# Patient Record
Sex: MALE | Race: WHITE | NOT HISPANIC OR LATINO | Employment: OTHER | URBAN - METROPOLITAN AREA
[De-identification: names, ages, dates, MRNs, and addresses within clinical notes are randomized per-mention and may not be internally consistent; named-entity substitution may affect disease eponyms.]

---

## 2017-01-12 ENCOUNTER — CLINICAL SUPPORT (OUTPATIENT)
Dept: SMOKING CESSATION | Facility: CLINIC | Age: 57
End: 2017-01-12
Payer: COMMERCIAL

## 2017-01-12 DIAGNOSIS — F17.200 NICOTINE DEPENDENCE: Primary | ICD-10-CM

## 2017-01-12 PROCEDURE — 99404 PREV MED CNSL INDIV APPRX 60: CPT | Mod: S$GLB,,,

## 2017-01-12 PROCEDURE — 99999 PR PBB SHADOW E&M-EST. PATIENT-LVL I: CPT | Mod: PBBFAC,,,

## 2017-01-12 RX ORDER — IBUPROFEN 200 MG
1 TABLET ORAL DAILY
Qty: 14 PATCH | Refills: 0 | Status: SHIPPED | OUTPATIENT
Start: 2017-01-12 | End: 2017-01-26

## 2017-01-12 RX ORDER — VARENICLINE TARTRATE 0.5 (11)-1
KIT ORAL
Qty: 1 PACKAGE | Refills: 0 | Status: SHIPPED | OUTPATIENT
Start: 2017-01-12 | End: 2017-02-02 | Stop reason: ALTCHOICE

## 2017-01-12 RX ORDER — IBUPROFEN 200 MG
1 TABLET ORAL DAILY
Qty: 28 PATCH | Refills: 0 | Status: SHIPPED | OUTPATIENT
Start: 2017-01-12 | End: 2017-01-12 | Stop reason: CLARIF

## 2017-01-12 NOTE — Clinical Note
Pt seen at intake today. He currently smokes  20 cigs/day. Discussed tobacco cessation medications of chantix starter pack , pt has quit with Chantix before.   Pt started on rate reduction and wait time of 15 min prior to smoking. Will see pt back in office in 2 weeks. Pt's exhaled carbon monoxide level was 30 ppm as per Smokerlyzer.  Please see Tobacco Cessation Intake Form for patient assessment and recommendations.

## 2017-01-15 VITALS — SYSTOLIC BLOOD PRESSURE: 125 MMHG | DIASTOLIC BLOOD PRESSURE: 75 MMHG

## 2017-01-19 ENCOUNTER — CLINICAL SUPPORT (OUTPATIENT)
Dept: SMOKING CESSATION | Facility: CLINIC | Age: 57
End: 2017-01-19
Payer: COMMERCIAL

## 2017-01-19 DIAGNOSIS — F17.200 NICOTINE DEPENDENCE: Primary | ICD-10-CM

## 2017-01-19 PROCEDURE — 99407 BEHAV CHNG SMOKING > 10 MIN: CPT | Mod: S$GLB,,,

## 2017-01-19 NOTE — PROGRESS NOTES
Phone follow up today. He continues to smoke 20 cigs/day. Pt remains on tobacco cessation medications of chantix starter pack  21 mg nicotine patch QD . No adverse effects/mental changes noted at this time. Reviewed coping strategies/habitual behavior/relapse prevention with patient.  Will see pt back in office in 1 week.

## 2017-02-02 ENCOUNTER — CLINICAL SUPPORT (OUTPATIENT)
Dept: SMOKING CESSATION | Facility: CLINIC | Age: 57
End: 2017-02-02
Payer: COMMERCIAL

## 2017-02-02 DIAGNOSIS — F17.200 NICOTINE DEPENDENCE: ICD-10-CM

## 2017-02-02 PROCEDURE — 99402 PREV MED CNSL INDIV APPRX 30: CPT | Mod: S$GLB,,,

## 2017-02-02 RX ORDER — VARENICLINE TARTRATE 1 MG/1
1 TABLET, FILM COATED ORAL 2 TIMES DAILY
Qty: 60 TABLET | Refills: 1 | Status: SHIPPED | OUTPATIENT
Start: 2017-02-02 | End: 2017-03-02 | Stop reason: SDUPTHER

## 2017-02-02 NOTE — Clinical Note
Pt seen in office today.      He quit smoking 7 days ago.   Pt remains on tobacco cessation medications of   chantix 1 mg BID                           No adverse effects  or mental changes noted at this time.  Pt doing well with rate reduction and wait times prior to smoking.  Reviewed coping strategies/habitual behavior/relapse prevention with patient.  Exhaled carbon monoxide level was 2   per Smokerlyzer.  Will see pt back in office in 2 weeks .  Congratulated patient on his  success .

## 2017-02-02 NOTE — PROGRESS NOTES
Individual Follow-Up Form    2/2/2017    Quit Date:     Clinical Status of Patient: Outpatient    Length of Service: 30 minutes    Continuing Medication: yes  Chantix    Other Medications: none     Target Symptoms: Withdrawal and medication side effects. The following were  rated moderate (3) to severe (4) on TCRS:  · Moderate (3): none  · Severe (4): none    Comments: Pt seen in office today.       He quit smoking 7 days ago.    Pt remains on tobacco cessation medications of    chantix 1 mg BID                            No adverse effects  or mental changes noted at this time.   Pt doing well with rate reduction and wait times prior to smoking.   Reviewed coping strategies/habitual behavior/relapse prevention with patient.   Exhaled carbon monoxide level was 2   per Smokerlyzer.   Will see pt back in office in 1 week .   Congratulated patient on his  success .      Diagnosis: F17.200    Next Visit: 2 weeks

## 2017-03-02 ENCOUNTER — CLINICAL SUPPORT (OUTPATIENT)
Dept: SMOKING CESSATION | Facility: CLINIC | Age: 57
End: 2017-03-02
Payer: COMMERCIAL

## 2017-03-02 DIAGNOSIS — F17.200 NICOTINE DEPENDENCE: Primary | ICD-10-CM

## 2017-03-02 PROCEDURE — 99999 PR PBB SHADOW E&M-EST. PATIENT-LVL I: CPT | Mod: PBBFAC,,,

## 2017-03-02 PROCEDURE — 99402 PREV MED CNSL INDIV APPRX 30: CPT | Mod: S$GLB,,,

## 2017-03-02 RX ORDER — VARENICLINE TARTRATE 1 MG/1
1 TABLET, FILM COATED ORAL 2 TIMES DAILY
Qty: 60 TABLET | Refills: 0 | Status: SHIPPED | OUTPATIENT
Start: 2017-03-02 | End: 2017-06-13

## 2017-03-02 NOTE — PROGRESS NOTES
Individual Follow-Up Form    3/2/2017    Quit Date: 2/1/17    Clinical Status of Patient: Outpatient    Length of Service: 30 minutes    Continuing Medication: yes  Chantix    Other Medications: none     Target Symptoms: Withdrawal and medication side effects. The following were  rated moderate (3) to severe (4) on TCRS:  · Moderate (3): none  · Severe (4): none    Comments: Pt seen in office today.       He quit smoking on 2/1/17.    Pt remains on tobacco cessation medications of    chantix starter 1 mg BID                             No adverse effects  or mental changes noted at this time.    Reviewed coping strategies/habitual behavior/relapse prevention with patient.   Exhaled carbon monoxide level was  0  per Smokerlyzer.   Will see pt back in office in 2 weeks .   Congratulated patient on his success .      Diagnosis: F17.200    Next Visit: 2 weeks

## 2017-03-02 NOTE — Clinical Note
Pt seen in office today.      He quit smoking on 2/1/17.   Pt remains on tobacco cessation medications of   chantix starter 1 mg BID                            No adverse effects  or mental changes noted at this time.   Reviewed coping strategies/habitual behavior/relapse prevention with patient.  Exhaled carbon monoxide level was  0  per Smokerlyzer.  Will see pt back in office in 2 weeks .  Congratulated patient on his success .Gave patient his quit coin.

## 2017-03-20 ENCOUNTER — CLINICAL SUPPORT (OUTPATIENT)
Dept: SMOKING CESSATION | Facility: CLINIC | Age: 57
End: 2017-03-20
Payer: COMMERCIAL

## 2017-03-20 DIAGNOSIS — F17.200 NICOTINE DEPENDENCE: Primary | ICD-10-CM

## 2017-03-20 PROCEDURE — 99406 BEHAV CHNG SMOKING 3-10 MIN: CPT | Mod: S$GLB,,,

## 2017-04-07 ENCOUNTER — HOSPITAL ENCOUNTER (OUTPATIENT)
Dept: PREADMISSION TESTING | Facility: HOSPITAL | Age: 57
Discharge: HOME | End: 2017-04-07
Attending: SURGERY
Payer: COMMERCIAL

## 2017-04-07 VITALS
TEMPERATURE: 98 F | RESPIRATION RATE: 17 BRPM | HEART RATE: 67 BPM | OXYGEN SATURATION: 97 % | BODY MASS INDEX: 20.81 KG/M2 | WEIGHT: 153.63 LBS | DIASTOLIC BLOOD PRESSURE: 88 MMHG | HEIGHT: 72 IN | SYSTOLIC BLOOD PRESSURE: 162 MMHG

## 2017-04-07 DIAGNOSIS — Z01.818 PRE-OP TESTING: Primary | ICD-10-CM

## 2017-04-07 LAB
ANION GAP SERPL CALC-SCNC: 13 MMOL/L
BASOPHILS # BLD AUTO: 0.03 K/UL
BASOPHILS NFR BLD: 0.4 %
BUN SERPL-MCNC: 44 MG/DL
CALCIUM SERPL-MCNC: 9.5 MG/DL
CHLORIDE SERPL-SCNC: 109 MMOL/L
CO2 SERPL-SCNC: 18 MMOL/L
CREAT SERPL-MCNC: 1.9 MG/DL
DIFFERENTIAL METHOD: ABNORMAL
EOSINOPHIL # BLD AUTO: 0.1 K/UL
EOSINOPHIL NFR BLD: 1.5 %
ERYTHROCYTE [DISTWIDTH] IN BLOOD BY AUTOMATED COUNT: 12.4 %
EST. GFR  (AFRICAN AMERICAN): 44 ML/MIN/1.73 M^2
EST. GFR  (NON AFRICAN AMERICAN): 38 ML/MIN/1.73 M^2
GLUCOSE SERPL-MCNC: 92 MG/DL
HCT VFR BLD AUTO: 38.6 %
HGB BLD-MCNC: 13 G/DL
LYMPHOCYTES # BLD AUTO: 1.1 K/UL
LYMPHOCYTES NFR BLD: 14.9 %
MCH RBC QN AUTO: 31.3 PG
MCHC RBC AUTO-ENTMCNC: 33.7 %
MCV RBC AUTO: 93 FL
MONOCYTES # BLD AUTO: 0.6 K/UL
MONOCYTES NFR BLD: 7.9 %
NEUTROPHILS # BLD AUTO: 5.5 K/UL
NEUTROPHILS NFR BLD: 75.2 %
PLATELET # BLD AUTO: 203 K/UL
PMV BLD AUTO: 11.2 FL
POTASSIUM SERPL-SCNC: 4.8 MMOL/L
RBC # BLD AUTO: 4.15 M/UL
SODIUM SERPL-SCNC: 140 MMOL/L
WBC # BLD AUTO: 7.31 K/UL

## 2017-04-07 PROCEDURE — 36415 COLL VENOUS BLD VENIPUNCTURE: CPT

## 2017-04-07 PROCEDURE — 93005 ELECTROCARDIOGRAM TRACING: CPT

## 2017-04-07 PROCEDURE — 85025 COMPLETE CBC W/AUTO DIFF WBC: CPT

## 2017-04-07 PROCEDURE — 80048 BASIC METABOLIC PNL TOTAL CA: CPT

## 2017-04-07 NOTE — IP AVS SNAPSHOT
[place] [address] [place] [address]  Phone: [phone]           Patient Discharge Instructions  Our goal is to set you up for success. This packet includes information on your condition, medications, and your home care. It will help you care for yourself to prevent having to return to the hospital.     Please ask your nurse if you have any questions.      There are many details to remember when preparing for your surgery. Here is what you will need to do, please ask your nurse if there are more specific instructions and if you have any questions:    1. Before procedure Do not smoke or drink alcoholic beverages 24 hours prior to your procedure. Do not eat or drink anything 8 hours before your procedure - this includes gum, mints, and candy.     2. Day of procedure Please remove all jewelry for the procedure. If you wear contact lenses, dentures, hearing aids or glasses, bring a container to put them in during your surgery and give to a family member.  If your doctor has scheduled you for an overnight stay, bring a small overnight bag with any personal items that you need.      3. After procedure  Make arrangements in advance for transportation home by a responsible adult. It is not safe to drive a vehicle during the 24 hours following surgery.     PLEASE NOTE: You may be contacted the day before your surgery to confirm your surgery date and arrival time. The Surgery schedule has many variables which may affect the time of your surgery case. Family members should be available if your surgery time changes.               ** Verify the list of medication(s) below is accurate and up to date. Carry this with you in case of emergency. If your medications have changed, please notify your healthcare provider.             Medication List      TAKE these medications        Additional Info                      allopurinol 100 MG tablet   Commonly known as:  ZYLOPRIM   Quantity:  90 tablet    Refills:  0   Dose:  100 mg    Instructions:  Take 1 tablet (100 mg total) by mouth once daily.     Begin Date    AM    Noon    PM    Bedtime       amlodipine 5 MG tablet   Commonly known as:  NORVASC   Quantity:  90 tablet   Refills:  0    Instructions:  TAKE 1 TABLET(5 MG) BY MOUTH EVERY DAY     Begin Date    AM    Noon    PM    Bedtime       atenolol 50 MG tablet   Commonly known as:  TENORMIN   Quantity:  90 tablet   Refills:  0   Dose:  50 mg    Instructions:  Take 1 tablet (50 mg total) by mouth once daily.     Begin Date    AM    Noon    PM    Bedtime       escitalopram oxalate 10 MG tablet   Commonly known as:  LEXAPRO   Quantity:  90 tablet   Refills:  0   Dose:  10 mg    Instructions:  Take 1 tablet (10 mg total) by mouth once daily.     Begin Date    AM    Noon    PM    Bedtime       gabapentin 400 MG capsule   Commonly known as:  NEURONTIN   Quantity:  180 capsule   Refills:  0   Dose:  400 mg    Instructions:  Take 1 capsule (400 mg total) by mouth 2 (two) times daily.     Begin Date    AM    Noon    PM    Bedtime       hydrocodone-acetaminophen 10-325mg  mg Tab   Commonly known as:  NORCO   Quantity:  45 tablet   Refills:  0   Dose:  1 tablet    Instructions:  Take 1 tablet by mouth every 6 (six) hours as needed.     Begin Date    AM    Noon    PM    Bedtime       lactulose 20 gram Pack   Commonly known as:  CEPHULAC   Quantity:  5 packet   Refills:  0   Dose:  20 g    Instructions:  Take 1 packet (20 g total) by mouth once daily.     Begin Date    AM    Noon    PM    Bedtime       levothyroxine 25 MCG tablet   Commonly known as:  SYNTHROID   Quantity:  30 tablet   Refills:  0   Comments:  Pt needs TSH level    Instructions:  TAKE 1 TABLET(25 MCG) BY MOUTH EVERY DAY     Begin Date    AM    Noon    PM    Bedtime       sodium bicarbonate 325 MG tablet   Refills:  0   Dose:  325 mg    Instructions:  Take 325 mg by mouth 2 (two) times daily.     Begin Date    AM    Noon    PM    Bedtime        varenicline 1 mg Tab   Commonly known as:  CHANTIX CONTINUING MONTH BOX   Quantity:  60 tablet   Refills:  0   Dose:  1 mg    Instructions:  Take 1 tablet (1 mg total) by mouth 2 (two) times daily.     Begin Date    AM    Noon    PM    Bedtime                  Please bring to all follow up appointments:    1. A copy of your discharge instructions.  2. All medicines you are currently taking in their original bottles.  3. Identification and insurance card.    Please arrive 15 minutes ahead of scheduled appointment time.    Please call 24 hours in advance if you must reschedule your appointment and/or time.        Your Scheduled Appointments     Jun 13, 2017  9:00 AM CDT   Ct Renal Stone with [facility] CT1 LIMIT 400 LBS   [facility] ([facility])    [address] [place] [id number] [phone]            Jun 13, 2017 11:00 AM CDT   Established Patient Visit with CHAD Bo MD   [facility] [place] [facility] ([facility])    [address] [facility] [address]  [facility] [id number] [phone]              Your Future Surgeries/Procedures     Apr 12, 2017   Surgery with Estuardo Mack MD   [facility] ([facility])    [address] [place] [id number] [phone]                  Discharge Instructions         Your surgery is scheduled for __WEDNESDAY April 12, 2017__________________.    Call [phone] between 2 p.m. and 5 p.m. on   _TUESDAY April 11, 2017______________ to find out your arrival time for the day of your surgery.      Please report to SAME DAY SURGERY UNIT on the 2nd FLOOR at _______ a.m.  Use front door entrance. The doors open at 0530 am.        INSTRUCTIONS IMPORTANT!!!  ¨ Do not eat or drink after 12 midnight-including water. OK to brush teeth, no   gum, candy or mints!    TAKE  AMLODIPINE,  ATENOLOL,  AND   LEXAPRO  WITH A SIP OF WATER THE AM OF SURGERY        X____  Prep instructions:   SHOWER   OTHER   ______________________  X____  Please shower using Hibiclens soap the night before AND  the morning of  your surgery/procedure. Do not use Hibiclens on your face or genitals               If your surgery is around your belly button (Navel) be sure to wash inside your belly button also. Rinse hibiclens off completely.  X____  No shaving of procedural area at least 4-5 days before surgery due to   increased risk of skin irritation and/or possible infection.  X____  No powder, lotions or creams to your body.  X____  You may wear only deodorant on the day of surgery.  X____  Please remove all jewelry, including piercings and leave at home.  X____  No money or valuables needed. Please leave at home.  You may bring your   cell phone.  X____  If going home the same day, arrange for a ride home. You will not be able to   drive if Anesthesia was used.  X____  Wear loose fitting clothing. Allow for dressings, bandages.  X____  Stop Aspirin, Ibuprofen, Motrin and Aleve at least 3-5 days before    surgery, unless otherwise instructed by your doctor, or the nurse.              You MAY use Tylenol/acetaminophen until day of surgery.  X____  Call MD for temperature above 101 degrees.               I have read or had read and explained to me, and understand the above information.  Additional comments or instructions:Please call   [phone] if you have any questions regarding the instructions above.                 Admission Information     Date & Time Provider Department CSN    4/7/2017 12:30 PM Estuardo Mack MD [facility] [id number]      Care Providers     Provider Role Specialty Primary office phone    Estuardo Mack MD Attending Provider General Surgery [phone]      Your Vitals Were     BP Pulse Temp Resp Height Weight    162/88 (BP Location: Right arm, Patient Position: Sitting, BP Method: Automatic) 67 98 °F (36.7 °C) (Oral) 17 6' (1.829 m) 69.7 kg (153 lb 9.6 oz)    SpO2 BMI             97% 20.83  "kg/m2         Recent Lab Values     No lab values to display.      Allergies as of 4/7/2017        Reactions    Keflex [Cephalexin] Anaphylaxis, Other (See Comments)    UNKNOWN TO PT-" DID NOT AGREE WITH PT"    Percocet [Oxycodone-acetaminophen] Nausea And Vomiting      [facility]     [facility] Nurse Care Line - 24/7 Assistance  Unless otherwise directed by your provider, please contact [facility], our nurse care line that is available for 24/7 assistance.     Registered nurses in the [facility] provide clinical advisement, health education, appointment booking, and other advisory services.  Call for this free service at [phone].        Advance Directives     An advance directive is a document which, in the event you are no longer able to make decisions for yourself, tells your healthcare team what kind of treatment you do or do not want to receive, or who you would like to make those decisions for you.  If you do not currently have an advance directive, [facility] encourages you to create one.  For more information call:  [phone]), [phone] ([phone]),  or log on to [email or web address].        Smoking Cessation     If you would like to quit smoking:   You may be eligible for free services if you are a [place] resident and started smoking cigarettes before September 1, 1988.  Call the [employer]oking [employer]essation [employer] (SCT) toll free at (168) 768-4798 or (451) 698-4504.   Call 6-446-QUIT-NOW if you do not meet the above criteria.   Contact us via email: [email or web address]   View our website for more information: [email or web address]        Language Assistance Services     ATTENTION: Language assistance services are available, free of charge. Please call [phone].      ATENCIÓN: Si habla español, tiene a platt disposición servicios gratuitos de asistencia lingüística. Llame al [phone].     CHÚ Ý: N?u b?n nói Ti?ng Vi?t, có các d?ch v? " h? tr? ngôn ng? mi?n phí mikeh cho b?n. G?i s? [phone].         [facility] complies with applicable Federal civil rights laws and does not discriminate on the basis of race, color, national origin, age, disability, or sex.

## 2017-04-07 NOTE — DISCHARGE INSTRUCTIONS
Your surgery is scheduled for __WEDNESDAY April 12, 2017__________________.    Call [phone] between 2 p.m. and 5 p.m. on   _TUESDAY April 11, 2017______________ to find out your arrival time for the day of your surgery.      Please report to SAME DAY SURGERY UNIT on the 2nd FLOOR at _______ a.m.  Use front door entrance. The doors open at 0530 am.        INSTRUCTIONS IMPORTANT!!!  ¨ Do not eat or drink after 12 midnight-including water. OK to brush teeth, no   gum, candy or mints!    TAKE  AMLODIPINE,  ATENOLOL,   WITH A SIP OF WATER THE AM OF SURGERY        X____  Prep instructions:   SHOWER   OTHER  ______________________  X____  Please shower using Hibiclens soap the night before AND  the morning of  your surgery/procedure. Do not use Hibiclens on your face or genitals               If your surgery is around your belly button (Navel) be sure to wash inside your belly button also. Rinse hibiclens off completely.  X____  No shaving of procedural area at least 4-5 days before surgery due to   increased risk of skin irritation and/or possible infection.  X____  No powder, lotions or creams to your body.  X____  You may wear only deodorant on the day of surgery.  X____  Please remove all jewelry, including piercings and leave at home.  X____  No money or valuables needed. Please leave at home.  You may bring your   cell phone.  X____  If going home the same day, arrange for a ride home. You will not be able to   drive if Anesthesia was used.  X____  Wear loose fitting clothing. Allow for dressings, bandages.  X____  Stop Aspirin, Ibuprofen, Motrin and Aleve at least 3-5 days before    surgery, unless otherwise instructed by your doctor, or the nurse.              You MAY use Tylenol/acetaminophen until day of surgery.  X____  Call MD for temperature above 101 degrees.               I have read or had read and explained to me, and understand the above information.  Additional comments or instructions:Please call    [phone] if you have any questions regarding the instructions above.

## 2017-04-11 ENCOUNTER — ANESTHESIA EVENT (OUTPATIENT)
Dept: SURGERY | Facility: HOSPITAL | Age: 57
End: 2017-04-11
Payer: COMMERCIAL

## 2017-04-11 NOTE — CLINICAL REVIEW
Patient's allergic to keflex (anaphylaxis), called dr Mack's office, dr Hurst said to cancel ancef, give vancomycin 1000mg prior to surgery.

## 2017-04-12 ENCOUNTER — ANESTHESIA (OUTPATIENT)
Dept: SURGERY | Facility: HOSPITAL | Age: 57
End: 2017-04-12
Payer: COMMERCIAL

## 2017-04-12 ENCOUNTER — HOSPITAL ENCOUNTER (OUTPATIENT)
Facility: HOSPITAL | Age: 57
Discharge: HOME | End: 2017-04-15
Attending: SURGERY | Admitting: SURGERY
Payer: COMMERCIAL

## 2017-04-12 DIAGNOSIS — K43.5 PARASTOMAL HERNIA WITHOUT OBSTRUCTION OR GANGRENE: Primary | ICD-10-CM

## 2017-04-12 DIAGNOSIS — N13.30 HYDRONEPHROSIS, BILATERAL: Chronic | ICD-10-CM

## 2017-04-12 PROCEDURE — 63600175 PHARM REV CODE 636 W HCPCS

## 2017-04-12 PROCEDURE — 37000009 HC ANESTHESIA EA ADD 15 MINS: Performed by: SURGERY

## 2017-04-12 PROCEDURE — 25000003 PHARM REV CODE 250: Performed by: ANESTHESIOLOGY

## 2017-04-12 PROCEDURE — 27201423 OPTIME MED/SURG SUP & DEVICES STERILE SUPPLY: Performed by: SURGERY

## 2017-04-12 PROCEDURE — 63600175 PHARM REV CODE 636 W HCPCS: Performed by: SURGERY

## 2017-04-12 PROCEDURE — 25000003 PHARM REV CODE 250: Performed by: SURGERY

## 2017-04-12 PROCEDURE — 63600175 PHARM REV CODE 636 W HCPCS: Performed by: ANESTHESIOLOGY

## 2017-04-12 PROCEDURE — 25000003 PHARM REV CODE 250: Performed by: REGISTERED NURSE

## 2017-04-12 PROCEDURE — 64486 TAP BLOCK UNIL BY INJECTION: CPT | Performed by: ANESTHESIOLOGY

## 2017-04-12 PROCEDURE — 36000709 HC OR TIME LEV III EA ADD 15 MIN: Performed by: SURGERY

## 2017-04-12 PROCEDURE — 37000008 HC ANESTHESIA 1ST 15 MINUTES: Performed by: SURGERY

## 2017-04-12 PROCEDURE — 63600175 PHARM REV CODE 636 W HCPCS: Performed by: REGISTERED NURSE

## 2017-04-12 PROCEDURE — 71000033 HC RECOVERY, INTIAL HOUR: Performed by: SURGERY

## 2017-04-12 PROCEDURE — 36000708 HC OR TIME LEV III 1ST 15 MIN: Performed by: SURGERY

## 2017-04-12 PROCEDURE — 71000039 HC RECOVERY, EACH ADD'L HOUR: Performed by: SURGERY

## 2017-04-12 PROCEDURE — 27800517 HC TRAY,EPIDURAL-CONTINUOUS: Performed by: ANESTHESIOLOGY

## 2017-04-12 PROCEDURE — D9220A PRA ANESTHESIA: Mod: CRNA,,, | Performed by: REGISTERED NURSE

## 2017-04-12 PROCEDURE — D9220A PRA ANESTHESIA: Mod: ANES,,, | Performed by: ANESTHESIOLOGY

## 2017-04-12 DEVICE — IMPLANTABLE DEVICE: Type: IMPLANTABLE DEVICE | Site: ABDOMEN | Status: FUNCTIONAL

## 2017-04-12 RX ORDER — PROPOFOL 10 MG/ML
VIAL (ML) INTRAVENOUS
Status: DISCONTINUED | OUTPATIENT
Start: 2017-04-12 | End: 2017-04-12

## 2017-04-12 RX ORDER — BUPIVACAINE HYDROCHLORIDE 5 MG/ML
INJECTION, SOLUTION EPIDURAL; INTRACAUDAL
Status: DISPENSED
Start: 2017-04-12 | End: 2017-04-13

## 2017-04-12 RX ORDER — FENTANYL CITRATE 50 UG/ML
INJECTION, SOLUTION INTRAMUSCULAR; INTRAVENOUS
Status: DISCONTINUED | OUTPATIENT
Start: 2017-04-12 | End: 2017-04-12

## 2017-04-12 RX ORDER — HYDROMORPHONE HYDROCHLORIDE 2 MG/ML
1 INJECTION, SOLUTION INTRAMUSCULAR; INTRAVENOUS; SUBCUTANEOUS EVERY 4 HOURS PRN
Status: DISCONTINUED | OUTPATIENT
Start: 2017-04-12 | End: 2017-04-13

## 2017-04-12 RX ORDER — ACETAMINOPHEN 10 MG/ML
1000 INJECTION, SOLUTION INTRAVENOUS ONCE
Status: COMPLETED | OUTPATIENT
Start: 2017-04-12 | End: 2017-04-12

## 2017-04-12 RX ORDER — HYDROCODONE BITARTRATE AND ACETAMINOPHEN 5; 325 MG/1; MG/1
1 TABLET ORAL EVERY 4 HOURS PRN
Status: DISCONTINUED | OUTPATIENT
Start: 2017-04-12 | End: 2017-04-13

## 2017-04-12 RX ORDER — HYDROMORPHONE HYDROCHLORIDE 2 MG/ML
INJECTION, SOLUTION INTRAMUSCULAR; INTRAVENOUS; SUBCUTANEOUS
Status: COMPLETED
Start: 2017-04-12 | End: 2017-04-12

## 2017-04-12 RX ORDER — SODIUM CHLORIDE, SODIUM LACTATE, POTASSIUM CHLORIDE, CALCIUM CHLORIDE 600; 310; 30; 20 MG/100ML; MG/100ML; MG/100ML; MG/100ML
INJECTION, SOLUTION INTRAVENOUS CONTINUOUS
Status: DISCONTINUED | OUTPATIENT
Start: 2017-04-12 | End: 2017-04-13

## 2017-04-12 RX ORDER — LIDOCAINE HYDROCHLORIDE 10 MG/ML
1 INJECTION, SOLUTION EPIDURAL; INFILTRATION; INTRACAUDAL; PERINEURAL ONCE AS NEEDED
Status: DISCONTINUED | OUTPATIENT
Start: 2017-04-12 | End: 2017-04-12 | Stop reason: HOSPADM

## 2017-04-12 RX ORDER — HYDROCODONE BITARTRATE AND ACETAMINOPHEN 10; 325 MG/1; MG/1
1 TABLET ORAL EVERY 6 HOURS PRN
Status: DISCONTINUED | OUTPATIENT
Start: 2017-04-12 | End: 2017-04-12

## 2017-04-12 RX ORDER — GLYCOPYRROLATE 0.2 MG/ML
INJECTION INTRAMUSCULAR; INTRAVENOUS
Status: DISCONTINUED | OUTPATIENT
Start: 2017-04-12 | End: 2017-04-12

## 2017-04-12 RX ORDER — PHENYLEPHRINE HYDROCHLORIDE 10 MG/ML
INJECTION INTRAVENOUS
Status: DISCONTINUED | OUTPATIENT
Start: 2017-04-12 | End: 2017-04-12

## 2017-04-12 RX ORDER — LIDOCAINE HCL/EPINEPHRINE/PF 2%-1:200K
VIAL (ML) INJECTION
Status: DISCONTINUED | OUTPATIENT
Start: 2017-04-12 | End: 2017-04-12 | Stop reason: HOSPADM

## 2017-04-12 RX ORDER — SUCCINYLCHOLINE CHLORIDE 20 MG/ML
INJECTION INTRAMUSCULAR; INTRAVENOUS
Status: DISCONTINUED | OUTPATIENT
Start: 2017-04-12 | End: 2017-04-12

## 2017-04-12 RX ORDER — SODIUM CHLORIDE 9 MG/ML
INJECTION, SOLUTION INTRAVENOUS CONTINUOUS
Status: DISCONTINUED | OUTPATIENT
Start: 2017-04-12 | End: 2017-04-13

## 2017-04-12 RX ORDER — MIDAZOLAM HYDROCHLORIDE 1 MG/ML
INJECTION, SOLUTION INTRAMUSCULAR; INTRAVENOUS
Status: DISCONTINUED | OUTPATIENT
Start: 2017-04-12 | End: 2017-04-12

## 2017-04-12 RX ORDER — LORAZEPAM 2 MG/ML
0.25 INJECTION INTRAMUSCULAR ONCE AS NEEDED
Status: DISCONTINUED | OUTPATIENT
Start: 2017-04-12 | End: 2017-04-12 | Stop reason: HOSPADM

## 2017-04-12 RX ORDER — LIDOCAINE HCL/PF 100 MG/5ML
SYRINGE (ML) INTRAVENOUS
Status: DISCONTINUED | OUTPATIENT
Start: 2017-04-12 | End: 2017-04-12

## 2017-04-12 RX ORDER — HYDROMORPHONE HYDROCHLORIDE 2 MG/ML
0.2 INJECTION, SOLUTION INTRAMUSCULAR; INTRAVENOUS; SUBCUTANEOUS EVERY 5 MIN PRN
Status: COMPLETED | OUTPATIENT
Start: 2017-04-12 | End: 2017-04-12

## 2017-04-12 RX ORDER — ROCURONIUM BROMIDE 10 MG/ML
INJECTION, SOLUTION INTRAVENOUS
Status: DISCONTINUED | OUTPATIENT
Start: 2017-04-12 | End: 2017-04-12

## 2017-04-12 RX ORDER — ACETAMINOPHEN 10 MG/ML
INJECTION, SOLUTION INTRAVENOUS
Status: DISPENSED
Start: 2017-04-12 | End: 2017-04-13

## 2017-04-12 RX ORDER — BUPIVACAINE HYDROCHLORIDE 5 MG/ML
INJECTION, SOLUTION PERINEURAL
Status: DISCONTINUED | OUTPATIENT
Start: 2017-04-12 | End: 2017-04-12 | Stop reason: HOSPADM

## 2017-04-12 RX ORDER — HYDROCODONE BITARTRATE AND ACETAMINOPHEN 10; 325 MG/1; MG/1
1 TABLET ORAL EVERY 4 HOURS PRN
Status: DISCONTINUED | OUTPATIENT
Start: 2017-04-12 | End: 2017-04-13

## 2017-04-12 RX ORDER — DIPHENHYDRAMINE HYDROCHLORIDE 50 MG/ML
25 INJECTION INTRAMUSCULAR; INTRAVENOUS EVERY 6 HOURS PRN
Status: DISCONTINUED | OUTPATIENT
Start: 2017-04-12 | End: 2017-04-12 | Stop reason: HOSPADM

## 2017-04-12 RX ORDER — HYDROCODONE BITARTRATE AND ACETAMINOPHEN 10; 325 MG/1; MG/1
1 TABLET ORAL EVERY 6 HOURS PRN
Qty: 50 TABLET | Refills: 0 | Status: SHIPPED | OUTPATIENT
Start: 2017-04-12 | End: 2017-06-13

## 2017-04-12 RX ORDER — ONDANSETRON 2 MG/ML
4 INJECTION INTRAMUSCULAR; INTRAVENOUS EVERY 8 HOURS PRN
Status: DISCONTINUED | OUTPATIENT
Start: 2017-04-12 | End: 2017-04-15 | Stop reason: HOSPADM

## 2017-04-12 RX ORDER — ACETAMINOPHEN 325 MG/1
650 TABLET ORAL EVERY 6 HOURS PRN
Status: DISCONTINUED | OUTPATIENT
Start: 2017-04-12 | End: 2017-04-13

## 2017-04-12 RX ORDER — FENTANYL CITRATE 50 UG/ML
25 INJECTION, SOLUTION INTRAMUSCULAR; INTRAVENOUS EVERY 5 MIN PRN
Status: DISCONTINUED | OUTPATIENT
Start: 2017-04-12 | End: 2017-04-12 | Stop reason: HOSPADM

## 2017-04-12 RX ORDER — ONDANSETRON 2 MG/ML
INJECTION INTRAMUSCULAR; INTRAVENOUS
Status: DISCONTINUED | OUTPATIENT
Start: 2017-04-12 | End: 2017-04-12

## 2017-04-12 RX ORDER — MEPERIDINE HYDROCHLORIDE 50 MG/ML
12.5 INJECTION INTRAMUSCULAR; INTRAVENOUS; SUBCUTANEOUS ONCE AS NEEDED
Status: DISCONTINUED | OUTPATIENT
Start: 2017-04-12 | End: 2017-04-12 | Stop reason: HOSPADM

## 2017-04-12 RX ORDER — NEOSTIGMINE METHYLSULFATE 1 MG/ML
INJECTION, SOLUTION INTRAVENOUS
Status: DISCONTINUED | OUTPATIENT
Start: 2017-04-12 | End: 2017-04-12

## 2017-04-12 RX ADMIN — ROCURONIUM BROMIDE 20 MG: 10 INJECTION, SOLUTION INTRAVENOUS at 01:04

## 2017-04-12 RX ADMIN — MIDAZOLAM HYDROCHLORIDE 2 MG: 1 INJECTION, SOLUTION INTRAMUSCULAR; INTRAVENOUS at 12:04

## 2017-04-12 RX ADMIN — SODIUM CHLORIDE: 0.9 INJECTION, SOLUTION INTRAVENOUS at 06:04

## 2017-04-12 RX ADMIN — EPHEDRINE SULFATE 5 MG: 50 INJECTION, SOLUTION INTRAMUSCULAR; INTRAVENOUS; SUBCUTANEOUS at 02:04

## 2017-04-12 RX ADMIN — ROCURONIUM BROMIDE 20 MG: 10 INJECTION, SOLUTION INTRAVENOUS at 02:04

## 2017-04-12 RX ADMIN — HYDROMORPHONE HYDROCHLORIDE 0.2 MG: 2 INJECTION INTRAMUSCULAR; INTRAVENOUS; SUBCUTANEOUS at 05:04

## 2017-04-12 RX ADMIN — LIDOCAINE HYDROCHLORIDE 80 MG: 20 INJECTION, SOLUTION INTRAVENOUS at 12:04

## 2017-04-12 RX ADMIN — EPHEDRINE SULFATE 10 MG: 50 INJECTION, SOLUTION INTRAMUSCULAR; INTRAVENOUS; SUBCUTANEOUS at 02:04

## 2017-04-12 RX ADMIN — PHENYLEPHRINE HYDROCHLORIDE 100 MCG: 10 INJECTION INTRAVENOUS at 12:04

## 2017-04-12 RX ADMIN — SODIUM CHLORIDE, SODIUM LACTATE, POTASSIUM CHLORIDE, AND CALCIUM CHLORIDE: .6; .31; .03; .02 INJECTION, SOLUTION INTRAVENOUS at 01:04

## 2017-04-12 RX ADMIN — Medication 1000 MG: at 12:04

## 2017-04-12 RX ADMIN — FENTANYL CITRATE 50 MCG: 50 INJECTION INTRAMUSCULAR; INTRAVENOUS at 03:04

## 2017-04-12 RX ADMIN — ROCURONIUM BROMIDE 10 MG: 10 INJECTION, SOLUTION INTRAVENOUS at 02:04

## 2017-04-12 RX ADMIN — HYDROMORPHONE HYDROCHLORIDE 0.2 MG: 2 INJECTION INTRAMUSCULAR; INTRAVENOUS; SUBCUTANEOUS at 04:04

## 2017-04-12 RX ADMIN — NEOSTIGMINE METHYLSULFATE 5 MG: 1 INJECTION INTRAVENOUS at 03:04

## 2017-04-12 RX ADMIN — PROPOFOL 50 MG: 10 INJECTION, EMULSION INTRAVENOUS at 03:04

## 2017-04-12 RX ADMIN — EPHEDRINE SULFATE 10 MG: 50 INJECTION, SOLUTION INTRAMUSCULAR; INTRAVENOUS; SUBCUTANEOUS at 01:04

## 2017-04-12 RX ADMIN — HYDROMORPHONE HYDROCHLORIDE 1 MG: 2 INJECTION INTRAMUSCULAR; INTRAVENOUS; SUBCUTANEOUS at 09:04

## 2017-04-12 RX ADMIN — SODIUM CHLORIDE, SODIUM LACTATE, POTASSIUM CHLORIDE, AND CALCIUM CHLORIDE: .6; .31; .03; .02 INJECTION, SOLUTION INTRAVENOUS at 09:04

## 2017-04-12 RX ADMIN — PROPOFOL 150 MG: 10 INJECTION, EMULSION INTRAVENOUS at 12:04

## 2017-04-12 RX ADMIN — GLYCOPYRROLATE 0.6 MG: 0.2 INJECTION, SOLUTION INTRAMUSCULAR; INTRAVENOUS at 03:04

## 2017-04-12 RX ADMIN — EPHEDRINE SULFATE 5 MG: 50 INJECTION, SOLUTION INTRAMUSCULAR; INTRAVENOUS; SUBCUTANEOUS at 01:04

## 2017-04-12 RX ADMIN — HYDROMORPHONE HYDROCHLORIDE 0.2 MG: 2 INJECTION INTRAMUSCULAR; INTRAVENOUS; SUBCUTANEOUS at 03:04

## 2017-04-12 RX ADMIN — ROCURONIUM BROMIDE 20 MG: 10 INJECTION, SOLUTION INTRAVENOUS at 12:04

## 2017-04-12 RX ADMIN — ONDANSETRON 4 MG: 2 INJECTION, SOLUTION INTRAMUSCULAR; INTRAVENOUS at 02:04

## 2017-04-12 RX ADMIN — SUCCINYLCHOLINE CHLORIDE 120 MG: 20 INJECTION, SOLUTION INTRAMUSCULAR; INTRAVENOUS at 12:04

## 2017-04-12 RX ADMIN — FENTANYL CITRATE 100 MCG: 50 INJECTION INTRAMUSCULAR; INTRAVENOUS at 12:04

## 2017-04-12 RX ADMIN — ROCURONIUM BROMIDE 10 MG: 10 INJECTION, SOLUTION INTRAVENOUS at 01:04

## 2017-04-12 RX ADMIN — ACETAMINOPHEN 1000 MG: 10 INJECTION, SOLUTION INTRAVENOUS at 04:04

## 2017-04-12 RX ADMIN — ROCURONIUM BROMIDE 5 MG: 10 INJECTION, SOLUTION INTRAVENOUS at 12:04

## 2017-04-12 RX ADMIN — HYDROCODONE BITARTRATE AND ACETAMINOPHEN 1 TABLET: 10; 325 TABLET ORAL at 11:04

## 2017-04-12 RX ADMIN — ROCURONIUM BROMIDE 15 MG: 10 INJECTION, SOLUTION INTRAVENOUS at 12:04

## 2017-04-12 RX ADMIN — FENTANYL CITRATE 50 MCG: 50 INJECTION INTRAMUSCULAR; INTRAVENOUS at 01:04

## 2017-04-12 RX ADMIN — FENTANYL CITRATE 50 MCG: 50 INJECTION INTRAMUSCULAR; INTRAVENOUS at 02:04

## 2017-04-12 RX ADMIN — HYDROCODONE BITARTRATE AND ACETAMINOPHEN 1 TABLET: 10; 325 TABLET ORAL at 06:04

## 2017-04-12 NOTE — IP AVS SNAPSHOT
[place] [address] [place] [address]  Phone: [phone]           Patient Discharge Instructions   Our goal is to set you up for success. This packet includes information on your condition, medications, and your home care.  It will help you care for yourself to prevent having to return to the hospital.     Please ask your nurse if you have any questions.      There are many details to remember when preparing to leave the hospital. Here is what you will need to do:    1. Take your medicine. If you are prescribed medications, review your Medication List on the following pages. You may have new medications to  at the pharmacy and others that you'll need to stop taking. Review the instructions for how and when to take your medications. Talk with your doctor or nurses if you are unsure of what to do.     2. Go to your follow-up appointments. Specific follow-up information is listed in the following pages. Your may be contacted by a nurse or clinical provider about future appointments. Be sure we have all of the phone numbers to reach you. Please contact your provider's office if you are unable to make an appointment.     3. Watch for warning signs. Your doctor or nurse will give you detailed warning signs to watch for and when to call for assistance. These instructions may also include educational information about your condition. If you experience any of warning signs to your health, call your doctor.               ** Verify the list of medication(s) below is accurate and up to date. Carry this with you in case of emergency. If your medications have changed, please notify your healthcare provider.             Medication List      CHANGE how you take these medications        Additional Info                      escitalopram oxalate 10 MG tablet   Commonly known as:  LEXAPRO   Quantity:  90 tablet   Refills:  0   Dose:  10 mg   What changed:  when to take this    Instructions:   Take 1 tablet (10 mg total) by mouth once daily.     Begin Date    AM    Noon    PM    Bedtime         CONTINUE taking these medications        Additional Info                      allopurinol 100 MG tablet   Commonly known as:  ZYLOPRIM   Quantity:  90 tablet   Refills:  0   Dose:  100 mg    Instructions:  Take 1 tablet (100 mg total) by mouth once daily.     Begin Date    AM    Noon    PM    Bedtime       amlodipine 5 MG tablet   Commonly known as:  NORVASC   Quantity:  90 tablet   Refills:  0    Instructions:  TAKE 1 TABLET(5 MG) BY MOUTH EVERY DAY     Begin Date    AM    Noon    PM    Bedtime       atenolol 50 MG tablet   Commonly known as:  TENORMIN   Quantity:  90 tablet   Refills:  0   Dose:  50 mg    Instructions:  Take 1 tablet (50 mg total) by mouth once daily.     Begin Date    AM    Noon    PM    Bedtime       gabapentin 400 MG capsule   Commonly known as:  NEURONTIN   Quantity:  180 capsule   Refills:  0   Dose:  400 mg    Instructions:  Take 1 capsule (400 mg total) by mouth 2 (two) times daily.     Begin Date    AM    Noon    PM    Bedtime       hydrocodone-acetaminophen 10-325mg  mg Tab   Commonly known as:  NORCO   Quantity:  50 tablet   Refills:  0   Dose:  1 tablet    Last time this was given:  1 tablet on 4/15/2017 12:49 PM   Instructions:  Take 1 tablet by mouth every 6 (six) hours as needed.     Begin Date    AM    Noon    PM    Bedtime       levothyroxine 25 MCG tablet   Commonly known as:  SYNTHROID   Quantity:  30 tablet   Refills:  0   Comments:  Pt needs TSH level    Instructions:  TAKE 1 TABLET(25 MCG) BY MOUTH EVERY DAY     Begin Date    AM    Noon    PM    Bedtime       sodium bicarbonate 325 MG tablet   Refills:  0   Dose:  325 mg    Instructions:  Take 325 mg by mouth 2 (two) times daily.     Begin Date    AM    Noon    PM    Bedtime       varenicline 1 mg Tab   Commonly known as:  CHANTIX CONTINUING MONTH BOX   Quantity:  60 tablet   Refills:  0   Dose:  1 mg    Instructions:   Take 1 tablet (1 mg total) by mouth 2 (two) times daily.     Begin Date    AM    Noon    PM    Bedtime         STOP taking these medications     lactulose 20 gram Pack   Commonly known as:  CEPHULAC            Where to Get Your Medications      You can get these medications from any pharmacy     Bring a paper prescription for each of these medications     hydrocodone-acetaminophen 10-325mg  mg Tab                  Please bring to all follow up appointments:    1. A copy of your discharge instructions.  2. All medicines you are currently taking in their original bottles.  3. Identification and insurance card.    Please arrive 15 minutes ahead of scheduled appointment time.    Please call 24 hours in advance if you must reschedule your appointment and/or time.        Your Scheduled Appointments     Apr 27, 2017  9:50 AM CDT   Post Op-OCC with Estuardo Mack MD   [facility] ([facility])    [address], [address]  [facility] [address]   [phone]            Jun 13, 2017  9:00 AM CDT   Ct Renal Stone with [facility] CT1 LIMIT 400 LBS   [facility] ([facility])    [address]  [place] [address]   [phone]            Jun 13, 2017 11:00 AM CDT   Established Patient Visit with CHAD Bo MD   [facility] ([facility])    [address]  [place] [address] [phone]              Follow-up Information     Follow up with Estuardo Mack MD On 4/27/2017.    Specialties:  General Surgery, Bariatrics    Why:  Thursday at 9:50am    Contact information:    [address]  [address]  [place] [facility] [place] [address]  [phone]          Follow up with Estuardo Mack MD In 10 days.    Specialties:  General Surgery, Bariatrics    Contact information:    [address]  [place] [facility]  [place] [address]  [phone]          Discharge Instructions     Future Orders    Activity as tolerated     Call  MD for:  difficulty breathing, headache or visual disturbances     Call MD for:  extreme fatigue     Call MD for:  hives     Call MD for:  persistent dizziness or light-headedness     Call MD for:  persistent nausea and vomiting or diarrhea     Call MD for:  persistent nausea and vomiting     Call MD for:  redness, tenderness, or signs of infection (pain, swelling, redness, odor or green/yellow discharge around incision site)     Call MD for:  redness, tenderness, or signs of infection (pain, swelling, redness, odor or green/yellow discharge around incision site)     Call MD for:  severe uncontrolled pain     Call MD for:  severe uncontrolled pain     Call MD for:  temperature >100.4     Call MD for:  temperature >100.4     Diet general     Questions:    Total calories:      Fat restriction, if any:      Protein restriction, if any:      Na restriction, if any:      Fluid restriction:      Additional restrictions:      Diet general     Questions:    Total calories:      Fat restriction, if any:      Protein restriction, if any:      Na restriction, if any:      Fluid restriction:      Additional restrictions:      Lifting restrictions     Comments:    No lifting greater than 10 pounds for 4 weeks    No dressing needed     No dressing needed     Shower on day dressing removed (No bath)         Primary Diagnosis     Your primary diagnosis was:  Parastomal Hernia Without Obstruction Or Gangrene      Admission Information     Date & Time Provider Department CSN    4/12/2017  8:54 AM Estuardo Mack MD [facility] [id number]      Care Providers     Provider Role Specialty Primary office phone    Estuardo Mack MD Attending Provider General Surgery [phone]    Estuardo Mack MD Surgeon  General Surgery [phone]    Jerad Bautista MD Consulting Physician  Anesthesiology [phone]      Your Vitals Were     BP Pulse Temp Resp Height Weight    181/89 (BP Location: Right arm, Patient Position:  "Lying, BP Method: Automatic) 98 98.3 °F (36.8 °C) (Oral) 20 6' (1.829 m) 69.7 kg (153 lb 9 oz)    SpO2 BMI             96% 20.83 kg/m2         Recent Lab Values     No lab values to display.      Allergies as of 4/15/2017        Reactions    Keflex [Cephalexin] Anaphylaxis, Other (See Comments)    UNKNOWN TO PT-" DID NOT AGREE WITH PT"    Percocet [Oxycodone-acetaminophen] Nausea And Vomiting      [facility]     [facility] Nurse Care Line - 24/7 Assistance  Unless otherwise directed by your provider, please contact [facility], our nurse care line that is available for 24/7 assistance.     Registered nurses in the [facility] provide clinical advisement, health education, appointment booking, and other advisory services.  Call for this free service at [phone].        Advance Directives     An advance directive is a document which, in the event you are no longer able to make decisions for yourself, tells your healthcare team what kind of treatment you do or do not want to receive, or who you would like to make those decisions for you.  If you do not currently have an advance directive, [facility] encourages you to create one.  For more information call:  [phone]), [phone] ([phone]),  or log on to [email or web address].        Smoking Cessation     If you would like to quit smoking:   You may be eligible for free services if you are a [place] resident and started smoking cigarettes before September 1, 1988.  Call the Smoking Cessation [employer] (SCT) toll free at ([phone] or ([phone] 466-2874.   Call 2-093-QUIT-NOW if you do not meet the above criteria.   Contact us via email: [email or web address]   View our website for more information: [email or web address]        Language Assistance Services     ATTENTION: Language assistance services are available, free of charge. Please call [phone].      ATENCIÓN: Si joanna ruiz " disposición servicios gratuitos de asistencia lingüística. Wingjuan ramon al [phone].     PLACIDO Ý: N?u b?n nói Ti?ng Vi?t, có các d?ch v? h? tr? ngôn ng? mi?n phí dành cho b?n. G?i s? [phone].         [facility] complies with applicable Federal civil rights laws and does not discriminate on the basis of race, color, national origin, age, disability, or sex.

## 2017-04-12 NOTE — H&P (VIEW-ONLY)
"History & Physical    SUBJECTIVE:     History of Present Illness:  Patient is a 57 y.o. male with previous resection of fistula and recreation of ileal conduit, now with pain and bulge at his urostomy.  Worse with lifting/straining.  Occasional constipation.    Chief Complaint   Patient presents with    Hernia       Review of patient's allergies indicates:   Allergen Reactions    Keflex [cephalexin] Anaphylaxis and Other (See Comments)     UNKNOWN TO PT-" DID NOT AGREE WITH PT"    Percocet [oxycodone-acetaminophen] Nausea And Vomiting       Current Outpatient Prescriptions   Medication Sig Dispense Refill    allopurinol (ZYLOPRIM) 100 MG tablet Take 1 tablet (100 mg total) by mouth once daily. 90 tablet 0    amlodipine (NORVASC) 5 MG tablet TAKE 1 TABLET(5 MG) BY MOUTH EVERY DAY 90 tablet 0    atenolol (TENORMIN) 50 MG tablet Take 1 tablet (50 mg total) by mouth once daily. 90 tablet 0    escitalopram oxalate (LEXAPRO) 10 MG tablet Take 1 tablet (10 mg total) by mouth once daily. 90 tablet 0    gabapentin (NEURONTIN) 400 MG capsule Take 1 capsule (400 mg total) by mouth 2 (two) times daily. 180 capsule 0    hydrocodone-acetaminophen 10-325mg (NORCO)  mg Tab Take 1 tablet by mouth every 6 (six) hours as needed. 45 tablet 0    lactulose (CEPHULAC) 20 gram Pack Take 1 packet (20 g total) by mouth once daily. 5 packet 0    levothyroxine (SYNTHROID) 25 MCG tablet TAKE 1 TABLET(25 MCG) BY MOUTH EVERY DAY 30 tablet 0    sodium bicarbonate 325 MG tablet Take 325 mg by mouth 2 (two) times daily.      varenicline (CHANTIX CONTINUING MONTH BOX) 1 mg Tab Take 1 tablet (1 mg total) by mouth 2 (two) times daily. 60 tablet 0     No current facility-administered medications for this visit.        Past Medical History:   Diagnosis Date    Arthritis     Blood transfusion     Cancer     bladder    Frequency of urination     General anesthetics causing adverse effect in therapeutic use     pt states he wakes up " very violently from anesthesia    History of urostomy     Hypertension     Limited joint range of motion right hip and leg    Mixed anxiety and depressive disorder     Personal history of bladder cancer     Ureteral stent displacement     Urinary retention     Wears glasses      Past Surgical History:   Procedure Laterality Date    ABDOMINAL SURGERY      APPENDECTOMY  12/30/2015    Procedure: APPENDECTOMY;  Surgeon: CHAD Bo MD;  Location: [facility] OR;  Service: Urology;;    bladder removed      CYSTOSCOPY      >1  episodes for bilat ureteral stent exchange    FRACTURE SURGERY      HAND SURGERY      HERNIA REPAIR      amalia    HIP SURGERY  2012    Rt hip septic    MOUTH SURGERY  2000    all teeth extracted    turbt      urostomy       Family History   Problem Relation Age of Onset    Adopted: Yes     Social History   Substance Use Topics    Smoking status: Former Smoker     Packs/day: 0.00     Years: 42.00     Quit date: 2/1/2017    Smokeless tobacco: Never Used    Alcohol use 0.0 oz/week     2 - 4 Cans of beer per week      Comment: occassional        Review of Systems:    Review of Systems   Constitutional: Negative for chills and fever.   HENT: Negative.    Eyes: Negative.    Respiratory: Negative for cough and shortness of breath.    Cardiovascular: Negative for chest pain and palpitations.   Gastrointestinal: Positive for abdominal pain, constipation and diarrhea. Negative for nausea and vomiting.   Musculoskeletal: Negative.    Skin: Negative.    Hematological: Negative.    Psychiatric/Behavioral: Negative.        OBJECTIVE:     Vital Signs (Most Recent)     6' (1.829 m)  67.1 kg (148 lb)     Physical Exam:    Physical Exam   Constitutional: He is oriented to person, place, and time. He appears well-developed and well-nourished.   HENT:   Head: Normocephalic and atraumatic.   Eyes: Pupils are equal, round, and reactive to light. No scleral icterus.   Neck: Normal range of motion. No  thyromegaly present.   Cardiovascular: Normal rate and regular rhythm.    Pulmonary/Chest: Effort normal and breath sounds normal.   Abdominal: Soft. He exhibits no distension. There is no tenderness. A hernia (parastomal hernia containing bowel, reducible) is present.   Neurological: He is alert and oriented to person, place, and time.   Skin: Skin is warm and dry.   Psychiatric: He has a normal mood and affect. Thought content normal.       Laboratory      Diagnostic Results:      ASSESSMENT/PLAN:     Parastomal hernia--explained the higher than normal risk of infection and recurrence associated with these, but he is at risk for strangulation.  He understands and wishes to proceed.  Lap ventral hernia repair, will attempt a modified sugarbaker technique if possible.

## 2017-04-12 NOTE — INTERVAL H&P NOTE
The patient has been examined and the H&P has been reviewed:    I concur with the findings and no changes have occurred since H&P was written.    Anesthesia/Surgery risks, benefits and alternative options discussed and understood by patient/family.          Active Hospital Problems    Diagnosis  POA    Parastomal hernia without obstruction or gangrene [K43.5]  Yes      Resolved Hospital Problems    Diagnosis Date Resolved POA   No resolved problems to display.

## 2017-04-12 NOTE — BRIEF OP NOTE
[facility]  Brief Operative Note     SUMMARY     Surgery Date: 4/12/2017     Surgeon(s) and Role:     * Estuardo Mack MD - Primary    Assisting Surgeon: None    Pre-op Diagnosis:  Parastomal hernia without obstruction or gangrene [K43.5]    Post-op Diagnosis:  Post-Op Diagnosis Codes:     * Parastomal hernia without obstruction or gangrene [K43.5]    Procedure(s) (LRB):  LAPAROSCOPIC PARASTOMAL HERNIA REPAIR WITH MESH (N/A)    Anesthesia: General    Description of the findings of the procedure: dense adhesions of small bowel and omentum to abdominal wall and in hernia defect.  Primary closure, reinforced with strattice.    Findings/Key Components: as above    Estimated Blood Loss: 50 ml         Specimens:   Specimen     None          Discharge Note    SUMMARY     Admit Date: 4/12/2017    Discharge Date and Time:  04/12/2017 3:23 PM    Hospital Course (synopsis of major diagnoses, care, treatment, and services provided during the course of the hospital stay): outpatient procedure, see op note     Final Diagnosis: Post-Op Diagnosis Codes:     * Parastomal hernia without obstruction or gangrene [K43.5]    Disposition: Home or Self Care    Follow Up/Patient Instructions:     Medications:  Reconciled Home Medications:   Current Discharge Medication List      CONTINUE these medications which have CHANGED    Details   hydrocodone-acetaminophen 10-325mg (NORCO)  mg Tab Take 1 tablet by mouth every 6 (six) hours as needed.  Qty: 50 tablet, Refills: 0    Associated Diagnoses: Hydronephrosis, bilateral         CONTINUE these medications which have NOT CHANGED    Details   allopurinol (ZYLOPRIM) 100 MG tablet Take 1 tablet (100 mg total) by mouth once daily.  Qty: 90 tablet, Refills: 0    Associated Diagnoses: Gout synovitis      amlodipine (NORVASC) 5 MG tablet TAKE 1 TABLET(5 MG) BY MOUTH EVERY DAY  Qty: 90 tablet, Refills: 0    Associated Diagnoses: Essential hypertension      atenolol (TENORMIN)  50 MG tablet Take 1 tablet (50 mg total) by mouth once daily.  Qty: 90 tablet, Refills: 0    Associated Diagnoses: Essential hypertension      escitalopram oxalate (LEXAPRO) 10 MG tablet Take 1 tablet (10 mg total) by mouth once daily.  Qty: 90 tablet, Refills: 0    Associated Diagnoses: Mild single current episode of major depressive disorder      gabapentin (NEURONTIN) 400 MG capsule Take 1 capsule (400 mg total) by mouth 2 (two) times daily.  Qty: 180 capsule, Refills: 0    Associated Diagnoses: Neuropathy; Stricture or kinking of ureter      levothyroxine (SYNTHROID) 25 MCG tablet TAKE 1 TABLET(25 MCG) BY MOUTH EVERY DAY  Qty: 30 tablet, Refills: 0    Comments: Pt needs TSH level  Associated Diagnoses: Acquired hypothyroidism      sodium bicarbonate 325 MG tablet Take 325 mg by mouth 2 (two) times daily.      varenicline (CHANTIX CONTINUING MONTH BOX) 1 mg Tab Take 1 tablet (1 mg total) by mouth 2 (two) times daily.  Qty: 60 tablet, Refills: 0    Associated Diagnoses: Nicotine dependence         STOP taking these medications       lactulose (CEPHULAC) 20 gram Pack Comments:   Reason for Stopping:               Discharge Procedure Orders  Diet general     Lifting restrictions   Order Comments: No lifting greater than 10 pounds for 4 weeks     Call MD for:  temperature >100.4     Call MD for:  persistent nausea and vomiting     Call MD for:  severe uncontrolled pain     Call MD for:  difficulty breathing, headache or visual disturbances     Call MD for:  redness, tenderness, or signs of infection (pain, swelling, redness, odor or green/yellow discharge around incision site)     Call MD for:  hives     Call MD for:  persistent dizziness or light-headedness     Call MD for:  extreme fatigue     No dressing needed       Follow-up Information     Follow up In 2 weeks.

## 2017-04-12 NOTE — ANESTHESIA PREPROCEDURE EVALUATION
04/12/2017  Blake Guillory is a 57 y.o., male.    [facility] Anesthesia Evaluation    I have reviewed the Patient Summary Reports.     I have reviewed the Medications.     Review of Systems  Anesthesia Hx:  History of prior surgery of interest to airway management or planning:  Denies Personal Hx of Anesthesia complications.   Social:  Former Smoker    Hematology/Oncology:         -- Anemia: --  Cancer in past history:    Cardiovascular:   Hypertension    Pulmonary:   COPD    Renal/:   Chronic Renal Disease, CRI    Hepatic/GI:  Hepatic/GI Normal    Musculoskeletal:   Arthritis     Endocrine:   Hypothyroidism    Psych:   Psychiatric History          Physical Exam  General:  Well nourished    Airway/Jaw/Neck:  Airway Findings: Mouth Opening: Normal Tongue: Normal  General Airway Assessment: Possible difficult intubation  Mallampati: III  TM Distance: 4 - 6 cm  Jaw/Neck Findings:  Neck ROM: Normal ROM  Neck Findings:      Dental:  Dental Findings: Periodontal disease, Severe   Chest/Lungs:  Chest/Lungs Findings: Normal Respiratory Rate     Heart/Vascular:  Heart Findings: Rate: Normal             Anesthesia Plan  Type of Anesthesia, risks & benefits discussed:  Anesthesia Type:  general  Patient's Preference:   Intra-op Monitoring Plan: standard ASA monitors  Intra-op Monitoring Plan Comments:   Post Op Pain Control Plan:   Post Op Pain Control Plan Comments:   Induction:   IV  Beta Blocker:  Patient is not currently on a Beta-Blocker (No further documentation required).       Informed Consent: Patient understands risks and agrees with Anesthesia plan.  Questions answered. Anesthesia consent signed with patient.  ASA Score: 3     Day of Surgery Review of History & Physical:    H&P update referred to the provider.         Ready For Surgery From Anesthesia Perspective.

## 2017-04-12 NOTE — OR NURSING
Dr sanderson called about pt pain and sats 86% needing O2 (93%) - awaiting call back for possible admit order

## 2017-04-12 NOTE — TRANSFER OF CARE
Anesthesia Transfer of Care Note    Patient: Blake Guillory    Procedure(s) Performed: Procedure(s) (LRB):  LAPAROSCOPIC PARASTOMAL HERNIA REPAIR WITH MESH (N/A)    Patient location: PACU    Anesthesia Type: general    Transport from OR: Transported from OR on room air with adequate spontaneous ventilation    Post pain: adequate analgesia    Post assessment: no apparent anesthetic complications and tolerated procedure well    Post vital signs: stable    Level of consciousness: responds to stimulation    Nausea/Vomiting: no nausea/vomiting    Complications: none          Last vitals:   Visit Vitals    BP (!) 140/66    Pulse 84    Temp 36.7 °C (98.1 °F) (Oral)    Resp 20    Ht 6' (1.829 m)    Wt 69.7 kg (153 lb 9 oz)    SpO2 (!) 93%    BMI 20.83 kg/m2

## 2017-04-12 NOTE — PROGRESS NOTES
Pt arrived to unit via stretcher, transferred to bed independently.  O2 @ 3LNC.  Pt rates pain 10/10.  Oriented to room and call light system.

## 2017-04-12 NOTE — PROGRESS NOTES
Instructed pt on importance of deep breathing.  Incentive spirometer and pillow for splinting at bedside, return demonstration complete.

## 2017-04-12 NOTE — ANESTHESIA PROCEDURE NOTES
Left TAP block    Patient location during procedure: post-op   Block not for primary anesthetic.  Reason for block: at surgeon's request and post-op pain management   Post-op Pain Location: Abdominal pain  Start time: 4/12/2017 3:45 PM  Timeout: 4/12/2017 3:45 PM   End time: 4/12/2017 3:48 PM  Surgery related to: parastomal hernia repair  Staffing  Anesthesiologist: JAMES PHELPS  Performed by: anesthesiologist   Preanesthetic Checklist  Completed: patient identified, site marked, surgical consent, pre-op evaluation, timeout performed, IV checked, risks and benefits discussed and monitors and equipment checked  Peripheral Block  Patient position: supine  Patient monitoring: heart rate, cardiac monitor, continuous pulse ox, continuous capnometry and frequent blood pressure checks  Block type: TAP  Laterality: left  Injection technique: single shot  Needle  Needle type: Stimuplex   Needle gauge: 21 G  Needle length: 4 in  Needle localization: ultrasound guidance   -ultrasound image captured on disc.  Assessment  Injection assessment: negative aspiration  Paresthesia pain: none  Heart rate change: no  Slow fractionated injection: yes  Medications:  Bolus administered: 30 mL of 0.25 bupivacaine  Additional Notes  Bupivacaine 0.25% 30ml left side.  Right side abandoned because of stoma

## 2017-04-13 PROCEDURE — 63600175 PHARM REV CODE 636 W HCPCS: Performed by: ANESTHESIOLOGY

## 2017-04-13 PROCEDURE — 25000003 PHARM REV CODE 250: Performed by: SURGERY

## 2017-04-13 PROCEDURE — G0378 HOSPITAL OBSERVATION PER HR: HCPCS

## 2017-04-13 PROCEDURE — 63600175 PHARM REV CODE 636 W HCPCS: Performed by: SURGERY

## 2017-04-13 RX ORDER — NALOXONE HCL 0.4 MG/ML
0.02 VIAL (ML) INJECTION ONCE
Status: COMPLETED | OUTPATIENT
Start: 2017-04-13 | End: 2017-04-13

## 2017-04-13 RX ORDER — DIAZEPAM 10 MG/2ML
5 INJECTION INTRAMUSCULAR EVERY 4 HOURS PRN
Status: DISCONTINUED | OUTPATIENT
Start: 2017-04-13 | End: 2017-04-13

## 2017-04-13 RX ORDER — DEXTROSE, SODIUM CHLORIDE, SODIUM LACTATE, POTASSIUM CHLORIDE, AND CALCIUM CHLORIDE 5; .6; .31; .03; .02 G/100ML; G/100ML; G/100ML; G/100ML; G/100ML
INJECTION, SOLUTION INTRAVENOUS CONTINUOUS
Status: DISCONTINUED | OUTPATIENT
Start: 2017-04-13 | End: 2017-04-15 | Stop reason: HOSPADM

## 2017-04-13 RX ORDER — OXYCODONE AND ACETAMINOPHEN 5; 325 MG/1; MG/1
1 TABLET ORAL EVERY 4 HOURS PRN
Status: DISCONTINUED | OUTPATIENT
Start: 2017-04-13 | End: 2017-04-14

## 2017-04-13 RX ORDER — DIPHENHYDRAMINE HYDROCHLORIDE 50 MG/ML
12.5 INJECTION INTRAMUSCULAR; INTRAVENOUS EVERY 4 HOURS PRN
Status: DISCONTINUED | OUTPATIENT
Start: 2017-04-13 | End: 2017-04-14

## 2017-04-13 RX ORDER — OXYCODONE AND ACETAMINOPHEN 10; 325 MG/1; MG/1
1 TABLET ORAL EVERY 4 HOURS PRN
Status: DISCONTINUED | OUTPATIENT
Start: 2017-04-13 | End: 2017-04-14

## 2017-04-13 RX ORDER — HYDROMORPHONE HYDROCHLORIDE 2 MG/ML
1 INJECTION, SOLUTION INTRAMUSCULAR; INTRAVENOUS; SUBCUTANEOUS EVERY 4 HOURS PRN
Status: DISCONTINUED | OUTPATIENT
Start: 2017-04-13 | End: 2017-04-13

## 2017-04-13 RX ORDER — HYDROMORPHONE HYDROCHLORIDE 2 MG/ML
1 INJECTION, SOLUTION INTRAMUSCULAR; INTRAVENOUS; SUBCUTANEOUS
Status: DISCONTINUED | OUTPATIENT
Start: 2017-04-13 | End: 2017-04-13

## 2017-04-13 RX ORDER — ENOXAPARIN SODIUM 100 MG/ML
30 INJECTION SUBCUTANEOUS EVERY 24 HOURS
Status: DISCONTINUED | OUTPATIENT
Start: 2017-04-13 | End: 2017-04-15 | Stop reason: HOSPADM

## 2017-04-13 RX ORDER — ONDANSETRON 2 MG/ML
4 INJECTION INTRAMUSCULAR; INTRAVENOUS EVERY 12 HOURS PRN
Status: DISCONTINUED | OUTPATIENT
Start: 2017-04-13 | End: 2017-04-14

## 2017-04-13 RX ORDER — HYDROMORPHONE HCL IN 0.9% NACL 6 MG/30 ML
PATIENT CONTROLLED ANALGESIA SYRINGE INTRAVENOUS CONTINUOUS
Status: DISCONTINUED | OUTPATIENT
Start: 2017-04-13 | End: 2017-04-14

## 2017-04-13 RX ORDER — DIAZEPAM 10 MG/2ML
5 INJECTION INTRAMUSCULAR EVERY 4 HOURS PRN
Status: DISCONTINUED | OUTPATIENT
Start: 2017-04-13 | End: 2017-04-15 | Stop reason: HOSPADM

## 2017-04-13 RX ADMIN — Medication: at 12:04

## 2017-04-13 RX ADMIN — HYDROMORPHONE HYDROCHLORIDE 1 MG: 2 INJECTION INTRAMUSCULAR; INTRAVENOUS; SUBCUTANEOUS at 02:04

## 2017-04-13 RX ADMIN — NALOXONE HYDROCHLORIDE 0.02 MG: 0.4 INJECTION, SOLUTION INTRAMUSCULAR; INTRAVENOUS; SUBCUTANEOUS at 12:04

## 2017-04-13 RX ADMIN — DIAZEPAM 5 MG: 5 INJECTION, SOLUTION INTRAMUSCULAR; INTRAVENOUS at 10:04

## 2017-04-13 RX ADMIN — Medication: at 05:04

## 2017-04-13 RX ADMIN — OXYCODONE HYDROCHLORIDE AND ACETAMINOPHEN 1 TABLET: 10; 325 TABLET ORAL at 09:04

## 2017-04-13 RX ADMIN — DIAZEPAM 5 MG: 5 INJECTION, SOLUTION INTRAMUSCULAR; INTRAVENOUS at 08:04

## 2017-04-13 RX ADMIN — HYDROMORPHONE HYDROCHLORIDE 1 MG: 2 INJECTION INTRAMUSCULAR; INTRAVENOUS; SUBCUTANEOUS at 11:04

## 2017-04-13 RX ADMIN — PROMETHAZINE HYDROCHLORIDE 12.5 MG: 25 INJECTION, SOLUTION INTRAMUSCULAR; INTRAVENOUS at 02:04

## 2017-04-13 RX ADMIN — HYDROMORPHONE HYDROCHLORIDE 1 MG: 2 INJECTION INTRAMUSCULAR; INTRAVENOUS; SUBCUTANEOUS at 06:04

## 2017-04-13 RX ADMIN — SODIUM CHLORIDE: 0.9 INJECTION, SOLUTION INTRAVENOUS at 02:04

## 2017-04-13 RX ADMIN — DEXTROSE, SODIUM CHLORIDE, SODIUM LACTATE, POTASSIUM CHLORIDE, AND CALCIUM CHLORIDE: 5; .6; .31; .03; .02 INJECTION, SOLUTION INTRAVENOUS at 09:04

## 2017-04-13 RX ADMIN — HYDROCODONE BITARTRATE AND ACETAMINOPHEN 1 TABLET: 10; 325 TABLET ORAL at 04:04

## 2017-04-13 RX ADMIN — Medication: at 09:04

## 2017-04-13 NOTE — PLAN OF CARE
Problem: Patient Care Overview  Goal: Plan of Care Review  Outcome: Ongoing (interventions implemented as appropriate)  Pt progressing. Oriented x4. Urostomy functioning. Skin integrity maintained. Pain still not controlled per patient. VS stable. Tolerating liquid diet. IV fluids maintained. Free of falls. Call light in reach. Low bed. No issues during shift. Continue plan of care.        Problem: Fall Risk (Adult)  Goal: Identify Related Risk Factors and Signs and Symptoms  Related risk factors and signs and symptoms are identified upon initiation of Human Response Clinical Practice Guideline (CPG)   Outcome: Ongoing (interventions implemented as appropriate)  Bed alarm on.     Problem: Surgery Nonspecified (Adult)  Goal: Signs and Symptoms of Listed Potential Problems Will be Absent, Minimized or Managed (Surgery Nonspecified)  Signs and symptoms of listed potential problems will be absent, minimized or managed by discharge/transition of care (reference Surgery Nonspecified (Adult) CPG).  Outcome: Ongoing (interventions implemented as appropriate)  multiple dermabond incisions to ABD noted.     Problem: Pain, Acute (Adult)  Goal: Identify Related Risk Factors and Signs and Symptoms  Related risk factors and signs and symptoms are identified upon initiation of Human Response Clinical Practice Guideline (CPG)  Outcome: Ongoing (interventions implemented as appropriate)  Reports severe pain to ABD. Pain not adequately controlled on current medication per patient.

## 2017-04-13 NOTE — PROGRESS NOTES
Pain not controlled.  No flatus.  Not ambulating due to pain.    Vitals:    04/12/17 2020 04/12/17 2327 04/13/17 0412 04/13/17 0730   BP: 133/64 128/69 (!) 156/76 (!) 167/82   BP Location: Right arm Right arm Right arm Right arm   Patient Position: Lying Lying Lying Lying   BP Method: Automatic Automatic Automatic Automatic   Pulse: 69 76 78 82   Resp: 19 18 18 18   Temp: 98 °F (36.7 °C) 98.1 °F (36.7 °C) 98.5 °F (36.9 °C) 98.6 °F (37 °C)   TempSrc: Oral Oral Oral Oral   SpO2: 99% 96% (!) 94% (!) 93%   Weight:       Height:         A/O x 3  RRR  Soft, lashonda tender    A/P s/p parastomal hernia.  Unable to give NSAIDs due to CKD.  Had been avoiding percocet due to adverse reaction, but norco is not sufficient.  Adding valium for muscle spasm and will consult anesthesiology for pain control

## 2017-04-13 NOTE — PLAN OF CARE
Problem: Fall Risk (Adult)  Intervention: Safety Promotion/Fall Prevention    04/12/17 2124   Safety Interventions   Safety Promotion/Fall Prevention bed alarm set;side rails raised x 2         Goal: Absence of Falls  Patient will demonstrate the desired outcomes by discharge/transition of care.   Outcome: Ongoing (interventions implemented as appropriate)    04/13/17 1836   Fall Risk (Adult)   Absence of Falls making progress toward outcome         Problem: Pain, Acute (Adult)  Intervention: Mutually Develop/Implement Acute Pain Management Plan    04/13/17 1836   Pain/Comfort/Sleep Interventions   Pain Management Interventions pain management plan reviewed with patient/caregiver;pain pump in use       Intervention: Support/Optimize Psychosocial Response to Acute Pain    04/13/17 1836   Coping/Psychosocial Interventions   Supportive Measures positive reinforcement provided;relaxation techniques promoted;self-care encouraged   Psychosocial Support   Family/Support System Care caregiver stress acknowledged;presence promoted         Goal: Acceptable Pain Control/Comfort Level  Patient will demonstrate the desired outcomes by discharge/transition of care.   Outcome: Ongoing (interventions implemented as appropriate)    04/13/17 1836   Pain, Acute (Adult)   Acceptable Pain Control/Comfort Level making progress toward outcome

## 2017-04-13 NOTE — PROGRESS NOTES
WRITTEN HEALTHCARE DISCHARGE INFORMATION     If you are unable to make your follow up appointments, please call the number listed and reschedule this appointment.     After discharge, if you need assistance, you can call [facility] On Call Nurse Care Line for 24/7 assistance at [phone]    Thank you for choosing [facility] and allowing us to care for you.   From your care management team: Urvashi Carmichael [phone] or [phone]     You should receive a call from [facility] Discharge Department within 48-72 hours to help manage your care after discharge. Please try to make sure that you answer your phone for this important phone call.     Follow-up Information     Follow up with Estuardo Mack MD On 4/27/2017.    Specialties:  General Surgery, Bariatrics    Why:  Thursday at 9:50am    Contact information:    [address] [facility]  [address] [place] [address] SURGICAL [facility]  [place] [address]  [phone]

## 2017-04-13 NOTE — PROGRESS NOTES
"Call placed to Dr. Mack's office [phone], spoke to Dina. Follow up appointment for patient is scheduled for Thursday, April 27th at 9:50am. All information added to "follow up" tab.        "

## 2017-04-13 NOTE — ANESTHESIA POSTPROCEDURE EVALUATION
Anesthesia Post Evaluation    Patient: Blake Guillory    Procedure(s) Performed: Procedure(s) (LRB):  LAPAROSCOPIC PARASTOMAL HERNIA REPAIR WITH MESH (N/A)    Final Anesthesia Type: general  Patient location during evaluation: PACU  Patient participation: Yes- Able to Participate  Level of consciousness: awake and alert and oriented  Post-procedure vital signs: reviewed and stable  Pain management: adequate  Airway patency: patent  PONV status at discharge: No PONV  Anesthetic complications: no      Cardiovascular status: blood pressure returned to baseline and hemodynamically stable  Respiratory status: unassisted, spontaneous ventilation and room air  Hydration status: euvolemic  Follow-up not needed.        Visit Vitals    BP (!) 140/70 (BP Location: Right arm, Patient Position: Lying, BP Method: Automatic)    Pulse 80    Temp 36.7 °C (98 °F) (Oral)    Resp 19    Ht 6' (1.829 m)    Wt 69.7 kg (153 lb 9 oz)    SpO2 97%    BMI 20.83 kg/m2       Pain/Gissel Score: Pain Assessment Performed: Yes (4/12/2017  9:05 AM)  Presence of Pain: complains of pain/discomfort (4/12/2017  5:50 PM)  Pain Rating Prior to Med Admin: 7 (4/12/2017  6:36 PM)  Gissel Score: 9 (4/12/2017  5:50 PM)

## 2017-04-13 NOTE — PLAN OF CARE
04/13/17 1112   Discharge Assessment   Assessment Type Discharge Planning Assessment   Confirmed/corrected address and phone number on facesheet? Yes   Assessment information obtained from? Other  (Spouse, Luke Guillory)   Communicated expected length of stay with patient/caregiver no   Type of Healthcare Directive Received Durable power of  for health care  (Spouse, Luke Guillory)   If Healthcare Directive is received, is it scanned into Epic? no (comment)  (Spouse encouraged to bring to hospital)   Prior to hospitilization cognitive status: Alert/Oriented   Prior to hospitalization functional status: Independent   Current cognitive status: Alert/Oriented   Current Functional Status: Independent   Arrived From home or self-care   Lives With spouse  (Luke Guillory)   Able to Return to Prior Arrangements yes   Is patient able to care for self after discharge? Yes   How many people do you have in your home that can help with your care after discharge? 1   Who are your caregiver(s) and their phone number(s)? Spouse, Luke Guillory   Patient's perception of discharge disposition home or selfcare   Readmission Within The Last 30 Days no previous admission in last 30 days   Patient currently being followed by outpatient case management? No   Patient currently receives home health services? No   Does the patient currently use HME? No   Patient currently receives private duty nursing? No   Patient currently receives any other outside agency services? No   Equipment Currently Used at Home none   Do you have any problems affording any of your prescribed medications? No   Is the patient taking medications as prescribed? yes   Do you have any financial concerns preventing you from receiving the healthcare you need? No   Does the patient have transportation to healthcare appointments? Yes   Transportation Available car;family or friend will provide   On Dialysis? No   Does the patient receive services at the Coumadin Clinic?  No   Are there any open cases? No   Discharge Plan A Home with family   Discharge Plan B Home with family   Patient/Family In Agreement With Plan yes   TN explained duties of Case Management to patient. Contact information added to white board, TN explained process to contact TN for any additional questions or concerns. Blue folder given to patient. She was informed to leave folder at bedside and we will add any needed paperwork to folder during hospital stay.     [facility] [id number] - [place] [facility]  [address] AT [facility] [address]  [address] [place] [address]  Phone: [phone] Fax: [phone]    [facility] [address] [facility]  [address] [facility] [address]  Phone: [phone] Fax: [phone]

## 2017-04-14 PROCEDURE — 27000221 HC OXYGEN, UP TO 24 HOURS

## 2017-04-14 PROCEDURE — 63600175 PHARM REV CODE 636 W HCPCS: Performed by: SURGERY

## 2017-04-14 PROCEDURE — 94761 N-INVAS EAR/PLS OXIMETRY MLT: CPT

## 2017-04-14 PROCEDURE — 25000003 PHARM REV CODE 250: Performed by: SURGERY

## 2017-04-14 PROCEDURE — 63600175 PHARM REV CODE 636 W HCPCS: Performed by: ANESTHESIOLOGY

## 2017-04-14 PROCEDURE — G0378 HOSPITAL OBSERVATION PER HR: HCPCS

## 2017-04-14 RX ORDER — HYDROCODONE BITARTRATE AND ACETAMINOPHEN 7.5; 325 MG/1; MG/1
1 TABLET ORAL EVERY 6 HOURS PRN
Status: DISCONTINUED | OUTPATIENT
Start: 2017-04-14 | End: 2017-04-14 | Stop reason: ALTCHOICE

## 2017-04-14 RX ORDER — OXYCODONE AND ACETAMINOPHEN 7.5; 325 MG/1; MG/1
1 TABLET ORAL EVERY 4 HOURS PRN
Status: DISCONTINUED | OUTPATIENT
Start: 2017-04-14 | End: 2017-04-14

## 2017-04-14 RX ORDER — HYDROCODONE BITARTRATE AND ACETAMINOPHEN 10; 325 MG/1; MG/1
1 TABLET ORAL EVERY 6 HOURS PRN
Status: DISCONTINUED | OUTPATIENT
Start: 2017-04-14 | End: 2017-04-14

## 2017-04-14 RX ORDER — HYDROCODONE BITARTRATE AND ACETAMINOPHEN 10; 325 MG/1; MG/1
1 TABLET ORAL EVERY 4 HOURS PRN
Status: DISCONTINUED | OUTPATIENT
Start: 2017-04-14 | End: 2017-04-15 | Stop reason: HOSPADM

## 2017-04-14 RX ORDER — MORPHINE SULFATE 10 MG/ML
2 INJECTION INTRAMUSCULAR; INTRAVENOUS; SUBCUTANEOUS EVERY 4 HOURS PRN
Status: DISCONTINUED | OUTPATIENT
Start: 2017-04-14 | End: 2017-04-15 | Stop reason: HOSPADM

## 2017-04-14 RX ADMIN — HYDROCODONE BITARTRATE AND ACETAMINOPHEN 1 TABLET: 10; 325 TABLET ORAL at 10:04

## 2017-04-14 RX ADMIN — Medication: at 01:04

## 2017-04-14 RX ADMIN — DEXTROSE, SODIUM CHLORIDE, SODIUM LACTATE, POTASSIUM CHLORIDE, AND CALCIUM CHLORIDE: 5; .6; .31; .03; .02 INJECTION, SOLUTION INTRAVENOUS at 08:04

## 2017-04-14 RX ADMIN — PROMETHAZINE HYDROCHLORIDE 12.5 MG: 25 INJECTION, SOLUTION INTRAMUSCULAR; INTRAVENOUS at 08:04

## 2017-04-14 RX ADMIN — Medication: at 06:04

## 2017-04-14 RX ADMIN — HYDROCODONE BITARTRATE AND ACETAMINOPHEN 1 TABLET: 10; 325 TABLET ORAL at 06:04

## 2017-04-14 RX ADMIN — MORPHINE SULFATE 2 MG: 10 INJECTION INTRAVENOUS at 04:04

## 2017-04-14 RX ADMIN — DIAZEPAM 5 MG: 5 INJECTION, SOLUTION INTRAMUSCULAR; INTRAVENOUS at 05:04

## 2017-04-14 RX ADMIN — MORPHINE SULFATE 2 MG: 10 INJECTION INTRAVENOUS at 08:04

## 2017-04-14 RX ADMIN — HYDROCODONE BITARTRATE AND ACETAMINOPHEN 1 TABLET: 7.5; 325 TABLET ORAL at 12:04

## 2017-04-14 NOTE — PROGRESS NOTES
Pt is agitated and not cooperating with prescribed care goals because of pain  Has not passed any flatus  abd is distended slightly    I will get anesthesia to re-evaluate him

## 2017-04-14 NOTE — PLAN OF CARE
Problem: Fall Risk (Adult)  Goal: Identify Related Risk Factors and Signs and Symptoms  Related risk factors and signs and symptoms are identified upon initiation of Human Response Clinical Practice Guideline (CPG)   Outcome: Ongoing (interventions implemented as appropriate)    04/14/17 0545   Fall Risk   Related Risk Factors (Fall Risk) polypharmacy;environment unfamiliar   Signs and Symptoms (Fall Risk) presence of risk factors       Goal: Absence of Falls  Patient will demonstrate the desired outcomes by discharge/transition of care.   Outcome: Ongoing (interventions implemented as appropriate)    04/14/17 0545   Fall Risk (Adult)   Absence of Falls making progress toward outcome         Problem: Pain, Acute (Adult)  Goal: Identify Related Risk Factors and Signs and Symptoms  Related risk factors and signs and symptoms are identified upon initiation of Human Response Clinical Practice Guideline (CPG)   Outcome: Ongoing (interventions implemented as appropriate)    04/14/17 0545   Pain, Acute   Related Risk Factors (Acute Pain) surgery;disease process   Signs and Symptoms (Acute Pain) verbalization of pain descriptors       Goal: Acceptable Pain Control/Comfort Level  Patient will demonstrate the desired outcomes by discharge/transition of care.   Outcome: Ongoing (interventions implemented as appropriate)    04/14/17 0545   Pain, Acute (Adult)   Acceptable Pain Control/Comfort Level making progress toward outcome         Problem: Pressure Ulcer Risk (Juan Scale) (Adult,Obstetrics,Pediatric)  Goal: Identify Related Risk Factors and Signs and Symptoms  Related risk factors and signs and symptoms are identified upon initiation of Human Response Clinical Practice Guideline (CPG)   Outcome: Ongoing (interventions implemented as appropriate)    04/14/17 0545   Pressure Ulcer Risk (Juan Scale)   Related Risk Factors (Pressure Ulcer Risk (Juan Scale)) tissue perfusion altered;medication       Goal: Skin  Integrity  Patient will demonstrate the desired outcomes by discharge/transition of care.   Outcome: Ongoing (interventions implemented as appropriate)    04/14/17 0520   Pressure Ulcer Risk (Juan Scale) (Adult,Obstetrics,Pediatric)   Skin Integrity making progress toward outcome

## 2017-04-15 VITALS
WEIGHT: 153.56 LBS | SYSTOLIC BLOOD PRESSURE: 181 MMHG | TEMPERATURE: 98 F | BODY MASS INDEX: 20.8 KG/M2 | RESPIRATION RATE: 20 BRPM | HEIGHT: 72 IN | HEART RATE: 98 BPM | OXYGEN SATURATION: 96 % | DIASTOLIC BLOOD PRESSURE: 89 MMHG

## 2017-04-15 PROCEDURE — 63600175 PHARM REV CODE 636 W HCPCS: Performed by: SURGERY

## 2017-04-15 PROCEDURE — G0378 HOSPITAL OBSERVATION PER HR: HCPCS

## 2017-04-15 PROCEDURE — 25000003 PHARM REV CODE 250: Performed by: SURGERY

## 2017-04-15 RX ORDER — HYDROCODONE BITARTRATE AND ACETAMINOPHEN 10; 325 MG/1; MG/1
1 TABLET ORAL EVERY 6 HOURS PRN
Status: DISCONTINUED | OUTPATIENT
Start: 2017-04-15 | End: 2017-04-15 | Stop reason: HOSPADM

## 2017-04-15 RX ADMIN — MORPHINE SULFATE 2 MG: 10 INJECTION INTRAVENOUS at 04:04

## 2017-04-15 RX ADMIN — HYDROCODONE BITARTRATE AND ACETAMINOPHEN 1 TABLET: 10; 325 TABLET ORAL at 12:04

## 2017-04-15 RX ADMIN — MORPHINE SULFATE 2 MG: 10 INJECTION INTRAVENOUS at 12:04

## 2017-04-15 RX ADMIN — HYDROCODONE BITARTRATE AND ACETAMINOPHEN 1 TABLET: 10; 325 TABLET ORAL at 06:04

## 2017-04-15 RX ADMIN — MORPHINE SULFATE 2 MG: 10 INJECTION INTRAVENOUS at 08:04

## 2017-04-15 RX ADMIN — HYDROCODONE BITARTRATE AND ACETAMINOPHEN 1 TABLET: 10; 325 TABLET ORAL at 02:04

## 2017-04-15 NOTE — PLAN OF CARE
Problem: Fall Risk (Adult)  Goal: Identify Related Risk Factors and Signs and Symptoms  Related risk factors and signs and symptoms are identified upon initiation of Human Response Clinical Practice Guideline (CPG)   Outcome: Ongoing (interventions implemented as appropriate)    04/14/17 0545   Fall Risk   Related Risk Factors (Fall Risk) polypharmacy;environment unfamiliar   Signs and Symptoms (Fall Risk) presence of risk factors       Goal: Absence of Falls  Patient will demonstrate the desired outcomes by discharge/transition of care.   Outcome: Ongoing (interventions implemented as appropriate)    04/15/17 0409   Fall Risk (Adult)   Absence of Falls making progress toward outcome         Problem: Pain, Acute (Adult)  Goal: Identify Related Risk Factors and Signs and Symptoms  Related risk factors and signs and symptoms are identified upon initiation of Human Response Clinical Practice Guideline (CPG)   Outcome: Ongoing (interventions implemented as appropriate)    04/14/17 0545   Pain, Acute   Related Risk Factors (Acute Pain) surgery;disease process   Signs and Symptoms (Acute Pain) verbalization of pain descriptors       Goal: Acceptable Pain Control/Comfort Level  Patient will demonstrate the desired outcomes by discharge/transition of care.   Outcome: Ongoing (interventions implemented as appropriate)    04/15/17 0409   Pain, Acute (Adult)   Acceptable Pain Control/Comfort Level making progress toward outcome

## 2017-04-15 NOTE — NURSING
Patient transported in wheelchair to his vehicle that his wife is driving with being discharged from hospital..  Patient AAO.x3   Patient and his spouse verbalized understanding of instruction of discharge and has prescriptions with them.  Patient in stable condition.

## 2017-04-16 NOTE — DISCHARGE SUMMARY
DATE OF ADMISSION:  04/12/2017    DATE OF DISCHARGE:  04/15/2017    ADMITTING DIAGNOSIS:  Parastomal hernia.    DISCHARGE DIAGNOSIS:  Parastomal hernia.    OPERATIVE PROCEDURE:  Laparoscopic repair of parastomal hernia.    HOSPITAL COURSE:  This is a 57-year-old male, who was admitted for elective   repair of a parastomal hernia.  On the day of admission, he was taken to the   Operating Room, where under general anesthesia, the above operative procedures   were carried out.  Postoperatively, he has done well.  At the time of discharge,   he was afebrile, tolerating diet, ambulatory, given a prescription for Norco   for pain, resuming his regular diet, no heavy lifting, will be followed up in   the clinic in approximately 10 days for further evaluation.      BUDDY/  dd: 04/16/2017 09:21:57 (CDT)  td: 04/16/2017 13:31:02 (CDT)  Doc ID   #[id number]  Job ID #[id number]    CC:

## 2017-04-17 ENCOUNTER — HOSPITAL ENCOUNTER (INPATIENT)
Facility: HOSPITAL | Age: 57
LOS: 8 days | Discharge: HOME | DRG: 388 | End: 2017-04-25
Payer: COMMERCIAL

## 2017-04-17 DIAGNOSIS — J44.1 COPD EXACERBATION: ICD-10-CM

## 2017-04-17 DIAGNOSIS — N17.9 ACUTE RENAL FAILURE, UNSPECIFIED ACUTE RENAL FAILURE TYPE: ICD-10-CM

## 2017-04-17 DIAGNOSIS — J44.9 CHRONIC OBSTRUCTIVE PULMONARY DISEASE, UNSPECIFIED COPD TYPE: ICD-10-CM

## 2017-04-17 DIAGNOSIS — K56.609 SMALL BOWEL OBSTRUCTION: Primary | ICD-10-CM

## 2017-04-17 LAB
ALBUMIN SERPL BCP-MCNC: 3.6 G/DL
ALLENS TEST: ABNORMAL
ALP SERPL-CCNC: 66 U/L
ALT SERPL W/O P-5'-P-CCNC: 21 U/L
ANION GAP SERPL CALC-SCNC: 22 MMOL/L
AST SERPL-CCNC: 21 U/L
BASOPHILS NFR BLD: 0 %
BILIRUB SERPL-MCNC: 0.7 MG/DL
BUN SERPL-MCNC: 93 MG/DL
CALCIUM SERPL-MCNC: 8.5 MG/DL
CHLORIDE SERPL-SCNC: 88 MMOL/L
CO2 SERPL-SCNC: 30 MMOL/L
CREAT SERPL-MCNC: 5.6 MG/DL
CREAT UR-MCNC: 126.7 MG/DL
DELSYS: ABNORMAL
DIFFERENTIAL METHOD: ABNORMAL
EOSINOPHIL NFR BLD: 0 %
ERYTHROCYTE [DISTWIDTH] IN BLOOD BY AUTOMATED COUNT: 12.5 %
EST. GFR  (AFRICAN AMERICAN): 12 ML/MIN/1.73 M^2
EST. GFR  (NON AFRICAN AMERICAN): 10 ML/MIN/1.73 M^2
FLOW: 3
GLUCOSE SERPL-MCNC: 124 MG/DL
HCO3 UR-SCNC: 30.4 MMOL/L (ref 24–28)
HCT VFR BLD AUTO: 37.3 %
HGB BLD-MCNC: 12.8 G/DL
LIPASE SERPL-CCNC: 6 U/L
LYMPHOCYTES NFR BLD: 12 %
MCH RBC QN AUTO: 31.8 PG
MCHC RBC AUTO-ENTMCNC: 34.3 %
MCV RBC AUTO: 93 FL
MODE: ABNORMAL
MONOCYTES NFR BLD: 7 %
NEUTROPHILS NFR BLD: 44 %
NEUTS BAND NFR BLD MANUAL: 37 %
PCO2 BLDA: 42.5 MMHG (ref 35–45)
PH SMN: 7.46 [PH] (ref 7.35–7.45)
PHOSPHATE SERPL-MCNC: 7.9 MG/DL
PLATELET # BLD AUTO: 291 K/UL
PMV BLD AUTO: 11.2 FL
PO2 BLDA: 54 MMHG (ref 80–100)
POC BE: 6 MMOL/L
POC SATURATED O2: 89 % (ref 95–100)
POC TCO2: 32 MMOL/L (ref 23–27)
POTASSIUM SERPL-SCNC: 3.4 MMOL/L
PROT SERPL-MCNC: 7.9 G/DL
PTH-INTACT SERPL-MCNC: 493.4 PG/ML
RBC # BLD AUTO: 4.02 M/UL
SAMPLE: ABNORMAL
SITE: ABNORMAL
SODIUM SERPL-SCNC: 140 MMOL/L
SODIUM UR-SCNC: 98 MMOL/L
WBC # BLD AUTO: 6 K/UL
WBC TOXIC VACUOLES BLD QL SMEAR: PRESENT

## 2017-04-17 PROCEDURE — 84300 ASSAY OF URINE SODIUM: CPT

## 2017-04-17 PROCEDURE — 36415 COLL VENOUS BLD VENIPUNCTURE: CPT

## 2017-04-17 PROCEDURE — 12000002 HC ACUTE/MED SURGE SEMI-PRIVATE ROOM

## 2017-04-17 PROCEDURE — 96375 TX/PRO/DX INJ NEW DRUG ADDON: CPT

## 2017-04-17 PROCEDURE — 82803 BLOOD GASES ANY COMBINATION: CPT

## 2017-04-17 PROCEDURE — 63600175 PHARM REV CODE 636 W HCPCS

## 2017-04-17 PROCEDURE — 85007 BL SMEAR W/DIFF WBC COUNT: CPT

## 2017-04-17 PROCEDURE — 25000003 PHARM REV CODE 250

## 2017-04-17 PROCEDURE — 99285 EMERGENCY DEPT VISIT HI MDM: CPT

## 2017-04-17 PROCEDURE — 25000242 PHARM REV CODE 250 ALT 637 W/ HCPCS

## 2017-04-17 PROCEDURE — 94640 AIRWAY INHALATION TREATMENT: CPT

## 2017-04-17 PROCEDURE — 80053 COMPREHEN METABOLIC PANEL: CPT

## 2017-04-17 PROCEDURE — 25000003 PHARM REV CODE 250: Performed by: INTERNAL MEDICINE

## 2017-04-17 PROCEDURE — 36600 WITHDRAWAL OF ARTERIAL BLOOD: CPT

## 2017-04-17 PROCEDURE — 96361 HYDRATE IV INFUSION ADD-ON: CPT | Mod: 59

## 2017-04-17 PROCEDURE — 25000242 PHARM REV CODE 250 ALT 637 W/ HCPCS: Performed by: INTERNAL MEDICINE

## 2017-04-17 PROCEDURE — 96365 THER/PROPH/DIAG IV INF INIT: CPT

## 2017-04-17 PROCEDURE — 82570 ASSAY OF URINE CREATININE: CPT

## 2017-04-17 PROCEDURE — 85027 COMPLETE CBC AUTOMATED: CPT

## 2017-04-17 PROCEDURE — 83690 ASSAY OF LIPASE: CPT

## 2017-04-17 PROCEDURE — 84100 ASSAY OF PHOSPHORUS: CPT

## 2017-04-17 PROCEDURE — C9113 INJ PANTOPRAZOLE SODIUM, VIA: HCPCS

## 2017-04-17 PROCEDURE — 83970 ASSAY OF PARATHORMONE: CPT

## 2017-04-17 RX ORDER — SODIUM CHLORIDE 0.9 % (FLUSH) 0.9 %
3 SYRINGE (ML) INJECTION EVERY 8 HOURS
Status: DISCONTINUED | OUTPATIENT
Start: 2017-04-17 | End: 2017-04-25 | Stop reason: HOSPADM

## 2017-04-17 RX ORDER — GABAPENTIN 400 MG/1
400 CAPSULE ORAL 2 TIMES DAILY
Status: DISCONTINUED | OUTPATIENT
Start: 2017-04-17 | End: 2017-04-25 | Stop reason: HOSPADM

## 2017-04-17 RX ORDER — MORPHINE SULFATE 10 MG/ML
5 INJECTION INTRAMUSCULAR; INTRAVENOUS; SUBCUTANEOUS
Status: COMPLETED | OUTPATIENT
Start: 2017-04-17 | End: 2017-04-17

## 2017-04-17 RX ORDER — SODIUM BICARBONATE 325 MG/1
325 TABLET ORAL 2 TIMES DAILY
Status: DISCONTINUED | OUTPATIENT
Start: 2017-04-17 | End: 2017-04-18

## 2017-04-17 RX ORDER — PANTOPRAZOLE SODIUM 40 MG/10ML
40 INJECTION, POWDER, LYOPHILIZED, FOR SOLUTION INTRAVENOUS
Status: COMPLETED | OUTPATIENT
Start: 2017-04-17 | End: 2017-04-17

## 2017-04-17 RX ORDER — PANTOPRAZOLE SODIUM 40 MG/10ML
40 INJECTION, POWDER, LYOPHILIZED, FOR SOLUTION INTRAVENOUS DAILY
Status: DISCONTINUED | OUTPATIENT
Start: 2017-04-18 | End: 2017-04-25 | Stop reason: HOSPADM

## 2017-04-17 RX ORDER — VARENICLINE TARTRATE 0.5 MG/1
1 TABLET, FILM COATED ORAL 2 TIMES DAILY
Status: DISCONTINUED | OUTPATIENT
Start: 2017-04-17 | End: 2017-04-25 | Stop reason: HOSPADM

## 2017-04-17 RX ORDER — SODIUM CHLORIDE 9 MG/ML
1000 INJECTION, SOLUTION INTRAVENOUS
Status: COMPLETED | OUTPATIENT
Start: 2017-04-17 | End: 2017-04-17

## 2017-04-17 RX ORDER — ATENOLOL 50 MG/1
50 TABLET ORAL DAILY
Status: DISCONTINUED | OUTPATIENT
Start: 2017-04-17 | End: 2017-04-25 | Stop reason: HOSPADM

## 2017-04-17 RX ORDER — IPRATROPIUM BROMIDE AND ALBUTEROL SULFATE 2.5; .5 MG/3ML; MG/3ML
3 SOLUTION RESPIRATORY (INHALATION) EVERY 6 HOURS
Status: DISCONTINUED | OUTPATIENT
Start: 2017-04-17 | End: 2017-04-19

## 2017-04-17 RX ORDER — SODIUM CHLORIDE 9 MG/ML
INJECTION, SOLUTION INTRAVENOUS CONTINUOUS
Status: DISCONTINUED | OUTPATIENT
Start: 2017-04-17 | End: 2017-04-18

## 2017-04-17 RX ORDER — LEVOTHYROXINE SODIUM 25 UG/1
25 TABLET ORAL
Status: DISCONTINUED | OUTPATIENT
Start: 2017-04-18 | End: 2017-04-25 | Stop reason: HOSPADM

## 2017-04-17 RX ORDER — SODIUM CHLORIDE 9 MG/ML
1000 INJECTION, SOLUTION INTRAVENOUS
Status: DISCONTINUED | OUTPATIENT
Start: 2017-04-17 | End: 2017-04-17 | Stop reason: SDUPTHER

## 2017-04-17 RX ORDER — IPRATROPIUM BROMIDE AND ALBUTEROL SULFATE 2.5; .5 MG/3ML; MG/3ML
3 SOLUTION RESPIRATORY (INHALATION)
Status: COMPLETED | OUTPATIENT
Start: 2017-04-17 | End: 2017-04-17

## 2017-04-17 RX ORDER — PROMETHAZINE HYDROCHLORIDE 12.5 MG/1
12.5 SUPPOSITORY RECTAL EVERY 6 HOURS PRN
Status: DISCONTINUED | OUTPATIENT
Start: 2017-04-17 | End: 2017-04-25 | Stop reason: HOSPADM

## 2017-04-17 RX ORDER — HYDROCODONE BITARTRATE AND ACETAMINOPHEN 5; 325 MG/1; MG/1
1 TABLET ORAL EVERY 4 HOURS PRN
Status: DISCONTINUED | OUTPATIENT
Start: 2017-04-17 | End: 2017-04-25 | Stop reason: HOSPADM

## 2017-04-17 RX ORDER — ACETAMINOPHEN 325 MG/1
650 TABLET ORAL EVERY 6 HOURS PRN
Status: DISCONTINUED | OUTPATIENT
Start: 2017-04-17 | End: 2017-04-25 | Stop reason: HOSPADM

## 2017-04-17 RX ORDER — ONDANSETRON 8 MG/1
8 TABLET, ORALLY DISINTEGRATING ORAL EVERY 8 HOURS PRN
Status: DISCONTINUED | OUTPATIENT
Start: 2017-04-17 | End: 2017-04-17

## 2017-04-17 RX ORDER — IPRATROPIUM BROMIDE AND ALBUTEROL SULFATE 2.5; .5 MG/3ML; MG/3ML
3 SOLUTION RESPIRATORY (INHALATION)
Status: DISCONTINUED | OUTPATIENT
Start: 2017-04-17 | End: 2017-04-17 | Stop reason: SDUPTHER

## 2017-04-17 RX ORDER — MORPHINE SULFATE 10 MG/ML
4 INJECTION INTRAMUSCULAR; INTRAVENOUS; SUBCUTANEOUS EVERY 4 HOURS PRN
Status: DISCONTINUED | OUTPATIENT
Start: 2017-04-17 | End: 2017-04-25 | Stop reason: HOSPADM

## 2017-04-17 RX ORDER — AMLODIPINE BESYLATE 5 MG/1
5 TABLET ORAL DAILY
Status: DISCONTINUED | OUTPATIENT
Start: 2017-04-17 | End: 2017-04-25 | Stop reason: HOSPADM

## 2017-04-17 RX ORDER — ESCITALOPRAM OXALATE 10 MG/1
10 TABLET ORAL NIGHTLY
Status: DISCONTINUED | OUTPATIENT
Start: 2017-04-17 | End: 2017-04-25 | Stop reason: HOSPADM

## 2017-04-17 RX ADMIN — SODIUM BICARBONATE 325 MG: 325 TABLET ORAL at 12:04

## 2017-04-17 RX ADMIN — IPRATROPIUM BROMIDE AND ALBUTEROL SULFATE 3 ML: .5; 3 SOLUTION RESPIRATORY (INHALATION) at 07:04

## 2017-04-17 RX ADMIN — MORPHINE SULFATE 5 MG: 10 INJECTION INTRAVENOUS at 10:04

## 2017-04-17 RX ADMIN — VARENICLINE TARTRATE 1 MG: 0.5 TABLET, FILM COATED ORAL at 12:04

## 2017-04-17 RX ADMIN — MORPHINE SULFATE 4 MG: 10 INJECTION INTRAVENOUS at 08:04

## 2017-04-17 RX ADMIN — MORPHINE SULFATE 4 MG: 10 INJECTION INTRAVENOUS at 04:04

## 2017-04-17 RX ADMIN — PROMETHAZINE HYDROCHLORIDE 12.5 MG: 25 INJECTION, SOLUTION INTRAMUSCULAR; INTRAVENOUS at 10:04

## 2017-04-17 RX ADMIN — IPRATROPIUM BROMIDE AND ALBUTEROL SULFATE 3 ML: .5; 3 SOLUTION RESPIRATORY (INHALATION) at 10:04

## 2017-04-17 RX ADMIN — SODIUM CHLORIDE: 0.9 INJECTION, SOLUTION INTRAVENOUS at 04:04

## 2017-04-17 RX ADMIN — SODIUM CHLORIDE 1000 ML: 0.9 INJECTION, SOLUTION INTRAVENOUS at 10:04

## 2017-04-17 RX ADMIN — SODIUM CHLORIDE 250 ML: 0.9 INJECTION, SOLUTION INTRAVENOUS at 09:04

## 2017-04-17 RX ADMIN — GABAPENTIN 400 MG: 100 CAPSULE ORAL at 12:04

## 2017-04-17 RX ADMIN — SODIUM CHLORIDE 1000 ML: 0.9 INJECTION, SOLUTION INTRAVENOUS at 04:04

## 2017-04-17 RX ADMIN — PANTOPRAZOLE SODIUM 40 MG: 40 INJECTION, POWDER, FOR SOLUTION INTRAVENOUS at 08:04

## 2017-04-17 NOTE — ED TRIAGE NOTES
"Pt arrived via [facility] from [facility]. CC of abdominal pain. Pt was transferred from [place], and was diagnosed with a small bowel obstruction, pt stated "I had a hernia repair done here last week on Wednesday that was around my ostomy." Pt reports N/V, Pt also arrived to ED with NG tube to Right Nare. Denies D/SOB/F. NAD at this time.  "

## 2017-04-17 NOTE — ED PROVIDER NOTES
"Encounter Date: 4/17/2017    SCRIBE #1 NOTE: I, Edmond Guerrero, am scribing for, and in the presence of, Indra Luna MD. Other sections scribed: HPI, ROS.       History     Chief Complaint   Patient presents with    Abdominal Pain     sent from [facility] for evaluation of possible bowel obstruction.     Review of patient's allergies indicates:   Allergen Reactions    Keflex [cephalexin] Anaphylaxis and Other (See Comments)     UNKNOWN TO PT-" DID NOT AGREE WITH PT"    Percocet [oxycodone-acetaminophen] Nausea And Vomiting     The history is provided by the patient.     Past Medical History:   Diagnosis Date    Arthritis     Blood transfusion     Cancer     bladder    COPD (chronic obstructive pulmonary disease)     Frequency of urination     General anesthetics causing adverse effect in therapeutic use     pt states he wakes up very violently from anesthesia    History of urostomy     Hypertension     Limited joint range of motion right hip and leg    Mixed anxiety and depressive disorder     Personal history of bladder cancer     Thyroid disease     Ureteral stent displacement     Urinary retention     Wears glasses      Past Surgical History:   Procedure Laterality Date    ABDOMINAL SURGERY      APPENDECTOMY  12/30/2015    Procedure: APPENDECTOMY;  Surgeon: CHAD Bo MD;  [facility];  Service: Urology;;    bladder removed      BOWEL RESECTION      CYSTOSCOPY      >1  episodes for bilat ureteral stent exchange    FRACTURE SURGERY      HAND SURGERY      HERNIA REPAIR      amalia    HIP SURGERY  2012    Rt hip septic    MOUTH SURGERY  2000    all teeth extracted    turbt      urostomy       Family History   Problem Relation Age of Onset    Adopted: Yes     Social History   Substance Use Topics    Smoking status: Former Smoker     Packs/day: 2.00     Years: 42.00     Quit date: 2/1/2017    Smokeless tobacco: Never Used    Alcohol use 0.0 oz/week     2 - " 4 Cans of beer per week      Comment: occassional     Review of Systems   Constitutional: Negative for fever.   HENT: Negative for rhinorrhea and sore throat.    Eyes: Negative for pain and visual disturbance.   Respiratory: Negative for shortness of breath.    Cardiovascular: Negative for chest pain.   Gastrointestinal: Positive for abdominal pain, constipation and nausea. Negative for vomiting.   Genitourinary: Negative for hematuria.   Musculoskeletal: Negative for back pain.   Skin: Negative for rash.   Neurological: Negative for syncope.       Physical Exam   Initial Vitals   BP Pulse Resp Temp SpO2   -- -- -- -- --            Physical Exam    ED Course   Procedures  Labs Reviewed   CBC W/ AUTO DIFFERENTIAL   COMPREHENSIVE METABOLIC PANEL   LIPASE                        Scribe Attestation:   Scribe #1: I performed the above scribed service and the documentation accurately describes the services I performed. I attest to the accuracy of the note.    Attending Attestation:           Physician Attestation for Scribe:  Physician Attestation Statement for Scribe #1: I, Indra Luna MD, reviewed documentation, as scribed by Edmond Guerrero in my presence, and it is both accurate and complete.                 ED Course     Clinical Impression:   There were no encounter diagnoses.

## 2017-04-17 NOTE — IP AVS SNAPSHOT
[place] [address] [address] [address]  Phone: [phone]           Patient Discharge Instructions   Our goal is to set you up for success. This packet includes information on your condition, medications, and your home care.  It will help you care for yourself to prevent having to return to the hospital.     Please ask your nurse if you have any questions.      There are many details to remember when preparing to leave the hospital. Here is what you will need to do:    1. Take your medicine. If you are prescribed medications, review your Medication List on the following pages. You may have new medications to  at the pharmacy and others that you'll need to stop taking. Review the instructions for how and when to take your medications. Talk with your doctor or nurses if you are unsure of what to do.     2. Go to your follow-up appointments. Specific follow-up information is listed in the following pages. Your may be contacted by a nurse or clinical provider about future appointments. Be sure we have all of the phone numbers to reach you. Please contact your provider's office if you are unable to make an appointment.     3. Watch for warning signs. Your doctor or nurse will give you detailed warning signs to watch for and when to call for assistance. These instructions may also include educational information about your condition. If you experience any of warning signs to your health, call your doctor.               ** Verify the list of medication(s) below is accurate and up to date. Carry this with you in case of emergency. If your medications have changed, please notify your healthcare provider.             Medication List      START taking these medications        Additional Info                      fluticasone-salmeterol 250-50 mcg/dose 250-50 mcg/dose diskus inhaler   Commonly known as:  ADVAIR   Quantity:  60 each   Refills:  11   Dose:  1 puff    Instructions:   Inhale 1 puff into the lungs 2 (two) times daily. Controller     Begin Date    AM    Noon    PM    Bedtime       levalbuterol 0.63 mg/3 mL nebulizer solution   Commonly known as:  XOPENEX   Quantity:  3 mL   Refills:  1   Dose:  0.63 mg    Last time this was given:  0.63 mg on 4/25/2017 10:56 AM   Instructions:  Take 3 mLs (0.63 mg total) by nebulization every 8 (eight) hours as needed for Wheezing or Shortness of Breath. Rescue     Begin Date    AM    Noon    PM    Bedtime       predniSONE 10 MG tablet   Commonly known as:  DELTASONE   Quantity:  6 tablet   Refills:  0   Dose:  10 mg    Last time this was given:  10 mg on 4/25/2017  9:15 AM   Instructions:  Take 1 tablet (10 mg total) by mouth once daily.     Begin Date    AM    Noon    PM    Bedtime       tiotropium 18 mcg inhalation capsule   Commonly known as:  SPIRIVA   Quantity:  30 capsule   Refills:  11   Dose:  18 mcg    Instructions:  Inhale 1 capsule (18 mcg total) into the lungs once daily. Controller     Begin Date    AM    Noon    PM    Bedtime         CHANGE how you take these medications        Additional Info                      escitalopram oxalate 10 MG tablet   Commonly known as:  LEXAPRO   Quantity:  90 tablet   Refills:  0   Dose:  10 mg   What changed:  when to take this    Last time this was given:  10 mg on 4/24/2017 10:04 PM   Instructions:  Take 1 tablet (10 mg total) by mouth once daily.     Begin Date    AM    Noon    PM    Bedtime         CONTINUE taking these medications        Additional Info                      allopurinol 100 MG tablet   Commonly known as:  ZYLOPRIM   Quantity:  90 tablet   Refills:  0   Dose:  100 mg    Instructions:  Take 1 tablet (100 mg total) by mouth once daily.     Begin Date    AM    Noon    PM    Bedtime       amlodipine 5 MG tablet   Commonly known as:  NORVASC   Quantity:  90 tablet   Refills:  0    Last time this was given:  5 mg on 4/25/2017  9:16 AM   Instructions:  TAKE 1 TABLET(5 MG) BY MOUTH EVERY  DAY     Begin Date    AM    Noon    PM    Bedtime       atenolol 50 MG tablet   Commonly known as:  TENORMIN   Quantity:  90 tablet   Refills:  0   Dose:  50 mg    Last time this was given:  50 mg on 4/25/2017  9:16 AM   Instructions:  Take 1 tablet (50 mg total) by mouth once daily.     Begin Date    AM    Noon    PM    Bedtime       gabapentin 400 MG capsule   Commonly known as:  NEURONTIN   Quantity:  180 capsule   Refills:  0   Dose:  400 mg    Last time this was given:  400 mg on 4/25/2017  9:16 AM   Instructions:  Take 1 capsule (400 mg total) by mouth 2 (two) times daily.     Begin Date    AM    Noon    PM    Bedtime       hydrocodone-acetaminophen 10-325mg  mg Tab   Commonly known as:  NORCO   Quantity:  50 tablet   Refills:  0   Dose:  1 tablet    Instructions:  Take 1 tablet by mouth every 6 (six) hours as needed.     Begin Date    AM    Noon    PM    Bedtime       levothyroxine 25 MCG tablet   Commonly known as:  SYNTHROID   Quantity:  30 tablet   Refills:  0   Comments:  Pt needs TSH level    Last time this was given:  25 mcg on 4/25/2017  5:00 AM   Instructions:  TAKE 1 TABLET(25 MCG) BY MOUTH EVERY DAY     Begin Date    AM    Noon    PM    Bedtime       sodium bicarbonate 325 MG tablet   Refills:  0   Dose:  325 mg    Last time this was given:  325 mg on 4/17/2017 12:35 PM   Instructions:  Take 325 mg by mouth 2 (two) times daily.     Begin Date    AM    Noon    PM    Bedtime       varenicline 1 mg Tab   Commonly known as:  CHANTIX CONTINUING MONTH BOX   Quantity:  60 tablet   Refills:  0   Dose:  1 mg    Last time this was given:  1 mg on 4/25/2017  9:16 AM   Instructions:  Take 1 tablet (1 mg total) by mouth 2 (two) times daily.     Begin Date    AM    Noon    PM    Bedtime            Where to Get Your Medications      These medications were sent to [id number] - [place] - [address] AT [facility] [address]  [address] [address]      Phone:  [phone]     fluticasone-salmeterol 250-50 mcg/dose 250-50 mcg/dose diskus inhaler    levalbuterol 0.63 mg/3 mL nebulizer solution    predniSONE 10 MG tablet    tiotropium 18 mcg inhalation capsule                  Please bring to all follow up appointments:    1. A copy of your discharge instructions.  2. All medicines you are currently taking in their original bottles.  3. Identification and insurance card.    Please arrive 15 minutes ahead of scheduled appointment time.    Please call 24 hours in advance if you must reschedule your appointment and/or time.        Your Scheduled Appointments     May 08, 2017 11:45 AM CDT   Established Patient Follow Up- OCC with Varun Zeng MD   [facility] ([facility])    [address] [place] [id number]   [phone]            May 11, 2017  9:30 AM CDT   Post Op-OCC with Estuardo Mack MD   [facility] ([facility])    [address] [facility]  [place] [address]   [phone]            Jun 02, 2017  2:30 PM CDT   Established Patient Visit with CHAD Bo MD   [facility] ([facility])    [address] [facility] [address]  [place] [address] [phone]            Jun 13, 2017  9:00 AM CDT   Ct Renal Stone with [facility] CT1 LIMIT 400 LBS   [facility] ([facility])    [address]  [place] [address]   [phone]            Jun 13, 2017 11:00 AM CDT   Established Patient Visit with CHAD Bo MD   [facility] ([facility])    [address] [facility]  [place] [address] [phone]              Follow-up Information     Follow up with Estuardo Mack MD On 5/11/2017.    Specialties:  General Surgery, Bariatrics    Why:  For Appointment on Thursday 5/11/2017 @ 9:30AM.     Contact information:    [address] [place] [address]  [address]  [place] [facility]  [place] [address]  [phone]          Follow up with Varun Zeng MD. Go on  5/8/2017.    Specialties:  General Practice, Physical Medicine and Rehabilitation    Why:  For Appointment on Monday 5/8/2017 @ 11:45 AM    Contact information:    [address] [place] [id number] [phone]          Follow up with SHIN Bo MD. Go on 6/2/2017.    Specialty:  Urology    Why:  For Appointment on Friday 6/2/2017 @ 2:35PM     Contact information:    [address]   [address]  [place] [id number] [phone]          Follow up with Joe Silver MD In 2 weeks.    Specialty:  Pulmonary Disease    Contact information:    [address] [place] [address] [address]  [place] [id number] [phone]          Follow up with [facility]    Specialty:  DME Provider    Why:  DME-nebulizer    Contact information:    [address]  [facility] [address]  [phone]        Referrals     Future Orders    Ambulatory Referral to Pulmonology         Discharge Instructions     Future Orders    Activity as tolerated     Call MD for:  persistent nausea and vomiting or diarrhea     Call MD for:  severe uncontrolled pain     Complete PFT w/ bronchodilator     Process Instructions:    This procedure includes: spirometry w/tracings, spirometry w/without bronchodilator, lung volumes, MVV, and DLCO.    Medication used for this procedure: 0.5cc of ventolin and 3cc of normal saline.    Diet general     Questions:    Total calories:      Fat restriction, if any:      Protein restriction, if any:      Na restriction, if any:      Fluid restriction:      Additional restrictions:      NEBULIZER FOR HOME USE     Questions:    Height:  6' (1.829 m)    Weight:  64.2 kg (141 lb 9.6 oz)    Does patient have medical equipment at home?:      Length of need (1-99 months):  99    No dressing needed       Discharge References/Attachments     BOWEL OBSTRUCTION, SMALL (ENGLISH)    INHALER USE (ENGLISH)        Primary Diagnosis     Your primary diagnosis was:  Small Bowel Obstruction      Admission Information     Date  "& Time Provider Department CSN    4/17/2017  7:32 AM Estuardo Mack MD [facility] [id number]      Care Providers     Provider Role Specialty Primary office phone    Estuardo Mack MD Attending Provider General Surgery [phone]    Kelvin Lowe MD Consulting Physician  Nephrology [phone]    Jerad Bautista MD Consulting Physician  Anesthesiology [phone]    Portia Maynard MD Consulting Physician  Hospitalist [phone]      Your Vitals Were     BP Pulse Temp Resp Height Weight    126/59 (BP Location: Left arm, Patient Position: Lying, BP Method: Automatic) 67 98.1 °F (36.7 °C) (Oral) 20 6' (1.829 m) 64.2 kg (141 lb 9.6 oz)    SpO2 BMI             97% 19.2 kg/m2         Recent Lab Values     No lab values to display.      Allergies as of 4/25/2017        Reactions    Keflex [Cephalexin] Anaphylaxis, Other (See Comments)    UNKNOWN TO PT-" DID NOT AGREE WITH PT"    Percocet [Oxycodone-acetaminophen] Nausea And Vomiting      [facility]     [facility] Nurse Care Line - 24/7 Assistance  Unless otherwise directed by your provider, please contact [facility], our nurse care line that is available for 24/7 assistance.     Registered nurses in the [facility] provide clinical advisement, health education, appointment booking, and other advisory services.  Call for this free service at [phone].        Advance Directives     An advance directive is a document which, in the event you are no longer able to make decisions for yourself, tells your healthcare team what kind of treatment you do or do not want to receive, or who you would like to make those decisions for you.  If you do not currently have an advance directive, [facility] encourages you to create one.  For more information call:  [phone]), [phone] ([phone]),  or log on to [email or web address].        Smoking Cessation     If you would like to quit " smoking:   You may be eligible for free services if you are a [place] resident and started smoking cigarettes before September 1, 1988.  Call the Smoking Cessation Trust (SCT) toll free at (899) 043-4680 or (364) 602-9193.   Call 1-800-QUIT-NOW if you do not meet the above criteria.   Contact us via email: [email or web address]   View our website for more information: [email or web address]        Language Assistance Services     ATTENTION: Language assistance services are available, free of charge. Please call [phone].      ATENCIÓN: Si habla español, tiene a platt disposición servicios gratuitos de asistencia lingüística. Llame al [phone].     CHÚ Ý: N?u b?n nói Ti?ng Vi?t, có các d?ch v? h? tr? ngôn ng? mi?n phí dành cho b?n. G?i s? [phone].         [facility] complies with applicable Federal civil rights laws and does not discriminate on the basis of race, color, national origin, age, disability, or sex.

## 2017-04-17 NOTE — CONSULTS
"57 y o male with hx of ca of the bladder who has a urostomy and ckd3 admitted with abdominal pain, nausea and vomiting which has led to marley on ckd. Renal consult requested to help with care.    He apparently went to [facility] where was evaluated and referred to [facility] where a week ago he apparently had a hernia repaired.    Has noted decrease po intake n-v and dark urine. Denies NSAID/Raygoza 2 ags. Also denies fever chill or redness on the incision.    Denies CNS ENT CP  RHEUM OR DERM SX  Past Medical History:   Diagnosis Date    Arthritis     Blood transfusion     Cancer     bladder    COPD (chronic obstructive pulmonary disease)     Frequency of urination     General anesthetics causing adverse effect in therapeutic use     pt states he wakes up very violently from anesthesia    History of urostomy     Hypertension     Limited joint range of motion right hip and leg    Mixed anxiety and depressive disorder     Personal history of bladder cancer     Thyroid disease     Ureteral stent displacement     Urinary retention     Wears glasses      Review of patient's allergies indicates:   Allergen Reactions    Keflex [cephalexin] Anaphylaxis and Other (See Comments)     UNKNOWN TO PT-" DID NOT AGREE WITH PT"    Percocet [oxycodone-acetaminophen] Nausea And Vomiting     Social History     Social History    Marital status:      Spouse name: N/A    Number of children: N/A    Years of education: N/A     Occupational History    [employer]     Social History Main Topics    Smoking status: Former Smoker     Packs/day: 2.00     Years: 42.00     Quit date: 2/1/2017    Smokeless tobacco: Never Used    Alcohol use 0.0 oz/week     2 - 4 Cans of beer per week      Comment: occassional    Drug use: Yes     Special: Marijuana      Comment: occass    Sexual activity: Yes     Partners: Female     Birth control/ protection: None     Other Topics Concern    Not on file "     Social History Narrative     Family History   Problem Relation Age of Onset    Adopted: Yes     Current Facility-Administered Medications   Medication    0.9%  NaCl infusion    acetaminophen tablet 650 mg    albuterol-ipratropium 2.5mg-0.5mg/3mL nebulizer solution 3 mL    amlodipine tablet 5 mg    atenolol tablet 50 mg    escitalopram oxalate tablet 10 mg    gabapentin capsule 400 mg    hydrocodone-acetaminophen 5-325mg per tablet 1 tablet    [START ON 4/18/2017] levothyroxine tablet 25 mcg    morphine injection 4 mg    ondansetron disintegrating tablet 8 mg    [START ON 4/18/2017] pantoprazole injection 40 mg    promethazine (PHENERGAN) 12.5 mg in dextrose 5 % 50 mL IVPB    sodium bicarbonate tablet 325 mg    sodium chloride 0.9% flush 3 mL    varenicline tablet 1 mg       LABS    Recent Results (from the past 24 hour(s))   CBC auto differential    Collection Time: 04/17/17  8:50 AM   Result Value Ref Range    WBC 6.00 3.90 - 12.70 K/uL    RBC 4.02 (L) 4.60 - 6.20 M/uL    Hemoglobin 12.8 (L) 14.0 - 18.0 g/dL    Hematocrit 37.3 (L) 40.0 - 54.0 %    MCV 93 82 - 98 fL    MCH 31.8 (H) 27.0 - 31.0 pg    MCHC 34.3 32.0 - 36.0 %    RDW 12.5 11.5 - 14.5 %    Platelets 291 150 - 350 K/uL    MPV 11.2 9.2 - 12.9 fL    Gran% 44.0 38.0 - 73.0 %    Lymph% 12.0 (L) 18.0 - 48.0 %    Mono% 7.0 4.0 - 15.0 %    Eosinophil% 0.0 0.0 - 8.0 %    Basophil% 0.0 0.0 - 1.9 %    Bands 37.0 %    Vacuolated Granulocytes Present     Differential Method Manual    Comprehensive metabolic panel    Collection Time: 04/17/17  8:50 AM   Result Value Ref Range    Sodium 140 136 - 145 mmol/L    Potassium 3.4 (L) 3.5 - 5.1 mmol/L    Chloride 88 (L) 95 - 110 mmol/L    CO2 30 (H) 23 - 29 mmol/L    Glucose 124 (H) 70 - 110 mg/dL    BUN, Bld 93 (H) 6 - 20 mg/dL    Creatinine 5.6 (H) 0.5 - 1.4 mg/dL    Calcium 8.5 (L) 8.7 - 10.5 mg/dL    Total Protein 7.9 6.0 - 8.4 g/dL    Albumin 3.6 3.5 - 5.2 g/dL    Total Bilirubin 0.7 0.1 - 1.0 mg/dL     Alkaline Phosphatase 66 55 - 135 U/L    AST 21 10 - 40 U/L    ALT 21 10 - 44 U/L    Anion Gap 22 (H) 8 - 16 mmol/L    eGFR if African American 12 (A) >60 mL/min/1.73 m^2    eGFR if non African American 10 (A) >60 mL/min/1.73 m^2   Lipase    Collection Time: 04/17/17  8:50 AM   Result Value Ref Range    Lipase 6 4 - 60 U/L   ISTAT PROCEDURE    Collection Time: 04/17/17 10:03 AM   Result Value Ref Range    POC PH 7.463 (H) 7.35 - 7.45    POC PCO2 42.5 35 - 45 mmHg    POC PO2 54 (LL) 80 - 100 mmHg    POC HCO3 30.4 (H) 24 - 28 mmol/L    POC BE 6 -2 to 2 mmol/L    POC SATURATED O2 89 (L) 95 - 100 %    POC TCO2 32 (H) 23 - 27 mmol/L    Sample ARTERIAL     Site LB     Allens Test Pass     DelSys Nasal Can     Mode SPONT     Flow 3    ]         Vitals:    04/17/17 1224 04/17/17 1337 04/17/17 1407 04/17/17 1533   BP:  (!) 99/58 101/63 111/62   Pulse:  88 86 90   Resp:    18   Temp:    98.5 °F (36.9 °C)   TempSrc:    Oral   SpO2: (!) 91% (!) 91% (!) 92% (!) 91%   Weight:    68 kg (150 lb)   Height:    6' (1.829 m)       No Jvd, Thyromegaly or Lymphadenopathy  Lungs: Fairly clear anteriorly and laterally  Cor: RRR no G or rubs  Abd: Soft benign good bowel sounds  Tender urostomy nice and pink good uo  Ext: No E C C    A)  DON on ckd3  Don secondary to dehydration  Hypotension hx of hypertension  N--abd pain  SBO  Hx of ca of bladder leading to a  Urostomy.  Hx of anxiety  COPD    P)  Rehydrate   Close follow up of I/O and BMP  Check Fena  When able renal diet  Check po4

## 2017-04-17 NOTE — OP NOTE
DATE OF PROCEDURE:  04/12/2017    SURGEON:  Estuardo Mack M.D.    PREOPERATIVE DIAGNOSIS:  Parastomal hernia without obstruction or gangrene.    POSTOPERATIVE DIAGNOSIS:  Parastomal hernia without obstruction or gangrene.    PROCEDURE:  Laparoscopic repair of peristomal hernia with mesh.    ANESTHESIA:  General endotracheal.    ESTIMATED BLOOD LOSS:  50 mL.    INDICATIONS FOR PROCEDURE:  The patient is a 57-year-old male who has previously   undergone an exploratory laparotomy for bowel obstruction and had to have   revision of his ileal conduit.    He now returns with a hernia through his stoma containing bowel.  The risks and   benefits of the procedure have been explained to the patient who acknowledges   these risks and wishes to proceed.    PROCEDURE IN DETAIL:  After administration of IV antibiotics and placement of   sequential compression devices, the patient was intubated by Anesthesia and   sterilely prepped and draped.  Access to the abdomen was gained in the left   upper quadrant through an 11-mm incision using an 11-mm trocar and a 0-degree   scope.  Insufflation was connected and a pneumoperitoneum to 15 mmHg was   obtained.  The underlying bowel was inspected and found to be free of injury.    Then, 5-mm ports were placed in the right and left lateral abdomen.  Later,   another 5-mm port was placed in the left lateral abdomen to facilitate   oversewing an area of the small bowel.  The abdomen was inspected and multiple   adhesions were found within the middle of the abdomen.  The lateral abdomen was   relatively free and the majority of adhesions were omental; these were taken   down with sharp dissection using electrocautery only where it could be clearly   seen that the bowel was well away from the area of cautery.  The portion of the   colon was adherent to the anterior abdominal wall, but came down easily.  The   small bowel was found to be within the defect and was reduced with a combination   of  sharp and blunt dissection.  The defect around the stoma was approximately 4   cm in greatest dimension; interrupted 0 Vicryl sutures were pulled through the   abdominal wall to close this defect primarily.  A 10 x 16 cm piece of   laparoscopic Strattice was chosen to cover the defect using a Sugarbaker   technique.  The mesh was rolled up and inserted into the abdomen and then   unrolled within the abdomen.  Transvesical sutures were pulled up in the lower   abdomen to the right and left of the ileal conduit and well below the defect.  A   third transvaginal suture was pulled out through the upper abdomen.  The intent   was then placed tacks at 1 to 1.5 cm intervals around the circumference of the   mesh; however, after trying multiple tackers, not a single tack would penetrate   the Strattice, even after soaking in warm saline.  For this reason, interrupted   0 Vicryl sutures were used to secure the mesh to the abdominal wall.  Stab   incisions were made circumferentially to pass the sutures through.  The tip of   the suture passer was towed considerably during this process and had to be   changed out several times.  During one of these passes through the suture passer   hit the small intestine, did not appear to penetrate fully, but due to this   concern, this area of the bowel was oversewn with 2-0 Vicryl Lembert sutures.    There was no evidence of bowel wall hematoma, no spillage, no evidence of   enterotomy.  Once all the transfascial sutures were placed, the abdomen was   inspected and found to be hemostatic with no other concerning findings.  The   insufflation was expelled from the abdomen.  All ports were removed and the skin   over all ports was closed using interrupted 4-0 Monocryl deep dermal sutures.    Dermabond was applied, and the patient was aroused and transferred to Recovery   Room in good condition.  All instrument and sponge counts were correct at the   end of the case.      LANA/  dd:  04/17/2017 08:13:28 (CDT)  td: 04/17/2017 10:22:47 (MARISSA)  Doc ID   #[id number]  Job ID #[id number]    CC:

## 2017-04-17 NOTE — ED PROVIDER NOTES
"Encounter Date: 4/17/2017    SCRIBE #1 NOTE: I, Edmond Guerrero, am scribing for, and in the presence of, Indra Luna MD. Other sections scribed: HPI, ROS.       History     Chief Complaint   Patient presents with    Abdominal Pain     sent from [facility] for evaluation of possible bowel obstruction.     Review of patient's allergies indicates:   Allergen Reactions    Keflex [cephalexin] Anaphylaxis and Other (See Comments)     UNKNOWN TO PT-" DID NOT AGREE WITH PT"    Percocet [oxycodone-acetaminophen] Nausea And Vomiting     HPI Comments: CC: Abdominal Pain  HPI: This 57 y.o. male with COPD, HTN,  hypothyroidism, urostomy s/p TURBT, mixed anxiety and depressive disorder and Hx of hernia repair, R hip surgery, bowel resection, appendectomy, tobacco use presents to the ED via EMS from [facility] for evaluation of possible bowel obstruction. Pt states he had hernia repair done by Dr. Mack here 5 days ago, and was dc'd 2 days ago. Pt states that he has not eaten any food since 6 days ago, as he was unable to even tolerate/swallow light foods or broths once he got home. Pt reports his last BM while in the hospital, noting it was watery and black.  Pt is not aware of any Hx of ulcers. Pt c/o severe pain to his entire abdomen. He denies fever, chest pain, vomiting, syncope.    The history is provided by the patient.     Past Medical History:   Diagnosis Date    Arthritis     Blood transfusion     Cancer     bladder    COPD (chronic obstructive pulmonary disease)     Frequency of urination     General anesthetics causing adverse effect in therapeutic use     pt states he wakes up very violently from anesthesia    History of urostomy     Hypertension     Limited joint range of motion right hip and leg    Mixed anxiety and depressive disorder     Personal history of bladder cancer     Thyroid disease     Ureteral stent displacement     Urinary retention     Wears glasses  "     Past Surgical History:   Procedure Laterality Date    ABDOMINAL SURGERY      APPENDECTOMY  12/30/2015    Procedure: APPENDECTOMY;  Surgeon: CHAD Bo MD;  Location: [facility] OR;  Service: Urology;;    bladder removed      BOWEL RESECTION      CYSTOSCOPY      >1  episodes for bilat ureteral stent exchange    FRACTURE SURGERY      HAND SURGERY      HERNIA REPAIR      amalia    HIP SURGERY  2012    Rt hip septic    MOUTH SURGERY  2000    all teeth extracted    turbt      urostomy       Family History   Problem Relation Age of Onset    Adopted: Yes     Social History   Substance Use Topics    Smoking status: Former Smoker     Packs/day: 2.00     Years: 42.00     Quit date: 2/1/2017    Smokeless tobacco: Never Used    Alcohol use 0.0 oz/week     2 - 4 Cans of beer per week      Comment: occassional     Review of Systems   Constitutional: Negative for chills and fever.   HENT: Negative for congestion and rhinorrhea.    Eyes: Negative for pain and visual disturbance.   Respiratory: Negative for shortness of breath.    Cardiovascular: Negative for chest pain.   Gastrointestinal: Positive for abdominal pain and constipation. Negative for vomiting.   Genitourinary: Negative for hematuria.   Musculoskeletal: Negative for myalgias.   Skin: Negative for rash.   Neurological: Negative for syncope and headaches.       Physical Exam   Initial Vitals   BP Pulse Resp Temp SpO2   -- -- -- -- --            Physical Exam well-developed thin white male alert oriented ×3  HEENT exam pupils reactive nonicteric nares benign moist mucous membranes posterior pharynx benign  Neck no significant adenopathy  Lungs somewhat distant without rales rhonchi or wheezing  Cardiac vascular regular rate and rhythm no murmur rub or gallop  Abdomen bowel sounds decreased diffusely tender throughout nephrostomy bag noted right lower abdomen no increased warmth  Muscular skeletal without deformity  Rectal normal tone yellow brown stool  tracing positive    ED Course   Critical Care  Date/Time: 4/29/2017 8:06 PM  Performed by: RADHA ABDULLAHI  Authorized by: MALIK MACK   Direct patient critical care time: 14 minutes  Additional history critical care time: 5 minutes  Ordering / reviewing critical care time: 7 minutes  Documentation critical care time: 17 minutes  Consulting other physicians critical care time: 3 minutes  Total critical care time (exclusive of procedural time) : 46 minutes  Critical care was necessary to treat or prevent imminent or life-threatening deterioration of the following conditions: renal failure.        Labs Reviewed   CBC W/ AUTO DIFFERENTIAL - Abnormal; Notable for the following:        Result Value    RBC 4.02 (*)     Hemoglobin 12.8 (*)     Hematocrit 37.3 (*)     MCH 31.8 (*)     Lymph% 12.0 (*)     All other components within normal limits   COMPREHENSIVE METABOLIC PANEL - Abnormal; Notable for the following:     Potassium 3.4 (*)     Chloride 88 (*)     CO2 30 (*)     Glucose 124 (*)     BUN, Bld 93 (*)     Creatinine 5.6 (*)     Calcium 8.5 (*)     Anion Gap 22 (*)     eGFR if  12 (*)     eGFR if non  10 (*)     All other components within normal limits   ISTAT PROCEDURE - Abnormal; Notable for the following:     POC PH 7.463 (*)     POC PO2 54 (*)     POC HCO3 30.4 (*)     POC SATURATED O2 89 (*)     POC TCO2 32 (*)     All other components within normal limits   LIPASE          X-Rays:   Independently Interpreted Readings:   Other Readings:  Abdominal films concerning for small bowel obstruction   MDM patient sent from [facility] ER for history of small bowel obstruction.  Patient had stomal hernia repair done Wednesday by Dr. Mack year discharged on Saturday states he is only been drinking liquids on Sunday but diffuse abdominal pain sharp stabbing in nature.  He states he had one black stool at home yesterday.  No history of ulcers he is tracing positive but there  is no evidence of melena present.  Differential includes small bowel obstruction peritonitis hypokalemia GI bleed             Scribe Attestation:   Scribe #1: I performed the above scribed service and the documentation accurately describes the services I performed. I attest to the accuracy of the note.    Attending Attestation:           Physician Attestation for Scribe:  Physician Attestation Statement for Scribe #1: I, Indra Luna MD, reviewed documentation, as scribed by Edmond Guerrero in my presence, and it is both accurate and complete.                 ED Course     Clinical Impression:   The primary encounter diagnosis was Small bowel obstruction. Diagnoses of Acute renal failure, unspecified acute renal failure type and Chronic obstructive pulmonary disease, unspecified COPD type were also pertinent to this visit.          Indra Luna MD  04/17/17 1105       Indra Luna MD  04/29/17 2006

## 2017-04-18 PROBLEM — N17.9 ACUTE RENAL FAILURE: Status: ACTIVE | Noted: 2017-04-18

## 2017-04-18 PROBLEM — E83.39 HYPERPHOSPHATEMIA: Status: ACTIVE | Noted: 2017-04-18

## 2017-04-18 PROBLEM — R79.89 ELEVATED PTHRP LEVEL: Status: ACTIVE | Noted: 2017-04-18

## 2017-04-18 PROBLEM — R09.02 HYPOXEMIA: Status: ACTIVE | Noted: 2017-04-18

## 2017-04-18 PROBLEM — E83.51 HYPOCALCEMIA: Status: ACTIVE | Noted: 2017-04-18

## 2017-04-18 PROBLEM — R79.89 INCREASED PTH LEVEL: Status: ACTIVE | Noted: 2017-04-18

## 2017-04-18 LAB
ANION GAP SERPL CALC-SCNC: 22 MMOL/L
BUN SERPL-MCNC: 117 MG/DL
CALCIUM SERPL-MCNC: 7.5 MG/DL
CHLORIDE SERPL-SCNC: 91 MMOL/L
CO2 SERPL-SCNC: 29 MMOL/L
CREAT SERPL-MCNC: 6.2 MG/DL
EST. GFR  (AFRICAN AMERICAN): 10 ML/MIN/1.73 M^2
EST. GFR  (NON AFRICAN AMERICAN): 9 ML/MIN/1.73 M^2
GLUCOSE SERPL-MCNC: 100 MG/DL
POTASSIUM SERPL-SCNC: 3.2 MMOL/L
SODIUM SERPL-SCNC: 142 MMOL/L

## 2017-04-18 PROCEDURE — 27000221 HC OXYGEN, UP TO 24 HOURS

## 2017-04-18 PROCEDURE — 25000242 PHARM REV CODE 250 ALT 637 W/ HCPCS: Performed by: INTERNAL MEDICINE

## 2017-04-18 PROCEDURE — 25000003 PHARM REV CODE 250: Performed by: INTERNAL MEDICINE

## 2017-04-18 PROCEDURE — 63600175 PHARM REV CODE 636 W HCPCS: Performed by: HOSPITALIST

## 2017-04-18 PROCEDURE — 94640 AIRWAY INHALATION TREATMENT: CPT

## 2017-04-18 PROCEDURE — 63600175 PHARM REV CODE 636 W HCPCS: Performed by: INTERNAL MEDICINE

## 2017-04-18 PROCEDURE — 36415 COLL VENOUS BLD VENIPUNCTURE: CPT

## 2017-04-18 PROCEDURE — 25000003 PHARM REV CODE 250

## 2017-04-18 PROCEDURE — 63600175 PHARM REV CODE 636 W HCPCS: Performed by: SURGERY

## 2017-04-18 PROCEDURE — 12000002 HC ACUTE/MED SURGE SEMI-PRIVATE ROOM

## 2017-04-18 PROCEDURE — C9113 INJ PANTOPRAZOLE SODIUM, VIA: HCPCS

## 2017-04-18 PROCEDURE — 63600175 PHARM REV CODE 636 W HCPCS

## 2017-04-18 PROCEDURE — 80048 BASIC METABOLIC PNL TOTAL CA: CPT

## 2017-04-18 PROCEDURE — 94761 N-INVAS EAR/PLS OXIMETRY MLT: CPT

## 2017-04-18 RX ORDER — POTASSIUM CHLORIDE 7.45 MG/ML
10 INJECTION INTRAVENOUS ONCE
Status: COMPLETED | OUTPATIENT
Start: 2017-04-18 | End: 2017-04-18

## 2017-04-18 RX ORDER — CALCITRIOL 0.25 UG/1
0.5 CAPSULE ORAL DAILY
Status: DISCONTINUED | OUTPATIENT
Start: 2017-04-19 | End: 2017-04-25 | Stop reason: HOSPADM

## 2017-04-18 RX ORDER — POTASSIUM CHLORIDE 7.45 MG/ML
10 INJECTION INTRAVENOUS
Status: DISPENSED | OUTPATIENT
Start: 2017-04-18 | End: 2017-04-18

## 2017-04-18 RX ORDER — POTASSIUM CHLORIDE 7.45 MG/ML
10 INJECTION INTRAVENOUS ONCE
Status: DISCONTINUED | OUTPATIENT
Start: 2017-04-18 | End: 2017-04-18

## 2017-04-18 RX ADMIN — POTASSIUM CHLORIDE 10 MEQ: 10 INJECTION, SOLUTION INTRAVENOUS at 05:04

## 2017-04-18 RX ADMIN — MORPHINE SULFATE 4 MG: 10 INJECTION INTRAVENOUS at 11:04

## 2017-04-18 RX ADMIN — Medication 3 ML: at 02:04

## 2017-04-18 RX ADMIN — IPRATROPIUM BROMIDE AND ALBUTEROL SULFATE 3 ML: .5; 3 SOLUTION RESPIRATORY (INHALATION) at 01:04

## 2017-04-18 RX ADMIN — MORPHINE SULFATE 4 MG: 10 INJECTION INTRAVENOUS at 06:04

## 2017-04-18 RX ADMIN — SODIUM CHLORIDE: 0.9 INJECTION, SOLUTION INTRAVENOUS at 01:04

## 2017-04-18 RX ADMIN — POTASSIUM CHLORIDE 10 MEQ: 10 INJECTION, SOLUTION INTRAVENOUS at 02:04

## 2017-04-18 RX ADMIN — PANTOPRAZOLE SODIUM 40 MG: 40 INJECTION, POWDER, FOR SOLUTION INTRAVENOUS at 10:04

## 2017-04-18 RX ADMIN — Medication 3 ML: at 06:04

## 2017-04-18 RX ADMIN — MORPHINE SULFATE 4 MG: 10 INJECTION INTRAVENOUS at 01:04

## 2017-04-18 RX ADMIN — MORPHINE SULFATE 4 MG: 10 INJECTION INTRAVENOUS at 02:04

## 2017-04-18 RX ADMIN — POTASSIUM CHLORIDE 10 MEQ: 10 INJECTION, SOLUTION INTRAVENOUS at 03:04

## 2017-04-18 RX ADMIN — METHYLPREDNISOLONE SODIUM SUCCINATE 40 MG: 40 INJECTION, POWDER, FOR SOLUTION INTRAMUSCULAR; INTRAVENOUS at 02:04

## 2017-04-18 RX ADMIN — SODIUM CHLORIDE: 0.9 INJECTION, SOLUTION INTRAVENOUS at 05:04

## 2017-04-18 RX ADMIN — POTASSIUM CHLORIDE: 2 INJECTION, SOLUTION, CONCENTRATE INTRAVENOUS at 09:04

## 2017-04-18 RX ADMIN — MORPHINE SULFATE 4 MG: 10 INJECTION INTRAVENOUS at 10:04

## 2017-04-18 RX ADMIN — METHYLPREDNISOLONE SODIUM SUCCINATE 40 MG: 40 INJECTION, POWDER, FOR SOLUTION INTRAMUSCULAR; INTRAVENOUS at 06:04

## 2017-04-18 RX ADMIN — Medication 3 ML: at 09:04

## 2017-04-18 RX ADMIN — MORPHINE SULFATE 4 MG: 10 INJECTION INTRAVENOUS at 07:04

## 2017-04-18 RX ADMIN — POTASSIUM CHLORIDE 10 MEQ: 10 INJECTION, SOLUTION INTRAVENOUS at 11:04

## 2017-04-18 RX ADMIN — IPRATROPIUM BROMIDE AND ALBUTEROL SULFATE 3 ML: .5; 3 SOLUTION RESPIRATORY (INHALATION) at 07:04

## 2017-04-18 RX ADMIN — METHYLPREDNISOLONE SODIUM SUCCINATE 40 MG: 40 INJECTION, POWDER, FOR SOLUTION INTRAMUSCULAR; INTRAVENOUS at 09:04

## 2017-04-18 NOTE — ASSESSMENT & PLAN NOTE
Hypertension  · Goal while inpatient is a systolic blood pressure less than 160mmHg  · BP in acceptable range at this time  · Continue current home regimen with hold parameters  · PRN antihypertensives available

## 2017-04-18 NOTE — ASSESSMENT & PLAN NOTE
Low normal calcium levels.  Albumin is within normal range.  Likely related to acute on chronic renal failure.

## 2017-04-18 NOTE — CONSULTS
[facility] Hospital Medicine  Consult Note    Patient Name: Blake Guillory  MRN: [id number]  Admission Date: 4/17/2017  Hospital Length of Stay: 1 days  Attending Physician: Estuardo Mack MD   Primary Care Provider: Varun Zeng MD           Patient information was obtained from patient and ER records.     Consults      Reason for consult:     Chief Complaint   Patient presents with    Abdominal Pain     sent from [facility] for evaluation of possible bowel obstruction.     Hospital medicine consulted for shortness of breath and COPD management.    HISTORY OF PRESENT ILLNESS:     Blake Guillory is a 57 y.o. male that (in part)  has a past medical history of Arthritis; Blood transfusion; Cancer; COPD (chronic obstructive pulmonary disease); Frequency of urination; General anesthetics causing adverse effect in therapeutic use; History of urostomy; Hypertension; Limited joint range of motion (right hip and leg); Mixed anxiety and depressive disorder; Personal history of bladder cancer; Thyroid disease; Ureteral stent displacement; Urinary retention; and Wears glasses. Presents to [facility] Emergency Department for transfer for evaluation of small bowel obstruction.  He was admitted to the general surgery service.  Hospital medicine was consulted by the emergency department for COPD management and hypoxemia.  The patient complains of shortness of breath with exertion.  He is comfortable at rest.  Symptoms were initially relieved with some little oxygen, but he subsequently developed dyspnea with exertion.  This is an acute exacerbation of her chronic problem for him.  He was given generalized treatment with albuterol and Atrovent which provided him with some relief.  He denies fever or chills.  Occasional nonproductive cough.  Denies thoughts is, stridor, wheezing, or night sweats.  Former smoker.  Continues to smoke marijuana  occasionally.      REVIEW OF SYSTEMS:     -- Constitutional: Generalized malaise and lethargy.  No fever or chills.  -- Eyes: No visual changes, diplopia, pain, tearing, blind spots, or discharge.   -- Ears, nose, mouth, throat, and face: No congestion, sore throat, epistaxis, d/c, bleeding gums, neck stiffness masses, or dental issues.  -- Respiratory: As above in history of present illness.  -- Cardiovascular: No chest pain, SHAFFER, syncope, PND, edema, cyanosis, or palpitations.   -- Gastrointestinal: Once distended abdomen.  Nausea with vomiting.  Here for evaluation of small bowel obstruction..  -- Genitourinary: Decreased urine output.  History of bladder cancer.  Denies hematuria.   -- Integument/breast: No rash, pruritis, pigmentation changes, dryness, or changes in hair  -- Hematologic/lymphatic: No easy bruising or lymphadenopathy.   -- Musculoskeletal: No acute arthralgias, acute myalgias, joint swelling, acute limitations of ROM,  -- Neurological: No seizures, headaches, incoordination, paraesthesias, ataxia, vertigo, or tremors.  -- Behavioral/Psych: History of treated anxiety and depression.  No auditory or visual hallucinations, , or suicidal/homicidal ideations.  -- Endocrine: No heat or cold intolerance, polydipsia, or unintentional weight gain / loss.  -- Allergy/Immunologic: No recurrent infections or adverse reaction to food, insects, or difficulty breathing.      PAST MEDICAL / SURGICAL HISTORY:     Past Medical History:   Diagnosis Date    Arthritis     Blood transfusion     Cancer     bladder    COPD (chronic obstructive pulmonary disease)     Frequency of urination     General anesthetics causing adverse effect in therapeutic use     pt states he wakes up very violently from anesthesia    History of urostomy     Hypertension     Limited joint range of motion right hip and leg    Mixed anxiety and depressive disorder     Personal history of bladder cancer     Thyroid disease      "Ureteral stent displacement     Urinary retention     Wears glasses      Past Surgical History:   Procedure Laterality Date    ABDOMINAL SURGERY      APPENDECTOMY  12/30/2015    Procedure: APPENDECTOMY;  Surgeon: CHAD Bo MD;  Location: [facility];  Service: Urology;;    bladder removed      BOWEL RESECTION      CYSTOSCOPY      >1  episodes for bilat ureteral stent exchange    FRACTURE SURGERY      HAND SURGERY      HERNIA REPAIR      amalia    HIP SURGERY  2012    Rt hip septic    MOUTH SURGERY  2000    all teeth extracted    turbt      urostomy           FAMILY HISTORY:     Family History   Problem Relation Age of Onset    Adopted: Yes         SOCIAL HISTORY:     Social History   Substance Use Topics    Smoking status: Former Smoker     Packs/day: 2.00     Years: 42.00     Quit date: 2/1/2017    Smokeless tobacco: Never Used    Alcohol use 0.0 oz/week     2 - 4 Cans of beer per week      Comment: occassional     Social History     Social History    Marital status:      Spouse name: N/A    Number of children: N/A    Years of education: N/A     Occupational History    [employer]     Social History Main Topics    Smoking status: Former Smoker     Packs/day: 2.00     Years: 42.00     Quit date: 2/1/2017    Smokeless tobacco: Never Used    Alcohol use 0.0 oz/week     2 - 4 Cans of beer per week      Comment: occassional    Drug use: Yes     Special: Marijuana      Comment: occass    Sexual activity: Yes     Partners: Female     Birth control/ protection: None     Other Topics Concern    None     Social History Narrative         ALLERGIES:       Review of patient's allergies indicates:   Allergen Reactions    Keflex [cephalexin] Anaphylaxis and Other (See Comments)     UNKNOWN TO PT-" DID NOT AGREE WITH PT"    Percocet [oxycodone-acetaminophen] Nausea And Vomiting         HEALTH SCREENING:     -- Prevnar 13 pneumonia vaccine = no evidence of previous " vaccination found in the medical record  -- Influenza vaccine = no evidence of current flu vaccination found in the medical record for this flu season      HOME MEDICATIONS:     Prior to Admission medications    Medication Sig Start Date End Date Taking? Authorizing Provider   allopurinol (ZYLOPRIM) 100 MG tablet Take 1 tablet (100 mg total) by mouth once daily. 1/25/17  Yes Varun Zeng MD   amlodipine (NORVASC) 5 MG tablet TAKE 1 TABLET(5 MG) BY MOUTH EVERY DAY 3/27/17  Yes Mamie Zeng NP   atenolol (TENORMIN) 50 MG tablet Take 1 tablet (50 mg total) by mouth once daily. 1/25/17  Yes Varun Zeng MD   escitalopram oxalate (LEXAPRO) 10 MG tablet Take 1 tablet (10 mg total) by mouth once daily.  Patient taking differently: Take 10 mg by mouth every evening.  1/25/17  Yes Varun Zeng MD   gabapentin (NEURONTIN) 400 MG capsule Take 1 capsule (400 mg total) by mouth 2 (two) times daily. 1/25/17  Yes Varun Zeng MD   hydrocodone-acetaminophen 10-325mg (NORCO)  mg Tab Take 1 tablet by mouth every 6 (six) hours as needed. 4/12/17  Yes Estuardo Mack MD   levothyroxine (SYNTHROID) 25 MCG tablet TAKE 1 TABLET(25 MCG) BY MOUTH EVERY DAY 3/27/17  Yes Mamie Zeng NP   sodium bicarbonate 325 MG tablet Take 325 mg by mouth 2 (two) times daily.   Yes Historical Provider, MD   varenicline (CHANTIX CONTINUING MONTH BOX) 1 mg Tab Take 1 tablet (1 mg total) by mouth 2 (two) times daily. 3/2/17  Yes Chris Samuels MD         HOSPITAL MEDICATIONS:     Scheduled Meds:   albuterol-ipratropium 2.5mg-0.5mg/3mL  3 mL Nebulization Q6H    amlodipine  5 mg Oral Daily    atenolol  50 mg Oral Daily    escitalopram oxalate  10 mg Oral QHS    gabapentin  400 mg Oral BID    levothyroxine  25 mcg Oral Before breakfast    methylPREDNISolone sodium succinate  40 mg Intravenous Q8H    pantoprazole  40 mg Intravenous Daily    sodium bicarbonate  325 mg Oral BID    sodium chloride 0.9%  3 mL Intravenous Q8H     varenicline  1 mg Oral BID     Continuous Infusions:   sodium chloride 0.9% 75 mL/hr at 04/17/17 1620     PRN Meds:.acetaminophen, hydrocodone-acetaminophen 5-325mg, morphine, promethazine      PHYSICAL EXAM:     Wt Readings from Last 1 Encounters:   04/17/17 1533 68 kg (150 lb)     Body mass index is 20.34 kg/(m^2).  Vitals:    04/17/17 1533 04/17/17 1730 04/17/17 1930 04/17/17 2121   BP: 111/62 120/72  115/67   BP Location: Right arm Right arm  Right arm   Patient Position: Lying Lying  Lying   BP Method: Automatic Automatic  Automatic   Pulse: 90 88 90 90   Resp: 18 19 18 18   Temp: 98.5 °F (36.9 °C) 98.2 °F (36.8 °C)  98 °F (36.7 °C)   TempSrc: Oral Oral  Oral   SpO2: (!) 91% 97% (!) 88% (!) 93%   Weight: 68 kg (150 lb)      Height: 6' (1.829 m)             -- General appearance: Lying in bed and appears comfortable.  well developed. appears slightly older than stated age   -- Head: normocephalic, atraumatic. +beard  -- Eyes: conjunctivae clear. Extraocular muscles intact  -- Nose: NG tube in place to suction.  Nares normal. Septum midline.   -- Mouth/Throat: lips, mucosa, and tongue normal. no throat erythema.   -- Neck: supple, symmetrical, trachea midline, no JVD and thyroid not grossly enlarged, appears symmetric  -- Lungs: Decreased breath sounds auscultation bilaterally with poor air excursion and prolonged expiratory phase consistent with long-term smoker. normal respiratory effort. No use of accessory muscles.   -- Chest wall: no tenderness. equal bilateral chest rise   -- Heart: Rapid rate and regular rhythm. S1, S2 normal.  no click, rub or gallop   -- Abdomen: Urostomy site in place.  Soft, Mildly distended abdomen that is tympanic with hypoactive bowel sounds.    -- Extremities: no cyanosis, clubbing or edema.   -- Pulses: 2+ and symmetric   -- Skin: color normal, texture normal, turgor normal. No rashes or lesions.   -- Neurologic: Normal strength and tone. No focal numbness or weakness.  CNII-XII intact. Edil coma scale: eyes open spontaneously-4, oriented & converses-5, obeys commands-6.      LABORATORY STUDIES:     Recent Results (from the past 36 hour(s))   CBC auto differential    Collection Time: 04/17/17  8:50 AM   Result Value Ref Range    WBC 6.00 3.90 - 12.70 K/uL    RBC 4.02 (L) 4.60 - 6.20 M/uL    Hemoglobin 12.8 (L) 14.0 - 18.0 g/dL    Hematocrit 37.3 (L) 40.0 - 54.0 %    MCV 93 82 - 98 fL    MCH 31.8 (H) 27.0 - 31.0 pg    MCHC 34.3 32.0 - 36.0 %    RDW 12.5 11.5 - 14.5 %    Platelets 291 150 - 350 K/uL    MPV 11.2 9.2 - 12.9 fL    Gran% 44.0 38.0 - 73.0 %    Lymph% 12.0 (L) 18.0 - 48.0 %    Mono% 7.0 4.0 - 15.0 %    Eosinophil% 0.0 0.0 - 8.0 %    Basophil% 0.0 0.0 - 1.9 %    Bands 37.0 %    Vacuolated Granulocytes Present     Differential Method Manual    Comprehensive metabolic panel    Collection Time: 04/17/17  8:50 AM   Result Value Ref Range    Sodium 140 136 - 145 mmol/L    Potassium 3.4 (L) 3.5 - 5.1 mmol/L    Chloride 88 (L) 95 - 110 mmol/L    CO2 30 (H) 23 - 29 mmol/L    Glucose 124 (H) 70 - 110 mg/dL    BUN, Bld 93 (H) 6 - 20 mg/dL    Creatinine 5.6 (H) 0.5 - 1.4 mg/dL    Calcium 8.5 (L) 8.7 - 10.5 mg/dL    Total Protein 7.9 6.0 - 8.4 g/dL    Albumin 3.6 3.5 - 5.2 g/dL    Total Bilirubin 0.7 0.1 - 1.0 mg/dL    Alkaline Phosphatase 66 55 - 135 U/L    AST 21 10 - 40 U/L    ALT 21 10 - 44 U/L    Anion Gap 22 (H) 8 - 16 mmol/L    eGFR if African American 12 (A) >60 mL/min/1.73 m^2    eGFR if non African American 10 (A) >60 mL/min/1.73 m^2   Lipase    Collection Time: 04/17/17  8:50 AM   Result Value Ref Range    Lipase 6 4 - 60 U/L   ISTAT PROCEDURE    Collection Time: 04/17/17 10:03 AM   Result Value Ref Range    POC PH 7.463 (H) 7.35 - 7.45    POC PCO2 42.5 35 - 45 mmHg    POC PO2 54 (LL) 80 - 100 mmHg    POC HCO3 30.4 (H) 24 - 28 mmol/L    POC BE 6 -2 to 2 mmol/L    POC SATURATED O2 89 (L) 95 - 100 %    POC TCO2 32 (H) 23 - 27 mmol/L    Sample ARTERIAL     Site LUIS MIGUEL Riggins  Test Pass     DelSys Nasal Can     Mode SPONT     Flow 3    Sodium, urine, random    Collection Time: 04/17/17  4:04 PM   Result Value Ref Range    Sodium Urine Random 98 20 - 250 mmol/L   Creatinine, urine, random    Collection Time: 04/17/17  4:04 PM   Result Value Ref Range    Creatinine, Random Ur 126.7 23.0 - 375.0 mg/dL   PTH, intact    Collection Time: 04/17/17  6:29 PM   Result Value Ref Range    PTH, Intact 493.4 (H) 9.0 - 77.0 pg/mL   Phosphorus    Collection Time: 04/17/17  6:29 PM   Result Value Ref Range    Phosphorus 7.9 (H) 2.7 - 4.5 mg/dL       Lab Results   Component Value Date    INR 0.9 08/01/2014    INR 1.0 06/24/2014    INR 1.0 06/27/2013     No results found for: HGBA1C  No results for input(s): POCTGLUCOSE in the last 72 hours.        MICROBIOLOGY DATA:     Urine Culture, Routine   Date Value Ref Range Status   06/11/2015 PROVIDENCIA RETTGERI  > 100,000 cfu/ml    Final   06/11/2015 STAPHYLOCOCCUS AUREUS  10,000 - 49,999 cfu/ml    Final   01/23/2015 PSEUDOMONAS AERUGINOSA  50,000 - 100,000 cfu/ml    Final   01/23/2015 STAPHYLOCOCCUS AUREUS  50,000 - 100,000 cfu/ml    Final   11/01/2014 PROVIDENCIA RETTGERI  > 100,000 cfu/ml    Final   11/01/2014 STAPHYLOCOCCUS AUREUS  50,000 - 100,000 cfu/ml    Final   11/01/2014 PSEUDOMONAS AERUGINOSA  > 100,000 cfu/ml    Final       Microbiology x 7d:   Microbiology Results (last 7 days)     ** No results found for the last 168 hours. **            IMAGING:     Imaging Results         X-Ray Abdomen Flat And Erect (Final result) Result time:  04/17/17 08:38:48    Final result by Mireya De Souza MD (04/17/17 08:38:48)    Impression:     Dilated loops of small bowel disproportionate to the amount of air noted in the large bowel concerning for small bowel obstruction      Electronically signed by: MIREYA DE SOUZA MD  Date:     04/17/17  Time:    08:38     Narrative:    Abdomen series.    Results: NG tube distal tip within the proximal stomach, could be  slightly advanced. Several dilated loops of small bowel seen in the left monika-abdomen measuring up to 5.5 cm with very little air noted in the large bowel concerning for small bowel obstruction.  No organomegaly. Small band of atelectasis at the lung bases.  Postoperative changes of lower abdominal wall hernia repair.  No free air.                CONSULTS:     IP CONSULT TO NEPHROLOGY  IP CONSULT TO HOSPITAL MEDICINE       ASSESSMENT & PLAN:     Primary Diagnosis:  Small bowel obstruction    Active Hospital Problems    Diagnosis  POA    *Small bowel obstruction [K56.69]  Yes     Priority: 1 - High    Hypoxemia [R09.02]  Yes     Priority: 2     Acute renal failure [N17.9]  Yes     Priority: 3     Increased PTH level [E34.9]  Yes    Hypocalcemia [E83.51]  Yes    Hyperphosphatemia [E83.39]  Yes    HTN (hypertension) [I10]  Yes     Chronic      Resolved Hospital Problems    Diagnosis Date Resolved POA   No resolved problems to display.         Small bowel obstruction  · Management per primary team    HTN (hypertension)  Hypertension  · Goal while inpatient is a systolic blood pressure less than 160mmHg  · BP in acceptable range at this time  · Continue current home regimen with hold parameters  · PRN antihypertensives available      Hypoxemia  · As evidence by history, physical exam, and ABG results    Recent Labs  Lab 04/17/17  1003   PH 7.463*   PCO2 42.5   PO2 54*   HCO3 30.4*   POCSATURATED 89*   BE 6     · Differential includes: COPD exacerbation, pulmonary edema associated with acute renal failure, and/or ventilation perfusion mismatch with supplemental oxygen in COPD patient  · Obtain PA and lateral chest x-ray  · Titrate his oxygen to 88-93% to prevent V/Q mismatch with his COPD patient  · Trial of Solu-Medrol with prophylactic Protonix for ulcer prevention  · Scheduled DuoNeb nebs    Acute renal failure  Management per nephrology      Hyperphosphatemia  · Likely due to acute renal failure  · Management  per nephrology      Insomnia        Increased PTH level  · PTH ordered by Dr. Kelvin Lowe  · Elevated PTH level with low normal calcium level  · Management per nephrology      Hypocalcemia  Low normal calcium levels.  Albumin is within normal range.  Likely related to acute on chronic renal failure.        VTE Risk Mitigation         Ordered     Medium Risk of VTE  Once      04/17/17 1530     Place ANNAMARIE hose  Until discontinued      04/17/17 1530            Adult PRN medications available   DVT prophylaxis given       Thank you for the consult.  Hospital medicine will continue to follow.    ===============================================================    Estuardo Philip MD, MPH  Department of Hospital Medicine   [facility]  [phone] pg  (7pm - 6am)          This note is dictated using Dragon Medical 360 voice recognition software.  There are word recognition mistakes that are occasionally missed on review.

## 2017-04-18 NOTE — NURSING
"Notified Dr Jain (On call for Dr Mack ) Pt went to  to have a BM; states he had 2 BM; dark brown lumps per pt and his wife,; they flushed the toilet. The pt states his NG tube "came out" and states he does not want it back in.  Pt did not get abd xray, spoke to Radiology who wanted to know should he get another NG prior to xray. Verbalized understanding. Leave NG tube out, keep NPO and get xray as ordered. Notified Radiology to perform xray as ord. Verbalized understanding.   "

## 2017-04-18 NOTE — ASSESSMENT & PLAN NOTE
· PTH ordered by Dr. Kelvin Lowe  · Elevated PTH level with low normal calcium level  · Management per nephrology

## 2017-04-18 NOTE — ASSESSMENT & PLAN NOTE
· As evidence by history, physical exam, and ABG results    Recent Labs  Lab 04/17/17  1003   PH 7.463*   PCO2 42.5   PO2 54*   HCO3 30.4*   POCSATURATED 89*   BE 6     · Differential includes: COPD exacerbation, pulmonary edema associated with acute renal failure, and/or ventilation perfusion mismatch with supplemental oxygen in COPD patient  · Obtain PA and lateral chest x-ray  · Titrate his oxygen to 88-93% to prevent V/Q mismatch with his COPD patient  · Trial of Solu-Medrol with prophylactic Protonix for ulcer prevention  · Scheduled DuoNeb nebs

## 2017-04-19 LAB
ANION GAP SERPL CALC-SCNC: 16 MMOL/L
BUN SERPL-MCNC: 117 MG/DL
CALCIUM SERPL-MCNC: 7.9 MG/DL
CHLORIDE SERPL-SCNC: 102 MMOL/L
CO2 SERPL-SCNC: 23 MMOL/L
CREAT SERPL-MCNC: 3.7 MG/DL
EST. GFR  (AFRICAN AMERICAN): 19 ML/MIN/1.73 M^2
EST. GFR  (NON AFRICAN AMERICAN): 17 ML/MIN/1.73 M^2
GLUCOSE SERPL-MCNC: 185 MG/DL
POTASSIUM SERPL-SCNC: 3.4 MMOL/L
SODIUM SERPL-SCNC: 141 MMOL/L

## 2017-04-19 PROCEDURE — 63600175 PHARM REV CODE 636 W HCPCS: Performed by: HOSPITALIST

## 2017-04-19 PROCEDURE — 63600175 PHARM REV CODE 636 W HCPCS: Performed by: INTERNAL MEDICINE

## 2017-04-19 PROCEDURE — 80048 BASIC METABOLIC PNL TOTAL CA: CPT

## 2017-04-19 PROCEDURE — 36415 COLL VENOUS BLD VENIPUNCTURE: CPT

## 2017-04-19 PROCEDURE — 25000003 PHARM REV CODE 250: Performed by: INTERNAL MEDICINE

## 2017-04-19 PROCEDURE — 25000003 PHARM REV CODE 250

## 2017-04-19 PROCEDURE — 63600175 PHARM REV CODE 636 W HCPCS

## 2017-04-19 PROCEDURE — 94640 AIRWAY INHALATION TREATMENT: CPT

## 2017-04-19 PROCEDURE — 99223 1ST HOSP IP/OBS HIGH 75: CPT | Mod: ,,, | Performed by: UROLOGY

## 2017-04-19 PROCEDURE — 25500020 PHARM REV CODE 255: Performed by: SURGERY

## 2017-04-19 PROCEDURE — 27000221 HC OXYGEN, UP TO 24 HOURS

## 2017-04-19 PROCEDURE — 12000002 HC ACUTE/MED SURGE SEMI-PRIVATE ROOM

## 2017-04-19 PROCEDURE — 25000242 PHARM REV CODE 250 ALT 637 W/ HCPCS: Performed by: INTERNAL MEDICINE

## 2017-04-19 PROCEDURE — 25000003 PHARM REV CODE 250: Performed by: HOSPITALIST

## 2017-04-19 RX ORDER — LORAZEPAM 2 MG/ML
0.5 INJECTION INTRAMUSCULAR EVERY 8 HOURS PRN
Status: DISCONTINUED | OUTPATIENT
Start: 2017-04-19 | End: 2017-04-25 | Stop reason: HOSPADM

## 2017-04-19 RX ORDER — POTASSIUM CHLORIDE 20 MEQ/15ML
40 SOLUTION ORAL ONCE
Status: DISCONTINUED | OUTPATIENT
Start: 2017-04-19 | End: 2017-04-25 | Stop reason: HOSPADM

## 2017-04-19 RX ORDER — POTASSIUM CHLORIDE 7.45 MG/ML
10 INJECTION INTRAVENOUS ONCE
Status: DISCONTINUED | OUTPATIENT
Start: 2017-04-19 | End: 2017-04-19

## 2017-04-19 RX ORDER — LEVALBUTEROL INHALATION SOLUTION 0.63 MG/3ML
0.63 SOLUTION RESPIRATORY (INHALATION) EVERY 8 HOURS
Status: DISCONTINUED | OUTPATIENT
Start: 2017-04-19 | End: 2017-04-22

## 2017-04-19 RX ADMIN — Medication 3 ML: at 06:04

## 2017-04-19 RX ADMIN — POTASSIUM CHLORIDE: 2 INJECTION, SOLUTION, CONCENTRATE INTRAVENOUS at 02:04

## 2017-04-19 RX ADMIN — POTASSIUM CHLORIDE: 2 INJECTION, SOLUTION, CONCENTRATE INTRAVENOUS at 06:04

## 2017-04-19 RX ADMIN — MORPHINE SULFATE 4 MG: 10 INJECTION INTRAVENOUS at 11:04

## 2017-04-19 RX ADMIN — LEVALBUTEROL 0.63 MG: 0.63 SOLUTION RESPIRATORY (INHALATION) at 10:04

## 2017-04-19 RX ADMIN — MORPHINE SULFATE 4 MG: 10 INJECTION INTRAVENOUS at 08:04

## 2017-04-19 RX ADMIN — METHYLPREDNISOLONE SODIUM SUCCINATE 40 MG: 40 INJECTION, POWDER, FOR SOLUTION INTRAMUSCULAR; INTRAVENOUS at 10:04

## 2017-04-19 RX ADMIN — Medication 3 ML: at 02:04

## 2017-04-19 RX ADMIN — MORPHINE SULFATE 4 MG: 10 INJECTION INTRAVENOUS at 03:04

## 2017-04-19 RX ADMIN — IOHEXOL 15 ML: 300 INJECTION, SOLUTION INTRAVENOUS at 01:04

## 2017-04-19 RX ADMIN — METHYLPREDNISOLONE SODIUM SUCCINATE 40 MG: 40 INJECTION, POWDER, FOR SOLUTION INTRAMUSCULAR; INTRAVENOUS at 06:04

## 2017-04-19 RX ADMIN — IPRATROPIUM BROMIDE AND ALBUTEROL SULFATE 3 ML: .5; 3 SOLUTION RESPIRATORY (INHALATION) at 12:04

## 2017-04-19 RX ADMIN — METHYLPREDNISOLONE SODIUM SUCCINATE 40 MG: 40 INJECTION, POWDER, FOR SOLUTION INTRAMUSCULAR; INTRAVENOUS at 02:04

## 2017-04-19 RX ADMIN — LEVALBUTEROL 0.63 MG: 0.63 SOLUTION RESPIRATORY (INHALATION) at 07:04

## 2017-04-19 NOTE — PLAN OF CARE
Problem: Fall Risk (Adult)  Goal: Identify Related Risk Factors and Signs and Symptoms  Related risk factors and signs and symptoms are identified upon initiation of Human Response Clinical Practice Guideline (CPG)   Outcome: Ongoing (interventions implemented as appropriate)  Free of falls. Call light in reach.     Problem: Patient Care Overview  Goal: Plan of Care Review  Outcome: Ongoing (interventions implemented as appropriate)  Pt progressing. Oriented x4. Urostomy maintained. Skin integrity maintained. Pain controlled. VS stable. NPO. IV fluids maintained. Free of falls. Call light in reach. Low bed. No issues during shift. Continue plan of care.        Problem: Pressure Ulcer Risk (Juan Scale) (Adult,Obstetrics,Pediatric)  Goal: Identify Related Risk Factors and Signs and Symptoms  Related risk factors and signs and symptoms are identified upon initiation of Human Response Clinical Practice Guideline (CPG)   Outcome: Ongoing (interventions implemented as appropriate)  Skin intact.     Problem: Renal Failure/Kidney Injury, Acute (Adult)  Goal: Signs and Symptoms of Listed Potential Problems Will be Absent, Minimized or Managed (Renal Failure/Kidney Injury, Acute)  Signs and symptoms of listed potential problems will be absent, minimized or managed by discharge/transition of care (reference Renal Failure/Kidney Injury, Acute (Adult) CPG).  Outcome: Ongoing (interventions implemented as appropriate)  IV fluids maintained. Urine is now yellow, sediment noted.     Problem: Bowel Obstruction (Adult)  Goal: Signs and Symptoms of Listed Potential Problems Will be Absent, Minimized or Managed (Bowel Obstruction)  Signs and symptoms of listed potential problems will be absent, minimized or managed by discharge/transition of care (reference Bowel Obstruction (Adult) CPG).  Outcome: Ongoing (interventions implemented as appropriate)  Pt no longer has NGT. Remains NPO. Still reports pain to stoma area. IV medication  given. Pt has had BMs during shift. No reports of nausea/ vomiting.

## 2017-04-19 NOTE — PROGRESS NOTES
16f NG tube inserted to right nare. F-tad used to secure tube in place. PH test done to confirm placement.PH-3-4 Tolerated well.

## 2017-04-19 NOTE — PLAN OF CARE
Problem: Patient Care Overview  Goal: Plan of Care Review  Outcome: Ongoing (interventions implemented as appropriate)  Patient is A/Ox4, remains free from falls and states moderate relief from pain with ordered pain medicines.  Patient was very upset and frustrated this AM and was threatening to leave AMA.  Patient agreed to morning imaging and afternoon CT scan.  Patient is now more agreeable, and is less frustrated.  NG tube reinserted, set to low/intermittent suction, tolerating well.  Patient's urostomy remains patent and stoma is pink and no signs of infection or circulation deficiency.  Will continue to monitor for pain, safety and abdominal discomfort.

## 2017-04-19 NOTE — PROGRESS NOTES
Renal Progress Note    Date of Admission:  4/17/2017  7:32 AM      Subjective:    N/a      Objective:    Current Facility-Administered Medications   Medication    0.9%  NaCl infusion    acetaminophen tablet 650 mg    albuterol-ipratropium 2.5mg-0.5mg/3mL nebulizer solution 3 mL    amlodipine tablet 5 mg    atenolol tablet 50 mg    escitalopram oxalate tablet 10 mg    gabapentin capsule 400 mg    hydrocodone-acetaminophen 5-325mg per tablet 1 tablet    levothyroxine tablet 25 mcg    methylPREDNISolone sodium succinate injection 40 mg    morphine injection 4 mg    pantoprazole injection 40 mg    promethazine suppository 12.5 mg    sodium bicarbonate tablet 325 mg    sodium chloride 0.9% flush 3 mL    varenicline tablet 1 mg     Vitals:    04/18/17 1815   BP: 114/67   Pulse: 98   Resp: 18   Temp: 98 °F (36.7 °C)     I/O last 3 completed shifts:  In: 2462.5 [I.V.:1762.5; Other:300; IV Piggyback:400]  Out: 1300 [Urine:750; Drains:550]         Exam:  N/a      Laboratories:    No results for input(s): WBC, RBC, HGB, HCT, PLT, MCV, MCH, MCHC in the last 24 hours.    Recent Labs  Lab 04/18/17  0402   CALCIUM 7.5*      K 3.2*   CO2 29   CL 91*   *   CREATININE 6.2*     PTH                 493.4   PTH       Diagnostic Tests:  X-Ray Abdomen Flat And Erect [[id number]] Resulted: 04/18/17 1407        Order Status: Completed Updated: 04/18/17 1408       Narrative:         Flat and upright    A dilated loop of small bowel are seen within the midabdomen from her prior exam. There is osteopenia and degenerative changes of the spine.       Impression:          There are findings suggesting small bowel obstruction.      Electronically signed by: DARY AGUILA MD  Date: 04/18/17  Time: 14:07        X-Ray Chest PA And Lateral [[id number]] Resulted: 04/18/17 0910       Order Status: Completed Updated: 04/18/17 0910       Addenda:           Results: There is a line or a catheter  seen overlying the mid chest region   possibly an NG tube, its distal tip is not well-seen on the lateral view   it projects in the gastroesophageal junction, this could be repeated to   confirm placement of the NG tube.     Electronically signed by: MIREYA DE SOUZA MD Date: 04/18/17 Time: 09:10   Signed: 04/18/17 0910 by Mireya De Souza MD       Narrative:         PA and lateral chest radiograph    Comparison: 4/14/17    Result: The cardiac silhouette is midline.  The pulmonary vascularity is normal.  No acute focal area of airspace disease.  No pleural effusion.  No pneumothorax.  Minimal band of atelectasis at the right lung base.  It is better aerated compared to the prior exam.       Impression:          Minimal residual atelectasis right lung base      Electronically signed by: MIREYA DE SOUZA MD  Date: 04/18/17  Time: 09:08         Assessment:    58 y/o male admitted with:    - SBO  - s/p recent Hernia repair  - Severe INDIO / Azotemia  likely due to severe volume depletion, possible ATN  - CKD-3 (creatinine 2.86 last Summer)  - Hypokalemia  - Secondary Hyperparathyroidism  - HypoCa++  - Hx. Cystectomy due to bladder CA  - Hx. Ileal conduit 2012  - Hx. COPD  - Hx. HTN      Plan:    - NPO per Surgery  - Change IVFs to D5NS + K+ and Ca++  - Renal Sonogram in a.m. (r/o obstruction)  - Consult  for f/u  - watch BMP

## 2017-04-19 NOTE — PROGRESS NOTES
04/18/17 1539   Discharge Assessment   Assessment Type Discharge Planning Assessment   Assessment information obtained from? Patient   Communicated expected length of stay with patient/caregiver no   Type of Healthcare Directive Received (None)   Prior to hospitilization cognitive status: Alert/Oriented   Prior to hospitalization functional status: Independent   Current cognitive status: Alert/Oriented   Current Functional Status: Needs Assistance   Arrived From home or self-care   Lives With spouse   Able to Return to Prior Arrangements yes   Is patient able to care for self after discharge? Yes  (Will need assistance.)   How many people do you have in your home that can help with your care after discharge? 1   Who are your caregiver(s) and their phone number(s)? (Spouse, Luke Guillory [phone])   Patient's perception of discharge disposition home or selfcare   Readmission Within The Last 30 Days (Yes, [facility], Hernia Repair 04/15/17)   Patient currently being followed by outpatient case management? No   Patient currently receives home health services? No   Does the patient currently use HME? Yes   Patient currently receives private duty nursing? N/A   Patient currently receives any other outside agency services? No   Equipment Currently Used at Home cane, straight   Do you have any problems affording any of your prescribed medications? No   Is the patient taking medications as prescribed? yes   Do you have any financial concerns preventing you from receiving the healthcare you need? No   Does the patient have transportation to healthcare appointments? Yes   Transportation Available family or friend will provide   On Dialysis? No   Does the patient receive services at the Coumadin Clinic? No   Are there any open cases? No   Discharge Plan A Home with family   Patient/Family In Agreement With Plan yes   Pt stated he has an appt on 04/25/17 with Dr. Mack. Discussed Discharge Brochure Checklist, Help at  Home, and Health Information Packet in detail with verbalization via teach back with pt and spouse.     "[employer] [id number]  [place] [address] [address] [facility] [address]  [facility] [address]  [place] [address]  Phone: [phone] Fax: [phone]     ** Assessment entered in error into another chart by another TN and copied into correct chart**

## 2017-04-19 NOTE — ASSESSMENT & PLAN NOTE
Some degree of hydronephrosis is expected with an ileal conduit  Currently he has more hydro than previous exam  He may need b/l nephrostomy tubes, but this is unlikely.  His creatinine is improving and he denies flank pain.    Dr. Mack is ordering a CT scan for further evaluation of his bowel obstruction.

## 2017-04-19 NOTE — SUBJECTIVE & OBJECTIVE
"Past Medical History:   Diagnosis Date    Arthritis     Blood transfusion     Cancer     bladder    COPD (chronic obstructive pulmonary disease)     Frequency of urination     General anesthetics causing adverse effect in therapeutic use     pt states he wakes up very violently from anesthesia    History of urostomy     Hypertension     Limited joint range of motion right hip and leg    Mixed anxiety and depressive disorder     Personal history of bladder cancer     Thyroid disease     Ureteral stent displacement     Urinary retention     Wears glasses        Past Surgical History:   Procedure Laterality Date    ABDOMINAL SURGERY      APPENDECTOMY  12/30/2015    Procedure: APPENDECTOMY;  Surgeon: CHAD Bo MD;  Location: [facility];  Service: Urology;;    bladder removed      BOWEL RESECTION      CYSTOSCOPY      >1  episodes for bilat ureteral stent exchange    FRACTURE SURGERY      HAND SURGERY      HERNIA REPAIR      amalia    HIP SURGERY  2012    Rt hip septic    MOUTH SURGERY  2000    all teeth extracted    turbt      urostomy         Review of patient's allergies indicates:   Allergen Reactions    Keflex [cephalexin] Anaphylaxis and Other (See Comments)     UNKNOWN TO PT-" DID NOT AGREE WITH PT"    Percocet [oxycodone-acetaminophen] Nausea And Vomiting       Family History     None          Social History Main Topics    Smoking status: Former Smoker     Packs/day: 2.00     Years: 42.00     Quit date: 2/1/2017    Smokeless tobacco: Never Used    Alcohol use 0.0 oz/week     2 - 4 Cans of beer per week      Comment: occassional    Drug use: Yes     Special: Marijuana      Comment: occass    Sexual activity: Yes     Partners: Female     Birth control/ protection: None       Review of Systems   Constitutional: Negative.    HENT: Negative.    Eyes: Negative.    Respiratory: Negative for cough, chest tightness and shortness of breath.    Cardiovascular: Negative for chest pain. "   Gastrointestinal: Negative.  Negative for abdominal distention, abdominal pain, constipation, diarrhea and nausea.   Musculoskeletal: Negative.    Neurological: Negative.    Psychiatric/Behavioral: Negative.        Objective:     Temp:  [97.5 °F (36.4 °C)-98.8 °F (37.1 °C)] 98.7 °F (37.1 °C)  Pulse:  [] 80  Resp:  [17-20] 18  SpO2:  [84 %-97 %] 91 %  BP: (114-141)/(60-74) 140/74     Body mass index is 20.34 kg/(m^2).            Drains     Drain                 Urostomy RLQ -- days         Ureteral Drain/Stent 12/30/15 1217 Left ureter 8 Fr. 475 days         Ureteral Drain/Stent 12/30/15 1300 Right ureter 7 Fr. 475 days                Physical Exam   Nursing note and vitals reviewed.  Constitutional: He is oriented to person, place, and time. He appears well-developed and well-nourished.   HENT:   Head: Normocephalic.   Eyes: Conjunctivae are normal.   Neck: Normal range of motion. Neck supple. No tracheal deviation present. No thyromegaly present.   Cardiovascular: Normal rate and normal heart sounds.    Pulmonary/Chest: Effort normal and breath sounds normal. No respiratory distress. He has no wheezes.   Abdominal: Soft. Bowel sounds are normal. There is no hepatosplenomegaly. There is no tenderness. There is no rebound and no CVA tenderness. No hernia.       Ileal Conduit Stoma, pink and well vascularized.  Urine yellow.  Some resolving ecchymosis noted around stoma.   Musculoskeletal: Normal range of motion. He exhibits no edema or tenderness.   Lymphadenopathy:     He has no cervical adenopathy.   Neurological: He is alert and oriented to person, place, and time.   Skin: Skin is warm and dry. No rash noted. No erythema.     Psychiatric: He has a normal mood and affect. His behavior is normal. Judgment and thought content normal.       Significant Labs:    BMP:    Recent Labs  Lab 04/17/17  0850 04/18/17  0402 04/19/17  0430    142 141   K 3.4* 3.2* 3.4*   CL 88* 91* 102   CO2 30* 29 23   BUN 93*  117* 117*   CREATININE 5.6* 6.2* 3.7*   CALCIUM 8.5* 7.5* 7.9*       CBC:    Recent Labs  Lab 04/17/17  0850   WBC 6.00   HGB 12.8*   HCT 37.3*          Blood Culture: No results for input(s): LABBLOO in the last 168 hours.  Urine Culture: No results for input(s): LABURIN in the last 168 hours.    Significant Imaging:  U/S: I have reviewed all results within the past 24 hours and my personal findings are:  bilateral hydronephrosis present, moderate.

## 2017-04-19 NOTE — PROGRESS NOTES
Urostomy leaking. Bag changed. Stoma pink and round. Skin intact without redness. No other issues at this time.

## 2017-04-19 NOTE — CONSULTS
[facility]  Urology  Consult Note    Patient Name: Blake Guillory  MRN: [id number]  Admission Date: 4/17/2017  Hospital Length of Stay: 2   Code Status: Full Code   Attending Provider: Estuardo Mack MD   Consulting Provider: SHIN Bo MD  Primary Care Physician: Varun Zeng MD  Principal Problem:Small bowel obstruction    Inpatient consult to Urology  Consult performed by: CHAD BO  Consult ordered by: ESTER MCCULLOUGH          Subjective:     HPI:  He has a history of bladder cancer. He is s/p cystectomy with ileal conduit in November 2012. From an oncologic standpoint, he is doing well. He has had issues with bilateral ureteral stricture. He underwent a bilateral ureteral reimplantation into his conduit several months later. His strictures recurred shortly afterwards. He was managed with ureteral stent changes until one of the stent eroded through his conduit and into his bowels.   He had an Exploratory Laparotomy with replacement of ileal conduit and small bowel anastomosis on 12/30/2015.     He developed a parastomal hernia that was recently repaired by Dr. Mack.  He is back in the hospital with concerns for bowel obstruction.  Urology consult requested due to concerns with the conduit and renal failure.    He denies problems with his stoma or stomal appliance.  He is feeling better today.      Past Medical History:   Diagnosis Date    Arthritis     Blood transfusion     Cancer     bladder    COPD (chronic obstructive pulmonary disease)     Frequency of urination     General anesthetics causing adverse effect in therapeutic use     pt states he wakes up very violently from anesthesia    History of urostomy     Hypertension     Limited joint range of motion right hip and leg    Mixed anxiety and depressive disorder     Personal history of bladder cancer     Thyroid disease     Ureteral stent displacement     Urinary retention     Wears glasses   "      Past Surgical History:   Procedure Laterality Date    ABDOMINAL SURGERY      APPENDECTOMY  12/30/2015    Procedure: APPENDECTOMY;  Surgeon: CHAD Bo MD;  Location: [facility] OR;  Service: Urology;;    bladder removed      BOWEL RESECTION      CYSTOSCOPY      >1  episodes for bilat ureteral stent exchange    FRACTURE SURGERY      HAND SURGERY      HERNIA REPAIR      amalia    HIP SURGERY  2012    Rt hip septic    MOUTH SURGERY  2000    all teeth extracted    turbt      urostomy       No current facility-administered medications on file prior to encounter.      Current Outpatient Prescriptions on File Prior to Encounter   Medication Sig Dispense Refill    allopurinol (ZYLOPRIM) 100 MG tablet Take 1 tablet (100 mg total) by mouth once daily. 90 tablet 0    amlodipine (NORVASC) 5 MG tablet TAKE 1 TABLET(5 MG) BY MOUTH EVERY DAY 90 tablet 0    atenolol (TENORMIN) 50 MG tablet Take 1 tablet (50 mg total) by mouth once daily. 90 tablet 0    escitalopram oxalate (LEXAPRO) 10 MG tablet Take 1 tablet (10 mg total) by mouth once daily. (Patient taking differently: Take 10 mg by mouth every evening. ) 90 tablet 0    gabapentin (NEURONTIN) 400 MG capsule Take 1 capsule (400 mg total) by mouth 2 (two) times daily. 180 capsule 0    hydrocodone-acetaminophen 10-325mg (NORCO)  mg Tab Take 1 tablet by mouth every 6 (six) hours as needed. 50 tablet 0    levothyroxine (SYNTHROID) 25 MCG tablet TAKE 1 TABLET(25 MCG) BY MOUTH EVERY DAY 30 tablet 0    sodium bicarbonate 325 MG tablet Take 325 mg by mouth 2 (two) times daily.      varenicline (CHANTIX CONTINUING MONTH BOX) 1 mg Tab Take 1 tablet (1 mg total) by mouth 2 (two) times daily. 60 tablet 0       Review of patient's allergies indicates:   Allergen Reactions    Keflex [cephalexin] Anaphylaxis and Other (See Comments)     UNKNOWN TO PT-" DID NOT AGREE WITH PT"    Percocet [oxycodone-acetaminophen] Nausea And Vomiting       Family History     None "          Social History Main Topics    Smoking status: Former Smoker     Packs/day: 2.00     Years: 42.00     Quit date: 2/1/2017    Smokeless tobacco: Never Used    Alcohol use 0.0 oz/week     2 - 4 Cans of beer per week      Comment: occassional    Drug use: Yes     Special: Marijuana      Comment: occass    Sexual activity: Yes     Partners: Female     Birth control/ protection: None       Review of Systems   Constitutional: Negative.    HENT: Negative.    Eyes: Negative.    Respiratory: Negative for cough, chest tightness and shortness of breath.    Cardiovascular: Negative for chest pain.   Gastrointestinal: Negative.  Negative for abdominal distention, abdominal pain, constipation, diarrhea and nausea.   Musculoskeletal: Negative.    Neurological: Negative.    Psychiatric/Behavioral: Negative.        Objective:     Temp:  [97.5 °F (36.4 °C)-98.8 °F (37.1 °C)] 98.7 °F (37.1 °C)  Pulse:  [] 80  Resp:  [17-20] 18  SpO2:  [84 %-97 %] 91 %  BP: (114-141)/(60-74) 140/74     Body mass index is 20.34 kg/(m^2).            Drains     Drain                 Urostomy RLQ -- days         Ureteral Drain/Stent 12/30/15 1217 Left ureter 8 Fr. 475 days         Ureteral Drain/Stent 12/30/15 1300 Right ureter 7 Fr. 475 days                Physical Exam   Nursing note and vitals reviewed.  Constitutional: He is oriented to person, place, and time. He appears well-developed and well-nourished.   HENT:   Head: Normocephalic.   Eyes: Conjunctivae are normal.   Neck: Normal range of motion. Neck supple. No tracheal deviation present. No thyromegaly present.   Cardiovascular: Normal rate and normal heart sounds.    Pulmonary/Chest: Effort normal and breath sounds normal. No respiratory distress. He has no wheezes.   Abdominal: Soft. Bowel sounds are normal. There is no hepatosplenomegaly. There is no tenderness. There is no rebound and no CVA tenderness. No hernia.       Ileal Conduit Stoma, pink and well vascularized.   Urine yellow.  Some resolving ecchymosis noted around stoma.   Musculoskeletal: Normal range of motion. He exhibits no edema or tenderness.   Lymphadenopathy:     He has no cervical adenopathy.   Neurological: He is alert and oriented to person, place, and time.   Skin: Skin is warm and dry. No rash noted. No erythema.     Psychiatric: He has a normal mood and affect. His behavior is normal. Judgment and thought content normal.       Significant Labs:    BMP:    Recent Labs  Lab 04/17/17  0850 04/18/17  0402 04/19/17  0430    142 141   K 3.4* 3.2* 3.4*   CL 88* 91* 102   CO2 30* 29 23   BUN 93* 117* 117*   CREATININE 5.6* 6.2* 3.7*   CALCIUM 8.5* 7.5* 7.9*       CBC:    Recent Labs  Lab 04/17/17  0850   WBC 6.00   HGB 12.8*   HCT 37.3*          Blood Culture: No results for input(s): LABBLOO in the last 168 hours.  Urine Culture: No results for input(s): LABURIN in the last 168 hours.    Significant Imaging:  U/S: I have reviewed all results within the past 24 hours and my personal findings are:  bilateral hydronephrosis present, moderate.                    Assessment and Plan:     Hydronephrosis, bilateral  Some degree of hydronephrosis is expected with an ileal conduit  Currently he has more hydro than previous exam  He may need b/l nephrostomy tubes, but this is unlikely.  His creatinine is improving and he denies flank pain.    Dr. Mack is ordering a CT scan for further evaluation of his bowel obstruction.    Acute renal failure  Improving with hydration       VTE Risk Mitigation         Ordered     Medium Risk of VTE  Once      04/17/17 1530     Place ANNAMARIE hose  Until discontinued      04/17/17 1530          Thank you for your consult.   Following    W Vikash Bo MD  Urology  [facility]

## 2017-04-19 NOTE — NURSING
Patient back on unit from ultrasound and xray.  Patient refusing all morning medicines, is adamant about leaving.  Dr. Mack notified.

## 2017-04-19 NOTE — NURSING
Patient transported off unit for ultrasound.  Patient stated he refused further treatment, is adamant on leaving this morning.  Patient agreed to ultrasound if IV was disconnected.  Patient travelled to ultrasound via wheelchair, IV fluids currently paused.  Patient is agitated and is threatening leaving AMA.   Dr. Mack notified.  Flat/erect abd xray ordered while patient is down for ultrasound.  Ultrasound and xray notified and order put in.

## 2017-04-19 NOTE — NURSING
Patient stated need for an anxiolytic and a request for albuterol treatment to be switched to a different nebulization to reduce anxiety caused by albuterol.  Dr. Brantley consulted as hospitalist, but consult was in error day prior, so Dr. Philip will be seeing patient at 7:00 to address issues.  Will hand-off concerns and requests to PM nurse.

## 2017-04-19 NOTE — PLAN OF CARE
04/18/17 1539   Readmission Questionnaire   At the time of your discharge, did someone talk to you about what your health problems were? Yes   At the time of discharge, did someone talk to you about what to watch out for regarding worsening of your health problem? Yes   At the time of discharge, did someone talk to you about what to do if you experienced worsening of your health problem? Yes   At the time of discharge, did someone talk to you about which medication to take when you left the hospital and which ones to stop taking? Yes   At the time of discharge, did someone talk to you about when and where to follow up with a doctor after you left the hospital? Yes   What do you believe caused you to be sick enough to be re-admitted? increased pain and nausea   How often do you need to have someone help you when you read instructions, pamphlets, or other written material from your doctor or pharmacy? Often   Do you have problems taking your medications as prescribed? No   Do you have any problems affording any of  your prescribed medications? No   Do you have problems obtaining/receiving your medications? No   Does the patient have transportation to healthcare appointments? Yes   Lives With spouse   Living Arrangements house   Does the patient have family/friends to help with healtcare needs after discharge? yes   Does your caregiver provide all the help you need? Yes   Are you currently feeling confused? No   Are you currently having problems thinking? No   Are you currently having memory problems? No

## 2017-04-19 NOTE — PROGRESS NOTES
Pt trying to leave AMA.  I have had a lengthy discussion with him and he is now willing to stay.  Passing some flatus, feels like eating.    Vitals:    04/18/17 2007 04/19/17 0002 04/19/17 0044 04/19/17 0800   BP: (!) 141/60 129/64  (!) 140/74   BP Location:       Patient Position:       BP Method:       Pulse: 91 89 79 80   Resp: 20 20 18 18   Temp: 98.7 °F (37.1 °C) 98.8 °F (37.1 °C)  98.7 °F (37.1 °C)   TempSrc:    Oral   SpO2: (!) 93% (!) 94%  (!) 91%   Weight:       Height:         abd films still with dilated loop of small bowel    A/P small bowel obstruction.  Will repeat CT.  If bowel is going over the mesh, may need surgery to fix this.  If contrast does not easily reach the colon will need NGT.  Pt understands this and is willing to proceed.  Acute renal failure on CKD, improving, likely dehydration

## 2017-04-19 NOTE — PROGRESS NOTES
Renal Progress Note    Date of Admission:  4/17/2017  7:32 AM      Subjective:    Pte. Very hungry and upset due to not knowing what is going on with his current condition. Reports passing small amount of stool at first and then a large bowel movement, no abdominal pain, nausea or vomiting, feels very thirsty.      Objective:    Current Facility-Administered Medications   Medication    acetaminophen tablet 650 mg    albuterol-ipratropium 2.5mg-0.5mg/3mL nebulizer solution 3 mL    amlodipine tablet 5 mg    atenolol tablet 50 mg    calcitRIOL capsule 0.5 mcg    dextrose 5 % 1,000 mL with potassium chloride 20 mEq, calcium chloride 2 g infusion    escitalopram oxalate tablet 10 mg    gabapentin capsule 400 mg    hydrocodone-acetaminophen 5-325mg per tablet 1 tablet    levothyroxine tablet 25 mcg    methylPREDNISolone sodium succinate injection 40 mg    morphine injection 4 mg    pantoprazole injection 40 mg    promethazine suppository 12.5 mg    sodium chloride 0.9% flush 3 mL    varenicline tablet 1 mg     Vitals:    04/19/17 0800   BP: (!) 140/74   Pulse: 80   Resp: 18   Temp: 98.7 °F (37.1 °C)     I/O last 3 completed shifts:  In: 2462.5 [I.V.:1762.5; Other:300; IV Piggyback:400]  Out: 3000 [Urine:2450; Drains:550]         Exam:  N/a      Laboratories:    No results for input(s): WBC, RBC, HGB, HCT, PLT, MCV, MCH, MCHC in the last 24 hours.    Recent Labs  Lab 04/19/17  0430   CALCIUM 7.9*      K 3.4*   CO2 23      *   CREATININE 3.7*     PTH                 493.4   PTH       Diagnostic Tests:  X-Ray Abdomen Flat And Erect [[id number]] Resulted: 04/18/17 1407        Order Status: Completed Updated: 04/18/17 1408       Narrative:         Flat and upright    A dilated loop of small bowel are seen within the midabdomen from her prior exam. There is osteopenia and degenerative changes of the spine.       Impression:          There are findings suggesting  small bowel obstruction.      Electronically signed by: DARY AGUILA MD  Date: 04/18/17  Time: 14:07        X-Ray Chest PA And Lateral [[id number]] Resulted: 04/18/17 0910       Order Status: Completed Updated: 04/18/17 0910       Addenda:           Results: There is a line or a catheter seen overlying the mid chest region   possibly an NG tube, its distal tip is not well-seen on the lateral view   it projects in the gastroesophageal junction, this could be repeated to   confirm placement of the NG tube.     Electronically signed by: MIREYA DE SOUZA MD Date: 04/18/17 Time: 09:10   Signed: 04/18/17 0910 by Mireya De Souza MD       Narrative:         PA and lateral chest radiograph    Comparison: 4/14/17    Result: The cardiac silhouette is midline.  The pulmonary vascularity is normal.  No acute focal area of airspace disease.  No pleural effusion.  No pneumothorax.  Minimal band of atelectasis at the right lung base.  It is better aerated compared to the prior exam.       Impression:          Minimal residual atelectasis right lung base      Electronically signed by: MIREYA DE SOUZA MD  Date: 04/18/17  Time: 09:08         US Retroperitoneal Complete (Kidney and [[id number]] (Abnormal) Resulted: 04/19/17 0927       Order Status: Completed Updated: 04/19/17 0927       Narrative:         Renal ultrasound    History: Ileal conduit, rule out obstruction    Comparison: June 2016    Technique: Sonographic evaluation of the kidneys and bladder was performed.    Findings: The right kidney measures 9.6 cm in length.  There is moderate right hydronephrosis, this is more prominent than was seen in June of 2016.  The resistive index within a parenchymal artery is 0.8.      The left kidney measures 9.1 cm in length.  There is moderate left hydronephrosis which is more prominent than what was seen in June of 2016. The resistive index within a parenchymal artery is 0.8.There is a 7 mm calcification within the lower  pole left kidney.    The bladder is unremarkable. Incidental note of sludge within the gallbladder is main.       Impression:           There is bilateral moderate hydronephrosis, the renal collecting systems are more prominent than what was seen in June of 2016. The increased degree of hydronephrosis may signify obstruction.    This report has been flagged as abnormal in EPIC.       Electronically signed by: DARY AGUILA MD  Date: 04/19/17              Assessment:    56 y/o male admitted with:    - SBO  - s/p recent Hernia repair  - Severe INDIO / Azotemia  likely due to severe volume depletion - creatinine improving since yesterday -  - CKD-3 (creatinine 2.86 last Summer)  - Hypokalemia  - Secondary Hyperparathyroidism  - HypoCa++  - Hx. Cystectomy due to bladder CA  - Hx. Ileal conduit 2012  - Mild chronic hydronephrosis  - Hx. COPD  - Hx. HTN      Plan:    - NPO per Surgery  - Change IVFs to D5W  + K+ and Ca++ at 125 cc/h  - If planning on keeping Pte. NPo any longer consider TPN  -  saw pte. Regarding Ileal conduit and mild hydronephrosis  - watch BMP and UO    Renal function issues discussed with Pte. At length.

## 2017-04-20 LAB
ANION GAP SERPL CALC-SCNC: 12 MMOL/L
BUN SERPL-MCNC: 84 MG/DL
CALCIUM SERPL-MCNC: 10.1 MG/DL
CHLORIDE SERPL-SCNC: 104 MMOL/L
CO2 SERPL-SCNC: 30 MMOL/L
CREAT SERPL-MCNC: 2.1 MG/DL
EST. GFR  (AFRICAN AMERICAN): 39 ML/MIN/1.73 M^2
EST. GFR  (NON AFRICAN AMERICAN): 33 ML/MIN/1.73 M^2
GLUCOSE SERPL-MCNC: 212 MG/DL
MAGNESIUM SERPL-MCNC: 2.1 MG/DL
PHOSPHATE SERPL-MCNC: 3.6 MG/DL
POTASSIUM SERPL-SCNC: 3.5 MMOL/L
SODIUM SERPL-SCNC: 146 MMOL/L

## 2017-04-20 PROCEDURE — 99232 SBSQ HOSP IP/OBS MODERATE 35: CPT | Mod: ,,, | Performed by: UROLOGY

## 2017-04-20 PROCEDURE — 63600175 PHARM REV CODE 636 W HCPCS: Performed by: HOSPITALIST

## 2017-04-20 PROCEDURE — 84100 ASSAY OF PHOSPHORUS: CPT

## 2017-04-20 PROCEDURE — 25000003 PHARM REV CODE 250: Performed by: HOSPITALIST

## 2017-04-20 PROCEDURE — 63600175 PHARM REV CODE 636 W HCPCS: Performed by: INTERNAL MEDICINE

## 2017-04-20 PROCEDURE — 94640 AIRWAY INHALATION TREATMENT: CPT

## 2017-04-20 PROCEDURE — 25000003 PHARM REV CODE 250: Performed by: INTERNAL MEDICINE

## 2017-04-20 PROCEDURE — 25000003 PHARM REV CODE 250

## 2017-04-20 PROCEDURE — 25000003 PHARM REV CODE 250: Performed by: SURGERY

## 2017-04-20 PROCEDURE — 94761 N-INVAS EAR/PLS OXIMETRY MLT: CPT

## 2017-04-20 PROCEDURE — 63600175 PHARM REV CODE 636 W HCPCS

## 2017-04-20 PROCEDURE — 27000221 HC OXYGEN, UP TO 24 HOURS

## 2017-04-20 PROCEDURE — 83735 ASSAY OF MAGNESIUM: CPT

## 2017-04-20 PROCEDURE — 80048 BASIC METABOLIC PNL TOTAL CA: CPT

## 2017-04-20 PROCEDURE — 12000002 HC ACUTE/MED SURGE SEMI-PRIVATE ROOM

## 2017-04-20 PROCEDURE — 02HV33Z INSERTION OF INFUSION DEVICE INTO SUPERIOR VENA CAVA, PERCUTANEOUS APPROACH: ICD-10-PCS | Performed by: SURGERY

## 2017-04-20 PROCEDURE — 36415 COLL VENOUS BLD VENIPUNCTURE: CPT

## 2017-04-20 PROCEDURE — C9113 INJ PANTOPRAZOLE SODIUM, VIA: HCPCS

## 2017-04-20 RX ORDER — PREDNISONE 20 MG/1
60 TABLET ORAL DAILY
Status: DISCONTINUED | OUTPATIENT
Start: 2017-04-20 | End: 2017-04-22

## 2017-04-20 RX ADMIN — POTASSIUM CHLORIDE: 2 INJECTION, SOLUTION, CONCENTRATE INTRAVENOUS at 09:04

## 2017-04-20 RX ADMIN — I.V. FAT EMULSION 250 ML: 20 EMULSION INTRAVENOUS at 11:04

## 2017-04-20 RX ADMIN — Medication 3 ML: at 10:04

## 2017-04-20 RX ADMIN — POTASSIUM CHLORIDE: 2 INJECTION, SOLUTION, CONCENTRATE INTRAVENOUS at 12:04

## 2017-04-20 RX ADMIN — MORPHINE SULFATE 4 MG: 10 INJECTION INTRAVENOUS at 04:04

## 2017-04-20 RX ADMIN — LORAZEPAM 0.5 MG: 2 INJECTION, SOLUTION INTRAMUSCULAR; INTRAVENOUS at 12:04

## 2017-04-20 RX ADMIN — LEVALBUTEROL 0.63 MG: 0.63 SOLUTION RESPIRATORY (INHALATION) at 08:04

## 2017-04-20 RX ADMIN — MORPHINE SULFATE 4 MG: 10 INJECTION INTRAVENOUS at 06:04

## 2017-04-20 RX ADMIN — PANTOPRAZOLE SODIUM 40 MG: 40 INJECTION, POWDER, FOR SOLUTION INTRAVENOUS at 09:04

## 2017-04-20 RX ADMIN — MORPHINE SULFATE 4 MG: 10 INJECTION INTRAVENOUS at 12:04

## 2017-04-20 RX ADMIN — METHYLPREDNISOLONE SODIUM SUCCINATE 40 MG: 40 INJECTION, POWDER, FOR SOLUTION INTRAMUSCULAR; INTRAVENOUS at 05:04

## 2017-04-20 RX ADMIN — LORAZEPAM 0.5 MG: 2 INJECTION, SOLUTION INTRAMUSCULAR; INTRAVENOUS at 09:04

## 2017-04-20 RX ADMIN — MORPHINE SULFATE 4 MG: 10 INJECTION INTRAVENOUS at 11:04

## 2017-04-20 RX ADMIN — LEVALBUTEROL 0.63 MG: 0.63 SOLUTION RESPIRATORY (INHALATION) at 04:04

## 2017-04-20 RX ADMIN — RETINOL, ERGOCALCIFEROL, .ALPHA.-TOCOPHEROL ACETATE, DL-, PHYTONADIONE, ASCORBIC ACID, NIACINAMIDE, RIBOFLAVIN 5-PHOSPHATE SODIUM, THIAMINE HYDROCHLORIDE, PYRIDOXINE HYDROCHLORIDE, DEXPANTHENOL, BIOTIN, FOLIC ACID, AND CYANOCOBALAMIN: KIT at 10:04

## 2017-04-20 RX ADMIN — MORPHINE SULFATE 4 MG: 10 INJECTION INTRAVENOUS at 08:04

## 2017-04-20 RX ADMIN — LEVALBUTEROL 0.63 MG: 0.63 SOLUTION RESPIRATORY (INHALATION) at 11:04

## 2017-04-20 RX ADMIN — Medication 3 ML: at 04:04

## 2017-04-20 NOTE — PROGRESS NOTES
NG came out overnight, reinserted.  Passing flatus, no BM.    Vitals:    04/19/17 1700 04/19/17 2019 04/19/17 2210 04/20/17 0000   BP: (!) 166/75 (!) 145/74  (!) 162/72   BP Location:       Patient Position:  Lying     BP Method:  Automatic     Pulse: 79 79 74 81   Resp: 17 18 (!) 22 18   Temp: 98.6 °F (37 °C) 98.7 °F (37.1 °C)  98.5 °F (36.9 °C)   TempSrc: Oral Oral  Oral   SpO2: (!) 92% (!) 90% (!) 92% (!) 92%   Weight:       Height:         A/O x 3  RRR  Soft, lashonda tender, nondistended.  NG bilious    CT shows obstruction, but no recurrence of parastomal hernia.    A/P sbo, continue NG decompression  Protein calorie malnutrition, start TPN  Acute renal failure, likely dehydration, improved.  CKD, stable, close to baseline now

## 2017-04-20 NOTE — NURSING
"SPOKE TO DR. PULIDO TO CLEAR LINE. MD STATED "SHE WILL NOT CLEAR THE LINE NOR DISCONTINUE THE ORDER''.  I THEN SPOKE TO DR. ORTEGA AND NOTIFIED HIM THE MD TOLD PRIMARY NURSE TO CANCEL LINE AND CONSULT ANESTHESIA FOR TLC  "

## 2017-04-20 NOTE — PLAN OF CARE
Problem: Fall Risk (Adult)  Intervention: Review Medications/Identify Contributors to Fall Risk    04/20/17 1809   Safety Interventions   Medication Review/Management medications reviewed       Intervention: Patient Rounds    04/20/17 1800   Safety Interventions   Patient Rounds bed in low position;bed wheels locked;call light in reach;clutter free environment maintained;ID band on;placement of personal items at bedside;toileting offered;visualized patient       Intervention: Safety Promotion/Fall Prevention    04/20/17 1800   Safety Interventions   Safety Promotion/Fall Prevention Fall Risk reviewed with patient/family;high risk medications identified;lighting adjusted;medications reviewed;nonskid shoes/socks when out of bed;side rails raised x 2           Problem: Pressure Ulcer Risk (Juan Scale) (Adult,Obstetrics,Pediatric)  Intervention: Promote/Optimize Nutrition    04/20/17 1809   Nutrition Interventions   Oral Nutrition Promotion rest periods promoted       Intervention: Maintain Head of Bed Elevation Less Than 30 Degrees as Tolerated    04/20/17 1800   Positioning   Head of Bed (HOB) HOB at 30-45 degrees       Intervention: Turn/Reposition Often    04/19/17 0715 04/20/17 1800   Skin Interventions   Pressure Reduction Techniques frequent weight shift encouraged --    Positioning   Body Position --  positioned/repositioned independently           Problem: Renal Failure/Kidney Injury, Acute (Adult)  Intervention: Monitor/Manage Fluid, Acid Base Balance    04/19/17 0715 04/20/17 1800 04/20/17 1809   Safety Interventions   Medication Review/Management --  --  medications reviewed   Nutrition Interventions   Fluid/Electrolyte Management intravenous fluid replacement initiated --  --    Positioning   Body Position --  positioned/repositioned independently --        Intervention: Evaluate/Maintain Nutrition Support    04/19/17 0715   Coping/Psychosocial Interventions   Environmental Support calm environment  promoted       Intervention: Promote Energy Conservation    04/20/17 1809   Pain/Comfort/Sleep Interventions   Sleep/Rest Enhancement relaxation techniques promoted           Problem: Bowel Obstruction (Adult)  Intervention: Monitor/Manage Pain    04/20/17 1809   Safety Interventions   Medication Review/Management medications reviewed       Intervention: Promote/Optimize Nutrition    04/20/17 1809   Nutrition Interventions   Oral Nutrition Promotion rest periods promoted       Intervention: Monitor/Manage Fluid Electrolyte Balance    04/19/17 0715   Nutrition Interventions   Fluid/Electrolyte Management intravenous fluid replacement initiated

## 2017-04-20 NOTE — SUBJECTIVE & OBJECTIVE
Interval History: No new issues. Resting comfortably without 02.     Review of Systems   Respiratory: Negative for chest tightness and shortness of breath.    Cardiovascular: Negative for chest pain, palpitations and leg swelling.   Gastrointestinal: Negative for abdominal pain.     Objective:     Vital Signs (Most Recent):  Temp: 98.5 °F (36.9 °C) (04/20/17 0000)  Pulse: 81 (04/20/17 0000)  Resp: 18 (04/20/17 0000)  BP: (!) 162/72 (04/20/17 0000)  SpO2: (!) 92 % (04/20/17 0000) Vital Signs (24h Range):  Temp:  [98.5 °F (36.9 °C)-98.7 °F (37.1 °C)] 98.5 °F (36.9 °C)  Pulse:  [74-81] 81  Resp:  [17-22] 18  SpO2:  [90 %-92 %] 92 %  BP: (140-166)/(72-75) 162/72     Weight: 68 kg (150 lb)  Body mass index is 20.34 kg/(m^2).    Intake/Output Summary (Last 24 hours) at 04/20/17 0633  Last data filed at 04/20/17 0500   Gross per 24 hour   Intake          1031.25 ml   Output             2450 ml   Net         -1418.75 ml      Physical Exam   Constitutional: He is oriented to person, place, and time. He appears well-developed and well-nourished.   Cardiovascular: Normal rate.    Pulmonary/Chest: Breath sounds normal. No respiratory distress. He has no wheezes.   Abdominal: Soft.   Neurological: He is alert and oriented to person, place, and time.   Vitals reviewed.      Significant Labs:   BMP:   Recent Labs  Lab 04/19/17  0430   *      K 3.4*      CO2 23   *   CREATININE 3.7*   CALCIUM 7.9*     CBC: No results for input(s): WBC, HGB, HCT, PLT in the last 48 hours.    Significant Imaging:

## 2017-04-20 NOTE — ASSESSMENT & PLAN NOTE
· As evidence by history, physical exam, and ABG results  No results for input(s): PH, PCO2, PO2, HCO3, POCSATURATED, BE in the last 24 hours.  · Differential includes: COPD exacerbation, pulmonary edema associated with acute renal failure, and/or ventilation perfusion mismatch with supplemental oxygen in COPD patient  · Obtain PA and lateral chest x-ray  · Titrate his oxygen to 88-93% to prevent V/Q mismatch with his COPD patient  Scheduled DuoNeb nebs  Changed to po prednisone on 4/20.

## 2017-04-20 NOTE — PROGRESS NOTES
[facility]  Urology  Progress Note    Patient Name: Blake Guillory  MRN: [id number]  Admission Date: 4/17/2017  Hospital Length of Stay: 3 days  Code Status: Full Code   Attending Provider: Estuardo Mack MD   Primary Care Physician: Varun Zeng MD    Subjective:     HPI:  He has a history of bladder cancer. He is s/p cystectomy with ileal conduit in November 2012. From an oncologic standpoint, he is doing well. He has had issues with bilateral ureteral stricture. He underwent a bilateral ureteral reimplantation into his conduit several months later. His strictures recurred shortly afterwards. He was managed with ureteral stent changes until one of the stent eroded through his conduit and into his bowels.   He had an Exploratory Laparotomy with replacement of ileal conduit and small bowel anastomosis on 12/30/2015.     He developed a parastomal hernia that was recently repaired by Dr. Mack.  He is back in the hospital with concerns for bowel obstruction.  Urology consult requested due to concerns with the conduit and renal failure.    He denies problems with his stoma or stomal appliance.  He is feeling better today.      Interval History: He feels okay.  NGT replaced yesterday after CT scan.    Review of Systems   Constitutional: Negative.    HENT: Negative.    Eyes: Negative.    Respiratory: Negative for cough, chest tightness and shortness of breath.    Cardiovascular: Negative for chest pain.   Gastrointestinal: Negative.  Negative for constipation, diarrhea and nausea.   Genitourinary: Negative for hematuria.   Musculoskeletal: Negative.    Neurological: Negative.    Psychiatric/Behavioral: Negative.      Objective:     Temp:  [98.5 °F (36.9 °C)-98.8 °F (37.1 °C)] 98.8 °F (37.1 °C)  Pulse:  [64-81] 64  Resp:  [17-22] 18  SpO2:  [86 %-98 %] 98 %  BP: (143-166)/(69-75) 143/69     Body mass index is 20.34 kg/(m^2).            Drains     Drain                 Urostomy RLQ -- days          Ureteral Drain/Stent 12/30/15 1217 Left ureter 8 Fr. 476 days         Ureteral Drain/Stent 12/30/15 1300 Right ureter 7 Fr. 476 days         NG/OG Tube 04/20/17 0644 16 Fr. Right nostril less than 1 day                Physical Exam   Nursing note and vitals reviewed.  Constitutional: He is oriented to person, place, and time. He appears well-developed and well-nourished.   HENT:   Head: Normocephalic.   Eyes: Conjunctivae are normal.   Neck: Normal range of motion. Neck supple. No tracheal deviation present. No thyromegaly present.   Cardiovascular: Normal rate and normal heart sounds.    Pulmonary/Chest: Effort normal and breath sounds normal. No respiratory distress. He has no wheezes.   Abdominal: Soft. Bowel sounds are normal. There is no hepatosplenomegaly. There is no tenderness. There is no rebound and no CVA tenderness. No hernia.   Musculoskeletal: Normal range of motion. He exhibits no edema or tenderness.   Lymphadenopathy:     He has no cervical adenopathy.   Neurological: He is alert and oriented to person, place, and time.   Skin: Skin is warm and dry. No rash noted. No erythema.     Psychiatric: He has a normal mood and affect. His behavior is normal. Judgment and thought content normal.       Significant Labs:    BMP:    Recent Labs  Lab 04/18/17  0402 04/19/17  0430 04/20/17  0506    141 146*   K 3.2* 3.4* 3.5   CL 91* 102 104   CO2 29 23 30*   * 117* 84*   CREATININE 6.2* 3.7* 2.1*   CALCIUM 7.5* 7.9* 10.1       CBC:     Recent Labs  Lab 04/17/17  0850   WBC 6.00   HGB 12.8*   HCT 37.3*          Blood Culture: No results for input(s): LABBLOO in the last 168 hours.  Urine Culture: No results for input(s): LABURIN in the last 168 hours.    Significant Imaging:  CT: I have reviewed all results within the past 24 hours and my personal findings are:  mild/moderate hydronephrosis                  Assessment/Plan:     Hydronephrosis, bilateral  Some degree of hydronephrosis is  expected with an ileal conduit  Currently he has more hydro than previous exam  He may need b/l nephrostomy tubes, but this is unlikely.  His creatinine is improving and he denies flank pain.        Acute renal failure  Improving with hydration       VTE Risk Mitigation         Ordered     Place sequential compression device  Until discontinued      04/20/17 0629     Medium Risk of VTE  Once      04/17/17 1530     Place ANNAMARIE hose  Until discontinued      04/17/17 1530          W Vikash Bo MD  Urology  [facility] [place]

## 2017-04-20 NOTE — ASSESSMENT & PLAN NOTE
Some degree of hydronephrosis is expected with an ileal conduit  Currently he has more hydro than previous exam  He may need b/l nephrostomy tubes, but this is unlikely.  His creatinine is improving and he denies flank pain.

## 2017-04-20 NOTE — SUBJECTIVE & OBJECTIVE
Interval History: He feels okay.  NGT replaced yesterday after CT scan.    Review of Systems   Constitutional: Negative.    HENT: Negative.    Eyes: Negative.    Respiratory: Negative for cough, chest tightness and shortness of breath.    Cardiovascular: Negative for chest pain.   Gastrointestinal: Negative.  Negative for constipation, diarrhea and nausea.   Genitourinary: Negative for hematuria.   Musculoskeletal: Negative.    Neurological: Negative.    Psychiatric/Behavioral: Negative.      Objective:     Temp:  [98.5 °F (36.9 °C)-98.8 °F (37.1 °C)] 98.8 °F (37.1 °C)  Pulse:  [64-81] 64  Resp:  [17-22] 18  SpO2:  [86 %-98 %] 98 %  BP: (143-166)/(69-75) 143/69     Body mass index is 20.34 kg/(m^2).            Drains     Drain                 Urostomy RLQ -- days         Ureteral Drain/Stent 12/30/15 1217 Left ureter 8 Fr. 476 days         Ureteral Drain/Stent 12/30/15 1300 Right ureter 7 Fr. 476 days         NG/OG Tube 04/20/17 0644 16 Fr. Right nostril less than 1 day                Physical Exam   Nursing note and vitals reviewed.  Constitutional: He is oriented to person, place, and time. He appears well-developed and well-nourished.   HENT:   Head: Normocephalic.   Eyes: Conjunctivae are normal.   Neck: Normal range of motion. Neck supple. No tracheal deviation present. No thyromegaly present.   Cardiovascular: Normal rate and normal heart sounds.    Pulmonary/Chest: Effort normal and breath sounds normal. No respiratory distress. He has no wheezes.   Abdominal: Soft. Bowel sounds are normal. There is no hepatosplenomegaly. There is no tenderness. There is no rebound and no CVA tenderness. No hernia.   Musculoskeletal: Normal range of motion. He exhibits no edema or tenderness.   Lymphadenopathy:     He has no cervical adenopathy.   Neurological: He is alert and oriented to person, place, and time.   Skin: Skin is warm and dry. No rash noted. No erythema.     Psychiatric: He has a normal mood and affect. His  behavior is normal. Judgment and thought content normal.       Significant Labs:    BMP:    Recent Labs  Lab 04/18/17  0402 04/19/17  0430 04/20/17  0506    141 146*   K 3.2* 3.4* 3.5   CL 91* 102 104   CO2 29 23 30*   * 117* 84*   CREATININE 6.2* 3.7* 2.1*   CALCIUM 7.5* 7.9* 10.1       CBC:     Recent Labs  Lab 04/17/17  0850   WBC 6.00   HGB 12.8*   HCT 37.3*          Blood Culture: No results for input(s): LABBLOO in the last 168 hours.  Urine Culture: No results for input(s): LABURIN in the last 168 hours.    Significant Imaging:  CT: I have reviewed all results within the past 24 hours and my personal findings are:  mild/moderate hydronephrosis

## 2017-04-20 NOTE — NURSING
Pt NPO with NGT KIRA, new order for potassium chloride PO, pt requesting IV medication for anxiety and a different medication than albuteral for his respiratory treatments as albuterol makes him very anxious.  Dr. Philip informed, stated to order Ativan 0.5q8h PRN anxiety, d/c albuterol tx, xopinex q8h when awake, stated ok to d/c potassium chloride PO, no new order for potassium.  Will continue to monitor.

## 2017-04-20 NOTE — PROGRESS NOTES
[facility] Hospital Medicine  Progress Note    Patient Name: Blake Guillory  MRN: [id number]  Patient Class: IP- Inpatient   Admission Date: 4/17/2017  Length of Stay: 3 days  Attending Physician: Estuardo Mack MD  Primary Care Provider: Varun Zeng MD        Subjective:     Principal Problem:Small bowel obstruction    HPI:       Hospital Course:  Patient was admitted to the hospital for treatment of SBO. The patient is on the surgery service and medicine was consulted to follow for history of COPD, hypertension as well as acute renal failure.      Interval History: No new issues. Resting comfortably without 02.     Review of Systems   Respiratory: Negative for chest tightness and shortness of breath.    Cardiovascular: Negative for chest pain, palpitations and leg swelling.   Gastrointestinal: Negative for abdominal pain.     Objective:     Vital Signs (Most Recent):  Temp: 98.5 °F (36.9 °C) (04/20/17 0000)  Pulse: 81 (04/20/17 0000)  Resp: 18 (04/20/17 0000)  BP: (!) 162/72 (04/20/17 0000)  SpO2: (!) 92 % (04/20/17 0000) Vital Signs (24h Range):  Temp:  [98.5 °F (36.9 °C)-98.7 °F (37.1 °C)] 98.5 °F (36.9 °C)  Pulse:  [74-81] 81  Resp:  [17-22] 18  SpO2:  [90 %-92 %] 92 %  BP: (140-166)/(72-75) 162/72     Weight: 68 kg (150 lb)  Body mass index is 20.34 kg/(m^2).    Intake/Output Summary (Last 24 hours) at 04/20/17 0633  Last data filed at 04/20/17 0500   Gross per 24 hour   Intake          1031.25 ml   Output             2450 ml   Net         -1418.75 ml      Physical Exam   Constitutional: He is oriented to person, place, and time. He appears well-developed and well-nourished.   Cardiovascular: Normal rate.    Pulmonary/Chest: Breath sounds normal. No respiratory distress. He has no wheezes.   Abdominal: Soft.   Neurological: He is alert and oriented to person, place, and time.   Vitals reviewed.      Significant Labs:   BMP:   Recent Labs  Lab 04/19/17  0430   *      K  3.4*      CO2 23   *   CREATININE 3.7*   CALCIUM 7.9*     CBC: No results for input(s): WBC, HGB, HCT, PLT in the last 48 hours.    Significant Imaging:     Assessment/Plan:      * Small bowel obstruction  · Management per primary team    HTN (hypertension)  Hypertension  · Goal while inpatient is a systolic blood pressure less than 160mmHg  · BP in acceptable range at this time  · Continue current home regimen with hold parameters  · PRN antihypertensives available      Hydronephrosis, bilateral        Hypoxemia  · As evidence by history, physical exam, and ABG results  No results for input(s): PH, PCO2, PO2, HCO3, POCSATURATED, BE in the last 24 hours.  · Differential includes: COPD exacerbation, pulmonary edema associated with acute renal failure, and/or ventilation perfusion mismatch with supplemental oxygen in COPD patient  · Obtain PA and lateral chest x-ray  · Titrate his oxygen to 88-93% to prevent V/Q mismatch with his COPD patient  Scheduled DuoNeb nebs  Changed to po prednisone on 4/20.     Increased PTH level  · PTH ordered by Dr. Kelvin Lowe  · Elevated PTH level with low normal calcium level  · Management per nephrology      Hypocalcemia  Low normal calcium levels.  Albumin is within normal range.  Likely related to acute on chronic renal failure.    Hyperphosphatemia  · Likely due to acute renal failure  · Management per nephrology      Acute renal failure  Management per nephrology- labs pending this am. CRT improving. All likely pre-renal.       VTE Risk Mitigation         Ordered     Place sequential compression device  Until discontinued      04/20/17 0629     Medium Risk of VTE  Once      04/17/17 1530     Place ANNAMARIE hose  Until discontinued      04/17/17 1530        Changed to po prednisone. No other recs.      Blaze Brantley MD  Department of Hospital Medicine   [facility]

## 2017-04-21 PROBLEM — E43 SEVERE MALNUTRITION: Status: ACTIVE | Noted: 2017-04-21

## 2017-04-21 LAB
25(OH)D3+25(OH)D2 SERPL-MCNC: 23 NG/ML
ALBUMIN SERPL BCP-MCNC: 3.3 G/DL
ALP SERPL-CCNC: 63 U/L
ALT SERPL W/O P-5'-P-CCNC: 25 U/L
ANION GAP SERPL CALC-SCNC: 11 MMOL/L
AST SERPL-CCNC: 27 U/L
BILIRUB SERPL-MCNC: 0.4 MG/DL
BUN SERPL-MCNC: 61 MG/DL
CALCIUM SERPL-MCNC: 9.9 MG/DL
CHLORIDE SERPL-SCNC: 105 MMOL/L
CO2 SERPL-SCNC: 30 MMOL/L
CREAT SERPL-MCNC: 1.6 MG/DL
EST. GFR  (AFRICAN AMERICAN): 54 ML/MIN/1.73 M^2
EST. GFR  (NON AFRICAN AMERICAN): 46 ML/MIN/1.73 M^2
GLUCOSE SERPL-MCNC: 107 MG/DL
MAGNESIUM SERPL-MCNC: 1.8 MG/DL
MAGNESIUM SERPL-MCNC: 1.8 MG/DL
PHOSPHATE SERPL-MCNC: 2.9 MG/DL
POTASSIUM SERPL-SCNC: 3.2 MMOL/L
PREALB SERPL-MCNC: 22 MG/DL
PROT SERPL-MCNC: 7 G/DL
SODIUM SERPL-SCNC: 146 MMOL/L
TRIGL SERPL-MCNC: 288 MG/DL

## 2017-04-21 PROCEDURE — 83735 ASSAY OF MAGNESIUM: CPT

## 2017-04-21 PROCEDURE — C9113 INJ PANTOPRAZOLE SODIUM, VIA: HCPCS

## 2017-04-21 PROCEDURE — 94761 N-INVAS EAR/PLS OXIMETRY MLT: CPT

## 2017-04-21 PROCEDURE — 12000002 HC ACUTE/MED SURGE SEMI-PRIVATE ROOM

## 2017-04-21 PROCEDURE — 99232 SBSQ HOSP IP/OBS MODERATE 35: CPT | Mod: ,,, | Performed by: UROLOGY

## 2017-04-21 PROCEDURE — 80053 COMPREHEN METABOLIC PANEL: CPT

## 2017-04-21 PROCEDURE — 84100 ASSAY OF PHOSPHORUS: CPT

## 2017-04-21 PROCEDURE — 25000003 PHARM REV CODE 250: Performed by: SURGERY

## 2017-04-21 PROCEDURE — 27000221 HC OXYGEN, UP TO 24 HOURS

## 2017-04-21 PROCEDURE — 82306 VITAMIN D 25 HYDROXY: CPT

## 2017-04-21 PROCEDURE — 84478 ASSAY OF TRIGLYCERIDES: CPT

## 2017-04-21 PROCEDURE — 25000003 PHARM REV CODE 250: Performed by: HOSPITALIST

## 2017-04-21 PROCEDURE — 94640 AIRWAY INHALATION TREATMENT: CPT

## 2017-04-21 PROCEDURE — 63600175 PHARM REV CODE 636 W HCPCS: Performed by: HOSPITALIST

## 2017-04-21 PROCEDURE — 36415 COLL VENOUS BLD VENIPUNCTURE: CPT

## 2017-04-21 PROCEDURE — 25000003 PHARM REV CODE 250

## 2017-04-21 PROCEDURE — 84134 ASSAY OF PREALBUMIN: CPT

## 2017-04-21 PROCEDURE — 63600175 PHARM REV CODE 636 W HCPCS: Performed by: SURGERY

## 2017-04-21 PROCEDURE — 63600175 PHARM REV CODE 636 W HCPCS

## 2017-04-21 RX ORDER — HYDRALAZINE HYDROCHLORIDE 20 MG/ML
10 INJECTION INTRAMUSCULAR; INTRAVENOUS EVERY 4 HOURS PRN
Status: DISCONTINUED | OUTPATIENT
Start: 2017-04-21 | End: 2017-04-22

## 2017-04-21 RX ADMIN — LORAZEPAM 0.5 MG: 2 INJECTION, SOLUTION INTRAMUSCULAR; INTRAVENOUS at 10:04

## 2017-04-21 RX ADMIN — MORPHINE SULFATE 4 MG: 10 INJECTION INTRAVENOUS at 05:04

## 2017-04-21 RX ADMIN — MORPHINE SULFATE 4 MG: 10 INJECTION INTRAVENOUS at 09:04

## 2017-04-21 RX ADMIN — Medication 3 ML: at 10:04

## 2017-04-21 RX ADMIN — MORPHINE SULFATE 4 MG: 10 INJECTION INTRAVENOUS at 01:04

## 2017-04-21 RX ADMIN — LEVALBUTEROL 0.63 MG: 0.63 SOLUTION RESPIRATORY (INHALATION) at 07:04

## 2017-04-21 RX ADMIN — Medication 3 ML: at 02:04

## 2017-04-21 RX ADMIN — HYDRALAZINE HYDROCHLORIDE 10 MG: 20 INJECTION INTRAMUSCULAR; INTRAVENOUS at 03:04

## 2017-04-21 RX ADMIN — PANTOPRAZOLE SODIUM 40 MG: 40 INJECTION, POWDER, FOR SOLUTION INTRAVENOUS at 09:04

## 2017-04-21 RX ADMIN — ASCORBIC ACID, VITAMIN A PALMITATE, CHOLECALCIFEROL, THIAMINE HYDROCHLORIDE, RIBOFLAVIN-5 PHOSPHATE SODIUM, PYRIDOXINE HYDROCHLORIDE, NIACINAMIDE, DEXPANTHENOL, ALPHA-TOCOPHEROL ACETATE, VITAMIN K1, FOLIC ACID, BIOTIN, CYANOCOBALAMIN: 200; 3300; 200; 6; 3.6; 6; 40; 15; 10; 150; 600; 60; 5 INJECTION, SOLUTION INTRAVENOUS at 10:04

## 2017-04-21 RX ADMIN — LEVALBUTEROL 0.63 MG: 0.63 SOLUTION RESPIRATORY (INHALATION) at 04:04

## 2017-04-21 RX ADMIN — Medication 3 ML: at 05:04

## 2017-04-21 NOTE — SUBJECTIVE & OBJECTIVE
Interval History: Complains of generalized back pain, no localized to kidney area. +flatus. Good UOP.    Review of Systems   Constitutional: Negative for chills and fever.   HENT: Negative for hearing loss, sore throat and trouble swallowing.    Eyes: Negative.    Respiratory: Negative for cough and shortness of breath.    Cardiovascular: Negative for chest pain.   Gastrointestinal: Positive for abdominal distention and abdominal pain. Negative for constipation, diarrhea and nausea.   Genitourinary: Negative for frequency and hematuria.        Ileal conduit   Musculoskeletal: Negative for arthralgias and neck pain.   Skin: Negative for color change, pallor and rash.   Neurological: Negative for dizziness and seizures.   Hematological: Negative for adenopathy.   Psychiatric/Behavioral: Negative for confusion.   All other systems reviewed and are negative.    Objective:     Temp:  [97.4 °F (36.3 °C)-98.6 °F (37 °C)] 98.1 °F (36.7 °C)  Pulse:  [63-94] 76  Resp:  [16-18] 18  SpO2:  [92 %-97 %] 92 %  BP: (148-182)/(80-96) 148/96     Body mass index is 19.17 kg/(m^2).            Drains     Drain                 Urostomy RLQ -- days         Ureteral Drain/Stent 12/30/15 1217 Left ureter 8 Fr. 478 days         Ureteral Drain/Stent 12/30/15 1300 Right ureter 7 Fr. 478 days         NG/OG Tube 04/20/17 0644 16 Fr. Right nostril 1 day                Physical Exam    Significant Labs:    BMP:    Recent Labs  Lab 04/19/17  0430 04/20/17  0506 04/21/17  0600    146* 146*  146*  146*   K 3.4* 3.5 3.2*  3.2*  3.2*    104 105  105  105   CO2 23 30* 30*  30*  30*   * 84* 61*  61*  61*   CREATININE 3.7* 2.1* 1.6*  1.6*  1.6*   CALCIUM 7.9* 10.1 9.9  9.9  9.9       CBC:     Recent Labs  Lab 04/17/17  0850   WBC 6.00   HGB 12.8*   HCT 37.3*

## 2017-04-21 NOTE — PROGRESS NOTES
Blake Guillory is a 57 y.o. male patient.    Active Hospital Problems    Diagnosis  POA    *Small bowel obstruction [K56.69]  Yes    Severe malnutrition [E43]  Yes    Hypoxemia [R09.02]  Yes    Increased PTH level [E34.9]  Yes    Hypocalcemia [E83.51]  Yes    Hyperphosphatemia [E83.39]  Yes    Acute renal failure [N17.9]  Yes    Hydronephrosis, bilateral [N13.30]  Yes     Chronic    HTN (hypertension) [I10]  Yes     Chronic      Resolved Hospital Problems    Diagnosis Date Resolved POA   No resolved problems to display.     Temp: 98.1 °F (36.7 °C) (04/21/17 0730)  Pulse: 76 (04/21/17 0739)  Resp: 18 (04/21/17 0739)  BP: (!) 148/96 (04/21/17 0730)  SpO2: (!) 92 % (04/21/17 0739)  Weight: 64.1 kg (141 lb 5 oz) (04/20/17 2007)  Height: 6' (182.9 cm) (04/20/17 2007)    Subjective:  Symptoms:  (Notes kidney pain with sitting and laying).      Objective:  General Appearance:  Comfortable, ill-appearing and in no acute distress.    Vital signs: (most recent): Blood pressure (!) 148/96, pulse 76, temperature 98.1 °F (36.7 °C), temperature source Oral, resp. rate 18, height 6' (1.829 m), weight 64.1 kg (141 lb 5 oz), SpO2 (!) 92 %.    HEENT: Normal HEENT exam.    Lungs:  Breath sounds clear to auscultation.    Heart: S1 normal and S2 normal.    Extremities: There is no dependent edema.    Neurological: Patient is alert.    Skin:  Warm and dry.    Abdomen: Abdomen is soft.  Bowel sounds are normal.   There is no abdominal tenderness.       Intake/Output - Last 3 Shifts       04/19 0700 - 04/20 0659 04/20 0700 - 04/21 0659 04/21 0700 - 04/22 0659    P.O.  0     I.V. (mL/kg) 1031.3 (15.2)      Other       IV Piggyback       TPN  682.3     Total Intake(mL/kg) 1031.3 (15.2) 682.3 (10.6)     Urine (mL/kg/hr) 2050 (1.3) 2020 (1.3)     Drains 1150 (0.7) 750 (0.5)     Stool       Total Output 3200 2770      Net -2168.8 -2087.7             Stool Occurrence  0 x 0 x        Lab Results   Component Value Date    WBC 6.00  04/17/2017    HGB 12.8 (L) 04/17/2017    HCT 37.3 (L) 04/17/2017    MCV 93 04/17/2017     04/17/2017     BMP  Lab Results   Component Value Date     (H) 04/21/2017     (H) 04/21/2017     (H) 04/21/2017    K 3.2 (L) 04/21/2017    K 3.2 (L) 04/21/2017    K 3.2 (L) 04/21/2017     04/21/2017     04/21/2017     04/21/2017    CO2 30 (H) 04/21/2017    CO2 30 (H) 04/21/2017    CO2 30 (H) 04/21/2017    BUN 61 (H) 04/21/2017    BUN 61 (H) 04/21/2017    BUN 61 (H) 04/21/2017    CREATININE 1.6 (H) 04/21/2017    CREATININE 1.6 (H) 04/21/2017    CREATININE 1.6 (H) 04/21/2017    CALCIUM 9.9 04/21/2017    CALCIUM 9.9 04/21/2017    CALCIUM 9.9 04/21/2017    ANIONGAP 11 04/21/2017    ANIONGAP 11 04/21/2017    ANIONGAP 11 04/21/2017    ESTGFRAFRICA 54 (A) 04/21/2017    ESTGFRAFRICA 54 (A) 04/21/2017    ESTGFRAFRICA 54 (A) 04/21/2017    EGFRNONAA 46 (A) 04/21/2017    EGFRNONAA 46 (A) 04/21/2017    EGFRNONAA 46 (A) 04/21/2017     Current Facility-Administered Medications   Medication    acetaminophen tablet 650 mg    Amino acid 4.25% - dextrose 25% (CLINIMIX-E) solution with additives (1L provides 42.5 gm AA, 250 gm CHO (850 kcal/L dextrose), Na 35, K 30, Mg 5, Ca 4.5, Acetate 80, Cl 39, Phos 15)    amlodipine tablet 5 mg    atenolol tablet 50 mg    calcitRIOL capsule 0.5 mcg    escitalopram oxalate tablet 10 mg    gabapentin capsule 400 mg    hydrocodone-acetaminophen 5-325mg per tablet 1 tablet    levalbuterol nebulizer solution 0.63 mg    levothyroxine tablet 25 mcg    lorazepam injection 0.5 mg    morphine injection 4 mg    pantoprazole injection 40 mg    pneumoc 13-pavel conj-dip cr(PF) 0.5 mL 0.5 mL    potassium chloride 10% solution 40 mEq    predniSONE tablet 60 mg    promethazine suppository 12.5 mg    sodium chloride 0.9% flush 3 mL    varenicline tablet 1 mg       Assessment & Plan  1.INDIO. Better.   Good uop.  2.Hypokalemia. Replete more ordered.  3.SBO.  ?  "Distension causing abd pain? Cont ng. Tx per surgery. Cont tpn.  4.2nd hyperparathyroidism. Cont tx.  5.ileal conduit with hydro. Expected. Defer to urology about "kidney pain."  Renal wise better. Will sign off. Please reconsult as needed. Thanks.  Elo Vasquez MD  4/21/2017    "

## 2017-04-21 NOTE — PROGRESS NOTES
[facility]  Urology  Progress Note    Patient Name: Blake Guillory  MRN: [id number]  Admission Date: 4/17/2017  Hospital Length of Stay: 4 days  Code Status: Full Code   Attending Provider: Estuardo Mack MD   Primary Care Physician: Varun Zeng MD    Subjective:     HPI:  He has a history of bladder cancer. He is s/p cystectomy with ileal conduit in November 2012. From an oncologic standpoint, he is doing well. He has had issues with bilateral ureteral stricture. He underwent a bilateral ureteral reimplantation into his conduit several months later. His strictures recurred shortly afterwards. He was managed with ureteral stent changes until one of the stent eroded through his conduit and into his bowels.   He had an Exploratory Laparotomy with replacement of ileal conduit and small bowel anastomosis on 12/30/2015.     He developed a parastomal hernia that was recently repaired by Dr. Mack.  He is back in the hospital with concerns for bowel obstruction.  Urology consult requested due to concerns with the conduit and renal failure.    He denies problems with his stoma or stomal appliance.  He is feeling better today.      Interval History: Complains of generalized back pain, no localized to kidney area. +flatus. Good UOP.    Review of Systems   Constitutional: Negative for chills and fever.   HENT: Negative for hearing loss, sore throat and trouble swallowing.    Eyes: Negative.    Respiratory: Negative for cough and shortness of breath.    Cardiovascular: Negative for chest pain.   Gastrointestinal: Positive for abdominal distention and abdominal pain. Negative for constipation, diarrhea and nausea.   Genitourinary: Negative for frequency and hematuria.        Ileal conduit   Musculoskeletal: Negative for arthralgias and neck pain.   Skin: Negative for color change, pallor and rash.   Neurological: Negative for dizziness and seizures.   Hematological: Negative for adenopathy.    Psychiatric/Behavioral: Negative for confusion.   All other systems reviewed and are negative.    Objective:     Temp:  [97.4 °F (36.3 °C)-98.6 °F (37 °C)] 98.1 °F (36.7 °C)  Pulse:  [63-94] 76  Resp:  [16-18] 18  SpO2:  [92 %-97 %] 92 %  BP: (148-182)/(80-96) 148/96     Body mass index is 19.17 kg/(m^2).            Drains     Drain                 Urostomy RLQ -- days         Ureteral Drain/Stent 12/30/15 1217 Left ureter 8 Fr. 478 days         Ureteral Drain/Stent 12/30/15 1300 Right ureter 7 Fr. 478 days         NG/OG Tube 04/20/17 0644 16 Fr. Right nostril 1 day                Physical Exam    Significant Labs:    BMP:    Recent Labs  Lab 04/19/17  0430 04/20/17  0506 04/21/17  0600    146* 146*  146*  146*   K 3.4* 3.5 3.2*  3.2*  3.2*    104 105  105  105   CO2 23 30* 30*  30*  30*   * 84* 61*  61*  61*   CREATININE 3.7* 2.1* 1.6*  1.6*  1.6*   CALCIUM 7.9* 10.1 9.9  9.9  9.9       CBC:     Recent Labs  Lab 04/17/17  0850   WBC 6.00   HGB 12.8*   HCT 37.3*              Assessment/Plan:     Hydronephrosis, bilateral  Some degree of hydronephrosis is expected with an ileal conduit  Currently he has more hydro than previous exam  He may need b/l nephrostomy tubes, but this is unlikely.  His creatinine is improving and he denies flank pain.  CT reviewed        Acute renal failure  Improving with hydration       VTE Risk Mitigation         Ordered     Place sequential compression device  Until discontinued      04/20/17 0629     Medium Risk of VTE  Once      04/17/17 1530     Place ANNAMARIE hose  Until discontinued      04/17/17 1530          Tori Fletcher MD  Urology  [facility]

## 2017-04-21 NOTE — PLAN OF CARE
Problem: Patient Care Overview  Goal: Plan of Care Review  Outcome: Ongoing (interventions implemented as appropriate)  Ng to low intermittent suction,output greenish brown apx. 250ml so far this shift. Urostomy with clear yellow uring 400 in drainage bag.patient afebrile. Abdominal incisions from last week healing, without redness, warmth or edema. kindey funcion returning to normal evidenced by daily lab work showing reduction in the BUN and creatinine. Pain managed with IV medication. Continue with plan of care and continue to monitor patient.

## 2017-04-21 NOTE — ASSESSMENT & PLAN NOTE
Some degree of hydronephrosis is expected with an ileal conduit  Currently he has more hydro than previous exam  He may need b/l nephrostomy tubes, but this is unlikely.  His creatinine is improving and he denies flank pain.  CT reviewed

## 2017-04-21 NOTE — PROGRESS NOTES
Passing flatus, no pain.      Vitals:    04/20/17 2354 04/21/17 0047 04/21/17 0730 04/21/17 0739   BP:  (!) 182/82 (!) 148/96    BP Location:   Right arm    Patient Position:   Lying    BP Method:   Automatic    Pulse: 63 64 94 76   Resp: 16 18 18 18   Temp:  97.4 °F (36.3 °C) 98.1 °F (36.7 °C)    TempSrc:  Oral Oral    SpO2: 97% (!) 92% 96% (!) 92%   Weight:       Height:         A/O x 3  RRR  NG bilious    abd films slightly worse, NG not seen on xray    A/P sbo, NG not in position  Advance NG and recheck KUB

## 2017-04-21 NOTE — PLAN OF CARE
Recommendations     Recommendation/Intervention:   1. Rec change TPN to customized: 5%AA/15% CHO @ 90 ml/hr + IVFE Daily (would decrease GIR to 3.5)  - (current TPN providing 450 g dextrose/day = GIR 4.8)  - Continue to monitor Mg, Phos, lytes- may be due to refeeding syndrome/high glucose load  2. Monitor TG weekly; if >400 rec cycle lipids 3 x week   3. Advance diet as able to 2 gm sodium with boost plus tid   4. Monitor weight weekly 5. RD to monitor     Goals: Patient to meet 85% EEN  Nutrition Goal Status: (new)  Communication of RD Recs: reviewed with RN     Continuum of Care Plan     Referral to Outpatient Services: (D/C planning: To soon to determine)

## 2017-04-21 NOTE — PROGRESS NOTES
[facility]  Adult Nutrition  Consult Note    SUMMARY     Recommendations    Recommendation/Intervention:   1. Rec change TPN to customized: 5%AA/15% CHO @ 90 ml/hr + IVFE Daily (would decrease GIR to 3.5)     - (current TPN providing 450 g dextrose/day = GIR 4.8)     - Continue to monitor Mg, Phos, lytes- may be due to refeeding syndrome/high glucose load  2. Monitor TG weekly; if >400 rec cycle lipids 3 x week   3. Advance diet as able to 2 gm sodium with boost plus tid   4. Monitor weight weekly 5. RD to monitor    Goals: Patient to meet 85% EEN  Nutrition Goal Status:  (new)  Communication of RD Recs: reviewed with RN    Continuum of Care Plan    Referral to Outpatient Services:  (D/C planning: To soon to determine)    Reason for Assessment    Reason for Assessment: new TPN  Diagnosis:  (SBO)  Relevent Medical History: HTN, COPD, CKD   Interdisciplinary Rounds: did not attend     General Information Comments: Last po intake x 10 days ago. Reports UBW of 150# 1 month ago. TPN started. Recent surgery for cystectomy and ileal conduit.    Nutrition Prescription Ordered    Current Diet Order: NPO     Current Nutrition Support Formula Ordered: Clinimix 4.25/25  Current Nutrition Support Rate Ordered: 75 (ml)  Current Nutrition Support Frequency Ordered: ml/hr  Oral Nutrition Supplement: IVFE Daily     Evaluation of Received Nutrients/Fluid Intake     Parenteral Calories (kcal): 2336  Parenteral Protein (gm): 77  Parenteral Fluid (mL): 1800      % Kcal Needs: 109%      % Protein Needs: 100%      Tolerance:  (GIR: 4.857)     Nutrition Risk Screen     Nutrition Risk Screen: no indicators present    Nutrition/Diet History     Food Preferences: Denies cultural, Shinto, ethnic food preferences      Factors Affecting Nutritional Intake: altered gastrointestinal function, NPO     Labs/Tests/Procedures/Meds     Pertinent Labs Reviewed: reviewed  Pertinent Labs Comments:   Pertinent Medications  Reviewed: reviewed  Pertinent Medications Comments: prednisone    Physical Findings    Overall Physical Appearance: generalized wasting  Tubes: nasogastric tube (suction: 750 ml output)  Oral/Mouth Cavity: WDL  Skin:  (incision; Juan 18)    Anthropometrics     Height (inches): 72.01 in  Weight Method: Bed Scale  Weight (kg): 64.1 kg  Ideal Body Weight (IBW), Male: 178.06 lb     % Ideal Body Weight, Male (lb): 79.37 lb     BMI (kg/m2): 19.16  BMI Grade: 18.5-24.9 - normal  Usual Body Weight (UBW), k.18 kg  % Usual Body Weight: 94.02     Weight Loss: unintentional     Estimated/Assessed Needs    Weight Used For Calorie Calculations: 64.1 kg (141 lb 5 oz)   Height (cm): 182.9 cm     Energy Need Method: Kcal/kg (2275-1129 kcal)     RMR ([place]-St. Jeor Equation): 1498.2      Weight Used For Protein Calculations: 64.1 kg (141 lb 5 oz)  Protein Requirements: 77-96 g    Fluid Need Method: RDA Method     Assessment and Plan    Nutrition Diagnosis    Problem: Severe Malnutrition (Acute)  Etiology: Altered GI Function  As Evidenced by: 7% weight loss in 1 month/0% intake x 10 days  Nutrition Diagnosis Status: New    Monitor and Evaluation    Food and Nutrient Intake: energy intake, food and beverage intake, parenteral nutrition intake  Food and Nutrient Adminstration: diet order, enteral and parenteral nutrition administration  Anthropometric Measurements: weight, weight change  Biochemical Data, Medical Tests and Procedures: electrolyte and renal panel, glucose/endocrine profile  Nutrition-Focused Physical Findings: overall appearance    Nutrition Risk    Level of Risk:  (2 x week)    Nutrition Follow-Up    RD Follow-up?: Yes

## 2017-04-21 NOTE — ASSESSMENT & PLAN NOTE
Nutrition Problem:   Severe Malnutrition  (Acute).    Etiology:  Altered GI Function/Inadequate Energy Intake    As Evidenced by:  7% weight loss in 1 month  0% po intake x 10 days    Treatment Strategy:   Currently on TPN; Consider customizing 5%AA/15% CHO @ 90 ml/hr + IVFE Daily to reduce CHO intake/GIR  Monitor lipids weekly; if >400 cycle lipids three times/week  Continue to monitor lytes, Mg, Phos for refeeding syndrome   Advance diet as able to 2 gm sodium with boost plus tid    Nutrition Diagnosis Status:   New

## 2017-04-21 NOTE — PLAN OF CARE
04/21/17 1023   Discharge Reassessment   Assessment Type Discharge Planning Reassessment   Can the patient answer the patient profile reliably? Yes, cognitively intact   How does the patient rate their overall health at the present time? Fair   Describe the patient's ability to walk at the present time. No restrictions   How often would a person be available to care for the patient? Whenever needed   Number of comorbid conditions (as recorded on the chart) Five or more   During the past month, has the patient often been bothered by feeling down, depressed or hopeless? No   During the past month, has the patient often been bothered by little interest or pleasure in doing things? No   Discharge plan remains the same: Yes   Provided patient/caregiver education on the expected discharge date and the discharge plan No   Discharge Plan A Home with family   Discharge Plan B Home with family;Home Health   Change in patient condition or support system No   Patient choice form signed by patient/caregiver N/A

## 2017-04-22 PROBLEM — E87.0 HYPERNATREMIA: Status: ACTIVE | Noted: 2017-04-22

## 2017-04-22 PROBLEM — J44.1 COPD EXACERBATION: Status: ACTIVE | Noted: 2017-04-22

## 2017-04-22 PROBLEM — E87.6 HYPOKALEMIA: Status: ACTIVE | Noted: 2017-04-22

## 2017-04-22 LAB
ANION GAP SERPL CALC-SCNC: 11 MMOL/L
ANION GAP SERPL CALC-SCNC: 11 MMOL/L
BUN SERPL-MCNC: 56 MG/DL
BUN SERPL-MCNC: 56 MG/DL
CALCIUM SERPL-MCNC: 9.8 MG/DL
CALCIUM SERPL-MCNC: 9.8 MG/DL
CHLORIDE SERPL-SCNC: 106 MMOL/L
CHLORIDE SERPL-SCNC: 106 MMOL/L
CO2 SERPL-SCNC: 31 MMOL/L
CO2 SERPL-SCNC: 31 MMOL/L
CREAT SERPL-MCNC: 1.7 MG/DL
CREAT SERPL-MCNC: 1.7 MG/DL
EST. GFR  (AFRICAN AMERICAN): 50 ML/MIN/1.73 M^2
EST. GFR  (AFRICAN AMERICAN): 50 ML/MIN/1.73 M^2
EST. GFR  (NON AFRICAN AMERICAN): 43 ML/MIN/1.73 M^2
EST. GFR  (NON AFRICAN AMERICAN): 43 ML/MIN/1.73 M^2
GLUCOSE SERPL-MCNC: 119 MG/DL
GLUCOSE SERPL-MCNC: 119 MG/DL
MAGNESIUM SERPL-MCNC: 1.9 MG/DL
MAGNESIUM SERPL-MCNC: 1.9 MG/DL
PHOSPHATE SERPL-MCNC: 3.6 MG/DL
PHOSPHATE SERPL-MCNC: 3.6 MG/DL
POTASSIUM SERPL-SCNC: 3.3 MMOL/L
POTASSIUM SERPL-SCNC: 3.3 MMOL/L
SODIUM SERPL-SCNC: 148 MMOL/L
SODIUM SERPL-SCNC: 148 MMOL/L

## 2017-04-22 PROCEDURE — 99900035 HC TECH TIME PER 15 MIN (STAT)

## 2017-04-22 PROCEDURE — 84100 ASSAY OF PHOSPHORUS: CPT

## 2017-04-22 PROCEDURE — 63600175 PHARM REV CODE 636 W HCPCS: Performed by: HOSPITALIST

## 2017-04-22 PROCEDURE — 99232 SBSQ HOSP IP/OBS MODERATE 35: CPT | Mod: ,,, | Performed by: UROLOGY

## 2017-04-22 PROCEDURE — 36415 COLL VENOUS BLD VENIPUNCTURE: CPT

## 2017-04-22 PROCEDURE — 25000003 PHARM REV CODE 250: Performed by: HOSPITALIST

## 2017-04-22 PROCEDURE — 25000003 PHARM REV CODE 250: Performed by: SURGERY

## 2017-04-22 PROCEDURE — 83735 ASSAY OF MAGNESIUM: CPT

## 2017-04-22 PROCEDURE — 25000003 PHARM REV CODE 250

## 2017-04-22 PROCEDURE — 12000002 HC ACUTE/MED SURGE SEMI-PRIVATE ROOM

## 2017-04-22 PROCEDURE — 94761 N-INVAS EAR/PLS OXIMETRY MLT: CPT

## 2017-04-22 PROCEDURE — 63600175 PHARM REV CODE 636 W HCPCS

## 2017-04-22 PROCEDURE — 94640 AIRWAY INHALATION TREATMENT: CPT

## 2017-04-22 PROCEDURE — 80048 BASIC METABOLIC PNL TOTAL CA: CPT

## 2017-04-22 PROCEDURE — C9113 INJ PANTOPRAZOLE SODIUM, VIA: HCPCS

## 2017-04-22 RX ORDER — LEVALBUTEROL INHALATION SOLUTION 0.63 MG/3ML
0.63 SOLUTION RESPIRATORY (INHALATION) EVERY 8 HOURS PRN
Status: DISCONTINUED | OUTPATIENT
Start: 2017-04-22 | End: 2017-04-25 | Stop reason: HOSPADM

## 2017-04-22 RX ORDER — HYDRALAZINE HYDROCHLORIDE 20 MG/ML
10 INJECTION INTRAMUSCULAR; INTRAVENOUS EVERY 8 HOURS PRN
Status: DISCONTINUED | OUTPATIENT
Start: 2017-04-22 | End: 2017-04-25 | Stop reason: HOSPADM

## 2017-04-22 RX ORDER — POTASSIUM CHLORIDE 7.45 MG/ML
10 INJECTION INTRAVENOUS
Status: DISPENSED | OUTPATIENT
Start: 2017-04-22 | End: 2017-04-22

## 2017-04-22 RX ADMIN — Medication 3 ML: at 05:04

## 2017-04-22 RX ADMIN — Medication 3 ML: at 10:04

## 2017-04-22 RX ADMIN — ESCITALOPRAM OXALATE 10 MG: 10 TABLET ORAL at 09:04

## 2017-04-22 RX ADMIN — MORPHINE SULFATE 4 MG: 10 INJECTION INTRAVENOUS at 06:04

## 2017-04-22 RX ADMIN — MORPHINE SULFATE 4 MG: 10 INJECTION INTRAVENOUS at 10:04

## 2017-04-22 RX ADMIN — Medication 3 ML: at 02:04

## 2017-04-22 RX ADMIN — PANTOPRAZOLE SODIUM 40 MG: 40 INJECTION, POWDER, FOR SOLUTION INTRAVENOUS at 09:04

## 2017-04-22 RX ADMIN — POTASSIUM CHLORIDE 10 MEQ: 10 INJECTION, SOLUTION INTRAVENOUS at 09:04

## 2017-04-22 RX ADMIN — POTASSIUM CHLORIDE 10 MEQ: 10 INJECTION, SOLUTION INTRAVENOUS at 08:04

## 2017-04-22 RX ADMIN — MORPHINE SULFATE 4 MG: 10 INJECTION INTRAVENOUS at 02:04

## 2017-04-22 RX ADMIN — GABAPENTIN 400 MG: 100 CAPSULE ORAL at 09:04

## 2017-04-22 RX ADMIN — LORAZEPAM 0.5 MG: 2 INJECTION, SOLUTION INTRAMUSCULAR; INTRAVENOUS at 11:04

## 2017-04-22 RX ADMIN — ASCORBIC ACID, VITAMIN A PALMITATE, CHOLECALCIFEROL, THIAMINE HYDROCHLORIDE, RIBOFLAVIN-5 PHOSPHATE SODIUM, PYRIDOXINE HYDROCHLORIDE, NIACINAMIDE, DEXPANTHENOL, ALPHA-TOCOPHEROL ACETATE, VITAMIN K1, FOLIC ACID, BIOTIN, CYANOCOBALAMIN: 200; 3300; 200; 6; 3.6; 6; 40; 15; 10; 150; 600; 60; 5 INJECTION, SOLUTION INTRAVENOUS at 09:04

## 2017-04-22 RX ADMIN — LEVALBUTEROL 0.63 MG: 0.63 SOLUTION RESPIRATORY (INHALATION) at 12:04

## 2017-04-22 NOTE — ASSESSMENT & PLAN NOTE
Management per nephrology- labs pending this am. CRT improving. All likely pre-renal.   Creatinine is slightly increased today  Need to increase maintenance IV fluids

## 2017-04-22 NOTE — ASSESSMENT & PLAN NOTE
Management per nephrology- labs pending this am. CRT improving. All likely pre-renal.   Creatinine is improving

## 2017-04-22 NOTE — SUBJECTIVE & OBJECTIVE
Interval History:   No flank pain  Good appliance fit    NG still in but he has started passing gas    Review of Systems  Objective:     Temp:  [97.5 °F (36.4 °C)-98.3 °F (36.8 °C)] 97.5 °F (36.4 °C)  Pulse:  [] 104  Resp:  [17-20] 17  SpO2:  [95 %-98 %] 97 %  BP: (128-188)/(65-96) 128/69     Body mass index is 19.2 kg/(m^2).            Drains     Drain                 Urostomy RLQ -- days         Ureteral Drain/Stent 12/30/15 1217 Left ureter 8 Fr. 478 days         Ureteral Drain/Stent 12/30/15 1300 Right ureter 7 Fr. 478 days         NG/OG Tube 04/20/17 0644 16 Fr. Right nostril 2 days                Physical Exam  Ao*4  resp normal rate' breathing not labored  cv normal rate  Ab nondistended  Ext no edema  No cva tenderness  Stoma pink and viable  Good appliance fit  Urine clear yellow      Significant Labs:    BMP:    Recent Labs  Lab 04/20/17  0506 04/21/17  0600 04/22/17  0541   * 146*  146*  146* 148*  148*   K 3.5 3.2*  3.2*  3.2* 3.3*  3.3*    105  105  105 106  106   CO2 30* 30*  30*  30* 31*  31*   BUN 84* 61*  61*  61* 56*  56*   CREATININE 2.1* 1.6*  1.6*  1.6* 1.7*  1.7*   CALCIUM 10.1 9.9  9.9  9.9 9.8  9.8       CBC:     Recent Labs  Lab 04/17/17  0850   WBC 6.00   HGB 12.8*   HCT 37.3*          Urine Culture: No results for input(s): LABURIN in the last 168 hours.    Significant Imaging:

## 2017-04-22 NOTE — ASSESSMENT & PLAN NOTE
The patient has good air movement on lung exam, continue Xopenex to as needed treatments  Start Solumedrol 30mg

## 2017-04-22 NOTE — PROGRESS NOTES
[facility] Hospital Medicine  Progress Note    Patient Name: Blake Guillory  MRN: [id number]  Patient Class: IP- Inpatient   Admission Date: 4/17/2017  Length of Stay: 5 days  Attending Physician: Estuardo Mack MD  Primary Care Provider: Varun Zeng MD        Subjective:     Principal Problem:Small bowel obstruction    HPI:       Hospital Course:  Patient was admitted to the hospital for treatment of SBO. The patient is on the surgery service and medicine was consulted to follow for history of COPD, hypertension as well as acute renal failure.  The patient has an improving creatinine and COPD appears to be stable with steroids and bronchodilator treatments.  The patient is receiving parenteral nutrition and has some electrolyte abnormalities.  BP is moderately elevated but giving IV Hydralazine to maintain SBP <180.    Interval History: The patient    Review of Systems   Constitutional: Negative for fatigue.   HENT: Negative for congestion.    Respiratory: Positive for cough.    Cardiovascular: Negative for chest pain.   Gastrointestinal: Negative for abdominal pain and vomiting.     Objective:     Vital Signs (Most Recent):  Temp: 98.3 °F (36.8 °C) (04/22/17 0400)  Pulse: 87 (04/22/17 0400)  Resp: 18 (04/22/17 0400)  BP: (!) 160/82 (04/22/17 0400)  SpO2: 96 % (04/22/17 0400) Vital Signs (24h Range):  Temp:  [98 °F (36.7 °C)-98.3 °F (36.8 °C)] 98.3 °F (36.8 °C)  Pulse:  [74-94] 87  Resp:  [18-20] 18  SpO2:  [92 %-98 %] 96 %  BP: (135-188)/(65-96) 160/82     Weight: 64.2 kg (141 lb 9.6 oz)  Body mass index is 19.2 kg/(m^2).    Intake/Output Summary (Last 24 hours) at 04/22/17 0725  Last data filed at 04/22/17 0609   Gross per 24 hour   Intake              825 ml   Output             2750 ml   Net            -1925 ml      Physical Exam   Constitutional: He is oriented to person, place, and time.   Unkempt male, pleasant in NAD cooperative with exam     HENT:   Head: Normocephalic and  atraumatic.   Eyes: EOM are normal. Pupils are equal, round, and reactive to light.   Neck: Normal range of motion. Neck supple. No JVD present.   Cardiovascular: Normal rate, regular rhythm, normal heart sounds and intact distal pulses.  Exam reveals no gallop and no friction rub.    No murmur heard.  Pulmonary/Chest: Effort normal and breath sounds normal. No respiratory distress. He has no wheezes.   Abdominal: Soft. There is tenderness.   Lymphadenopathy:     He has no cervical adenopathy.   Neurological: He is alert and oriented to person, place, and time.       Significant Labs:   CBC: No results for input(s): WBC, HGB, HCT, PLT in the last 48 hours.  CMP:   Recent Labs  Lab 04/21/17  0600   *  146*  146*   K 3.2*  3.2*  3.2*     105  105   CO2 30*  30*  30*     107  107   BUN 61*  61*  61*   CREATININE 1.6*  1.6*  1.6*   CALCIUM 9.9  9.9  9.9   PROT 7.0   ALBUMIN 3.3*   BILITOT 0.4   ALKPHOS 63   AST 27   ALT 25   ANIONGAP 11  11  11   EGFRNONAA 46*  46*  46*       Significant Imaging: I have reviewed and interpreted all pertinent imaging results/findings within the past 24 hours.    Assessment/Plan:      * Small bowel obstruction  · Management per general surgery    Essential hypertension  Continue IV Hydralazine while NPO  SBP<180      Anemia of chronic disease  H/H mildly decreased, no need to monitor  No signs of bleeding      Acquired hypothyroidism  Patient on oral supplementation despite NG to low intermittent suction so unsure how much medication the patient is actually receiving      Hypoxemia  The patient is stable on 2L O2 via NC    Hypocalcemia  Low normal calcium levels.  Albumin is within normal range.  Likely related to acute on chronic renal failure, Nephrology to address.    Hyperphosphatemia  · Likely due to acute renal failure  · Management per nephrology      Acute renal failure  Management per nephrology- labs pending this am. CRT improving. All  likely pre-renal.   Creatinine is slightly increased today  Need to increase maintenance IV fluids      Hypernatremia  Needs free water, but currently NPO so will increase IV hydration      COPD exacerbation  The patient has good air movement on lung exam, continue Xopenex to as needed treatments  Start Solumedrol 30mg      VTE Risk Mitigation         Ordered     Place sequential compression device  Until discontinued      04/20/17 0629     Medium Risk of VTE  Once      04/17/17 1530     Place ANNAMARIE hose  Until discontinued      04/17/17 1530          Susanne Hickey MD  Department of Hospital Medicine   [facility]

## 2017-04-22 NOTE — ASSESSMENT & PLAN NOTE
The patient has good air movement on lung exam, can change Xopenex to as needed treatments  Decrease Prednisone to 30mg daily, again this is oral medication, not sure how much he is receiving, would recommend Solumedrol

## 2017-04-22 NOTE — ASSESSMENT & PLAN NOTE
Low normal calcium levels.  Albumin is within normal range.  Likely related to acute on chronic renal failure, Nephrology to address.

## 2017-04-22 NOTE — ASSESSMENT & PLAN NOTE
Patient on oral supplementation despite NG to low intermittent suction so unsure how much medication the patient is actually receiving

## 2017-04-22 NOTE — PLAN OF CARE
Problem: Patient Care Overview  Goal: Plan of Care Review  Outcome: Ongoing (interventions implemented as appropriate)  njo falls trauma or injury this shift. Pain managed with iv dilaudid. NG tube to low intermittent suction. Patient afebrile, watching for resolving of small bowel obstruction. Continue with plan of care and continue to monitor patient.

## 2017-04-22 NOTE — PROGRESS NOTES
[facility]  Urology  Progress Note    Patient Name: Blake Guillory  MRN: [id number]  Admission Date: 4/17/2017  Hospital Length of Stay: 5 days  Code Status: Full Code   Attending Provider: Estuardo Mack MD   Primary Care Physician: Varun Zeng MD    Subjective:     HPI:  He has a history of bladder cancer. He is s/p cystectomy with ileal conduit in November 2012. From an oncologic standpoint, he is doing well. He has had issues with bilateral ureteral stricture. He underwent a bilateral ureteral reimplantation into his conduit several months later. His strictures recurred shortly afterwards. He was managed with ureteral stent changes until one of the stent eroded through his conduit and into his bowels.   He had an Exploratory Laparotomy with replacement of ileal conduit and small bowel anastomosis on 12/30/2015.     He developed a parastomal hernia that was recently repaired by Dr. Mack.  He is back in the hospital with concerns for bowel obstruction.  Urology consult requested due to concerns with the conduit and renal failure.    He denies problems with his stoma or stomal appliance.  He is feeling better today.      Interval History:   No flank pain  Good appliance fit    NG still in but he has started passing gas    Review of Systems  Objective:     Temp:  [97.5 °F (36.4 °C)-98.3 °F (36.8 °C)] 97.5 °F (36.4 °C)  Pulse:  [] 104  Resp:  [17-20] 17  SpO2:  [95 %-98 %] 97 %  BP: (128-188)/(65-96) 128/69     Body mass index is 19.2 kg/(m^2).            Drains     Drain                 Urostomy RLQ -- days         Ureteral Drain/Stent 12/30/15 1217 Left ureter 8 Fr. 478 days         Ureteral Drain/Stent 12/30/15 1300 Right ureter 7 Fr. 478 days         NG/OG Tube 04/20/17 0644 16 Fr. Right nostril 2 days                Physical Exam  Ao*4  resp normal rate' breathing not labored  cv normal rate  Ab nondistended  Ext no edema  No cva tenderness  Stoma pink and viable  Good appliance  fit  Urine clear yellow      Significant Labs:    BMP:    Recent Labs  Lab 04/20/17  0506 04/21/17  0600 04/22/17  0541   * 146*  146*  146* 148*  148*   K 3.5 3.2*  3.2*  3.2* 3.3*  3.3*    105  105  105 106  106   CO2 30* 30*  30*  30* 31*  31*   BUN 84* 61*  61*  61* 56*  56*   CREATININE 2.1* 1.6*  1.6*  1.6* 1.7*  1.7*   CALCIUM 10.1 9.9  9.9  9.9 9.8  9.8       CBC:     Recent Labs  Lab 04/17/17  0850   WBC 6.00   HGB 12.8*   HCT 37.3*          Urine Culture: No results for input(s): LABURIN in the last 168 hours.    Significant Imaging:                    Assessment/Plan:     History of bladder cancer  5 years sp cystectomy and ileal conduit  Will follow up with Dr Fletcher/Betzy as outpatient    Hydronephrosis, bilateral  Not symptomatic; chronic  No intervention necessary at this time          VTE Risk Mitigation         Ordered     Place sequential compression device  Until discontinued      04/20/17 0629     Medium Risk of VTE  Once      04/17/17 1530     Place ANNAMARIE hose  Until discontinued      04/17/17 1530          Timbo Dempsey MD  Urology  [facility]

## 2017-04-22 NOTE — SUBJECTIVE & OBJECTIVE
Interval History: The patient    Review of Systems   Constitutional: Negative for fatigue.   HENT: Negative for congestion.    Respiratory: Positive for cough.    Cardiovascular: Negative for chest pain.   Gastrointestinal: Negative for abdominal pain and vomiting.     Objective:     Vital Signs (Most Recent):  Temp: 98.3 °F (36.8 °C) (04/22/17 0400)  Pulse: 87 (04/22/17 0400)  Resp: 18 (04/22/17 0400)  BP: (!) 160/82 (04/22/17 0400)  SpO2: 96 % (04/22/17 0400) Vital Signs (24h Range):  Temp:  [98 °F (36.7 °C)-98.3 °F (36.8 °C)] 98.3 °F (36.8 °C)  Pulse:  [74-94] 87  Resp:  [18-20] 18  SpO2:  [92 %-98 %] 96 %  BP: (135-188)/(65-96) 160/82     Weight: 64.2 kg (141 lb 9.6 oz)  Body mass index is 19.2 kg/(m^2).    Intake/Output Summary (Last 24 hours) at 04/22/17 0725  Last data filed at 04/22/17 0609   Gross per 24 hour   Intake              825 ml   Output             2750 ml   Net            -1925 ml      Physical Exam   Constitutional: He is oriented to person, place, and time.   Unkempt male, pleasant in NAD cooperative with exam     HENT:   Head: Normocephalic and atraumatic.   Eyes: EOM are normal. Pupils are equal, round, and reactive to light.   Neck: Normal range of motion. Neck supple. No JVD present.   Cardiovascular: Normal rate, regular rhythm, normal heart sounds and intact distal pulses.  Exam reveals no gallop and no friction rub.    No murmur heard.  Pulmonary/Chest: Effort normal and breath sounds normal. No respiratory distress. He has no wheezes.   Abdominal: Soft. There is tenderness.   Lymphadenopathy:     He has no cervical adenopathy.   Neurological: He is alert and oriented to person, place, and time.       Significant Labs:   CBC: No results for input(s): WBC, HGB, HCT, PLT in the last 48 hours.  CMP:   Recent Labs  Lab 04/21/17  0600   *  146*  146*   K 3.2*  3.2*  3.2*     105  105   CO2 30*  30*  30*     107  107   BUN 61*  61*  61*   CREATININE 1.6*  1.6*   1.6*   CALCIUM 9.9  9.9  9.9   PROT 7.0   ALBUMIN 3.3*   BILITOT 0.4   ALKPHOS 63   AST 27   ALT 25   ANIONGAP 11  11  11   EGFRNONAA 46*  46*  46*       Significant Imaging: I have reviewed and interpreted all pertinent imaging results/findings within the past 24 hours.

## 2017-04-22 NOTE — ASSESSMENT & PLAN NOTE
Patient appears to have some contraction alkalosis from GI losses, so will give some additional hydration to current PPN and supplement with potassium.

## 2017-04-22 NOTE — SUBJECTIVE & OBJECTIVE
Interval History: The patient has no complaints of cough or wheezing, and has discomfort with NG tube.    Review of Systems   Constitutional: Negative for fatigue.   HENT: Negative for congestion.    Respiratory: Positive for cough.    Cardiovascular: Negative for chest pain.   Gastrointestinal: Negative for abdominal pain and vomiting.     Objective:     Vital Signs (Most Recent):  Temp: 98.3 °F (36.8 °C) (04/22/17 0400)  Pulse: 87 (04/22/17 0400)  Resp: 18 (04/22/17 0400)  BP: (!) 160/82 (04/22/17 0400)  SpO2: 96 % (04/22/17 0400) Vital Signs (24h Range):  Temp:  [98 °F (36.7 °C)-98.3 °F (36.8 °C)] 98.3 °F (36.8 °C)  Pulse:  [74-94] 87  Resp:  [18-20] 18  SpO2:  [92 %-98 %] 96 %  BP: (135-188)/(65-96) 160/82     Weight: 64.2 kg (141 lb 9.6 oz)  Body mass index is 19.2 kg/(m^2).    Intake/Output Summary (Last 24 hours) at 04/22/17 0708  Last data filed at 04/22/17 0609   Gross per 24 hour   Intake              825 ml   Output             2750 ml   Net            -1925 ml      Physical Exam   Constitutional: He is oriented to person, place, and time.   Unkempt male, pleasant in NAD cooperative with exam     HENT:   Head: Normocephalic and atraumatic.   Eyes: EOM are normal. Pupils are equal, round, and reactive to light.   Neck: Normal range of motion. Neck supple. No JVD present.   Cardiovascular: Normal rate, regular rhythm, normal heart sounds and intact distal pulses.  Exam reveals no gallop and no friction rub.    No murmur heard.  Pulmonary/Chest: Effort normal and breath sounds normal. No respiratory distress. He has no wheezes.   Abdominal: Soft. There is tenderness.   Lymphadenopathy:     He has no cervical adenopathy.   Neurological: He is alert and oriented to person, place, and time.       Significant Labs: All pertinent labs within the past 24 hours have been reviewed.    Significant Imaging: I have reviewed and interpreted all pertinent imaging results/findings within the past 24 hours.

## 2017-04-22 NOTE — PLAN OF CARE
Problem: Pain, Chronic (Adult)  Intervention: Manage Persistent Pain Analgesia    04/22/17 1725   Safety Interventions   Medication Review/Management medications reviewed         Goal: Acceptable Pain Control/Comfort Level  Patient will demonstrate the desired outcomes by discharge/transition of care.  Outcome: Ongoing (interventions implemented as appropriate)    04/22/17 1725   Pain, Chronic (Adult)   Acceptable Pain Control/Comfort Level making progress toward outcome

## 2017-04-23 PROBLEM — E83.39 HYPERPHOSPHATEMIA: Status: RESOLVED | Noted: 2017-04-18 | Resolved: 2017-04-23

## 2017-04-23 PROBLEM — E83.51 HYPOCALCEMIA: Status: RESOLVED | Noted: 2017-04-18 | Resolved: 2017-04-23

## 2017-04-23 PROBLEM — E87.0 HYPERNATREMIA: Status: RESOLVED | Noted: 2017-04-22 | Resolved: 2017-04-23

## 2017-04-23 PROBLEM — N17.9 ACUTE RENAL FAILURE: Status: RESOLVED | Noted: 2017-04-18 | Resolved: 2017-04-23

## 2017-04-23 LAB
ANION GAP SERPL CALC-SCNC: 9 MMOL/L
ANION GAP SERPL CALC-SCNC: 9 MMOL/L
BUN SERPL-MCNC: 60 MG/DL
BUN SERPL-MCNC: 60 MG/DL
CALCIUM SERPL-MCNC: 8.7 MG/DL
CALCIUM SERPL-MCNC: 8.7 MG/DL
CHLORIDE SERPL-SCNC: 107 MMOL/L
CHLORIDE SERPL-SCNC: 107 MMOL/L
CO2 SERPL-SCNC: 25 MMOL/L
CO2 SERPL-SCNC: 25 MMOL/L
CREAT SERPL-MCNC: 1.8 MG/DL
CREAT SERPL-MCNC: 1.8 MG/DL
EST. GFR  (AFRICAN AMERICAN): 47 ML/MIN/1.73 M^2
EST. GFR  (AFRICAN AMERICAN): 47 ML/MIN/1.73 M^2
EST. GFR  (NON AFRICAN AMERICAN): 40 ML/MIN/1.73 M^2
EST. GFR  (NON AFRICAN AMERICAN): 40 ML/MIN/1.73 M^2
GLUCOSE SERPL-MCNC: 164 MG/DL
GLUCOSE SERPL-MCNC: 164 MG/DL
MAGNESIUM SERPL-MCNC: 1.8 MG/DL
MAGNESIUM SERPL-MCNC: 1.8 MG/DL
PHOSPHATE SERPL-MCNC: 3.6 MG/DL
PHOSPHATE SERPL-MCNC: 3.6 MG/DL
POTASSIUM SERPL-SCNC: 3.7 MMOL/L
POTASSIUM SERPL-SCNC: 3.7 MMOL/L
SODIUM SERPL-SCNC: 141 MMOL/L
SODIUM SERPL-SCNC: 141 MMOL/L

## 2017-04-23 PROCEDURE — 25000003 PHARM REV CODE 250: Performed by: INTERNAL MEDICINE

## 2017-04-23 PROCEDURE — 36415 COLL VENOUS BLD VENIPUNCTURE: CPT

## 2017-04-23 PROCEDURE — C9113 INJ PANTOPRAZOLE SODIUM, VIA: HCPCS

## 2017-04-23 PROCEDURE — 63600175 PHARM REV CODE 636 W HCPCS: Performed by: HOSPITALIST

## 2017-04-23 PROCEDURE — 25000003 PHARM REV CODE 250

## 2017-04-23 PROCEDURE — 12000002 HC ACUTE/MED SURGE SEMI-PRIVATE ROOM

## 2017-04-23 PROCEDURE — 63600175 PHARM REV CODE 636 W HCPCS

## 2017-04-23 PROCEDURE — 84100 ASSAY OF PHOSPHORUS: CPT

## 2017-04-23 PROCEDURE — 25000003 PHARM REV CODE 250: Performed by: HOSPITALIST

## 2017-04-23 PROCEDURE — 25000003 PHARM REV CODE 250: Performed by: SURGERY

## 2017-04-23 PROCEDURE — 99232 SBSQ HOSP IP/OBS MODERATE 35: CPT | Mod: ,,, | Performed by: UROLOGY

## 2017-04-23 PROCEDURE — 80048 BASIC METABOLIC PNL TOTAL CA: CPT

## 2017-04-23 PROCEDURE — 83735 ASSAY OF MAGNESIUM: CPT

## 2017-04-23 RX ORDER — PREDNISONE 20 MG/1
20 TABLET ORAL DAILY
Status: DISCONTINUED | OUTPATIENT
Start: 2017-04-24 | End: 2017-04-24

## 2017-04-23 RX ADMIN — Medication 3 ML: at 01:04

## 2017-04-23 RX ADMIN — PREDNISONE 30 MG: 5 TABLET ORAL at 09:04

## 2017-04-23 RX ADMIN — PANTOPRAZOLE SODIUM 40 MG: 40 INJECTION, POWDER, FOR SOLUTION INTRAVENOUS at 09:04

## 2017-04-23 RX ADMIN — CALCITRIOL 0.5 MCG: 0.25 CAPSULE, LIQUID FILLED ORAL at 09:04

## 2017-04-23 RX ADMIN — MORPHINE SULFATE 4 MG: 10 INJECTION INTRAVENOUS at 01:04

## 2017-04-23 RX ADMIN — GABAPENTIN 400 MG: 100 CAPSULE ORAL at 09:04

## 2017-04-23 RX ADMIN — MORPHINE SULFATE 4 MG: 10 INJECTION INTRAVENOUS at 10:04

## 2017-04-23 RX ADMIN — LEVOTHYROXINE SODIUM 25 MCG: 25 TABLET ORAL at 06:04

## 2017-04-23 RX ADMIN — LORAZEPAM 0.5 MG: 2 INJECTION, SOLUTION INTRAMUSCULAR; INTRAVENOUS at 10:04

## 2017-04-23 RX ADMIN — MORPHINE SULFATE 4 MG: 10 INJECTION INTRAVENOUS at 06:04

## 2017-04-23 RX ADMIN — ATENOLOL 50 MG: 50 TABLET ORAL at 09:04

## 2017-04-23 RX ADMIN — Medication 3 ML: at 06:04

## 2017-04-23 RX ADMIN — ASCORBIC ACID, VITAMIN A PALMITATE, CHOLECALCIFEROL, THIAMINE HYDROCHLORIDE, RIBOFLAVIN-5 PHOSPHATE SODIUM, PYRIDOXINE HYDROCHLORIDE, NIACINAMIDE, DEXPANTHENOL, ALPHA-TOCOPHEROL ACETATE, VITAMIN K1, FOLIC ACID, BIOTIN, CYANOCOBALAMIN: 200; 3300; 200; 6; 3.6; 6; 40; 15; 10; 150; 600; 60; 5 INJECTION, SOLUTION INTRAVENOUS at 10:04

## 2017-04-23 RX ADMIN — Medication 3 ML: at 10:04

## 2017-04-23 RX ADMIN — MORPHINE SULFATE 4 MG: 10 INJECTION INTRAVENOUS at 05:04

## 2017-04-23 RX ADMIN — MORPHINE SULFATE 4 MG: 10 INJECTION INTRAVENOUS at 09:04

## 2017-04-23 RX ADMIN — ESCITALOPRAM OXALATE 10 MG: 10 TABLET ORAL at 08:04

## 2017-04-23 RX ADMIN — AMLODIPINE BESYLATE 5 MG: 5 TABLET ORAL at 09:04

## 2017-04-23 RX ADMIN — GABAPENTIN 400 MG: 100 CAPSULE ORAL at 08:04

## 2017-04-23 NOTE — PLAN OF CARE
Problem: Fall Risk (Adult)  Goal: Identify Related Risk Factors and Signs and Symptoms  Related risk factors and signs and symptoms are identified upon initiation of Human Response Clinical Practice Guideline (CPG)   Outcome: Ongoing (interventions implemented as appropriate)  Patient will remain free from falls,trauma,and injury while hospitalized.    Problem: Patient Care Overview  Goal: Plan of Care Review  Outcome: Ongoing (interventions implemented as appropriate)  Pt progressing. Oriented X4. Voiding well. Skin integrity maintained. Pain controlled with Iv pain meds. Vs stable. Adequate oral intake. Tolerating diet. TPN maintained. Free of falls. Call light within reach. Bed in low position. No issues during shift. Continue plan of care.        Problem: Pressure Ulcer Risk (Juan Scale) (Adult,Obstetrics,Pediatric)  Goal: Identify Related Risk Factors and Signs and Symptoms  Related risk factors and signs and symptoms are identified upon initiation of Human Response Clinical Practice Guideline (CPG)   Outcome: Ongoing (interventions implemented as appropriate)  Skin integrity maintained. Patient independently turns and is ambulatory.    Problem: Infection, Risk/Actual (Adult)  Goal: Identify Related Risk Factors and Signs and Symptoms  Related risk factors and signs and symptoms are identified upon initiation of Human Response Clinical Practice Guideline (CPG)   Outcome: Ongoing (interventions implemented as appropriate)  Afebrile during shift. Patient will remain free from signs and symptoms of infection.    Problem: Pain, Chronic (Adult)  Goal: Identify Related Risk Factors and Signs and Symptoms  Related risk factors and signs and symptoms are identified upon initiation of Human Response Clinical Practice Guideline (CPG)   Outcome: Ongoing (interventions implemented as appropriate)  Complains of generalized abdominal pain.

## 2017-04-23 NOTE — NURSING
"Patient remains free from falls and injury. No distress noted at this time. No complaint of pain, nausea/vomiting, or SOB. Afebrile. VSS. Tolerating diet well. Patient ambulated in hallway. No bowel movement, but complains of "a lot of gas." Will continue to monitor. Will continue with plan of care.  "

## 2017-04-23 NOTE — PROGRESS NOTES
[facility] Hospital Medicine  Progress Note    Patient Name: Blake Guillory  MRN: [id number]  Patient Class: IP- Inpatient   Admission Date: 4/17/2017  Length of Stay: 6 days  Attending Physician: Estuardo Mack MD  Primary Care Provider: Varun Zeng MD        Subjective:     Principal Problem:Small bowel obstruction    HPI/Hospital Course:  Patient was admitted to the hospital for treatment of SBO. The patient is on the surgery service and medicine was consulted to follow for history of COPD, hypertension as well as acute renal failure.  The patient has an improving creatinine and COPD appears to be stable with steroids and bronchodilator treatments.  The patient is receiving parenteral nutrition and has some electrolyte abnormalities.  BP is moderately elevated but giving IV Hydralazine to maintain SBP <180.    Interval History: only complaint is feeling hungry    Review of Systems   Constitutional: Negative for fatigue.   HENT: Negative for congestion.    Respiratory: Positive for cough.    Cardiovascular: Negative for chest pain.   Gastrointestinal: Negative for abdominal pain and vomiting.     Objective:     Vital Signs (Most Recent):  Temp: 97.8 °F (36.6 °C) (04/23/17 0730)  Pulse: 98 (04/23/17 0730)  Resp: 17 (04/23/17 0730)  BP: (!) 156/90 (04/23/17 0730)  SpO2: 97 % (04/23/17 0730) Vital Signs (24h Range):  Temp:  [97.8 °F (36.6 °C)-99.1 °F (37.3 °C)] 97.8 °F (36.6 °C)  Pulse:  [] 98  Resp:  [17-18] 17  SpO2:  [94 %-97 %] 97 %  BP: (122-160)/(60-90) 156/90     Weight: 64.2 kg (141 lb 9.6 oz)  Body mass index is 19.2 kg/(m^2).    Intake/Output Summary (Last 24 hours) at 04/23/17 1324  Last data filed at 04/23/17 0600   Gross per 24 hour   Intake            977.5 ml   Output             2025 ml   Net          -1047.5 ml      Physical Exam   Constitutional: He is oriented to person, place, and time. He appears well-developed and well-nourished. No distress.   HENT:   Head:  Normocephalic and atraumatic.   Mouth/Throat: Oropharynx is clear and moist.   Eyes: EOM are normal. Pupils are equal, round, and reactive to light.   Neck: Normal range of motion. Neck supple. No JVD present.   Cardiovascular: Normal rate, normal heart sounds and intact distal pulses.  Exam reveals no gallop and no friction rub.    No murmur heard.  Pulmonary/Chest: Effort normal and breath sounds normal. No respiratory distress. He has no wheezes.   Abdominal: Soft. There is tenderness.   Lymphadenopathy:     He has no cervical adenopathy.   Neurological: He is alert and oriented to person, place, and time.       Significant Labs:     CMP:   Recent Labs  Lab 04/22/17  0541 04/23/17  0523   *  148* 141  141   K 3.3*  3.3* 3.7  3.7     106 107  107   CO2 31*  31* 25  25   *  119* 164*  164*   BUN 56*  56* 60*  60*   CREATININE 1.7*  1.7* 1.8*  1.8*   CALCIUM 9.8  9.8 8.7  8.7   ANIONGAP 11  11 9  9   EGFRNONAA 43*  43* 40*  40*     Assessment/Plan:      Hypoxemia  The patient is stable on 2L O2 via NC    COPD exacerbation  The patient has good air movement on lung exam, continue Xopenex to as needed treatments.  Start to slowly taper steroids.    Acute renal failure, resolved as of 4/23/2017  CRT improving, back to baseline, and stable; good UOP. All likely pre-renal.     Hypokalemia  Monitor and replete cautiously.    Hypocalcemia, resolved as of 4/23/2017  Low normal calcium levels.  Albumin is within normal range.  Likely related to acute on chronic renal failure.    Hyperphosphatemia, resolved as of 4/23/2017  Likely due to acute renal failure. Resolved.    Hypernatremia, resolved as of 4/23/2017  Resolved w/ IV fluids.    Anemia of chronic disease  H/H mildly decreased, no need to monitor. No signs of bleeding.    Acquired hypothyroidism  Levothyroxine 25mcg    Severe malnutrition  PPN    Bladder cancer, resolved as of 11/5/2015  Five years sp cystectomy and ileal conduit.  Will follow up with Dr Fletcher/Betzy as outpatient.    VTE Risk Mitigation         Ordered     Place sequential compression device  Until discontinued      04/20/17 0629     Medium Risk of VTE  Once      04/17/17 1530     Place ANNAMARIE hose  Until discontinued      04/17/17 1530          Portia Maynard MD  Department of Hospital Medicine   [facility]

## 2017-04-23 NOTE — PLAN OF CARE
Problem: Bowel Obstruction (Adult)  Intervention: Monitor/Manage Gastrointestinal Function/Elimination    04/23/17 1419   Monitor/Manage Gastrointestinal Function/Elimination   Abdominal Appearance nondistended   Activity   Activity Type activity adjusted per tolerance   Monitor/Manage Chemotherapy Gastrointestinal Effects   Bowel Dysfunction Management sitting position facilitated;relaxation techniques promoted;toileting offered       Intervention: Monitor/Manage Pain    04/23/17 1419   Safety Interventions   Medication Review/Management medications reviewed   Pain/Comfort/Sleep Interventions   Pain Management Interventions care clustered;medication       Intervention: Support/Optimize Psychosocial Response to Illness    04/23/17 1419   Coping/Psychosocial Interventions   Supportive Measures active listening utilized;positive reinforcement provided;self-care encouraged   Psychosocial Support   Family/Support System Care involvement promoted;presence promoted;self-care encouraged         04/23/17 1419   Coping/Psychosocial Interventions   Supportive Measures active listening utilized;positive reinforcement provided;self-care encouraged   Psychosocial Support   Family/Support System Care involvement promoted;presence promoted;self-care encouraged         Goal: Signs and Symptoms of Listed Potential Problems Will be Absent, Minimized or Managed (Bowel Obstruction)  Signs and symptoms of listed potential problems will be absent, minimized or managed by discharge/transition of care (reference Bowel Obstruction (Adult) CPG).   Outcome: Ongoing (interventions implemented as appropriate)    04/23/17 1419   Bowel Obstruction   Problems Assessed (Bowel Obstruction) all   Problems Present (Bowel Obstruction) pain;situational response

## 2017-04-23 NOTE — SUBJECTIVE & OBJECTIVE
Interval History: only complaint is feeling hungry    Review of Systems   Constitutional: Negative for fatigue.   HENT: Negative for congestion.    Respiratory: Positive for cough.    Cardiovascular: Negative for chest pain.   Gastrointestinal: Negative for abdominal pain and vomiting.     Objective:     Vital Signs (Most Recent):  Temp: 97.8 °F (36.6 °C) (04/23/17 0730)  Pulse: 98 (04/23/17 0730)  Resp: 17 (04/23/17 0730)  BP: (!) 156/90 (04/23/17 0730)  SpO2: 97 % (04/23/17 0730) Vital Signs (24h Range):  Temp:  [97.8 °F (36.6 °C)-99.1 °F (37.3 °C)] 97.8 °F (36.6 °C)  Pulse:  [] 98  Resp:  [17-18] 17  SpO2:  [94 %-97 %] 97 %  BP: (122-160)/(60-90) 156/90     Weight: 64.2 kg (141 lb 9.6 oz)  Body mass index is 19.2 kg/(m^2).    Intake/Output Summary (Last 24 hours) at 04/23/17 1324  Last data filed at 04/23/17 0600   Gross per 24 hour   Intake            977.5 ml   Output             2025 ml   Net          -1047.5 ml      Physical Exam   Constitutional: He is oriented to person, place, and time. He appears well-developed and well-nourished. No distress.   HENT:   Head: Normocephalic and atraumatic.   Mouth/Throat: Oropharynx is clear and moist.   Eyes: EOM are normal. Pupils are equal, round, and reactive to light.   Neck: Normal range of motion. Neck supple. No JVD present.   Cardiovascular: Normal rate, normal heart sounds and intact distal pulses.  Exam reveals no gallop and no friction rub.    No murmur heard.  Pulmonary/Chest: Effort normal and breath sounds normal. No respiratory distress. He has no wheezes.   Abdominal: Soft. There is tenderness.   Lymphadenopathy:     He has no cervical adenopathy.   Neurological: He is alert and oriented to person, place, and time.       Significant Labs:     CMP:   Recent Labs  Lab 04/22/17  0541 04/23/17  0523   *  148* 141  141   K 3.3*  3.3* 3.7  3.7     106 107  107   CO2 31*  31* 25  25   *  119* 164*  164*   BUN 56*  56* 60*  60*    CREATININE 1.7*  1.7* 1.8*  1.8*   CALCIUM 9.8  9.8 8.7  8.7   ANIONGAP 11  11 9  9   EGFRNONAA 43*  43* 40*  40*

## 2017-04-23 NOTE — SUBJECTIVE & OBJECTIVE
Interval History:   No flank pain  Good urine output  No ostomy issues    edel reg diet    Review of Systems  Objective:     Temp:  [97.8 °F (36.6 °C)-99.1 °F (37.3 °C)] 97.8 °F (36.6 °C)  Pulse:  [] 98  Resp:  [17-18] 17  SpO2:  [94 %-97 %] 97 %  BP: (122-160)/(60-90) 156/90     Body mass index is 19.2 kg/(m^2).     good urine output  IO not accurate (some urine output is recorded as a drain but pt does not have a drain)       Drains     Drain                 Urostomy RLQ -- days         Ureteral Drain/Stent 12/30/15 1217 Left ureter 8 Fr. 480 days         Ureteral Drain/Stent 12/30/15 1300 Right ureter 7 Fr. 480 days                Physical Exam  Ao*4  resp normal rate' breathing not labored  cv normal rate  Ab nondistended  Ext no edema  Stoma pink and viable  Good appliance fit  No cva tenderness    Significant Labs:    BMP:    Recent Labs  Lab 04/21/17  0600 04/22/17  0541 04/23/17  0523   *  146*  146* 148*  148* 141  141   K 3.2*  3.2*  3.2* 3.3*  3.3* 3.7  3.7     105  105 106  106 107  107   CO2 30*  30*  30* 31*  31* 25  25   BUN 61*  61*  61* 56*  56* 60*  60*   CREATININE 1.6*  1.6*  1.6* 1.7*  1.7* 1.8*  1.8*   CALCIUM 9.9  9.9  9.9 9.8  9.8 8.7  8.7       CBC:     Recent Labs  Lab 04/17/17  0850   WBC 6.00   HGB 12.8*   HCT 37.3*          Urine Culture: No results for input(s): LABURIN in the last 168 hours.    Significant Imaging:

## 2017-04-23 NOTE — ASSESSMENT & PLAN NOTE
The patient has good air movement on lung exam, continue Xopenex to as needed treatments.  Start to slowly taper steroids.

## 2017-04-23 NOTE — PROGRESS NOTES
[facility]  Urology  Progress Note    Patient Name: Blake Guillory  MRN: [id number]  Admission Date: 4/17/2017  Hospital Length of Stay: 6 days  Code Status: Full Code   Attending Provider: Estuardo Mack MD   Primary Care Physician: Varun Zeng MD    Subjective:     HPI:  He has a history of bladder cancer. He is s/p cystectomy with ileal conduit in November 2012. From an oncologic standpoint, he is doing well. He has had issues with bilateral ureteral stricture. He underwent a bilateral ureteral reimplantation into his conduit several months later. His strictures recurred shortly afterwards. He was managed with ureteral stent changes until one of the stent eroded through his conduit and into his bowels.   He had an Exploratory Laparotomy with replacement of ileal conduit and small bowel anastomosis on 12/30/2015.     He developed a parastomal hernia that was recently repaired by Dr. Mack.  He is back in the hospital with concerns for bowel obstruction.  Urology consult requested due to concerns with the conduit and renal failure.    He denies problems with his stoma or stomal appliance.  He is feeling better today.      Interval History:   No flank pain  Good urine output  No ostomy issues    edel reg diet    Review of Systems  Objective:     Temp:  [97.8 °F (36.6 °C)-99.1 °F (37.3 °C)] 97.8 °F (36.6 °C)  Pulse:  [] 98  Resp:  [17-18] 17  SpO2:  [94 %-97 %] 97 %  BP: (122-160)/(60-90) 156/90     Body mass index is 19.2 kg/(m^2).     good urine output  IO not accurate (some urine output is recorded as a drain but pt does not have a drain)       Drains     Drain                 Urostomy RLQ -- days         Ureteral Drain/Stent 12/30/15 1217 Left ureter 8 Fr. 480 days         Ureteral Drain/Stent 12/30/15 1300 Right ureter 7 Fr. 480 days                Physical Exam  Ao*4  resp normal rate' breathing not labored  cv normal rate  Ab nondistended  Ext no edema  Stoma pink and  viable  Good appliance fit  No cva tenderness    Significant Labs:    BMP:    Recent Labs  Lab 04/21/17  0600 04/22/17  0541 04/23/17  0523   *  146*  146* 148*  148* 141  141   K 3.2*  3.2*  3.2* 3.3*  3.3* 3.7  3.7     105  105 106  106 107  107   CO2 30*  30*  30* 31*  31* 25  25   BUN 61*  61*  61* 56*  56* 60*  60*   CREATININE 1.6*  1.6*  1.6* 1.7*  1.7* 1.8*  1.8*   CALCIUM 9.9  9.9  9.9 9.8  9.8 8.7  8.7       CBC:     Recent Labs  Lab 04/17/17  0850   WBC 6.00   HGB 12.8*   HCT 37.3*          Urine Culture: No results for input(s): LABURIN in the last 168 hours.    Significant Imaging:                    Assessment/Plan:     History of bladder cancer  5 years sp cystectomy and ileal conduit  Will follow up with Dr Fletcher/Betzy as outpatient    Hydronephrosis, bilateral  Not symptomatic; chronic  No intervention necessary at this time          VTE Risk Mitigation         Ordered     Place sequential compression device  Until discontinued      04/20/17 0629     Medium Risk of VTE  Once      04/17/17 1530     Place ANNAMARIE hose  Until discontinued      04/17/17 1530          Timbo Dempsey MD  Urology  [facility]

## 2017-04-24 LAB
ANION GAP SERPL CALC-SCNC: 8 MMOL/L
ANION GAP SERPL CALC-SCNC: 8 MMOL/L
BUN SERPL-MCNC: 67 MG/DL
BUN SERPL-MCNC: 67 MG/DL
CALCIUM SERPL-MCNC: 8.7 MG/DL
CALCIUM SERPL-MCNC: 8.7 MG/DL
CHLORIDE SERPL-SCNC: 107 MMOL/L
CHLORIDE SERPL-SCNC: 107 MMOL/L
CO2 SERPL-SCNC: 24 MMOL/L
CO2 SERPL-SCNC: 24 MMOL/L
CREAT SERPL-MCNC: 1.8 MG/DL
CREAT SERPL-MCNC: 1.8 MG/DL
EST. GFR  (AFRICAN AMERICAN): 47 ML/MIN/1.73 M^2
EST. GFR  (AFRICAN AMERICAN): 47 ML/MIN/1.73 M^2
EST. GFR  (NON AFRICAN AMERICAN): 40 ML/MIN/1.73 M^2
EST. GFR  (NON AFRICAN AMERICAN): 40 ML/MIN/1.73 M^2
GLUCOSE SERPL-MCNC: 86 MG/DL
GLUCOSE SERPL-MCNC: 86 MG/DL
MAGNESIUM SERPL-MCNC: 1.9 MG/DL
MAGNESIUM SERPL-MCNC: 1.9 MG/DL
PHOSPHATE SERPL-MCNC: 3.7 MG/DL
PHOSPHATE SERPL-MCNC: 3.7 MG/DL
POTASSIUM SERPL-SCNC: 4.6 MMOL/L
POTASSIUM SERPL-SCNC: 4.6 MMOL/L
SODIUM SERPL-SCNC: 139 MMOL/L
SODIUM SERPL-SCNC: 139 MMOL/L

## 2017-04-24 PROCEDURE — C9113 INJ PANTOPRAZOLE SODIUM, VIA: HCPCS

## 2017-04-24 PROCEDURE — 25000003 PHARM REV CODE 250: Performed by: INTERNAL MEDICINE

## 2017-04-24 PROCEDURE — 36415 COLL VENOUS BLD VENIPUNCTURE: CPT

## 2017-04-24 PROCEDURE — 83735 ASSAY OF MAGNESIUM: CPT

## 2017-04-24 PROCEDURE — 63600175 PHARM REV CODE 636 W HCPCS

## 2017-04-24 PROCEDURE — 84100 ASSAY OF PHOSPHORUS: CPT

## 2017-04-24 PROCEDURE — 25000003 PHARM REV CODE 250: Performed by: SURGERY

## 2017-04-24 PROCEDURE — 12000002 HC ACUTE/MED SURGE SEMI-PRIVATE ROOM

## 2017-04-24 PROCEDURE — 27000221 HC OXYGEN, UP TO 24 HOURS

## 2017-04-24 PROCEDURE — 25000003 PHARM REV CODE 250

## 2017-04-24 PROCEDURE — 80048 BASIC METABOLIC PNL TOTAL CA: CPT

## 2017-04-24 PROCEDURE — 94640 AIRWAY INHALATION TREATMENT: CPT

## 2017-04-24 PROCEDURE — 25000003 PHARM REV CODE 250: Performed by: HOSPITALIST

## 2017-04-24 PROCEDURE — 63600175 PHARM REV CODE 636 W HCPCS: Performed by: INTERNAL MEDICINE

## 2017-04-24 PROCEDURE — 99232 SBSQ HOSP IP/OBS MODERATE 35: CPT | Mod: ,,, | Performed by: UROLOGY

## 2017-04-24 PROCEDURE — 63600175 PHARM REV CODE 636 W HCPCS: Performed by: HOSPITALIST

## 2017-04-24 RX ORDER — PREDNISONE 5 MG/1
10 TABLET ORAL DAILY
Status: DISCONTINUED | OUTPATIENT
Start: 2017-04-25 | End: 2017-04-25 | Stop reason: HOSPADM

## 2017-04-24 RX ADMIN — MORPHINE SULFATE 4 MG: 10 INJECTION INTRAVENOUS at 01:04

## 2017-04-24 RX ADMIN — ATENOLOL 50 MG: 50 TABLET ORAL at 08:04

## 2017-04-24 RX ADMIN — GABAPENTIN 400 MG: 100 CAPSULE ORAL at 08:04

## 2017-04-24 RX ADMIN — PREDNISONE 20 MG: 20 TABLET ORAL at 08:04

## 2017-04-24 RX ADMIN — MORPHINE SULFATE 4 MG: 10 INJECTION INTRAVENOUS at 04:04

## 2017-04-24 RX ADMIN — LORAZEPAM 0.5 MG: 2 INJECTION, SOLUTION INTRAMUSCULAR; INTRAVENOUS at 10:04

## 2017-04-24 RX ADMIN — CALCITRIOL 0.5 MCG: 0.25 CAPSULE, LIQUID FILLED ORAL at 08:04

## 2017-04-24 RX ADMIN — Medication 3 ML: at 01:04

## 2017-04-24 RX ADMIN — LEVALBUTEROL 0.63 MG: 0.63 SOLUTION RESPIRATORY (INHALATION) at 05:04

## 2017-04-24 RX ADMIN — ESCITALOPRAM OXALATE 10 MG: 10 TABLET ORAL at 10:04

## 2017-04-24 RX ADMIN — Medication 3 ML: at 10:04

## 2017-04-24 RX ADMIN — MORPHINE SULFATE 4 MG: 10 INJECTION INTRAVENOUS at 10:04

## 2017-04-24 RX ADMIN — MORPHINE SULFATE 4 MG: 10 INJECTION INTRAVENOUS at 05:04

## 2017-04-24 RX ADMIN — MORPHINE SULFATE 4 MG: 10 INJECTION INTRAVENOUS at 09:04

## 2017-04-24 RX ADMIN — GABAPENTIN 400 MG: 100 CAPSULE ORAL at 10:04

## 2017-04-24 RX ADMIN — RETINOL, ERGOCALCIFEROL, .ALPHA.-TOCOPHEROL ACETATE, DL-, PHYTONADIONE, ASCORBIC ACID, NIACINAMIDE, RIBOFLAVIN 5-PHOSPHATE SODIUM, THIAMINE HYDROCHLORIDE, PYRIDOXINE HYDROCHLORIDE, DEXPANTHENOL, BIOTIN, FOLIC ACID, AND CYANOCOBALAMIN: KIT at 10:04

## 2017-04-24 RX ADMIN — VARENICLINE TARTRATE 1 MG: 0.5 TABLET, FILM COATED ORAL at 08:04

## 2017-04-24 RX ADMIN — LEVOTHYROXINE SODIUM 25 MCG: 25 TABLET ORAL at 05:04

## 2017-04-24 RX ADMIN — AMLODIPINE BESYLATE 5 MG: 5 TABLET ORAL at 08:04

## 2017-04-24 RX ADMIN — PANTOPRAZOLE SODIUM 40 MG: 40 INJECTION, POWDER, FOR SOLUTION INTRAVENOUS at 08:04

## 2017-04-24 NOTE — PROGRESS NOTES
[facility] Hospital Medicine  Progress Note    Patient Name: Blake Guillory  MRN: [id number]  Patient Class: IP- Inpatient   Admission Date: 4/17/2017  Length of Stay: 7 days  Attending Physician: Estuardo Mack MD  Primary Care Provider: Varun Zeng MD        Subjective:     Principal Problem:Small bowel obstruction    HPI:  Hospital Course:  Patient was admitted to the hospital for treatment of SBO. The patient is on the surgery service and medicine was consulted to follow for history of COPD, hypertension as well as acute renal failure.  The patient has an improving creatinine and COPD appears to be stable with steroids and bronchodilator treatments. Steroids are being tapered slowly.  The patient is receiving parenteral nutrition and had some electrolyte abnormalities. Electrolytes have since improved.  BP is controlled on amlodipine and atenolol.     Review of Systems   Constitutional: Negative for fatigue and fever.   HENT: Negative for congestion and rhinorrhea.    Respiratory: Negative for cough and shortness of breath.    Cardiovascular: Negative for chest pain and palpitations.   Gastrointestinal: Negative for abdominal pain, constipation and vomiting.     Objective:     Vital Signs (Most Recent):  Temp: 98.2 °F (36.8 °C) (04/24/17 0749)  Pulse: 72 (04/24/17 0749)  Resp: 17 (04/24/17 0749)  BP: 131/69 (04/24/17 0749)  SpO2: 95 % (04/24/17 0749) Vital Signs (24h Range):  Temp:  [97.8 °F (36.6 °C)-98.5 °F (36.9 °C)] 98.2 °F (36.8 °C)  Pulse:  [72-84] 72  Resp:  [17-18] 17  SpO2:  [93 %-100 %] 95 %  BP: (125-145)/(68-74) 131/69     Weight: 64.2 kg (141 lb 9.6 oz)  Body mass index is 19.2 kg/(m^2).    Intake/Output Summary (Last 24 hours) at 04/24/17 1303  Last data filed at 04/24/17 0600   Gross per 24 hour   Intake              925 ml   Output             1400 ml   Net             -475 ml      Physical Exam   Constitutional: He is oriented to person, place, and time. He appears  well-developed and well-nourished. No distress.   HENT:   Head: Normocephalic and atraumatic.   Mouth/Throat: Oropharynx is clear and moist.   Eyes: EOM are normal. Pupils are equal, round, and reactive to light.   Neck: Normal range of motion. Neck supple. No JVD present.   Cardiovascular: Normal rate, normal heart sounds and intact distal pulses.  Exam reveals no friction rub.    No murmur heard.  Pulmonary/Chest: Effort normal and breath sounds normal. No respiratory distress. He has no wheezes. He has no rales.   Abdominal: Soft. Bowel sounds are normal. He exhibits no distension. There is no tenderness.   Musculoskeletal: Normal range of motion. He exhibits no edema.   Lymphadenopathy:     He has no cervical adenopathy.   Neurological: He is alert and oriented to person, place, and time.   Skin: Skin is warm and dry. No rash noted.       Significant Labs:   CMP:   Recent Labs  Lab 04/23/17  0523 04/24/17  0450     141 139  139   K 3.7  3.7 4.6  4.6     107 107  107   CO2 25  25 24  24   *  164* 86  86   BUN 60*  60* 67*  67*   CREATININE 1.8*  1.8* 1.8*  1.8*   CALCIUM 8.7  8.7 8.7  8.7   ANIONGAP 9  9 8  8   EGFRNONAA 40*  40* 40*  40*     Assessment/Plan:      * Small bowel obstruction  Management per general surgery. Tolerating normal diet.    Hypoxemia  The patient is stable on 2L O2 via NC. Wean as tolerated. 6 min walk test ordered to see if he qualifies for home O2.     COPD exacerbation  The patient has good air movement on lung exam. Continue Xopenex to as needed treatments.  Start to slowly taper steroids. Prednisone 20mg daily until 4/25. Start 10mg daily 4/25 x6 days then stop.    Acute renal failure, resolved as of 4/23/2017  CRT improving, back to baseline, and stable; good UOP. All likely pre-renal.     Hypokalemia  Monitor and replete cautiously given CKD.    Hypocalcemia, resolved as of 4/23/2017  Low normal calcium levels.  Albumin is within normal range.   Likely related to acute on chronic renal failure.    Hyperphosphatemia, resolved as of 4/23/2017  Likely due to acute renal failure. Resolved.    Hypernatremia, resolved as of 4/23/2017  Resolved w/ IV fluids.    Anemia of chronic disease  H/H mildly decreased, no need to monitor. No signs of bleeding.    Acquired hypothyroidism  Levothyroxine 25mcg    Severe malnutrition  BMI 19. PPN if not taking sufficient PO nutrition.    Bladder cancer, resolved as of 11/5/2015  Five years sp cystectomy and ileal conduit. Will follow up with Dr Fletcher/Betzy as outpatient.    VTE Risk Mitigation         Ordered     Place sequential compression device  Until discontinued      04/20/17 0629     Medium Risk of VTE  Once      04/17/17 1530     Place ANNAMARIE hose  Until discontinued      04/17/17 1530          Portia Maynard MD  Department of Hospital Medicine   [facility]

## 2017-04-24 NOTE — PLAN OF CARE
Problem: Fall Risk (Adult)  Goal: Identify Related Risk Factors and Signs and Symptoms  Related risk factors and signs and symptoms are identified upon initiation of Human Response Clinical Practice Guideline (CPG)   Outcome: Ongoing (interventions implemented as appropriate)  Free of falls.     Problem: Patient Care Overview  Goal: Plan of Care Review  Outcome: Ongoing (interventions implemented as appropriate)  Pt progressing. Oriented x4. Urostomy maintained. Skin integrity maintained. Pain controlled. VS stable. Adequate oral intake. Tolerating diet.  TPN maintained. Free of falls. Call light in reach. Low bed. No issues during shift. Continue plan of care.        Problem: Pressure Ulcer Risk (Juan Scale) (Adult,Obstetrics,Pediatric)  Goal: Identify Related Risk Factors and Signs and Symptoms  Related risk factors and signs and symptoms are identified upon initiation of Human Response Clinical Practice Guideline (CPG)   Outcome: Ongoing (interventions implemented as appropriate)  Skin intact.     Problem: Bowel Obstruction (Adult)  Goal: Signs and Symptoms of Listed Potential Problems Will be Absent, Minimized or Managed (Bowel Obstruction)  Signs and symptoms of listed potential problems will be absent, minimized or managed by discharge/transition of care (reference Bowel Obstruction (Adult) CPG).   Outcome: Ongoing (interventions implemented as appropriate)  Tolerating regular diet. No n/v. Continue to monitor. Reports flatulence.     Problem: Infection, Risk/Actual (Adult)  Goal: Identify Related Risk Factors and Signs and Symptoms  Related risk factors and signs and symptoms are identified upon initiation of Human Response Clinical Practice Guideline (CPG)   Outcome: Ongoing (interventions implemented as appropriate)  Afebrile.     Problem: Pain, Chronic (Adult)  Goal: Identify Related Risk Factors and Signs and Symptoms  Related risk factors and signs and symptoms are identified upon initiation of Human  Response Clinical Practice Guideline (CPG)   Outcome: Ongoing (interventions implemented as appropriate)  Complains of RLQ ABD pain.

## 2017-04-24 NOTE — PLAN OF CARE
04/24/17 1051   Discharge Reassessment   Assessment Type Discharge Planning Reassessment   Can the patient answer the patient profile reliably? Yes, cognitively intact   How does the patient rate their overall health at the present time? Good   Describe the patient's ability to walk at the present time. No restrictions   Number of comorbid conditions (as recorded on the chart) Five or more   During the past month, has the patient often been bothered by feeling down, depressed or hopeless? No   Discharge plan remains the same: No   Provided patient/caregiver education on the expected discharge date and the discharge plan Yes   Discharge Plan A Home with family   Discharge Plan B Home with family   Change in patient condition or support system No   Patient choice form signed by patient/caregiver N/A

## 2017-04-24 NOTE — PROGRESS NOTES
"TN scheduled follow up appts with marshall at Dr. Tanner office [phone] for 6/2/2017 @ 2:35PM    TN scheduled follow up appts with Chaparrita at Dr. Zurita office [phone] for 5/8/2017 @ 11:45AM    TN scheduled follow up appts with Marci at Dr. Cooks office [phone] for 5/11/2017 @ 9:30AM.    All information added to "follow up" tab of pt's chart.  "

## 2017-04-24 NOTE — ASSESSMENT & PLAN NOTE
The patient is stable on 2L O2 via NC. Wean as tolerated. 6 min walk test ordered to see if he qualifies for home O2.

## 2017-04-24 NOTE — SUBJECTIVE & OBJECTIVE
Interval History: He is feeling better.  He is tolerating regular diet.  He would like to go home.    Review of Systems   Constitutional: Negative.    HENT: Negative.    Eyes: Negative.    Respiratory: Negative for cough, chest tightness and shortness of breath.    Cardiovascular: Negative for chest pain.   Gastrointestinal: Negative.  Negative for constipation, diarrhea and nausea.   Musculoskeletal: Negative.    Neurological: Negative.    Psychiatric/Behavioral: Negative.      Objective:     Temp:  [97.8 °F (36.6 °C)-98.5 °F (36.9 °C)] 98.2 °F (36.8 °C)  Pulse:  [72-84] 72  Resp:  [17-18] 17  SpO2:  [93 %-100 %] 95 %  BP: (125-145)/(68-74) 131/69     Body mass index is 19.2 kg/(m^2).            Drains     Drain                 Urostomy RLQ -- days         Ureteral Drain/Stent 12/30/15 1217 Left ureter 8 Fr. 480 days         Ureteral Drain/Stent 12/30/15 1300 Right ureter 7 Fr. 480 days                Physical Exam   Nursing note and vitals reviewed.  Constitutional: He is oriented to person, place, and time. He appears well-developed and well-nourished.   HENT:   Head: Normocephalic.   Eyes: Conjunctivae are normal.   Neck: Normal range of motion. Neck supple. No tracheal deviation present. No thyromegaly present.   Cardiovascular: Normal rate and normal heart sounds.    Pulmonary/Chest: Effort normal and breath sounds normal. No respiratory distress. He has no wheezes.   Abdominal: Soft. Bowel sounds are normal. There is no hepatosplenomegaly. There is no tenderness. There is no rebound and no CVA tenderness. No hernia.   Stoma pink   Musculoskeletal: Normal range of motion. He exhibits no edema or tenderness.   Lymphadenopathy:     He has no cervical adenopathy.   Neurological: He is alert and oriented to person, place, and time.   Skin: Skin is warm and dry. No rash noted. No erythema.     Psychiatric: He has a normal mood and affect. His behavior is normal. Judgment and thought content normal.        Significant Labs:    BMP:    Recent Labs  Lab 04/22/17  0541 04/23/17  0523 04/24/17  0450   *  148* 141  141 139  139   K 3.3*  3.3* 3.7  3.7 4.6  4.6     106 107  107 107  107   CO2 31*  31* 25  25 24  24   BUN 56*  56* 60*  60* 67*  67*   CREATININE 1.7*  1.7* 1.8*  1.8* 1.8*  1.8*   CALCIUM 9.8  9.8 8.7  8.7 8.7  8.7       CBC:   No results for input(s): WBC, HGB, HCT, PLT in the last 168 hours.    Blood Culture: No results for input(s): LABBLOO in the last 168 hours.  Urine Culture: No results for input(s): LABURIN in the last 168 hours.    Significant Imaging:

## 2017-04-24 NOTE — PROGRESS NOTES
WRITTEN HEALTHCARE DISCHARGE INFORMATION     Things that YOU are responsible for to Manage Your Care At Home:  1. Getting your prescriptions filled.  2. Taking you medications as directed. DO NOT MISS ANY DOSES!  3. Going to your follow-up doctor appointments. This is important because it allows the doctor to monitor your progress and to determine if any changes need to be made to your treatment plan.    If you are unable to make your follow up appointments, please call the number listed and reschedule this appointment.     After discharge, if you need assistance, you can call [facility] Line for 24/7 assistance at [phone]    Thank you for choosing [facility] and allowing us to care for you.   From your care management team: Urvashi Carmichael [phone] or [phone]     You should receive a call from [facility] within 48-72 hours to help manage your care after discharge. Please try to make sure that you answer your phone for this important phone call.     Follow-up Information     Follow up with Estuardo Mack MD On 5/11/2017.    Specialties:  General Surgery, Bariatrics    Why:  For Appointment on Thursday 5/11/2017 @ 9:30AM.     Contact information:    [address]  [address]  [facility] [place] [facility]  [place] [address]  [phone]          Follow up with Varun Zeng MD. Go on 5/8/2017.    Specialties:  General Practice, Physical Medicine and Rehabilitation    Why:  For Appointment on Monday 5/8/2017 @ 11:45 AM    Contact information:    [address]  [facility] [address]  [phone]          Follow up with SHIN Bo MD. Go on 6/2/2017.    Specialty:  Urology    Why:  For Appointment on Friday 6/2/2017 @ 2:35PM     Contact information:    [address]  [address]  [place] [address]  [phone]          Follow up with Joe Silver MD In 2 weeks.    Specialty:  Pulmonary Disease    Contact information:    [address] [place] [address]  [id number]  [place] [address]  [phone]          Follow up with [facility].    Specialty:  DME Provider    Why:  DME-nebulizer    Contact information:    [address]  [facility] [address]  [facility] [id number] [phone]

## 2017-04-24 NOTE — SUBJECTIVE & OBJECTIVE
Review of Systems   Constitutional: Negative for fatigue and fever.   HENT: Negative for congestion and rhinorrhea.    Respiratory: Negative for cough and shortness of breath.    Cardiovascular: Negative for chest pain and palpitations.   Gastrointestinal: Negative for abdominal pain, constipation and vomiting.     Objective:     Vital Signs (Most Recent):  Temp: 98.2 °F (36.8 °C) (04/24/17 0749)  Pulse: 72 (04/24/17 0749)  Resp: 17 (04/24/17 0749)  BP: 131/69 (04/24/17 0749)  SpO2: 95 % (04/24/17 0749) Vital Signs (24h Range):  Temp:  [97.8 °F (36.6 °C)-98.5 °F (36.9 °C)] 98.2 °F (36.8 °C)  Pulse:  [72-84] 72  Resp:  [17-18] 17  SpO2:  [93 %-100 %] 95 %  BP: (125-145)/(68-74) 131/69     Weight: 64.2 kg (141 lb 9.6 oz)  Body mass index is 19.2 kg/(m^2).    Intake/Output Summary (Last 24 hours) at 04/24/17 1303  Last data filed at 04/24/17 0600   Gross per 24 hour   Intake              925 ml   Output             1400 ml   Net             -475 ml      Physical Exam   Constitutional: He is oriented to person, place, and time. He appears well-developed and well-nourished. No distress.   HENT:   Head: Normocephalic and atraumatic.   Mouth/Throat: Oropharynx is clear and moist.   Eyes: EOM are normal. Pupils are equal, round, and reactive to light.   Neck: Normal range of motion. Neck supple. No JVD present.   Cardiovascular: Normal rate, normal heart sounds and intact distal pulses.  Exam reveals no friction rub.    No murmur heard.  Pulmonary/Chest: Effort normal and breath sounds normal. No respiratory distress. He has no wheezes. He has no rales.   Abdominal: Soft. Bowel sounds are normal. He exhibits no distension. There is no tenderness.   Musculoskeletal: Normal range of motion. He exhibits no edema.   Lymphadenopathy:     He has no cervical adenopathy.   Neurological: He is alert and oriented to person, place, and time.   Skin: Skin is warm and dry. No rash noted.       Significant Labs:   CMP:   Recent Labs  Lab  04/23/17  0523 04/24/17  0450     141 139  139   K 3.7  3.7 4.6  4.6     107 107  107   CO2 25  25 24  24   *  164* 86  86   BUN 60*  60* 67*  67*   CREATININE 1.8*  1.8* 1.8*  1.8*   CALCIUM 8.7  8.7 8.7  8.7   ANIONGAP 9  9 8  8   EGFRNONAA 40*  40* 40*  40*

## 2017-04-24 NOTE — PLAN OF CARE
Problem: Patient Care Overview  Goal: Plan of Care Review  Outcome: Ongoing (interventions implemented as appropriate)  Patient is A/Ox4, remains free from falls, and states readiness for discharge.  Patient is tolerating IV fluids and CINEMIX solution well, and has stated no s/s's from the tapering of the CINEMIX from 75 to 25 mL/hour.  Patient's urostomy remains red and wet, with a good seal on the bag.  Voiding clear yellow urine.  Patient ambulated in [place], with no stated complaints.  Patient complains of no further abdominal discomfort, save for some pain by stoma site.  Patient is passing gas and having small bowel movements.  Will continue to monitor for s/s's of worsening obstruction, safety and tolerance of CINEMIX taper.

## 2017-04-24 NOTE — ASSESSMENT & PLAN NOTE
The patient has good air movement on lung exam. Continue Xopenex to as needed treatments.  Start to slowly taper steroids. Prednisone 20mg daily until 4/25. Start 10mg daily 4/25 x6 days then stop.

## 2017-04-24 NOTE — PROGRESS NOTES
[facility]  Urology  Progress Note    Patient Name: Blake Guillory  MRN: [id number]  Admission Date: 4/17/2017  Hospital Length of Stay: 7 days  Code Status: Full Code   Attending Provider: Estuardo Mack MD   Primary Care Physician: Varun Zeng MD    Subjective:     HPI:  He has a history of bladder cancer. He is s/p cystectomy with ileal conduit in November 2012. From an oncologic standpoint, he is doing well. He has had issues with bilateral ureteral stricture. He underwent a bilateral ureteral reimplantation into his conduit several months later. His strictures recurred shortly afterwards. He was managed with ureteral stent changes until one of the stent eroded through his conduit and into his bowels.   He had an Exploratory Laparotomy with replacement of ileal conduit and small bowel anastomosis on 12/30/2015.     He developed a parastomal hernia that was recently repaired by Dr. Mack.  He is back in the hospital with concerns for bowel obstruction.  Urology consult requested due to concerns with the conduit and renal failure.    He denies problems with his stoma or stomal appliance.  He is feeling better today.      Interval History: He is feeling better.  He is tolerating regular diet.  He would like to go home.    Review of Systems   Constitutional: Negative.    HENT: Negative.    Eyes: Negative.    Respiratory: Negative for cough, chest tightness and shortness of breath.    Cardiovascular: Negative for chest pain.   Gastrointestinal: Negative.  Negative for constipation, diarrhea and nausea.   Musculoskeletal: Negative.    Neurological: Negative.    Psychiatric/Behavioral: Negative.      Objective:     Temp:  [97.8 °F (36.6 °C)-98.5 °F (36.9 °C)] 98.2 °F (36.8 °C)  Pulse:  [72-84] 72  Resp:  [17-18] 17  SpO2:  [93 %-100 %] 95 %  BP: (125-145)/(68-74) 131/69     Body mass index is 19.2 kg/(m^2).            Drains     Drain                 Urostomy RLQ -- days         Ureteral  Drain/Stent 12/30/15 1217 Left ureter 8 Fr. 480 days         Ureteral Drain/Stent 12/30/15 1300 Right ureter 7 Fr. 480 days                Physical Exam   Nursing note and vitals reviewed.  Constitutional: He is oriented to person, place, and time. He appears well-developed and well-nourished.   HENT:   Head: Normocephalic.   Eyes: Conjunctivae are normal.   Neck: Normal range of motion. Neck supple. No tracheal deviation present. No thyromegaly present.   Cardiovascular: Normal rate and normal heart sounds.    Pulmonary/Chest: Effort normal and breath sounds normal. No respiratory distress. He has no wheezes.   Abdominal: Soft. Bowel sounds are normal. There is no hepatosplenomegaly. There is no tenderness. There is no rebound and no CVA tenderness. No hernia.   Stoma pink   Musculoskeletal: Normal range of motion. He exhibits no edema or tenderness.   Lymphadenopathy:     He has no cervical adenopathy.   Neurological: He is alert and oriented to person, place, and time.   Skin: Skin is warm and dry. No rash noted. No erythema.     Psychiatric: He has a normal mood and affect. His behavior is normal. Judgment and thought content normal.       Significant Labs:    BMP:    Recent Labs  Lab 04/22/17  0541 04/23/17  0523 04/24/17  0450   *  148* 141  141 139  139   K 3.3*  3.3* 3.7  3.7 4.6  4.6     106 107  107 107  107   CO2 31*  31* 25  25 24  24   BUN 56*  56* 60*  60* 67*  67*   CREATININE 1.7*  1.7* 1.8*  1.8* 1.8*  1.8*   CALCIUM 9.8  9.8 8.7  8.7 8.7  8.7       CBC:   No results for input(s): WBC, HGB, HCT, PLT in the last 168 hours.    Blood Culture: No results for input(s): LABBLOO in the last 168 hours.  Urine Culture: No results for input(s): LABURIN in the last 168 hours.    Significant Imaging:                    Assessment/Plan:     History of bladder cancer  5 years sp cystectomy and ileal conduit  Will follow up with me as scheduled in June    Hydronephrosis,  bilateral  Not symptomatic; chronic  No intervention necessary at this time        Acute renal failure, resolved as of 4/23/2017  Back to baseline      VTE Risk Mitigation         Ordered     Place sequential compression device  Until discontinued      04/20/17 0629     Medium Risk of VTE  Once      04/17/17 1530     Place ANNAMARIE hose  Until discontinued      04/17/17 1530          W Vikash Bo MD  Urology  [facility]

## 2017-04-24 NOTE — PHYSICIAN QUERY
"PT Name: Blake Guillory  MR #: [id number]    Physician Query Form - Relationship to Procedure Clarification     CDS/: Shannen Kramer RN, CCDS               Contact information: [phone]    This form is a permanent document in the medical record.     Query Date: April 24, 2017      By submitting this query, we are merely seeking further clarification of documentation. Please utilize your independent clinical judgment when addressing the question(s) below.    The Medical record contains the following:  Supporting Clinical Findings Location in Medical Record   presents to the ED via EMS from [facility] for evaluation of possible bowel obstruction. Pt states he had hernia repair done by Dr. Mack here 5 days ago, and was dc'd 2 days ago. Pt states that he has not eaten any food since 6 days ago, as he was unable to even tolerate/swallow light foods or broths once he got home. Pt reports his last BM while in the hospital, noting it was watery and black.     SBO ED MD Note       Please clarify if "SBO" is    [  ] Inherent/Integral to procedure (Hernia repair done 5 days ago)  [  ] Routine outcome  [ x ] Incidental finding--related to previous bladder resection and previous small bowel obstruction  [  ] Complication of procedure  [  ] Clinically insignificant  [  ] Clinically undetermined      "

## 2017-04-25 VITALS
SYSTOLIC BLOOD PRESSURE: 126 MMHG | HEIGHT: 72 IN | WEIGHT: 141.63 LBS | TEMPERATURE: 98 F | RESPIRATION RATE: 20 BRPM | DIASTOLIC BLOOD PRESSURE: 59 MMHG | OXYGEN SATURATION: 97 % | HEART RATE: 67 BPM | BODY MASS INDEX: 19.18 KG/M2

## 2017-04-25 PROBLEM — E87.6 HYPOKALEMIA: Status: RESOLVED | Noted: 2017-04-22 | Resolved: 2017-04-25

## 2017-04-25 LAB
ANION GAP SERPL CALC-SCNC: 9 MMOL/L
ANION GAP SERPL CALC-SCNC: 9 MMOL/L
BUN SERPL-MCNC: 62 MG/DL
BUN SERPL-MCNC: 62 MG/DL
CALCIUM SERPL-MCNC: 9 MG/DL
CALCIUM SERPL-MCNC: 9 MG/DL
CHLORIDE SERPL-SCNC: 109 MMOL/L
CHLORIDE SERPL-SCNC: 109 MMOL/L
CO2 SERPL-SCNC: 22 MMOL/L
CO2 SERPL-SCNC: 22 MMOL/L
CREAT SERPL-MCNC: 1.7 MG/DL
CREAT SERPL-MCNC: 1.7 MG/DL
EST. GFR  (AFRICAN AMERICAN): 50 ML/MIN/1.73 M^2
EST. GFR  (AFRICAN AMERICAN): 50 ML/MIN/1.73 M^2
EST. GFR  (NON AFRICAN AMERICAN): 43 ML/MIN/1.73 M^2
EST. GFR  (NON AFRICAN AMERICAN): 43 ML/MIN/1.73 M^2
GLUCOSE SERPL-MCNC: 95 MG/DL
GLUCOSE SERPL-MCNC: 95 MG/DL
MAGNESIUM SERPL-MCNC: 1.8 MG/DL
MAGNESIUM SERPL-MCNC: 1.8 MG/DL
PHOSPHATE SERPL-MCNC: 3.4 MG/DL
PHOSPHATE SERPL-MCNC: 3.4 MG/DL
POTASSIUM SERPL-SCNC: 4.5 MMOL/L
POTASSIUM SERPL-SCNC: 4.5 MMOL/L
SODIUM SERPL-SCNC: 140 MMOL/L
SODIUM SERPL-SCNC: 140 MMOL/L

## 2017-04-25 PROCEDURE — 80048 BASIC METABOLIC PNL TOTAL CA: CPT

## 2017-04-25 PROCEDURE — C9113 INJ PANTOPRAZOLE SODIUM, VIA: HCPCS

## 2017-04-25 PROCEDURE — 84100 ASSAY OF PHOSPHORUS: CPT

## 2017-04-25 PROCEDURE — 63600175 PHARM REV CODE 636 W HCPCS: Performed by: SURGERY

## 2017-04-25 PROCEDURE — 25000003 PHARM REV CODE 250: Performed by: HOSPITALIST

## 2017-04-25 PROCEDURE — 25000003 PHARM REV CODE 250

## 2017-04-25 PROCEDURE — 36415 COLL VENOUS BLD VENIPUNCTURE: CPT

## 2017-04-25 PROCEDURE — 63600175 PHARM REV CODE 636 W HCPCS

## 2017-04-25 PROCEDURE — 94640 AIRWAY INHALATION TREATMENT: CPT

## 2017-04-25 PROCEDURE — 27000221 HC OXYGEN, UP TO 24 HOURS

## 2017-04-25 PROCEDURE — 83735 ASSAY OF MAGNESIUM: CPT

## 2017-04-25 PROCEDURE — 25000003 PHARM REV CODE 250: Performed by: INTERNAL MEDICINE

## 2017-04-25 RX ORDER — FLUTICASONE PROPIONATE AND SALMETEROL 250; 50 UG/1; UG/1
1 POWDER RESPIRATORY (INHALATION) 2 TIMES DAILY
Qty: 60 EACH | Refills: 11 | Status: SHIPPED | OUTPATIENT
Start: 2017-04-25 | End: 2017-05-08

## 2017-04-25 RX ORDER — PREDNISONE 10 MG/1
10 TABLET ORAL DAILY
Qty: 6 TABLET | Refills: 0 | Status: SHIPPED | OUTPATIENT
Start: 2017-04-25 | End: 2017-05-01

## 2017-04-25 RX ORDER — LEVALBUTEROL INHALATION SOLUTION 0.63 MG/3ML
0.63 SOLUTION RESPIRATORY (INHALATION) EVERY 8 HOURS PRN
Qty: 3 ML | Refills: 1 | Status: SHIPPED | OUTPATIENT
Start: 2017-04-25 | End: 2018-04-19

## 2017-04-25 RX ORDER — TIOTROPIUM BROMIDE 18 UG/1
18 CAPSULE ORAL; RESPIRATORY (INHALATION) DAILY
Qty: 30 CAPSULE | Refills: 11 | Status: SHIPPED | OUTPATIENT
Start: 2017-04-25 | End: 2017-06-13

## 2017-04-25 RX ADMIN — VARENICLINE TARTRATE 1 MG: 0.5 TABLET, FILM COATED ORAL at 09:04

## 2017-04-25 RX ADMIN — PREDNISONE 10 MG: 5 TABLET ORAL at 09:04

## 2017-04-25 RX ADMIN — HYDROCODONE BITARTRATE AND ACETAMINOPHEN 1 TABLET: 5; 325 TABLET ORAL at 01:04

## 2017-04-25 RX ADMIN — Medication 3 ML: at 05:04

## 2017-04-25 RX ADMIN — MORPHINE SULFATE 4 MG: 10 INJECTION INTRAVENOUS at 09:04

## 2017-04-25 RX ADMIN — MORPHINE SULFATE 4 MG: 10 INJECTION INTRAVENOUS at 04:04

## 2017-04-25 RX ADMIN — AMLODIPINE BESYLATE 5 MG: 5 TABLET ORAL at 09:04

## 2017-04-25 RX ADMIN — CALCITRIOL 0.5 MCG: 0.25 CAPSULE, LIQUID FILLED ORAL at 09:04

## 2017-04-25 RX ADMIN — LEVOTHYROXINE SODIUM 25 MCG: 25 TABLET ORAL at 05:04

## 2017-04-25 RX ADMIN — GABAPENTIN 400 MG: 100 CAPSULE ORAL at 09:04

## 2017-04-25 RX ADMIN — PANTOPRAZOLE SODIUM 40 MG: 40 INJECTION, POWDER, FOR SOLUTION INTRAVENOUS at 09:04

## 2017-04-25 RX ADMIN — ATENOLOL 50 MG: 50 TABLET ORAL at 09:04

## 2017-04-25 RX ADMIN — LEVALBUTEROL 0.63 MG: 0.63 SOLUTION RESPIRATORY (INHALATION) at 10:04

## 2017-04-25 NOTE — PLAN OF CARE
04/25/17 1316   Final Note   Assessment Type Final Discharge Note   Discharge Disposition Home   Discharge planning education complete? Yes   Hospital Follow Up  Appt(s) scheduled? Yes   Discharge plans and expectations educations in teach back method with documentation complete? Yes   Offered [facility] -- Bedside Delivery? n/a   Discharge/Hospital Encounter Summary to (non[facility]) PCP n/a   Referral to Outpatient Case Management complete? No   Referral to / orders for Home Health Complete? n/a   30 day supply of medicines given at discharge, if documented non-compliance / non-adherence? No   Any social issues identified prior to discharge? No   Did you assess the readiness or willingness of the family or caregiver to support self management of care? Yes   Right Care Referral Info   Post Acute Recommendation No Care     Patients nurse, Tonya, informed that patient can discharge from case management standpoint, and that the nebulizer should be delivered to bedside from respiratory shortly.

## 2017-04-25 NOTE — PROGRESS NOTES
"Call placed back to [facility] with [facility]. She was informed that discharge is in the system and that we will pull the nebulizer from the closet. All information added to "follow up" tab.     Call placed to Respiratory department, spoke to Fior. She was informed of above. She states that they will get the nebulizer and deliver to patients room.   "

## 2017-04-25 NOTE — SUBJECTIVE & OBJECTIVE
Review of Systems   Constitutional: Negative for fatigue and fever.   HENT: Negative for congestion and rhinorrhea.    Respiratory: Negative for cough and shortness of breath.    Cardiovascular: Negative for chest pain and palpitations.   Gastrointestinal: Negative for abdominal pain, constipation and vomiting.     Objective:     Vital Signs (Most Recent):  Temp: 97.7 °F (36.5 °C) (04/25/17 0020)  Pulse: 80 (04/25/17 0020)  Resp: 20 (04/25/17 0020)  BP: 134/74 (04/25/17 0020)  SpO2: 97 % (04/25/17 0020) Vital Signs (24h Range):  Temp:  [97.7 °F (36.5 °C)-98.7 °F (37.1 °C)] 97.7 °F (36.5 °C)  Pulse:  [72-84] 80  Resp:  [17-20] 20  SpO2:  [95 %-99 %] 97 %  BP: (114-134)/(58-74) 134/74     Weight: 64.2 kg (141 lb 9.6 oz)  Body mass index is 19.2 kg/(m^2).    Intake/Output Summary (Last 24 hours) at 04/25/17 0717  Last data filed at 04/25/17 0600   Gross per 24 hour   Intake             3010 ml   Output              850 ml   Net             2160 ml      Physical Exam   Constitutional: He is oriented to person, place, and time. He appears well-developed and well-nourished. No distress.   HENT:   Head: Normocephalic and atraumatic.   Mouth/Throat: Oropharynx is clear and moist.   Eyes: EOM are normal. Pupils are equal, round, and reactive to light.   Neck: Normal range of motion. Neck supple. No JVD present.   Cardiovascular: Normal rate, normal heart sounds and intact distal pulses.  Exam reveals no friction rub.    No murmur heard.  Pulmonary/Chest: Effort normal and breath sounds normal. No respiratory distress. He has no wheezes. He has no rales.   Abdominal: Soft. Bowel sounds are normal. He exhibits no distension. There is no tenderness.   Musculoskeletal: Normal range of motion. He exhibits no edema.   Lymphadenopathy:     He has no cervical adenopathy.   Neurological: He is alert and oriented to person, place, and time.   Skin: Skin is warm and dry. No rash noted.       Significant Labs:   CMP:   Recent Labs  Lab  04/24/17  0450 04/25/17  0526     139 140  140   K 4.6  4.6 4.5  4.5     107 109  109   CO2 24  24 22*  22*   GLU 86  86 95  95   BUN 67*  67* 62*  62*   CREATININE 1.8*  1.8* 1.7*  1.7*   CALCIUM 8.7  8.7 9.0  9.0   ANIONGAP 8  8 9  9   EGFRNONAA 40*  40* 43*  43*

## 2017-04-25 NOTE — PROGRESS NOTES
Order for nebulizer received and faxed to [facility] along with facesheet to [phone]. Fax confirmation received.

## 2017-04-25 NOTE — NURSING
Received nebulizer. Patient discharged via wheelchair; accompanied by staff and family.  No acute distress noted.

## 2017-04-25 NOTE — NURSING
Home Oxygen Evaluation    Date Performed: 2017    1) Patient's Home O2 Sat on room air, while at rest: 97%        If O2 sats on room air at rest are 88% or below, patient qualifies. No additional testing needed. Document N/A in steps 2 and 3. If 89% or above, complete steps 2.      2) Patient's O2 Sat on room air while exercisin%        If O2 sats on room air while exercising remain 89% or above patient does not qualify, no further testing needed Document N/A in step 3. If O2 sats on room air while exercising are 88% or below, continue to step 3.

## 2017-04-25 NOTE — PROGRESS NOTES
[facility]  Adult Nutrition  Progress Note    SUMMARY     Recommendations    Recommendation/Intervention: 1) Continue current diet 2) Order Boost Plus BID 3) Monitor weight  Goals: 1) Patient to meet >=85% of EEN  Nutrition Goal Status: new  Communication of RD Recs: other (physician's sticky note)    Continuum of Care Plan    D/C Planning:  Patient will consume adequate intake for optimal nutrition.    Reason for Assessment    Reason for Assessment: RD follow-up  Diagnosis:  (SBO)  Relevent Medical History: HTN, COPD, CKD   General Information Comments: TPN discontinued. Diet advanced. 75- 100% meal intake for 3 days. Tolerating well. Weight stable.     Nutrition Prescription Ordered    Current Diet Order: NPO     Current Nutrition Support Formula Ordered: Discontinued  Current Nutrition Support Rate Ordered: 75 (ml)  Current Nutrition Support Frequency Ordered: ml/hr  Oral Nutrition Supplement: IVFE Daily     Evaluation of Received Nutrients/Fluid Intake    Energy Calories Required: meeting needs  Protein Required: meeting needs  Fluid Required: exceed needs (Net I/O +1860)     Tolerance: tolerating     Nutrition Risk Screen     Nutrition Risk Screen: no indicators present    Nutrition/Diet History    Food Preferences: Denies cultural, Restorationism, ethnic food preferences     Labs/Tests/Procedures/Meds    Pertinent Labs Reviewed: reviewed  Pertinent Labs Comments: BUN 62, Creat 1.7, GFR 43  Pertinent Medications Reviewed: reviewed  Pertinent Medications Comments: prednisone    Physical Findings    Overall Physical Appearance: generalized wasting  Tubes:  (-)  Oral/Mouth Cavity: WDL  Skin: incision    Anthropometrics    Height (inches): 72.01 in  Weight Method: Bed Scale  Weight (kg): 64.2 kg  Ideal Body Weight (IBW), Male: 178.06 lb  % Ideal Body Weight, Male (lb): 79.49 lb  BMI (kg/m2): 19.19  BMI Grade: 18.5-24.9 - normal  Usual Body Weight (UBW), k.18 kg  % Usual Body Weight: 94.02  Weight  Loss: unintentional    Estimated/Assessed Needs    Weight Used For Calorie Calculations: 64.1 kg (141 lb 5 oz)   Height (cm): 182.9 cm  Energy Need Method: Kcal/kg (7454-7585 kcal)  RMR ([place]-St. Jeor Equation): 1499.2  Weight Used For Protein Calculations: 64.1 kg (141 lb 5 oz)  Protein Requirements: 77-96 g  Fluid Need Method: RDA Method    Assessment and Plan    Severe malnutrition  Nutrition Problem:   Severe Malnutrition  (Acute).    Etiology:  Altered GI Function/Inadequate Energy Intake    As Evidenced by:  7% weight loss in 1 month  0% po intake x 10 days    Treatment Strategy:   Currently on TPN; Consider customizing 5%AA/15% CHO @ 90 ml/hr + IVFE Daily to reduce CHO intake/GIR  Monitor lipids weekly; if >400 cycle lipids three times/week  Continue to monitor lytes, Mg, Phos for refeeding syndrome   Advance diet as able to 2 gm sodium with boost plus tid    Nutrition Diagnosis Status:   Improving (diet advanced; 75 -100% po intake x3 days)    Monitor and Evaluation    Food and Nutrient Intake: energy intake, food and beverage intake  Food and Nutrient Adminstration: diet order  Anthropometric Measurements: weight, weight change  Biochemical Data, Medical Tests and Procedures: electrolyte and renal panel, glucose/endocrine profile  Nutrition-Focused Physical Findings: overall appearance  % Intake of Estimated Energy Needs: 75 - 100 %  % Meal Intake: 100%    Nutrition Risk    Level of Risk: other (see comments) (f/u 1x weekly)    Nutrition Follow-Up    RD Follow-up?: Yes

## 2017-04-25 NOTE — PLAN OF CARE
Problem: Fall Risk (Adult)  Goal: Identify Related Risk Factors and Signs and Symptoms  Related risk factors and signs and symptoms are identified upon initiation of Human Response Clinical Practice Guideline (CPG)   Outcome: Ongoing (interventions implemented as appropriate)  Free of falls.      Problem: Patient Care Overview  Goal: Plan of Care Review  Outcome: Ongoing (interventions implemented as appropriate)  Pt progressing. Oriented x4. Urostomy maintained. Skin integrity maintained. Pain controlled. VS stable. Adequate oral intake. Tolerating diet. TPN tapered. No issues. Free of falls. Call light in reach. Low bed. No issues during shift. Continue plan of care.         Problem: Pressure Ulcer Risk (Juan Scale) (Adult,Obstetrics,Pediatric)  Goal: Identify Related Risk Factors and Signs and Symptoms  Related risk factors and signs and symptoms are identified upon initiation of Human Response Clinical Practice Guideline (CPG)   Outcome: Ongoing (interventions implemented as appropriate)  Skin intact.      Problem: Bowel Obstruction (Adult)  Goal: Signs and Symptoms of Listed Potential Problems Will be Absent, Minimized or Managed (Bowel Obstruction)  Signs and symptoms of listed potential problems will be absent, minimized or managed by discharge/transition of care (reference Bowel Obstruction (Adult) CPG).   Outcome: Ongoing (interventions implemented as appropriate)  Tolerating regular diet. No n/v. Continue to monitor. Reports flatulence.      Problem: Infection, Risk/Actual (Adult)  Goal: Identify Related Risk Factors and Signs and Symptoms  Related risk factors and signs and symptoms are identified upon initiation of Human Response Clinical Practice Guideline (CPG)   Outcome: Ongoing (interventions implemented as appropriate)  Afebrile.      Problem: Pain, Chronic (Adult)  Goal: Identify Related Risk Factors and Signs and Symptoms  Related risk factors and signs and symptoms are identified upon  initiation of Human Response Clinical Practice Guideline (CPG)   Outcome: Ongoing (interventions implemented as appropriate)  Complains of RLQ ABD pain. Pain controlled.

## 2017-04-25 NOTE — PROGRESS NOTES
[facility] Hospital Medicine  Progress Note    Patient Name: Blake Guillory  MRN: [id number]  Patient Class: IP- Inpatient   Admission Date: 4/17/2017  Length of Stay: 8 days  Attending Physician: Estuardo Mack MD  Primary Care Provider: Varun Zeng MD        Subjective:     Principal Problem:Small bowel obstruction    HPI:       Hospital Course:  Patient was admitted to the hospital for treatment of SBO. The patient is on the surgery service and medicine was consulted to follow for history of COPD, hypertension as well as acute renal failure.  The patient has an improving creatinine and COPD appears to be stable with steroids and bronchodilator treatments. Steroids are being tapered slowly.  The patient is receiving parenteral nutrition and had some electrolyte abnormalities. Electrolytes have since improved.  BP is controlled on amlodipine and atenolol.     Review of Systems   Constitutional: Negative for fatigue and fever.   HENT: Negative for congestion and rhinorrhea.    Respiratory: Negative for cough and shortness of breath.    Cardiovascular: Negative for chest pain and palpitations.   Gastrointestinal: Negative for abdominal pain, constipation and vomiting.     Objective:     Vital Signs (Most Recent):  Temp: 97.7 °F (36.5 °C) (04/25/17 0020)  Pulse: 80 (04/25/17 0020)  Resp: 20 (04/25/17 0020)  BP: 134/74 (04/25/17 0020)  SpO2: 97 % (04/25/17 0020) Vital Signs (24h Range):  Temp:  [97.7 °F (36.5 °C)-98.7 °F (37.1 °C)] 97.7 °F (36.5 °C)  Pulse:  [72-84] 80  Resp:  [17-20] 20  SpO2:  [95 %-99 %] 97 %  BP: (114-134)/(58-74) 134/74     Weight: 64.2 kg (141 lb 9.6 oz)  Body mass index is 19.2 kg/(m^2).    Intake/Output Summary (Last 24 hours) at 04/25/17 0717  Last data filed at 04/25/17 0600   Gross per 24 hour   Intake             3010 ml   Output              850 ml   Net             2160 ml      Physical Exam   Constitutional: He is oriented to person, place, and time. He  appears well-developed and well-nourished. No distress.   HENT:   Head: Normocephalic and atraumatic.   Mouth/Throat: Oropharynx is clear and moist.   Eyes: EOM are normal. Pupils are equal, round, and reactive to light.   Neck: Normal range of motion. Neck supple. No JVD present.   Cardiovascular: Normal rate, normal heart sounds and intact distal pulses.  Exam reveals no friction rub.    No murmur heard.  Pulmonary/Chest: Effort normal and breath sounds normal. No respiratory distress. He has no wheezes. He has no rales.   Abdominal: Soft. Bowel sounds are normal. He exhibits no distension. There is no tenderness.   Musculoskeletal: Normal range of motion. He exhibits no edema.   Lymphadenopathy:     He has no cervical adenopathy.   Neurological: He is alert and oriented to person, place, and time.   Skin: Skin is warm and dry. No rash noted.       Significant Labs:   CMP:   Recent Labs  Lab 04/24/17  0450 04/25/17  0526     139 140  140   K 4.6  4.6 4.5  4.5     107 109  109   CO2 24  24 22*  22*   GLU 86  86 95  95   BUN 67*  67* 62*  62*   CREATININE 1.8*  1.8* 1.7*  1.7*   CALCIUM 8.7  8.7 9.0  9.0   ANIONGAP 8  8 9  9   EGFRNONAA 40*  40* 43*  43*     Assessment/Plan:      * Small bowel obstruction  Management per general surgery. Tolerating normal diet.    Hypoxemia  The patient is stable on 2L O2 via NC. Wean as tolerated. 6 min walk test ordered to see if he qualifies for home O2. Will ask nursing to document.    COPD exacerbation  The patient has good air movement on lung exam. Continue Xopenex to as needed treatments. Ordered for home as well. Also ordered spiriva, advair, and home nebulizer. Needs to follow up with pulmonology as outpatient.  Start to slowly taper steroids. Prednisone 20mg today. Start 10mg daily 4/25 x6 days then stop (ordered).    Acute renal failure, resolved as of 4/23/2017  CRT improving, back to baseline, and stable; good UOP. All likely pre-renal.      Hypokalemia, resolved as of 4/25/2017  Monitor and replete cautiously given CKD.    Hypocalcemia, resolved as of 4/23/2017  Low normal calcium levels.  Albumin is within normal range.  Likely related to acute on chronic renal failure. Resolved.    Hyperphosphatemia, resolved as of 4/23/2017  Likely due to acute renal failure. Resolved.    Hypernatremia, resolved as of 4/23/2017  Resolved w/ IV fluids.    Anemia of chronic disease  H/H mildly decreased, no need to monitor. No signs of bleeding.    Acquired hypothyroidism  Levothyroxine 25mcg    Severe malnutrition  BMI 19. PPN continued until taking sufficient PO nutrition.    Bladder cancer, resolved as of 11/5/2015  Five years sp cystectomy and ileal conduit. Will follow up with Dr Fletcher/Betzy as outpatient.    VTE Risk Mitigation         Ordered     Place sequential compression device  Until discontinued      04/20/17 0629     Medium Risk of VTE  Once      04/17/17 1530     Place ANNAMARIE hose  Until discontinued      04/17/17 1530          Portia Maynard MD  Department of Hospital Medicine   [facility]

## 2017-04-25 NOTE — ASSESSMENT & PLAN NOTE
The patient has good air movement on lung exam. Continue Xopenex to as needed treatments. Ordered for home as well. Also ordered spiriva, advair, and home nebulizer. Needs to follow up with pulmonology as outpatient.  Start to slowly taper steroids. Prednisone 20mg today. Start 10mg daily 4/25 x6 days then stop (ordered).

## 2017-04-25 NOTE — ASSESSMENT & PLAN NOTE
The patient is stable on 2L O2 via NC. Wean as tolerated. 6 min walk test ordered to see if he qualifies for home O2. Will ask nursing to document.

## 2017-04-25 NOTE — PROGRESS NOTES
Call received from Magdy with [facility]. She informed TN that she did receive the order for the nebulizer and she will have it delivered to the patients room.

## 2017-04-25 NOTE — PROGRESS NOTES
"TN reviewed follow up appointment information as well as  "Post op discharge instructions" handout with patient using teach back while informing patient to concentrate on signs and symptoms to look for after discharge that would flag him that he needs to contact the doctor. Patient is in agreement and verbalized an understanding. Placed discharge information in blue discharge folder.  TN also reviewed patient responsibility checklist with him using teach back. Patient was able to verbalize his responsibilities after discharge to manager his care at home being   1. Going to follow up appointments   2.  rx from the pharmacy when discharged  3. Taking her medication as prescribed       "

## 2017-04-25 NOTE — PLAN OF CARE
Problem: Patient Care Overview  Goal: Plan of Care Review  Outcome: Ongoing (interventions implemented as appropriate)    04/25/17 1600   Coping/Psychosocial   Plan Of Care Reviewed With patient      Patient remained free from falls, trauma, and injuries throughout shift.  No complaints of nausea or vomiting.  Pain controlled with prn analgesics.  TPN and medications administered as prescribed.  Urostomy site clean and intact; draining concentrated yellow urine. Vital Signs Stable; afebrile throughout shift.  Tolerated regular diet well.  Patient reported BM earlier today.  No acute distress noted.

## 2017-04-25 NOTE — PLAN OF CARE
Problem: Patient Care Overview  Goal: Plan of Care Review  04/25/2017     Recommendations     Recommendation/Intervention: 1) Continue current diet 2) Order Boost Plus BID 3) Monitor weight  Goals: 1) Patient to meet >=85% of EEN  Nutrition Goal Status: new  Communication of RD Recs: other (physician's sticky note)     Nahed Rivas, MPH, RD, LDN

## 2017-04-25 NOTE — ASSESSMENT & PLAN NOTE
Low normal calcium levels.  Albumin is within normal range.  Likely related to acute on chronic renal failure. Resolved.

## 2017-04-25 NOTE — PROGRESS NOTES
R TLC d/c per protocol, pt instructed to hold breath upon removal. Cath tip intact, no redness/swelling noted to site. Pressure held for 5 minutes. Pt instructed to lie flat for 15 minutes.     Discharge instructions given. Pt stated understanding. Pt educated on new meds ordered and follow up appts. Pt stated understanding.

## 2017-04-27 ENCOUNTER — PATIENT OUTREACH (OUTPATIENT)
Dept: ADMINISTRATIVE | Facility: CLINIC | Age: 57
End: 2017-04-27
Payer: COMMERCIAL

## 2017-04-27 NOTE — Clinical Note
Please forward this important TCC information to your provider in order to maximize the post discharge care delivery of this patient.  C3 nurse attempted to contact Blake Guillory  for a TCC post hospital discharge follow up call. No answer. C3 nurse left a voice message and OOC # given. The patient has a scheduled HOSFU appointment with Varun Zeng MD  on 5/8 @ 6326.  Respectfully, Syeda St RN  [facility]  [email or web address]  Please do not reply to this message, as this inbox is not routinely monitored.

## 2017-04-27 NOTE — Clinical Note
Please forward this important TCC information to your provider in order to maximize the post discharge care delivery of this patient.  C3 nurse spoke with Blake Guillory  for a TCC post hospital discharge follow up call. The patient has a scheduled HOSFU appointment with Varun Zeng MD on 5/8 @ 3701. Respectfully,  Syeda St RN  [facility]   [email or web address]     Please do not reply to this message, as this inbox is not routinely monitored.

## 2017-04-27 NOTE — PROGRESS NOTES
C3 nurse attempted to contact patient. The following occurred:   C3 nurse attempted to contact Blake Guillory  for a TCC post hospital discharge follow up call. The patient is unable to conduct the call @ this time. The patient requested a callback.    The patient has a scheduled HOSFU appointment with Varun Zeng MD  on 5/8 @ 6873. Message sent to Physician staff. NON [facility].

## 2017-04-27 NOTE — PATIENT INSTRUCTIONS
Small Bowel Obstruction  A small bowel obstruction occurs when there is partial or total blockage in the small intestine (bowel). As a result, waste cannot move through the bowel properly and out of the body. Treatment is needed right away to remove the blockage. This can relieve painful symptoms. It can also prevent serious complications such as tissue death or rupture of the small bowel. Without treatment, a small bowel obstruction can be fatal.  Causes of Small Bowel Obstruction  A small bowel obstruction can be caused by:  Scar tissue (adhesions). These may form after surgery or an infection.  Hernia (weakness or tear in the wall of the abdomen). This can cause a portion of the small bowel to push out and appear as a visible bulge under the skin.  Certain medical conditions. These include intussusception (occurs when a portion of the bowel slides inside another portion) and Crohns disease (illness that causes inflammation and sores to form in the digestive tract).  Abnormal tissue growths (tumors). These can form on the inside or outside of the small bowel, and are usually due to cancer.  Symptoms of Small Bowel Obstruction  Common symptoms include:  Abdominal cramping and pain  Abdominal swelling and bloating  Nausea and vomiting  Inability to pass gas  Inability to pass stool (constipation)  Diarrhea  Diagnosing Small Bowel Obstruction  Your doctor will ask about your symptoms and health history. Youll also have a physical exam. In addition, tests may be done to confirm the problem. These can include:  Imaging tests. These provide pictures of the small bowel. Common tests include x-rays and a computed tomography (CT) scan.  Blood tests. These check for infection and other problems such as dehydration.  Upper gastrointestinal (GI) series with a small bowel follow-through. This test is done to take x-rays of the upper digestive tract from the mouth through the small bowel. A contrast fluid is used. The  contrast fluid coats the inside of the upper digestive tract so it will show up clearly on x-rays.  Treating Small Bowel Obstruction  Treatment takes place in a hospital. As part of your care, the following may be done:  No food or drink is given by mouth. This allows your bowels to rest.  An intravenous (IV) line is placed in a vein in your arm or hand. The IV line is used to give fluids. It may also be used to give medications. These may be needed to relieve pain, nausea, and other symptoms. They may also be needed to treat or prevent infections.  A soft, thin, flexible tube (nasogastric tube) is inserted through your nose and into your stomach. The tube is used to remove extra gas and fluid in your stomach and bowels. This helps to relieve symptoms such as pain and swelling.  In severe cases, such as when the small bowel is almost or totally blocked, surgery is done. During surgery, the blockage is removed. Portions of the bowel may also be removed if there is tissue death. Other repair may be done as well, depending on the cause of the blockage. Your doctor will give you more information about surgery, if needed.  Youll be observed in the hospital until your symptoms improve. Your doctor will tell you when you can return home.  Long-term Concerns   After treatment, many people recover with no lasting effects. If a long portion of the bowel is removed, there is a greater chance for lifelong digestive problems such as irregular bowel movements. Work with your doctor to learn the best ways to manage any symptoms you may have and to protect your health.    When to Call the Doctor  Call your doctor right away if you have any of the following:  Abdominal swelling or cramping that wont go away  Inability to pass stool or gas  Nausea or vomiting (especially if the vomit looks or smells like stool)   © 6454-5140 [employer], [address] [place] [address], [place] [address]. All rights reserved. This information is not  intended as a substitute for professional medical care. Always follow your healthcare professional's instructions.

## 2017-05-03 NOTE — DISCHARGE SUMMARY
ADMITTING DIAGNOSIS:  Possible small bowel obstruction.    DISCHARGE DIAGNOSIS:  Partial small bowel obstruction.    HOSPITAL COURSE:  This is a 57-year-old male who returns to the hospital after   having a repair of a parastomal hernia.  At the time of discharge, he was doing   well; however represents back to the hospital several days after discharge with   nausea, vomiting and abdominal pain.  Evaluation in the Emergency Room at the   time of admission was compatible with a small bowel obstruction.  He was treated   with NG suction, IV fluids and slowly made a recovery, and was able finally to   be started on diet with no difficulties and began to have normal bowel   movements.  At the time of discharge, he was afebrile, his diet was able to be   advanced and was discharged on the eighth hospital day, afebrile, tolerating   diet, ambulatory, normal bowel movements, normal diet.  He was given a   prescription for Percocet for pain.  His TPN had been discontinued and will be   followed up by Dr. Mack in the clinic in approximately 1 week.      CARLOS  dd: 05/02/2017 07:42:38 (CDT)  td: 05/03/2017 00:45:24 (CDT)  Doc ID   #[id number]  Job ID #[id number]    CC:

## 2017-05-09 NOTE — H&P
HISTORY:  This is a 57-year-old male, who previously underwent a laparoscopic   hernia repair for a parastomal hernia, who presents to the Emergency Room after   being evaluated in [facility] for possible bowel obstruction.  He states   that after being discharged, he developed nausea, vomiting and abdominal pain.    He underwent CAT scan, which was compatible with a partial small bowel   obstruction.    PAST HISTORY:  Significant for COPD, cystectomy with urostomy, high blood   pressure, bowel resection.    SOCIAL HISTORY:  That he was previously a smoker, does drink beer occasionally.    REVIEW OF SYSTEMS:  Unremarkable except for the previously mentioned findings.    PHYSICAL EXAMINATION:  GENERAL:  He is a thin male, in no acute distress.  HEAD, EYES, EARS, NOSE AND THROAT:  Normal.  NECK:  Supple.  CHEST:  Has some decreased breath sounds.  HEART:  Shows a regular sinus rhythm without murmurs.  ABDOMEN:  Soft and flat with multiple small incisions over the abdomen with no   significant tenderness or rebound.  EXTREMITIES:  Show no deformity.  NEUROLOGIC:  Grossly intact.    IMPRESSION:  Small bowel obstruction.      BUDDY/  dd: 05/09/2017 07:00:39 (CDT)  td: 05/09/2017 07:51:55 (CDT)  Doc ID   #[id number]  Job ID #[id number]    CC:

## 2017-06-13 ENCOUNTER — OFFICE VISIT (OUTPATIENT)
Dept: UROLOGY | Facility: CLINIC | Age: 57
End: 2017-06-13
Payer: COMMERCIAL

## 2017-06-13 VITALS — BODY MASS INDEX: 21.83 KG/M2 | HEIGHT: 72 IN | WEIGHT: 161.19 LBS

## 2017-06-13 DIAGNOSIS — N13.30 HYDRONEPHROSIS, BILATERAL: Chronic | ICD-10-CM

## 2017-06-13 DIAGNOSIS — N28.9 RENAL INSUFFICIENCY: Chronic | ICD-10-CM

## 2017-06-13 DIAGNOSIS — Z85.51 HISTORY OF BLADDER CANCER: Primary | ICD-10-CM

## 2017-06-13 PROCEDURE — 99999 PR PBB SHADOW E&M-EST. PATIENT-LVL III: CPT | Mod: PBBFAC,,, | Performed by: UROLOGY

## 2017-06-13 PROCEDURE — 99213 OFFICE O/P EST LOW 20 MIN: CPT | Mod: S$GLB,,, | Performed by: UROLOGY

## 2017-06-13 RX ORDER — OXYCODONE AND ACETAMINOPHEN 10; 325 MG/1; MG/1
1 TABLET ORAL
Qty: 30 TABLET | Refills: 0 | Status: SHIPPED | OUTPATIENT
Start: 2017-06-13 | End: 2017-07-25 | Stop reason: SDUPTHER

## 2017-06-13 RX ORDER — ESCITALOPRAM OXALATE 10 MG/1
TABLET ORAL
COMMUNITY
Start: 2017-06-09 | End: 2017-07-17 | Stop reason: SDUPTHER

## 2017-06-13 RX ORDER — OXYCODONE AND ACETAMINOPHEN 5; 325 MG/1; MG/1
TABLET ORAL
Refills: 0 | COMMUNITY
Start: 2017-05-25 | End: 2017-06-13

## 2017-06-13 NOTE — PROGRESS NOTES
Subjective:       Patient ID: Blake uGillory is a 57 y.o. male who was last seen in this office Visit date not found    Chief Complaint:   Chief Complaint   Patient presents with    Bladder Cancer     6 month f/u          History of Present Illness  History of Bladder Cancer  He has a history of bladder cancer. He is s/p cystectomy with ileal conduit in November 2012. From an oncologic standpoint, he is doing well. He has had issues with bilateral ureteral stricture. He underwent a bilateral ureteral reimplantation into his conduit several months later. His strictures recurred shortly afterwards.  He was managed with ureteral stent changes until one of the stent eroded through his conduit and into his bowels.    He had an Exploratory Laparotomy with replacement of ileal conduit and small bowel anastomosis on 12/30/2015. His stents have been removed and he is here for a follow up.  He continues to have back pain in the morning and in the evening.  His stoma is not bothering him and he tells me that he has good urine output.    He had a parastomal hernia repair in April by Dr. Mack.  He is healing nicely from that and has been discharged from his office.  He had a small bowel repair that resolved.      ACTIVE MEDICAL ISSUES:  Patient Active Problem List   Diagnosis    Insomnia    Lumbar facet arthropathy    Sacroiliitis    Spondylosis without myelopathy    DDD (degenerative disc disease)    History of bladder cancer    Hydronephrosis, bilateral    H/O primary malignant neoplasm of urinary bladder    Essential hypertension    Anemia of chronic disease    Moderate protein malnutrition    Chronic gout    Renal insufficiency    Body mass index (BMI) 20.0-20.9, adult    Personal history of bladder cancer    Acquired hypothyroidism    Parastomal hernia without obstruction or gangrene    Small bowel obstruction    Hypoxemia    Increased PTH level    Severe malnutrition    COPD exacerbation        ALLERGIES AND MEDICATIONS: updated and reviewed.  Review of patient's allergies indicates:  No Known Allergies  Current Outpatient Prescriptions   Medication Sig    allopurinol (ZYLOPRIM) 100 MG tablet Take 1 tablet (100 mg total) by mouth once daily.    amlodipine (NORVASC) 5 MG tablet TAKE 1 TABLET(5 MG) BY MOUTH EVERY DAY    atenolol (TENORMIN) 50 MG tablet Take 1 tablet (50 mg total) by mouth once daily.    escitalopram oxalate (LEXAPRO) 10 MG tablet     gabapentin (NEURONTIN) 400 MG capsule Take 1 capsule (400 mg total) by mouth 2 (two) times daily.    levalbuterol (XOPENEX) 0.63 mg/3 mL nebulizer solution Take 3 mLs (0.63 mg total) by nebulization every 8 (eight) hours as needed for Wheezing or Shortness of Breath. Rescue    levothyroxine (SYNTHROID) 50 MCG tablet Take 1 tablet (50 mcg total) by mouth once daily.    sodium bicarbonate 325 MG tablet Take 325 mg by mouth 2 (two) times daily.    trazodone (DESYREL) 100 MG tablet Take 1 tablet (100 mg total) by mouth every evening.    oxycodone-acetaminophen (PERCOCET)  mg per tablet Take 1 tablet by mouth every 24 hours as needed for Pain.     No current facility-administered medications for this visit.        Review of Systems   Constitutional: Negative for activity change, fatigue, fever and unexpected weight change.   HENT: Negative for congestion.    Eyes: Negative for redness.   Respiratory: Negative for chest tightness and shortness of breath.    Cardiovascular: Negative for chest pain and leg swelling.   Gastrointestinal: Negative for abdominal pain, constipation, diarrhea, nausea and vomiting.   Genitourinary: Negative for dysuria, flank pain, frequency, hematuria, penile pain, penile swelling, scrotal swelling, testicular pain and urgency.   Musculoskeletal: Negative for arthralgias and back pain.   Neurological: Negative for dizziness and light-headedness.   Psychiatric/Behavioral: Negative for behavioral problems and confusion.  The patient is not nervous/anxious.    All other systems reviewed and are negative.      Objective:      Vitals:    06/13/17 1101   Weight: 73.1 kg (161 lb 2.5 oz)   Height: 6' (1.829 m)     Physical Exam   Nursing note and vitals reviewed.  Constitutional: He is oriented to person, place, and time. He appears well-developed and well-nourished.   HENT:   Head: Normocephalic.   Eyes: Conjunctivae are normal.   Neck: Normal range of motion. Neck supple. No tracheal deviation present. No thyromegaly present.   Cardiovascular: Normal rate and normal heart sounds.    Pulmonary/Chest: Effort normal and breath sounds normal. No respiratory distress. He has no wheezes.   Abdominal: Soft. Bowel sounds are normal. There is no hepatosplenomegaly. There is no tenderness. There is no rebound and no CVA tenderness. No hernia.   Genitourinary:   Genitourinary Comments: Stoma pink, urine yellow   Musculoskeletal: Normal range of motion. He exhibits no edema or tenderness.   Lymphadenopathy:     He has no cervical adenopathy.   Neurological: He is alert and oriented to person, place, and time.   Skin: Skin is warm and dry. No rash noted. No erythema.     Psychiatric: He has a normal mood and affect. His behavior is normal. Judgment and thought content normal.       CT: no evidence of recurrence    Assessment:       1. History of bladder cancer    2. Hydronephrosis, bilateral    3. Renal insufficiency          Plan:       1. History of bladder cancer  stable    2. Hydronephrosis, bilateral  stable  - US Retroperitoneal Complete (Kidney and; Future  - oxycodone-acetaminophen (PERCOCET)  mg per tablet; Take 1 tablet by mouth every 24 hours as needed for Pain.  Dispense: 30 tablet; Refill: 0    3. Renal insufficiency  Stable, soon to see Dr. Vega            Return in about 6 months (around 12/13/2017) for Follow up, Review X-ray.

## 2017-07-10 ENCOUNTER — INITIAL CONSULT (OUTPATIENT)
Dept: ENDOCRINOLOGY | Facility: CLINIC | Age: 57
End: 2017-07-10
Payer: COMMERCIAL

## 2017-07-10 VITALS
HEIGHT: 72 IN | DIASTOLIC BLOOD PRESSURE: 62 MMHG | BODY MASS INDEX: 21.56 KG/M2 | WEIGHT: 159.19 LBS | SYSTOLIC BLOOD PRESSURE: 102 MMHG | HEART RATE: 65 BPM

## 2017-07-10 DIAGNOSIS — I10 ESSENTIAL HYPERTENSION: ICD-10-CM

## 2017-07-10 DIAGNOSIS — E03.9 ACQUIRED HYPOTHYROIDISM: Primary | Chronic | ICD-10-CM

## 2017-07-10 DIAGNOSIS — R79.89 INCREASED PTH LEVEL: ICD-10-CM

## 2017-07-10 PROCEDURE — 99244 OFF/OP CNSLTJ NEW/EST MOD 40: CPT | Mod: S$GLB,,, | Performed by: INTERNAL MEDICINE

## 2017-07-10 PROCEDURE — 99999 PR PBB SHADOW E&M-EST. PATIENT-LVL III: CPT | Mod: PBBFAC,,, | Performed by: INTERNAL MEDICINE

## 2017-07-10 RX ORDER — LEVOTHYROXINE SODIUM 88 UG/1
88 TABLET ORAL DAILY
Qty: 30 TABLET | Refills: 11 | Status: SHIPPED | OUTPATIENT
Start: 2017-07-10 | End: 2018-01-09 | Stop reason: SDUPTHER

## 2017-07-10 NOTE — PROGRESS NOTES
Subjective:      Patient ID: Blake Guillory is a 57 y.o. male.    Chief Complaint:  No chief complaint on file.    Referral from Dr. Zeng    History of Present Illness  Mr. Guillory presents for evaluation and management of hypothyroidism.     Has active history of hypertension, depression, hypothyroidism, CKD 3 and gout. Has history of bladder cancer.      Was diagnosed with hypothyroidism about 2 years ago. Has been taking thyroid hormone since that time.    Currently taking 50 mcg of levothyroxine in the AM and 25 mcg in the PM. Takes thyroid hormone together with other meds but separates from food for 30 min.   Has overt fatigue. Bowel movements are regular. Weight has been stable. Has heat and cold intolerance. No excessive hair loss. Has some dry skin.     No history of thyroid nodules.     Has HTN on multiple agents with stable bp.     Review of Systems   Constitutional: Negative for unexpected weight change.   Eyes: Negative for visual disturbance.   Respiratory: Positive for shortness of breath.    Cardiovascular: Negative for chest pain.   Gastrointestinal: Positive for abdominal pain.   Endocrine: Positive for cold intolerance and heat intolerance.   Genitourinary: Negative for frequency.   Musculoskeletal: Positive for arthralgias.   Skin: Negative for rash.   Neurological: Negative for headaches.       Objective:   Physical Exam   Constitutional: He is oriented to person, place, and time. He appears well-developed and well-nourished.   HENT:   Head: Normocephalic and atraumatic.   Right Ear: External ear normal.   Left Ear: External ear normal.   Nose: Nose normal.   Neck: No tracheal deviation present. No thyromegaly present.   Cardiovascular: Normal rate, regular rhythm and normal heart sounds.    No edema   Pulmonary/Chest: Effort normal and breath sounds normal.   Abdominal: Soft. Bowel sounds are normal. There is no tenderness.   Urostomy in place   Musculoskeletal:   Normal gait, no cyanosis or  clubbing   Neurological: He is alert and oriented to person, place, and time. He has normal reflexes.   Vibration sense intact   Skin: Skin is warm and dry. No rash noted.   No nodules, no ulcers   Psychiatric: He has a normal mood and affect. Judgment normal.   Vitals reviewed.      Lab Review:   Results for MARINO SANDS (MRN [id number]) as of 7/10/2017 13:17   Ref. Range 5/8/2017 12:49   Sodium Latest Ref Range: 135 - 146 mmol/L 138   Potassium Latest Ref Range: 3.5 - 5.3 mmol/L 5.0   Chloride Latest Ref Range: 98 - 110 mmol/L 109   CO2 Latest Ref Range: 20 - 31 mmol/L 22   BUN, Bld Latest Ref Range: 7 - 25 mg/dL 34 (H)   Creatinine Latest Ref Range: 0.70 - 1.33 mg/dL 2.14 (H)   BUN/Creatinine Ratio Latest Ref Range: 6 - 22 (calc) 16   eGFR if non African American Latest Ref Range: > OR = 60 mL/min/1.73m2 33 (L)   eGFR if African American Latest Ref Range: > OR = 60 mL/min/1.73m2 38 (L)   Glucose Latest Ref Range: 65 - 99 mg/dL 69   Calcium Latest Ref Range: 8.6 - 10.3 mg/dL 8.3 (L)   Alkaline Phosphatase Latest Ref Range: 40 - 115 U/L 125 (H)   Total Protein Latest Ref Range: 6.1 - 8.1 g/dL 6.0 (L)   Albumin Latest Ref Range: 3.6 - 5.1 g/dL 3.3 (L)   Albumin/Globulin Ratio Latest Ref Range: 1.0 - 2.5 (calc) 1.2   Total Bilirubin Latest Ref Range: 0.2 - 1.2 mg/dL 0.2   AST Latest Ref Range: 10 - 35 U/L 31   ALT Latest Ref Range: 9 - 46 U/L 54 (H)   Globulin, Total Latest Ref Range: 1.9 - 3.7 g/dL (calc) 2.7   TSH Latest Ref Range: 0.40 - 4.50 mIU/L 6.89 (H)   T3, Free Latest Ref Range: 2.3 - 4.2 pg/mL 2.6   T4, Free Latest Ref Range: 0.8 - 1.8 ng/dL 1.0       Assessment:     1. Acquired hypothyroidism    2. Essential hypertension    3. Increased PTH level      Plan:     --Patient with hypothyroidism  --TSH remains stable despite dose escalation  --Having some fatigue which is likely multifactorial but may be due in part to the thyroid  --Will increase levothyroxine to 88 mcg PO daily  --Repeat TSH in 6  weeks  --Advised him to take thyroid hormone first thing in the AM on an empty stomach and separate from other meds  --Bp stable on current regimen  --PTH likely elevated from CKD 3 and low Vit D, calcium has been low normal recently so don't suspect primary HPT    Copy to Dr. Azucena Powell M.D. Staff Endocrinology

## 2017-07-10 NOTE — LETTER
July 10, 2017      Varun Zeng MD  [address] [place] [address]           [facility] [address]  [facility]  [facility] [address]  Phone: [phone]  Fax: [phone]          Patient: Blake Guillory   MR Number: [id number]   YOB: 1960   Date of Visit: 7/10/2017       Dear Dr. Varun Zeng:    Thank you for referring Blake Guillory to me for evaluation. Attached you will find relevant portions of my assessment and plan of care.    If you have questions, please do not hesitate to call me. I look forward to following Blake Guillory along with you.    Sincerely,    Yariel Powell MD    Enclosure  CC:  No Recipients    If you would like to receive this communication electronically, please contact [email or web address] or [phone] to request more information on [facility] access.    For providers and/or their staff who would like to refer a patient to [facility], please contact us through our one-stop-shop provider referral line, [facility], at [phone].    If you feel you have received this communication in error or would no longer like to receive these types of communications, please e-mail [email or web address]

## 2017-07-13 ENCOUNTER — CLINICAL SUPPORT (OUTPATIENT)
Dept: SMOKING CESSATION | Facility: CLINIC | Age: 57
End: 2017-07-13
Payer: COMMERCIAL

## 2017-07-13 DIAGNOSIS — F17.200 NICOTINE DEPENDENCE: Primary | ICD-10-CM

## 2017-07-13 PROCEDURE — 99407 BEHAV CHNG SMOKING > 10 MIN: CPT | Mod: S$GLB,,, | Performed by: INTERNAL MEDICINE

## 2017-12-20 ENCOUNTER — OFFICE VISIT (OUTPATIENT)
Dept: UROLOGY | Facility: CLINIC | Age: 57
End: 2017-12-20
Payer: MEDICAID

## 2017-12-20 ENCOUNTER — HOSPITAL ENCOUNTER (OUTPATIENT)
Dept: RADIOLOGY | Facility: HOSPITAL | Age: 57
Discharge: HOME | End: 2017-12-20
Attending: UROLOGY
Payer: MEDICAID

## 2017-12-20 VITALS
HEART RATE: 62 BPM | DIASTOLIC BLOOD PRESSURE: 78 MMHG | BODY MASS INDEX: 20.51 KG/M2 | WEIGHT: 151.44 LBS | HEIGHT: 72 IN | SYSTOLIC BLOOD PRESSURE: 130 MMHG

## 2017-12-20 DIAGNOSIS — N28.9 RENAL INSUFFICIENCY: Chronic | ICD-10-CM

## 2017-12-20 DIAGNOSIS — Z85.51 H/O PRIMARY MALIGNANT NEOPLASM OF URINARY BLADDER: Primary | Chronic | ICD-10-CM

## 2017-12-20 DIAGNOSIS — N13.30 HYDRONEPHROSIS, BILATERAL: Chronic | ICD-10-CM

## 2017-12-20 DIAGNOSIS — N13.30 HYDRONEPHROSIS, UNSPECIFIED HYDRONEPHROSIS TYPE: ICD-10-CM

## 2017-12-20 DIAGNOSIS — N23 RENAL COLIC ON RIGHT SIDE: ICD-10-CM

## 2017-12-20 PROCEDURE — 99214 OFFICE O/P EST MOD 30 MIN: CPT | Mod: S$PBB,,, | Performed by: UROLOGY

## 2017-12-20 PROCEDURE — 81001 URINALYSIS AUTO W/SCOPE: CPT | Mod: PBBFAC | Performed by: UROLOGY

## 2017-12-20 PROCEDURE — 76770 US EXAM ABDO BACK WALL COMP: CPT | Mod: TC

## 2017-12-20 PROCEDURE — 76770 US EXAM ABDO BACK WALL COMP: CPT | Mod: 26,,, | Performed by: RADIOLOGY

## 2017-12-20 PROCEDURE — 99999 PR PBB SHADOW E&M-EST. PATIENT-LVL III: CPT | Mod: PBBFAC,,, | Performed by: UROLOGY

## 2017-12-20 PROCEDURE — 99213 OFFICE O/P EST LOW 20 MIN: CPT | Mod: PBBFAC,25 | Performed by: UROLOGY

## 2017-12-20 RX ORDER — HYDROCODONE BITARTRATE AND ACETAMINOPHEN 10; 325 MG/1; MG/1
1 TABLET ORAL EVERY 6 HOURS PRN
Qty: 30 TABLET | Refills: 0 | Status: SHIPPED | OUTPATIENT
Start: 2017-12-20 | End: 2018-01-03 | Stop reason: SDUPTHER

## 2017-12-20 NOTE — PROGRESS NOTES
Subjective:       Patient ID: Blaek Guillory is a 57 y.o. male who was last seen in this office Visit date not found    Chief Complaint:   Chief Complaint   Patient presents with    Bladder Cancer     6 month f/u with ultrasound      History of Present Illness  History of Bladder Cancer  He has a history of bladder cancer. He is s/p cystectomy with ileal conduit in November 2012. From an oncologic standpoint, he is doing well. He has had issues with bilateral ureteral stricture. He underwent a bilateral ureteral reimplantation into his conduit several months later. His strictures recurred shortly afterwards.  He was managed with ureteral stent changes until one of the stent eroded through his conduit and into his bowels.    He had an Exploratory Laparotomy with replacement of ileal conduit and small bowel anastomosis on 12/30/2015.    He had a parastomal hernia repair in April by Dr. Mack.  He is healing nicely from that and has been discharged from his office.  He had a small bowel obstruction. that resolved.       He continues to have back pain in the morning and in the evening.  His stoma is not bothering him and he tells me that he has good urine output during the day and less at night.  He has worsening renal function and his right sided pain is becoming worse.          ACTIVE MEDICAL ISSUES:  Patient Active Problem List   Diagnosis    Insomnia    Lumbar facet arthropathy    Sacroiliitis    Spondylosis without myelopathy    DDD (degenerative disc disease)    History of bladder cancer    Hydronephrosis, bilateral    H/O primary malignant neoplasm of urinary bladder    Essential hypertension    Anemia of chronic disease    Moderate protein malnutrition    Chronic gout    Renal insufficiency    Body mass index (BMI) 20.0-20.9, adult    Personal history of bladder cancer    Acquired hypothyroidism    Parastomal hernia without obstruction or gangrene    Small bowel obstruction    Hypoxemia     Increased PTH level    Severe malnutrition    COPD exacerbation       ALLERGIES AND MEDICATIONS: updated and reviewed.  Review of patient's allergies indicates:  No Known Allergies  Current Outpatient Prescriptions   Medication Sig    allopurinol (ZYLOPRIM) 100 MG tablet TAKE 1 TABLET(100 MG) BY MOUTH EVERY DAY    amlodipine (NORVASC) 5 MG tablet TAKE 1 TABLET(5 MG) BY MOUTH EVERY DAY    atenolol (TENORMIN) 50 MG tablet TAKE 1 TABLET(50 MG) BY MOUTH EVERY DAY    escitalopram oxalate (LEXAPRO) 10 MG tablet Take 1 tablet (10 mg total) by mouth once daily. Pt to take 15 mg daily (1.5) pills for depression    gabapentin (NEURONTIN) 400 MG capsule TAKE 1 CAPSULE(400 MG) BY MOUTH TWICE DAILY    hydrocodone-acetaminophen 10-325mg (NORCO)  mg Tab Take 1 tablet by mouth every 6 (six) hours as needed for Pain.    levalbuterol (XOPENEX) 0.63 mg/3 mL nebulizer solution Take 3 mLs (0.63 mg total) by nebulization every 8 (eight) hours as needed for Wheezing or Shortness of Breath. Rescue    levothyroxine (SYNTHROID) 88 MCG tablet Take 1 tablet (88 mcg total) by mouth once daily.    sodium bicarbonate 325 MG tablet Take 325 mg by mouth 2 (two) times daily.    trazodone (DESYREL) 100 MG tablet Take 1 tablet (100 mg total) by mouth every evening.     No current facility-administered medications for this visit.        Review of Systems   Constitutional: Negative for activity change, fatigue, fever and unexpected weight change.   HENT: Negative for congestion.    Eyes: Negative for redness.   Respiratory: Negative for chest tightness and shortness of breath.    Cardiovascular: Negative for chest pain and leg swelling.   Gastrointestinal: Negative for abdominal pain, constipation, diarrhea, nausea and vomiting.   Genitourinary: Negative for dysuria, flank pain, frequency, hematuria, penile pain, penile swelling, scrotal swelling, testicular pain and urgency.   Musculoskeletal: Negative for arthralgias and back pain.    Neurological: Negative for dizziness and light-headedness.   Psychiatric/Behavioral: Negative for behavioral problems and confusion. The patient is not nervous/anxious.    All other systems reviewed and are negative.      Objective:      Vitals:    12/20/17 1408   BP: 130/78   Pulse: 62   Weight: 68.7 kg (151 lb 7.3 oz)   Height: 6' (1.829 m)     Physical Exam   Nursing note and vitals reviewed.  Constitutional: He is oriented to person, place, and time. He appears well-developed and well-nourished.   HENT:   Head: Normocephalic.   Eyes: Conjunctivae are normal.   Neck: Normal range of motion. Neck supple. No tracheal deviation present. No thyromegaly present.   Cardiovascular: Normal rate and normal heart sounds.    Pulmonary/Chest: Effort normal and breath sounds normal. No respiratory distress. He has no wheezes.   Abdominal: Soft. Bowel sounds are normal. There is no hepatosplenomegaly. There is no tenderness. There is no rebound and no CVA tenderness. No hernia.       Stoma pink  Yellow urine   Genitourinary:   Genitourinary Comments: Right CVA tenderness   Musculoskeletal: Normal range of motion. He exhibits no edema or tenderness.   Lymphadenopathy:     He has no cervical adenopathy.   Neurological: He is alert and oriented to person, place, and time.   Skin: Skin is warm and dry. No rash noted. No erythema.     Psychiatric: He has a normal mood and affect. His behavior is normal. Judgment and thought content normal.       Urine dipstick shows negative for all components.  Micro exam: negative for WBC's or RBC's.  US Retroperitoneal Complete (Kidney and   Status: Final result   MyChart Results Release     "[employer] Status: Active Results Release   PACS Images     Show images for US Retroperitoneal Complete (Kidney and   External Result Report     External Result Report   Narrative     Time of Procedure: 12/20/17 13:21:04  Accession # [id number]    Technique: Ultrasound retroperitoneal complete.      Comparison:  CT abdomen pelvis from 04/19/2017    Clinical indication:  Hydronephrosis.      Findings:    Right kidney: The right kidney measures 10.5 cm.  No evidence of cortical thinning.  There is increased cortical echogenicity with loss of corticomedullary distinction.  There is decreased perfusion.  The resistive index is 0.75.  No evidence of mass.  Subcentimeter simple cysts noted. No renal stones. Moderate hydronephrosis similar to prior exam.  Moderate hydroureter of the visualized proximal ureter.    Left kidney: The left kidney measures 9.8 cm.  No evidence of cortical thinning.  There is increased cortical echogenicity with loss of corticomedullary distinction.  There is decreased perfusion.  The resistive index is 0.75.  10 mm nonobstructing stone in inferior pole.  6 mm nonobstructing stone in the mid to lower pole.  No evidence of mass.   Mild hydronephrosis, improved when compared to prior exam.      The bladder is surgically absent.  No evidence of mass in the surgical bed.   Impression         Moderate stable right sided hydronephrosis and mild left-sided hydronephrosis, slightly improved when compared to prior exam.    Findings consistent with chronic medical renal disease.    2 nonobstructing stones in the left kidney.      Electronically signed by: JACQUELINE MORIN MD  Date: 12/20/17  Time: 14:20             Assessment:       1. H/O primary malignant neoplasm of urinary bladder    2. Renal colic on right side    3. Renal insufficiency    4. Hydronephrosis, unspecified hydronephrosis type          Plan:       1. H/O primary malignant neoplasm of urinary bladder    - Ambulatory Referral to Interventional Radiology  - hydrocodone-acetaminophen 10-325mg (NORCO)  mg Tab; Take 1 tablet by mouth every 6 (six) hours as needed for Pain.  Dispense: 30 tablet; Refill: 0    2. Renal colic on right side    - Ambulatory Referral to Interventional Radiology  - hydrocodone-acetaminophen 10-325mg (NORCO)  mg Tab;  Take 1 tablet by mouth every 6 (six) hours as needed for Pain.  Dispense: 30 tablet; Refill: 0    3. Renal insufficiency      4. Hydronephrosis, unspecified hydronephrosis type  He may need his anastomosis dilated again, but I need a nephrostomy tube first.    - Ambulatory Referral to Interventional Radiology  - hydrocodone-acetaminophen 10-325mg (NORCO)  mg Tab; Take 1 tablet by mouth every 6 (six) hours as needed for Pain.  Dispense: 30 tablet; Refill: 0            No Follow-up on file.

## 2017-12-26 ENCOUNTER — TELEPHONE (OUTPATIENT)
Dept: UROLOGY | Facility: CLINIC | Age: 57
End: 2017-12-26

## 2017-12-26 NOTE — TELEPHONE ENCOUNTER
Patient wife is calling about setting up a procedure. I see a referral and notes that patient has been referred to IR. Can you advise on what procedure? I am unfamiliar with his case. I can try to help them schedule.

## 2017-12-26 NOTE — TELEPHONE ENCOUNTER
----- Message from Jalyn Qiu sent at 12/26/2017 12:43 PM CST -----  Contact: alcira  Discuss setting pt up for surgery. Please call alcira at [phone].

## 2017-12-26 NOTE — TELEPHONE ENCOUNTER
Spoke to james in IR- she will scheduled the procedure- patient wife notified and if apt isn't scheduled by tmrw afternoon, please call me back to check on it.

## 2017-12-28 ENCOUNTER — HOSPITAL ENCOUNTER (OUTPATIENT)
Dept: PREADMISSION TESTING | Facility: HOSPITAL | Age: 57
Discharge: HOME | End: 2017-12-28
Attending: RADIOLOGY
Payer: MEDICAID

## 2017-12-28 VITALS
OXYGEN SATURATION: 96 % | HEART RATE: 70 BPM | SYSTOLIC BLOOD PRESSURE: 130 MMHG | RESPIRATION RATE: 18 BRPM | WEIGHT: 149.69 LBS | HEIGHT: 72 IN | BODY MASS INDEX: 20.28 KG/M2 | DIASTOLIC BLOOD PRESSURE: 75 MMHG | TEMPERATURE: 99 F

## 2017-12-28 DIAGNOSIS — Z85.51 HISTORY OF PRIMARY MALIGNANT NEOPLASM OF URINARY BLADDER: ICD-10-CM

## 2017-12-28 DIAGNOSIS — Z01.818 PRE-OP TESTING: Primary | ICD-10-CM

## 2017-12-28 DIAGNOSIS — N28.9 RENAL INSUFFICIENCY: Chronic | ICD-10-CM

## 2017-12-28 LAB
ANION GAP SERPL CALC-SCNC: 7 MMOL/L
BASOPHILS # BLD AUTO: 0.03 K/UL
BASOPHILS NFR BLD: 0.4 %
BUN SERPL-MCNC: 37 MG/DL
CALCIUM SERPL-MCNC: 9.5 MG/DL
CHLORIDE SERPL-SCNC: 108 MMOL/L
CO2 SERPL-SCNC: 22 MMOL/L
CREAT SERPL-MCNC: 1.7 MG/DL
DIFFERENTIAL METHOD: ABNORMAL
EOSINOPHIL # BLD AUTO: 0.2 K/UL
EOSINOPHIL NFR BLD: 2.7 %
ERYTHROCYTE [DISTWIDTH] IN BLOOD BY AUTOMATED COUNT: 13.7 %
EST. GFR  (AFRICAN AMERICAN): 50 ML/MIN/1.73 M^2
EST. GFR  (NON AFRICAN AMERICAN): 43 ML/MIN/1.73 M^2
GLUCOSE SERPL-MCNC: 76 MG/DL
HCT VFR BLD AUTO: 41.4 %
HGB BLD-MCNC: 13.9 G/DL
INR PPP: 0.9
LYMPHOCYTES # BLD AUTO: 1.4 K/UL
LYMPHOCYTES NFR BLD: 19.3 %
MCH RBC QN AUTO: 32.2 PG
MCHC RBC AUTO-ENTMCNC: 33.6 G/DL
MCV RBC AUTO: 96 FL
MONOCYTES # BLD AUTO: 0.7 K/UL
MONOCYTES NFR BLD: 9.7 %
NEUTROPHILS # BLD AUTO: 4.8 K/UL
NEUTROPHILS NFR BLD: 67.9 %
PLATELET # BLD AUTO: 234 K/UL
PMV BLD AUTO: 9.9 FL
POTASSIUM SERPL-SCNC: 4.8 MMOL/L
PROTHROMBIN TIME: 9.7 SEC
RBC # BLD AUTO: 4.32 M/UL
SODIUM SERPL-SCNC: 137 MMOL/L
WBC # BLD AUTO: 7.09 K/UL

## 2017-12-28 PROCEDURE — 85610 PROTHROMBIN TIME: CPT

## 2017-12-28 PROCEDURE — 80048 BASIC METABOLIC PNL TOTAL CA: CPT

## 2017-12-28 PROCEDURE — 85025 COMPLETE CBC W/AUTO DIFF WBC: CPT

## 2017-12-28 PROCEDURE — 36415 COLL VENOUS BLD VENIPUNCTURE: CPT

## 2017-12-28 NOTE — DISCHARGE INSTRUCTIONS
"  Your surgery is scheduled for _Tuesday Jan. 2 2018___________________.    Call [phone] between 2 p.m. and 5 p.m. on   __Friday _____________ to find out your arrival time for the day of your surgery.      Please report to SAME DAY SURGERY UNIT on the 2nd FLOOR at _______ a.m.  Use front door entrance. The doors open at 0530 am.      If you need WHEELCHAIR assistance please call  [phone] from your cell phone or "[phone]"  from the  hospital courtesy phone located to the right after you enter the hospital lobby.      INSTRUCTIONS IMPORTANT!!!  ¨ Do not eat or drink after 12 midnight-including water. OK to brush teeth, no   gum, candy or mints!    ¨ Take only these medicines with a small swallow of water-morning of surgery.  Take Norvasc, Atenolol and Gabapentin AM of surgery with swallow of water.      _x___  Prep instructions:    SHOWER    _x___  Please shower using Hibiclens soap the night before AND  the morning of   your surgery/procedure. Do not use Hibiclens on your face or genitals               If your surgery is around your belly button (Navel) be sure to wash inside your   belly button also. Rinse hibiclens off completely.  __x__  No shaving of procedural area at least 4-5 days before surgery due to  increased risk of skin irritation and/or possible infection.  ____  Do not wear makeup, including mascara. WEARING EYE MAKEUP MAY  LEAD TO SERIOUS EYE INJURY during surgery.  _x___  No powder, lotions or creams to your body.  __x__  You may wear only deodorant on the day of surgery.  _x___  Please remove all jewelry, including piercings and leave at home.  __x__  No money or valuables needed. Please leave at home.  You may bring your           cell phone.  ____  Please bring any documents given by your doctor.  __x__  If going home the same day, arrange for a ride home. You will not be able to   drive if Anesthesia was used.  ____  Children under 18 years require a parent / guardian present the entire time   they " are in surgery / recovery.  _x___  Wear loose fitting clothing. Allow for dressings, bandages.  _x___  Stop Aspirin, Ibuprofen, Motrin and Aleve at least 3-5 days before surgery, unless otherwise instructed by your doctor, or the nurse.              You MAY use Tylenol/acetaminophen until day of surgery.  ____  If you take diabetic medication, do not take am of surgery unless instructed by   Doctor.  _x___  Call MD for temperature above 101 degrees.        _x___ Stop taking any Fish Oil supplement or any Vitamins that contain Vitamin E at least 5 days prior to surgery.              I have read or had read and explained to me, and understand the above information.    Additional comments or instructions:Please call   [phone] if you have any questions regarding the instructions above.

## 2017-12-28 NOTE — PRE-PROCEDURE INSTRUCTIONS
Pre-operative instructions, medication directives and pain scales reviewed with patient. Instructed to wash with Hibiclens prior to surgery.  All questions the patient had  were answered. Re-assurance about surgical procedure and day of surgery routine given as needed. The patient verbalized understanding of the pre-op instructions.

## 2018-01-02 ENCOUNTER — HOSPITAL ENCOUNTER (OUTPATIENT)
Facility: HOSPITAL | Age: 58
Discharge: HOME | End: 2018-01-02
Attending: RADIOLOGY | Admitting: RADIOLOGY
Payer: MEDICAID

## 2018-01-02 ENCOUNTER — SURGERY (OUTPATIENT)
Age: 58
End: 2018-01-02

## 2018-01-02 VITALS
WEIGHT: 149.69 LBS | BODY MASS INDEX: 20.28 KG/M2 | HEIGHT: 72 IN | TEMPERATURE: 98 F | DIASTOLIC BLOOD PRESSURE: 70 MMHG | SYSTOLIC BLOOD PRESSURE: 120 MMHG | OXYGEN SATURATION: 95 % | RESPIRATION RATE: 16 BRPM | HEART RATE: 69 BPM

## 2018-01-02 DIAGNOSIS — N13.30 HYDRONEPHROSIS: ICD-10-CM

## 2018-01-02 DIAGNOSIS — Z85.51 H/O PRIMARY MALIGNANT NEOPLASM OF URINARY BLADDER: ICD-10-CM

## 2018-01-02 DIAGNOSIS — Z85.51 HISTORY OF PRIMARY MALIGNANT NEOPLASM OF URINARY BLADDER: ICD-10-CM

## 2018-01-02 PROCEDURE — 25000003 PHARM REV CODE 250: Performed by: RADIOLOGY

## 2018-01-02 PROCEDURE — 63600175 PHARM REV CODE 636 W HCPCS: Performed by: RADIOLOGY

## 2018-01-02 RX ORDER — HYDROCODONE BITARTRATE AND ACETAMINOPHEN 10; 325 MG/1; MG/1
1 TABLET ORAL ONCE
Status: COMPLETED | OUTPATIENT
Start: 2018-01-02 | End: 2018-01-02

## 2018-01-02 RX ORDER — FENTANYL CITRATE 50 UG/ML
INJECTION, SOLUTION INTRAMUSCULAR; INTRAVENOUS CODE/TRAUMA/SEDATION MEDICATION
Status: COMPLETED | OUTPATIENT
Start: 2018-01-02 | End: 2018-01-02

## 2018-01-02 RX ORDER — SODIUM CHLORIDE 9 MG/ML
INJECTION, SOLUTION INTRAVENOUS CONTINUOUS
Status: CANCELLED | OUTPATIENT
Start: 2018-01-02

## 2018-01-02 RX ORDER — MIDAZOLAM HYDROCHLORIDE 1 MG/ML
INJECTION INTRAMUSCULAR; INTRAVENOUS CODE/TRAUMA/SEDATION MEDICATION
Status: COMPLETED | OUTPATIENT
Start: 2018-01-02 | End: 2018-01-02

## 2018-01-02 RX ORDER — SODIUM CHLORIDE 9 MG/ML
INJECTION, SOLUTION INTRAVENOUS CONTINUOUS
Status: DISCONTINUED | OUTPATIENT
Start: 2018-01-02 | End: 2018-01-02 | Stop reason: HOSPADM

## 2018-01-02 RX ADMIN — MIDAZOLAM HYDROCHLORIDE 0.5 MG: 1 INJECTION, SOLUTION INTRAMUSCULAR; INTRAVENOUS at 11:01

## 2018-01-02 RX ADMIN — MIDAZOLAM HYDROCHLORIDE 1 MG: 1 INJECTION, SOLUTION INTRAMUSCULAR; INTRAVENOUS at 11:01

## 2018-01-02 RX ADMIN — HYDROCODONE BITARTRATE AND ACETAMINOPHEN 1 TABLET: 10; 325 TABLET ORAL at 01:01

## 2018-01-02 RX ADMIN — FENTANYL CITRATE 50 MCG: 50 INJECTION INTRAMUSCULAR; INTRAVENOUS at 11:01

## 2018-01-02 RX ADMIN — SODIUM CHLORIDE: 900 INJECTION, SOLUTION INTRAVENOUS at 09:01

## 2018-01-02 NOTE — INTERVAL H&P NOTE
The patient has been examined and the H&P has been reviewed:    I concur with the findings and no changes have occurred since H&P was written.    Anesthesia/Surgery risks, benefits and alternative options discussed and understood by patient/family.          Active Hospital Problems    Diagnosis  POA    Hydronephrosis [N13.30]  Yes    History of primary malignant neoplasm of urinary bladder [Z85.51]  Not Applicable      Resolved Hospital Problems    Diagnosis Date Resolved POA   No resolved problems to display.

## 2018-01-02 NOTE — BRIEF OP NOTE
Radiology Post-Procedure Note    Pre Op Diagnosis: right hydronephrosis    Post Op Diagnosis: right hydronephrosis    Procedure:right percutaneous nephrostomy    Procedure performed by: Gennaro Hobson MD    Written Informed Consent Obtained: Yes    Specimen Removed: No    Estimated Blood Loss: Minimal    Findings: Local anesthesia and moderate sedation were used.      Ultrasound and fluoroscopy were used to localize the right collecting system and a percutaneous catheter was introduced.  Position of the catheter in the collecting system was confirmed using injection of iodinated contrast.      The patient tolerated the procedure well and there were no complications.  A specimen was sent to the lab for further analysis and culture. Please see Imaging report for further details.    Gennaro Hobson MD  Staff Interventional Radiologist  Department of Radiology

## 2018-01-02 NOTE — SEDATION DOCUMENTATION
Anesthesia Plan-Radiology    Anesthesia Plan: Moderate Sedation     Pre-Anesthesia Assessment: Calm, Awake    ASA Status: Mild Disease    Airway: Dentition, Mouth Opening, Visualization of uvula and Neck range of motion     PREINDUCTION ASSESSMENT: UNCHANGED    Procedure: R Pcn placement    Pre-Procedure Diagnosis: ureteral stricture    Intra-Procedure Meds:Versed and Fentanyl

## 2018-01-02 NOTE — DISCHARGE SUMMARY
[facility]  Discharge Note  Short Stay    Admit Date: 1/2/2018    Discharge Date and Time: No discharge date for patient encounter.     Attending Physician: Chino Vo MD     Discharge Provider: Gennaro Hobson    Diagnoses:  Active Hospital Problems    Diagnosis  POA    Hydronephrosis [N13.30]  Yes    History of primary malignant neoplasm of urinary bladder [Z85.51]  Not Applicable      Resolved Hospital Problems    Diagnosis Date Resolved POA   No resolved problems to display.       Discharged Condition: good    Hospital Course: Patient was admitted for an outpatient procedure and tolerated the procedure well with no complications.    Final Diagnoses: Same as principal problem.    Disposition: Home or Self Care    Follow up/Patient Instructions:    Medications:  Reconciled Home Medications:   Current Discharge Medication List      CONTINUE these medications which have NOT CHANGED    Details   allopurinol (ZYLOPRIM) 100 MG tablet TAKE 1 TABLET(100 MG) BY MOUTH EVERY DAY  Qty: 90 tablet, Refills: 0    Associated Diagnoses: Gout synovitis      amlodipine (NORVASC) 5 MG tablet TAKE 1 TABLET(5 MG) BY MOUTH EVERY DAY  Qty: 90 tablet, Refills: 0    Associated Diagnoses: Essential hypertension      atenolol (TENORMIN) 50 MG tablet TAKE 1 TABLET(50 MG) BY MOUTH EVERY DAY  Qty: 90 tablet, Refills: 0    Associated Diagnoses: Essential hypertension      escitalopram oxalate (LEXAPRO) 10 MG tablet Take 1 tablet (10 mg total) by mouth once daily. Pt to take 15 mg daily (1.5) pills for depression  Qty: 45 tablet, Refills: 2    Associated Diagnoses: Moderate single current episode of major depressive disorder      gabapentin (NEURONTIN) 400 MG capsule TAKE 1 CAPSULE(400 MG) BY MOUTH TWICE DAILY  Qty: 60 capsule, Refills: 0    Associated Diagnoses: Neuropathy; Stricture or kinking of ureter      hydrocodone-acetaminophen 10-325mg (NORCO)  mg Tab Take 1 tablet by mouth every 6 (six) hours as needed for  Pain.  Qty: 30 tablet, Refills: 0    Associated Diagnoses: H/O primary malignant neoplasm of urinary bladder; Renal colic on right side; Hydronephrosis, unspecified hydronephrosis type      levalbuterol (XOPENEX) 0.63 mg/3 mL nebulizer solution Take 3 mLs (0.63 mg total) by nebulization every 8 (eight) hours as needed for Wheezing or Shortness of Breath. Rescue  Qty: 3 mL, Refills: 1      levothyroxine (SYNTHROID) 88 MCG tablet Take 1 tablet (88 mcg total) by mouth once daily.  Qty: 30 tablet, Refills: 11      sodium bicarbonate 325 MG tablet Take 325 mg by mouth 2 (two) times daily.      trazodone (DESYREL) 100 MG tablet Take 1 tablet (100 mg total) by mouth every evening.  Qty: 90 tablet, Refills: 0    Associated Diagnoses: Primary insomnia      FLUTICASONE/VILANTEROL (BREO ELLIPTA INHL) Inhale 1 puff into the lungs once daily.           No discharge procedures on file.      Discharge Procedure Orders (must include Diet, Follow-up, Activity):  1. Resume previous diet  2. No heavy lifting for 7 days  3. Go to ER for severe abdominal pain, leaking around tube or urinating blood

## 2018-01-02 NOTE — H&P (VIEW-ONLY)
Subjective:       Patient ID: Blake Guillory is a 57 y.o. male who was last seen in this office Visit date not found    Chief Complaint:   Chief Complaint   Patient presents with    Bladder Cancer     6 month f/u with ultrasound      History of Present Illness  History of Bladder Cancer  He has a history of bladder cancer. He is s/p cystectomy with ileal conduit in November 2012. From an oncologic standpoint, he is doing well. He has had issues with bilateral ureteral stricture. He underwent a bilateral ureteral reimplantation into his conduit several months later. His strictures recurred shortly afterwards.  He was managed with ureteral stent changes until one of the stent eroded through his conduit and into his bowels.    He had an Exploratory Laparotomy with replacement of ileal conduit and small bowel anastomosis on 12/30/2015.    He had a parastomal hernia repair in April by Dr. Mack.  He is healing nicely from that and has been discharged from his office.  He had a small bowel obstruction. that resolved.       He continues to have back pain in the morning and in the evening.  His stoma is not bothering him and he tells me that he has good urine output during the day and less at night.  He has worsening renal function and his right sided pain is becoming worse.          ACTIVE MEDICAL ISSUES:  Patient Active Problem List   Diagnosis    Insomnia    Lumbar facet arthropathy    Sacroiliitis    Spondylosis without myelopathy    DDD (degenerative disc disease)    History of bladder cancer    Hydronephrosis, bilateral    H/O primary malignant neoplasm of urinary bladder    Essential hypertension    Anemia of chronic disease    Moderate protein malnutrition    Chronic gout    Renal insufficiency    Body mass index (BMI) 20.0-20.9, adult    Personal history of bladder cancer    Acquired hypothyroidism    Parastomal hernia without obstruction or gangrene    Small bowel obstruction    Hypoxemia     Increased PTH level    Severe malnutrition    COPD exacerbation       ALLERGIES AND MEDICATIONS: updated and reviewed.  Review of patient's allergies indicates:  No Known Allergies  Current Outpatient Prescriptions   Medication Sig    allopurinol (ZYLOPRIM) 100 MG tablet TAKE 1 TABLET(100 MG) BY MOUTH EVERY DAY    amlodipine (NORVASC) 5 MG tablet TAKE 1 TABLET(5 MG) BY MOUTH EVERY DAY    atenolol (TENORMIN) 50 MG tablet TAKE 1 TABLET(50 MG) BY MOUTH EVERY DAY    escitalopram oxalate (LEXAPRO) 10 MG tablet Take 1 tablet (10 mg total) by mouth once daily. Pt to take 15 mg daily (1.5) pills for depression    gabapentin (NEURONTIN) 400 MG capsule TAKE 1 CAPSULE(400 MG) BY MOUTH TWICE DAILY    hydrocodone-acetaminophen 10-325mg (NORCO)  mg Tab Take 1 tablet by mouth every 6 (six) hours as needed for Pain.    levalbuterol (XOPENEX) 0.63 mg/3 mL nebulizer solution Take 3 mLs (0.63 mg total) by nebulization every 8 (eight) hours as needed for Wheezing or Shortness of Breath. Rescue    levothyroxine (SYNTHROID) 88 MCG tablet Take 1 tablet (88 mcg total) by mouth once daily.    sodium bicarbonate 325 MG tablet Take 325 mg by mouth 2 (two) times daily.    trazodone (DESYREL) 100 MG tablet Take 1 tablet (100 mg total) by mouth every evening.     No current facility-administered medications for this visit.        Review of Systems   Constitutional: Negative for activity change, fatigue, fever and unexpected weight change.   HENT: Negative for congestion.    Eyes: Negative for redness.   Respiratory: Negative for chest tightness and shortness of breath.    Cardiovascular: Negative for chest pain and leg swelling.   Gastrointestinal: Negative for abdominal pain, constipation, diarrhea, nausea and vomiting.   Genitourinary: Negative for dysuria, flank pain, frequency, hematuria, penile pain, penile swelling, scrotal swelling, testicular pain and urgency.   Musculoskeletal: Negative for arthralgias and back pain.    Neurological: Negative for dizziness and light-headedness.   Psychiatric/Behavioral: Negative for behavioral problems and confusion. The patient is not nervous/anxious.    All other systems reviewed and are negative.      Objective:      Vitals:    12/20/17 1408   BP: 130/78   Pulse: 62   Weight: 68.7 kg (151 lb 7.3 oz)   Height: 6' (1.829 m)     Physical Exam   Nursing note and vitals reviewed.  Constitutional: He is oriented to person, place, and time. He appears well-developed and well-nourished.   HENT:   Head: Normocephalic.   Eyes: Conjunctivae are normal.   Neck: Normal range of motion. Neck supple. No tracheal deviation present. No thyromegaly present.   Cardiovascular: Normal rate and normal heart sounds.    Pulmonary/Chest: Effort normal and breath sounds normal. No respiratory distress. He has no wheezes.   Abdominal: Soft. Bowel sounds are normal. There is no hepatosplenomegaly. There is no tenderness. There is no rebound and no CVA tenderness. No hernia.       Stoma pink  Yellow urine   Genitourinary:   Genitourinary Comments: Right CVA tenderness   Musculoskeletal: Normal range of motion. He exhibits no edema or tenderness.   Lymphadenopathy:     He has no cervical adenopathy.   Neurological: He is alert and oriented to person, place, and time.   Skin: Skin is warm and dry. No rash noted. No erythema.     Psychiatric: He has a normal mood and affect. His behavior is normal. Judgment and thought content normal.       Urine dipstick shows negative for all components.  Micro exam: negative for WBC's or RBC's.  US Retroperitoneal Complete (Kidney and   Status: Final result   MyChart Results Release     Invodo Status: Active Results Release   PACS Images     Show images for US Retroperitoneal Complete (Kidney and   External Result Report     External Result Report   Narrative     Time of Procedure: 12/20/17 13:21:04  Accession # [id number]    Technique: Ultrasound retroperitoneal complete.      Comparison:  CT abdomen pelvis from 04/19/2017    Clinical indication:  Hydronephrosis.      Findings:    Right kidney: The right kidney measures 10.5 cm.  No evidence of cortical thinning.  There is increased cortical echogenicity with loss of corticomedullary distinction.  There is decreased perfusion.  The resistive index is 0.75.  No evidence of mass.  Subcentimeter simple cysts noted. No renal stones. Moderate hydronephrosis similar to prior exam.  Moderate hydroureter of the visualized proximal ureter.    Left kidney: The left kidney measures 9.8 cm.  No evidence of cortical thinning.  There is increased cortical echogenicity with loss of corticomedullary distinction.  There is decreased perfusion.  The resistive index is 0.75.  10 mm nonobstructing stone in inferior pole.  6 mm nonobstructing stone in the mid to lower pole.  No evidence of mass.   Mild hydronephrosis, improved when compared to prior exam.      The bladder is surgically absent.  No evidence of mass in the surgical bed.   Impression         Moderate stable right sided hydronephrosis and mild left-sided hydronephrosis, slightly improved when compared to prior exam.    Findings consistent with chronic medical renal disease.    2 nonobstructing stones in the left kidney.      Electronically signed by: JACQUELINE MORIN MD  Date: 12/20/17  Time: 14:20             Assessment:       1. H/O primary malignant neoplasm of urinary bladder    2. Renal colic on right side    3. Renal insufficiency    4. Hydronephrosis, unspecified hydronephrosis type          Plan:       1. H/O primary malignant neoplasm of urinary bladder    - Ambulatory Referral to Interventional Radiology  - hydrocodone-acetaminophen 10-325mg (NORCO)  mg Tab; Take 1 tablet by mouth every 6 (six) hours as needed for Pain.  Dispense: 30 tablet; Refill: 0    2. Renal colic on right side    - Ambulatory Referral to Interventional Radiology  - hydrocodone-acetaminophen 10-325mg (NORCO)  mg Tab;  Take 1 tablet by mouth every 6 (six) hours as needed for Pain.  Dispense: 30 tablet; Refill: 0    3. Renal insufficiency      4. Hydronephrosis, unspecified hydronephrosis type  He may need his anastomosis dilated again, but I need a nephrostomy tube first.    - Ambulatory Referral to Interventional Radiology  - hydrocodone-acetaminophen 10-325mg (NORCO)  mg Tab; Take 1 tablet by mouth every 6 (six) hours as needed for Pain.  Dispense: 30 tablet; Refill: 0            No Follow-up on file.

## 2018-01-02 NOTE — DISCHARGE INSTRUCTIONS
BATHING:   May shower tomorrow    Dressing:  Do not remove dressing    ACTIVITY LEVEL: If you have received sedation or an anesthetic, you may feel sleepy for several hours. Rest until you are more awake. Gradually resume your normal activities    Do not drive, drink alcohol, or sign legal documents for 24 hours, or if taking narcotic pain medication.      DIET: You may resume your home diet. If nausea is present, increase your diet gradually with fluids and bland foods.    Medications: Pain medication should be taken only if needed and as directed. If antibiotics are prescribed, the medication should be taken until completed. You will be given an updated list of you medications.  ? No driving, alcoholic beverages or signing legal documents for next 24 hours if you have had sedation, or while taking pain medication    CALL THE DOCTOR:   For any obvious bleeding (some dried blood over the incision is normal).     Redness, swelling, foul smell around incision or fever over 101.  Shortness of breath.  Persistent pain or nausea not relieved by medication.  Call  [phone]     to speak with an Interventional Radiologist    If any unusual problems or difficulties occur contact your doctor. If you cannot contact your doctor but feel your signs and symptoms warrant a physicians attention return to the emergency room.  Fall Prevention  Millions of people fall every year and injure themselves. You may have had anesthesia or sedation which may increase your risk of falling. You may have health issues that put you at an increased risk of falling.     Here are ways to reduce your risk of falling.  ·   · Make your home safe by keeping walkways clear of objects you may trip over.  · Use non-slip pads under rugs. Do not use area rugs or small throw rugs.  · Use non-slip mats in bathtubs and showers.  · Install handrails and lights on staircases.  · Do not walk in poorly lit areas.  · Do not stand on chairs or wobbly ladders.  · Use  caution when reaching overhead or looking upward. This position can cause a loss of balance.  · Be sure your shoes fit properly, have non-slip bottoms and are in good condition.   · Wear shoes both inside and out. Avoid going barefoot or wearing slippers.  · Be cautious when going up and down stairs, curbs, and when walking on uneven sidewalks.  · If your balance is poor, consider using a cane or walker.  · If your fall was related to alcohol use, stop or limit alcohol intake.   · If your fall was related to use of sleeping medicines, talk to your doctor about this. You may need to reduce your dosage at bedtime if you awaken during the night to go to the bathroom.    · To reduce the need for nighttime bathroom trips:  ¨ Avoid drinking fluids for several hours before going to bed  ¨ Empty your bladder before going to bed  ¨ Men can keep a urinal at the bedside  · Stay as active as you can. Balance, flexibility, strength, and endurance all come from exercise. They all play a role in preventing falls. Ask your healthcare provider which types of activity are right for you.  · Get your vision checked on a regular basis.  · If you have pets, know where they are before you stand up or walk so you don't trip over them.  · Use night lights.

## 2018-01-03 ENCOUNTER — TELEPHONE (OUTPATIENT)
Dept: UROLOGY | Facility: CLINIC | Age: 58
End: 2018-01-03

## 2018-01-03 DIAGNOSIS — N13.30 HYDRONEPHROSIS, UNSPECIFIED HYDRONEPHROSIS TYPE: ICD-10-CM

## 2018-01-03 DIAGNOSIS — Z85.51 H/O PRIMARY MALIGNANT NEOPLASM OF URINARY BLADDER: Chronic | ICD-10-CM

## 2018-01-03 DIAGNOSIS — N23 RENAL COLIC ON RIGHT SIDE: ICD-10-CM

## 2018-01-03 RX ORDER — HYDROCODONE BITARTRATE AND ACETAMINOPHEN 10; 325 MG/1; MG/1
1 TABLET ORAL EVERY 6 HOURS PRN
Qty: 30 TABLET | Refills: 0 | Status: SHIPPED | OUTPATIENT
Start: 2018-01-03 | End: 2018-01-18 | Stop reason: SDUPTHER

## 2018-01-03 NOTE — TELEPHONE ENCOUNTER
----- Message from Seema Gerardo sent at 1/3/2018 11:07 AM CST -----  Contact: [phone]  Pt is needing a refill on his pain medication Please call  at your earliest convenience.  Thanks !

## 2018-01-03 NOTE — TELEPHONE ENCOUNTER
Pt had nephrostomy tube placement on yesterday when do you need to see him back for f/u. Please advise/red

## 2018-01-03 NOTE — TELEPHONE ENCOUNTER
I do not want it taken out yet.  I plan to use it as an access point to hopefully get his kidney to drain better.

## 2018-01-03 NOTE — TELEPHONE ENCOUNTER
Pt is requesting another rx for pain medication last script given was on 12/20/17.please advised/red

## 2018-01-03 NOTE — TELEPHONE ENCOUNTER
Spoke to patient to notify him that his rx is ready  for . Patient states that he is hurting very bad and wants his tube removed. He states that its draining and does not think it is clogged, but he states that he can not take the pain and wants to know what else can he do.

## 2018-01-03 NOTE — TELEPHONE ENCOUNTER
----- Message from Christian Walker sent at 1/3/2018  8:46 AM CST -----  Contact: Wife/ Luke Butler called to schedule pt f/u appt from procedure done on 1.2.18. Please call and advise her at [phone] to schedule.    Thanks-

## 2018-01-11 ENCOUNTER — TELEPHONE (OUTPATIENT)
Dept: SMOKING CESSATION | Facility: CLINIC | Age: 58
End: 2018-01-11

## 2018-01-12 ENCOUNTER — TELEPHONE (OUTPATIENT)
Dept: SMOKING CESSATION | Facility: CLINIC | Age: 58
End: 2018-01-12

## 2018-01-12 ENCOUNTER — OFFICE VISIT (OUTPATIENT)
Dept: UROLOGY | Facility: CLINIC | Age: 58
End: 2018-01-12
Payer: MEDICAID

## 2018-01-12 VITALS — BODY MASS INDEX: 19.91 KG/M2 | HEIGHT: 72 IN | WEIGHT: 147 LBS

## 2018-01-12 DIAGNOSIS — N13.1 HYDRONEPHROSIS WITH URETERAL STRICTURE, NOT ELSEWHERE CLASSIFIED: Primary | ICD-10-CM

## 2018-01-12 DIAGNOSIS — Z85.51 HISTORY OF BLADDER CANCER: ICD-10-CM

## 2018-01-12 PROCEDURE — 99214 OFFICE O/P EST MOD 30 MIN: CPT | Mod: PBBFAC | Performed by: UROLOGY

## 2018-01-12 PROCEDURE — 99999 PR PBB SHADOW E&M-EST. PATIENT-LVL IV: CPT | Mod: PBBFAC,,, | Performed by: UROLOGY

## 2018-01-12 PROCEDURE — 99214 OFFICE O/P EST MOD 30 MIN: CPT | Mod: S$PBB,,, | Performed by: UROLOGY

## 2018-01-12 RX ORDER — CIPROFLOXACIN 2 MG/ML
400 INJECTION, SOLUTION INTRAVENOUS
Status: CANCELLED | OUTPATIENT
Start: 2018-01-12

## 2018-01-12 RX ORDER — OXYCODONE AND ACETAMINOPHEN 5; 325 MG/1; MG/1
1 TABLET ORAL EVERY 6 HOURS PRN
Qty: 30 TABLET | Refills: 0 | Status: ON HOLD | OUTPATIENT
Start: 2018-01-12 | End: 2018-01-22

## 2018-01-12 NOTE — PROGRESS NOTES
Subjective:       Patient ID: Blake Guillory is a 57 y.o. male who was referred by No ref. provider found    Chief Complaint:   Chief Complaint   Patient presents with    Follow-up     f/u from nephrostomy tube     History of Present Illness  History of Bladder Cancer  He has a history of bladder cancer. He is s/p cystectomy with ileal conduit in November 2012. From an oncologic standpoint, he is doing well. He has had issues with bilateral ureteral stricture. He underwent a bilateral ureteral reimplantation into his conduit several months later. His strictures recurred shortly afterwards.  He was managed with ureteral stent changes until one of the stent eroded through his conduit and into his bowels.    He had an Exploratory Laparotomy with replacement of ileal conduit and small bowel anastomosis on 12/30/2015.    He had a parastomal hernia repair in April by Dr. Mack.  He is healing nicely from that and has been discharged from his office.  He had a small bowel obstruction. that resolved.       He continues to have back pain in the morning and in the evening.  His stoma is not bothering him and he tells me that he has good urine output during the day and less at night.  He has worsening renal function and his right sided pain is becoming worse.      He had a right PCN placed by IR on 1/2/2018.  He is having a fair amount of pain at the PCN site.  He denies fever or hematuria.  He has had some nausea.    ACTIVE MEDICAL ISSUES:  Patient Active Problem List   Diagnosis    Insomnia    Lumbar facet arthropathy    Sacroiliitis    Spondylosis without myelopathy    DDD (degenerative disc disease)    History of bladder cancer    Hydronephrosis, bilateral    H/O primary malignant neoplasm of urinary bladder    Essential hypertension    Anemia of chronic disease    Moderate protein malnutrition    Chronic gout    Renal insufficiency    Body mass index (BMI) 20.0-20.9, adult    Personal history of bladder  cancer    Acquired hypothyroidism    Parastomal hernia without obstruction or gangrene    Small bowel obstruction    Hypoxemia    Increased PTH level    Severe malnutrition    COPD exacerbation    Hydronephrosis    History of primary malignant neoplasm of urinary bladder       PAST MEDICAL HISTORY  Past Medical History:   Diagnosis Date    Arthritis     Blood transfusion     Cancer     bladder    COPD (chronic obstructive pulmonary disease)     Frequency of urination     General anesthetics causing adverse effect in therapeutic use     pt states he wakes up very violently from anesthesia    History of urostomy     Hypertension     Limited joint range of motion right hip and leg    Mixed anxiety and depressive disorder     Personal history of bladder cancer     Thyroid disease     Ureteral stent displacement     Urinary retention     Wears glasses        PAST SURGICAL HISTORY:  Past Surgical History:   Procedure Laterality Date    ABDOMINAL SURGERY      APPENDECTOMY  12/30/2015    Procedure: APPENDECTOMY;  Surgeon: CHAD Bo MD;  Location: [facility] OR;  Service: Urology;;    bladder removed      BOWEL RESECTION      CYSTOSCOPY      >1  episodes for bilat ureteral stent exchange    FRACTURE SURGERY      HAND SURGERY      HERNIA REPAIR      amalia    HIP SURGERY  2012    Rt hip septic    MOUTH SURGERY  2000    all teeth extracted    nephrostomy tube placement      turbt      urostomy         SOCIAL HISTORY:  Social History   Substance Use Topics    Smoking status: Current Some Day Smoker     Packs/day: 0.30     Years: 42.00     Last attempt to quit: 2/1/2017    Smokeless tobacco: Never Used    Alcohol use 0.0 oz/week     2 - 4 Cans of beer per week      Comment: occassional       FAMILY HISTORY:  Family History   Problem Relation Age of Onset    Adopted: Yes       ALLERGIES AND MEDICATIONS: updated and reviewed.  Review of patient's allergies indicates:  No Known  Allergies  Current Outpatient Prescriptions   Medication Sig    allopurinol (ZYLOPRIM) 100 MG tablet TAKE 1 TABLET(100 MG) BY MOUTH EVERY DAY    amLODIPine (NORVASC) 5 MG tablet TAKE 1 TABLET(5 MG) BY MOUTH EVERY DAY    atenolol (TENORMIN) 50 MG tablet TAKE 1 TABLET(50 MG) BY MOUTH EVERY DAY    ergocalciferol (VITAMIN D2) 50,000 unit Cap Take 1 capsule (50,000 Units total) by mouth every 7 days.    escitalopram oxalate (LEXAPRO) 10 MG tablet Take 1 tablet (10 mg total) by mouth once daily. Pt to take 15 mg daily (1.5) pills for depression    fluticasone-vilanterol (BREO ELLIPTA) 200-25 mcg/dose DsDv diskus inhaler Inhale 1 puff into the lungs once daily. Controller    fluticasone-vilanterol (BREO ELLIPTA) 200-25 mcg/dose DsDv diskus inhaler Inhale 1 puff into the lungs once daily.    gabapentin (NEURONTIN) 400 MG capsule TAKE 1 CAPSULE(400 MG) BY MOUTH TWICE DAILY    hydrocodone-acetaminophen 10-325mg (NORCO)  mg Tab Take 1 tablet by mouth every 6 (six) hours as needed for Pain.    levalbuterol (XOPENEX) 0.63 mg/3 mL nebulizer solution Take 3 mLs (0.63 mg total) by nebulization every 8 (eight) hours as needed for Wheezing or Shortness of Breath. Rescue    levothyroxine (SYNTHROID) 88 MCG tablet Take 1 tablet (88 mcg total) by mouth once daily.    oxyCODONE-acetaminophen (PERCOCET) 5-325 mg per tablet TK 1 T PO  Q 4-6 H PRN P    sodium bicarbonate 325 MG tablet Take 325 mg by mouth 2 (two) times daily.    traZODone (DESYREL) 100 MG tablet Take 1 tablet (100 mg total) by mouth every evening.     No current facility-administered medications for this visit.        Review of Systems   Constitutional: Negative for activity change, fatigue, fever and unexpected weight change.   HENT: Negative for congestion.    Eyes: Negative for redness.   Respiratory: Negative for chest tightness and shortness of breath.    Cardiovascular: Negative for chest pain and leg swelling.   Gastrointestinal: Negative for  abdominal pain, constipation, diarrhea, nausea and vomiting.   Genitourinary: Negative for dysuria, flank pain, frequency, hematuria, penile pain, penile swelling, scrotal swelling, testicular pain and urgency.   Musculoskeletal: Negative for arthralgias and back pain.   Neurological: Negative for dizziness and light-headedness.   Psychiatric/Behavioral: Negative for behavioral problems and confusion. The patient is not nervous/anxious.    All other systems reviewed and are negative.      Objective:      Vitals:    01/12/18 1323   Weight: 66.7 kg (147 lb)   Height: 6' (1.829 m)     Physical Exam   Nursing note and vitals reviewed.  Constitutional: He is oriented to person, place, and time. He appears well-developed and well-nourished.   HENT:   Head: Normocephalic.   Eyes: Conjunctivae are normal.   Neck: Normal range of motion. Neck supple. No tracheal deviation present. No thyromegaly present.   Cardiovascular: Normal rate and normal heart sounds.    Pulmonary/Chest: Effort normal and breath sounds normal. No respiratory distress. He has no wheezes.   Abdominal: Soft. Bowel sounds are normal. There is no hepatosplenomegaly. There is no tenderness. There is no rebound and no CVA tenderness. No hernia.   Genitourinary:   Genitourinary Comments: Right PCN site clean, urine yellow   Musculoskeletal: Normal range of motion. He exhibits no edema or tenderness.   Lymphadenopathy:     He has no cervical adenopathy.   Neurological: He is alert and oriented to person, place, and time.   Skin: Skin is warm and dry. No rash noted. No erythema.     Psychiatric: He has a normal mood and affect. His behavior is normal. Judgment and thought content normal.           Assessment:       1. Hydronephrosis with ureteral stricture, not elsewhere classified    2. History of bladder cancer          Plan:       1. Hydronephrosis with ureteral stricture, not elsewhere classified  Plan for a percutaneous right ureteral dilation on Monday  1/22/2018    2. History of bladder cancer  stable              Follow-up in about 6 weeks (around 2/23/2018) for Follow up.

## 2018-01-12 NOTE — H&P
Subjective:       Patient ID: Blake Guillory is a 57 y.o. male who was referred by No ref. provider found    Chief Complaint:   Chief Complaint   Patient presents with    Follow-up     f/u from nephrostomy tube     History of Present Illness  History of Bladder Cancer  He has a history of bladder cancer. He is s/p cystectomy with ileal conduit in November 2012. From an oncologic standpoint, he is doing well. He has had issues with bilateral ureteral stricture. He underwent a bilateral ureteral reimplantation into his conduit several months later. His strictures recurred shortly afterwards.  He was managed with ureteral stent changes until one of the stent eroded through his conduit and into his bowels.    He had an Exploratory Laparotomy with replacement of ileal conduit and small bowel anastomosis on 12/30/2015.    He had a parastomal hernia repair in April by Dr. Mack.  He is healing nicely from that and has been discharged from his office.  He had a small bowel obstruction. that resolved.       He continues to have back pain in the morning and in the evening.  His stoma is not bothering him and he tells me that he has good urine output during the day and less at night.  He has worsening renal function and his right sided pain is becoming worse.      He had a right PCN placed by IR on 1/2/2018.  He is having a fair amount of pain at the PCN site.  He denies fever or hematuria.  He has had some nausea.    ACTIVE MEDICAL ISSUES:  Patient Active Problem List   Diagnosis    Insomnia    Lumbar facet arthropathy    Sacroiliitis    Spondylosis without myelopathy    DDD (degenerative disc disease)    History of bladder cancer    Hydronephrosis, bilateral    H/O primary malignant neoplasm of urinary bladder    Essential hypertension    Anemia of chronic disease    Moderate protein malnutrition    Chronic gout    Renal insufficiency    Body mass index (BMI) 20.0-20.9, adult    Personal history of bladder  cancer    Acquired hypothyroidism    Parastomal hernia without obstruction or gangrene    Small bowel obstruction    Hypoxemia    Increased PTH level    Severe malnutrition    COPD exacerbation    Hydronephrosis    History of primary malignant neoplasm of urinary bladder       PAST MEDICAL HISTORY  Past Medical History:   Diagnosis Date    Arthritis     Blood transfusion     Cancer     bladder    COPD (chronic obstructive pulmonary disease)     Frequency of urination     General anesthetics causing adverse effect in therapeutic use     pt states he wakes up very violently from anesthesia    History of urostomy     Hypertension     Limited joint range of motion right hip and leg    Mixed anxiety and depressive disorder     Personal history of bladder cancer     Thyroid disease     Ureteral stent displacement     Urinary retention     Wears glasses        PAST SURGICAL HISTORY:  Past Surgical History:   Procedure Laterality Date    ABDOMINAL SURGERY      APPENDECTOMY  12/30/2015    Procedure: APPENDECTOMY;  Surgeon: CHAD Bo MD;  Location: [facility] OR;  Service: Urology;;    bladder removed      BOWEL RESECTION      CYSTOSCOPY      >1  episodes for bilat ureteral stent exchange    FRACTURE SURGERY      HAND SURGERY      HERNIA REPAIR      amalia    HIP SURGERY  2012    Rt hip septic    MOUTH SURGERY  2000    all teeth extracted    nephrostomy tube placement      turbt      urostomy         SOCIAL HISTORY:  Social History   Substance Use Topics    Smoking status: Current Some Day Smoker     Packs/day: 0.30     Years: 42.00     Last attempt to quit: 2/1/2017    Smokeless tobacco: Never Used    Alcohol use 0.0 oz/week     2 - 4 Cans of beer per week      Comment: occassional       FAMILY HISTORY:  Family History   Problem Relation Age of Onset    Adopted: Yes       ALLERGIES AND MEDICATIONS: updated and reviewed.  Review of patient's allergies indicates:  No Known  Allergies  Current Outpatient Prescriptions   Medication Sig    allopurinol (ZYLOPRIM) 100 MG tablet TAKE 1 TABLET(100 MG) BY MOUTH EVERY DAY    amLODIPine (NORVASC) 5 MG tablet TAKE 1 TABLET(5 MG) BY MOUTH EVERY DAY    atenolol (TENORMIN) 50 MG tablet TAKE 1 TABLET(50 MG) BY MOUTH EVERY DAY    ergocalciferol (VITAMIN D2) 50,000 unit Cap Take 1 capsule (50,000 Units total) by mouth every 7 days.    escitalopram oxalate (LEXAPRO) 10 MG tablet Take 1 tablet (10 mg total) by mouth once daily. Pt to take 15 mg daily (1.5) pills for depression    fluticasone-vilanterol (BREO ELLIPTA) 200-25 mcg/dose DsDv diskus inhaler Inhale 1 puff into the lungs once daily. Controller    fluticasone-vilanterol (BREO ELLIPTA) 200-25 mcg/dose DsDv diskus inhaler Inhale 1 puff into the lungs once daily.    gabapentin (NEURONTIN) 400 MG capsule TAKE 1 CAPSULE(400 MG) BY MOUTH TWICE DAILY    hydrocodone-acetaminophen 10-325mg (NORCO)  mg Tab Take 1 tablet by mouth every 6 (six) hours as needed for Pain.    levalbuterol (XOPENEX) 0.63 mg/3 mL nebulizer solution Take 3 mLs (0.63 mg total) by nebulization every 8 (eight) hours as needed for Wheezing or Shortness of Breath. Rescue    levothyroxine (SYNTHROID) 88 MCG tablet Take 1 tablet (88 mcg total) by mouth once daily.    oxyCODONE-acetaminophen (PERCOCET) 5-325 mg per tablet TK 1 T PO  Q 4-6 H PRN P    sodium bicarbonate 325 MG tablet Take 325 mg by mouth 2 (two) times daily.    traZODone (DESYREL) 100 MG tablet Take 1 tablet (100 mg total) by mouth every evening.     No current facility-administered medications for this visit.        Review of Systems   Constitutional: Negative for activity change, fatigue, fever and unexpected weight change.   HENT: Negative for congestion.    Eyes: Negative for redness.   Respiratory: Negative for chest tightness and shortness of breath.    Cardiovascular: Negative for chest pain and leg swelling.   Gastrointestinal: Negative for  abdominal pain, constipation, diarrhea, nausea and vomiting.   Genitourinary: Negative for dysuria, flank pain, frequency, hematuria, penile pain, penile swelling, scrotal swelling, testicular pain and urgency.   Musculoskeletal: Negative for arthralgias and back pain.   Neurological: Negative for dizziness and light-headedness.   Psychiatric/Behavioral: Negative for behavioral problems and confusion. The patient is not nervous/anxious.    All other systems reviewed and are negative.      Objective:      Vitals:    01/12/18 1323   Weight: 66.7 kg (147 lb)   Height: 6' (1.829 m)     Physical Exam   Nursing note and vitals reviewed.  Constitutional: He is oriented to person, place, and time. He appears well-developed and well-nourished.   HENT:   Head: Normocephalic.   Eyes: Conjunctivae are normal.   Neck: Normal range of motion. Neck supple. No tracheal deviation present. No thyromegaly present.   Cardiovascular: Normal rate and normal heart sounds.    Pulmonary/Chest: Effort normal and breath sounds normal. No respiratory distress. He has no wheezes.   Abdominal: Soft. Bowel sounds are normal. There is no hepatosplenomegaly. There is no tenderness. There is no rebound and no CVA tenderness. No hernia.   Genitourinary:   Genitourinary Comments: Right PCN site clean, urine yellow   Musculoskeletal: Normal range of motion. He exhibits no edema or tenderness.   Lymphadenopathy:     He has no cervical adenopathy.   Neurological: He is alert and oriented to person, place, and time.   Skin: Skin is warm and dry. No rash noted. No erythema.     Psychiatric: He has a normal mood and affect. His behavior is normal. Judgment and thought content normal.           Assessment:       1. Hydronephrosis with ureteral stricture, not elsewhere classified    2. History of bladder cancer          Plan:       1. Hydronephrosis with ureteral stricture, not elsewhere classified  Plan for a percutaneous right ureteral dilation on Monday  1/22/2018    2. History of bladder cancer  stable              Follow-up in about 6 weeks (around 2/23/2018) for Follow up.

## 2018-01-15 ENCOUNTER — CLINICAL SUPPORT (OUTPATIENT)
Dept: SMOKING CESSATION | Facility: CLINIC | Age: 58
End: 2018-01-15
Payer: COMMERCIAL

## 2018-01-15 DIAGNOSIS — F17.200 NICOTINE DEPENDENCE: Primary | ICD-10-CM

## 2018-01-15 PROCEDURE — 99407 BEHAV CHNG SMOKING > 10 MIN: CPT | Mod: S$GLB,,,

## 2018-01-18 ENCOUNTER — TELEPHONE (OUTPATIENT)
Dept: UROLOGY | Facility: CLINIC | Age: 58
End: 2018-01-18

## 2018-01-18 ENCOUNTER — HOSPITAL ENCOUNTER (OUTPATIENT)
Dept: PREADMISSION TESTING | Facility: HOSPITAL | Age: 58
Discharge: HOME | End: 2018-01-18
Attending: UROLOGY
Payer: MEDICAID

## 2018-01-18 VITALS
RESPIRATION RATE: 18 BRPM | BODY MASS INDEX: 21.4 KG/M2 | TEMPERATURE: 98 F | OXYGEN SATURATION: 95 % | HEART RATE: 80 BPM | WEIGHT: 158 LBS | SYSTOLIC BLOOD PRESSURE: 120 MMHG | DIASTOLIC BLOOD PRESSURE: 68 MMHG | HEIGHT: 72 IN

## 2018-01-18 DIAGNOSIS — Z01.818 PRE-OP TESTING: Primary | ICD-10-CM

## 2018-01-18 DIAGNOSIS — N23 RENAL COLIC ON RIGHT SIDE: ICD-10-CM

## 2018-01-18 DIAGNOSIS — Z85.51 HISTORY OF BLADDER CANCER: ICD-10-CM

## 2018-01-18 DIAGNOSIS — Z85.51 H/O PRIMARY MALIGNANT NEOPLASM OF URINARY BLADDER: Chronic | ICD-10-CM

## 2018-01-18 DIAGNOSIS — N13.30 HYDRONEPHROSIS, UNSPECIFIED HYDRONEPHROSIS TYPE: ICD-10-CM

## 2018-01-18 DIAGNOSIS — N13.1 HYDRONEPHROSIS WITH URETERAL STRICTURE, NOT ELSEWHERE CLASSIFIED: ICD-10-CM

## 2018-01-18 LAB
ANION GAP SERPL CALC-SCNC: 9 MMOL/L
BASOPHILS # BLD AUTO: 0.06 K/UL
BASOPHILS NFR BLD: 0.9 %
BUN SERPL-MCNC: 27 MG/DL
CALCIUM SERPL-MCNC: 9.1 MG/DL
CHLORIDE SERPL-SCNC: 108 MMOL/L
CO2 SERPL-SCNC: 22 MMOL/L
CREAT SERPL-MCNC: 1.8 MG/DL
DIFFERENTIAL METHOD: ABNORMAL
EOSINOPHIL # BLD AUTO: 0.2 K/UL
EOSINOPHIL NFR BLD: 2.5 %
ERYTHROCYTE [DISTWIDTH] IN BLOOD BY AUTOMATED COUNT: 13.4 %
EST. GFR  (AFRICAN AMERICAN): 47 ML/MIN/1.73 M^2
EST. GFR  (NON AFRICAN AMERICAN): 40 ML/MIN/1.73 M^2
GLUCOSE SERPL-MCNC: 82 MG/DL
HCT VFR BLD AUTO: 37.6 %
HGB BLD-MCNC: 12.4 G/DL
LYMPHOCYTES # BLD AUTO: 1 K/UL
LYMPHOCYTES NFR BLD: 14.1 %
MCH RBC QN AUTO: 31.7 PG
MCHC RBC AUTO-ENTMCNC: 33 G/DL
MCV RBC AUTO: 96 FL
MONOCYTES # BLD AUTO: 0.5 K/UL
MONOCYTES NFR BLD: 7.4 %
NEUTROPHILS # BLD AUTO: 5 K/UL
NEUTROPHILS NFR BLD: 75 %
PLATELET # BLD AUTO: 271 K/UL
PMV BLD AUTO: 9.9 FL
POTASSIUM SERPL-SCNC: 4.9 MMOL/L
RBC # BLD AUTO: 3.91 M/UL
SODIUM SERPL-SCNC: 139 MMOL/L
WBC # BLD AUTO: 6.72 K/UL

## 2018-01-18 PROCEDURE — 93005 ELECTROCARDIOGRAM TRACING: CPT

## 2018-01-18 PROCEDURE — 85025 COMPLETE CBC W/AUTO DIFF WBC: CPT

## 2018-01-18 PROCEDURE — 80048 BASIC METABOLIC PNL TOTAL CA: CPT

## 2018-01-18 PROCEDURE — 36415 COLL VENOUS BLD VENIPUNCTURE: CPT

## 2018-01-18 PROCEDURE — 93010 ELECTROCARDIOGRAM REPORT: CPT | Mod: ,,, | Performed by: INTERNAL MEDICINE

## 2018-01-18 RX ORDER — HYDROCODONE BITARTRATE AND ACETAMINOPHEN 10; 325 MG/1; MG/1
1 TABLET ORAL EVERY 6 HOURS PRN
Qty: 30 TABLET | Refills: 0 | Status: SHIPPED | OUTPATIENT
Start: 2018-01-18 | End: 2018-01-26 | Stop reason: SDUPTHER

## 2018-01-18 NOTE — DISCHARGE INSTRUCTIONS
Your surgery is scheduled for____1/22/2018_____________.    Call [phone] between 2 pm and 5 pm ___1/19/2018_________ to find out your arrival time for the day of surgery.    Report to SAME DAY SURGERY UNIT at _______am on the 2nd floor of the hospital.  Use the front entrance of the hospital before 6 am.  If you need wheelchair assistance, call [phone] from your cell phone,  or call 0 from the courtesy phone in the hospital lobby.    Important instructions:   Do not eat or drink after 12 midnight, including water.  It is okay to brush your teeth.  Do not have gum, candy or mints.     Take only these medications with a small swallow of water on the morning of your surgery.__amlodipine, atenolol, lexapro, inhalers, gabapentin, levothyroxine, pain medicine___________       Please shower the night before and the morning of your surgery.         You may wear deodorant only.      Do not wear powder, body lotion or cologne.     Do not wear any jewelry or have any metal on your body.     Please bring any documents given to you by your doctor.     If you are going home on the same day of surgery, you must have arrangements for a ride home.  You will not be able to drive home if you were given anesthesia or sedation.     Stop taking Aspirin, Ibuprofen, Motrin and Aleve at least 7 days before your surgery. You may use Tylenol.     Stop taking fish oil and vitamin E for least 7 days before surgery.     Wear loose fitting clothes allowing for bandages.     Please leave money and valuables home.       You may bring your cell phone.     Call the doctor if fever or illness should occur before your surgery.    Call [phone] to contact us here at [facility] if needed.

## 2018-01-19 ENCOUNTER — ANESTHESIA EVENT (OUTPATIENT)
Dept: SURGERY | Facility: HOSPITAL | Age: 58
End: 2018-01-19
Payer: MEDICAID

## 2018-01-22 ENCOUNTER — SURGERY (OUTPATIENT)
Age: 58
End: 2018-01-22

## 2018-01-22 ENCOUNTER — HOSPITAL ENCOUNTER (OUTPATIENT)
Facility: HOSPITAL | Age: 58
Discharge: HOME | End: 2018-01-22
Attending: UROLOGY | Admitting: UROLOGY
Payer: MEDICAID

## 2018-01-22 ENCOUNTER — ANESTHESIA (OUTPATIENT)
Dept: SURGERY | Facility: HOSPITAL | Age: 58
End: 2018-01-22
Payer: MEDICAID

## 2018-01-22 VITALS
WEIGHT: 157.88 LBS | DIASTOLIC BLOOD PRESSURE: 58 MMHG | BODY MASS INDEX: 21.38 KG/M2 | TEMPERATURE: 97 F | HEIGHT: 72 IN | RESPIRATION RATE: 16 BRPM | OXYGEN SATURATION: 92 % | HEART RATE: 63 BPM | SYSTOLIC BLOOD PRESSURE: 109 MMHG

## 2018-01-22 DIAGNOSIS — N13.1 HYDRONEPHROSIS WITH URETERAL STRICTURE, NOT ELSEWHERE CLASSIFIED: ICD-10-CM

## 2018-01-22 DIAGNOSIS — Z85.51 HISTORY OF BLADDER CANCER: Primary | ICD-10-CM

## 2018-01-22 PROCEDURE — D9220A PRA ANESTHESIA: Mod: ANES,,, | Performed by: ANESTHESIOLOGY

## 2018-01-22 PROCEDURE — 88300 SURGICAL PATH GROSS: CPT | Performed by: PATHOLOGY

## 2018-01-22 PROCEDURE — 00862 ANES XTRPRTL LWR ABD RNL PX: CPT | Performed by: UROLOGY

## 2018-01-22 PROCEDURE — 25000003 PHARM REV CODE 250: Performed by: UROLOGY

## 2018-01-22 PROCEDURE — C1726 CATH, BAL DIL, NON-VASCULAR: HCPCS | Performed by: UROLOGY

## 2018-01-22 PROCEDURE — C1769 GUIDE WIRE: HCPCS | Performed by: UROLOGY

## 2018-01-22 PROCEDURE — 37000008 HC ANESTHESIA 1ST 15 MINUTES: Performed by: UROLOGY

## 2018-01-22 PROCEDURE — 63600175 PHARM REV CODE 636 W HCPCS: Performed by: NURSE ANESTHETIST, CERTIFIED REGISTERED

## 2018-01-22 PROCEDURE — C1758 CATHETER, URETERAL: HCPCS | Performed by: UROLOGY

## 2018-01-22 PROCEDURE — 50431 NJX PX NFROSGRM &/URTRGRM: CPT | Mod: 51,RT,, | Performed by: UROLOGY

## 2018-01-22 PROCEDURE — 25000003 PHARM REV CODE 250: Performed by: ANESTHESIOLOGY

## 2018-01-22 PROCEDURE — 36000705 HC OR TIME LEV I EA ADD 15 MIN: Performed by: UROLOGY

## 2018-01-22 PROCEDURE — 36000706: Performed by: UROLOGY

## 2018-01-22 PROCEDURE — 71000016 HC POSTOP RECOV ADDL HR: Performed by: UROLOGY

## 2018-01-22 PROCEDURE — 37000009 HC ANESTHESIA EA ADD 15 MINS: Performed by: UROLOGY

## 2018-01-22 PROCEDURE — 71000015 HC POSTOP RECOV 1ST HR: Performed by: UROLOGY

## 2018-01-22 PROCEDURE — C2628 CATHETER, OCCLUSION: HCPCS | Performed by: UROLOGY

## 2018-01-22 PROCEDURE — 63600175 PHARM REV CODE 636 W HCPCS: Performed by: UROLOGY

## 2018-01-22 PROCEDURE — 25000003 PHARM REV CODE 250: Performed by: NURSE ANESTHETIST, CERTIFIED REGISTERED

## 2018-01-22 PROCEDURE — D9220A PRA ANESTHESIA: Mod: CRNA,,, | Performed by: NURSE ANESTHETIST, CERTIFIED REGISTERED

## 2018-01-22 PROCEDURE — 71000033 HC RECOVERY, INTIAL HOUR: Performed by: UROLOGY

## 2018-01-22 PROCEDURE — 36000707: Performed by: UROLOGY

## 2018-01-22 PROCEDURE — 88300 SURGICAL PATH GROSS: CPT | Mod: 26,,, | Performed by: PATHOLOGY

## 2018-01-22 PROCEDURE — 36000704 HC OR TIME LEV I 1ST 15 MIN: Performed by: UROLOGY

## 2018-01-22 PROCEDURE — 50553 KIDNEY ENDOSCOPY: CPT | Mod: RT,,, | Performed by: UROLOGY

## 2018-01-22 RX ORDER — HYDROMORPHONE HYDROCHLORIDE 2 MG/ML
0.2 INJECTION, SOLUTION INTRAMUSCULAR; INTRAVENOUS; SUBCUTANEOUS EVERY 5 MIN PRN
Status: DISCONTINUED | OUTPATIENT
Start: 2018-01-22 | End: 2018-01-22 | Stop reason: HOSPADM

## 2018-01-22 RX ORDER — MIDAZOLAM HYDROCHLORIDE 1 MG/ML
INJECTION, SOLUTION INTRAMUSCULAR; INTRAVENOUS
Status: DISCONTINUED | OUTPATIENT
Start: 2018-01-22 | End: 2018-01-22

## 2018-01-22 RX ORDER — CIPROFLOXACIN 2 MG/ML
400 INJECTION, SOLUTION INTRAVENOUS
Status: COMPLETED | OUTPATIENT
Start: 2018-01-22 | End: 2018-01-22

## 2018-01-22 RX ORDER — PHENYLEPHRINE HYDROCHLORIDE 10 MG/ML
INJECTION INTRAVENOUS
Status: DISCONTINUED | OUTPATIENT
Start: 2018-01-22 | End: 2018-01-22

## 2018-01-22 RX ORDER — ONDANSETRON 2 MG/ML
INJECTION INTRAMUSCULAR; INTRAVENOUS
Status: DISCONTINUED | OUTPATIENT
Start: 2018-01-22 | End: 2018-01-22

## 2018-01-22 RX ORDER — MEPERIDINE HYDROCHLORIDE 50 MG/ML
12.5 INJECTION INTRAMUSCULAR; INTRAVENOUS; SUBCUTANEOUS ONCE AS NEEDED
Status: DISCONTINUED | OUTPATIENT
Start: 2018-01-22 | End: 2018-01-22 | Stop reason: HOSPADM

## 2018-01-22 RX ORDER — PROPOFOL 10 MG/ML
VIAL (ML) INTRAVENOUS
Status: DISCONTINUED | OUTPATIENT
Start: 2018-01-22 | End: 2018-01-22

## 2018-01-22 RX ORDER — METOCLOPRAMIDE HYDROCHLORIDE 5 MG/ML
10 INJECTION INTRAMUSCULAR; INTRAVENOUS EVERY 10 MIN PRN
Status: DISCONTINUED | OUTPATIENT
Start: 2018-01-22 | End: 2018-01-22 | Stop reason: HOSPADM

## 2018-01-22 RX ORDER — HYDROCODONE BITARTRATE AND ACETAMINOPHEN 10; 325 MG/1; MG/1
1 TABLET ORAL EVERY 4 HOURS PRN
Status: DISCONTINUED | OUTPATIENT
Start: 2018-01-22 | End: 2018-01-22 | Stop reason: HOSPADM

## 2018-01-22 RX ORDER — HYDROCODONE BITARTRATE AND ACETAMINOPHEN 5; 325 MG/1; MG/1
1 TABLET ORAL EVERY 4 HOURS PRN
Status: DISCONTINUED | OUTPATIENT
Start: 2018-01-22 | End: 2018-01-22 | Stop reason: HOSPADM

## 2018-01-22 RX ORDER — OXYCODONE AND ACETAMINOPHEN 5; 325 MG/1; MG/1
1 TABLET ORAL
Status: DISCONTINUED | OUTPATIENT
Start: 2018-01-22 | End: 2018-01-22 | Stop reason: HOSPADM

## 2018-01-22 RX ORDER — ROCURONIUM BROMIDE 10 MG/ML
INJECTION, SOLUTION INTRAVENOUS
Status: DISCONTINUED | OUTPATIENT
Start: 2018-01-22 | End: 2018-01-22

## 2018-01-22 RX ORDER — FENTANYL CITRATE 50 UG/ML
INJECTION, SOLUTION INTRAMUSCULAR; INTRAVENOUS
Status: DISCONTINUED | OUTPATIENT
Start: 2018-01-22 | End: 2018-01-22

## 2018-01-22 RX ORDER — CIPROFLOXACIN 500 MG/1
500 TABLET ORAL 2 TIMES DAILY
Qty: 6 TABLET | Refills: 0 | Status: SHIPPED | OUTPATIENT
Start: 2018-01-22 | End: 2018-01-25

## 2018-01-22 RX ORDER — SUCCINYLCHOLINE CHLORIDE 20 MG/ML
INJECTION INTRAMUSCULAR; INTRAVENOUS
Status: DISCONTINUED | OUTPATIENT
Start: 2018-01-22 | End: 2018-01-22

## 2018-01-22 RX ORDER — GLYCOPYRROLATE 0.2 MG/ML
INJECTION INTRAMUSCULAR; INTRAVENOUS
Status: DISCONTINUED | OUTPATIENT
Start: 2018-01-22 | End: 2018-01-22

## 2018-01-22 RX ORDER — LIDOCAINE HCL/PF 100 MG/5ML
SYRINGE (ML) INTRAVENOUS
Status: DISCONTINUED | OUTPATIENT
Start: 2018-01-22 | End: 2018-01-22

## 2018-01-22 RX ORDER — SODIUM CHLORIDE 0.9 % (FLUSH) 0.9 %
3 SYRINGE (ML) INJECTION
Status: DISCONTINUED | OUTPATIENT
Start: 2018-01-22 | End: 2018-01-22 | Stop reason: HOSPADM

## 2018-01-22 RX ORDER — SODIUM CHLORIDE, SODIUM LACTATE, POTASSIUM CHLORIDE, CALCIUM CHLORIDE 600; 310; 30; 20 MG/100ML; MG/100ML; MG/100ML; MG/100ML
INJECTION, SOLUTION INTRAVENOUS CONTINUOUS
Status: DISCONTINUED | OUTPATIENT
Start: 2018-01-22 | End: 2018-01-22 | Stop reason: HOSPADM

## 2018-01-22 RX ORDER — LIDOCAINE HYDROCHLORIDE 10 MG/ML
1 INJECTION, SOLUTION EPIDURAL; INFILTRATION; INTRACAUDAL; PERINEURAL ONCE
Status: DISCONTINUED | OUTPATIENT
Start: 2018-01-22 | End: 2018-01-22 | Stop reason: HOSPADM

## 2018-01-22 RX ADMIN — SODIUM CHLORIDE, SODIUM LACTATE, POTASSIUM CHLORIDE, AND CALCIUM CHLORIDE: 600; 310; 30; 20 INJECTION, SOLUTION INTRAVENOUS at 08:01

## 2018-01-22 RX ADMIN — CIPROFLOXACIN 400 MG: 2 INJECTION, SOLUTION INTRAVENOUS at 09:01

## 2018-01-22 RX ADMIN — PHENYLEPHRINE HYDROCHLORIDE 200 MCG: 10 INJECTION INTRAVENOUS at 09:01

## 2018-01-22 RX ADMIN — ROCURONIUM BROMIDE 5 MG: 10 INJECTION, SOLUTION INTRAVENOUS at 08:01

## 2018-01-22 RX ADMIN — SUCCINYLCHOLINE CHLORIDE 120 MG: 20 INJECTION, SOLUTION INTRAMUSCULAR; INTRAVENOUS at 08:01

## 2018-01-22 RX ADMIN — GLYCOPYRROLATE 0.2 MG: 0.2 INJECTION, SOLUTION INTRAMUSCULAR; INTRAVENOUS at 09:01

## 2018-01-22 RX ADMIN — LIDOCAINE HYDROCHLORIDE 80 MG: 20 INJECTION, SOLUTION INTRAVENOUS at 08:01

## 2018-01-22 RX ADMIN — MIDAZOLAM HYDROCHLORIDE 2 MG: 1 INJECTION, SOLUTION INTRAMUSCULAR; INTRAVENOUS at 08:01

## 2018-01-22 RX ADMIN — PROPOFOL 150 MG: 10 INJECTION, EMULSION INTRAVENOUS at 08:01

## 2018-01-22 RX ADMIN — FENTANYL CITRATE 100 MCG: 50 INJECTION INTRAMUSCULAR; INTRAVENOUS at 08:01

## 2018-01-22 RX ADMIN — HYDROCODONE BITARTRATE AND ACETAMINOPHEN 1 TABLET: 10; 325 TABLET ORAL at 10:01

## 2018-01-22 RX ADMIN — ONDANSETRON 4 MG: 2 INJECTION, SOLUTION INTRAMUSCULAR; INTRAVENOUS at 09:01

## 2018-01-22 RX ADMIN — SUCCINYLCHOLINE CHLORIDE 30 MG: 20 INJECTION, SOLUTION INTRAMUSCULAR; INTRAVENOUS at 09:01

## 2018-01-22 NOTE — TRANSFER OF CARE
Anesthesia Transfer of Care Note    Patient: Blake Guillory    Procedure(s) Performed: Procedure(s) (LRB):  DILATATION-BALLOON-URETER (Right)    Patient location: PACU    Anesthesia Type: general    Transport from OR: Transported from OR on room air with adequate spontaneous ventilation    Post pain: adequate analgesia    Post assessment: no apparent anesthetic complications and tolerated procedure well    Post vital signs: stable    Level of consciousness: sedated and responds to stimulation    Nausea/Vomiting: no nausea/vomiting    Complications: none    Transfer of care protocol was followed      Last vitals:   Visit Vitals  /60   Pulse 75   Temp 36.1 °C (97 °F) (Axillary)   Resp 19   Ht 6' (1.829 m)   Wt 71.6 kg (157 lb 13.6 oz)   SpO2 97%   BMI 21.41 kg/m²

## 2018-01-22 NOTE — DISCHARGE SUMMARY
[facility]  Discharge Note  Short Stay    Admit Date: 1/22/2018    Discharge Date and Time: 01/22/2018 9:39 AM      Attending Physician: CHAD Bo MD     Discharge Provider: SHIN Bo    Diagnoses:  Active Hospital Problems    Diagnosis  POA    *Hydronephrosis with ureteral stricture, not elsewhere classified [N13.1]  Yes    History of bladder cancer [Z85.51]  Not Applicable      Resolved Hospital Problems    Diagnosis Date Resolved POA   No resolved problems to display.       Discharged Condition: stable    Hospital Course: Patient was admitted for an outpatient procedure and tolerated the procedure well with no complications.    Final Diagnoses: Same as principal problem.    Disposition: Home or Self Care    Follow up/Patient Instructions:    Medications:  Reconciled Home Medications:   Current Discharge Medication List      START taking these medications    Details   ciprofloxacin HCl (CIPRO) 500 MG tablet Take 1 tablet (500 mg total) by mouth 2 (two) times daily.  Qty: 6 tablet, Refills: 0    Associated Diagnoses: History of bladder cancer         CONTINUE these medications which have NOT CHANGED    Details   allopurinol (ZYLOPRIM) 100 MG tablet TAKE 1 TABLET(100 MG) BY MOUTH EVERY DAY  Qty: 90 tablet, Refills: 0    Associated Diagnoses: Gout synovitis      amLODIPine (NORVASC) 5 MG tablet TAKE 1 TABLET(5 MG) BY MOUTH EVERY DAY  Qty: 90 tablet, Refills: 0    Associated Diagnoses: Essential hypertension      atenolol (TENORMIN) 50 MG tablet TAKE 1 TABLET(50 MG) BY MOUTH EVERY DAY  Qty: 90 tablet, Refills: 0    Associated Diagnoses: Essential hypertension      escitalopram oxalate (LEXAPRO) 10 MG tablet Take 1 tablet (10 mg total) by mouth once daily. Pt to take 15 mg daily (1.5) pills for depression  Qty: 45 tablet, Refills: 2    Associated Diagnoses: Moderate single current episode of major depressive disorder; Current mild episode of major depressive disorder without prior episode       fluticasone-vilanterol (BREO ELLIPTA) 200-25 mcg/dose DsDv diskus inhaler Inhale 1 puff into the lungs once daily.  Qty: 60 each, Refills: 1    Associated Diagnoses: Simple chronic bronchitis      gabapentin (NEURONTIN) 400 MG capsule TAKE 1 CAPSULE(400 MG) BY MOUTH TWICE DAILY  Qty: 60 capsule, Refills: 2    Associated Diagnoses: Neuropathy; Stricture or kinking of ureter      hydrocodone-acetaminophen 10-325mg (NORCO)  mg Tab Take 1 tablet by mouth every 6 (six) hours as needed for Pain.  Qty: 30 tablet, Refills: 0    Associated Diagnoses: H/O primary malignant neoplasm of urinary bladder; Renal colic on right side; Hydronephrosis, unspecified hydronephrosis type      levothyroxine (SYNTHROID) 88 MCG tablet Take 1 tablet (88 mcg total) by mouth once daily.  Qty: 30 tablet, Refills: 2    Associated Diagnoses: Acquired hypothyroidism      sodium bicarbonate 325 MG tablet Take 325 mg by mouth 2 (two) times daily.      traZODone (DESYREL) 100 MG tablet Take 1 tablet (100 mg total) by mouth every evening.  Qty: 90 tablet, Refills: 0    Associated Diagnoses: Primary insomnia      ergocalciferol (VITAMIN D2) 50,000 unit Cap Take 1 capsule (50,000 Units total) by mouth every 7 days.  Qty: 12 capsule, Refills: 0    Associated Diagnoses: Vitamin D deficiency      levalbuterol (XOPENEX) 0.63 mg/3 mL nebulizer solution Take 3 mLs (0.63 mg total) by nebulization every 8 (eight) hours as needed for Wheezing or Shortness of Breath. Rescue  Qty: 3 mL, Refills: 1             Discharge Procedure Orders  Diet general     Activity as tolerated     Call MD for:   Order Comments: Significant Hematuria       Follow-up Information     W Vikash Bo MD. Schedule an appointment as soon as possible for a visit in 2 weeks.    Specialty:  Urology  Why:  Post op Check  Contact information:  [address] [place] [id number] [phone]                   Discharge Procedure Orders (must include Diet, Follow-up,  Activity):    Discharge Procedure Orders (must include Diet, Follow-up, Activity)  Diet general     Activity as tolerated     Call MD for:   Order Comments: Significant Hematuria

## 2018-01-22 NOTE — H&P (VIEW-ONLY)
Subjective:       Patient ID: Blake Guillory is a 57 y.o. male who was referred by No ref. provider found    Chief Complaint:   Chief Complaint   Patient presents with    Follow-up     f/u from nephrostomy tube     History of Present Illness  History of Bladder Cancer  He has a history of bladder cancer. He is s/p cystectomy with ileal conduit in November 2012. From an oncologic standpoint, he is doing well. He has had issues with bilateral ureteral stricture. He underwent a bilateral ureteral reimplantation into his conduit several months later. His strictures recurred shortly afterwards.  He was managed with ureteral stent changes until one of the stent eroded through his conduit and into his bowels.    He had an Exploratory Laparotomy with replacement of ileal conduit and small bowel anastomosis on 12/30/2015.    He had a parastomal hernia repair in April by Dr. Mack.  He is healing nicely from that and has been discharged from his office.  He had a small bowel obstruction. that resolved.       He continues to have back pain in the morning and in the evening.  His stoma is not bothering him and he tells me that he has good urine output during the day and less at night.  He has worsening renal function and his right sided pain is becoming worse.      He had a right PCN placed by IR on 1/2/2018.  He is having a fair amount of pain at the PCN site.  He denies fever or hematuria.  He has had some nausea.    ACTIVE MEDICAL ISSUES:  Patient Active Problem List   Diagnosis    Insomnia    Lumbar facet arthropathy    Sacroiliitis    Spondylosis without myelopathy    DDD (degenerative disc disease)    History of bladder cancer    Hydronephrosis, bilateral    H/O primary malignant neoplasm of urinary bladder    Essential hypertension    Anemia of chronic disease    Moderate protein malnutrition    Chronic gout    Renal insufficiency    Body mass index (BMI) 20.0-20.9, adult    Personal history of bladder  cancer    Acquired hypothyroidism    Parastomal hernia without obstruction or gangrene    Small bowel obstruction    Hypoxemia    Increased PTH level    Severe malnutrition    COPD exacerbation    Hydronephrosis    History of primary malignant neoplasm of urinary bladder       PAST MEDICAL HISTORY  Past Medical History:   Diagnosis Date    Arthritis     Blood transfusion     Cancer     bladder    COPD (chronic obstructive pulmonary disease)     Frequency of urination     General anesthetics causing adverse effect in therapeutic use     pt states he wakes up very violently from anesthesia    History of urostomy     Hypertension     Limited joint range of motion right hip and leg    Mixed anxiety and depressive disorder     Personal history of bladder cancer     Thyroid disease     Ureteral stent displacement     Urinary retention     Wears glasses        PAST SURGICAL HISTORY:  Past Surgical History:   Procedure Laterality Date    ABDOMINAL SURGERY      APPENDECTOMY  12/30/2015    Procedure: APPENDECTOMY;  Surgeon: CHAD Bo MD;  Location: [facility] OR;  Service: Urology;;    bladder removed      BOWEL RESECTION      CYSTOSCOPY      >1  episodes for bilat ureteral stent exchange    FRACTURE SURGERY      HAND SURGERY      HERNIA REPAIR      amalia    HIP SURGERY  2012    Rt hip septic    MOUTH SURGERY  2000    all teeth extracted    nephrostomy tube placement      turbt      urostomy         SOCIAL HISTORY:  Social History   Substance Use Topics    Smoking status: Current Some Day Smoker     Packs/day: 0.30     Years: 42.00     Last attempt to quit: 2/1/2017    Smokeless tobacco: Never Used    Alcohol use 0.0 oz/week     2 - 4 Cans of beer per week      Comment: occassional       FAMILY HISTORY:  Family History   Problem Relation Age of Onset    Adopted: Yes       ALLERGIES AND MEDICATIONS: updated and reviewed.  Review of patient's allergies indicates:  No Known  Allergies  Current Outpatient Prescriptions   Medication Sig    allopurinol (ZYLOPRIM) 100 MG tablet TAKE 1 TABLET(100 MG) BY MOUTH EVERY DAY    amLODIPine (NORVASC) 5 MG tablet TAKE 1 TABLET(5 MG) BY MOUTH EVERY DAY    atenolol (TENORMIN) 50 MG tablet TAKE 1 TABLET(50 MG) BY MOUTH EVERY DAY    ergocalciferol (VITAMIN D2) 50,000 unit Cap Take 1 capsule (50,000 Units total) by mouth every 7 days.    escitalopram oxalate (LEXAPRO) 10 MG tablet Take 1 tablet (10 mg total) by mouth once daily. Pt to take 15 mg daily (1.5) pills for depression    fluticasone-vilanterol (BREO ELLIPTA) 200-25 mcg/dose DsDv diskus inhaler Inhale 1 puff into the lungs once daily. Controller    fluticasone-vilanterol (BREO ELLIPTA) 200-25 mcg/dose DsDv diskus inhaler Inhale 1 puff into the lungs once daily.    gabapentin (NEURONTIN) 400 MG capsule TAKE 1 CAPSULE(400 MG) BY MOUTH TWICE DAILY    hydrocodone-acetaminophen 10-325mg (NORCO)  mg Tab Take 1 tablet by mouth every 6 (six) hours as needed for Pain.    levalbuterol (XOPENEX) 0.63 mg/3 mL nebulizer solution Take 3 mLs (0.63 mg total) by nebulization every 8 (eight) hours as needed for Wheezing or Shortness of Breath. Rescue    levothyroxine (SYNTHROID) 88 MCG tablet Take 1 tablet (88 mcg total) by mouth once daily.    oxyCODONE-acetaminophen (PERCOCET) 5-325 mg per tablet TK 1 T PO  Q 4-6 H PRN P    sodium bicarbonate 325 MG tablet Take 325 mg by mouth 2 (two) times daily.    traZODone (DESYREL) 100 MG tablet Take 1 tablet (100 mg total) by mouth every evening.     No current facility-administered medications for this visit.        Review of Systems   Constitutional: Negative for activity change, fatigue, fever and unexpected weight change.   HENT: Negative for congestion.    Eyes: Negative for redness.   Respiratory: Negative for chest tightness and shortness of breath.    Cardiovascular: Negative for chest pain and leg swelling.   Gastrointestinal: Negative for  abdominal pain, constipation, diarrhea, nausea and vomiting.   Genitourinary: Negative for dysuria, flank pain, frequency, hematuria, penile pain, penile swelling, scrotal swelling, testicular pain and urgency.   Musculoskeletal: Negative for arthralgias and back pain.   Neurological: Negative for dizziness and light-headedness.   Psychiatric/Behavioral: Negative for behavioral problems and confusion. The patient is not nervous/anxious.    All other systems reviewed and are negative.      Objective:      Vitals:    01/12/18 1323   Weight: 66.7 kg (147 lb)   Height: 6' (1.829 m)     Physical Exam   Nursing note and vitals reviewed.  Constitutional: He is oriented to person, place, and time. He appears well-developed and well-nourished.   HENT:   Head: Normocephalic.   Eyes: Conjunctivae are normal.   Neck: Normal range of motion. Neck supple. No tracheal deviation present. No thyromegaly present.   Cardiovascular: Normal rate and normal heart sounds.    Pulmonary/Chest: Effort normal and breath sounds normal. No respiratory distress. He has no wheezes.   Abdominal: Soft. Bowel sounds are normal. There is no hepatosplenomegaly. There is no tenderness. There is no rebound and no CVA tenderness. No hernia.   Genitourinary:   Genitourinary Comments: Right PCN site clean, urine yellow   Musculoskeletal: Normal range of motion. He exhibits no edema or tenderness.   Lymphadenopathy:     He has no cervical adenopathy.   Neurological: He is alert and oriented to person, place, and time.   Skin: Skin is warm and dry. No rash noted. No erythema.     Psychiatric: He has a normal mood and affect. His behavior is normal. Judgment and thought content normal.           Assessment:       1. Hydronephrosis with ureteral stricture, not elsewhere classified    2. History of bladder cancer          Plan:       1. Hydronephrosis with ureteral stricture, not elsewhere classified  Plan for a percutaneous right ureteral dilation on Monday  1/22/2018    2. History of bladder cancer  stable              Follow-up in about 6 weeks (around 2/23/2018) for Follow up.

## 2018-01-22 NOTE — BRIEF OP NOTE
[facility]  Brief Operative Note     SUMMARY     Surgery Date: 1/22/2018     Surgeon(s) and Role:     * CHAD Bo MD - Primary    Assisting Surgeon: None    Pre-op Diagnosis:  History of bladder cancer [Z85.51]  Hydronephrosis with ureteral stricture, not elsewhere classified [N13.1]    Post-op Diagnosis:  Post-Op Diagnosis Codes:     * History of bladder cancer [Z85.51]     * Hydronephrosis with ureteral stricture, not elsewhere classified [N13.1]    Procedure(s) (LRB):  DILATATION-BALLOON-URETER (Right)    Anesthesia: General    Description of the findings of the procedure: ureteral narrowing at ureteroconduit junction. Dilated. Stent placed    Findings/Key Components: as above    Estimated Blood Loss: * No values recorded between 1/22/2018  9:01 AM and 1/22/2018  9:38 AM *         Specimens:   Specimen (12h ago through future)    None          Discharge Note    SUMMARY     Admit Date: 1/22/2018    Discharge Date and Time:  01/22/2018 9:39 AM    Hospital Course (synopsis of major diagnoses, care, treatment, and services provided during the course of the hospital stay): Patient was admitted for an outpatient procedure and tolerated the procedure well with no complications.      Final Diagnosis: Post-Op Diagnosis Codes:     * History of bladder cancer [Z85.51]     * Hydronephrosis with ureteral stricture, not elsewhere classified [N13.1]    Disposition: Home or Self Care    Follow Up/Patient Instructions:     Medications:  Reconciled Home Medications:   Current Discharge Medication List      START taking these medications    Details   ciprofloxacin HCl (CIPRO) 500 MG tablet Take 1 tablet (500 mg total) by mouth 2 (two) times daily.  Qty: 6 tablet, Refills: 0    Associated Diagnoses: History of bladder cancer         CONTINUE these medications which have NOT CHANGED    Details   allopurinol (ZYLOPRIM) 100 MG tablet TAKE 1 TABLET(100 MG) BY MOUTH EVERY DAY  Qty: 90 tablet, Refills: 0     Associated Diagnoses: Gout synovitis      amLODIPine (NORVASC) 5 MG tablet TAKE 1 TABLET(5 MG) BY MOUTH EVERY DAY  Qty: 90 tablet, Refills: 0    Associated Diagnoses: Essential hypertension      atenolol (TENORMIN) 50 MG tablet TAKE 1 TABLET(50 MG) BY MOUTH EVERY DAY  Qty: 90 tablet, Refills: 0    Associated Diagnoses: Essential hypertension      escitalopram oxalate (LEXAPRO) 10 MG tablet Take 1 tablet (10 mg total) by mouth once daily. Pt to take 15 mg daily (1.5) pills for depression  Qty: 45 tablet, Refills: 2    Associated Diagnoses: Moderate single current episode of major depressive disorder; Current mild episode of major depressive disorder without prior episode      fluticasone-vilanterol (BREO ELLIPTA) 200-25 mcg/dose DsDv diskus inhaler Inhale 1 puff into the lungs once daily.  Qty: 60 each, Refills: 1    Associated Diagnoses: Simple chronic bronchitis      gabapentin (NEURONTIN) 400 MG capsule TAKE 1 CAPSULE(400 MG) BY MOUTH TWICE DAILY  Qty: 60 capsule, Refills: 2    Associated Diagnoses: Neuropathy; Stricture or kinking of ureter      hydrocodone-acetaminophen 10-325mg (NORCO)  mg Tab Take 1 tablet by mouth every 6 (six) hours as needed for Pain.  Qty: 30 tablet, Refills: 0    Associated Diagnoses: H/O primary malignant neoplasm of urinary bladder; Renal colic on right side; Hydronephrosis, unspecified hydronephrosis type      levothyroxine (SYNTHROID) 88 MCG tablet Take 1 tablet (88 mcg total) by mouth once daily.  Qty: 30 tablet, Refills: 2    Associated Diagnoses: Acquired hypothyroidism      sodium bicarbonate 325 MG tablet Take 325 mg by mouth 2 (two) times daily.      traZODone (DESYREL) 100 MG tablet Take 1 tablet (100 mg total) by mouth every evening.  Qty: 90 tablet, Refills: 0    Associated Diagnoses: Primary insomnia      ergocalciferol (VITAMIN D2) 50,000 unit Cap Take 1 capsule (50,000 Units total) by mouth every 7 days.  Qty: 12 capsule, Refills: 0    Associated Diagnoses: Vitamin D  deficiency      levalbuterol (XOPENEX) 0.63 mg/3 mL nebulizer solution Take 3 mLs (0.63 mg total) by nebulization every 8 (eight) hours as needed for Wheezing or Shortness of Breath. Rescue  Qty: 3 mL, Refills: 1             Discharge Procedure Orders  Diet general     Activity as tolerated     Call MD for:   Order Comments: Significant Hematuria       Follow-up Information     W Vikash Bo MD. Schedule an appointment as soon as possible for a visit in 2 weeks.    Specialty:  Urology  Why:  Post op Check  Contact information:  [address] [place] [id number] [phone]

## 2018-01-22 NOTE — ANESTHESIA PREPROCEDURE EVALUATION
01/22/2018  Blake Guillory is a 57 y.o., male.    Anesthesia Evaluation    I have reviewed the Patient Summary Reports.     I have reviewed the Medications.     Review of Systems  Anesthesia Hx:  Hx of Anesthetic complications    Social:  Smoker, Alcohol Use +marijuana use   Cardiovascular:   Hypertension    Pulmonary:   COPD Asthma    Renal/:   Chronic Renal Disease, CRI    Musculoskeletal:   Arthritis   Spine Disorders: Degenerative disease and Disc disease    Endocrine:   Hypothyroidism    Psych:   Psychiatric History          Physical Exam  General:  Well nourished, Large Beard    Airway/Jaw/Neck:  Airway Findings: Mouth Opening: Normal Tongue: Normal  General Airway Assessment: Adult  Mallampati: II  TM Distance: Normal, at least 6 cm  Jaw/Neck Findings:  Neck ROM: Normal ROM      Dental:  Dental Findings: In tact   Chest/Lungs:  Chest/Lungs Findings: Clear to auscultation, Normal Respiratory Rate     Heart/Vascular:  Heart Findings: Rate: Normal        Mental Status:  Mental Status Findings:  Cooperative, Alert and Oriented         Anesthesia Plan  Type of Anesthesia, risks & benefits discussed:  Anesthesia Type:  general  Patient's Preference:   Intra-op Monitoring Plan: standard ASA monitors  Intra-op Monitoring Plan Comments:   Post Op Pain Control Plan: multimodal analgesia, IV/PO Opioids PRN and per primary service following discharge from PACU  Post Op Pain Control Plan Comments:   Induction:   IV  Beta Blocker:  Patient is not currently on a Beta-Blocker (No further documentation required).       Informed Consent: Patient understands risks and agrees with Anesthesia plan.  Questions answered. Anesthesia consent signed with patient.  ASA Score: 3     Day of Surgery Review of History & Physical:    H&P update referred to the surgeon.         Ready For Surgery From Anesthesia Perspective.

## 2018-01-22 NOTE — DISCHARGE INSTRUCTIONS
BATHING:  ? You may shower tomorrow.  DRESSING:  ? Remove dressing tomorrow.        ACTIVITY LEVEL: If you have received sedation or an anesthetic, you may feel sleepy for several hours. Rest until you are more awake. Gradually resume your normal activities    Do not drive, drink alcohol, or sign legal documents for 24 hours, or if taking narcotic pain medication.      DIET: You may resume your home diet. If nausea is present, increase your diet gradually with fluids and bland foods.    Medications: Pain medication should be taken only if needed and as directed. If antibiotics are prescribed, the medication should be taken until completed. You will be given an updated list of you medications.  ? No driving, alcoholic beverages or signing legal documents for next 24 hours if you have had sedation, or while taking pain medication    CALL THE DOCTOR:   For any obvious bleeding (some dried blood over the incision is normal).     Redness, swelling, foul smell around incision or fever over 101.  Shortness of breath.  Persistent pain or nausea not relieved by medication.  Call  [phone]     to speak with an Interventional Radiologist    If any unusual problems or difficulties occur contact your doctor. If you cannot contact your doctor but feel your signs and symptoms warrant a physicians attention return to the emergency room.    Fall Prevention  Millions of people fall every year and injure themselves. You may have had anesthesia or sedation which may increase your risk of falling. You may have health issues that put you at an increased risk of falling.     Here are ways to reduce your risk of falling.  ·   · Make your home safe by keeping walkways clear of objects you may trip over.  · Use non-slip pads under rugs. Do not use area rugs or small throw rugs.  · Use non-slip mats in bathtubs and showers.  · Install handrails and lights on staircases.  · Do not walk in poorly lit areas.  · Do not stand on chairs or wobbly  ladders.  · Use caution when reaching overhead or looking upward. This position can cause a loss of balance.  · Be sure your shoes fit properly, have non-slip bottoms and are in good condition.   · Wear shoes both inside and out. Avoid going barefoot or wearing slippers.  · Be cautious when going up and down stairs, curbs, and when walking on uneven sidewalks.  · If your balance is poor, consider using a cane or walker.  · If your fall was related to alcohol use, stop or limit alcohol intake.   · If your fall was related to use of sleeping medicines, talk to your doctor about this. You may need to reduce your dosage at bedtime if you awaken during the night to go to the bathroom.    · To reduce the need for nighttime bathroom trips:  ¨ Avoid drinking fluids for several hours before going to bed  ¨ Empty your bladder before going to bed  ¨ Men can keep a urinal at the bedside  · Stay as active as you can. Balance, flexibility, strength, and endurance all come from exercise. They all play a role in preventing falls. Ask your healthcare provider which types of activity are right for you.  · Get your vision checked on a regular basis.  · If you have pets, know where they are before you stand up or walk so you don't trip over them.  · Use night lights.

## 2018-01-22 NOTE — ANESTHESIA POSTPROCEDURE EVALUATION
Anesthesia Post Evaluation    Patient: Blake Guillory    Procedure(s) Performed: Procedure(s) (LRB):  DILATATION-BALLOON-URETER (Right)    Final Anesthesia Type: general  Patient location during evaluation: PACU  Patient participation: Yes- Able to Participate  Level of consciousness: awake and alert, oriented and awake  Post-procedure vital signs: reviewed and stable  Pain management: adequate  Airway patency: patent  PONV status at discharge: No PONV  Anesthetic complications: no      Cardiovascular status: blood pressure returned to baseline  Respiratory status: unassisted and spontaneous ventilation  Hydration status: euvolemic  Follow-up not needed.        Visit Vitals  /60   Pulse 75   Temp 36.1 °C (97 °F) (Axillary)   Resp 19   Ht 6' (1.829 m)   Wt 71.6 kg (157 lb 13.6 oz)   SpO2 97%   BMI 21.41 kg/m²       Pain/Gissel Score: Pain Assessment Performed: Yes (1/22/2018  7:54 AM)  Presence of Pain: complains of pain/discomfort (1/22/2018  7:54 AM)

## 2018-01-23 NOTE — OP NOTE
DATE OF PROCEDURE:  01/22/2018.    PREOPERATIVE DIAGNOSIS:  Right ureteral stricture, right hydronephrosis, and   history of bladder cancer.    POSTOPERATIVE DIAGNOSIS:  Right ureteral stricture, right hydronephrosis, and   history of bladder cancer.    PROCEDURES PERFORMED:  Antegrade pyelogram on the right ureteral dilation, right   ureteral stent placement, fluoroscopy.    PRIMARY SURGEON:  Joo Bo M.D.    ANESTHESIA:  General.    ESTIMATED BLOOD LOSS:  Minimal.    DRAINS:  An 8-Somali x 22 cm double-J stents.    SPECIMENS REMOVED:  Nephrostomy tube.    COMPLICATIONS:  None.    INDICATIONS:  Blake Guillory is a 57-year-old male with a history of bladder   cancer.  He has a history of strictures in the ureter.  He has had progressive   hydronephrosis on the right as well as pain on the right side.  It was   recommended that he undergo ureteral dilation at the ureteroileal conduit   junction.  He previously had a nephrostomy tube placed by Interventional   Radiology.    Blake Guillory was taken to the Operating Room where he was positively identified   by armband.  He was left on the stretcher in the supine position.  After   following induction of adequate general anesthesia, he was then placed in the   prone position and his pressure points were then padded.    A  film was taken using fluoroscopy.  The right-sided nephrostomy tube was   visualized.    I then performed antegrade pyelogram through the nephrostomy tube to delineate   the pyelocalyceal structure as well as to delineate the ureter.  Contrast was   seen going down the ureter to the utero-conduit junction where the ureter was   very narrow.  Contrast was seen going into the conduit.    I then passed a hydrophilic tip sensor wire through the nephrostomy tube into   the lower pole bobby.  The nephrostomy tube was then withdrawn, leaving the wire   in place.    I then advanced a 5-Somali open-ended catheter over the wire and then I was  able   to use this to negotiate the wire into the renal collecting system and then   down into the renal pelvis and down the ureter.  The wire was seen going into   the conduit across the strictured area.    I then withdrew the open-ended catheter.    I then passed a 4 cm UroMax balloon dilating catheter over the wire down across   the ureteral conduit junction.  Next, I then using a LeVeen syringe inflated the   balloon and dilated the strictured area.  Waisting was seen to go out of the   balloon.    The balloon was then deflated and the catheter was withdrawn leaving the wire in   place.    I then advanced an 8-Luxembourger 22 cm double-J stent over the wire, deploying a coil   within the conduit and a coil within the kidney on the right side.    Bandage was then applied to the back.    His anesthesia was reversed.  He was taken to the Recovery Room in stable   condition.      ZEENAT  dd: 01/22/2018 09:44:07 (CST)  td: 01/22/2018 19:07:03 (CST)  Doc ID   #[id number]  Job ID #[id number]    CC:

## 2018-01-25 ENCOUNTER — TELEPHONE (OUTPATIENT)
Dept: UROLOGY | Facility: CLINIC | Age: 58
End: 2018-01-25

## 2018-01-25 DIAGNOSIS — Z85.51 H/O PRIMARY MALIGNANT NEOPLASM OF URINARY BLADDER: Chronic | ICD-10-CM

## 2018-01-25 DIAGNOSIS — N23 RENAL COLIC: Primary | ICD-10-CM

## 2018-01-25 DIAGNOSIS — N13.30 HYDRONEPHROSIS, UNSPECIFIED HYDRONEPHROSIS TYPE: ICD-10-CM

## 2018-01-25 DIAGNOSIS — N23 RENAL COLIC ON RIGHT SIDE: ICD-10-CM

## 2018-01-25 NOTE — TELEPHONE ENCOUNTER
----- Message from Liz Cobos sent at 1/25/2018  2:56 PM CST -----  Contact: self  Wife called stating that pain is worsening since procedure. Please contact Luke Guillory      [phone].    Thanks!

## 2018-01-25 NOTE — TELEPHONE ENCOUNTER
Spoke to pt he states since the procedure he is hurting worse I advised him if he cant take the pain he may want to go to the ed he states doesn't want to do that.  He states not having fever. I did offer him to come in tomorrow to see  he states would like to know if there is anything else he can do for pain because he would have to make some kind of arrangements to come in tomorrow. He also states will need an rx for pain medication because he cant go the weekend without medication. Please advise/red

## 2018-01-26 RX ORDER — OXYBUTYNIN CHLORIDE 5 MG/1
5 TABLET ORAL 3 TIMES DAILY PRN
Qty: 90 TABLET | Refills: 1 | Status: SHIPPED | OUTPATIENT
Start: 2018-01-26 | End: 2018-02-02 | Stop reason: DRUGHIGH

## 2018-01-26 RX ORDER — HYDROCODONE BITARTRATE AND ACETAMINOPHEN 10; 325 MG/1; MG/1
1 TABLET ORAL EVERY 6 HOURS PRN
Qty: 30 TABLET | Refills: 0 | Status: SHIPPED | OUTPATIENT
Start: 2018-01-26 | End: 2018-02-02 | Stop reason: SDUPTHER

## 2018-01-26 NOTE — TELEPHONE ENCOUNTER
Spoke to pt's wife advised her  will call in oxybutynin for bladder,pelvic,urethral pain an spams an rx for pain medication will be printed out this afternoon once  is out of surgery. [place] states she will come by the office to  the rx../red

## 2018-01-26 NOTE — TELEPHONE ENCOUNTER
I will send in Oxybutynin for possible ureteral spasms causing pain.  I will write for more narcotic but I will not be in to the office until this afternoon to get it to the desk.

## 2018-01-29 ENCOUNTER — CLINICAL SUPPORT (OUTPATIENT)
Dept: SMOKING CESSATION | Facility: CLINIC | Age: 58
End: 2018-01-29
Payer: COMMERCIAL

## 2018-01-29 DIAGNOSIS — F17.200 NICOTINE DEPENDENCE: Primary | ICD-10-CM

## 2018-01-29 PROCEDURE — 99404 PREV MED CNSL INDIV APPRX 60: CPT | Mod: S$GLB,,, | Performed by: FAMILY MEDICINE

## 2018-01-29 RX ORDER — IBUPROFEN 200 MG
1 TABLET ORAL DAILY
Qty: 28 PATCH | Refills: 0 | Status: SHIPPED | OUTPATIENT
Start: 2018-01-29 | End: 2018-02-22 | Stop reason: SDUPTHER

## 2018-01-29 RX ORDER — ASPIRIN/CALCIUM CARB/MAGNESIUM 325 MG
4 TABLET ORAL
Qty: 72 LOZENGE | Refills: 0 | Status: SHIPPED | OUTPATIENT
Start: 2018-01-29 | End: 2018-02-28

## 2018-01-29 NOTE — Clinical Note
Pt seen at intake today. He currently smokes 20 cigs/day. Discussed tobacco cessation medication of 21 mg nicotine patch QD and 4 mg nicotine lozenge PRN (1-2 per hour in place of cigarettes). Pt started on rate reduction and wait time of 15 min prior to smoking. Exhaled carbon monoxide level was 15 (0-6 non-smoker). Will see pt back in office in 2 wks.

## 2018-01-29 NOTE — PROGRESS NOTES
See Tobacco Cessation Intake Form for patient assessment and recommendations.  Exhaled carbon monoxide level was 15 ppm per Smokerlyzer.

## 2018-02-02 ENCOUNTER — TELEPHONE (OUTPATIENT)
Dept: UROLOGY | Facility: CLINIC | Age: 58
End: 2018-02-02

## 2018-02-02 DIAGNOSIS — N23 RENAL COLIC ON RIGHT SIDE: ICD-10-CM

## 2018-02-02 DIAGNOSIS — N13.30 HYDRONEPHROSIS, UNSPECIFIED HYDRONEPHROSIS TYPE: ICD-10-CM

## 2018-02-02 DIAGNOSIS — N23 RENAL COLIC: ICD-10-CM

## 2018-02-02 DIAGNOSIS — Z85.51 H/O PRIMARY MALIGNANT NEOPLASM OF URINARY BLADDER: Chronic | ICD-10-CM

## 2018-02-02 RX ORDER — HYDROCODONE BITARTRATE AND ACETAMINOPHEN 10; 325 MG/1; MG/1
1 TABLET ORAL EVERY 6 HOURS PRN
Qty: 30 TABLET | Refills: 0 | Status: SHIPPED | OUTPATIENT
Start: 2018-02-02 | End: 2018-02-12 | Stop reason: SDUPTHER

## 2018-02-02 RX ORDER — OXYBUTYNIN CHLORIDE 5 MG/1
10 TABLET ORAL 3 TIMES DAILY
Qty: 180 TABLET | Refills: 11 | Status: SHIPPED | OUTPATIENT
Start: 2018-02-02 | End: 2018-04-19

## 2018-02-02 NOTE — TELEPHONE ENCOUNTER
----- Message from Trista Krishnan sent at 2/2/2018  8:34 AM CST -----  Contact: self  Patient would like oxybutynin (DITROPAN) 5 to be a little stronger. Also patient would like a refill hydrocodone-acetaminophen 10-325mg . Patient can be reached at [phone].        Thanks,

## 2018-02-02 NOTE — TELEPHONE ENCOUNTER
Patient is asking for the oxybutynin to be raised in dosage and is requesting another for pain medication. Please advise.

## 2018-02-12 ENCOUNTER — TELEPHONE (OUTPATIENT)
Dept: UROLOGY | Facility: CLINIC | Age: 58
End: 2018-02-12

## 2018-02-12 DIAGNOSIS — Z85.51 H/O PRIMARY MALIGNANT NEOPLASM OF URINARY BLADDER: Chronic | ICD-10-CM

## 2018-02-12 DIAGNOSIS — N23 RENAL COLIC ON RIGHT SIDE: ICD-10-CM

## 2018-02-12 DIAGNOSIS — N13.30 HYDRONEPHROSIS, UNSPECIFIED HYDRONEPHROSIS TYPE: ICD-10-CM

## 2018-02-12 RX ORDER — HYDROCODONE BITARTRATE AND ACETAMINOPHEN 10; 325 MG/1; MG/1
1 TABLET ORAL EVERY 6 HOURS PRN
Qty: 40 TABLET | Refills: 0 | Status: SHIPPED | OUTPATIENT
Start: 2018-02-12 | End: 2018-02-22 | Stop reason: SDUPTHER

## 2018-02-15 ENCOUNTER — CLINICAL SUPPORT (OUTPATIENT)
Dept: SMOKING CESSATION | Facility: CLINIC | Age: 58
End: 2018-02-15
Payer: COMMERCIAL

## 2018-02-15 DIAGNOSIS — F17.200 NICOTINE DEPENDENCE: ICD-10-CM

## 2018-02-15 PROCEDURE — 99402 PREV MED CNSL INDIV APPRX 30: CPT | Mod: S$GLB,,, | Performed by: FAMILY MEDICINE

## 2018-02-15 NOTE — Clinical Note
Pt seen in office today. He continues to smoke 15 cigs/day. Pt remains on tobacco cessation medication of 21 mg nicotine patch QD and 4 mg nicotine lozenge PRN (1-2 per hour in place of cigarettes). He has only been using lozenges, pharmacy did not have patches. I called pharmacy and they state they have them and he can pick them up today. No adverse effects/mental changes noted at this time. Pt asked to reduce current smoking rate by 3 cigs/day. Reviewed coping strategies/habitual behavior with patient. Exhaled carbon monoxide level was 15 ppm per Smokerlyzer (0-6 non-smoker). Will see pt back in office in 1 wk.

## 2018-02-15 NOTE — PROGRESS NOTES
Individual Follow-Up Form    2/15/2018    Clinical Status of Patient: Outpatient    Length of Service: 30 minutes    Continuing Medication: yes  Nicotine Lozenges    Other Medications: none     Target Symptoms: Withdrawal and medication side effects. The following were rated moderate (3) to severe (4) on TCRS:  · Moderate (3): desire/crave tobacco  · Severe (4): none    Comments:  Pt seen in office today. He continues to smoke 15 cigs/day. Pt remains on tobacco cessation medication of 21 mg nicotine patch QD and 4 mg nicotine lozenge PRN (1-2 per hour in place of cigarettes). He has only been using lozenges, pharmacy did not have patches. I called pharmacy and they state they have them and he can pick them up today. No adverse effects/mental changes noted at this time. Pt asked to reduce current smoking rate by 3 cigs/day. Reviewed coping strategies/habitual behavior with patient. Exhaled carbon monoxide level was 15 ppm per Smokerlyzer (0-6 non-smoker). Will see pt back in office in 1 wk.     Diagnosis: F17.200    Next Visit: 1 week

## 2018-02-22 ENCOUNTER — OFFICE VISIT (OUTPATIENT)
Dept: UROLOGY | Facility: CLINIC | Age: 58
End: 2018-02-22
Payer: MEDICAID

## 2018-02-22 ENCOUNTER — CLINICAL SUPPORT (OUTPATIENT)
Dept: SMOKING CESSATION | Facility: CLINIC | Age: 58
End: 2018-02-22
Payer: MEDICAID

## 2018-02-22 VITALS
SYSTOLIC BLOOD PRESSURE: 130 MMHG | HEART RATE: 68 BPM | WEIGHT: 154.31 LBS | HEIGHT: 72 IN | DIASTOLIC BLOOD PRESSURE: 70 MMHG | BODY MASS INDEX: 20.9 KG/M2

## 2018-02-22 DIAGNOSIS — Z85.51 H/O PRIMARY MALIGNANT NEOPLASM OF URINARY BLADDER: Chronic | ICD-10-CM

## 2018-02-22 DIAGNOSIS — Z85.51 HISTORY OF BLADDER CANCER: ICD-10-CM

## 2018-02-22 DIAGNOSIS — N13.1 HYDRONEPHROSIS WITH URETERAL STRICTURE, NOT ELSEWHERE CLASSIFIED: Primary | ICD-10-CM

## 2018-02-22 DIAGNOSIS — N50.819 ORCHALGIA: ICD-10-CM

## 2018-02-22 DIAGNOSIS — N23 RENAL COLIC ON RIGHT SIDE: ICD-10-CM

## 2018-02-22 DIAGNOSIS — F17.200 NICOTINE DEPENDENCE: ICD-10-CM

## 2018-02-22 DIAGNOSIS — N13.30 HYDRONEPHROSIS, UNSPECIFIED HYDRONEPHROSIS TYPE: ICD-10-CM

## 2018-02-22 PROBLEM — K56.609 SMALL BOWEL OBSTRUCTION: Status: RESOLVED | Noted: 2017-04-17 | Resolved: 2018-02-22

## 2018-02-22 PROBLEM — K43.5 PARASTOMAL HERNIA WITHOUT OBSTRUCTION OR GANGRENE: Status: RESOLVED | Noted: 2017-04-12 | Resolved: 2018-02-22

## 2018-02-22 PROCEDURE — 99999 PR PBB SHADOW E&M-EST. PATIENT-LVL IV: CPT | Mod: PBBFAC,,, | Performed by: UROLOGY

## 2018-02-22 PROCEDURE — 3008F BODY MASS INDEX DOCD: CPT | Mod: ,,, | Performed by: UROLOGY

## 2018-02-22 PROCEDURE — 99214 OFFICE O/P EST MOD 30 MIN: CPT | Mod: PBBFAC | Performed by: UROLOGY

## 2018-02-22 PROCEDURE — 99402 PREV MED CNSL INDIV APPRX 30: CPT | Mod: S$GLB,,, | Performed by: FAMILY MEDICINE

## 2018-02-22 PROCEDURE — 99214 OFFICE O/P EST MOD 30 MIN: CPT | Mod: S$PBB,,, | Performed by: UROLOGY

## 2018-02-22 RX ORDER — IBUPROFEN 200 MG
1 TABLET ORAL DAILY
Qty: 28 PATCH | Refills: 0 | Status: SHIPPED | OUTPATIENT
Start: 2018-02-22 | End: 2018-03-24

## 2018-02-22 RX ORDER — HYDROCODONE BITARTRATE AND ACETAMINOPHEN 10; 325 MG/1; MG/1
1 TABLET ORAL EVERY 6 HOURS PRN
Qty: 40 TABLET | Refills: 0 | Status: ON HOLD | OUTPATIENT
Start: 2018-02-22 | End: 2018-03-09

## 2018-02-22 RX ORDER — OXYCODONE AND ACETAMINOPHEN 5; 325 MG/1; MG/1
TABLET ORAL
Refills: 0 | Status: ON HOLD | COMMUNITY
Start: 2018-01-13 | End: 2018-03-09

## 2018-02-22 NOTE — PROGRESS NOTES
Subjective:       Patient ID: Blake Guillory is a 58 y.o. male who was referred by No ref. provider found    Chief Complaint:   Chief Complaint   Patient presents with    Post-op Evaluation     post op from procedure         History of Present Illness  History of Bladder Cancer  He has a history of bladder cancer. He is s/p cystectomy with ileal conduit in November 2012. From an oncologic standpoint, he is doing well. He has had issues with bilateral ureteral stricture. He underwent a bilateral ureteral reimplantation into his conduit several months later. His strictures recurred shortly afterwards.  He was managed with ureteral stent changes until one of the stent eroded through his conduit and into his bowels.    He had an Exploratory Laparotomy with replacement of ileal conduit and small bowel anastomosis on 12/30/2015.    He had a parastomal hernia repair in April by Dr. Mack.  He is healing nicely from that and has been discharged from his office.  He had a small bowel obstruction. that resolved.       He continues to have back pain in the morning and in the evening.  His stoma is not bothering him and he tells me that he has good urine output during the day and less at night.  He has worsening renal function and his right sided pain is becoming worse.      He had a right PCN placed by IR on 1/2/2018.  He then had a percutaneous right ureteral dilation and stent placement.  He noted increased urine output afterwards.  He still has pain and has noted significant pain in the right testicle.  He denies swelling or redness to the testicle.      ACTIVE MEDICAL ISSUES:  Patient Active Problem List   Diagnosis    Insomnia    Lumbar facet arthropathy    Sacroiliitis    Spondylosis without myelopathy    DDD (degenerative disc disease)    History of bladder cancer    Hydronephrosis, bilateral    H/O primary malignant neoplasm of urinary bladder    Essential hypertension    Anemia of chronic disease     Moderate protein malnutrition    Chronic gout    Renal insufficiency    Body mass index (BMI) 20.0-20.9, adult    Personal history of bladder cancer    Acquired hypothyroidism    Hypoxemia    Increased PTH level    Severe malnutrition    COPD exacerbation    Hydronephrosis    History of primary malignant neoplasm of urinary bladder    Hydronephrosis with ureteral stricture, not elsewhere classified       PAST MEDICAL HISTORY  Past Medical History:   Diagnosis Date    Arthritis     Blood transfusion     Cancer     bladder    COPD (chronic obstructive pulmonary disease)     Frequency of urination     General anesthetics causing adverse effect in therapeutic use     pt states he wakes up very violently from anesthesia    History of urostomy     Hypertension     Limited joint range of motion right hip and leg    Mixed anxiety and depressive disorder     Personal history of bladder cancer     Thyroid disease     Ureteral stent displacement     Urinary retention     Wears glasses        PAST SURGICAL HISTORY:  Past Surgical History:   Procedure Laterality Date    ABDOMINAL SURGERY      APPENDECTOMY  12/30/2015    Procedure: APPENDECTOMY;  Surgeon: CHAD Bo MD;  Location: [facility];  Service: Urology;;    bladder removed      BOWEL RESECTION      CYSTOSCOPY      >1  episodes for bilat ureteral stent exchange    FRACTURE SURGERY      HAND SURGERY      HERNIA REPAIR      amalia    HIP SURGERY  2012    Rt hip septic    MOUTH SURGERY  2000    all teeth extracted    nephrostomy tube placement      turbt      urostomy         SOCIAL HISTORY:  Social History   Substance Use Topics    Smoking status: Current Some Day Smoker     Packs/day: 0.30     Years: 42.00     Last attempt to quit: 2/1/2017    Smokeless tobacco: Never Used    Alcohol use 0.0 oz/week     2 - 4 Cans of beer per week      Comment: occassional       FAMILY HISTORY:  Family History   Problem Relation Age of Onset     Adopted: Yes       ALLERGIES AND MEDICATIONS: updated and reviewed.  Review of patient's allergies indicates:  No Known Allergies  Current Outpatient Prescriptions   Medication Sig    allopurinol (ZYLOPRIM) 100 MG tablet TAKE 1 TABLET(100 MG) BY MOUTH EVERY DAY    amLODIPine (NORVASC) 5 MG tablet TAKE 1 TABLET(5 MG) BY MOUTH EVERY DAY    atenolol (TENORMIN) 50 MG tablet TAKE 1 TABLET(50 MG) BY MOUTH EVERY DAY    ergocalciferol (VITAMIN D2) 50,000 unit Cap Take 1 capsule (50,000 Units total) by mouth every 7 days.    escitalopram oxalate (LEXAPRO) 10 MG tablet Take 1 tablet (10 mg total) by mouth once daily. Pt to take 15 mg daily (1.5) pills for depression    fluticasone-vilanterol (BREO ELLIPTA) 200-25 mcg/dose DsDv diskus inhaler Inhale 1 puff into the lungs once daily.    gabapentin (NEURONTIN) 400 MG capsule TAKE 1 CAPSULE(400 MG) BY MOUTH TWICE DAILY    hydrocodone-acetaminophen 10-325mg (NORCO)  mg Tab Take 1 tablet by mouth every 6 (six) hours as needed for Pain.    levalbuterol (XOPENEX) 0.63 mg/3 mL nebulizer solution Take 3 mLs (0.63 mg total) by nebulization every 8 (eight) hours as needed for Wheezing or Shortness of Breath. Rescue    levothyroxine (SYNTHROID) 88 MCG tablet Take 1 tablet (88 mcg total) by mouth once daily.    nicotine (NICODERM CQ) 21 mg/24 hr Place 1 patch onto the skin once daily.    nicotine polacrilex 4 MG Lozg Take 1 lozenge (4 mg total) by mouth as needed. 1-2 per hour in place of cigarettes    oxybutynin (DITROPAN) 5 MG Tab Take 2 tablets (10 mg total) by mouth 3 (three) times daily.    oxyCODONE-acetaminophen (PERCOCET) 5-325 mg per tablet TK 1 T PO  Q 6 H PRN P    sodium bicarbonate 325 MG tablet Take 325 mg by mouth 2 (two) times daily.    traZODone (DESYREL) 100 MG tablet Take 1 tablet (100 mg total) by mouth every evening.     No current facility-administered medications for this visit.        Review of Systems   Constitutional: Negative for activity  change, fatigue, fever and unexpected weight change.   HENT: Negative for congestion.    Eyes: Negative for redness.   Respiratory: Negative for chest tightness and shortness of breath.    Cardiovascular: Negative for chest pain and leg swelling.   Gastrointestinal: Negative for abdominal pain, constipation, diarrhea, nausea and vomiting.   Genitourinary: Negative for dysuria, flank pain, frequency, hematuria, penile pain, penile swelling, scrotal swelling, testicular pain and urgency.   Musculoskeletal: Negative for arthralgias and back pain.   Neurological: Negative for dizziness and light-headedness.   Psychiatric/Behavioral: Negative for behavioral problems and confusion. The patient is not nervous/anxious.    All other systems reviewed and are negative.      Objective:      Vitals:    02/22/18 1114   BP: 130/70   Pulse: 68   Weight: 70 kg (154 lb 5.2 oz)   Height: 6' (1.829 m)     Physical Exam   Nursing note and vitals reviewed.  Constitutional: He is oriented to person, place, and time. He appears well-developed and well-nourished.   HENT:   Head: Normocephalic.   Eyes: Conjunctivae are normal.   Neck: Normal range of motion. Neck supple. No tracheal deviation present. No thyromegaly present.   Cardiovascular: Normal rate and normal heart sounds.    Pulmonary/Chest: Effort normal and breath sounds normal. No respiratory distress. He has no wheezes.   Abdominal: Soft. Bowel sounds are normal. There is no hepatosplenomegaly. There is no tenderness. There is no rebound and no CVA tenderness. No hernia. Hernia confirmed negative in the right inguinal area and confirmed negative in the left inguinal area.   Genitourinary: Penis normal. Right testis shows tenderness. Right testis shows no mass and no swelling. Right testis is descended. Left testis shows no mass, no swelling and no tenderness. Left testis is descended. Circumcised.   Genitourinary Comments: Stoma pink, urine yellow     Musculoskeletal: Normal range  of motion. He exhibits no edema or tenderness.   Lymphadenopathy:     He has no cervical adenopathy.   Neurological: He is alert and oriented to person, place, and time.   Skin: Skin is warm and dry. No rash noted. No erythema.     Psychiatric: He has a normal mood and affect. His behavior is normal. Judgment and thought content normal.           Assessment:       1. Hydronephrosis with ureteral stricture, not elsewhere classified    2. History of bladder cancer    3. Orchalgia    4. H/O primary malignant neoplasm of urinary bladder    5. Renal colic on right side    6. Hydronephrosis, unspecified hydronephrosis type          Plan:       1. Hydronephrosis with ureteral stricture, not elsewhere classified  Plan to remove stent and perform retrograde pyelogram on Friday 3/9/2018    2. History of bladder cancer  stable    3. Orchalgia    - US Scrotum And Testicles; Future    4. H/O primary malignant neoplasm of urinary bladder  As above  - hydrocodone-acetaminophen 10-325mg (NORCO)  mg Tab; Take 1 tablet by mouth every 6 (six) hours as needed for Pain.  Dispense: 40 tablet; Refill: 0    5. Renal colic on right side    - hydrocodone-acetaminophen 10-325mg (NORCO)  mg Tab; Take 1 tablet by mouth every 6 (six) hours as needed for Pain.  Dispense: 40 tablet; Refill: 0    6. Hydronephrosis, unspecified hydronephrosis type    - hydrocodone-acetaminophen 10-325mg (NORCO)  mg Tab; Take 1 tablet by mouth every 6 (six) hours as needed for Pain.  Dispense: 40 tablet; Refill: 0            No Follow-up on file.

## 2018-02-22 NOTE — PROGRESS NOTES
Individual Follow-Up Form    2/22/2018    Clinical Status of Patient: Outpatient    Length of Service: 30 minutes    Continuing Medication: yes  Patches    Other Medications: none     Target Symptoms: Withdrawal and medication side effects. The following were rated moderate (3) to severe (4) on TCRS:  · Moderate (3): none  · Severe (4): desire/crave tobacco    Comments:  Pt seen in office today. He continues to smoke 10 cigs/day. Pt remains on tobacco cessation medication of 21 mg nicotine patch QD and 4 mg nicotine lozenge PRN (1-2 per hour in place of cigarettes). He is not using lozenges. He finds taste disagreeable. No adverse effects/mental changes noted at this time. Pt asked to reduce current smoking rate by2 cigs/day. Reviewed coping strategies/habitual behavior with patient. Exhaled carbon monoxide level was 11 ppm per Smokerlyzer (0-6 non-smoker). Will see pt back in office in 1 wk.     Diagnosis: F17.200    Next Visit: 1 week

## 2018-02-22 NOTE — H&P
Subjective:       Patient ID: Blake Guillory is a 58 y.o. male who was referred by No ref. provider found    Chief Complaint:   Chief Complaint   Patient presents with    Post-op Evaluation     post op from procedure         History of Present Illness  History of Bladder Cancer  He has a history of bladder cancer. He is s/p cystectomy with ileal conduit in November 2012. From an oncologic standpoint, he is doing well. He has had issues with bilateral ureteral stricture. He underwent a bilateral ureteral reimplantation into his conduit several months later. His strictures recurred shortly afterwards.  He was managed with ureteral stent changes until one of the stent eroded through his conduit and into his bowels.    He had an Exploratory Laparotomy with replacement of ileal conduit and small bowel anastomosis on 12/30/2015.    He had a parastomal hernia repair in April by Dr. Mack.  He is healing nicely from that and has been discharged from his office.  He had a small bowel obstruction. that resolved.       He continues to have back pain in the morning and in the evening.  His stoma is not bothering him and he tells me that he has good urine output during the day and less at night.  He has worsening renal function and his right sided pain is becoming worse.      He had a right PCN placed by IR on 1/2/2018.  He then had a percutaneous right ureteral dilation and stent placement.  He noted increased urine output afterwards.  He still has pain and has noted significant pain in the right testicle.  He denies swelling or redness to the testicle.      ACTIVE MEDICAL ISSUES:  Patient Active Problem List   Diagnosis    Insomnia    Lumbar facet arthropathy    Sacroiliitis    Spondylosis without myelopathy    DDD (degenerative disc disease)    History of bladder cancer    Hydronephrosis, bilateral    H/O primary malignant neoplasm of urinary bladder    Essential hypertension    Anemia of chronic disease     Moderate protein malnutrition    Chronic gout    Renal insufficiency    Body mass index (BMI) 20.0-20.9, adult    Personal history of bladder cancer    Acquired hypothyroidism    Hypoxemia    Increased PTH level    Severe malnutrition    COPD exacerbation    Hydronephrosis    History of primary malignant neoplasm of urinary bladder    Hydronephrosis with ureteral stricture, not elsewhere classified       PAST MEDICAL HISTORY  Past Medical History:   Diagnosis Date    Arthritis     Blood transfusion     Cancer     bladder    COPD (chronic obstructive pulmonary disease)     Frequency of urination     General anesthetics causing adverse effect in therapeutic use     pt states he wakes up very violently from anesthesia    History of urostomy     Hypertension     Limited joint range of motion right hip and leg    Mixed anxiety and depressive disorder     Personal history of bladder cancer     Thyroid disease     Ureteral stent displacement     Urinary retention     Wears glasses        PAST SURGICAL HISTORY:  Past Surgical History:   Procedure Laterality Date    ABDOMINAL SURGERY      APPENDECTOMY  12/30/2015    Procedure: APPENDECTOMY;  Surgeon: CHAD Bo MD;  Location: [facility];  Service: Urology;;    bladder removed      BOWEL RESECTION      CYSTOSCOPY      >1  episodes for bilat ureteral stent exchange    FRACTURE SURGERY      HAND SURGERY      HERNIA REPAIR      amalia    HIP SURGERY  2012    Rt hip septic    MOUTH SURGERY  2000    all teeth extracted    nephrostomy tube placement      turbt      urostomy         SOCIAL HISTORY:  Social History   Substance Use Topics    Smoking status: Current Some Day Smoker     Packs/day: 0.30     Years: 42.00     Last attempt to quit: 2/1/2017    Smokeless tobacco: Never Used    Alcohol use 0.0 oz/week     2 - 4 Cans of beer per week      Comment: occassional       FAMILY HISTORY:  Family History   Problem Relation Age of Onset     Adopted: Yes       ALLERGIES AND MEDICATIONS: updated and reviewed.  Review of patient's allergies indicates:  No Known Allergies  Current Outpatient Prescriptions   Medication Sig    allopurinol (ZYLOPRIM) 100 MG tablet TAKE 1 TABLET(100 MG) BY MOUTH EVERY DAY    amLODIPine (NORVASC) 5 MG tablet TAKE 1 TABLET(5 MG) BY MOUTH EVERY DAY    atenolol (TENORMIN) 50 MG tablet TAKE 1 TABLET(50 MG) BY MOUTH EVERY DAY    ergocalciferol (VITAMIN D2) 50,000 unit Cap Take 1 capsule (50,000 Units total) by mouth every 7 days.    escitalopram oxalate (LEXAPRO) 10 MG tablet Take 1 tablet (10 mg total) by mouth once daily. Pt to take 15 mg daily (1.5) pills for depression    fluticasone-vilanterol (BREO ELLIPTA) 200-25 mcg/dose DsDv diskus inhaler Inhale 1 puff into the lungs once daily.    gabapentin (NEURONTIN) 400 MG capsule TAKE 1 CAPSULE(400 MG) BY MOUTH TWICE DAILY    hydrocodone-acetaminophen 10-325mg (NORCO)  mg Tab Take 1 tablet by mouth every 6 (six) hours as needed for Pain.    levalbuterol (XOPENEX) 0.63 mg/3 mL nebulizer solution Take 3 mLs (0.63 mg total) by nebulization every 8 (eight) hours as needed for Wheezing or Shortness of Breath. Rescue    levothyroxine (SYNTHROID) 88 MCG tablet Take 1 tablet (88 mcg total) by mouth once daily.    nicotine (NICODERM CQ) 21 mg/24 hr Place 1 patch onto the skin once daily.    nicotine polacrilex 4 MG Lozg Take 1 lozenge (4 mg total) by mouth as needed. 1-2 per hour in place of cigarettes    oxybutynin (DITROPAN) 5 MG Tab Take 2 tablets (10 mg total) by mouth 3 (three) times daily.    oxyCODONE-acetaminophen (PERCOCET) 5-325 mg per tablet TK 1 T PO  Q 6 H PRN P    sodium bicarbonate 325 MG tablet Take 325 mg by mouth 2 (two) times daily.    traZODone (DESYREL) 100 MG tablet Take 1 tablet (100 mg total) by mouth every evening.     No current facility-administered medications for this visit.        Review of Systems   Constitutional: Negative for activity  change, fatigue, fever and unexpected weight change.   HENT: Negative for congestion.    Eyes: Negative for redness.   Respiratory: Negative for chest tightness and shortness of breath.    Cardiovascular: Negative for chest pain and leg swelling.   Gastrointestinal: Negative for abdominal pain, constipation, diarrhea, nausea and vomiting.   Genitourinary: Negative for dysuria, flank pain, frequency, hematuria, penile pain, penile swelling, scrotal swelling, testicular pain and urgency.   Musculoskeletal: Negative for arthralgias and back pain.   Neurological: Negative for dizziness and light-headedness.   Psychiatric/Behavioral: Negative for behavioral problems and confusion. The patient is not nervous/anxious.    All other systems reviewed and are negative.      Objective:      Vitals:    02/22/18 1114   BP: 130/70   Pulse: 68   Weight: 70 kg (154 lb 5.2 oz)   Height: 6' (1.829 m)     Physical Exam   Nursing note and vitals reviewed.  Constitutional: He is oriented to person, place, and time. He appears well-developed and well-nourished.   HENT:   Head: Normocephalic.   Eyes: Conjunctivae are normal.   Neck: Normal range of motion. Neck supple. No tracheal deviation present. No thyromegaly present.   Cardiovascular: Normal rate and normal heart sounds.    Pulmonary/Chest: Effort normal and breath sounds normal. No respiratory distress. He has no wheezes.   Abdominal: Soft. Bowel sounds are normal. There is no hepatosplenomegaly. There is no tenderness. There is no rebound and no CVA tenderness. No hernia. Hernia confirmed negative in the right inguinal area and confirmed negative in the left inguinal area.   Genitourinary: Penis normal. Right testis shows tenderness. Right testis shows no mass and no swelling. Right testis is descended. Left testis shows no mass, no swelling and no tenderness. Left testis is descended. Circumcised.   Genitourinary Comments: Stoma pink, urine yellow     Musculoskeletal: Normal range  of motion. He exhibits no edema or tenderness.   Lymphadenopathy:     He has no cervical adenopathy.   Neurological: He is alert and oriented to person, place, and time.   Skin: Skin is warm and dry. No rash noted. No erythema.     Psychiatric: He has a normal mood and affect. His behavior is normal. Judgment and thought content normal.           Assessment:       1. Hydronephrosis with ureteral stricture, not elsewhere classified    2. History of bladder cancer    3. Orchalgia    4. H/O primary malignant neoplasm of urinary bladder    5. Renal colic on right side    6. Hydronephrosis, unspecified hydronephrosis type          Plan:       1. Hydronephrosis with ureteral stricture, not elsewhere classified  Plan to remove stent and perform retrograde pyelogram on Friday 3/9/2018    2. History of bladder cancer  stable    3. Orchalgia    - US Scrotum And Testicles; Future    4. H/O primary malignant neoplasm of urinary bladder  As above  - hydrocodone-acetaminophen 10-325mg (NORCO)  mg Tab; Take 1 tablet by mouth every 6 (six) hours as needed for Pain.  Dispense: 40 tablet; Refill: 0    5. Renal colic on right side    - hydrocodone-acetaminophen 10-325mg (NORCO)  mg Tab; Take 1 tablet by mouth every 6 (six) hours as needed for Pain.  Dispense: 40 tablet; Refill: 0    6. Hydronephrosis, unspecified hydronephrosis type    - hydrocodone-acetaminophen 10-325mg (NORCO)  mg Tab; Take 1 tablet by mouth every 6 (six) hours as needed for Pain.  Dispense: 40 tablet; Refill: 0            No Follow-up on file.

## 2018-02-22 NOTE — Clinical Note
Pt seen in office today. He continues to smoke 10 cigs/day. Pt remains on tobacco cessation medication of 21 mg nicotine patch QD and 4 mg nicotine lozenge PRN (1-2 per hour in place of cigarettes). He is not using lozenges. He finds taste disagreeable. No adverse effects/mental changes noted at this time. Pt asked to reduce current smoking rate by2 cigs/day. Reviewed coping strategies/habitual behavior with patient. Exhaled carbon monoxide level was 11 ppm per Smokerlyzer (0-6 non-smoker). Will see pt back in office in 1 wk.

## 2018-03-01 ENCOUNTER — CLINICAL SUPPORT (OUTPATIENT)
Dept: SMOKING CESSATION | Facility: CLINIC | Age: 58
End: 2018-03-01
Payer: MEDICAID

## 2018-03-01 DIAGNOSIS — F17.200 NICOTINE DEPENDENCE: ICD-10-CM

## 2018-03-01 PROCEDURE — 99402 PREV MED CNSL INDIV APPRX 30: CPT | Mod: S$GLB,,, | Performed by: FAMILY MEDICINE

## 2018-03-01 NOTE — Clinical Note
Pt seen in office today. He continues to smoke 10 cigs/day. Pt remains on tobacco cessation medication of 21 mg nicotine patch QD. No adverse effects/mental changes noted at this time. Pt asked to reduce current smoking rate by 2 cigs/day. Reviewed coping strategies/habitual behavior with patient. Exhaled carbon monoxide level was 13 ppm per Smokerlyzer (0-6 non-smoker). Will see pt back in office in 1 wk.

## 2018-03-01 NOTE — PROGRESS NOTES
Individual Follow-Up Form    3/1/2018    Clinical Status of Patient: Outpatient    Length of Service: 30 minutes    Continuing Medication: yes  Patches    Other Medications: none     Target Symptoms: Withdrawal and medication side effects. The following were rated moderate (3) to severe (4) on TCRS:  · Moderate (3): desire/crave tobacco  · Severe (4): none    Comments:  Pt seen in office today. He continues to smoke 10 cigs/day. Pt remains on tobacco cessation medication of 21 mg nicotine patch QD. No adverse effects/mental changes noted at this time. Pt asked to reduce current smoking rate by 2 cigs/day. Reviewed coping strategies/habitual behavior with patient. Exhaled carbon monoxide level was 13 ppm per Smokerlyzer (0-6 non-smoker). Will see pt back in office in 1 wk.     Diagnosis: F17.200    Next Visit: 1 week

## 2018-03-07 ENCOUNTER — HOSPITAL ENCOUNTER (OUTPATIENT)
Dept: PREADMISSION TESTING | Facility: HOSPITAL | Age: 58
Discharge: HOME | End: 2018-03-07
Attending: UROLOGY
Payer: MEDICAID

## 2018-03-07 ENCOUNTER — HOSPITAL ENCOUNTER (OUTPATIENT)
Dept: RADIOLOGY | Facility: HOSPITAL | Age: 58
Discharge: HOME | End: 2018-03-07
Attending: UROLOGY
Payer: MEDICAID

## 2018-03-07 VITALS
OXYGEN SATURATION: 96 % | BODY MASS INDEX: 20.63 KG/M2 | TEMPERATURE: 99 F | WEIGHT: 152.31 LBS | SYSTOLIC BLOOD PRESSURE: 106 MMHG | DIASTOLIC BLOOD PRESSURE: 60 MMHG | HEIGHT: 72 IN | HEART RATE: 67 BPM | RESPIRATION RATE: 17 BRPM

## 2018-03-07 DIAGNOSIS — Z85.51 HISTORY OF BLADDER CANCER: ICD-10-CM

## 2018-03-07 DIAGNOSIS — N13.1 HYDRONEPHROSIS WITH URETERAL STRICTURE, NOT ELSEWHERE CLASSIFIED: ICD-10-CM

## 2018-03-07 DIAGNOSIS — N50.819 ORCHALGIA: ICD-10-CM

## 2018-03-07 LAB
ANION GAP SERPL CALC-SCNC: 6 MMOL/L
BASOPHILS # BLD AUTO: 0.05 K/UL
BASOPHILS NFR BLD: 0.8 %
BUN SERPL-MCNC: 27 MG/DL
CALCIUM SERPL-MCNC: 9.1 MG/DL
CHLORIDE SERPL-SCNC: 102 MMOL/L
CO2 SERPL-SCNC: 25 MMOL/L
CREAT SERPL-MCNC: 2 MG/DL
DIFFERENTIAL METHOD: ABNORMAL
EOSINOPHIL # BLD AUTO: 0.1 K/UL
EOSINOPHIL NFR BLD: 2.1 %
ERYTHROCYTE [DISTWIDTH] IN BLOOD BY AUTOMATED COUNT: 12.5 %
EST. GFR  (AFRICAN AMERICAN): 41 ML/MIN/1.73 M^2
EST. GFR  (NON AFRICAN AMERICAN): 35 ML/MIN/1.73 M^2
GLUCOSE SERPL-MCNC: 89 MG/DL
HCT VFR BLD AUTO: 35.1 %
HGB BLD-MCNC: 11.9 G/DL
LYMPHOCYTES # BLD AUTO: 1.3 K/UL
LYMPHOCYTES NFR BLD: 20.1 %
MCH RBC QN AUTO: 32.5 PG
MCHC RBC AUTO-ENTMCNC: 33.9 G/DL
MCV RBC AUTO: 96 FL
MONOCYTES # BLD AUTO: 0.6 K/UL
MONOCYTES NFR BLD: 8.9 %
NEUTROPHILS # BLD AUTO: 4.5 K/UL
NEUTROPHILS NFR BLD: 67.9 %
PLATELET # BLD AUTO: 182 K/UL
PMV BLD AUTO: 10.2 FL
POTASSIUM SERPL-SCNC: 5.9 MMOL/L
RBC # BLD AUTO: 3.66 M/UL
SODIUM SERPL-SCNC: 133 MMOL/L
WBC # BLD AUTO: 6.62 K/UL

## 2018-03-07 PROCEDURE — 85025 COMPLETE CBC W/AUTO DIFF WBC: CPT

## 2018-03-07 PROCEDURE — 36415 COLL VENOUS BLD VENIPUNCTURE: CPT

## 2018-03-07 PROCEDURE — 76870 US EXAM SCROTUM: CPT | Mod: TC

## 2018-03-07 PROCEDURE — 76870 US EXAM SCROTUM: CPT | Mod: 26,,, | Performed by: RADIOLOGY

## 2018-03-07 PROCEDURE — 80048 BASIC METABOLIC PNL TOTAL CA: CPT

## 2018-03-07 NOTE — DISCHARGE INSTRUCTIONS
"  Your surgery is scheduled for ___Friday March 9, 2018_________________.    Call [phone] between 2 p.m. and 5 p.m. on   _Thursday______________ to find out your arrival time for the day of your surgery.      Please report to SAME DAY SURGERY UNIT on the 2nd FLOOR at _______ a.m.  Use front door entrance. The doors open at 0530 am.      If you need WHEELCHAIR assistance please call  [phone] from your cell phone or "0"  from the  hospital courtesy phone located to the right after you enter the hospital lobby.      INSTRUCTIONS IMPORTANT!!!  ¨ Do not eat or drink after 12 midnight-including water. OK to brush teeth, no   gum, candy or mints!    ¨ Take only these medicines with a small swallow of water-morning of surgery.  Take Atenolol and Amlodipine with swallow of water am of procedure.                __x__  Prep instructions:    SHOWER    _x___  Please shower using Hibiclens soap the night before AND  the morning of  your surgery/procedure. Do not use Hibiclens on your face or genitals               If your surgery is around your belly button (Navel) be sure to wash inside your  belly button also. Rinse hibiclens off completely.  ____  No shaving of procedural area at least 4-5 days before surgery due to increased risk of skin irritation and/or possible infection.  ____  Do not wear makeup, including mascara. WEARING EYE MAKEUP MAY  LEAD TO SERIOUS EYE INJURY during surgery.  _x___  No powder, lotions or creams to your body.  _x___  You may wear only deodorant on the day of surgery.  _x___  Please remove all jewelry, including piercings and leave at home.  _x___  No money or valuables needed. Please leave at home.  You may bring you cell phone.  ____  Please bring any documents given by your doctor.  _x___  If going home the same day, arrange for a ride home. You will not be able to   drive if Anesthesia was used.  ____  Children under 18 years require a parent / guardian present the entire time   they are in " surgery / recovery.  _x___  Wear loose fitting clothing. Allow for dressings, bandages.  __x__  Stop Aspirin, Ibuprofen, Motrin and Aleve at least 3-5 days before surgery, unless otherwise instructed by your doctor, or the nurse.              You MAY use Tylenol/acetaminophen until day of surgery.  _x___  If you take diabetic medication, do not take am of surgery unless instructed by   Doctor.  x____  Call MD for temperature above 101 degrees.        ____ Stop taking any Fish Oil supplement or any Vitamins that contain Vitamin   E at least 5 days prior to surgery.          I have read or had read and explained to me, and understand the above information.  Additional comments or instructions:Please call   [phone] if you have any questions regarding the instructions above.

## 2018-03-08 ENCOUNTER — CLINICAL SUPPORT (OUTPATIENT)
Dept: SMOKING CESSATION | Facility: CLINIC | Age: 58
End: 2018-03-08
Payer: COMMERCIAL

## 2018-03-08 ENCOUNTER — ANESTHESIA EVENT (OUTPATIENT)
Dept: SURGERY | Facility: HOSPITAL | Age: 58
End: 2018-03-08
Payer: MEDICAID

## 2018-03-08 DIAGNOSIS — F17.200 NICOTINE DEPENDENCE: ICD-10-CM

## 2018-03-08 PROCEDURE — 99402 PREV MED CNSL INDIV APPRX 30: CPT | Mod: S$GLB,,, | Performed by: FAMILY MEDICINE

## 2018-03-08 NOTE — Clinical Note
Pt seen in office today. He continues to smoke 8 cigs/day. Pt remains on tobacco cessation medication of 21 mg nicotine patch QD. No adverse effects/mental changes noted at this time. Pt asked to reduce current smoking rate by 3 cigs/day. Reviewed coping strategies/habitual behavior with patient. Exhaled carbon monoxide level was 7 ppm per Smokerlyzer (0-6 non-smoker). Will see pt back in office in 1 wk.

## 2018-03-08 NOTE — PROGRESS NOTES
Individual Follow-Up Form    3/8/2018    Clinical Status of Patient: Outpatient    Length of Service: 30 minutes    Continuing Medication: yes  Patches    Other Medications: none     Target Symptoms: Withdrawal and medication side effects. The following were rated moderate (3) to severe (4) on TCRS:  · Moderate (3): desire/crave tobacco  · Severe (4): none    Comments:  Pt seen in office today. He continues to smoke 8 cigs/day. Pt remains on tobacco cessation medication of 21 mg nicotine patch QD. No adverse effects/mental changes noted at this time. Pt asked to reduce current smoking rate by 3 cigs/day. Reviewed coping strategies/habitual behavior with patient. Exhaled carbon monoxide level was 7 ppm per Smokerlyzer (0-6 non-smoker). Will see pt back in office in 1 wk.     Diagnosis: F17.200    Next Visit: 1 week

## 2018-03-09 ENCOUNTER — SURGERY (OUTPATIENT)
Age: 58
End: 2018-03-09

## 2018-03-09 ENCOUNTER — HOSPITAL ENCOUNTER (OUTPATIENT)
Facility: HOSPITAL | Age: 58
Discharge: HOME | End: 2018-03-09
Attending: UROLOGY | Admitting: UROLOGY
Payer: MEDICAID

## 2018-03-09 ENCOUNTER — ANESTHESIA (OUTPATIENT)
Dept: SURGERY | Facility: HOSPITAL | Age: 58
End: 2018-03-09
Payer: MEDICAID

## 2018-03-09 VITALS
TEMPERATURE: 98 F | DIASTOLIC BLOOD PRESSURE: 68 MMHG | OXYGEN SATURATION: 95 % | BODY MASS INDEX: 20.63 KG/M2 | WEIGHT: 152.31 LBS | RESPIRATION RATE: 16 BRPM | HEIGHT: 72 IN | SYSTOLIC BLOOD PRESSURE: 133 MMHG | HEART RATE: 58 BPM

## 2018-03-09 DIAGNOSIS — N13.30 HYDRONEPHROSIS, UNSPECIFIED HYDRONEPHROSIS TYPE: ICD-10-CM

## 2018-03-09 DIAGNOSIS — N13.1 HYDRONEPHROSIS WITH URETERAL STRICTURE, NOT ELSEWHERE CLASSIFIED: Primary | ICD-10-CM

## 2018-03-09 DIAGNOSIS — Z85.51 H/O PRIMARY MALIGNANT NEOPLASM OF URINARY BLADDER: Chronic | ICD-10-CM

## 2018-03-09 DIAGNOSIS — Z85.51 HISTORY OF BLADDER CANCER: ICD-10-CM

## 2018-03-09 DIAGNOSIS — N23 RENAL COLIC ON RIGHT SIDE: ICD-10-CM

## 2018-03-09 LAB
ANION GAP SERPL CALC-SCNC: 8 MMOL/L
BUN SERPL-MCNC: 24 MG/DL
CALCIUM SERPL-MCNC: 9.2 MG/DL
CHLORIDE SERPL-SCNC: 106 MMOL/L
CO2 SERPL-SCNC: 25 MMOL/L
CREAT SERPL-MCNC: 2 MG/DL
EST. GFR  (AFRICAN AMERICAN): 41 ML/MIN/1.73 M^2
EST. GFR  (NON AFRICAN AMERICAN): 35 ML/MIN/1.73 M^2
GLUCOSE SERPL-MCNC: 90 MG/DL
POTASSIUM SERPL-SCNC: 5.1 MMOL/L
SODIUM SERPL-SCNC: 139 MMOL/L

## 2018-03-09 PROCEDURE — 36000706: Performed by: UROLOGY

## 2018-03-09 PROCEDURE — 63600175 PHARM REV CODE 636 W HCPCS: Performed by: UROLOGY

## 2018-03-09 PROCEDURE — 63600175 PHARM REV CODE 636 W HCPCS: Performed by: ANESTHESIOLOGY

## 2018-03-09 PROCEDURE — 63600175 PHARM REV CODE 636 W HCPCS: Performed by: NURSE ANESTHETIST, CERTIFIED REGISTERED

## 2018-03-09 PROCEDURE — 36000707: Performed by: UROLOGY

## 2018-03-09 PROCEDURE — D9220A PRA ANESTHESIA: Mod: CRNA,,, | Performed by: NURSE ANESTHETIST, CERTIFIED REGISTERED

## 2018-03-09 PROCEDURE — 71000039 HC RECOVERY, EACH ADD'L HOUR: Performed by: UROLOGY

## 2018-03-09 PROCEDURE — 88300 SURGICAL PATH GROSS: CPT | Performed by: PATHOLOGY

## 2018-03-09 PROCEDURE — 74420 UROGRAPHY RTRGR +-KUB: CPT | Mod: 26,,, | Performed by: UROLOGY

## 2018-03-09 PROCEDURE — 36415 COLL VENOUS BLD VENIPUNCTURE: CPT

## 2018-03-09 PROCEDURE — 37000008 HC ANESTHESIA 1ST 15 MINUTES: Performed by: UROLOGY

## 2018-03-09 PROCEDURE — 25000003 PHARM REV CODE 250: Performed by: UROLOGY

## 2018-03-09 PROCEDURE — 25000003 PHARM REV CODE 250: Performed by: ANESTHESIOLOGY

## 2018-03-09 PROCEDURE — 71000015 HC POSTOP RECOV 1ST HR: Performed by: UROLOGY

## 2018-03-09 PROCEDURE — 71000033 HC RECOVERY, INTIAL HOUR: Performed by: UROLOGY

## 2018-03-09 PROCEDURE — 71000016 HC POSTOP RECOV ADDL HR: Performed by: UROLOGY

## 2018-03-09 PROCEDURE — C1769 GUIDE WIRE: HCPCS | Performed by: UROLOGY

## 2018-03-09 PROCEDURE — 88300 SURGICAL PATH GROSS: CPT | Mod: 26,,, | Performed by: PATHOLOGY

## 2018-03-09 PROCEDURE — 80048 BASIC METABOLIC PNL TOTAL CA: CPT

## 2018-03-09 PROCEDURE — 37000009 HC ANESTHESIA EA ADD 15 MINS: Performed by: UROLOGY

## 2018-03-09 PROCEDURE — C1758 CATHETER, URETERAL: HCPCS | Performed by: UROLOGY

## 2018-03-09 PROCEDURE — 76000 FLUOROSCOPY <1 HR PHYS/QHP: CPT | Mod: 26,59,, | Performed by: UROLOGY

## 2018-03-09 PROCEDURE — 25500020 PHARM REV CODE 255: Performed by: UROLOGY

## 2018-03-09 PROCEDURE — 52005 CYSTO W/URTRL CATHJ: CPT | Mod: ,,, | Performed by: UROLOGY

## 2018-03-09 PROCEDURE — 25000003 PHARM REV CODE 250: Performed by: NURSE ANESTHETIST, CERTIFIED REGISTERED

## 2018-03-09 PROCEDURE — D9220A PRA ANESTHESIA: Mod: ANES,,, | Performed by: ANESTHESIOLOGY

## 2018-03-09 RX ORDER — HYDROCODONE BITARTRATE AND ACETAMINOPHEN 10; 325 MG/1; MG/1
1 TABLET ORAL EVERY 4 HOURS PRN
Status: DISCONTINUED | OUTPATIENT
Start: 2018-03-09 | End: 2018-03-09 | Stop reason: HOSPADM

## 2018-03-09 RX ORDER — FENTANYL CITRATE 50 UG/ML
INJECTION, SOLUTION INTRAMUSCULAR; INTRAVENOUS
Status: DISCONTINUED | OUTPATIENT
Start: 2018-03-09 | End: 2018-03-09

## 2018-03-09 RX ORDER — MIDAZOLAM HYDROCHLORIDE 1 MG/ML
INJECTION, SOLUTION INTRAMUSCULAR; INTRAVENOUS
Status: DISCONTINUED | OUTPATIENT
Start: 2018-03-09 | End: 2018-03-09

## 2018-03-09 RX ORDER — MORPHINE SULFATE 4 MG/ML
2 INJECTION, SOLUTION INTRAMUSCULAR; INTRAVENOUS EVERY 5 MIN PRN
Status: DISCONTINUED | OUTPATIENT
Start: 2018-03-09 | End: 2018-03-09

## 2018-03-09 RX ORDER — PROPOFOL 10 MG/ML
VIAL (ML) INTRAVENOUS
Status: DISCONTINUED | OUTPATIENT
Start: 2018-03-09 | End: 2018-03-09

## 2018-03-09 RX ORDER — HYDROCODONE BITARTRATE AND ACETAMINOPHEN 5; 325 MG/1; MG/1
1 TABLET ORAL EVERY 4 HOURS PRN
Status: DISCONTINUED | OUTPATIENT
Start: 2018-03-09 | End: 2018-03-09 | Stop reason: HOSPADM

## 2018-03-09 RX ORDER — SODIUM CHLORIDE, SODIUM LACTATE, POTASSIUM CHLORIDE, CALCIUM CHLORIDE 600; 310; 30; 20 MG/100ML; MG/100ML; MG/100ML; MG/100ML
INJECTION, SOLUTION INTRAVENOUS CONTINUOUS
Status: DISCONTINUED | OUTPATIENT
Start: 2018-03-09 | End: 2018-03-09 | Stop reason: HOSPADM

## 2018-03-09 RX ORDER — LIDOCAINE HYDROCHLORIDE 10 MG/ML
1 INJECTION, SOLUTION EPIDURAL; INFILTRATION; INTRACAUDAL; PERINEURAL ONCE
Status: DISCONTINUED | OUTPATIENT
Start: 2018-03-09 | End: 2018-03-09 | Stop reason: HOSPADM

## 2018-03-09 RX ORDER — FENTANYL CITRATE 50 UG/ML
25 INJECTION, SOLUTION INTRAMUSCULAR; INTRAVENOUS EVERY 5 MIN PRN
Status: COMPLETED | OUTPATIENT
Start: 2018-03-09 | End: 2018-03-09

## 2018-03-09 RX ORDER — SODIUM CHLORIDE 0.9 % (FLUSH) 0.9 %
3 SYRINGE (ML) INJECTION
Status: DISCONTINUED | OUTPATIENT
Start: 2018-03-09 | End: 2018-03-09 | Stop reason: HOSPADM

## 2018-03-09 RX ORDER — LIDOCAINE HCL/PF 100 MG/5ML
SYRINGE (ML) INTRAVENOUS
Status: DISCONTINUED | OUTPATIENT
Start: 2018-03-09 | End: 2018-03-09

## 2018-03-09 RX ORDER — GLYCOPYRROLATE 0.2 MG/ML
INJECTION INTRAMUSCULAR; INTRAVENOUS
Status: DISCONTINUED | OUTPATIENT
Start: 2018-03-09 | End: 2018-03-09

## 2018-03-09 RX ORDER — HYDROCODONE BITARTRATE AND ACETAMINOPHEN 10; 325 MG/1; MG/1
1 TABLET ORAL EVERY 6 HOURS PRN
Qty: 40 TABLET | Refills: 0 | Status: SHIPPED | OUTPATIENT
Start: 2018-03-09 | End: 2018-03-20 | Stop reason: SDUPTHER

## 2018-03-09 RX ORDER — PHENYLEPHRINE HYDROCHLORIDE 10 MG/ML
INJECTION INTRAVENOUS
Status: DISCONTINUED | OUTPATIENT
Start: 2018-03-09 | End: 2018-03-09

## 2018-03-09 RX ORDER — ONDANSETRON 2 MG/ML
INJECTION INTRAMUSCULAR; INTRAVENOUS
Status: DISCONTINUED | OUTPATIENT
Start: 2018-03-09 | End: 2018-03-09

## 2018-03-09 RX ORDER — CIPROFLOXACIN 2 MG/ML
400 INJECTION, SOLUTION INTRAVENOUS ONCE
Status: COMPLETED | OUTPATIENT
Start: 2018-03-09 | End: 2018-03-09

## 2018-03-09 RX ADMIN — FENTANYL CITRATE 25 MCG: 50 INJECTION, SOLUTION INTRAMUSCULAR; INTRAVENOUS at 08:03

## 2018-03-09 RX ADMIN — GLYCOPYRROLATE 0.1 MG: 0.2 INJECTION, SOLUTION INTRAMUSCULAR; INTRAVENOUS at 07:03

## 2018-03-09 RX ADMIN — IOHEXOL 100 ML: 300 INJECTION, SOLUTION INTRAVENOUS at 07:03

## 2018-03-09 RX ADMIN — PHENYLEPHRINE HYDROCHLORIDE 100 MCG: 10 INJECTION INTRAVENOUS at 07:03

## 2018-03-09 RX ADMIN — ONDANSETRON 4 MG: 2 INJECTION, SOLUTION INTRAMUSCULAR; INTRAVENOUS at 06:03

## 2018-03-09 RX ADMIN — SODIUM CHLORIDE, SODIUM LACTATE, POTASSIUM CHLORIDE, AND CALCIUM CHLORIDE: .6; .31; .03; .02 INJECTION, SOLUTION INTRAVENOUS at 06:03

## 2018-03-09 RX ADMIN — PHENYLEPHRINE HYDROCHLORIDE 200 MCG: 10 INJECTION INTRAVENOUS at 07:03

## 2018-03-09 RX ADMIN — CIPROFLOXACIN 400 MG: 2 INJECTION, SOLUTION INTRAVENOUS at 07:03

## 2018-03-09 RX ADMIN — MIDAZOLAM HYDROCHLORIDE 2 MG: 1 INJECTION, SOLUTION INTRAMUSCULAR; INTRAVENOUS at 06:03

## 2018-03-09 RX ADMIN — LIDOCAINE HYDROCHLORIDE 100 MG: 20 INJECTION, SOLUTION INTRAVENOUS at 07:03

## 2018-03-09 RX ADMIN — HYDROCODONE BITARTRATE AND ACETAMINOPHEN 1 TABLET: 10; 325 TABLET ORAL at 09:03

## 2018-03-09 RX ADMIN — FENTANYL CITRATE 100 MCG: 50 INJECTION INTRAMUSCULAR; INTRAVENOUS at 07:03

## 2018-03-09 RX ADMIN — PROPOFOL 150 MG: 10 INJECTION, EMULSION INTRAVENOUS at 07:03

## 2018-03-09 NOTE — INTERVAL H&P NOTE
The patient has been examined and the H&P has been reviewed:    I concur with the findings and no changes have occurred since H&P was written.    Anesthesia/Surgery risks, benefits and alternative options discussed and understood by patient/family.          Active Hospital Problems    Diagnosis  POA    *Hydronephrosis with ureteral stricture, not elsewhere classified [N13.1]  Yes      Resolved Hospital Problems    Diagnosis Date Resolved POA   No resolved problems to display.

## 2018-03-09 NOTE — TRANSFER OF CARE
Anesthesia Transfer of Care Note    Patient: Blake Guillory    Procedure(s) Performed: Procedure(s) (LRB):  CYSTOSCOPY WITH RETROGRADE PYELOGRAM (Right)    Patient location: PACU    Anesthesia Type: general    Transport from OR: Transported from OR on room air with adequate spontaneous ventilation    Post pain: adequate analgesia    Post assessment: no apparent anesthetic complications and tolerated procedure well    Post vital signs: stable    Level of consciousness: awake, alert and oriented    Nausea/Vomiting: no nausea/vomiting    Complications: none    Transfer of care protocol was followed      Last vitals:   Visit Vitals  /64   Pulse 60   Temp 36.9 °C (98.4 °F) (Oral)   Resp 14   Ht 6' (1.829 m)   Wt 69.1 kg (152 lb 5.4 oz)   SpO2 96%   BMI 20.66 kg/m²

## 2018-03-09 NOTE — ANESTHESIA PREPROCEDURE EVALUATION
03/09/2018  Blake Guillory is a 58 y.o., male.    Anesthesia Evaluation    I have reviewed the Patient Summary Reports.    I have reviewed the Nursing Notes.   I have reviewed the Medications.     Review of Systems  Anesthesia Hx:  No previous Anesthesia Hx of Anesthetic complications  Denies Family Hx of Anesthesia complications.   Denies Personal Hx of Anesthesia complications.   Social:  Non-Smoker    Hematology/Oncology:  Hematology Normal   Oncology Normal     EENT/Dental:EENT/Dental Normal   Cardiovascular:   Hypertension    Pulmonary:   COPD Asthma    Renal/:   Chronic Renal Disease    Hepatic/GI:  Hepatic/GI Normal    Musculoskeletal:   Arthritis     Neurological:  Neurology Normal    Endocrine:   Hypothyroidism    Dermatological:  Skin Normal    Psych:   Psychiatric History          Physical Exam   Airway/Jaw/Neck:  Airway Findings: Mouth Opening: Normal Tongue: Normal  General Airway Assessment: Adult  Mallampati: II  TM Distance: 4 - 6 cm  Jaw/Neck Findings:  Neck ROM: Normal ROM      Dental:  Dental Findings: Edentulous             Anesthesia Plan  Type of Anesthesia, risks & benefits discussed:  Anesthesia Type:  general  Patient's Preference:   Intra-op Monitoring Plan: standard ASA monitors  Intra-op Monitoring Plan Comments:   Post Op Pain Control Plan: IV/PO Opioids PRN  Post Op Pain Control Plan Comments:   Induction:   IV  Beta Blocker:  Patient is on a Beta-Blocker and has received one dose within the past 24 hours (No further documentation required).       Informed Consent: Patient understands risks and agrees with Anesthesia plan.  Questions answered. Anesthesia consent signed with patient.  ASA Score: 3     Day of Surgery Review of History & Physical:     H&P completed by Anesthesiologist.       Ready For Surgery From Anesthesia Perspective.

## 2018-03-09 NOTE — DISCHARGE SUMMARY
[facility]  Discharge Note  Short Stay    Admit Date: 3/9/2018    Discharge Date and Time: 03/09/2018 7:57 AM       Attending Physician: CHAD Bo MD     Discharge Provider: SHIN Bo    Diagnoses:  Active Hospital Problems    Diagnosis  POA    *Hydronephrosis with ureteral stricture, not elsewhere classified [N13.1]  Yes      Resolved Hospital Problems    Diagnosis Date Resolved POA   No resolved problems to display.       Discharged Condition: stable    Hospital Course: Patient was admitted for an outpatient procedure and tolerated the procedure well with no complications.    Final Diagnoses: Same as principal problem.    Disposition: Home or Self Care    Follow up/Patient Instructions:    Medications:  Reconciled Home Medications:   Current Discharge Medication List      CONTINUE these medications which have CHANGED    Details   hydrocodone-acetaminophen 10-325mg (NORCO)  mg Tab Take 1 tablet by mouth every 6 (six) hours as needed for Pain.  Qty: 40 tablet, Refills: 0    Associated Diagnoses: H/O primary malignant neoplasm of urinary bladder; Renal colic on right side; Hydronephrosis, unspecified hydronephrosis type; Hydronephrosis with ureteral stricture, not elsewhere classified         CONTINUE these medications which have NOT CHANGED    Details   allopurinol (ZYLOPRIM) 100 MG tablet TAKE 1 TABLET(100 MG) BY MOUTH EVERY DAY  Qty: 90 tablet, Refills: 0    Associated Diagnoses: Gout synovitis      amLODIPine (NORVASC) 5 MG tablet TAKE 1 TABLET(5 MG) BY MOUTH EVERY DAY  Qty: 90 tablet, Refills: 0    Associated Diagnoses: Essential hypertension      atenolol (TENORMIN) 50 MG tablet TAKE 1 TABLET(50 MG) BY MOUTH EVERY DAY  Qty: 90 tablet, Refills: 0    Associated Diagnoses: Essential hypertension      ergocalciferol (VITAMIN D2) 50,000 unit Cap Take 1 capsule (50,000 Units total) by mouth every 7 days.  Qty: 12 capsule, Refills: 0    Associated Diagnoses: Vitamin D deficiency       escitalopram oxalate (LEXAPRO) 10 MG tablet Take 1 tablet (10 mg total) by mouth once daily. Pt to take 15 mg daily (1.5) pills for depression  Qty: 45 tablet, Refills: 2    Associated Diagnoses: Moderate single current episode of major depressive disorder; Current mild episode of major depressive disorder without prior episode      fluticasone-vilanterol (BREO ELLIPTA) 200-25 mcg/dose DsDv diskus inhaler Inhale 1 puff into the lungs once daily.  Qty: 60 each, Refills: 1    Associated Diagnoses: Simple chronic bronchitis      gabapentin (NEURONTIN) 400 MG capsule TAKE 1 CAPSULE(400 MG) BY MOUTH TWICE DAILY  Qty: 60 capsule, Refills: 2    Associated Diagnoses: Neuropathy; Stricture or kinking of ureter      levothyroxine (SYNTHROID) 88 MCG tablet Take 1 tablet (88 mcg total) by mouth once daily.  Qty: 30 tablet, Refills: 2    Associated Diagnoses: Acquired hypothyroidism      oxybutynin (DITROPAN) 5 MG Tab Take 2 tablets (10 mg total) by mouth 3 (three) times daily.  Qty: 180 tablet, Refills: 11      sodium bicarbonate 325 MG tablet Take 325 mg by mouth 2 (two) times daily.      traZODone (DESYREL) 100 MG tablet Take 1 tablet (100 mg total) by mouth every evening.  Qty: 90 tablet, Refills: 0    Associated Diagnoses: Primary insomnia      levalbuterol (XOPENEX) 0.63 mg/3 mL nebulizer solution Take 3 mLs (0.63 mg total) by nebulization every 8 (eight) hours as needed for Wheezing or Shortness of Breath. Rescue  Qty: 3 mL, Refills: 1      nicotine (NICODERM CQ) 21 mg/24 hr Place 1 patch onto the skin once daily.  Qty: 28 patch, Refills: 0    Associated Diagnoses: Nicotine dependence             Discharge Procedure Orders  Diet general     Activity as tolerated     Call MD for:   Order Comments: Significant Hematuria       Follow-up Information     W Vikash Bo MD. Schedule an appointment as soon as possible for a visit in 3 weeks.    Specialty:  Urology  Why:  Post op Check  Contact information:  [address]  [address] [address]  [phone]                   Discharge Procedure Orders (must include Diet, Follow-up, Activity):    Discharge Procedure Orders (must include Diet, Follow-up, Activity)  Diet general     Activity as tolerated     Call MD for:   Order Comments: Significant Hematuria

## 2018-03-09 NOTE — PLAN OF CARE
Problem: Surgery Nonspecified (Adult)  Goal: Signs and Symptoms of Listed Potential Problems Will be Absent, Minimized or Managed (Surgery Nonspecified)  Signs and symptoms of listed potential problems will be absent, minimized or managed by discharge/transition of care (reference Surgery Nonspecified (Adult) CPG).   Outcome: Ongoing (interventions implemented as appropriate)   03/09/18 0904   Surgery Nonspecified   Problems Assessed (Surgery) bleeding/anemia;pain;postoperative nausea and vomiting;respiratory compromise;situational response   Problems Present (Surgery) pain     Goal: Anesthesia/Sedation Recovery  Outcome: Outcome(s) achieved Date Met: 03/09/18 03/09/18 0904   Goal/Outcome Evaluation   Anesthesia/Sedation Recovery recovered to baseline;criteria met for transfer

## 2018-03-09 NOTE — ANESTHESIA POSTPROCEDURE EVALUATION
Anesthesia Post Evaluation    Patient: Blake Guillory    Procedure(s) Performed: Procedure(s) (LRB):  CYSTOSCOPY WITH RETROGRADE PYELOGRAM (Right)    Final Anesthesia Type: general  Patient location during evaluation: PACU  Patient participation: Yes- Able to Participate  Level of consciousness: awake and alert, oriented and awake  Post-procedure vital signs: reviewed and stable  Pain management: adequate  Airway patency: patent  PONV status at discharge: No PONV  Anesthetic complications: no      Cardiovascular status: blood pressure returned to baseline, hemodynamically stable and stable  Respiratory status: unassisted and spontaneous ventilation  Hydration status: euvolemic  Follow-up not needed.        Visit Vitals  /61   Pulse 62   Temp 36.8 °C (98.2 °F) (Oral)   Resp 17   Ht 6' (1.829 m)   Wt 69.1 kg (152 lb 5.4 oz)   SpO2 96%   BMI 20.66 kg/m²       Pain/Gissel Score: Pain Assessment Performed: Yes (3/9/2018  8:45 AM)  Presence of Pain: complains of pain/discomfort (3/9/2018  9:12 AM)  Pain Rating Prior to Med Admin: 8 (3/9/2018  9:37 AM)  Pain Rating Post Med Admin: 5 (3/9/2018  9:00 AM)  Gissel Score: 10 (3/9/2018  9:12 AM)

## 2018-03-09 NOTE — OP NOTE
DATE OF PROCEDURE:  03/09/2018    PREOPERATIVE DIAGNOSIS:  Right hydronephrosis.    POSTOPERATIVE DIAGNOSES:  Right hydronephrosis.    PROCEDURE PERFORMED:  Endoscopy of the ileal conduit (cystoscopy) with right   ureteral stent removal, right retrograde pyelogram and fluoroscopy.    PRIMARY SURGEON:  Joo Bo M.D.    ANESTHESIA:  General.    ESTIMATED BLOOD LOSS:  Minimal.    DRAINS:  None.    COMPLICATIONS:  None.    INDICATIONS:  Blake Guillory is a 58-year-old male with a history of bladder   cancer.  He has a history of right-sided hydronephrosis with pain with likely   stricture, which was dilated approximately six weeks ago.  He is here today for   removal of the stent and retrograde pyelogram.    Blake Guillory was taken to the Operating Room where he was positively identified   by kevin.  He was placed supine on the operating room table.  Following   induction of adequate general anesthesia, he was left in the supine position and   his ileal conduit was then prepped and draped in the usual sterile fashion.    Preoperative time-out was performed.    A  film was taken using fluoroscopy.    A flexible cystoscope was passed through the stoma into the conduit; however,   the conduit was somewhat tortuous and the scope could not reach the stent.    I switched to a 21-[place] rigid cystoscope and I was then able to gently pass   the scope through the conduit until the stent was visualized.    I then used flexible graspers to grasp the stent and brought this out to the   stoma of the conduit.    I then passed a 0.035 inch Bentson wire passed this through the stent up to the   level of the kidney.  I withdrew the stent.    I then passed a 5-[place] open-ended catheter over the wire into the ureter.    I then performed a retrograde pyelogram.  Contrast was seen traveling up the   ureter.  The ureter was somewhat tortuous and hydronephrosis was noted.  I then   continued to perform retrograde pyelogram  as I pulled the open-ended catheter   out.    Contrast was seen draining through the ureter into the conduit, it seemed to   drain promptly.    His anesthesia was then reversed.  He was then taken to Recovery Room in stable   condition.      ZEENAT  dd: 03/09/2018 08:00:32 (CST)  td: 03/09/2018 08:37:46 (CST)  Doc ID   #[id number]  Job ID #[id number]    CC:

## 2018-03-09 NOTE — DISCHARGE INSTRUCTIONS
BATHING/DRESSING:  ? Ok to shower tomorrow    ACTIVITY LEVEL: If you have received sedation or an anesthetic, you may feel sleepy for several hours. Rest until you are more awake. Gradually resume your normal activities.    No heavy lifting.      DIET: You may resume your home diet. If nausea is present, increase your diet gradually with fluids and bland foods.  Medications: Pain medication should be taken only if needed and as directed. If antibiotics are prescribed, the medication should be taken until completed. You will be given an updated list of you medications.  LAST DOSE OF PAIN MEDICATION 9:37 PM.    No driving, alcoholic beverages or signing legal documents for next 24 hours or while taking pain medication    CALL THE DOCTOR:    For any obvious bleeding (some dried blood over the incision is normal).    Some blood in your urine is normal.     Redness, swelling, foul smell around incision or fever over 101.   Shortness of breath, Coughing Up Bloody Sputum, or Pains or Swelling in your Calves..   Persistent pain or nausea not relieved by medication.   Problems urinating - unable to urinate or heavy bleeding (with our without clots) in urine.    If any unusual problems or difficulties occur contact your doctor. If you cannot contact your doctor but feel your signs and symptoms warrant a physicians attention return to the emergency room.  Fall Prevention  Millions of people fall every year and injure themselves. You may have had anesthesia or sedation which may increase your risk of falling. You may have health issues that put you at an increased risk of falling.     Here are ways to reduce your risk of falling.  ·   · Make your home safe by keeping walkways clear of objects you may trip over.  · Use non-slip pads under rugs. Do not use area rugs or small throw rugs.  · Use non-slip mats in bathtubs and showers.  · Install handrails and lights on staircases.  · Do not walk in poorly lit areas.  · Do  not stand on chairs or wobbly ladders.  · Use caution when reaching overhead or looking upward. This position can cause a loss of balance.  · Be sure your shoes fit properly, have non-slip bottoms and are in good condition.   · Wear shoes both inside and out. Avoid going barefoot or wearing slippers.  · Be cautious when going up and down stairs, curbs, and when walking on uneven sidewalks.  · If your balance is poor, consider using a cane or walker.  · If your fall was related to alcohol use, stop or limit alcohol intake.   · If your fall was related to use of sleeping medicines, talk to your doctor about this. You may need to reduce your dosage at bedtime if you awaken during the night to go to the bathroom.    · To reduce the need for nighttime bathroom trips:  ¨ Avoid drinking fluids for several hours before going to bed  ¨ Empty your bladder before going to bed  ¨ Men can keep a urinal at the bedside  · Stay as active as you can. Balance, flexibility, strength, and endurance all come from exercise. They all play a role in preventing falls. Ask your healthcare provider which types of activity are right for you.  · Get your vision checked on a regular basis.  · If you have pets, know where they are before you stand up or walk so you don't trip over them.  · Use night lights.

## 2018-03-09 NOTE — BRIEF OP NOTE
[facility]  Brief Operative Note     SUMMARY     Surgery Date: 3/9/2018     Surgeon(s) and Role:     * CHAD Bo MD - Primary    Assisting Surgeon: None    Pre-op Diagnosis:  History of bladder cancer [Z85.51]  Hydronephrosis with ureteral stricture, not elsewhere classified [N13.1]    Post-op Diagnosis:  Post-Op Diagnosis Codes:     * History of bladder cancer [Z85.51]     * Hydronephrosis with ureteral stricture, not elsewhere classified [N13.1]    Procedure(s) (LRB):  CYSTOSCOPY WITH RETROGRADE PYELOGRAM (Right)    Anesthesia: General    Description of the findings of the procedure: Hydronephrosis present but contrast drained promptly     Findings/Key Components: as above    Estimated Blood Loss: * No values recorded between 3/9/2018  7:37 AM and 3/9/2018  7:56 AM *         Specimens:   Specimen (12h ago through future)    None          Discharge Note    SUMMARY     Admit Date: 3/9/2018    Discharge Date and Time:  03/09/2018 7:56 AM    Hospital Course (synopsis of major diagnoses, care, treatment, and services provided during the course of the hospital stay): Patient was admitted for an outpatient procedure and tolerated the procedure well with no complications.      Final Diagnosis: Post-Op Diagnosis Codes:     * History of bladder cancer [Z85.51]     * Hydronephrosis with ureteral stricture, not elsewhere classified [N13.1]    Disposition: Home or Self Care    Follow Up/Patient Instructions:     Medications:  Reconciled Home Medications:   Current Discharge Medication List      CONTINUE these medications which have CHANGED    Details   hydrocodone-acetaminophen 10-325mg (NORCO)  mg Tab Take 1 tablet by mouth every 6 (six) hours as needed for Pain.  Qty: 40 tablet, Refills: 0    Associated Diagnoses: H/O primary malignant neoplasm of urinary bladder; Renal colic on right side; Hydronephrosis, unspecified hydronephrosis type; Hydronephrosis with ureteral stricture, not elsewhere  classified         CONTINUE these medications which have NOT CHANGED    Details   allopurinol (ZYLOPRIM) 100 MG tablet TAKE 1 TABLET(100 MG) BY MOUTH EVERY DAY  Qty: 90 tablet, Refills: 0    Associated Diagnoses: Gout synovitis      amLODIPine (NORVASC) 5 MG tablet TAKE 1 TABLET(5 MG) BY MOUTH EVERY DAY  Qty: 90 tablet, Refills: 0    Associated Diagnoses: Essential hypertension      atenolol (TENORMIN) 50 MG tablet TAKE 1 TABLET(50 MG) BY MOUTH EVERY DAY  Qty: 90 tablet, Refills: 0    Associated Diagnoses: Essential hypertension      ergocalciferol (VITAMIN D2) 50,000 unit Cap Take 1 capsule (50,000 Units total) by mouth every 7 days.  Qty: 12 capsule, Refills: 0    Associated Diagnoses: Vitamin D deficiency      escitalopram oxalate (LEXAPRO) 10 MG tablet Take 1 tablet (10 mg total) by mouth once daily. Pt to take 15 mg daily (1.5) pills for depression  Qty: 45 tablet, Refills: 2    Associated Diagnoses: Moderate single current episode of major depressive disorder; Current mild episode of major depressive disorder without prior episode      fluticasone-vilanterol (BREO ELLIPTA) 200-25 mcg/dose DsDv diskus inhaler Inhale 1 puff into the lungs once daily.  Qty: 60 each, Refills: 1    Associated Diagnoses: Simple chronic bronchitis      gabapentin (NEURONTIN) 400 MG capsule TAKE 1 CAPSULE(400 MG) BY MOUTH TWICE DAILY  Qty: 60 capsule, Refills: 2    Associated Diagnoses: Neuropathy; Stricture or kinking of ureter      levothyroxine (SYNTHROID) 88 MCG tablet Take 1 tablet (88 mcg total) by mouth once daily.  Qty: 30 tablet, Refills: 2    Associated Diagnoses: Acquired hypothyroidism      oxybutynin (DITROPAN) 5 MG Tab Take 2 tablets (10 mg total) by mouth 3 (three) times daily.  Qty: 180 tablet, Refills: 11      sodium bicarbonate 325 MG tablet Take 325 mg by mouth 2 (two) times daily.      traZODone (DESYREL) 100 MG tablet Take 1 tablet (100 mg total) by mouth every evening.  Qty: 90 tablet, Refills: 0    Associated  Diagnoses: Primary insomnia      levalbuterol (XOPENEX) 0.63 mg/3 mL nebulizer solution Take 3 mLs (0.63 mg total) by nebulization every 8 (eight) hours as needed for Wheezing or Shortness of Breath. Rescue  Qty: 3 mL, Refills: 1      nicotine (NICODERM CQ) 21 mg/24 hr Place 1 patch onto the skin once daily.  Qty: 28 patch, Refills: 0    Associated Diagnoses: Nicotine dependence             Discharge Procedure Orders  Diet general     Activity as tolerated     Call MD for:   Order Comments: Significant Hematuria       Follow-up Information     W Vikash Bo MD. Schedule an appointment as soon as possible for a visit in 3 weeks.    Specialty:  Urology  Why:  Post op Check  Contact information:  [address] [place] [id number] [phone]

## 2018-03-09 NOTE — H&P (VIEW-ONLY)
Subjective:       Patient ID: Blake Guillory is a 58 y.o. male who was referred by No ref. provider found    Chief Complaint:   Chief Complaint   Patient presents with    Post-op Evaluation     post op from procedure         History of Present Illness  History of Bladder Cancer  He has a history of bladder cancer. He is s/p cystectomy with ileal conduit in November 2012. From an oncologic standpoint, he is doing well. He has had issues with bilateral ureteral stricture. He underwent a bilateral ureteral reimplantation into his conduit several months later. His strictures recurred shortly afterwards.  He was managed with ureteral stent changes until one of the stent eroded through his conduit and into his bowels.    He had an Exploratory Laparotomy with replacement of ileal conduit and small bowel anastomosis on 12/30/2015.    He had a parastomal hernia repair in April by Dr. Mack.  He is healing nicely from that and has been discharged from his office.  He had a small bowel obstruction. that resolved.       He continues to have back pain in the morning and in the evening.  His stoma is not bothering him and he tells me that he has good urine output during the day and less at night.  He has worsening renal function and his right sided pain is becoming worse.      He had a right PCN placed by IR on 1/2/2018.  He then had a percutaneous right ureteral dilation and stent placement.  He noted increased urine output afterwards.  He still has pain and has noted significant pain in the right testicle.  He denies swelling or redness to the testicle.      ACTIVE MEDICAL ISSUES:  Patient Active Problem List   Diagnosis    Insomnia    Lumbar facet arthropathy    Sacroiliitis    Spondylosis without myelopathy    DDD (degenerative disc disease)    History of bladder cancer    Hydronephrosis, bilateral    H/O primary malignant neoplasm of urinary bladder    Essential hypertension    Anemia of chronic disease     Moderate protein malnutrition    Chronic gout    Renal insufficiency    Body mass index (BMI) 20.0-20.9, adult    Personal history of bladder cancer    Acquired hypothyroidism    Hypoxemia    Increased PTH level    Severe malnutrition    COPD exacerbation    Hydronephrosis    History of primary malignant neoplasm of urinary bladder    Hydronephrosis with ureteral stricture, not elsewhere classified       PAST MEDICAL HISTORY  Past Medical History:   Diagnosis Date    Arthritis     Blood transfusion     Cancer     bladder    COPD (chronic obstructive pulmonary disease)     Frequency of urination     General anesthetics causing adverse effect in therapeutic use     pt states he wakes up very violently from anesthesia    History of urostomy     Hypertension     Limited joint range of motion right hip and leg    Mixed anxiety and depressive disorder     Personal history of bladder cancer     Thyroid disease     Ureteral stent displacement     Urinary retention     Wears glasses        PAST SURGICAL HISTORY:  Past Surgical History:   Procedure Laterality Date    ABDOMINAL SURGERY      APPENDECTOMY  12/30/2015    Procedure: APPENDECTOMY;  Surgeon: CHAD Bo MD;  Location: [facility];  Service: Urology;;    bladder removed      BOWEL RESECTION      CYSTOSCOPY      >1  episodes for bilat ureteral stent exchange    FRACTURE SURGERY      HAND SURGERY      HERNIA REPAIR      amalia    HIP SURGERY  2012    Rt hip septic    MOUTH SURGERY  2000    all teeth extracted    nephrostomy tube placement      turbt      urostomy         SOCIAL HISTORY:  Social History   Substance Use Topics    Smoking status: Current Some Day Smoker     Packs/day: 0.30     Years: 42.00     Last attempt to quit: 2/1/2017    Smokeless tobacco: Never Used    Alcohol use 0.0 oz/week     2 - 4 Cans of beer per week      Comment: occassional       FAMILY HISTORY:  Family History   Problem Relation Age of Onset     Adopted: Yes       ALLERGIES AND MEDICATIONS: updated and reviewed.  Review of patient's allergies indicates:  No Known Allergies  Current Outpatient Prescriptions   Medication Sig    allopurinol (ZYLOPRIM) 100 MG tablet TAKE 1 TABLET(100 MG) BY MOUTH EVERY DAY    amLODIPine (NORVASC) 5 MG tablet TAKE 1 TABLET(5 MG) BY MOUTH EVERY DAY    atenolol (TENORMIN) 50 MG tablet TAKE 1 TABLET(50 MG) BY MOUTH EVERY DAY    ergocalciferol (VITAMIN D2) 50,000 unit Cap Take 1 capsule (50,000 Units total) by mouth every 7 days.    escitalopram oxalate (LEXAPRO) 10 MG tablet Take 1 tablet (10 mg total) by mouth once daily. Pt to take 15 mg daily (1.5) pills for depression    fluticasone-vilanterol (BREO ELLIPTA) 200-25 mcg/dose DsDv diskus inhaler Inhale 1 puff into the lungs once daily.    gabapentin (NEURONTIN) 400 MG capsule TAKE 1 CAPSULE(400 MG) BY MOUTH TWICE DAILY    hydrocodone-acetaminophen 10-325mg (NORCO)  mg Tab Take 1 tablet by mouth every 6 (six) hours as needed for Pain.    levalbuterol (XOPENEX) 0.63 mg/3 mL nebulizer solution Take 3 mLs (0.63 mg total) by nebulization every 8 (eight) hours as needed for Wheezing or Shortness of Breath. Rescue    levothyroxine (SYNTHROID) 88 MCG tablet Take 1 tablet (88 mcg total) by mouth once daily.    nicotine (NICODERM CQ) 21 mg/24 hr Place 1 patch onto the skin once daily.    nicotine polacrilex 4 MG Lozg Take 1 lozenge (4 mg total) by mouth as needed. 1-2 per hour in place of cigarettes    oxybutynin (DITROPAN) 5 MG Tab Take 2 tablets (10 mg total) by mouth 3 (three) times daily.    oxyCODONE-acetaminophen (PERCOCET) 5-325 mg per tablet TK 1 T PO  Q 6 H PRN P    sodium bicarbonate 325 MG tablet Take 325 mg by mouth 2 (two) times daily.    traZODone (DESYREL) 100 MG tablet Take 1 tablet (100 mg total) by mouth every evening.     No current facility-administered medications for this visit.        Review of Systems   Constitutional: Negative for activity  change, fatigue, fever and unexpected weight change.   HENT: Negative for congestion.    Eyes: Negative for redness.   Respiratory: Negative for chest tightness and shortness of breath.    Cardiovascular: Negative for chest pain and leg swelling.   Gastrointestinal: Negative for abdominal pain, constipation, diarrhea, nausea and vomiting.   Genitourinary: Negative for dysuria, flank pain, frequency, hematuria, penile pain, penile swelling, scrotal swelling, testicular pain and urgency.   Musculoskeletal: Negative for arthralgias and back pain.   Neurological: Negative for dizziness and light-headedness.   Psychiatric/Behavioral: Negative for behavioral problems and confusion. The patient is not nervous/anxious.    All other systems reviewed and are negative.      Objective:      Vitals:    02/22/18 1114   BP: 130/70   Pulse: 68   Weight: 70 kg (154 lb 5.2 oz)   Height: 6' (1.829 m)     Physical Exam   Nursing note and vitals reviewed.  Constitutional: He is oriented to person, place, and time. He appears well-developed and well-nourished.   HENT:   Head: Normocephalic.   Eyes: Conjunctivae are normal.   Neck: Normal range of motion. Neck supple. No tracheal deviation present. No thyromegaly present.   Cardiovascular: Normal rate and normal heart sounds.    Pulmonary/Chest: Effort normal and breath sounds normal. No respiratory distress. He has no wheezes.   Abdominal: Soft. Bowel sounds are normal. There is no hepatosplenomegaly. There is no tenderness. There is no rebound and no CVA tenderness. No hernia. Hernia confirmed negative in the right inguinal area and confirmed negative in the left inguinal area.   Genitourinary: Penis normal. Right testis shows tenderness. Right testis shows no mass and no swelling. Right testis is descended. Left testis shows no mass, no swelling and no tenderness. Left testis is descended. Circumcised.   Genitourinary Comments: Stoma pink, urine yellow     Musculoskeletal: Normal range  of motion. He exhibits no edema or tenderness.   Lymphadenopathy:     He has no cervical adenopathy.   Neurological: He is alert and oriented to person, place, and time.   Skin: Skin is warm and dry. No rash noted. No erythema.     Psychiatric: He has a normal mood and affect. His behavior is normal. Judgment and thought content normal.           Assessment:       1. Hydronephrosis with ureteral stricture, not elsewhere classified    2. History of bladder cancer    3. Orchalgia    4. H/O primary malignant neoplasm of urinary bladder    5. Renal colic on right side    6. Hydronephrosis, unspecified hydronephrosis type          Plan:       1. Hydronephrosis with ureteral stricture, not elsewhere classified  Plan to remove stent and perform retrograde pyelogram on Friday 3/9/2018    2. History of bladder cancer  stable    3. Orchalgia    - US Scrotum And Testicles; Future    4. H/O primary malignant neoplasm of urinary bladder  As above  - hydrocodone-acetaminophen 10-325mg (NORCO)  mg Tab; Take 1 tablet by mouth every 6 (six) hours as needed for Pain.  Dispense: 40 tablet; Refill: 0    5. Renal colic on right side    - hydrocodone-acetaminophen 10-325mg (NORCO)  mg Tab; Take 1 tablet by mouth every 6 (six) hours as needed for Pain.  Dispense: 40 tablet; Refill: 0    6. Hydronephrosis, unspecified hydronephrosis type    - hydrocodone-acetaminophen 10-325mg (NORCO)  mg Tab; Take 1 tablet by mouth every 6 (six) hours as needed for Pain.  Dispense: 40 tablet; Refill: 0            No Follow-up on file.

## 2018-03-15 ENCOUNTER — CLINICAL SUPPORT (OUTPATIENT)
Dept: SMOKING CESSATION | Facility: CLINIC | Age: 58
End: 2018-03-15
Payer: MEDICAID

## 2018-03-15 DIAGNOSIS — F17.200 NICOTINE DEPENDENCE: ICD-10-CM

## 2018-03-15 PROCEDURE — 99402 PREV MED CNSL INDIV APPRX 30: CPT | Mod: S$GLB,,, | Performed by: FAMILY MEDICINE

## 2018-03-15 NOTE — PROGRESS NOTES
Individual Follow-Up Form    3/15/2018    Clinical Status of Patient: Outpatient    Length of Service: 30 minutes    Continuing Medication: yes  Patches    Other Medications: non     Target Symptoms: Withdrawal and medication side effects. The following were rated moderate (3) to severe (4) on TCRS:  · Moderate (3): desire/crave tobacco  · Severe (4): none    Comments:  Pt seen in office today. He continues to smoke 10 cigs/day. He had surgery a few days ago and smoked a little extra this week. Assured him that these things happen and to start again this week. He agrees. Pt remains on tobacco cessation medication of 21 mg nicotine patch QD. No adverse effects/mental changes noted at this time. Pt asked to reduce current smoking rate by 3 cigs/day. Reviewed coping strategies/habitual behavior with patient. Exhaled carbon monoxide level was 21 ppm per Smokerlyzer (0-6 non-smoker). He smoked prior to visit. Will see pt back in office in 1 wk.     Diagnosis: F17.200    Next Visit: 1 week

## 2018-03-15 NOTE — Clinical Note
Pt seen in office today. He continues to smoke 10 cigs/day. He had surgery a few days ago and smoked a little extra this week. Assured him that these things happen and to start again this week. He agrees. Pt remains on tobacco cessation medication of 21 mg nicotine patch QD. No adverse effects/mental changes noted at this time. Pt asked to reduce current smoking rate by 3 cigs/day. Reviewed coping strategies/habitual behavior with patient. Exhaled carbon monoxide level was 21 ppm per Smokerlyzer (0-6 non-smoker). He smoked prior to visit. Will see pt back in office in 1 wk.

## 2018-03-20 DIAGNOSIS — Z85.51 H/O PRIMARY MALIGNANT NEOPLASM OF URINARY BLADDER: Chronic | ICD-10-CM

## 2018-03-20 DIAGNOSIS — N13.30 HYDRONEPHROSIS, UNSPECIFIED HYDRONEPHROSIS TYPE: ICD-10-CM

## 2018-03-20 DIAGNOSIS — N23 RENAL COLIC ON RIGHT SIDE: ICD-10-CM

## 2018-03-20 DIAGNOSIS — N13.1 HYDRONEPHROSIS WITH URETERAL STRICTURE, NOT ELSEWHERE CLASSIFIED: ICD-10-CM

## 2018-03-20 RX ORDER — HYDROCODONE BITARTRATE AND ACETAMINOPHEN 10; 325 MG/1; MG/1
1 TABLET ORAL EVERY 6 HOURS PRN
Qty: 40 TABLET | Refills: 0 | Status: SHIPPED | OUTPATIENT
Start: 2018-03-20 | End: 2018-04-17 | Stop reason: SDUPTHER

## 2018-03-20 NOTE — TELEPHONE ENCOUNTER
----- Message from Aline Bush sent at 3/20/2018  3:29 PM CDT -----  Contact: Self   Patient need a refill on his pain medication. Please call at [phone]      hydrocodone-acetaminophen 10-325mg (NORCO)  mg Tab3        [facility] - [place], [place] - [address]

## 2018-03-21 RX ORDER — HYDROCODONE BITARTRATE AND ACETAMINOPHEN 10; 325 MG/1; MG/1
1 TABLET ORAL EVERY 6 HOURS PRN
Qty: 40 TABLET | Refills: 0 | OUTPATIENT
Start: 2018-03-21

## 2018-03-22 ENCOUNTER — OFFICE VISIT (OUTPATIENT)
Dept: CARDIOLOGY | Facility: CLINIC | Age: 58
End: 2018-03-22
Payer: MEDICAID

## 2018-03-22 ENCOUNTER — TELEPHONE (OUTPATIENT)
Dept: SMOKING CESSATION | Facility: CLINIC | Age: 58
End: 2018-03-22

## 2018-03-22 VITALS
HEIGHT: 72 IN | BODY MASS INDEX: 20.67 KG/M2 | WEIGHT: 152.63 LBS | DIASTOLIC BLOOD PRESSURE: 67 MMHG | SYSTOLIC BLOOD PRESSURE: 116 MMHG | HEART RATE: 67 BPM

## 2018-03-22 DIAGNOSIS — M1A.00X0 IDIOPATHIC CHRONIC GOUT WITHOUT TOPHUS, UNSPECIFIED SITE: ICD-10-CM

## 2018-03-22 DIAGNOSIS — I10 ESSENTIAL HYPERTENSION: ICD-10-CM

## 2018-03-22 DIAGNOSIS — J44.9 CHRONIC OBSTRUCTIVE PULMONARY DISEASE, UNSPECIFIED COPD TYPE: ICD-10-CM

## 2018-03-22 DIAGNOSIS — E03.9 ACQUIRED HYPOTHYROIDISM: Chronic | ICD-10-CM

## 2018-03-22 DIAGNOSIS — R10.816 EPIGASTRIC ABDOMINAL TENDERNESS WITHOUT REBOUND TENDERNESS: ICD-10-CM

## 2018-03-22 DIAGNOSIS — Z85.51 HISTORY OF BLADDER CANCER: ICD-10-CM

## 2018-03-22 DIAGNOSIS — R07.9 CHEST PAIN OF UNCERTAIN ETIOLOGY: Primary | ICD-10-CM

## 2018-03-22 DIAGNOSIS — N18.30 CKD (CHRONIC KIDNEY DISEASE), STAGE III: ICD-10-CM

## 2018-03-22 PROCEDURE — 99999 PR PBB SHADOW E&M-EST. PATIENT-LVL III: CPT | Mod: PBBFAC,,, | Performed by: INTERNAL MEDICINE

## 2018-03-22 PROCEDURE — 99213 OFFICE O/P EST LOW 20 MIN: CPT | Mod: PBBFAC,PN | Performed by: INTERNAL MEDICINE

## 2018-03-22 PROCEDURE — 99205 OFFICE O/P NEW HI 60 MIN: CPT | Mod: S$PBB,,, | Performed by: INTERNAL MEDICINE

## 2018-03-22 NOTE — TELEPHONE ENCOUNTER
Called patient to let him know that his chantix came in today. Could not leave message on cell phone, no mail box service set up yet. Left message at home phone number. Will try again later.

## 2018-03-22 NOTE — PROGRESS NOTES
Subjective:      Patient ID: Blake Guillory is a 58 y.o. male.    Chief Complaint: Chest Pain (mostly at rest)    HPI:  Over the past month or so since pt pt has been experiencing episodes of pain across his anterior chest not associated with activity.  The pain lasts from a couple of seconds to 5 minutes.  Pt lifts paint cans at work.  There is no hx of chest pain with exertion.  Pt does have some shortness of breath when climbing stairs.  The chest pain began when pt was being treated for a ureteral stricture with a nephrostomy tube.  The chest pain is not pleuritic. There was no radiation of the chest pain and no associated symptoms.     Review of Systems   Cardiovascular: Positive for chest pain and dyspnea on exertion. Negative for claudication, irregular heartbeat, leg swelling, near-syncope, orthopnea, palpitations and syncope.      Pt had trouble swallowing until he stopped taking oxybutynin    Pt is followed by Dr Glass at [facility] for urology.    Pt sees Dr Juárez, nephrologist every 2 months  Past Medical History:   Diagnosis Date    Arthritis     Blood transfusion     Cancer     bladder    COPD (chronic obstructive pulmonary disease)     Frequency of urination     General anesthetics causing adverse effect in therapeutic use     pt states he wakes up very violently from anesthesia    History of urostomy     Hypertension     Limited joint range of motion right hip and leg    Mixed anxiety and depressive disorder     Personal history of bladder cancer     Thyroid disease     Ureteral stent displacement     Urinary retention     Wears glasses         Past Surgical History:   Procedure Laterality Date    ABDOMINAL SURGERY      APPENDECTOMY  12/30/2015    Procedure: APPENDECTOMY;  Surgeon: CHAD Bo MD;  Location: [facility];  Service: Urology;;    bladder removed      BOWEL RESECTION      CYSTOSCOPY      >1  episodes for bilat ureteral stent exchange    FRACTURE  SURGERY      HAND SURGERY      HERNIA REPAIR      amalia    HIP SURGERY  2012    Rt hip septic    MOUTH SURGERY  2000    all teeth extracted    nephrostomy tube placement      turbt      urostomy         Family History   Problem Relation Age of Onset    Adopted: Yes       Social History     Social History    Marital status:      Spouse name: N/A    Number of children: N/A    Years of education: N/A     Occupational History    [employer]     Social History Main Topics    Smoking status: Current Some Day Smoker     Packs/day: 0.30     Years: 42.00     Last attempt to quit: 2/1/2017    Smokeless tobacco: Never Used      Comment: in cessation program    Alcohol use 8.4 - 12.6 oz/week     14 - 21 Cans of beer per week      Comment: occassional    Drug use: Yes     Types: Marijuana      Comment: occass    Sexual activity: Yes     Partners: Female     Birth control/ protection: None     Other Topics Concern    None     Social History Narrative    None       Current Outpatient Prescriptions on File Prior to Visit   Medication Sig Dispense Refill    allopurinol (ZYLOPRIM) 100 MG tablet TAKE 1 TABLET(100 MG) BY MOUTH EVERY DAY 90 tablet 0    amLODIPine (NORVASC) 5 MG tablet TAKE 1 TABLET(5 MG) BY MOUTH EVERY DAY 90 tablet 0    atenolol (TENORMIN) 50 MG tablet TAKE 1 TABLET(50 MG) BY MOUTH EVERY DAY 90 tablet 0    ergocalciferol (VITAMIN D2) 50,000 unit Cap Take 1 capsule (50,000 Units total) by mouth every 7 days. 12 capsule 0    escitalopram oxalate (LEXAPRO) 10 MG tablet Take 1 tablet (10 mg total) by mouth once daily. Pt to take 15 mg daily (1.5) pills for depression 45 tablet 2    fluticasone-vilanterol (BREO ELLIPTA) 200-25 mcg/dose DsDv diskus inhaler Inhale 1 puff into the lungs once daily. 60 each 1    gabapentin (NEURONTIN) 400 MG capsule TAKE 1 CAPSULE(400 MG) BY MOUTH TWICE DAILY 60 capsule 2    hydrocodone-acetaminophen 10-325mg (NORCO)  mg Tab  Take 1 tablet by mouth every 6 (six) hours as needed for Pain. 40 tablet 0    levalbuterol (XOPENEX) 0.63 mg/3 mL nebulizer solution Take 3 mLs (0.63 mg total) by nebulization every 8 (eight) hours as needed for Wheezing or Shortness of Breath. Rescue 3 mL 1    levothyroxine (SYNTHROID) 88 MCG tablet Take 1 tablet (88 mcg total) by mouth once daily. 30 tablet 2    nicotine (NICODERM CQ) 21 mg/24 hr Place 1 patch onto the skin once daily. 28 patch 0    sodium bicarbonate 325 MG tablet Take 325 mg by mouth 2 (two) times daily.      traZODone (DESYREL) 100 MG tablet Take 1 tablet (100 mg total) by mouth every evening. 90 tablet 0    oxybutynin (DITROPAN) 5 MG Tab Take 2 tablets (10 mg total) by mouth 3 (three) times daily. 180 tablet 11     No current facility-administered medications on file prior to visit.        Review of patient's allergies indicates:  No Known Allergies  Objective:     Vitals:    03/22/18 0821   BP: 116/67   BP Location: Right arm   Patient Position: Sitting   BP Method: Medium (Automatic)   Pulse: 67   Weight: 69.2 kg (152 lb 9.6 oz)   Height: 6' (1.829 m)        Physical Exam   Constitutional: He is oriented to person, place, and time. He appears well-developed and well-nourished. No distress.   Eyes: No scleral icterus.   Neck: No JVD present. Carotid bruit is not present.   Cardiovascular: Regular rhythm and normal heart sounds.  Exam reveals no gallop and no friction rub.    No murmur heard.  Pulses:       Posterior tibial pulses are 2+ on the right side, and 2+ on the left side.   Pulmonary/Chest: Effort normal and breath sounds normal. No respiratory distress.   Abdominal: Soft. There is hepatosplenomegaly. There is tenderness in the epigastric area. There is no rebound.       Musculoskeletal: He exhibits no edema.   Neurological: He is alert and oriented to person, place, and time.   Skin: Skin is warm and dry. He is not diaphoretic.   Psychiatric: He has a normal mood and affect.  His behavior is normal. Judgment and thought content normal.   Vitals reviewed.     ECG: sinus bradycardia, WNL    Creat 2.0, BUN 24, K 5.1,  Hgb 11.9  Assessment:     1. Chest pain of uncertain etiology    2. Essential hypertension    3. History of bladder cancer    4. Acquired hypothyroidism    5. Idiopathic chronic gout without tophus, unspecified site    6. Chronic obstructive pulmonary disease, unspecified COPD type    7. Epigastric abdominal tenderness without rebound tenderness      Plan:   Blake was seen today for chest pain.    Diagnoses and all orders for this visit:    Chest pain of uncertain etiology  -     Stress test; Future  -     EKG 12-lead  -     Echocardiogram stress test; Future  -     X-Ray Chest PA And Lateral; Future    Essential hypertension    History of bladder cancer    Acquired hypothyroidism    Idiopathic chronic gout without tophus, unspecified site    Chronic obstructive pulmonary disease, unspecified COPD type    Epigastric abdominal tenderness without rebound tenderness  -     US Abdomen Complete; Future    Other orders  -     ranitidine (ZANTAC) 150 MG tablet; Take 1 tablet (150 mg total) by mouth 2 (two) times daily.     Treadmill stress echo.  Hold atenolol AM of test  CXR  Empiric ranitidine 150 mg bid in case pt has peptic ulcer disease  Pt counseled to curtail alcohol and cigarette consumption.  Ultrasound abdomen  Check lab  Follow-up in about 4 weeks (around 4/19/2018).

## 2018-03-29 ENCOUNTER — CLINICAL SUPPORT (OUTPATIENT)
Dept: SMOKING CESSATION | Facility: CLINIC | Age: 58
End: 2018-03-29
Payer: MEDICAID

## 2018-03-29 DIAGNOSIS — F17.200 NICOTINE DEPENDENCE: ICD-10-CM

## 2018-03-29 PROCEDURE — 99402 PREV MED CNSL INDIV APPRX 30: CPT | Mod: S$GLB,,, | Performed by: FAMILY MEDICINE

## 2018-03-29 NOTE — Clinical Note
Pt seen in office today. He continues to smoke 1-2 cigs/day. Pt remains on tobacco cessation medication of chantix 0.5 mg BID and 21 mg nicotine patch QD. He states that cravings for tobacco are down since he started chantix. No adverse effects/mental changes noted at this time. Pt asked to attempt a quit prior to next visit. Reviewed coping strategies/habitual behavior/relapse prevention with patient. Exhaled carbon monoxide level was 10 ppm per Smokerlyzer (0-6 non-smoker). Will see pt back in office in 1 wk.

## 2018-03-29 NOTE — PROGRESS NOTES
Individual Follow-Up Form    3/29/2018    Clinical Status of Patient: Outpatient    Length of Service: 30 minutes    Continuing Medication: yes  Chantix or Patches    Other Medications: none     Target Symptoms: Withdrawal and medication side effects. The following were rated moderate (3) to severe (4) on TCRS:  · Moderate (3): none  · Severe (4): none    Comments:  Pt seen in office today. He continues to smoke 1-2 cigs/day. Pt remains on tobacco cessation medication of chantix 0.5 mg BID and 21 mg nicotine patch QD. He states that cravings for tobacco are down since he started chantix. No adverse effects/mental changes noted at this time. Pt asked to attempt a quit prior to next visit. Reviewed coping strategies/habitual behavior/relapse prevention with patient. Exhaled carbon monoxide level was 10 ppm per Smokerlyzer (0-6 non-smoker). Will see pt back in office in 1 wk.     Diagnosis: F17.200    Next Visit: 1 week

## 2018-04-03 ENCOUNTER — OFFICE VISIT (OUTPATIENT)
Dept: UROLOGY | Facility: CLINIC | Age: 58
End: 2018-04-03
Payer: MEDICAID

## 2018-04-03 VITALS
HEART RATE: 68 BPM | SYSTOLIC BLOOD PRESSURE: 122 MMHG | BODY MASS INDEX: 20.59 KG/M2 | WEIGHT: 152 LBS | DIASTOLIC BLOOD PRESSURE: 72 MMHG | HEIGHT: 72 IN

## 2018-04-03 DIAGNOSIS — Z85.51 HISTORY OF BLADDER CANCER: Primary | ICD-10-CM

## 2018-04-03 DIAGNOSIS — N13.1 HYDRONEPHROSIS WITH URETERAL STRICTURE, NOT ELSEWHERE CLASSIFIED: ICD-10-CM

## 2018-04-03 DIAGNOSIS — N50.819 ORCHALGIA: ICD-10-CM

## 2018-04-03 DIAGNOSIS — N13.30 HYDRONEPHROSIS, BILATERAL: Chronic | ICD-10-CM

## 2018-04-03 PROCEDURE — 99999 PR PBB SHADOW E&M-EST. PATIENT-LVL III: CPT | Mod: PBBFAC,,, | Performed by: UROLOGY

## 2018-04-03 PROCEDURE — 99214 OFFICE O/P EST MOD 30 MIN: CPT | Mod: S$PBB,,, | Performed by: UROLOGY

## 2018-04-03 PROCEDURE — 99213 OFFICE O/P EST LOW 20 MIN: CPT | Mod: PBBFAC | Performed by: UROLOGY

## 2018-04-03 RX ORDER — IBUPROFEN 200 MG
TABLET ORAL
COMMUNITY
Start: 2018-03-30 | End: 2018-08-24

## 2018-04-03 NOTE — PROGRESS NOTES
Subjective:       Patient ID: Blake Guillory is a 58 y.o. male who was last seen in this office 2/22/2018    Chief Complaint:   Chief Complaint   Patient presents with    Post-op Evaluation     post op from procedure       History of Present Illness  History of Bladder Cancer  He has a history of bladder cancer. He is s/p cystectomy with ileal conduit in November 2012. From an oncologic standpoint, he is doing well. He has had issues with bilateral ureteral stricture. He underwent a bilateral ureteral reimplantation into his conduit several months later. His strictures recurred shortly afterwards.  He was managed with ureteral stent changes until one of the stent eroded through his conduit and into his bowels.    He had an Exploratory Laparotomy with replacement of ileal conduit and small bowel anastomosis on 12/30/2015.    He had a parastomal hernia repair in April by Dr. Mack.  He is healing nicely from that and has been discharged from his office.  He had a small bowel obstruction. that resolved.       He continues to have back pain in the morning and in the evening.  His stoma is not bothering him and he tells me that he has good urine output during the day and less at night.  He has worsening renal function and his right sided pain is becoming worse.      He had a right PCN placed by IR on 1/2/2018.  He then had a percutaneous right ureteral dilation and stent placement.  He noted increased urine output afterwards.  He still has pain and has noted significant pain in the right testicle.  He denies swelling or redness to the testicle.    He had his stent removed on 3/9/2018.  He is feeling okay, his chronic pain is about the same.  He still has right orchalgia.       ACTIVE MEDICAL ISSUES:  Patient Active Problem List   Diagnosis    Insomnia    Lumbar facet arthropathy    Sacroiliitis    Spondylosis without myelopathy    DDD (degenerative disc disease)    History of bladder cancer    Hydronephrosis,  bilateral    H/O primary malignant neoplasm of urinary bladder    Essential hypertension    Anemia of chronic disease    Moderate protein malnutrition    Chronic gout    Renal insufficiency    Body mass index (BMI) 20.0-20.9, adult    Personal history of bladder cancer    Acquired hypothyroidism    Hypoxemia    Increased PTH level    Severe malnutrition    COPD exacerbation    Hydronephrosis    History of primary malignant neoplasm of urinary bladder    Hydronephrosis with ureteral stricture, not elsewhere classified    Chest pain of uncertain etiology    COPD (chronic obstructive pulmonary disease)    Epigastric abdominal tenderness without rebound tenderness    CKD (chronic kidney disease), stage III       ALLERGIES AND MEDICATIONS: updated and reviewed.  Review of patient's allergies indicates:  No Known Allergies  Current Outpatient Prescriptions   Medication Sig    allopurinol (ZYLOPRIM) 100 MG tablet TAKE 1 TABLET(100 MG) BY MOUTH EVERY DAY    amLODIPine (NORVASC) 5 MG tablet TAKE 1 TABLET(5 MG) BY MOUTH EVERY DAY    atenolol (TENORMIN) 50 MG tablet TAKE 1 TABLET(50 MG) BY MOUTH EVERY DAY    ergocalciferol (VITAMIN D2) 50,000 unit Cap Take 1 capsule (50,000 Units total) by mouth every 7 days.    escitalopram oxalate (LEXAPRO) 10 MG tablet Take 1 tablet (10 mg total) by mouth once daily. Pt to take 15 mg daily (1.5) pills for depression    fluticasone-vilanterol (BREO ELLIPTA) 200-25 mcg/dose DsDv diskus inhaler Inhale 1 puff into the lungs once daily.    gabapentin (NEURONTIN) 400 MG capsule TAKE 1 CAPSULE(400 MG) BY MOUTH TWICE DAILY    hydrocodone-acetaminophen 10-325mg (NORCO)  mg Tab Take 1 tablet by mouth every 6 (six) hours as needed for Pain.    levalbuterol (XOPENEX) 0.63 mg/3 mL nebulizer solution Take 3 mLs (0.63 mg total) by nebulization every 8 (eight) hours as needed for Wheezing or Shortness of Breath. Rescue    levothyroxine (SYNTHROID) 88 MCG tablet Take 1  tablet (88 mcg total) by mouth once daily.    oxybutynin (DITROPAN) 5 MG Tab Take 2 tablets (10 mg total) by mouth 3 (three) times daily.    ranitidine (ZANTAC) 150 MG tablet Take 1 tablet (150 mg total) by mouth 2 (two) times daily.    sodium bicarbonate 325 MG tablet Take 325 mg by mouth 2 (two) times daily.    traZODone (DESYREL) 100 MG tablet Take 1 tablet (100 mg total) by mouth every evening.    nicotine (NICODERM CQ) 21 mg/24 hr      No current facility-administered medications for this visit.        Review of Systems   Constitutional: Negative for activity change, fatigue, fever and unexpected weight change.   HENT: Negative for congestion.    Eyes: Negative for redness.   Respiratory: Negative for chest tightness and shortness of breath.    Cardiovascular: Negative for chest pain and leg swelling.   Gastrointestinal: Negative for abdominal pain, constipation, diarrhea, nausea and vomiting.   Genitourinary: Negative for dysuria, flank pain, frequency, hematuria, penile pain, penile swelling, scrotal swelling, testicular pain and urgency.   Musculoskeletal: Negative for arthralgias and back pain.   Neurological: Negative for dizziness and light-headedness.   Psychiatric/Behavioral: Negative for behavioral problems and confusion. The patient is not nervous/anxious.    All other systems reviewed and are negative.      Objective:      Vitals:    04/03/18 1053   BP: 122/72   Pulse: 68   Weight: 68.9 kg (152 lb)   Height: 6' (1.829 m)     Physical Exam   Nursing note and vitals reviewed.  Constitutional: He is oriented to person, place, and time. He appears well-developed and well-nourished.   HENT:   Head: Normocephalic.   Eyes: Conjunctivae are normal.   Neck: Normal range of motion. Neck supple. No tracheal deviation present. No thyromegaly present.   Cardiovascular: Normal rate and normal heart sounds.    Pulmonary/Chest: Effort normal and breath sounds normal. No respiratory distress. He has no wheezes.    Abdominal: Soft. Bowel sounds are normal. He exhibits no distension and no mass. There is no hepatosplenomegaly. There is no tenderness. There is no rebound, no guarding and no CVA tenderness. No hernia.   Musculoskeletal: Normal range of motion. He exhibits no edema or tenderness.   Lymphadenopathy:     He has no cervical adenopathy.   Neurological: He is alert and oriented to person, place, and time.   Skin: Skin is warm and dry. No rash noted. No erythema.     Psychiatric: He has a normal mood and affect. His behavior is normal. Judgment and thought content normal.       Urine dipstick shows negative for all components.  Micro exam: negative for WBC's or RBC's.  US Scrotum And Testicles   Status: Final result   MyChart Results Release     MyChart Status: Active Results Release   PACS Images     Show images for US Scrotum And Testicles   Reviewed By Tori Bo MD on 3/7/2018 16:17   External Result Report     External Result Report   Narrative     Procedure: Testicular ultrasound    Technique: Transverse and longitudinal scrotal imaging of the testicles       Comparison: None    Finding:Right testicle: The right testicle is normal in size measuring 4.8 by2.9 x 3.3 cm    There is normal arterial and venous waveform without varicosity or hydrocele.  There is normal color flow within the right epididymis with a 1 cm anechoic focus in the epididymal head compatible with a cyst.    The left testicle is normal in size measuring 4.4x 2.7 x 3.1 cm.  There is normal arterial and venous waveforms in the left testicle. There is no evidence for left testicular hydrocele or varicocele.    .   Impression      Normal size and echogenicity of the testicles bilaterally without evidence for mass lesion or lack of vascular waveform to suggest testicular torsion.     There is a 1.0 CM anechoic focus within right epididymal head most compatible with epididymal cyst versus spermatocele in..      Electronically signed  by: ALIZA GARCIA DO  Date: 03/07/18  Time: 16:08     Encounter     View Encounter              Assessment:       1. History of bladder cancer    2. Hydronephrosis, bilateral    3. Hydronephrosis with ureteral stricture, not elsewhere classified    4. Orchalgia          Plan:       1. History of bladder cancer  stable    2. Hydronephrosis, bilateral  stable    3. Hydronephrosis with ureteral stricture, not elsewhere classified  Improved after stent  - US Retroperitoneal Complete (Kidney and; Future    4. Orchalgia  Consider looking in to back issues.              Follow-up in about 6 weeks (around 5/15/2018) for Follow up, Review X-ray.

## 2018-04-05 ENCOUNTER — CLINICAL SUPPORT (OUTPATIENT)
Dept: SMOKING CESSATION | Facility: CLINIC | Age: 58
End: 2018-04-05
Payer: MEDICAID

## 2018-04-05 DIAGNOSIS — F17.200 NICOTINE DEPENDENCE: ICD-10-CM

## 2018-04-05 PROCEDURE — 99402 PREV MED CNSL INDIV APPRX 30: CPT | Mod: S$GLB,,, | Performed by: FAMILY MEDICINE

## 2018-04-05 NOTE — PROGRESS NOTES
Individual Follow-Up Form    4/5/2018    Clinical Status of Patient: Outpatient    Length of Service: 30 minutes    Continuing Medication: yes  Chantix or Patches    Other Medications: none     Target Symptoms: Withdrawal and medication side effects. The following were rated moderate (3) to severe (4) on TCRS:  · Moderate (3): none  · Severe (4): none    Comments:  Pt seen in office today. He has only smoked 1 cigarette today. Pt remains on tobacco cessation medication of chantix 1 mg BID and 21 mg nicotine patch QD. No adverse effects/mental changes noted at this time. He states he will be quit by next visit. Reviewed coping strategies/habitual behavior/relapse prevention with patient. Exhaled carbon monoxide level was 6 ppm per Smokerlyzer (0-6 non-smoker). Will see pt back in office in 1 wk.     Diagnosis: F17.200    Next Visit: 1 week

## 2018-04-05 NOTE — Clinical Note
Pt seen in office today. He has only smoked 1 cigarette today. Pt remains on tobacco cessation medication of chantix 1 mg BID and 21 mg nicotine patch QD. No adverse effects/mental changes noted at this time. He states he will be quit by next visit. Reviewed coping strategies/habitual behavior/relapse prevention with patient. Exhaled carbon monoxide level was 6 ppm per Smokerlyzer (0-6 non-smoker). Will see pt back in office in 1 wk.

## 2018-04-06 ENCOUNTER — TELEPHONE (OUTPATIENT)
Dept: CARDIOLOGY | Facility: CLINIC | Age: 58
End: 2018-04-06

## 2018-04-06 NOTE — TELEPHONE ENCOUNTER
I spoke with pt:  CXR WNL  U/S of abdomen shows chronic right hydronephrosis and left renal stones and new finding of gallstone.  Stress echo results pending

## 2018-04-09 ENCOUNTER — TELEPHONE (OUTPATIENT)
Dept: CARDIOLOGY | Facility: CLINIC | Age: 58
End: 2018-04-09

## 2018-04-09 NOTE — TELEPHONE ENCOUNTER
Stress ECG and stress echo WNL at a submaximal rate.  Left message that we will need to proceed with Lexiscan Cardiolite stress test ir pt is still experiencing chest pains.  Pt has appt later this month.

## 2018-04-12 ENCOUNTER — CLINICAL SUPPORT (OUTPATIENT)
Dept: SMOKING CESSATION | Facility: CLINIC | Age: 58
End: 2018-04-12
Payer: COMMERCIAL

## 2018-04-12 DIAGNOSIS — F17.200 NICOTINE DEPENDENCE: ICD-10-CM

## 2018-04-12 PROCEDURE — 99402 PREV MED CNSL INDIV APPRX 30: CPT | Mod: S$GLB,,, | Performed by: FAMILY MEDICINE

## 2018-04-12 NOTE — Clinical Note
Pt seen in office today. He continues to be tobacco free. He does admit to a slip, but has not returned to smoking. Pt remains on tobacco cessation medication of chantix 1 mg BID. No adverse effects/mental changes noted at this time. Reviewed coping strategies/habitual behavior/relapse prevention with patient. Exhaled carbon monoxide level was 2 ppm per Smokerlyzer (0-6 non-smoker). He has completed all assigned tobacco cessation visits and will see pt back in office PRN.

## 2018-04-12 NOTE — PROGRESS NOTES
Individual Follow-Up Form    4/12/2018    Clinical Status of Patient: Outpatient    Length of Service: 30 minutes    Continuing Medication: yes  Chantix    Other Medications: none     Target Symptoms: Withdrawal and medication side effects. The following were rated moderate (3) to severe (4) on TCRS:  · Moderate (3): none  · Severe (4): none    Comments:  Pt seen in office today. He continues to be tobacco free. He does admit to a slip, but has not returned to smoking. Pt remains on tobacco cessation medication of chantix 1 mg BID. No adverse effects/mental changes noted at this time. Reviewed coping strategies/habitual behavior/relapse prevention with patient. Exhaled carbon monoxide level was 2 ppm per Smokerlyzer (0-6 non-smoker). He has completed all assigned tobacco cessation visits and will see pt back in office PRN.     Diagnosis: F17.200    Next Visit: 1 week

## 2018-04-17 DIAGNOSIS — N23 RENAL COLIC ON RIGHT SIDE: ICD-10-CM

## 2018-04-17 DIAGNOSIS — Z85.51 H/O PRIMARY MALIGNANT NEOPLASM OF URINARY BLADDER: Chronic | ICD-10-CM

## 2018-04-17 DIAGNOSIS — N13.1 HYDRONEPHROSIS WITH URETERAL STRICTURE, NOT ELSEWHERE CLASSIFIED: ICD-10-CM

## 2018-04-17 DIAGNOSIS — N13.30 HYDRONEPHROSIS, UNSPECIFIED HYDRONEPHROSIS TYPE: ICD-10-CM

## 2018-04-17 RX ORDER — HYDROCODONE BITARTRATE AND ACETAMINOPHEN 10; 325 MG/1; MG/1
1 TABLET ORAL EVERY 6 HOURS PRN
Qty: 40 TABLET | Refills: 0 | Status: SHIPPED | OUTPATIENT
Start: 2018-04-17 | End: 2018-05-02 | Stop reason: SDUPTHER

## 2018-04-17 NOTE — TELEPHONE ENCOUNTER
----- Message from Trent Silva sent at 4/17/2018  3:54 PM CDT -----  Contact: Wife  REFILL: hydrocodone-acetaminophen 10-325mg (NORCO)  mg Tab    PH[facility]Y-[facility]

## 2018-04-19 ENCOUNTER — OFFICE VISIT (OUTPATIENT)
Dept: CARDIOLOGY | Facility: CLINIC | Age: 58
End: 2018-04-19
Payer: MEDICAID

## 2018-04-19 VITALS
HEIGHT: 72 IN | WEIGHT: 152.88 LBS | SYSTOLIC BLOOD PRESSURE: 136 MMHG | HEART RATE: 65 BPM | BODY MASS INDEX: 20.71 KG/M2 | DIASTOLIC BLOOD PRESSURE: 79 MMHG

## 2018-04-19 DIAGNOSIS — R07.9 CHEST PAIN, UNSPECIFIED TYPE: ICD-10-CM

## 2018-04-19 DIAGNOSIS — R07.9 CHEST PAIN: ICD-10-CM

## 2018-04-19 DIAGNOSIS — I10 ESSENTIAL HYPERTENSION: ICD-10-CM

## 2018-04-19 DIAGNOSIS — E03.9 ACQUIRED HYPOTHYROIDISM: Chronic | ICD-10-CM

## 2018-04-19 DIAGNOSIS — N18.30 CKD (CHRONIC KIDNEY DISEASE), STAGE III: ICD-10-CM

## 2018-04-19 DIAGNOSIS — J44.9 CHRONIC OBSTRUCTIVE PULMONARY DISEASE, UNSPECIFIED COPD TYPE: Primary | ICD-10-CM

## 2018-04-19 PROCEDURE — 99212 OFFICE O/P EST SF 10 MIN: CPT | Mod: PBBFAC,PN | Performed by: INTERNAL MEDICINE

## 2018-04-19 PROCEDURE — 99999 PR PBB SHADOW E&M-EST. PATIENT-LVL II: CPT | Mod: PBBFAC,,, | Performed by: INTERNAL MEDICINE

## 2018-04-19 PROCEDURE — 99213 OFFICE O/P EST LOW 20 MIN: CPT | Mod: S$PBB,,, | Performed by: INTERNAL MEDICINE

## 2018-04-19 RX ORDER — MULTIVITAMIN
1 TABLET ORAL DAILY
COMMUNITY

## 2018-04-19 RX ORDER — VARENICLINE TARTRATE 1 MG/1
0.5 TABLET, FILM COATED ORAL 2 TIMES DAILY
COMMUNITY
End: 2018-08-24

## 2018-04-19 NOTE — PROGRESS NOTES
Subjective:      Patient ID: Blake Guillory is a 58 y.o. male.    Chief Complaint: Follow-up (No chest pain, just pins and needles in neck and shoulder on left side)    HPI:    Feels OK. Woke up with tingling in left shoulder today.  No chest pains since last visit.     Review of Systems   Cardiovascular: Positive for dyspnea on exertion (Chronically short of breath when climbing stairs.). Negative for chest pain, claudication, irregular heartbeat, leg swelling, near-syncope, orthopnea, palpitations and syncope.      No cigarets for the past week on Chantix and patches.  Past Medical History:   Diagnosis Date    Arthritis     Blood transfusion     Cancer     bladder    COPD (chronic obstructive pulmonary disease)     Frequency of urination     General anesthetics causing adverse effect in therapeutic use     pt states he wakes up very violently from anesthesia    History of urostomy     Hypertension     Limited joint range of motion right hip and leg    Mixed anxiety and depressive disorder     Personal history of bladder cancer     Thyroid disease     Ureteral stent displacement     Urinary retention     Wears glasses         Past Surgical History:   Procedure Laterality Date    ABDOMINAL SURGERY      APPENDECTOMY  12/30/2015    Procedure: APPENDECTOMY;  Surgeon: CHAD Bo MD;  [facility];  Service: Urology;;    bladder removed      BOWEL RESECTION      CYSTOSCOPY      >1  episodes for bilat ureteral stent exchange    FRACTURE SURGERY      HAND SURGERY      HERNIA REPAIR      [place]    HIP SURGERY  2012    Rt hip septic    MOUTH SURGERY  2000    all teeth extracted    nephrostomy tube placement      turbt      urostomy         Family History   Problem Relation Age of Onset    Adopted: Yes       Social History     Social History    Marital status:      Spouse name: N/A    Number of children: N/A    Years of education: N/A     Occupational History    [employer]     Social History Main Topics    Smoking status: Current Some Day Smoker     Packs/day: 0.30     Years: 42.00     Last attempt to quit: 2/1/2017    Smokeless tobacco: Never Used      Comment: in cessation program    Alcohol use 8.4 - 12.6 oz/week     14 - 21 Cans of beer per week      Comment: occassional    Drug use: Yes     Types: Marijuana      Comment: occass    Sexual activity: Yes     Partners: Female     Birth control/ protection: None     Other Topics Concern    None     Social History Narrative    None       Current Outpatient Prescriptions on File Prior to Visit   Medication Sig Dispense Refill    allopurinol (ZYLOPRIM) 100 MG tablet TAKE 1 TABLET(100 MG) BY MOUTH EVERY DAY 90 tablet 0    amLODIPine (NORVASC) 5 MG tablet TAKE 1 TABLET(5 MG) BY MOUTH EVERY DAY 90 tablet 0    atenolol (TENORMIN) 50 MG tablet TAKE 1 TABLET(50 MG) BY MOUTH EVERY DAY 90 tablet 0    ergocalciferol (VITAMIN D2) 50,000 unit Cap Take 1 capsule (50,000 Units total) by mouth every 7 days. 12 capsule 0    escitalopram oxalate (LEXAPRO) 10 MG tablet Take 1 tablet (10 mg total) by mouth once daily. Pt to take 15 mg daily (1.5) pills for depression 45 tablet 2    fluticasone-vilanterol (BREO ELLIPTA) 200-25 mcg/dose DsDv diskus inhaler Inhale 1 puff into the lungs once daily. 60 each 1    gabapentin (NEURONTIN) 400 MG capsule TAKE 1 CAPSULE(400 MG) BY MOUTH TWICE DAILY 60 capsule 2    hydrocodone-acetaminophen 10-325mg (NORCO)  mg Tab Take 1 tablet by mouth every 6 (six) hours as needed for Pain. 40 tablet 0    levothyroxine (SYNTHROID) 88 MCG tablet Take 1 tablet (88 mcg total) by mouth once daily. 30 tablet 2    nicotine (NICODERM CQ) 21 mg/24 hr       ranitidine (ZANTAC) 150 MG tablet Take 1 tablet (150 mg total) by mouth 2 (two) times daily. 60 tablet 11    sodium bicarbonate 325 MG tablet Take 325 mg by mouth 2 (two) times daily.      traZODone (DESYREL) 100 MG tablet Take 1  tablet (100 mg total) by mouth every evening. 90 tablet 0    [DISCONTINUED] levalbuterol (XOPENEX) 0.63 mg/3 mL nebulizer solution Take 3 mLs (0.63 mg total) by nebulization every 8 (eight) hours as needed for Wheezing or Shortness of Breath. Rescue 3 mL 1    [DISCONTINUED] oxybutynin (DITROPAN) 5 MG Tab Take 2 tablets (10 mg total) by mouth 3 (three) times daily. 180 tablet 11     No current facility-administered medications on file prior to visit.        Review of patient's allergies indicates:  No Known Allergies  Objective:     Vitals:    04/19/18 0819   BP: 136/79   BP Location: Right arm   Patient Position: Sitting   BP Method: Medium (Automatic)   Pulse: 65   Weight: 69.4 kg (152 lb 14.4 oz)   Height: 6' (1.829 m)        Physical Exam   Constitutional: He is oriented to person, place, and time. He appears well-developed and well-nourished. No distress.   Eyes: No scleral icterus.   Neck: No JVD present. Carotid bruit is not present.   Cardiovascular: Regular rhythm and normal heart sounds.  Exam reveals no gallop and no friction rub.    No murmur heard.  Pulmonary/Chest: Effort normal and breath sounds normal. No respiratory distress.   Musculoskeletal: He exhibits no edema.   Neurological: He is alert and oriented to person, place, and time.   Skin: Skin is warm and dry. He is not diaphoretic.   Psychiatric: He has a normal mood and affect. His behavior is normal. Judgment and thought content normal.   Vitals reviewed.     ECG: NSR, WNL    Echo WNL  CXR OK  Stress ECG and stress echo WNL but submaximal  abd U/S showed hydronephrosis and gallstone.  Assessment:     1. Chronic obstructive pulmonary disease, unspecified COPD type    2. Essential hypertension    3. CKD (chronic kidney disease), stage III    4. Acquired hypothyroidism    5.      Chest pain resolved  6.      Asymptomatic gallstone  7.      Possible cervical radiculopathy left  Plan:   Blake was seen today for follow-up.    Diagnoses and all  orders for this visit:    Chronic obstructive pulmonary disease, unspecified COPD type    Essential hypertension    CKD (chronic kidney disease), stage III    Acquired hypothyroidism    Other orders  -     IN OFFICE EKG 12-LEAD (to Muse)     Smoking cessation counseled  Strategies to keep neck aligned with thorax hs discussed  No need to pursue dobutamine Cardiolite stress test (given submaximal nature of stress test)since pt is no longer having chest pains  F/u with PCP  RTC prn  No Follow-up on file.

## 2018-05-02 ENCOUNTER — HOSPITAL ENCOUNTER (OUTPATIENT)
Dept: RADIOLOGY | Facility: HOSPITAL | Age: 58
Discharge: HOME | End: 2018-05-02
Attending: UROLOGY
Payer: MEDICAID

## 2018-05-02 DIAGNOSIS — Z85.51 H/O PRIMARY MALIGNANT NEOPLASM OF URINARY BLADDER: Chronic | ICD-10-CM

## 2018-05-02 DIAGNOSIS — N13.30 HYDRONEPHROSIS, UNSPECIFIED HYDRONEPHROSIS TYPE: ICD-10-CM

## 2018-05-02 DIAGNOSIS — N13.1 HYDRONEPHROSIS WITH URETERAL STRICTURE, NOT ELSEWHERE CLASSIFIED: ICD-10-CM

## 2018-05-02 DIAGNOSIS — N23 RENAL COLIC ON RIGHT SIDE: ICD-10-CM

## 2018-05-02 PROCEDURE — 76770 US EXAM ABDO BACK WALL COMP: CPT | Mod: TC

## 2018-05-02 PROCEDURE — 76770 US EXAM ABDO BACK WALL COMP: CPT | Mod: 26,,, | Performed by: RADIOLOGY

## 2018-05-02 NOTE — TELEPHONE ENCOUNTER
----- Message from Jalyn Qiu sent at 5/2/2018 11:37 AM CDT -----  Contact: sister  Pt sister calling to request a refill of hydrocodone-acetaminophen 10-325mg (NORCO)

## 2018-05-03 RX ORDER — HYDROCODONE BITARTRATE AND ACETAMINOPHEN 10; 325 MG/1; MG/1
1 TABLET ORAL EVERY 6 HOURS PRN
Qty: 40 TABLET | Refills: 0 | Status: SHIPPED | OUTPATIENT
Start: 2018-05-03 | End: 2018-05-15 | Stop reason: SDUPTHER

## 2018-05-15 ENCOUNTER — OFFICE VISIT (OUTPATIENT)
Dept: UROLOGY | Facility: CLINIC | Age: 58
End: 2018-05-15
Payer: MEDICAID

## 2018-05-15 VITALS
DIASTOLIC BLOOD PRESSURE: 70 MMHG | BODY MASS INDEX: 20.59 KG/M2 | WEIGHT: 152 LBS | HEART RATE: 62 BPM | SYSTOLIC BLOOD PRESSURE: 130 MMHG | HEIGHT: 72 IN

## 2018-05-15 DIAGNOSIS — Z85.51 H/O PRIMARY MALIGNANT NEOPLASM OF URINARY BLADDER: Chronic | ICD-10-CM

## 2018-05-15 DIAGNOSIS — N50.819 ORCHALGIA: ICD-10-CM

## 2018-05-15 DIAGNOSIS — M54.40 CHRONIC LOW BACK PAIN WITH SCIATICA, SCIATICA LATERALITY UNSPECIFIED, UNSPECIFIED BACK PAIN LATERALITY: ICD-10-CM

## 2018-05-15 DIAGNOSIS — N13.30 HYDRONEPHROSIS, UNSPECIFIED HYDRONEPHROSIS TYPE: Primary | ICD-10-CM

## 2018-05-15 DIAGNOSIS — N18.30 CKD (CHRONIC KIDNEY DISEASE), STAGE III: ICD-10-CM

## 2018-05-15 DIAGNOSIS — G89.29 CHRONIC LOW BACK PAIN WITH SCIATICA, SCIATICA LATERALITY UNSPECIFIED, UNSPECIFIED BACK PAIN LATERALITY: ICD-10-CM

## 2018-05-15 PROCEDURE — 99214 OFFICE O/P EST MOD 30 MIN: CPT | Mod: PBBFAC | Performed by: UROLOGY

## 2018-05-15 PROCEDURE — 99214 OFFICE O/P EST MOD 30 MIN: CPT | Mod: S$PBB,,, | Performed by: UROLOGY

## 2018-05-15 PROCEDURE — 99999 PR PBB SHADOW E&M-EST. PATIENT-LVL IV: CPT | Mod: PBBFAC,,, | Performed by: UROLOGY

## 2018-05-15 RX ORDER — HYDROCODONE BITARTRATE AND ACETAMINOPHEN 10; 325 MG/1; MG/1
1 TABLET ORAL EVERY 6 HOURS PRN
Qty: 40 TABLET | Refills: 0 | Status: SHIPPED | OUTPATIENT
Start: 2018-05-15 | End: 2020-07-10 | Stop reason: SDUPTHER

## 2018-05-15 NOTE — PROGRESS NOTES
Subjective:       Patient ID: Blake Guillory is a 58 y.o. male who was last seen in this office 4/3/2018    Chief Complaint:   Chief Complaint   Patient presents with    Follow-up     6 week f/u with ultrasound  pt is still complaining of pain        History of Present Illness  History of Bladder Cancer  He has a history of bladder cancer. He is s/p cystectomy with ileal conduit in November 2012. From an oncologic standpoint, he is doing well. He has had issues with bilateral ureteral stricture. He underwent a bilateral ureteral reimplantation into his conduit several months later. His strictures recurred shortly afterwards.  He was managed with ureteral stent changes until one of the stent eroded through his conduit and into his bowels.    He had an Exploratory Laparotomy with replacement of ileal conduit and small bowel anastomosis on 12/30/2015.    He had a parastomal hernia repair in April by Dr. Mack.  He is healing nicely from that and has been discharged from his office.  He had a small bowel obstruction. that resolved.       He continues to have back pain in the morning and in the evening.  His stoma is not bothering him and he tells me that he has good urine output during the day and less at night.  He has worsening renal function and his right sided pain is becoming worse.      He had a right PCN placed by IR on 1/2/2018.  He then had a percutaneous right ureteral dilation and stent placement.  He noted increased urine output afterwards.  He still has pain and has noted significant pain in the right testicle.  He denies swelling or redness to the testicle.    He had his stent removed on 3/9/2018.  He is feeling okay, his chronic pain is about the same.  He still has right orchalgia.     ACTIVE MEDICAL ISSUES:  Patient Active Problem List   Diagnosis    Insomnia    Lumbar facet arthropathy    Sacroiliitis    Spondylosis without myelopathy    DDD (degenerative disc disease)    History of bladder  cancer    Hydronephrosis, bilateral    H/O primary malignant neoplasm of urinary bladder    Essential hypertension    Anemia of chronic disease    Moderate protein malnutrition    Chronic gout    Renal insufficiency    Body mass index (BMI) 20.0-20.9, adult    Personal history of bladder cancer    Acquired hypothyroidism    Hypoxemia    Increased PTH level    Severe malnutrition    COPD exacerbation    Hydronephrosis    History of primary malignant neoplasm of urinary bladder    Hydronephrosis with ureteral stricture, not elsewhere classified    Chest pain of uncertain etiology    COPD (chronic obstructive pulmonary disease)    Epigastric abdominal tenderness without rebound tenderness    CKD (chronic kidney disease), stage III       ALLERGIES AND MEDICATIONS: updated and reviewed.  Review of patient's allergies indicates:  No Known Allergies  Current Outpatient Prescriptions   Medication Sig    allopurinol (ZYLOPRIM) 100 MG tablet TAKE 1 TABLET(100 MG) BY MOUTH EVERY DAY    amLODIPine (NORVASC) 5 MG tablet TAKE 1 TABLET(5 MG) BY MOUTH EVERY DAY    atenolol (TENORMIN) 50 MG tablet TAKE 1 TABLET(50 MG) BY MOUTH EVERY DAY    ergocalciferol (VITAMIN D2) 50,000 unit Cap Take 1 capsule (50,000 Units total) by mouth every 7 days.    escitalopram oxalate (LEXAPRO) 10 MG tablet Take 1 tablet (10 mg total) by mouth once daily. Pt to take 15 mg daily (1.5) pills for depression    fluticasone-vilanterol (BREO ELLIPTA) 200-25 mcg/dose DsDv diskus inhaler Inhale 1 puff into the lungs once daily.    gabapentin (NEURONTIN) 400 MG capsule TAKE 1 CAPSULE(400 MG) BY MOUTH TWICE DAILY    hydrocodone-acetaminophen 10-325mg (NORCO)  mg Tab Take 1 tablet by mouth every 6 (six) hours as needed for Pain.    levothyroxine (SYNTHROID) 88 MCG tablet Take 1 tablet (88 mcg total) by mouth once daily.    multivitamin (ONE DAILY MULTIVITAMIN) per tablet Take 1 tablet by mouth once daily.    nicotine (NICODERM  CQ) 21 mg/24 hr     ranitidine (ZANTAC) 150 MG tablet Take 1 tablet (150 mg total) by mouth 2 (two) times daily.    sodium bicarbonate 325 MG tablet Take 325 mg by mouth 2 (two) times daily.    traZODone (DESYREL) 100 MG tablet Take 1 tablet (100 mg total) by mouth every evening.    varenicline (CHANTIX) 1 mg Tab Take 0.5 mg by mouth 2 (two) times daily.     No current facility-administered medications for this visit.        Review of Systems   Constitutional: Negative for activity change, fatigue, fever and unexpected weight change.   HENT: Negative for congestion.    Eyes: Negative for redness.   Respiratory: Negative for chest tightness and shortness of breath.    Cardiovascular: Negative for chest pain and leg swelling.   Gastrointestinal: Negative for abdominal pain, constipation, diarrhea, nausea and vomiting.   Genitourinary: Positive for flank pain and testicular pain. Negative for dysuria, frequency, hematuria, penile pain, penile swelling, scrotal swelling and urgency.   Musculoskeletal: Negative for arthralgias and back pain.   Neurological: Negative for dizziness and light-headedness.   Psychiatric/Behavioral: Negative for behavioral problems and confusion. The patient is not nervous/anxious.    All other systems reviewed and are negative.      Objective:      Vitals:    05/15/18 1023   BP: 130/70   Pulse: 62   Weight: 68.9 kg (152 lb)   Height: 6' (1.829 m)     Physical Exam   Nursing note and vitals reviewed.  Constitutional: He is oriented to person, place, and time. He appears well-developed and well-nourished.   HENT:   Head: Normocephalic.   Eyes: Conjunctivae are normal.   Neck: Normal range of motion. Neck supple. No tracheal deviation present. No thyromegaly present.   Cardiovascular: Normal rate and normal heart sounds.    Pulmonary/Chest: Effort normal and breath sounds normal. No respiratory distress. He has no wheezes.   Abdominal: Soft. Bowel sounds are normal. There is no  hepatosplenomegaly. There is no tenderness. There is no rebound and no CVA tenderness. No hernia.   Genitourinary: Penis normal. Right testis shows tenderness. Circumcised.   Genitourinary Comments: No palpable abnormality   Musculoskeletal: Normal range of motion. He exhibits no edema or tenderness.   Lymphadenopathy:     He has no cervical adenopathy.   Neurological: He is alert and oriented to person, place, and time.   Skin: Skin is warm and dry. No rash noted. No erythema.     Psychiatric: He has a normal mood and affect. His behavior is normal. Judgment and thought content normal.       Urine dipstick shows negative for all components.  Micro exam: negative for WBC's or RBC's.  US Retroperitoneal Complete (Kidney and   Status: Final result   MyChart Results Release     AwesomePiece Status: Active Results Release   PACS Images     Show images for US Retroperitoneal Complete (Kidney and   Reviewed By Tori Bo MD on 5/2/2018 15:33   External Result Report     External Result Report   Narrative     EXAMINATION:  US RETROPERITONEAL COMPLETE    CLINICAL HISTORY:  Hydronephrosis with ureteral stricture, not elsewhere classified    TECHNIQUE:  Ultrasound of the kidneys and urinary bladder was performed including color flow and Doppler evaluation of the kidneys.    COMPARISON:  Ultrasound 04/06/2018    FINDINGS:  The right kidney is normal in length measuring 11.0 cm. The left kidney is normal in length measuring 10.9 cm. Segmental arterial resistive indices are within normal limits.  Renal cortical thinning with scarring noted.  Two echogenic foci in the left lower pole measuring roughly 8 and 9 mm in size suggestive of calcifications.  Mild right hydronephrosis.  No renal masses..  The bladder is absent.   Impression       Mild right hydronephrosis, improved from the 04/06/2018 exam.    Nonobstructing left renal stones.      Electronically signed by: Morales Covington MD  Date: 05/02/2018  Time: 15:08     Encounter     View Encounter          Signed by     Signed Credentials Date/Time  Phone Pager   HALINA BROWN MD 5/02/2018 15:08 [phone]   Reviewed By     CHAD Bo MD on 5/2/2018 15:33       Assessment:       1. Hydronephrosis, unspecified hydronephrosis type    2. Chronic low back pain with sciatica, sciatica laterality unspecified, unspecified back pain laterality    3. Orchalgia    4. CKD (chronic kidney disease), stage III    5. H/O primary malignant neoplasm of urinary bladder          Plan:       1. Hydronephrosis, unspecified hydronephrosis type    - US Retroperitoneal Complete (Kidney and; Future  - hydrocodone-acetaminophen 10-325mg (NORCO)  mg Tab; Take 1 tablet by mouth every 6 (six) hours as needed for Pain.  Dispense: 40 tablet; Refill: 0    2. Chronic low back pain with sciatica, sciatica laterality unspecified, unspecified back pain laterality    - Ambulatory Referral to Pain Clinic    3. Orchalgia      4. CKD (chronic kidney disease), stage III    - Ambulatory Referral to Nephrology    5. H/O primary malignant neoplasm of urinary bladder  stable            Follow-up in about 6 months (around 11/15/2018) for Follow up, Review X-ray.

## 2018-05-22 ENCOUNTER — TELEPHONE (OUTPATIENT)
Dept: PAIN MEDICINE | Facility: CLINIC | Age: 58
End: 2018-05-22

## 2018-05-22 ENCOUNTER — TELEPHONE (OUTPATIENT)
Dept: UROLOGY | Facility: CLINIC | Age: 58
End: 2018-05-22

## 2018-05-22 NOTE — TELEPHONE ENCOUNTER
Lt message pain management doctor that  was referred to was dr.michael bowen an phone number an address was given also./red

## 2018-05-22 NOTE — TELEPHONE ENCOUNTER
Message left on phone number provided below for Mrs. Guillory to contact clinic to discuss.    ----- Message from Shannen Putnam sent at 5/22/2018 11:51 AM CDT -----  Contact: spouse - Luke   Pt's spouse is asking for an earlier appt than first available in Sept. Pt has [facility].  Pt complains of pain all over body due to Arthritis and Kidney Disease.  [phone]

## 2018-05-22 NOTE — TELEPHONE ENCOUNTER
----- Message from Christian Walker sent at 5/22/2018 11:09 AM CDT -----  Contact: Wife/Luke  Pt called requesting to schedule Pain Management referral. Contact Luke [phone].

## 2018-05-28 PROBLEM — Z98.890 S/P COLONOSCOPY: Chronic | Status: ACTIVE | Noted: 2018-05-28

## 2018-06-14 ENCOUNTER — TELEPHONE (OUTPATIENT)
Dept: UROLOGY | Facility: CLINIC | Age: 58
End: 2018-06-14

## 2018-06-14 NOTE — TELEPHONE ENCOUNTER
Pt called stating he is having pain an has been without pain medication for a week and needs something for pain. I explained to him that his imaging were all normal an he shouldn't  be having any pain and   referred him to pain management t help manage his pain which he than stated that he was told there was no openings until next year do to his insurance (medicaid). Pt states he needs something now an wanted me to send a message to . I advised him  will not be in the office until tomorrow but I would send message anyway. I did leave a message with 's office for his nurse to contact me regarding to try an get patient into their clinic. Please advise-red

## 2018-06-15 NOTE — TELEPHONE ENCOUNTER
Spoke to pt's wife advised from urologic stand point pt should be having any pain imaging were normal.  states patient should see pain management or pcp. Ms. Guillory fully understands an will forward message to patient./red

## 2018-06-28 ENCOUNTER — TELEPHONE (OUTPATIENT)
Dept: UROLOGY | Facility: CLINIC | Age: 58
End: 2018-06-28

## 2018-06-28 NOTE — TELEPHONE ENCOUNTER
----- Message from Ronnie Burnett sent at 6/28/2018  3:00 PM CDT -----  Contact: [phone]/Spouse  Calling TO speak with Shannen or Josey regarding blood in urine all week

## 2018-06-28 NOTE — TELEPHONE ENCOUNTER
Spoke to pt's wife she states pt is having dark color blood in urine I asked if he was drinking water she states about 2 to 3 bottles a day. She states he is still having kidney pain since the last time he spoke with . I advised her to have him increase his fluid intake an I would send a message to  to see if he would like to add anything else to message. Please advise-red

## 2018-06-29 NOTE — TELEPHONE ENCOUNTER
Spoke to pt's wife (rhian) advised her to have  increase water intake by more than 3 bottles a day. If doesn't improve call office for appt with np or .  fully understands./red

## 2018-09-26 ENCOUNTER — TELEPHONE (OUTPATIENT)
Dept: UROLOGY | Facility: CLINIC | Age: 58
End: 2018-09-26

## 2018-09-26 NOTE — TELEPHONE ENCOUNTER
In [place] - Remy Wakefield et al at . At [facility] Oziel [facility]. There are general surgeons at [facility] as well, no specific recommendations on a surgeon.

## 2018-09-26 NOTE — TELEPHONE ENCOUNTER
Patient wife is requesting a recommendation for general surgery. [facility]/maria e/[facility]ri[facility] are not accepting new patients for the rest of the year. Can you recommend someone since Dr. Bo is out?

## 2018-09-26 NOTE — TELEPHONE ENCOUNTER
----- Message from Joann Stearns sent at 9/26/2018 11:45 AM CDT -----  Contact: Wife/ Luke/ [phone]  Pt calling to request information on surgeon Dr. Bo recommended. Thank you.

## 2018-10-30 ENCOUNTER — TELEPHONE (OUTPATIENT)
Dept: UROLOGY | Facility: CLINIC | Age: 58
End: 2018-10-30

## 2018-10-30 DIAGNOSIS — N20.0 KIDNEY STONE: Primary | ICD-10-CM

## 2018-10-30 NOTE — TELEPHONE ENCOUNTER
Okay to cancel ADRYAN, his current study is sufficient.  This shows several stones.  I would like a more detailed study to look into this.  CT ordered.

## 2018-10-30 NOTE — TELEPHONE ENCOUNTER
----- Message from Aline Bush sent at 10/30/2018  8:54 AM CDT -----  Contact: Rhian- Wife   Patient's wife says patient has a recent ultrasound and he is scheduled for another ont his wife would like Dr. Bo to check Epic to see if he really need to have the on he is scheduled for since he had one. Please call at [phone].

## 2018-10-30 NOTE — TELEPHONE ENCOUNTER
Patient just had a ADRYAN- can we cancel the one is ordered and use present- also will patient need to follow up with additional imaging as suggested in the recent ADRYAN. Please advise.

## 2018-11-02 ENCOUNTER — HOSPITAL ENCOUNTER (OUTPATIENT)
Dept: RADIOLOGY | Facility: HOSPITAL | Age: 58
Discharge: HOME | End: 2018-11-02
Attending: UROLOGY
Payer: MEDICAID

## 2018-11-02 DIAGNOSIS — N20.0 KIDNEY STONE: ICD-10-CM

## 2018-11-02 PROCEDURE — 74176 CT ABD & PELVIS W/O CONTRAST: CPT | Mod: TC

## 2018-11-02 PROCEDURE — 74176 CT ABD & PELVIS W/O CONTRAST: CPT | Mod: 26,,, | Performed by: RADIOLOGY

## 2018-11-09 ENCOUNTER — OFFICE VISIT (OUTPATIENT)
Dept: UROLOGY | Facility: CLINIC | Age: 58
End: 2018-11-09
Payer: MEDICAID

## 2018-11-09 VITALS — BODY MASS INDEX: 22.72 KG/M2 | HEIGHT: 72 IN | WEIGHT: 167.75 LBS

## 2018-11-09 DIAGNOSIS — I72.3 COMMON ILIAC ANEURYSM: ICD-10-CM

## 2018-11-09 DIAGNOSIS — N18.30 CKD (CHRONIC KIDNEY DISEASE), STAGE III: ICD-10-CM

## 2018-11-09 DIAGNOSIS — Z85.51 HISTORY OF BLADDER CANCER: Primary | ICD-10-CM

## 2018-11-09 DIAGNOSIS — N13.1 HYDRONEPHROSIS WITH URETERAL STRICTURE, NOT ELSEWHERE CLASSIFIED: ICD-10-CM

## 2018-11-09 DIAGNOSIS — N20.0 KIDNEY STONES: ICD-10-CM

## 2018-11-09 PROCEDURE — 99213 OFFICE O/P EST LOW 20 MIN: CPT | Mod: S$PBB,,, | Performed by: UROLOGY

## 2018-11-09 PROCEDURE — 99999 PR PBB SHADOW E&M-EST. PATIENT-LVL III: CPT | Mod: PBBFAC,,, | Performed by: UROLOGY

## 2018-11-09 PROCEDURE — 81001 URINALYSIS AUTO W/SCOPE: CPT | Mod: PBBFAC | Performed by: UROLOGY

## 2018-11-09 PROCEDURE — 99213 OFFICE O/P EST LOW 20 MIN: CPT | Mod: PBBFAC | Performed by: UROLOGY

## 2018-11-09 NOTE — PROGRESS NOTES
Subjective:       Patient ID: Blake Guillory is a 58 y.o. male who was last seen in this office 5/15/2018    Chief Complaint:   Chief Complaint   Patient presents with    Follow-up     6 month f/u with ultrasound an ct scan          History of Present Illness  History of Bladder Cancer  He has a history of bladder cancer. He is s/p cystectomy with ileal conduit in November 2012. From an oncologic standpoint, he is doing well. He has had issues with bilateral ureteral stricture. He underwent a bilateral ureteral reimplantation into his conduit several months later. His strictures recurred shortly afterwards.  He was managed with ureteral stent changes until one of the stent eroded through his conduit and into his bowels.    He had an Exploratory Laparotomy with replacement of ileal conduit and small bowel anastomosis on 12/30/2015.    He had a parastomal hernia repair in April by Dr. Mack.  He is healing nicely from that and has been discharged from his office.  He had a small bowel obstruction. that resolved.       He continues to have back pain in the morning and in the evening.  His stoma is not bothering him and he tells me that he has good urine output during the day and less at night.  He has worsening renal function and his right sided pain is becoming worse.      He had a right PCN placed by IR on 1/2/2018.  He then had a percutaneous right ureteral dilation and stent placement.  He noted increased urine output afterwards.  He still has pain and has noted significant pain in the right testicle.  He denies swelling or redness to the testicle.    He had his stent removed on 3/9/2018.  He is feeling okay, his chronic pain is about the same.    He is back with a CT scan.  He had an ultrasound a few weeks ago due to pain around his stoma.  This ultrasound showed kidney stones.      ACTIVE MEDICAL ISSUES:  Patient Active Problem List   Diagnosis    Insomnia    Lumbar facet arthropathy    Sacroiliitis     Spondylosis without myelopathy    DDD (degenerative disc disease)    History of bladder cancer    Hydronephrosis, bilateral    H/O primary malignant neoplasm of urinary bladder    Essential hypertension    Anemia of chronic disease    Moderate protein malnutrition    Chronic gout    Renal insufficiency    Body mass index (BMI) 20.0-20.9, adult    Personal history of bladder cancer    Acquired hypothyroidism    Hypoxemia    Increased PTH level    Severe malnutrition    COPD exacerbation    Hydronephrosis    History of primary malignant neoplasm of urinary bladder    Hydronephrosis with ureteral stricture, not elsewhere classified    Chest pain of uncertain etiology    COPD (chronic obstructive pulmonary disease)    Epigastric abdominal tenderness without rebound tenderness    CKD (chronic kidney disease), stage III    S/P colonoscopy       ALLERGIES AND MEDICATIONS: updated and reviewed.  Review of patient's allergies indicates:  No Known Allergies  Current Outpatient Medications   Medication Sig    allopurinol (ZYLOPRIM) 100 MG tablet TAKE 1 TABLET BY MOUTH ONCE DAILY    amLODIPine (NORVASC) 10 MG tablet Take 1 tablet (10 mg total) by mouth once daily.    atenolol (TENORMIN) 50 MG tablet TAKE 1 TABLET(50 MG) BY MOUTH EVERY DAY    escitalopram oxalate (LEXAPRO) 10 MG tablet Take 1 tablet (10 mg total) by mouth once daily. Pt to take 15 mg daily (1.5) pills for depression    fluticasone-vilanterol (BREO ELLIPTA) 200-25 mcg/dose DsDv diskus inhaler Inhale 1 puff into the lungs once daily.    gabapentin (NEURONTIN) 400 MG capsule TAKE 1 CAPSULE(400 MG) BY MOUTH TWICE DAILY    hydrocodone-acetaminophen 10-325mg (NORCO)  mg Tab Take 1 tablet by mouth every 6 (six) hours as needed for Pain.    levothyroxine (SYNTHROID) 88 MCG tablet Take 1 tablet (88 mcg total) by mouth once daily.    multivitamin (ONE DAILY MULTIVITAMIN) per tablet Take 1 tablet by mouth once daily.    ranitidine  (ZANTAC) 150 MG tablet Take 1 tablet (150 mg total) by mouth 2 (two) times daily.    sodium bicarbonate 325 MG tablet Take 325 mg by mouth 2 (two) times daily.    tiotropium bromide (SPIRIVA RESPIMAT) 1.25 mcg/actuation Mist Inhale 2.5 mcg into the lungs once daily. Controller    traZODone (DESYREL) 100 MG tablet Take 1 tablet (100 mg total) by mouth every evening.    VITAMIN D2 50,000 unit capsule TAKE 1 CAPSULE BY MOUTH EVERY 7 DAYS     No current facility-administered medications for this visit.        Review of Systems   Constitutional: Negative for activity change, fatigue, fever and unexpected weight change.   HENT: Negative for congestion.    Eyes: Negative for redness.   Respiratory: Negative for chest tightness and shortness of breath.    Cardiovascular: Negative for chest pain and leg swelling.   Gastrointestinal: Negative for abdominal pain, constipation, diarrhea, nausea and vomiting.   Genitourinary: Negative for dysuria, flank pain, frequency, hematuria, penile pain, penile swelling, scrotal swelling, testicular pain and urgency.   Musculoskeletal: Negative for arthralgias and back pain.   Neurological: Negative for dizziness and light-headedness.   Psychiatric/Behavioral: Negative for behavioral problems and confusion. The patient is not nervous/anxious.    All other systems reviewed and are negative.      Objective:      Vitals:    11/09/18 1516   Weight: 76.1 kg (167 lb 12.3 oz)   Height: 6' (1.829 m)     Physical Exam   Nursing note and vitals reviewed.  Constitutional: He is oriented to person, place, and time. He appears well-developed and well-nourished.   HENT:   Head: Normocephalic.   Eyes: Conjunctivae are normal.   Neck: Normal range of motion. Neck supple. No tracheal deviation present. No thyromegaly present.   Cardiovascular: Normal rate and normal heart sounds.    Pulmonary/Chest: Effort normal and breath sounds normal. No respiratory distress. He has no wheezes.   Abdominal: Soft.  Bowel sounds are normal. There is no hepatosplenomegaly. There is no tenderness. There is no rebound and no CVA tenderness. No hernia.       Stoma pink, urine yellow, no obvious hernia   Musculoskeletal: Normal range of motion. He exhibits no edema or tenderness.   Lymphadenopathy:     He has no cervical adenopathy.   Neurological: He is alert and oriented to person, place, and time.   Skin: Skin is warm and dry. No rash noted. No erythema.     Psychiatric: He has a normal mood and affect. His behavior is normal. Judgment and thought content normal.       Urine dipstick shows negative for all components.  Micro exam: negative for WBC's or RBC's.    CT Renal Stone Study ABD Pelvis WO   Order: [id number]   Status:  Final result   Visible to patient:  Yes (Patient Portal)   Next appt:  11/16/2018 at 02:00 PM in Family Medicine (Varun Zeng MD)   Dx:  Kidney stone   Details     Reading Physician Reading Date Result Priority   Li Butler MD 11/2/2018       Narrative     EXAMINATION:  CT RENAL STONE STUDY ABD PELVIS WO    CLINICAL HISTORY:  Kidney stone, known, follow up; Calculus of kidney    TECHNIQUE:  Low dose axial images, sagittal and coronal reformations were obtained from the lung bases to the pubic symphysis.  Contrast was not administered.    COMPARISON:  Abdominal ultrasound 10/19/2018, CT abdomen pelvis 04/19/2017    FINDINGS:  Heart size is normal.  No pericardial effusion.  Lung bases are clear.    Please note in the absence of IV contrast evaluation for solid organ mass is limited.  Liver is normal in size, morphology and with smooth contour.  Multiple splenic granulomas.  Gallbladder, pancreas and adrenal glands are normal.  No intra or extrahepatic biliary ductal dilatation.  Spleen size is normal.    Multifocal cortical renal scarring.  The kidneys are atrophic.  Multiple bilateral nonobstructing renal stones in.  Largest on the right measures 6 mm; largest on the left measures 8 mm.  Mild  bilateral hydroureteronephrosis with wall thickening.  The right lower quadrant ileal conduit anastomosis is intact.  Right lower quadrant small bowel anastomosis is intact.  No ureteral stone.    No bowel obstruction or inflammatory changes.  No enlarged retroperitoneal or mesenteric lymph nodes.  No free fluid or free air. Severe atherosclerotic calcification of the abdominal aorta.  Right common iliac artery aneurysm measures 3 cm, previously 2.9 cm.    Postsurgical changes of cystectomy.  No enlarged pelvic lymph nodes.  Soft tissues are unremarkable.  No acute or aggressive osseous abnormality.      Impression       Bilateral mild hydroureteronephrosis without evidence of obstructing stone within the kidneys or ureters.  Hydroureteronephrosis likely related to ileal conduit which appears intact.    Bilateral nonobstructing nephrolithiasis.    Right common iliac artery aneurysm measuring 3 cm.      Electronically signed by: Li Butler  Date: 11/02/2018  Time: 14:17            Last Resulted: 11/02/18 14:17               Assessment:       1. History of bladder cancer    2. Hydronephrosis with ureteral stricture, not elsewhere classified    3. CKD (chronic kidney disease), stage III    4. Kidney stones    5. Common iliac aneurysm          Plan:       1. History of bladder cancer  CT shows no recurrence    2. Hydronephrosis with ureteral stricture, not elsewhere classified  stable    3. CKD (chronic kidney disease), stage III  Per hospital medicine    4. Kidney stones   monitor  - US Kidney; Future    5. Common iliac aneurysm    - Ambulatory consult to Vascular Surgery            Follow-up in about 6 months (around 5/9/2019) for Follow up, Review X-ray.

## 2018-11-16 PROBLEM — I72.3 ILIAC ARTERY ANEURYSM: Chronic | Status: ACTIVE | Noted: 2018-11-16

## 2018-11-16 PROBLEM — N20.0 KIDNEY STONES: Chronic | Status: ACTIVE | Noted: 2018-11-16

## 2018-11-26 ENCOUNTER — OFFICE VISIT (OUTPATIENT)
Dept: VASCULAR SURGERY | Facility: CLINIC | Age: 58
End: 2018-11-26
Payer: MEDICAID

## 2018-11-26 VITALS
WEIGHT: 171.94 LBS | HEART RATE: 82 BPM | SYSTOLIC BLOOD PRESSURE: 128 MMHG | DIASTOLIC BLOOD PRESSURE: 72 MMHG | BODY MASS INDEX: 23.29 KG/M2 | HEIGHT: 72 IN

## 2018-11-26 DIAGNOSIS — R09.89 ABDOMINAL BRUIT: ICD-10-CM

## 2018-11-26 DIAGNOSIS — I72.3 COMMON ILIAC ANEURYSM: Primary | ICD-10-CM

## 2018-11-26 PROCEDURE — 99214 OFFICE O/P EST MOD 30 MIN: CPT | Mod: PBBFAC | Performed by: SURGERY

## 2018-11-26 PROCEDURE — 99999 PR PBB SHADOW E&M-EST. PATIENT-LVL IV: CPT | Mod: PBBFAC,,, | Performed by: SURGERY

## 2018-11-26 PROCEDURE — 99204 OFFICE O/P NEW MOD 45 MIN: CPT | Mod: S$PBB,,, | Performed by: SURGERY

## 2018-11-26 NOTE — PROGRESS NOTES
Neptali Adame MD [facility]                         11/26/2018    HPI:  Blake Guillory is a 58 y.o. male with   Patient Active Problem List   Diagnosis    Insomnia    Lumbar facet arthropathy    Sacroiliitis    Spondylosis without myelopathy    DDD (degenerative disc disease)    History of bladder cancer    Hydronephrosis, bilateral    H/O primary malignant neoplasm of urinary bladder    Essential hypertension    Anemia of chronic disease    Moderate protein malnutrition    Chronic gout    Renal insufficiency    Body mass index (BMI) 20.0-20.9, adult    Personal history of bladder cancer    Acquired hypothyroidism    Hypoxemia    Increased PTH level    Severe malnutrition    COPD exacerbation    Hydronephrosis    History of primary malignant neoplasm of urinary bladder    Hydronephrosis with ureteral stricture, not elsewhere classified    Chest pain of uncertain etiology    COPD (chronic obstructive pulmonary disease)    Epigastric abdominal tenderness without rebound tenderness    CKD (chronic kidney disease), stage III    S/P colonoscopy    Iliac artery aneurysm    Kidney stones    being managed by PCP and specialists who is here today for evaluation of R ANTHONY aneurysm.  Pt has a history of bladder cancer who status post cystectomy with ileal conduit.  He had a revision of his ileal ureteral anastomoses several months after his cystectomy.  He developed recurrent ureteral strictures and he was managed with stents.  His last ureteral stent change through the conduit was in October 2015.  Within days of the last ureteral stent change, he developed profuse diarrhea.  He saw Gastroenterology who performed a colonoscopy, who discovered that he had an ureteral stent in the colon.  CT scan showing that there was the stent indeed coming from the conduit into the colon. and is s/p exploratory laparotomy, lysis of adhesions, excision of  ileal conduit to distal ileum fistula, small bowel resection, appendectomy, small bowel to small bowel anastomosis, replacement of ileal conduit 2015.  Pt is without complaints today.  No abd pain or pelvic pain.  Prev 2.9 cm on 4/2017 non contrasted CT A/P.  Pt states he has severe SHAFFER and is unable to climb one flight of stairs.    no MI  no Stroke  Tobacco use: quit 1/2018    Past Medical History:   Diagnosis Date    Arthritis     Blood transfusion     Cancer     bladder    COPD (chronic obstructive pulmonary disease)     Frequency of urination     General anesthetics causing adverse effect in therapeutic use     pt states he wakes up very violently from anesthesia    History of urostomy     Hypertension     Limited joint range of motion right hip and leg    Mixed anxiety and depressive disorder     Personal history of bladder cancer     Thyroid disease     Ureteral stent displacement     Urinary retention     Wears glasses      Past Surgical History:   Procedure Laterality Date    ABDOMINAL SURGERY      ANASTAMOSIS  12/30/2015    [facility]formed [facility]    APPENDECTOMY  12/30/2015    Procedure: APPENDECTOMY;  Surgeon: CHAD Bo MD;  Location: [facility] OR;  Service: Urology;;    APPENDECTOMY  12/30/2015    [facility] by [facility] at [facility]    bladder removed      BLOCK-NERVE-MEDIAL BRANCH-LUMBAR Bilateral 9/26/2014    [facility]    BLOCK-NERVE-MEDIAL BRANCH-LUMBAR Bilateral 9/5/2014    [facility] at [facility]    BOWEL RESECTION      CYSTECTOMY, RADICAL N/A 11/8/2012    [facility] at [facility]    CYSTOSCOPY      >1  episodes for bilat ureteral stent exchange    CYSTOSCOPY WITH BILATERAL URETERAL STENT EXCHANGE Bilateral 8/8/2014    Performed by [facility] at [facility]    CYSTOSCOPY WITH RETROGRADE PYELOGRAM Right 3/9/2018    Performed by [facility]  MD at [facility]    CYSTOSCOPY WITH STENT EXCHANGE Bilateral 6/15/2015    [facility]formed [facility] at [facility]    CYSTOSCOPY WITH STENT EXCHANGE Bilateral 6/2/2014    [facility]formed [facility] at [facility]    CYSTOSCOPY WITH STENT PLACEMENT Bilateral 10/7/2015    Performed by [facility] at [facility] OR    CYSTOSCOPY WITH STENT PLACEMENT Right 6/19/2015    [facility]formed by [facility] at [facility] OR    CYSTOSCOPY WITH URETERAL STENT  EXCHANGE LEFT RETROGRADE PYELOGRAM Bilateral 1/30/2015    Performed by [facility] at [facility] OR    DILATATION-BALLOON-URETER Right 1/22/2018    Performed by [facility] at [facility] OR    DILATION, NEPHROSTOMY TRACT Left 6/7/2013    Performed by [facility] at [facility]    DILATION, URETER Left 6/7/2013    Performed by [facility] at [facility] OR    DISSECTION-LYMPH NODE-PELVIC Bilateral 11/8/2012    Performed by [facility] at [facility]    EXPLORATORY-LAPAROTOMY  12/30/2015    Performed by [facility] at [facility] OR    FRACTURE SURGERY      HAND SURGERY      HEMICOLECTOMY Right 12/30/2015    Performed by Estuardo Mack MD at [facility] OR    HERNIA REPAIR      amalia    HIP SURGERY  2012    Rt hip septic    ILEAL CONDUIT N/A 11/8/2012    Performed by [facility] at [facility]    LAPAROSCOPIC PARASTOMAL HERNIA REPAIR WITH MESH N/A 4/12/2017    Performed by [facility] at [facility]    LAPAROTOMY-EXPLORATORY N/A 7/5/2013    Performed by [facility] at [facility] OR    LYSIS-ADHESION  12/30/2015    [facility]formed [facility] at [facility]    MOUTH SURGERY  2000    all teeth extracted    NEPHROSTOGRAM N/A 5/22/2013    [facility]    nephrostomy tube placement      OPEN ILEAL CONDUIT N/A 12/30/2015    Performed by [facility] at [facility] OR    [id number]-[id number] TUBE N/A 1/2/2018    [facility]formed by [facility]    PLACEMENT-STENT  Bilateral 2013    [facility]    PLACEMENT-STENT URETERAL Right 2018    [facility] at [facility]    PYELOGRAM, ANTEGRADE N/A 2013    [facility]formed by [facility] at [facility]    PYELOGRAM-ANTEGRADE Right 2018    [facility] at [facility]    PYELOGRAM-RETROGRADE Bilateral 6/15/2015    [facility]formed by [facility] at [facility]    RADIOFREQUENCY THERMOCOAGULATION (RFTC)-NERVE-MEDIAN BRANCH-LUMBAR Left 2014    Performed by Paco Singer [facility] at [facility]    RADIOFREQUENCY THERMOCOAGULATION (RFTC)-NERVE-MEDIAN BRANCH-LUMBAR Right 2014    [facility] at [facility]    REPLACEMENT, NEPHROSTOMY TUBE Left 2013    [facility]formed [facility] at [facility]    RESECTION - SMALL BOWEL  2015    [facility]formed [facility] at [facility]    REVISION, ANASTOMOSIS, URETEROILEAL Left 2013    [facility]formed [facility] at [facility]    turbt      URETERORENOSCOPY Left 2013    [facility]formed by [facility] at [facility]    urostomy       Family History   Adopted: Yes     Social History     Socioeconomic History    Marital status:      Spouse name: Not on file    Number of children: Not on file    Years of education: Not on file    Highest education level: Not on file   Social Needs    Financial resource strain: Not on file    Food insecurity - worry: Not on file    Food insecurity - inability: Not on file    Transportation needs - medical: Not on file    Transportation needs - non-medical: Not on file   Occupational History    Occupation: [employer]     Employer: [employer]   Tobacco Use    Smoking status: Former Smoker     Packs/day: 0.30     Years: 42.00     Pack years: 12.60     Last attempt to quit: 2017     Years since quittin.8    Smokeless tobacco: Never Used    Tobacco comment: in cessation program    Substance and Sexual Activity    Alcohol use: Yes     Alcohol/week: 8.4 - 12.6 oz     Types: 14 - 21 Cans of beer per week     Comment: occassional    Drug use: Yes     Types: Marijuana     Comment: occass    Sexual activity: Yes     Partners: Female     Birth control/protection: None   Other Topics Concern    Not on file   Social History Narrative    Not on file       Current Outpatient Medications:     allopurinol (ZYLOPRIM) 100 MG tablet, TAKE 1 TABLET BY MOUTH ONCE DAILY, Disp: 90 tablet, Rfl: 0    amLODIPine (NORVASC) 10 MG tablet, Take 1 tablet (10 mg total) by mouth once daily., Disp: 90 tablet, Rfl: 0    atenolol (TENORMIN) 50 MG tablet, TAKE 1 TABLET(50 MG) BY MOUTH EVERY DAY, Disp: 90 tablet, Rfl: 0    ergocalciferol (VITAMIN D2) 50,000 unit Cap, Take 1 capsule (50,000 Units total) by mouth every 7 days., Disp: 12 capsule, Rfl: 0    escitalopram oxalate (LEXAPRO) 10 MG tablet, Take 1 tablet (10 mg total) by mouth once daily. Pt to take 15 mg daily (1.5) pills for depression, Disp: 135 tablet, Rfl: 0    fluticasone-vilanterol (BREO ELLIPTA) 200-25 mcg/dose DsDv diskus inhaler, Inhale 1 puff into the lungs once daily., Disp: 60 each, Rfl: 1    gabapentin (NEURONTIN) 400 MG capsule, TAKE 1 CAPSULE(400 MG) BY MOUTH TWICE DAILY, Disp: 60 capsule, Rfl: 2    hydrocodone-acetaminophen 10-325mg (NORCO)  mg Tab, Take 1 tablet by mouth every 6 (six) hours as needed for Pain., Disp: 40 tablet, Rfl: 0    levothyroxine (SYNTHROID) 88 MCG tablet, Take 1 tablet (88 mcg total) by mouth once daily., Disp: 90 tablet, Rfl: 0    multivitamin (ONE DAILY MULTIVITAMIN) per tablet, Take 1 tablet by mouth once daily., Disp: , Rfl:     ranitidine (ZANTAC) 150 MG tablet, Take 1 tablet (150 mg total) by mouth 2 (two) times daily., Disp: 60 tablet, Rfl: 2    sodium bicarbonate 325 MG tablet, Take 325 mg by mouth 2 (two) times daily., Disp: , Rfl:     tiotropium bromide (SPIRIVA RESPIMAT) 1.25 mcg/actuation  Mist, Inhale 2.5 mcg into the lungs once daily. Controller, Disp: 4 g, Rfl: 5    traZODone (DESYREL) 100 MG tablet, Take 1 tablet (100 mg total) by mouth every evening., Disp: 90 tablet, Rfl: 0    REVIEW OF SYSTEMS:  General: No fevers or chills; ENT: No sore throat; Allergy and Immunology: no persistent infections; Hematological and Lymphatic: No history of bleeding or easy bruising; Endocrine: negative; Respiratory: no cough, shortness of breath, or wheezing; Cardiovascular: no chest pain or dyspnea on exertion; Gastrointestinal: no abdominal pain/back, change in bowel habits, or bloody stools; Genito-Urinary: no dysuria, trouble voiding, or hematuria; Musculoskeletal: negative; Neurological: no TIA or stroke symptoms; Psychiatric: no nervousness, anxiety or depression.    PHYSICAL EXAM:      Pulse: 82         General appearance:  Alert, well-appearing, and in no distress.  Oriented to person, place, and time                    Neurological: Normal speech, no focal findings noted; CN II - XII grossly intact. RLE with sensation to light touch, LLE with sensation to light touch.            Musculoskeletal: Digits/nail without cyanosis/clubbing.  Strength 5/5 BLE.                    Neck: Supple, no significant adenopathy, no carotid bruit can be auscultated                  Chest:  Clear to auscultation, no wheezes, rales or rhonchi, symmetric air entry. No use of accessory muscles               Cardiac: Normal rate and regular rhythm, S1 and S2 normal            Abdomen: Soft, nontender, nondistended, no masses or organomegaly, no hernia     No rebound tenderness noted; bowel sounds normal     No groin adenopathy      Extremities:   2+ R femoral pulse, 2+ L femoral pulse     2+ R popliteal pulse, 2+ L popliteal pulse     2+ R PT pulse, 1+ L PT pulse     2+ R DP pulse, 1+ L DP pulse     no RLE edema, no LLE edema    Skin: RLE without tissue loss; LLE without tissue loss    LAB RESULTS:  No results found for:  CBC  Lab Results   Component Value Date    LABPROT 9.7 12/28/2017    INR 0.9 12/28/2017     Lab Results   Component Value Date     09/24/2018    K 4.5 09/24/2018     09/24/2018    CO2 24 09/24/2018     09/24/2018    BUN 44 (H) 09/24/2018    CREATININE 1.9 (H) 09/24/2018    CALCIUM 9.1 09/24/2018    ANIONGAP 7 (L) 09/24/2018    EGFRNONAA 37.5 (A) 09/24/2018     Lab Results   Component Value Date    WBC 5.80 09/24/2018    RBC 4.43 (L) 09/24/2018    HGB 14.4 09/24/2018    HCT 42.8 09/24/2018    MCV 97 09/24/2018    MCH 32.5 (H) 09/24/2018    MCHC 33.6 09/24/2018    RDW 13.3 09/24/2018     09/24/2018    MPV 9.5 09/24/2018    GRAN 4.1 09/24/2018    GRAN 70.0 09/24/2018    LYMPH 1.0 09/24/2018    LYMPH 16.8 (L) 09/24/2018    MONO 0.4 09/24/2018    MONO 7.6 09/24/2018    EOS 0.3 09/24/2018    BASO 0.10 09/24/2018    EOSINOPHIL 4.4 09/24/2018    BASOPHIL 1.2 09/24/2018    DIFFMETHOD Automated 09/24/2018     .No results found for: HGBA1C    IMAGING:  All pertinent imaging has been reviewed and interpreted independently.    CT A/P without contrast 11/2018 and 4/2017 reviewed.    IMP/PLAN:  58 y.o. male with   Patient Active Problem List   Diagnosis    Insomnia    Lumbar facet arthropathy    Sacroiliitis    Spondylosis without myelopathy    DDD (degenerative disc disease)    History of bladder cancer    Hydronephrosis, bilateral    H/O primary malignant neoplasm of urinary bladder    Essential hypertension    Anemia of chronic disease    Moderate protein malnutrition    Chronic gout    Renal insufficiency    Body mass index (BMI) 20.0-20.9, adult    Personal history of bladder cancer    Acquired hypothyroidism    Hypoxemia    Increased PTH level    Severe malnutrition    COPD exacerbation    Hydronephrosis    History of primary malignant neoplasm of urinary bladder    Hydronephrosis with ureteral stricture, not elsewhere classified    Chest pain of uncertain etiology    COPD  (chronic obstructive pulmonary disease)    Epigastric abdominal tenderness without rebound tenderness    CKD (chronic kidney disease), stage III    S/P colonoscopy    Iliac artery aneurysm    Kidney stones    being managed by PCP and specialists who is here today for evaluation of R ANTHONY aneurysm.    -Incidental detection of 3 cm R ANTHONY aneurysm (2.9 cm 4/2017), asymptomatic; CKD stage 3, GFR 37 with extensive intraabdominal open surgical history with complications from ileal conduit and stent erosion into colon and severe SHAFFER - risks outweigh benefits of obtaining contrasted imaging at this time, if aneurysm increases in size he would benefit from a CTA for possible endovascular vs open repair  -Will perform routine surveillance with repeat CT A/P non contrast in 6 mo  -Rec daily ASA, BP control and cont smoking cessation    I spent 25 minutes evaluating this patient and greater than 50% of the time was spent counseling, coordinator care and discussing the plan of care.  All questions were answered and patient stated understanding with agreement with the above treatment plan.    Neptali Adame MD [facility]  Vascular and Endovascular Surgery

## 2018-11-26 NOTE — LETTER
November 26, 2018      CHAD Bo MD  [address] [facility] [address]  [address]  [facility] [address]           [facility]  [address] [facility] [address]  [facility] [address]  Phone: [phone]  Fax: [phone]          Patient: Blake Guillory   MR Number: [id number]   YOB: 1960   Date of Visit: 11/26/2018       Dear Dr. CHAD Bo:    Thank you for referring Blake Guillory to me for evaluation. Attached you will find relevant portions of my assessment and plan of care.    If you have questions, please do not hesitate to call me. I look forward to following Blake Guillory along with you.    Sincerely,    Neptali Adame MD    Enclosure  CC:  No Recipients    If you would like to receive this communication electronically, please contact [email or web address] or [phone] to request more information on [employer] [facility] access.    For providers and/or their staff who would like to refer a patient to [facility], please contact us through our one-stop-shop provider referral line, [facility], at [phone].    If you feel you have received this communication in error or would no longer like to receive these types of communications, please e-mail [email or web address]

## 2018-11-26 NOTE — PATIENT INSTRUCTIONS
Taking Aspirin for Atherosclerosis       Aspirin is a medicine often prescribed to treat atherosclerosis. This condition affects your arteries. These are the blood vessels that carry blood away from your heart. Having atherosclerosis means youre at greater risk of a heart attack or stroke. Aspirin can help prevent these from happening.  How atherosclerosis affects your arteries   A fatty material (plaque) can build up in your arteries. This makes it harder for blood to flow through them. A blood clot can then form on the plaque. This may block the artery, cutting off blood flow. This can cause conditions such as coronary artery disease (CAD) and peripheral arterial disease (PAD):  · CAD occurs when plaque builds up in the coronary artery. This artery supplies the heart with oxygen-rich blood.  · PAD occurs when plaque forms in leg arteries.  The same things that cause CAD and PAD can also cause plaque to form in other arteries in your body, such as those in the brain. When plaque occurs in any of these arteries, it raises your risk of heart attack or stroke.  What aspirin does  Aspirin is a blood-thinner (antiplatelet medicine). It helps keep blood clots from forming. This reduces the risk of blockage. Aspirin can be taken daily if you are at high risk of or have already had a heart attack or stroke. It is also used after a procedure called a stent placement. This is when a tiny wire mesh tube, or stent, is placed in an artery to keep it open. Aspirin helps prevent blood clots from forming on the stent.  Taking aspirin safely  Tell your healthcare provider about any medicines you are taking. This includes:  · All prescription medicines  · Over-the-counter medicines  · Herbs, vitamins, and other supplements  Also mention if you have a history of ulcers or bleeding problems. Ask whether you will need to stop taking aspirin before having surgery or dental work. Always take medicines as directed.  Tips for taking  aspirin  · Have a routine. For example, take aspirin with the same meal each day.  · Dont take more than prescribed. A low dose gives the same benefit as a higher one, with a lower risk of side effects.  · Dont skip doses. Aspirin needs to be taken each day to work well.  · Keep track of what you take. A pillbox with days of the week can help, especially if you take several medicines. Or use a list or chart to keep track.  When to call your healthcare provider  Side effects of aspirin are not usually serious. If you do have problems, changing your dose may help. Call your healthcare provider if you have any of the following:  · Excessive bruising (some bruising is normal)  · Nosebleeds, bleeding gums, or other excessive bleeding  · An upset stomach or stomach pain   Date Last Reviewed: 6/1/2016 © 2000-2017 [employer]. [address] [place] [address]. All rights reserved. This information is not intended as a substitute for professional medical care. Always follow your healthcare professional's instructions.        Taking Aspirin for Atherosclerosis       Aspirin is a medicine often prescribed to treat atherosclerosis. This condition affects your arteries. These are the blood vessels that carry blood away from your heart. Having atherosclerosis means youre at greater risk of a heart attack or stroke. Aspirin can help prevent these from happening.  How atherosclerosis affects your arteries   A fatty material (plaque) can build up in your arteries. This makes it harder for blood to flow through them. A blood clot can then form on the plaque. This may block the artery, cutting off blood flow. This can cause conditions such as coronary artery disease (CAD) and peripheral arterial disease (PAD):  · CAD occurs when plaque builds up in the coronary artery. This artery supplies the heart with oxygen-rich blood.  · PAD occurs when plaque forms in leg arteries.  The same things that cause CAD and  PAD can also cause plaque to form in other arteries in your body, such as those in the brain. When plaque occurs in any of these arteries, it raises your risk of heart attack or stroke.  What aspirin does  Aspirin is a blood-thinner (antiplatelet medicine). It helps keep blood clots from forming. This reduces the risk of blockage. Aspirin can be taken daily if you are at high risk of or have already had a heart attack or stroke. It is also used after a procedure called a stent placement. This is when a tiny wire mesh tube, or stent, is placed in an artery to keep it open. Aspirin helps prevent blood clots from forming on the stent.  Taking aspirin safely  Tell your healthcare provider about any medicines you are taking. This includes:  · All prescription medicines  · Over-the-counter medicines  · Herbs, vitamins, and other supplements  Also mention if you have a history of ulcers or bleeding problems. Ask whether you will need to stop taking aspirin before having surgery or dental work. Always take medicines as directed.  Tips for taking aspirin  · Have a routine. For example, take aspirin with the same meal each day.  · Dont take more than prescribed. A low dose gives the same benefit as a higher one, with a lower risk of side effects.  · Dont skip doses. Aspirin needs to be taken each day to work well.  · Keep track of what you take. A pillbox with days of the week can help, especially if you take several medicines. Or use a list or chart to keep track.  When to call your healthcare provider  Side effects of aspirin are not usually serious. If you do have problems, changing your dose may help. Call your healthcare provider if you have any of the following:  · Excessive bruising (some bruising is normal)  · Nosebleeds, bleeding gums, or other excessive bleeding  · An upset stomach or stomach pain   Date Last Reviewed: 6/1/2016  © 7720-9514 [employer]. [address] [place] [address], [place] [address]. All  rights reserved. This information is not intended as a substitute for professional medical care. Always follow your healthcare professional's instructions.

## 2019-04-12 ENCOUNTER — TELEPHONE (OUTPATIENT)
Dept: CARDIOLOGY | Facility: CLINIC | Age: 59
End: 2019-04-12

## 2019-05-15 DIAGNOSIS — I72.3 ILIAC ARTERY ANEURYSM: Primary | Chronic | ICD-10-CM

## 2019-05-21 ENCOUNTER — OFFICE VISIT (OUTPATIENT)
Dept: UROLOGY | Facility: CLINIC | Age: 59
End: 2019-05-21
Payer: MEDICAID

## 2019-05-21 VITALS
DIASTOLIC BLOOD PRESSURE: 70 MMHG | WEIGHT: 172.63 LBS | BODY MASS INDEX: 23.38 KG/M2 | SYSTOLIC BLOOD PRESSURE: 120 MMHG | HEIGHT: 72 IN

## 2019-05-21 DIAGNOSIS — N20.0 KIDNEY STONES: Chronic | ICD-10-CM

## 2019-05-21 DIAGNOSIS — Z85.51 H/O PRIMARY MALIGNANT NEOPLASM OF URINARY BLADDER: Primary | Chronic | ICD-10-CM

## 2019-05-21 DIAGNOSIS — N28.9 RENAL INSUFFICIENCY: Chronic | ICD-10-CM

## 2019-05-21 DIAGNOSIS — N13.1 HYDRONEPHROSIS WITH URETERAL STRICTURE, NOT ELSEWHERE CLASSIFIED: ICD-10-CM

## 2019-05-21 PROCEDURE — 99999 PR PBB SHADOW E&M-EST. PATIENT-LVL III: ICD-10-PCS | Mod: PBBFAC,,, | Performed by: UROLOGY

## 2019-05-21 PROCEDURE — 99999 PR PBB SHADOW E&M-EST. PATIENT-LVL III: CPT | Mod: PBBFAC,,, | Performed by: UROLOGY

## 2019-05-21 PROCEDURE — 99213 OFFICE O/P EST LOW 20 MIN: CPT | Mod: PBBFAC | Performed by: UROLOGY

## 2019-05-21 PROCEDURE — 99214 OFFICE O/P EST MOD 30 MIN: CPT | Mod: S$PBB,,, | Performed by: UROLOGY

## 2019-05-21 PROCEDURE — 99214 PR OFFICE/OUTPT VISIT, EST, LEVL IV, 30-39 MIN: ICD-10-PCS | Mod: S$PBB,,, | Performed by: UROLOGY

## 2019-05-21 NOTE — PROGRESS NOTES
Subjective:       Patient ID: Blake Guillory is a 59 y.o. male who was last seen in this office Visit date not found    Chief Complaint:   Chief Complaint   Patient presents with    Follow-up     6 month f/u with ultrasound hx of bladder cancer        History of Present Illness  History of Bladder Cancer  He has a history of bladder cancer. He is s/p cystectomy with ileal conduit in November 2012. From an oncologic standpoint, he is doing well. He has had issues with bilateral ureteral stricture. He underwent a bilateral ureteral reimplantation into his conduit several months later. His strictures recurred shortly afterwards.  He was managed with ureteral stent changes until one of the stent eroded through his conduit and into his bowels.    He had an Exploratory Laparotomy with replacement of ileal conduit and small bowel anastomosis on 12/30/2015.    He had a parastomal hernia repair in April by Dr. Mack.  He is healing nicely from that and has been discharged from his office.  He had a small bowel obstruction. that resolved.       He continues to have back pain in the morning and in the evening.  His stoma is not bothering him and he tells me that he has good urine output during the day and less at night.  He has worsening renal function and his right sided pain is becoming worse.      He had a right PCN placed by IR on 1/2/2018.  He then had a percutaneous right ureteral dilation and stent placement.  He noted increased urine output afterwards.  He still has pain and has noted significant pain in the right testicle.  He denies swelling or redness to the testicle.    He had his stent removed on 3/9/2018.  He is feeling okay, his chronic pain is about the same.    CT scan last visit showed mild bilateral hydronephrosis with non-obstructing stones.  He is back with a new ultrasound.  He has some aches and pains, mostly in the morning.    ACTIVE MEDICAL ISSUES:  Patient Active Problem List   Diagnosis     Insomnia    Lumbar facet arthropathy    Sacroiliitis    Spondylosis without myelopathy    DDD (degenerative disc disease)    History of bladder cancer    Hydronephrosis, bilateral    H/O primary malignant neoplasm of urinary bladder    Essential hypertension    Anemia of chronic disease    Moderate protein malnutrition    Chronic gout    Renal insufficiency    Body mass index (BMI) 20.0-20.9, adult    Personal history of bladder cancer    Acquired hypothyroidism    Hypoxemia    Increased PTH level    Severe malnutrition    COPD exacerbation    Hydronephrosis    History of primary malignant neoplasm of urinary bladder    Hydronephrosis with ureteral stricture, not elsewhere classified    Chest pain of uncertain etiology    COPD (chronic obstructive pulmonary disease)    Epigastric abdominal tenderness without rebound tenderness    CKD (chronic kidney disease), stage III    S/P colonoscopy    Iliac artery aneurysm    Kidney stones       ALLERGIES AND MEDICATIONS: updated and reviewed.  Review of patient's allergies indicates:  No Known Allergies  Current Outpatient Medications   Medication Sig    albuterol-ipratropium (DUO-NEB) 2.5 mg-0.5 mg/3 mL nebulizer solution Take 3 mLs by nebulization every 6 (six) hours as needed for Wheezing. Rescue    allopurinol (ZYLOPRIM) 100 MG tablet TAKE 1 TABLET BY MOUTH ONCE DAILY    amLODIPine (NORVASC) 10 MG tablet Take 1 tablet (10 mg total) by mouth once daily.    atenolol (TENORMIN) 50 MG tablet TAKE 1 TABLET(50 MG) BY MOUTH EVERY DAY    ergocalciferol (VITAMIN D2) 50,000 unit Cap Take 1 capsule (50,000 Units total) by mouth every 7 days.    escitalopram oxalate (LEXAPRO) 10 MG tablet Take 1 tablet (10 mg total) by mouth once daily. Pt to take 15 mg daily (1.5) pills for depression    fluticasone-umeclidin-vilanter (TRELEGY ELLIPTA) 100-62.5-25 mcg DsDv Inhale into the lungs.    gabapentin (NEURONTIN) 400 MG capsule TAKE 1 CAPSULE(400 MG) BY  MOUTH TWICE DAILY    hydrocodone-acetaminophen 10-325mg (NORCO)  mg Tab Take 1 tablet by mouth every 6 (six) hours as needed for Pain.    levothyroxine (SYNTHROID) 88 MCG tablet Take 1 tablet (88 mcg total) by mouth once daily.    multivitamin (ONE DAILY MULTIVITAMIN) per tablet Take 1 tablet by mouth once daily.    ranitidine (ZANTAC) 150 MG tablet Take 1 tablet (150 mg total) by mouth 2 (two) times daily.    sodium bicarbonate 325 MG tablet Take 325 mg by mouth 2 (two) times daily.    tiotropium bromide (SPIRIVA RESPIMAT) 1.25 mcg/actuation Mist Inhale 2.5 mcg into the lungs once daily. Controller     No current facility-administered medications for this visit.        Review of Systems   Constitutional: Negative for activity change, fatigue, fever and unexpected weight change.   HENT: Negative for congestion.    Eyes: Negative for redness.   Respiratory: Negative for chest tightness and shortness of breath.    Cardiovascular: Negative for chest pain and leg swelling.   Gastrointestinal: Negative for abdominal pain, constipation, diarrhea, nausea and vomiting.   Genitourinary: Negative for dysuria, flank pain, frequency, hematuria, penile pain, penile swelling, scrotal swelling, testicular pain and urgency.   Musculoskeletal: Negative for arthralgias and back pain.   Neurological: Negative for dizziness and light-headedness.   Psychiatric/Behavioral: Negative for behavioral problems and confusion. The patient is not nervous/anxious.    All other systems reviewed and are negative.      Objective:      Vitals:    05/21/19 1054   BP: 120/70   BP Location: Left arm   Patient Position: Sitting   BP Method: Medium (Manual)   Weight: 78.3 kg (172 lb 9.9 oz)   Height: 6' (1.829 m)     Physical Exam   Nursing note and vitals reviewed.  Constitutional: He is oriented to person, place, and time. He appears well-developed and well-nourished.   HENT:   Head: Normocephalic.   Eyes: Conjunctivae are normal.   Neck:  Normal range of motion. Neck supple. No tracheal deviation present. No thyromegaly present.   Cardiovascular: Normal rate and normal heart sounds.    Pulmonary/Chest: Effort normal and breath sounds normal. No respiratory distress. He has no wheezes.   Abdominal: Soft. Bowel sounds are normal. There is no hepatosplenomegaly. There is no tenderness. There is no rebound and no CVA tenderness. No hernia.   Musculoskeletal: Normal range of motion. He exhibits no edema or tenderness.   Lymphadenopathy:     He has no cervical adenopathy.   Neurological: He is alert and oriented to person, place, and time.   Skin: Skin is warm and dry. No rash noted. No erythema.     Psychiatric: He has a normal mood and affect. His behavior is normal. Judgment and thought content normal.       US Retroperitoneal Complete (Kidney and   Order: [id number]   Status:  Final result   Visible to patient:  Yes (Patient Portal)   Next appt:  05/29/2019 at 09:30 AM in Radiology (WB CT2 LIMIT 500 LBS)   Dx:  Hydronephrosis, unspecified hydroneph...   Details     Reading Physician Reading Date Result Priority   Mamie Lopez MD 5/15/2019       Narrative     EXAMINATION:  US RETROPERITONEAL COMPLETE    CLINICAL HISTORY:  Unspecified hydronephrosis    TECHNIQUE:  Ultrasound of the kidneys and urinary bladder was performed including color flow and Doppler evaluation of the kidneys.    COMPARISON:  05/02/2018    FINDINGS:  Right kidney: The right kidney measures 10.5 cm x 5 cm x 5.2 cm with resistive index of 0.78.  A 1 cm x 0.8 cm cyst is noted upper pole right kidney.  Are two nonobstructing calculi lower pole right kidney measuring 1 cm and 0.9 cm respectively.  Mild dilatation (grade 1) of the right intrarenal collecting system is detected but is slightly less noticeable than on previous ultrasound of 05/12/2018.  Cortical thinning/scarring is again noted on the right.    Left kidney: The left kidney measures 9.4 cm x 4.3 cm x 5.7 cm with  resistive index of 0.75.  Renal cortex shows irregular cortex consistent with cortical thinning and scarring.  There are two 8 mm nonobstructing urinary calculi lower pole left kidney, as described on 05/02/2018.    A urostomy has been performed.      Impression       1. Probable residual mild dilatation right intrarenal collecting system compared to 05/02/2018 with slight interval improvement compared to previous exam.  2.  Cortical thinning and scarring again noted bilaterally.  3.  Nonobstructive renal calculi bilaterally as described above.  4.  Urostomy.      Electronically signed by: Mamie Lopez  Date: 05/15/2019  Time: 13:57            Last Resulted: 05/15/19 13:57               Assessment:       1. H/O primary malignant neoplasm of urinary bladder    2. Hydronephrosis with ureteral stricture, not elsewhere classified    3. Kidney stones    4. Renal insufficiency          Plan:       1. H/O primary malignant neoplasm of urinary bladder  Stable   [facility]    2. Hydronephrosis with ureteral stricture, not elsewhere classified  stable    3. Kidney stones  We discussed monitoring vs treatment.  Treatment would require bilateral PCNL due to urostomy.  He is willing to wait.    4. Renal insufficiency  stable            Follow up in about 6 months (around 11/21/2019) for Follow up.

## 2019-06-11 DIAGNOSIS — I71.40 ABDOMINAL AORTIC ANEURYSM (AAA) WITHOUT RUPTURE: ICD-10-CM

## 2019-07-11 ENCOUNTER — OFFICE VISIT (OUTPATIENT)
Dept: VASCULAR SURGERY | Facility: CLINIC | Age: 59
End: 2019-07-11
Payer: MEDICAID

## 2019-07-11 ENCOUNTER — HOSPITAL ENCOUNTER (OUTPATIENT)
Dept: RADIOLOGY | Facility: HOSPITAL | Age: 59
Discharge: HOME | End: 2019-07-11
Attending: SURGERY
Payer: MEDICAID

## 2019-07-11 VITALS
BODY MASS INDEX: 23.66 KG/M2 | DIASTOLIC BLOOD PRESSURE: 80 MMHG | SYSTOLIC BLOOD PRESSURE: 124 MMHG | HEIGHT: 72 IN | WEIGHT: 174.69 LBS | HEART RATE: 67 BPM

## 2019-07-11 DIAGNOSIS — I71.40 ABDOMINAL AORTIC ANEURYSM (AAA) WITHOUT RUPTURE: ICD-10-CM

## 2019-07-11 DIAGNOSIS — I72.3 COMMON ILIAC ANEURYSM: Primary | ICD-10-CM

## 2019-07-11 PROCEDURE — 99999 PR PBB SHADOW E&M-EST. PATIENT-LVL IV: CPT | Mod: PBBFAC,,, | Performed by: SURGERY

## 2019-07-11 PROCEDURE — 99214 PR OFFICE/OUTPT VISIT, EST, LEVL IV, 30-39 MIN: ICD-10-PCS | Mod: S$PBB,,, | Performed by: SURGERY

## 2019-07-11 PROCEDURE — 99999 PR PBB SHADOW E&M-EST. PATIENT-LVL IV: ICD-10-PCS | Mod: PBBFAC,,, | Performed by: SURGERY

## 2019-07-11 PROCEDURE — 74176 CT ABDOMEN PELVIS WITHOUT CONTRAST: ICD-10-PCS | Mod: 26,,, | Performed by: RADIOLOGY

## 2019-07-11 PROCEDURE — 99214 OFFICE O/P EST MOD 30 MIN: CPT | Mod: PBBFAC,25 | Performed by: SURGERY

## 2019-07-11 PROCEDURE — 99214 OFFICE O/P EST MOD 30 MIN: CPT | Mod: S$PBB,,, | Performed by: SURGERY

## 2019-07-11 PROCEDURE — 74176 CT ABD & PELVIS W/O CONTRAST: CPT | Mod: TC

## 2019-07-11 PROCEDURE — 74176 CT ABD & PELVIS W/O CONTRAST: CPT | Mod: 26,,, | Performed by: RADIOLOGY

## 2019-07-11 NOTE — PROGRESS NOTES
Neptali Adame MD [facility]                         07/11/2019    HPI:  Blake Guillory is a 59 y.o. male with   Patient Active Problem List   Diagnosis    Insomnia    Lumbar facet arthropathy    Sacroiliitis    Spondylosis without myelopathy    DDD (degenerative disc disease)    History of bladder cancer    Hydronephrosis, bilateral    H/O primary malignant neoplasm of urinary bladder    Essential hypertension    Anemia of chronic disease    Moderate protein malnutrition    Chronic gout    Renal insufficiency    Body mass index (BMI) 20.0-20.9, adult    Personal history of bladder cancer    Acquired hypothyroidism    Hypoxemia    Increased PTH level    Severe malnutrition    COPD exacerbation    Hydronephrosis    History of primary malignant neoplasm of urinary bladder    Hydronephrosis with ureteral stricture, not elsewhere classified    Chest pain of uncertain etiology    COPD (chronic obstructive pulmonary disease)    Epigastric abdominal tenderness without rebound tenderness    CKD (chronic kidney disease), stage III    S/P colonoscopy    Iliac artery aneurysm    Kidney stones    being managed by PCP and specialists who is here today for evaluation of R ANTOHNY aneurysm.  Pt has a history of bladder cancer who status post cystectomy with ileal conduit.  He had a revision of his ileal ureteral anastomoses several months after his cystectomy.  He developed recurrent ureteral strictures and he was managed with stents.  His last ureteral stent change through the conduit was in October 2015.  Within days of the last ureteral stent change, he developed profuse diarrhea.  He saw Gastroenterology who performed a colonoscopy, who discovered that he had an ureteral stent in the colon.  CT scan showing that there was the stent indeed coming from the conduit into the colon. and is s/p exploratory laparotomy, lysis of adhesions, excision of  ileal conduit to distal ileum fistula, small bowel resection, appendectomy, small bowel to small bowel anastomosis, replacement of ileal conduit 2015.  Pt is without complaints today.  No abd pain or pelvic pain.  Prev 2.9 cm on 4/2017 non contrasted CT A/P.  Pt states he has severe SHAFFER and is unable to climb one flight of stairs.    no MI  no Stroke  Tobacco use: quit 1/2018    Interval history:  Denies abdominal, pelvic and back pain.  C/o BLE heaviness at 1 block for a few months.  +tingling BLE.  No BLE edema.    Past Medical History:   Diagnosis Date    Arthritis     Blood transfusion     Cancer     bladder    COPD (chronic obstructive pulmonary disease)     Frequency of urination     General anesthetics causing adverse effect in therapeutic use     pt states he wakes up very violently from anesthesia    History of urostomy     Hypertension     Limited joint range of motion right hip and leg    Mixed anxiety and depressive disorder     Personal history of bladder cancer     Thyroid disease     Ureteral stent displacement     Urinary retention     Wears glasses      Past Surgical History:   Procedure Laterality Date    ABDOMINAL SURGERY      ANASTAMOSIS  12/30/2015    [facility]    APPENDECTOMY  12/30/2015    [facility]    bladder removed      BLOCK-NERVE-MEDIAL BRANCH-LUMBAR Bilateral 9/26/2014    [facility]    BLOCK-NERVE-MEDIAL BRANCH-LUMBAR Bilateral 9/5/2014    [facility] at [facility]    BOWEL RESECTION      CYSTECTOMY, RADICAL N/A 11/8/2012    [facility]    CYSTOSCOPY      >1  episodes for bilat ureteral stent exchange    CYSTOSCOPY WITH BILATERAL URETERAL STENT EXCHANGE Bilateral 8/8/2014    [facility]    CYSTOSCOPY WITH RETROGRADE PYELOGRAM Right 3/9/2018    Performed by [facility]ash [facility] at [facility]    CYSTOSCOPY WITH STENT EXCHANGE Bilateral 6/15/2015    [facility]    CYSTOSCOPY WITH STENT EXCHANGE Bilateral 6/2/2014    [facility]    CYSTOSCOPY WITH STENT PLACEMENT Bilateral 10/7/2015    [facility]    CYSTOSCOPY WITH STENT PLACEMENT Right 6/19/2015    [facility]    CYSTOSCOPY WITH URETERAL STENT  EXCHANGE LEFT RETROGRADE PYELOGRAM Bilateral 1/30/2015    [facility]formed by [facility]    DILATATION-BALLOON-URETER Right 1/22/2018    [facility]    DILATION, NEPHROSTOMY TRACT Left 6/7/2013    Performed by [facility]    DILATION, URETER Left 6/7/2013    [facility]formed [facility]    DISSECTION-LYMPH NODE-PELVIC Bilateral 11/8/2012    [facility]    EXPLORATORY-LAPAROTOMY  12/30/2015    [facility]formed [facility]    FRACTURE SURGERY      HAND SURGERY      HEMICOLECTOMY Right 12/30/2015    [facility]formed by [facility]ardo Mack [facility] at [facility]    HERNIA REPAIR      amalia    HIP SURGERY  2012    Rt hip septic    ILEAL CONDUIT N/A 11/8/2012    [facility]formed [facility]    LAPAROSCOPIC PARASTOMAL HERNIA REPAIR WITH MESH N/A 4/12/2017    [facility]formed [facility] at [facility]    LAPAROTOMY-EXPLORATORY N/A 7/5/2013    Performed by [facility] at [facility]    LYSIS-ADHESION  12/30/2015    [facility]    MOUTH SURGERY  2000    all teeth extracted    NEPHROSTOGRAM N/A 5/22/2013    [facility]formed by [facility]    nephrostomy tube placement      OPEN ILEAL CONDUIT N/A 12/30/2015    [facility]    SLGYWMFSS-[id number]-MYFILDKHYTR TUBE N/A 1/2/2018    Performed by [facility]     PLACEMENT-STENT Bilateral 2013    [facility]    PLACEMENT-STENT URETERAL Right 2018    [facility]    PYELOGRAM, ANTEGRADE N/A 2013    [facility]formed [facility]    PYELOGRAM-ANTEGRADE Right 2018    [facility]formed [facility]    PYELOGRAM-RETROGRADE Bilateral 6/15/2015    [facility]formed [facility]    RADIOFREQUENCY THERMOCOAGULATION (RFTC)-NERVE-MEDIAN BRANCH-LUMBAR Left 2014    Performed by [facility]    RADIOFREQUENCY THERMOCOAGULATION (RFTC)-NERVE-MEDIAN BRANCH-LUMBAR Right 2014    Performed by [facility]    REPLACEMENT, NEPHROSTOMY TUBE Left 2013    [facility]    RESECTION - SMALL BOWEL  2015    [facility] at [facility]    REVISION, ANASTOMOSIS, URETEROILEAL Left 2013    [facility]formed [facility]    turbt      URETERORENOSCOPY Left 2013    [facility]    urostomy       Family History   Adopted: Yes     Social History     Socioeconomic History    Marital status:      Spouse name: Not on file    Number of children: Not on file    Years of education: Not on file    Highest education level: Not on file   Occupational History    Occupation: [employer]     Employer: [employer]   Social Needs    Financial resource strain: Not on file    Food insecurity:     Worry: Not on file     Inability: Not on file    Transportation needs:     Medical: Not on file     Non-medical: Not on file   Tobacco Use    Smoking status: Former Smoker     Packs/day: 0.30     Years: 42.00     Pack years: 12.60     Last attempt to quit: 2017     Years since quittin.4    Smokeless tobacco: Never Used    Tobacco comment: in cessation program   Substance and Sexual  Activity    Alcohol use: Yes     Alcohol/week: 8.4 - 12.6 oz     Types: 14 - 21 Cans of beer per week     Comment: occassional    Drug use: Yes     Types: Marijuana     Comment: occass    Sexual activity: Yes     Partners: Female     Birth control/protection: None   Lifestyle    Physical activity:     Days per week: Not on file     Minutes per session: Not on file    Stress: Not on file   Relationships    Social connections:     Talks on phone: Not on file     Gets together: Not on file     Attends Sabianist service: Not on file     Active member of club or organization: Not on file     Attends meetings of clubs or organizations: Not on file     Relationship status: Not on file   Other Topics Concern    Not on file   Social History Narrative    Not on file       Current Outpatient Medications:     albuterol-ipratropium (DUO-NEB) 2.5 mg-0.5 mg/3 mL nebulizer solution, Take 3 mLs by nebulization every 6 (six) hours as needed for Wheezing. Rescue, Disp: 50 vial, Rfl: 2    allopurinol (ZYLOPRIM) 100 MG tablet, TAKE 1 TABLET BY MOUTH ONCE DAILY, Disp: 90 tablet, Rfl: 0    amLODIPine (NORVASC) 10 MG tablet, Take 1 tablet (10 mg total) by mouth once daily., Disp: 90 tablet, Rfl: 0    atenolol (TENORMIN) 50 MG tablet, TAKE 1 TABLET(50 MG) BY MOUTH EVERY DAY, Disp: 90 tablet, Rfl: 0    ergocalciferol (VITAMIN D2) 50,000 unit Cap, Take 1 capsule (50,000 Units total) by mouth every 7 days., Disp: 12 capsule, Rfl: 0    escitalopram oxalate (LEXAPRO) 10 MG tablet, Take 1 tablet (10 mg total) by mouth once daily. Pt to take 15 mg daily (1.5) pills for depression, Disp: 135 tablet, Rfl: 0    fluticasone-umeclidin-vilanter (TRELEGY ELLIPTA) 100-62.5-25 mcg DsDv, Inhale into the lungs., Disp: , Rfl:     gabapentin (NEURONTIN) 400 MG capsule, TAKE 1 CAPSULE(400 MG) BY MOUTH TWICE DAILY, Disp: 60 capsule, Rfl: 2    hydrocodone-acetaminophen 10-325mg (NORCO)  mg Tab, Take 1 tablet by mouth every 6 (six) hours as  needed for Pain., Disp: 40 tablet, Rfl: 0    levothyroxine (SYNTHROID) 88 MCG tablet, Take 1 tablet (88 mcg total) by mouth once daily., Disp: 90 tablet, Rfl: 0    multivitamin (ONE DAILY MULTIVITAMIN) per tablet, Take 1 tablet by mouth once daily., Disp: , Rfl:     ranitidine (ZANTAC) 150 MG tablet, Take 1 tablet (150 mg total) by mouth 2 (two) times daily., Disp: 60 tablet, Rfl: 2    sodium bicarbonate 325 MG tablet, Take 325 mg by mouth 2 (two) times daily., Disp: , Rfl:     tiotropium bromide (SPIRIVA RESPIMAT) 1.25 mcg/actuation Mist, Inhale 2.5 mcg into the lungs once daily. Controller, Disp: 4 g, Rfl: 5    traZODone (DESYREL) 100 MG tablet, Take 1 tablet (100 mg total) by mouth every evening., Disp: 30 tablet, Rfl: 11    REVIEW OF SYSTEMS:  General: No fevers or chills; ENT: No sore throat; Allergy and Immunology: no persistent infections; Hematological and Lymphatic: No history of bleeding or easy bruising; Endocrine: negative; Respiratory: no cough, shortness of breath, or wheezing; Cardiovascular: no chest pain or dyspnea on exertion; Gastrointestinal: no abdominal pain/back, change in bowel habits, or bloody stools; Genito-Urinary: no dysuria, trouble voiding, or hematuria; Musculoskeletal: negative; Neurological: no TIA or stroke symptoms; Psychiatric: no nervousness, anxiety or depression.    PHYSICAL EXAM:                General appearance:  Alert, well-appearing, and in no distress.  Oriented to person, place, and time                    Neurological: Normal speech, no focal findings noted; CN II - XII grossly intact. RLE with sensation to light touch, LLE with sensation to light touch.            Musculoskeletal: Digits/nail without cyanosis/clubbing.  Strength 5/5 BLE.                    Neck: Supple, no significant adenopathy, no carotid bruit can be auscultated                  Chest:  Clear to auscultation, no wheezes, rales or rhonchi, symmetric air entry. No use of accessory muscles                Cardiac: Normal rate and regular rhythm, S1 and S2 normal            Abdomen: Soft, nontender, nondistended, no masses or organomegaly, no hernia     No rebound tenderness noted; bowel sounds normal     No groin adenopathy      Extremities:   2+ R femoral pulse, 2+ L femoral pulse     2+ R popliteal pulse, 2+ L popliteal pulse     2+ R PT pulse, 1+ L PT pulse     2+ R DP pulse, 1+ L DP pulse     no RLE edema, no LLE edema    Skin: RLE without tissue loss; LLE without tissue loss    LAB RESULTS:  No results found for: CBC  Lab Results   Component Value Date    LABPROT 9.7 12/28/2017    INR 0.9 12/28/2017     Lab Results   Component Value Date     07/11/2019    K 4.6 07/11/2019     07/11/2019    CO2 27 07/11/2019     (H) 07/11/2019    BUN 33 (H) 07/11/2019    CREATININE 2.4 (H) 07/11/2019    CALCIUM 9.7 07/11/2019    ANIONGAP 8 07/11/2019    EGFRNONAA 28 (A) 07/11/2019     Lab Results   Component Value Date    WBC 5.80 05/23/2019    RBC 4.50 (L) 05/23/2019    HGB 14.6 05/23/2019    HCT 43.7 05/23/2019    MCV 97 05/23/2019    MCH 32.3 (H) 05/23/2019    MCHC 33.4 05/23/2019    RDW 13.6 05/23/2019     05/23/2019    MPV 9.6 05/23/2019    GRAN 4.0 05/23/2019    GRAN 68.6 05/23/2019    LYMPH 1.1 05/23/2019    LYMPH 19.4 05/23/2019    MONO 0.5 05/23/2019    MONO 8.3 05/23/2019    EOS 0.2 05/23/2019    BASO 0.10 05/23/2019    EOSINOPHIL 2.8 05/23/2019    BASOPHIL 0.9 05/23/2019    DIFFMETHOD Automated 05/23/2019     .No results found for: HGBA1C    IMAGING:  All pertinent imaging has been reviewed and interpreted independently.    CT A/P without contrast 11/2018, 4/2017, 7/2019 reviewed:  3 cm R ANTHONY aneurysm, stable in size.    IMP/PLAN:  59 y.o. male with   Patient Active Problem List   Diagnosis    Insomnia    Lumbar facet arthropathy    Sacroiliitis    Spondylosis without myelopathy    DDD (degenerative disc disease)    History of bladder cancer    Hydronephrosis, bilateral    H/O  primary malignant neoplasm of urinary bladder    Essential hypertension    Anemia of chronic disease    Moderate protein malnutrition    Chronic gout    Renal insufficiency    Body mass index (BMI) 20.0-20.9, adult    Personal history of bladder cancer    Acquired hypothyroidism    Hypoxemia    Increased PTH level    Severe malnutrition    COPD exacerbation    Hydronephrosis    History of primary malignant neoplasm of urinary bladder    Hydronephrosis with ureteral stricture, not elsewhere classified    Chest pain of uncertain etiology    COPD (chronic obstructive pulmonary disease)    Epigastric abdominal tenderness without rebound tenderness    CKD (chronic kidney disease), stage III    S/P colonoscopy    Iliac artery aneurysm    Kidney stones    being managed by PCP and specialists who is here today for evaluation of R ANTHONY aneurysm.    -Stable 3 cm R ANTHONY aneurysm (2.9 cm 4/2017), asymptomatic; CKD stage 3, GFR 28 (37) with extensive intraabdominal open surgical history with complications from ileal conduit and stent erosion into colon and severe SHAFFER - risks outweigh benefits of obtaining contrasted imaging at this time, if aneurysm increases in size >3.5cm he would benefit from a CTA for possible endovascular vs open repair  -Will perform routine surveillance with repeat CT A/P non contrast in 6 mo  -Can proceed with kidney stone treatment per Urology  -Rec daily ASA, BP control and cont smoking cessation    I spent 20 minutes evaluating this patient and greater than 50% of the time was spent counseling, coordinator care and discussing the plan of care.  All questions were answered and patient stated understanding with agreement with the above treatment plan.    Neptali Adame MD [facility]  Vascular and Endovascular Surgery

## 2019-07-11 NOTE — PATIENT INSTRUCTIONS

## 2019-07-11 NOTE — LETTER
July 11, 2019        Varun Zeng MD  [address]  [place] [address]             [facility]  [address] [facility]  [facility] [address]  Phone: [phone]  Fax: [phone]   Patient: Blake Guillory   MR Number: [id number]   YOB: 1960   Date of Visit: 7/11/2019       Dear Dr. Zeng:    Thank you for referring Blake Guillory to me for evaluation. Below are the relevant portions of my assessment and plan of care.            If you have questions, please do not hesitate to call me. I look forward to following Blake along with you.    Sincerely,      Neptali Adame MD           CC  No Recipients

## 2019-11-07 ENCOUNTER — HOSPITAL ENCOUNTER (OUTPATIENT)
Dept: RADIOLOGY | Facility: HOSPITAL | Age: 59
Discharge: HOME | End: 2019-11-07
Attending: UROLOGY
Payer: MEDICAID

## 2019-11-07 DIAGNOSIS — N13.1 HYDRONEPHROSIS WITH URETERAL STRICTURE, NOT ELSEWHERE CLASSIFIED: ICD-10-CM

## 2019-11-07 DIAGNOSIS — N20.0 KIDNEY STONES: ICD-10-CM

## 2019-11-07 PROCEDURE — 76770 US RETROPERITONEAL COMPLETE: ICD-10-PCS | Mod: 26,,, | Performed by: RADIOLOGY

## 2019-11-07 PROCEDURE — 76770 US EXAM ABDO BACK WALL COMP: CPT | Mod: TC

## 2019-11-07 PROCEDURE — 76770 US EXAM ABDO BACK WALL COMP: CPT | Mod: 26,,, | Performed by: RADIOLOGY

## 2019-11-12 ENCOUNTER — OFFICE VISIT (OUTPATIENT)
Dept: UROLOGY | Facility: CLINIC | Age: 59
End: 2019-11-12
Payer: MEDICAID

## 2019-11-12 VITALS — BODY MASS INDEX: 24.85 KG/M2 | HEIGHT: 72 IN | WEIGHT: 183.44 LBS

## 2019-11-12 DIAGNOSIS — N13.1 HYDRONEPHROSIS WITH URETERAL STRICTURE, NOT ELSEWHERE CLASSIFIED: ICD-10-CM

## 2019-11-12 DIAGNOSIS — N20.0 KIDNEY STONES: ICD-10-CM

## 2019-11-12 DIAGNOSIS — Z85.51 H/O PRIMARY MALIGNANT NEOPLASM OF URINARY BLADDER: Primary | ICD-10-CM

## 2019-11-12 PROCEDURE — 99213 OFFICE O/P EST LOW 20 MIN: CPT | Mod: PBBFAC | Performed by: UROLOGY

## 2019-11-12 PROCEDURE — 99999 PR PBB SHADOW E&M-EST. PATIENT-LVL III: ICD-10-PCS | Mod: PBBFAC,,, | Performed by: UROLOGY

## 2019-11-12 PROCEDURE — 99213 OFFICE O/P EST LOW 20 MIN: CPT | Mod: S$PBB,,, | Performed by: UROLOGY

## 2019-11-12 PROCEDURE — 99213 PR OFFICE/OUTPT VISIT, EST, LEVL III, 20-29 MIN: ICD-10-PCS | Mod: S$PBB,,, | Performed by: UROLOGY

## 2019-11-12 PROCEDURE — 99999 PR PBB SHADOW E&M-EST. PATIENT-LVL III: CPT | Mod: PBBFAC,,, | Performed by: UROLOGY

## 2019-11-12 RX ORDER — ASPIRIN 81 MG/1
81 TABLET ORAL DAILY
COMMUNITY

## 2019-11-12 NOTE — PROGRESS NOTES
Subjective:       Patient ID: Blake Guillory is a 59 y.o. male who was last seen in this office 5/21/2019    Chief Complaint:   Chief Complaint   Patient presents with    Follow-up     6 month f/u with ultrasound       History of Present Illness  History of Bladder Cancer  He has a history of bladder cancer. He is s/p cystectomy with ileal conduit in November 2012. From an oncologic standpoint, he is doing well. He has had issues with bilateral ureteral stricture. He underwent a bilateral ureteral reimplantation into his conduit several months later. His strictures recurred shortly afterwards.  He was managed with ureteral stent changes until one of the stent eroded through his conduit and into his bowels.    He had an Exploratory Laparotomy with replacement of ileal conduit and small bowel anastomosis on 12/30/2015.    He had a parastomal hernia repair in April by Dr. Mack.  He is healing nicely from that and has been discharged from his office.  He had a small bowel obstruction. that resolved.       He continues to have back pain in the morning and in the evening.  His stoma is not bothering him and he tells me that he has good urine output during the day and less at night.  He has worsening renal function and his right sided pain is becoming worse.      He had a right PCN placed by IR on 1/2/2018.  He then had a percutaneous right ureteral dilation and stent placement.  He noted increased urine output afterwards.  He still has pain and has noted significant pain in the right testicle.  He denies swelling or redness to the testicle.    He had his stent removed on 3/9/2018.  He is feeling okay, his chronic pain is about the same.    CT scan this summer showed mild bilateral hydronephrosis with non-obstructing stones.  He is back with a new ultrasound. He noted some pain lateral to his stoma last week after lifting something heavy.  The pain is resolving.      ACTIVE MEDICAL ISSUES:  Patient Active Problem List    Diagnosis    Insomnia    Lumbar facet arthropathy    Sacroiliitis    Spondylosis without myelopathy    DDD (degenerative disc disease)    History of bladder cancer    Hydronephrosis, bilateral    H/O primary malignant neoplasm of urinary bladder    Essential hypertension    Anemia of chronic disease    Moderate protein malnutrition    Chronic gout    Renal insufficiency    Body mass index (BMI) 20.0-20.9, adult    Personal history of bladder cancer    Acquired hypothyroidism    Hypoxemia    Increased PTH level    Severe malnutrition    COPD exacerbation    Hydronephrosis    History of primary malignant neoplasm of urinary bladder    Hydronephrosis with ureteral stricture, not elsewhere classified    Chest pain of uncertain etiology    COPD (chronic obstructive pulmonary disease)    Epigastric abdominal tenderness without rebound tenderness    CKD (chronic kidney disease), stage III    S/P colonoscopy    Iliac artery aneurysm    Kidney stones       ALLERGIES AND MEDICATIONS: updated and reviewed.  Review of patient's allergies indicates:  No Known Allergies  Current Outpatient Medications   Medication Sig    albuterol-ipratropium (DUO-NEB) 2.5 mg-0.5 mg/3 mL nebulizer solution Take 3 mLs by nebulization every 6 (six) hours as needed for Wheezing. Rescue    allopurinol (ZYLOPRIM) 100 MG tablet TAKE 1 TABLET BY MOUTH ONCE DAILY    amLODIPine (NORVASC) 10 MG tablet Take 1 tablet (10 mg total) by mouth once daily.    aspirin (ECOTRIN) 81 MG EC tablet Take 81 mg by mouth once daily.    atenolol (TENORMIN) 50 MG tablet TAKE 1 TABLET(50 MG) BY MOUTH EVERY DAY    ergocalciferol (VITAMIN D2) 50,000 unit Cap Take 1 capsule (50,000 Units total) by mouth every 7 days.    escitalopram oxalate (LEXAPRO) 10 MG tablet Take 1 tablet (10 mg total) by mouth once daily. Pt to take 15 mg daily (1.5) pills for depression    fluticasone-umeclidin-vilanter (TRELEGY ELLIPTA) 100-62.5-25 mcg DsDv Take 1  puff by mouth once daily.    gabapentin (NEURONTIN) 400 MG capsule TAKE 1 CAPSULE(400 MG) BY MOUTH TWICE DAILY    hydrocodone-acetaminophen 10-325mg (NORCO)  mg Tab Take 1 tablet by mouth every 6 (six) hours as needed for Pain.    levothyroxine (SYNTHROID) 88 MCG tablet Take 1 tablet (88 mcg total) by mouth once daily.    multivitamin (ONE DAILY MULTIVITAMIN) per tablet Take 1 tablet by mouth once daily.    pantoprazole (PROTONIX) 40 MG tablet Take 1 tablet (40 mg total) by mouth once daily.    sodium bicarbonate 325 MG tablet Take 325 mg by mouth 2 (two) times daily.    traZODone (DESYREL) 150 MG tablet Take 1 tablet (150 mg total) by mouth every evening.     No current facility-administered medications for this visit.        Review of Systems   Constitutional: Negative for activity change, fatigue, fever and unexpected weight change.   HENT: Negative for congestion.    Eyes: Negative for redness.   Respiratory: Negative for chest tightness and shortness of breath.    Cardiovascular: Negative for chest pain and leg swelling.   Gastrointestinal: Negative for abdominal pain, constipation, diarrhea, nausea and vomiting.   Genitourinary: Negative for dysuria, flank pain, frequency, hematuria, penile pain, penile swelling, scrotal swelling, testicular pain and urgency.   Musculoskeletal: Negative for arthralgias and back pain.   Neurological: Negative for dizziness and light-headedness.   Psychiatric/Behavioral: Negative for behavioral problems and confusion. The patient is not nervous/anxious.    All other systems reviewed and are negative.      Objective:      Vitals:    11/12/19 0953   Weight: 83.2 kg (183 lb 6.8 oz)   Height: 6' (1.829 m)     Physical Exam   Nursing note and vitals reviewed.  Constitutional: He is oriented to person, place, and time. He appears well-developed and well-nourished.   HENT:   Head: Normocephalic.   Eyes: Conjunctivae are normal.   Neck: Normal range of motion. Neck supple.  No tracheal deviation present. No thyromegaly present.   Cardiovascular: Normal rate and normal heart sounds.    Pulmonary/Chest: Effort normal and breath sounds normal. No respiratory distress. He has no wheezes.   Abdominal: Soft. Bowel sounds are normal. There is no hepatosplenomegaly. There is no tenderness. There is no rebound and no CVA tenderness. No hernia.   Stoma pink   Musculoskeletal: Normal range of motion. He exhibits no edema or tenderness.   Lymphadenopathy:     He has no cervical adenopathy.   Neurological: He is alert and oriented to person, place, and time.   Skin: Skin is warm and dry. No rash noted. No erythema.     Psychiatric: He has a normal mood and affect. His behavior is normal. Judgment and thought content normal.        US Retroperitoneal Complete (Kidney and   Order: [id number]   Status:  Final result   Visible to patient:  Yes (Patient Portal)   Next appt:  01/31/2020 at 02:00 PM in Family Medicine (Varun Zeng MD)   Dx:  Kidney stones; Hydronephrosis with ur...   Details     Reading Physician Reading Date Result Priority   Anastasia Manuel MD 11/8/2019 Routine      Narrative     EXAMINATION:  US RETROPERITONEAL COMPLETE    CLINICAL HISTORY:  Hydronephrosis with ureteral stricture, not elsewhere classified    TECHNIQUE:  Ultrasound of the kidneys and urinary bladder was performed including color flow and Doppler evaluation of the kidneys.    COMPARISON:  Renal ultrasound 05/15/2019, CT abdomen and pelvis 07/11/2018    FINDINGS:  Right kidney: The right kidney measures 10.0 x 4.8 x 5.8 cm. There is cortical irregularity and cortical thinning and increased cortical echogenicity.  Resistive index is 0.7 cm.  There are renal stones.  There is prominence of the right renal pelvis.    Left kidney: The left kidney measures 9.5 x 3.8 x 5.7 cm. There is cortical irregularity and cortical thinning and increased cortical echogenicity.  Resistive index is 0.7.  There are multiple echogenic  foci, the largest of which measures 1.0 cm, likely reflecting small stones.  No evidence of hydronephrosis.    The urinary bladder is surgically absent.      Impression       1. Mild dilatation of the right renal collecting system, similar to slightly increased from prior ultrasound examination.  2. Bilateral renal cortical thinning and parenchymal scarring.  3. Bilateral nonobstructing renal calculi.      Electronically signed by: Anastasia Manuel MD  Date: 11/08/2019  Time: 07:57               Assessment:       1. H/O primary malignant neoplasm of urinary bladder    2. Hydronephrosis with ureteral stricture, not elsewhere classified    3. Kidney stones          Plan:       1. H/O primary malignant neoplasm of urinary bladder  Doing well    2. Hydronephrosis with ureteral stricture, not elsewhere classified  Minimal, improved    3. Kidney stones  Will need PCNL if he ever needs a procedure, will monitor for now  Not likely the source of pain            Follow up in about 6 months (around 5/12/2020) for Follow up.

## 2020-02-28 NOTE — ASSESSMENT & PLAN NOTE
If you were PLEASED with your appointment with me today and your clinical experience, please fill out the survey requesting it at the .  And if any time you are mailed a survey to complete, please do so, as this helps my national survey input.     Thank you for your time in advance.    Dr. Shelia Brady      Please check your lab results via [employer] 48 hrs after you get labs done or please call if you have not received your test results in 2 weeks of them being completed. Whenever you view your [facility] labs and your labs are NOT within the normal values,  please send me an e-mail so we can discuss your labs sooner.      If any MRIs/CTs/ultrasounds/Xrays were ordered, please call our office for results the next business day.    [employer] is a powerful new Internet tool that is quick, convenient and confidential.  With [employer], you can view your results faster; communicate on-line with your [facility] physician's office; schedule many types of appointments; and find helpful healthcare links.    **Visit [email or web address] soon and often.  Sign up, take a quick tour, view important information, and stay in touch with [facility].  We're There When You Need Us    Multivitamin/Adequate Vitamin D3 2000 IU daily and Calcium 500 mg twice daily (dietary or supplement)  Starting at 40 (unless otherwise discussed w/ me), Yearly Mammogram:  please call [phone] to schedule  Dental vist every 6 months or as directed.  Optometry every 2 years or as directed.  Healthy eating and at least 150 mins of weekly Exercise  Seatbelts always.   Condoms always as applicable: single/any risk for sexually transmitted infections.  Pap Smear due 2021  Colonoscopy due at 2027  Bone Density due at 63  please call [phone] to schedule      MyPlate.gov Smart Tracker     My Fitness pal [employer]ien with smartphone      Medication Cost Resource:    Call  [facility]:          FREE LIST OF MEDS: inquire.         $25 Gift  Not symptomatic; chronic  No intervention necessary at this time       card for each new script.     [employer]:  A great way to save $ for prescription and over-the-counter medicines.   Consider these pharmacies: [facility], [facility], [facility]    [employer]:  Can get free meds when  No insurance/your insurance doesn't cover them.      **Go directly by Internet to the medication's specific pharmaceutical company: may have the BEST discounts.

## 2020-05-20 PROBLEM — E78.1 HYPERTRIGLYCERIDEMIA: Chronic | Status: ACTIVE | Noted: 2020-05-20

## 2020-05-25 ENCOUNTER — OFFICE VISIT (OUTPATIENT)
Dept: UROLOGY | Facility: CLINIC | Age: 60
End: 2020-05-25
Payer: MEDICAID

## 2020-05-25 VITALS
HEIGHT: 72 IN | SYSTOLIC BLOOD PRESSURE: 124 MMHG | WEIGHT: 185.44 LBS | BODY MASS INDEX: 25.12 KG/M2 | DIASTOLIC BLOOD PRESSURE: 70 MMHG

## 2020-05-25 DIAGNOSIS — N28.9 RENAL INSUFFICIENCY: ICD-10-CM

## 2020-05-25 DIAGNOSIS — Z85.51 H/O PRIMARY MALIGNANT NEOPLASM OF URINARY BLADDER: Primary | ICD-10-CM

## 2020-05-25 DIAGNOSIS — N20.0 KIDNEY STONES: ICD-10-CM

## 2020-05-25 DIAGNOSIS — N13.1 HYDRONEPHROSIS WITH URETERAL STRICTURE, NOT ELSEWHERE CLASSIFIED: ICD-10-CM

## 2020-05-25 PROCEDURE — 99213 OFFICE O/P EST LOW 20 MIN: CPT | Mod: PBBFAC | Performed by: UROLOGY

## 2020-05-25 PROCEDURE — 99999 PR PBB SHADOW E&M-EST. PATIENT-LVL III: CPT | Mod: PBBFAC,,, | Performed by: UROLOGY

## 2020-05-25 PROCEDURE — 99213 OFFICE O/P EST LOW 20 MIN: CPT | Mod: S$PBB,,, | Performed by: UROLOGY

## 2020-05-25 PROCEDURE — 99213 PR OFFICE/OUTPT VISIT, EST, LEVL III, 20-29 MIN: ICD-10-PCS | Mod: S$PBB,,, | Performed by: UROLOGY

## 2020-05-25 PROCEDURE — 99999 PR PBB SHADOW E&M-EST. PATIENT-LVL III: ICD-10-PCS | Mod: PBBFAC,,, | Performed by: UROLOGY

## 2020-05-25 NOTE — PROGRESS NOTES
Subjective:       Patient ID: Blake Guillory is a 60 y.o. male who was last seen in this office Visit date not found    Chief Complaint:   Chief Complaint   Patient presents with    Follow-up     6 month follow up       History of Present Illness  History of Bladder Cancer  He has a history of bladder cancer. He is s/p cystectomy with ileal conduit in November 2012. From an oncologic standpoint, he is doing well. He has had issues with bilateral ureteral stricture. He underwent a bilateral ureteral reimplantation into his conduit several months later. His strictures recurred shortly afterwards.  He was managed with ureteral stent changes until one of the stent eroded through his conduit and into his bowels.    He had an Exploratory Laparotomy with replacement of ileal conduit and small bowel anastomosis on 12/30/2015.     He had a parastomal hernia repair in April by Dr. Mack.  He is healing nicely from that and has been discharged from his office.  He had a small bowel obstruction. that resolved.        He continues to have back pain in the morning and in the evening.  His stoma is not bothering him and he tells me that he has good urine output during the day and less at night.  He has worsening renal function and his right sided pain is becoming worse.       He had a right PCN placed by IR on 1/2/2018.  He then had a percutaneous right ureteral dilation and stent placement.  He noted increased urine output afterwards.  He still has pain and has noted significant pain in the right testicle.  He denies swelling or redness to the testicle.     He had his stent removed on 3/9/2018.  He is feeling okay, his chronic pain is about the same.     CT scan this summer showed mild bilateral hydronephrosis with non-obstructing stones.  Ultrasound last time looked okay.  ACTIVE MEDICAL ISSUES:  Patient Active Problem List   Diagnosis    Insomnia    Lumbar facet arthropathy    Sacroiliitis    Spondylosis without  myelopathy    DDD (degenerative disc disease)    History of bladder cancer    Hydronephrosis, bilateral    H/O primary malignant neoplasm of urinary bladder    Essential hypertension    Anemia of chronic disease    Moderate protein malnutrition    Chronic gout    Renal insufficiency    Body mass index (BMI) 20.0-20.9, adult    Personal history of bladder cancer    Acquired hypothyroidism    Hypoxemia    Increased PTH level    Severe malnutrition    COPD exacerbation    Hydronephrosis    History of primary malignant neoplasm of urinary bladder    Hydronephrosis with ureteral stricture, not elsewhere classified    Chest pain of uncertain etiology    COPD (chronic obstructive pulmonary disease)    Epigastric abdominal tenderness without rebound tenderness    CKD (chronic kidney disease), stage III    S/P colonoscopy    Iliac artery aneurysm    Kidney stones    Hypertriglyceridemia       ALLERGIES AND MEDICATIONS: updated and reviewed.  Review of patient's allergies indicates:  No Known Allergies  Current Outpatient Medications   Medication Sig    albuterol-ipratropium (DUO-NEB) 2.5 mg-0.5 mg/3 mL nebulizer solution Take 3 mLs by nebulization every 6 (six) hours as needed for Wheezing. Rescue    allopurinoL (ZYLOPRIM) 100 MG tablet TAKE 1 TABLET BY MOUTH ONCE DAILY    amLODIPine (NORVASC) 10 MG tablet Take 1 tablet (10 mg total) by mouth once daily.    aspirin (ECOTRIN) 81 MG EC tablet Take 81 mg by mouth once daily.    atenoloL (TENORMIN) 50 MG tablet TAKE 1 TABLET(50 MG) BY MOUTH EVERY DAY    ergocalciferol (VITAMIN D2) 50,000 unit Cap Take 1 capsule (50,000 Units total) by mouth every 7 days.    escitalopram oxalate (LEXAPRO) 10 MG tablet Take 1 tablet (10 mg total) by mouth once daily. Pt to take 15 mg daily (1.5) pills for depression    fluticasone-umeclidin-vilanter (TRELEGY ELLIPTA) 100-62.5-25 mcg DsDv Take 1 puff by mouth once daily.    gabapentin (NEURONTIN) 400 MG capsule  TAKE 1 CAPSULE BY MOUTH TWICE DAILY    hydrocodone-acetaminophen 10-325mg (NORCO)  mg Tab Take 1 tablet by mouth every 6 (six) hours as needed for Pain.    levothyroxine (SYNTHROID) 88 MCG tablet Take 1 tablet (88 mcg total) by mouth once daily.    multivitamin (ONE DAILY MULTIVITAMIN) per tablet Take 1 tablet by mouth once daily.    pantoprazole (PROTONIX) 40 MG tablet Take 1 tablet (40 mg total) by mouth once daily.    sodium bicarbonate 325 MG tablet Take 325 mg by mouth 2 (two) times daily.    traZODone (DESYREL) 150 MG tablet Take 1 tablet (150 mg total) by mouth every evening.     No current facility-administered medications for this visit.        Review of Systems   Constitutional: Negative for activity change, fatigue, fever and unexpected weight change.   HENT: Negative for congestion.    Eyes: Negative for redness.   Respiratory: Negative for chest tightness and shortness of breath.    Cardiovascular: Negative for chest pain and leg swelling.   Gastrointestinal: Negative for abdominal pain, constipation, diarrhea, nausea and vomiting.   Genitourinary: Negative for dysuria, flank pain, frequency, hematuria, penile pain, penile swelling, scrotal swelling, testicular pain and urgency.   Musculoskeletal: Negative for arthralgias and back pain.   Neurological: Negative for dizziness and light-headedness.   Psychiatric/Behavioral: Negative for behavioral problems and confusion. The patient is not nervous/anxious.    All other systems reviewed and are negative.      Objective:      Vitals:    05/25/20 1308   BP: 124/70   Weight: 84.1 kg (185 lb 6.5 oz)   Height: 6' (1.829 m)     Physical Exam   Nursing note and vitals reviewed.  Constitutional: He is oriented to person, place, and time. He appears well-developed and well-nourished.   HENT:   Head: Normocephalic.   Eyes: Conjunctivae are normal.   Neck: Normal range of motion. Neck supple. No tracheal deviation present. No thyromegaly present.    Cardiovascular: Normal rate and normal heart sounds.    Pulmonary/Chest: Effort normal and breath sounds normal. No respiratory distress. He has no wheezes.   Abdominal: Soft. Bowel sounds are normal. There is no hepatosplenomegaly. There is no tenderness. There is no rebound and no CVA tenderness. No hernia.   Stoma is pink   Musculoskeletal: Normal range of motion. He exhibits no edema or tenderness.   Lymphadenopathy:     He has no cervical adenopathy.   Neurological: He is alert and oriented to person, place, and time.   Skin: Skin is warm and dry. No rash noted. No erythema.     Psychiatric: He has a normal mood and affect. His behavior is normal. Judgment and thought content normal.        CMP  Sodium   Date Value Ref Range Status   05/06/2020 138 136 - 145 mmol/L Final   05/06/2020 136 136 - 145 mmol/L Final     Potassium   Date Value Ref Range Status   05/06/2020 4.6 3.5 - 5.1 mmol/L Final   05/06/2020 4.6 3.5 - 5.1 mmol/L Final     Chloride   Date Value Ref Range Status   05/06/2020 104 101 - 111 mmol/L Final   05/06/2020 106 101 - 111 mmol/L Final     CO2   Date Value Ref Range Status   05/06/2020 22 (L) 23 - 29 mmol/L Final   05/06/2020 21 (L) 23 - 29 mmol/L Final     Glucose   Date Value Ref Range Status   05/06/2020 104 74 - 118 mg/dL Final   05/06/2020 105 74 - 118 mg/dL Final     BUN, Bld   Date Value Ref Range Status   05/06/2020 52 (H) 8 - 23 mg/dL Final   05/06/2020 52 (H) 8 - 23 mg/dL Final     Creatinine   Date Value Ref Range Status   05/06/2020 2.4 (H) 0.5 - 1.4 mg/dL Final   05/06/2020 2.4 (H) 0.5 - 1.4 mg/dL Final     Calcium   Date Value Ref Range Status   05/06/2020 9.3 8.6 - 10.0 mg/dL Final   05/06/2020 8.9 8.6 - 10.0 mg/dL Final     Total Protein   Date Value Ref Range Status   05/06/2020 7.4 6.0 - 8.4 g/dL Final     Albumin   Date Value Ref Range Status   05/06/2020 4.4 3.5 - 5.2 g/dL Final     Total Bilirubin   Date Value Ref Range Status   05/06/2020 0.6 0.3 - 1.2 mg/dL Final      Comment:     For infants and newborns, interpretation of results should be based  on gestational age, weight and in agreement with clinical  observations.  Premature Infant recommended reference ranges:  Up to 24 hours.............<8.0 mg/dL  Up to 48 hours............<12.0 mg/dL  3-5 days..................<15.0 mg/dL  6-29 days.................<15.0 mg/dL       Alkaline Phosphatase   Date Value Ref Range Status   05/06/2020 71 38 - 126 U/L Final     AST   Date Value Ref Range Status   05/06/2020 31 15 - 41 U/L Final     ALT   Date Value Ref Range Status   05/06/2020 40 17 - 63 U/L Final     Anion Gap   Date Value Ref Range Status   05/06/2020 12 8 - 16 mmol/L Final   05/06/2020 9 8 - 16 mmol/L Final     eGFR if    Date Value Ref Range Status   05/06/2020 32.2 (A) >60 mL/min/1.73 m^2 Final   05/06/2020 32.2 (A) >60 mL/min/1.73 m^2 Final     eGFR if non    Date Value Ref Range Status   05/06/2020 27.9 (A) >60 mL/min/1.73 m^2 Final     Comment:     Calculation used to obtain the estimated glomerular filtration  rate (eGFR) is the CKD-EPI equation.      05/06/2020 27.9 (A) >60 mL/min/1.73 m^2 Final     Comment:     Calculation used to obtain the estimated glomerular filtration  rate (eGFR) is the CKD-EPI equation.            Assessment:       1. H/O primary malignant neoplasm of urinary bladder    2. Hydronephrosis with ureteral stricture, not elsewhere classified    3. Kidney stones    4. Renal insufficiency          Plan:       1. H/O primary malignant neoplasm of urinary bladder  ADRYAN next time  No evidence of recurrent disease    2. Hydronephrosis with ureteral stricture, not elsewhere classified  stable  - US Retroperitoneal Complete (Kidney and; Future    3. Kidney stones  monitor    4. Renal insufficiency  stable            Follow up in about 6 months (around 11/25/2020) for Follow up, Review X-ray.

## 2020-06-17 ENCOUNTER — HOSPITAL ENCOUNTER (INPATIENT)
Facility: OTHER | Age: 60
LOS: 3 days | Discharge: HOME | DRG: 689 | End: 2020-06-20
Attending: HOSPITALIST | Admitting: HOSPITALIST
Payer: MEDICAID

## 2020-06-17 DIAGNOSIS — N13.30 HYDRONEPHROSIS: ICD-10-CM

## 2020-06-17 DIAGNOSIS — N13.2 HYDRONEPHROSIS WITH URINARY OBSTRUCTION DUE TO URETERAL CALCULUS: ICD-10-CM

## 2020-06-17 DIAGNOSIS — Z85.51 H/O PRIMARY MALIGNANT NEOPLASM OF URINARY BLADDER: Chronic | ICD-10-CM

## 2020-06-17 DIAGNOSIS — N13.30 HYDRONEPHROSIS, UNSPECIFIED HYDRONEPHROSIS TYPE: ICD-10-CM

## 2020-06-17 DIAGNOSIS — N18.4 CKD (CHRONIC KIDNEY DISEASE), STAGE IV: ICD-10-CM

## 2020-06-17 DIAGNOSIS — J44.9 CHRONIC OBSTRUCTIVE PULMONARY DISEASE, UNSPECIFIED COPD TYPE: ICD-10-CM

## 2020-06-17 DIAGNOSIS — N20.0 RENAL STONE: ICD-10-CM

## 2020-06-17 DIAGNOSIS — J96.01 ACUTE RESPIRATORY FAILURE WITH HYPOXIA: ICD-10-CM

## 2020-06-17 DIAGNOSIS — J44.1 CHRONIC OBSTRUCTIVE PULMONARY DISEASE WITH ACUTE EXACERBATION: Primary | ICD-10-CM

## 2020-06-17 DIAGNOSIS — N20.0 NEPHROLITHIASIS: ICD-10-CM

## 2020-06-17 DIAGNOSIS — I72.3 ILIAC ARTERY ANEURYSM: Chronic | ICD-10-CM

## 2020-06-17 DIAGNOSIS — E03.9 ACQUIRED HYPOTHYROIDISM: Chronic | ICD-10-CM

## 2020-06-17 DIAGNOSIS — I10 ESSENTIAL HYPERTENSION: ICD-10-CM

## 2020-06-17 LAB
ALBUMIN SERPL BCP-MCNC: 3.7 G/DL (ref 3.5–5.2)
ALP SERPL-CCNC: 82 U/L (ref 55–135)
ALT SERPL W/O P-5'-P-CCNC: 28 U/L (ref 10–44)
ANION GAP SERPL CALC-SCNC: 12 MMOL/L (ref 8–16)
AST SERPL-CCNC: 26 U/L (ref 10–40)
BASOPHILS # BLD AUTO: ABNORMAL K/UL (ref 0–0.2)
BASOPHILS NFR BLD: 0 % (ref 0–1.9)
BILIRUB SERPL-MCNC: 0.4 MG/DL (ref 0.1–1)
BUN SERPL-MCNC: 37 MG/DL (ref 8–23)
CALCIUM SERPL-MCNC: 8.9 MG/DL (ref 8.7–10.5)
CHLORIDE SERPL-SCNC: 108 MMOL/L (ref 95–110)
CO2 SERPL-SCNC: 19 MMOL/L (ref 23–29)
CREAT SERPL-MCNC: 3 MG/DL (ref 0.5–1.4)
DIFFERENTIAL METHOD: ABNORMAL
EOSINOPHIL # BLD AUTO: ABNORMAL K/UL (ref 0–0.5)
EOSINOPHIL NFR BLD: 0 % (ref 0–8)
ERYTHROCYTE [DISTWIDTH] IN BLOOD BY AUTOMATED COUNT: 12.9 % (ref 11.5–14.5)
EST. GFR  (AFRICAN AMERICAN): 25 ML/MIN/1.73 M^2
EST. GFR  (NON AFRICAN AMERICAN): 21 ML/MIN/1.73 M^2
GLUCOSE SERPL-MCNC: 160 MG/DL (ref 70–110)
HCT VFR BLD AUTO: 42.5 % (ref 40–54)
HGB BLD-MCNC: 14.1 G/DL (ref 14–18)
IMM GRANULOCYTES # BLD AUTO: ABNORMAL K/UL (ref 0–0.04)
IMM GRANULOCYTES NFR BLD AUTO: ABNORMAL % (ref 0–0.5)
LYMPHOCYTES # BLD AUTO: ABNORMAL K/UL (ref 1–4.8)
LYMPHOCYTES NFR BLD: 1 % (ref 18–48)
MAGNESIUM SERPL-MCNC: 2 MG/DL (ref 1.6–2.6)
MCH RBC QN AUTO: 32.5 PG (ref 27–31)
MCHC RBC AUTO-ENTMCNC: 33.2 G/DL (ref 32–36)
MCV RBC AUTO: 98 FL (ref 82–98)
MONOCYTES # BLD AUTO: ABNORMAL K/UL (ref 0.3–1)
MONOCYTES NFR BLD: 3 % (ref 4–15)
NEUTROPHILS NFR BLD: 89 % (ref 38–73)
NEUTS BAND NFR BLD MANUAL: 7 %
NRBC BLD-RTO: 0 /100 WBC
PHOSPHATE SERPL-MCNC: 2.4 MG/DL (ref 2.7–4.5)
PLATELET # BLD AUTO: 202 K/UL (ref 150–350)
PMV BLD AUTO: 11.1 FL (ref 9.2–12.9)
POTASSIUM SERPL-SCNC: 4.7 MMOL/L (ref 3.5–5.1)
PROT SERPL-MCNC: 7.1 G/DL (ref 6–8.4)
RBC # BLD AUTO: 4.34 M/UL (ref 4.6–6.2)
SODIUM SERPL-SCNC: 139 MMOL/L (ref 136–145)
WBC # BLD AUTO: 16.38 K/UL (ref 3.9–12.7)

## 2020-06-17 PROCEDURE — 85007 BL SMEAR W/DIFF WBC COUNT: CPT

## 2020-06-17 PROCEDURE — 99223 1ST HOSP IP/OBS HIGH 75: CPT | Mod: ,,, | Performed by: NURSE PRACTITIONER

## 2020-06-17 PROCEDURE — 27000221 HC OXYGEN, UP TO 24 HOURS

## 2020-06-17 PROCEDURE — 25000003 PHARM REV CODE 250: Performed by: NURSE PRACTITIONER

## 2020-06-17 PROCEDURE — 25500020 PHARM REV CODE 255: Performed by: RADIOLOGY

## 2020-06-17 PROCEDURE — 25000242 PHARM REV CODE 250 ALT 637 W/ HCPCS: Performed by: NURSE PRACTITIONER

## 2020-06-17 PROCEDURE — 94660 CPAP INITIATION&MGMT: CPT

## 2020-06-17 PROCEDURE — 99232 PR SUBSEQUENT HOSPITAL CARE,LEVL II: ICD-10-PCS | Mod: ,,, | Performed by: UROLOGY

## 2020-06-17 PROCEDURE — 83735 ASSAY OF MAGNESIUM: CPT

## 2020-06-17 PROCEDURE — 80053 COMPREHEN METABOLIC PANEL: CPT

## 2020-06-17 PROCEDURE — C1769 GUIDE WIRE: HCPCS | Performed by: RADIOLOGY

## 2020-06-17 PROCEDURE — 99900035 HC TECH TIME PER 15 MIN (STAT)

## 2020-06-17 PROCEDURE — 84100 ASSAY OF PHOSPHORUS: CPT

## 2020-06-17 PROCEDURE — 25000003 PHARM REV CODE 250: Performed by: RADIOLOGY

## 2020-06-17 PROCEDURE — 87086 URINE CULTURE/COLONY COUNT: CPT

## 2020-06-17 PROCEDURE — 99152 MOD SED SAME PHYS/QHP 5/>YRS: CPT | Performed by: RADIOLOGY

## 2020-06-17 PROCEDURE — 94640 AIRWAY INHALATION TREATMENT: CPT

## 2020-06-17 PROCEDURE — 99223 PR INITIAL HOSPITAL CARE,LEVL III: ICD-10-PCS | Mod: ,,, | Performed by: NURSE PRACTITIONER

## 2020-06-17 PROCEDURE — 87040 BLOOD CULTURE FOR BACTERIA: CPT

## 2020-06-17 PROCEDURE — 94761 N-INVAS EAR/PLS OXIMETRY MLT: CPT

## 2020-06-17 PROCEDURE — 63600175 PHARM REV CODE 636 W HCPCS: Performed by: RADIOLOGY

## 2020-06-17 PROCEDURE — 85027 COMPLETE CBC AUTOMATED: CPT

## 2020-06-17 PROCEDURE — 27000190 HC CPAP FULL FACE MASK W/VALVE

## 2020-06-17 PROCEDURE — 25000003 PHARM REV CODE 250: Performed by: HOSPITALIST

## 2020-06-17 PROCEDURE — 25000242 PHARM REV CODE 250 ALT 637 W/ HCPCS: Performed by: HOSPITALIST

## 2020-06-17 PROCEDURE — C1729 CATH, DRAINAGE: HCPCS | Performed by: RADIOLOGY

## 2020-06-17 PROCEDURE — 99232 SBSQ HOSP IP/OBS MODERATE 35: CPT | Mod: ,,, | Performed by: UROLOGY

## 2020-06-17 PROCEDURE — 36415 COLL VENOUS BLD VENIPUNCTURE: CPT

## 2020-06-17 PROCEDURE — 20000000 HC ICU ROOM

## 2020-06-17 RX ORDER — PANTOPRAZOLE SODIUM 40 MG/1
40 TABLET, DELAYED RELEASE ORAL DAILY
Status: DISCONTINUED | OUTPATIENT
Start: 2020-06-17 | End: 2020-06-20 | Stop reason: HOSPADM

## 2020-06-17 RX ORDER — ALBUTEROL SULFATE 0.83 MG/ML
2.5 SOLUTION RESPIRATORY (INHALATION) EVERY 4 HOURS PRN
Status: DISCONTINUED | OUTPATIENT
Start: 2020-06-17 | End: 2020-06-17

## 2020-06-17 RX ORDER — ALBUTEROL SULFATE 0.83 MG/ML
2.5 SOLUTION RESPIRATORY (INHALATION) ONCE
Status: COMPLETED | OUTPATIENT
Start: 2020-06-17 | End: 2020-06-17

## 2020-06-17 RX ORDER — SODIUM CHLORIDE 0.9 % (FLUSH) 0.9 %
10 SYRINGE (ML) INJECTION
Status: DISCONTINUED | OUTPATIENT
Start: 2020-06-17 | End: 2020-06-20 | Stop reason: HOSPADM

## 2020-06-17 RX ORDER — AMLODIPINE BESYLATE 5 MG/1
10 TABLET ORAL DAILY
Status: DISCONTINUED | OUTPATIENT
Start: 2020-06-17 | End: 2020-06-19

## 2020-06-17 RX ORDER — SODIUM BICARBONATE 325 MG/1
325 TABLET ORAL 2 TIMES DAILY
Status: DISCONTINUED | OUTPATIENT
Start: 2020-06-17 | End: 2020-06-20 | Stop reason: HOSPADM

## 2020-06-17 RX ORDER — HYDROCODONE BITARTRATE AND ACETAMINOPHEN 10; 325 MG/1; MG/1
1 TABLET ORAL EVERY 6 HOURS PRN
Status: DISCONTINUED | OUTPATIENT
Start: 2020-06-17 | End: 2020-06-18

## 2020-06-17 RX ORDER — ONDANSETRON 8 MG/1
8 TABLET, ORALLY DISINTEGRATING ORAL EVERY 8 HOURS PRN
Status: DISCONTINUED | OUTPATIENT
Start: 2020-06-17 | End: 2020-06-20 | Stop reason: HOSPADM

## 2020-06-17 RX ORDER — GABAPENTIN 400 MG/1
400 CAPSULE ORAL 2 TIMES DAILY
Status: DISCONTINUED | OUTPATIENT
Start: 2020-06-17 | End: 2020-06-17 | Stop reason: DRUGHIGH

## 2020-06-17 RX ORDER — ATENOLOL 25 MG/1
50 TABLET ORAL DAILY
Status: DISCONTINUED | OUTPATIENT
Start: 2020-06-17 | End: 2020-06-20 | Stop reason: HOSPADM

## 2020-06-17 RX ORDER — BENZONATATE 100 MG/1
100 CAPSULE ORAL ONCE
Status: COMPLETED | OUTPATIENT
Start: 2020-06-17 | End: 2020-06-17

## 2020-06-17 RX ORDER — LEVOTHYROXINE SODIUM 88 UG/1
88 TABLET ORAL
Status: DISCONTINUED | OUTPATIENT
Start: 2020-06-17 | End: 2020-06-20 | Stop reason: HOSPADM

## 2020-06-17 RX ORDER — BENZONATATE 100 MG/1
100 CAPSULE ORAL 3 TIMES DAILY PRN
Status: DISCONTINUED | OUTPATIENT
Start: 2020-06-17 | End: 2020-06-17

## 2020-06-17 RX ORDER — ALPRAZOLAM 0.5 MG/1
0.5 TABLET ORAL 3 TIMES DAILY PRN
Status: DISCONTINUED | OUTPATIENT
Start: 2020-06-17 | End: 2020-06-20 | Stop reason: HOSPADM

## 2020-06-17 RX ORDER — GABAPENTIN 300 MG/1
300 CAPSULE ORAL 2 TIMES DAILY
Status: DISCONTINUED | OUTPATIENT
Start: 2020-06-17 | End: 2020-06-20 | Stop reason: HOSPADM

## 2020-06-17 RX ORDER — FLUTICASONE FUROATE AND VILANTEROL 100; 25 UG/1; UG/1
1 POWDER RESPIRATORY (INHALATION) DAILY
Status: DISCONTINUED | OUTPATIENT
Start: 2020-06-17 | End: 2020-06-20 | Stop reason: HOSPADM

## 2020-06-17 RX ORDER — FENTANYL CITRATE 50 UG/ML
INJECTION, SOLUTION INTRAMUSCULAR; INTRAVENOUS
Status: DISCONTINUED | OUTPATIENT
Start: 2020-06-17 | End: 2020-06-17 | Stop reason: HOSPADM

## 2020-06-17 RX ORDER — LIDOCAINE HYDROCHLORIDE 10 MG/ML
INJECTION INFILTRATION; PERINEURAL
Status: DISCONTINUED | OUTPATIENT
Start: 2020-06-17 | End: 2020-06-17 | Stop reason: HOSPADM

## 2020-06-17 RX ORDER — IPRATROPIUM BROMIDE AND ALBUTEROL SULFATE 2.5; .5 MG/3ML; MG/3ML
3 SOLUTION RESPIRATORY (INHALATION) EVERY 6 HOURS PRN
Status: DISCONTINUED | OUTPATIENT
Start: 2020-06-17 | End: 2020-06-18

## 2020-06-17 RX ORDER — MIDAZOLAM HYDROCHLORIDE 1 MG/ML
INJECTION INTRAMUSCULAR; INTRAVENOUS
Status: DISCONTINUED | OUTPATIENT
Start: 2020-06-17 | End: 2020-06-17 | Stop reason: HOSPADM

## 2020-06-17 RX ORDER — ESCITALOPRAM OXALATE 10 MG/1
10 TABLET ORAL DAILY
Status: DISCONTINUED | OUTPATIENT
Start: 2020-06-17 | End: 2020-06-20 | Stop reason: HOSPADM

## 2020-06-17 RX ORDER — CEFAZOLIN SODIUM 1 G/3ML
INJECTION, POWDER, FOR SOLUTION INTRAMUSCULAR; INTRAVENOUS
Status: DISCONTINUED | OUTPATIENT
Start: 2020-06-17 | End: 2020-06-17 | Stop reason: HOSPADM

## 2020-06-17 RX ORDER — ACETAMINOPHEN 325 MG/1
650 TABLET ORAL EVERY 4 HOURS PRN
Status: DISCONTINUED | OUTPATIENT
Start: 2020-06-17 | End: 2020-06-20 | Stop reason: HOSPADM

## 2020-06-17 RX ORDER — HYDROCODONE BITARTRATE AND ACETAMINOPHEN 5; 325 MG/1; MG/1
1 TABLET ORAL ONCE
Status: COMPLETED | OUTPATIENT
Start: 2020-06-18 | End: 2020-06-17

## 2020-06-17 RX ADMIN — CEFTRIAXONE 1 G: 1 INJECTION, SOLUTION INTRAVENOUS at 11:06

## 2020-06-17 RX ADMIN — SODIUM BICARBONATE TAB 325 MG 325 MG: 325 TAB at 08:06

## 2020-06-17 RX ADMIN — LEVOTHYROXINE SODIUM 88 MCG: 88 TABLET ORAL at 05:06

## 2020-06-17 RX ADMIN — BENZONATATE 100 MG: 100 CAPSULE ORAL at 10:06

## 2020-06-17 RX ADMIN — AMLODIPINE BESYLATE 10 MG: 5 TABLET ORAL at 09:06

## 2020-06-17 RX ADMIN — ALPRAZOLAM 0.5 MG: 0.5 TABLET ORAL at 10:06

## 2020-06-17 RX ADMIN — ALBUTEROL SULFATE 2.5 MG: 2.5 SOLUTION RESPIRATORY (INHALATION) at 10:06

## 2020-06-17 RX ADMIN — IPRATROPIUM BROMIDE AND ALBUTEROL SULFATE 3 ML: .5; 3 SOLUTION RESPIRATORY (INHALATION) at 05:06

## 2020-06-17 RX ADMIN — IPRATROPIUM BROMIDE AND ALBUTEROL SULFATE 3 ML: .5; 3 SOLUTION RESPIRATORY (INHALATION) at 10:06

## 2020-06-17 RX ADMIN — HYDROCODONE BITARTRATE AND ACETAMINOPHEN 1 TABLET: 5; 325 TABLET ORAL at 11:06

## 2020-06-17 RX ADMIN — HYDROCODONE BITARTRATE AND ACETAMINOPHEN 1 TABLET: 10; 325 TABLET ORAL at 02:06

## 2020-06-17 RX ADMIN — ACETAMINOPHEN 650 MG: 325 TABLET ORAL at 08:06

## 2020-06-17 RX ADMIN — ATENOLOL 50 MG: 25 TABLET ORAL at 09:06

## 2020-06-17 RX ADMIN — TRAZODONE HYDROCHLORIDE 150 MG: 50 TABLET ORAL at 08:06

## 2020-06-17 RX ADMIN — GABAPENTIN 300 MG: 300 CAPSULE ORAL at 08:06

## 2020-06-17 RX ADMIN — TRAZODONE HYDROCHLORIDE 150 MG: 50 TABLET ORAL at 02:06

## 2020-06-17 RX ADMIN — HYDROCODONE BITARTRATE AND ACETAMINOPHEN 1 TABLET: 10; 325 TABLET ORAL at 08:06

## 2020-06-17 RX ADMIN — ALPRAZOLAM 0.5 MG: 0.5 TABLET ORAL at 02:06

## 2020-06-17 RX ADMIN — PANTOPRAZOLE SODIUM 40 MG: 40 TABLET, DELAYED RELEASE ORAL at 09:06

## 2020-06-17 NOTE — ASSESSMENT & PLAN NOTE
Creatinine 2.8, baseline 2.4-2.5; small increase likely due to obstruction    Avoid nephrotoxics  CMP in AM

## 2020-06-17 NOTE — ASSESSMENT & PLAN NOTE
- CT showed moderate to severe hydronephrosis and hydroureter on the left due to a 4 mm stone in the distal left ureter associated withleft perinephric inflammatory stranding.  - S/P placement of left nephrostomy tube with IR.  Initial urine noted to be cloudy and malodorous.   - Patient on Rocephin for likely pyelonephritis, also had perioperative Ancef.  - Urine cultures pending.  - Will also get blood cultures as procedure makes him high risk for development of bacteremia.

## 2020-06-17 NOTE — SUBJECTIVE & OBJECTIVE
Past Medical History:   Diagnosis Date    Arthritis     Blood transfusion     Cancer     bladder    COPD (chronic obstructive pulmonary disease)     Frequency of urination     General anesthetics causing adverse effect in therapeutic use     pt states he wakes up very violently from anesthesia    History of urostomy     Hypertension     Limited joint range of motion right hip and leg    Mixed anxiety and depressive disorder     Personal history of bladder cancer     Thyroid disease     Ureteral stent displacement     Urinary retention     Wears glasses        Past Surgical History:   Procedure Laterality Date    ABDOMINAL SURGERY      APPENDECTOMY  12/30/2015    Procedure: APPENDECTOMY;  Surgeon: CHAD Bo MD;  Location: [facility];  Service: Urology;;    bladder removed      BOWEL RESECTION      CYSTOSCOPY      >1  episodes for bilat ureteral stent exchange    FRACTURE SURGERY      HAND SURGERY      HERNIA REPAIR      amalia    HIP SURGERY  2012    Rt hip septic    MOUTH SURGERY  2000    all teeth extracted    nephrostomy tube placement      turbt      urostomy         Review of patient's allergies indicates:  No Known Allergies    Current Facility-Administered Medications on File Prior to Encounter   Medication    [COMPLETED] albuterol-ipratropium 2.5 mg-0.5 mg/3 mL nebulizer solution 3 mL    [COMPLETED] cefTRIAXone (ROCEPHIN) 2 g/50 mL D5W IVPB    [COMPLETED] diphenhydrAMINE injection 50 mg    [COMPLETED] fentaNYL injection 100 mcg    [COMPLETED] haloperidol lactate injection 10 mg    [COMPLETED] HYDROmorphone injection 1 mg    [COMPLETED] lorazepam injection 2 mg    [COMPLETED] morphine injection 4 mg    [COMPLETED] ondansetron injection 4 mg     Current Outpatient Medications on File Prior to Encounter   Medication Sig    albuterol-ipratropium (DUO-NEB) 2.5 mg-0.5 mg/3 mL nebulizer solution Take 3 mLs by nebulization every 6 (six) hours as needed for Wheezing. Rescue     allopurinoL (ZYLOPRIM) 100 MG tablet TAKE 1 TABLET BY MOUTH ONCE DAILY    amLODIPine (NORVASC) 10 MG tablet Take 1 tablet (10 mg total) by mouth once daily.    aspirin (ECOTRIN) 81 MG EC tablet Take 81 mg by mouth once daily.    atenoloL (TENORMIN) 50 MG tablet TAKE 1 TABLET(50 MG) BY MOUTH EVERY DAY    ergocalciferol (VITAMIN D2) 50,000 unit Cap Take 1 capsule (50,000 Units total) by mouth every 7 days.    escitalopram oxalate (LEXAPRO) 10 MG tablet Take 1 tablet (10 mg total) by mouth once daily. Pt to take 15 mg daily (1.5) pills for depression    fluticasone-umeclidin-vilanter (TRELEGY ELLIPTA) 100-62.5-25 mcg DsDv Take 1 puff by mouth once daily.    gabapentin (NEURONTIN) 400 MG capsule Take 1 capsule by mouth twice daily    hydrocodone-acetaminophen 10-325mg (NORCO)  mg Tab Take 1 tablet by mouth every 6 (six) hours as needed for Pain.    levothyroxine (SYNTHROID) 88 MCG tablet Take 1 tablet (88 mcg total) by mouth once daily.    multivitamin (ONE DAILY MULTIVITAMIN) per tablet Take 1 tablet by mouth once daily.    pantoprazole (PROTONIX) 40 MG tablet Take 1 tablet (40 mg total) by mouth once daily.    sodium bicarbonate 325 MG tablet Take 325 mg by mouth 2 (two) times daily.    traZODone (DESYREL) 150 MG tablet Take 1 tablet (150 mg total) by mouth every evening.     Family History     None        Tobacco Use    Smoking status: Former Smoker     Packs/day: 0.30     Years: 42.00     Pack years: 12.60     Quit date: 2/1/2017     Years since quitting: 3.3    Smokeless tobacco: Never Used    Tobacco comment: in cessation program   Substance and Sexual Activity    Alcohol use: Yes     Alcohol/week: 14.0 - 21.0 standard drinks     Types: 14 - 21 Cans of beer per week     Comment: occassional    Drug use: Yes     Types: Marijuana     Comment: occass    Sexual activity: Yes     Partners: Female     Birth control/protection: None     Review of Systems   Constitutional: Negative for activity  change, appetite change, fatigue and fever.   HENT: Negative for congestion, ear pain and postnasal drip.    Eyes: Negative for discharge.   Respiratory: Negative for apnea, shortness of breath and wheezing.    Cardiovascular: Negative for chest pain and leg swelling.   Gastrointestinal: Negative for abdominal distention, abdominal pain, nausea and vomiting.   Endocrine: Negative for polydipsia, polyphagia and polyuria.   Genitourinary: Positive for flank pain. Negative for difficulty urinating, frequency, hematuria and urgency.   Musculoskeletal: Negative for arthralgias and joint swelling.   Skin: Negative for pallor and rash.   Allergic/Immunologic: Negative for environmental allergies and food allergies.   Neurological: Negative for dizziness, speech difficulty, weakness, light-headedness and headaches.   Hematological: Does not bruise/bleed easily.   Psychiatric/Behavioral: Negative for agitation.     Objective:     Vital Signs (Most Recent):  Temp: 98.9 °F (37.2 °C) (06/17/20 0125)  Pulse: 104 (06/17/20 0125)  Resp: 20 (06/17/20 0125)  BP: (!) 156/83 (06/17/20 0125)  SpO2: (!) 87 % (06/17/20 0125) Vital Signs (24h Range):  Temp:  [98.3 °F (36.8 °C)-98.9 °F (37.2 °C)] 98.9 °F (37.2 °C)  Pulse:  [] 104  Resp:  [13-24] 20  SpO2:  [84 %-91 %] 87 %  BP: (130-156)/(62-93) 156/83     Weight: 89 kg (196 lb 3.4 oz)  Body mass index is 26.61 kg/m².    Physical Exam  Constitutional:       Appearance: Normal appearance. He is well-developed.   HENT:      Head: Normocephalic.   Eyes:      Conjunctiva/sclera: Conjunctivae normal.   Neck:      Musculoskeletal: Normal range of motion and neck supple.   Cardiovascular:      Rate and Rhythm: Tachycardia present. Rhythm irregular.      Pulses:           Radial pulses are 2+ on the right side and 2+ on the left side.      Heart sounds: Normal heart sounds.   Pulmonary:      Effort: Pulmonary effort is normal. Tachypnea present.      Breath sounds: Examination of the  right-middle field reveals decreased breath sounds. Examination of the left-middle field reveals decreased breath sounds. Examination of the right-lower field reveals decreased breath sounds. Examination of the left-lower field reveals decreased breath sounds. Decreased breath sounds present.   Abdominal:      General: The ostomy site is clean. Bowel sounds are normal. There is no distension.      Palpations: Abdomen is soft.      Tenderness: There is no abdominal tenderness.   Musculoskeletal: Normal range of motion.   Skin:     General: Skin is warm and dry.   Neurological:      Mental Status: He is alert and oriented to person, place, and time.   Psychiatric:         Mood and Affect: Mood is depressed. Affect is blunt.         Speech: He is noncommunicative.         Behavior: Behavior is withdrawn.             Significant Labs:   CBC:   Recent Labs   Lab 06/16/20  1803   WBC 8.30   HGB 14.1   HCT 41.9        CMP:   Recent Labs   Lab 06/16/20  1803      K 4.1      CO2 23   *   BUN 40*   CREATININE 2.8*   CALCIUM 8.7   PROT 7.3   ALBUMIN 4.3   BILITOT 0.3   ALKPHOS 82   AST 30   ALT 33   ANIONGAP 11   EGFRNONAA 23.1*       Significant Imaging: I have reviewed all pertinent imaging results/findings within the past 24 hours.

## 2020-06-17 NOTE — HPI
"This is a 60 YOM with HTN, COPD, and hx bladder cancer s/p cystectomy with ileal conduit (11/2012) who presents to [facility] as a transfer for management of left hydronephrosis. He initially presented to OSH with left abdominal pain radiating in the direction of his conduit. He is states that he frequently has pain like this. He denies fevers. He reports blood in his urine "sometimes" but not recently.     Since arrival, he has been afebrile, HR , and hypertensive.   WBC 16  Cr 3.0 (baseline 2.5)    CT scan shows bilateral ureteral stones and severe left hydroureteronephrosis to the level of a distal ureteral stone in close proximity to the conduit. The right kidney appears atrophic.     Since his cystectomy, he has had bilateral ureteral strictures for which he underwent bilateral ureteral reimplantation into his conduit several months later. His strictures recurred shortly afterwards and he was managed with ureteral stent changes until one of the stent eroded through his conduit and into his bowels. For this he underwent an exploratory Laparotomy with replacement of ileal conduit and small bowel anastomosis on 12/2015. He also had a parastomal hernia repair 4/2017 by Dr. Mack.  He most recently underwent balloon dilation of the right ureter 1/2018 and his right ureteral stent was removed 3/2018.   "

## 2020-06-17 NOTE — ASSESSMENT & PLAN NOTE
CT- Moderate-severe hydronephrosis and hydroureter on the left secondary to a 4 mm stone in the distal left ureter.  There is left perinephric inflammatory stranding noted.  Recommend follow-up.  Bilateral nonobstructing nephrolithiasis at the lower poles also.  Minimal cholelithiasis with no evidence of cholecystitis.  Postop changes of prior cystectomy with right lower quadrant ureteral diversion procedure.  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  Recommend vascular surveillance.    Consult Urology/Interventional Radiology for nephrostomy tube placement per Urology recommendation  Continue Rocephin

## 2020-06-17 NOTE — SUBJECTIVE & OBJECTIVE
Interval History:  Notified patient became hypoxemic and tachycardic following left nephrostomy tube placement.  Initially required NRB and respiratory treatment for wheezing, then became calm and was able to be transferred to ICU on BiPAP.    Review of Systems   Unable to perform ROS: Acuity of condition     Objective:     Vital Signs (Most Recent):  Temp: 99.8 °F (37.7 °C) (06/17/20 1100)  Pulse: 102 (06/17/20 1100)  Resp: 20 (06/17/20 1100)  BP: (!) 141/69 (06/17/20 1100)  SpO2: (!) 92 % (06/17/20 1100) Vital Signs (24h Range):  Temp:  [98.3 °F (36.8 °C)-99.8 °F (37.7 °C)] 99.8 °F (37.7 °C)  Pulse:  [] 102  Resp:  [13-36] 20  SpO2:  [84 %-97 %] 92 %  BP: (119-172)/(61-98) 141/69     Weight: 89 kg (196 lb 3.4 oz)  Body mass index is 26.61 kg/m².    Intake/Output Summary (Last 24 hours) at 6/17/2020 1409  Last data filed at 6/17/2020 1200  Gross per 24 hour   Intake --   Output 1750 ml   Net -1750 ml      Physical Exam  Constitutional:       Appearance: He is well-developed.      Comments: Somnolent, chronically ill-appearing man.   HENT:      Head: Normocephalic.   Eyes:      Conjunctiva/sclera: Conjunctivae normal.      Pupils: Pupils are equal, round, and reactive to light.   Neck:      Musculoskeletal: Neck supple.      Thyroid: No thyromegaly.   Cardiovascular:      Rate and Rhythm: Regular rhythm.      Heart sounds: No murmur. No friction rub. No gallop.       Comments:  on exam.  Pulmonary:      Breath sounds: No stridor. No wheezing or rales.      Comments: Breathing unlabored, occasional expiratory wheeze noted (after respiratory treatment).    Abdominal:      General: Bowel sounds are normal. There is distension.      Tenderness: There is no abdominal tenderness.      Comments: Firm, distended, urostomy on the right, multiple scars.   Musculoskeletal: Normal range of motion.   Lymphadenopathy:      Cervical: No cervical adenopathy.   Skin:     General: Skin is warm and dry.      Findings: No  rash.   Neurological:      Comments: Somnolent         Significant Labs: All pertinent labs within the past 24 hours have been reviewed.    Significant Imaging: I have reviewed all pertinent imaging results/findings within the past 24 hours.

## 2020-06-17 NOTE — NURSING
Patient tolerated nephrostomy tube placement well, VSS per flowsheet. When awaiting transport back to floor, patient became tachycardic to 130's, tachypneic, spo2 85% on 6L NC, with audible wheezing. 100% NRB applied, Dr. Vo notified, rapid response team called. Patient transferred to ICU room 2, report given at bedside to ICU RN.

## 2020-06-17 NOTE — HOSPITAL COURSE
Patient was started on IV antibiotics to cover for urinary pathogens and in the AM 6/17 went for left nephrostomy tube placement with IR.  He tolerated tube placement but while waiting for transport he became tachycardic, tachypneic and dropped his oxygen level to 85% on 6 liters NC.  A 100% NRB was applied and he was given a respiratory treatment for audible wheezing.  Patient's sats improved and he became calmer, then was transferred to the ICU for monitoring on BiPAP.  He was difficult to wean off BiPAP, dropped oxygen level to the 80's and became short of breath when it was removed.  Pulmonology was consulted for evaluation and treated him for COPD exacerbation.  He was transitioned to a high flow NC and transferred to the floor 6/19 when he was tolerating a diet and oxygen level stabilized on a regular nasal cannula.  Urine culture grew multiple organisms with none in predominance.  Creatinine gradually improved after reaching a parisa of 3.9 and was down to 2.7 on discharge.  He continued to require oxygen and on discharge day dropped to 82% on room air with oxygen removed.  He was discharged on 4 liters, which may be weaned down by his PCP as tolerated.  He was prescribed a 5 day course of Augmentin to cover potential urinary tract organisms and will follow up with Dr. Bo for definitive treatment of renal stones after discharge.    Note also that renal stone study (detailed below) showed a right common iliac artery aneurysm measuring 3.4 cm.  Vascular surveillance of this aneurysm is recommended.

## 2020-06-17 NOTE — SUBJECTIVE & OBJECTIVE
Past Medical History:   Diagnosis Date    Arthritis     Blood transfusion     Cancer     bladder    COPD (chronic obstructive pulmonary disease)     Frequency of urination     General anesthetics causing adverse effect in therapeutic use     pt states he wakes up very violently from anesthesia    History of urostomy     Hypertension     Limited joint range of motion right hip and leg    Mixed anxiety and depressive disorder     Personal history of bladder cancer     Thyroid disease     Ureteral stent displacement     Urinary retention     Wears glasses        Past Surgical History:   Procedure Laterality Date    ABDOMINAL SURGERY      APPENDECTOMY  12/30/2015    Procedure: APPENDECTOMY;  Surgeon: CHAD Bo MD;  Location: [facility];  Service: Urology;;    bladder removed      BOWEL RESECTION      CYSTOSCOPY      >1  episodes for bilat ureteral stent exchange    FRACTURE SURGERY      HAND SURGERY      HERNIA REPAIR      amalia    HIP SURGERY  2012    Rt hip septic    MOUTH SURGERY  2000    all teeth extracted    nephrostomy tube placement      turbt      urostomy         Review of patient's allergies indicates:  No Known Allergies    Family History     None          Tobacco Use    Smoking status: Former Smoker     Packs/day: 0.30     Years: 42.00     Pack years: 12.60     Quit date: 2/1/2017     Years since quitting: 3.3    Smokeless tobacco: Never Used    Tobacco comment: in cessation program   Substance and Sexual Activity    Alcohol use: Yes     Alcohol/week: 14.0 - 21.0 standard drinks     Types: 14 - 21 Cans of beer per week     Comment: occassional    Drug use: Yes     Types: Marijuana     Comment: occass    Sexual activity: Yes     Partners: Female     Birth control/protection: None       Review of Systems   Constitutional: Negative for activity change, appetite change, fatigue and fever.   HENT: Negative for facial swelling and hearing loss.    Eyes: Negative for discharge  and itching.   Respiratory: Negative for chest tightness and shortness of breath.    Cardiovascular: Negative for chest pain and leg swelling.   Gastrointestinal: Positive for abdominal pain. Negative for abdominal distention, constipation, diarrhea, nausea and vomiting.   Genitourinary: Positive for flank pain. Negative for difficulty urinating, frequency and hematuria.   Musculoskeletal: Negative for arthralgias, back pain and neck pain.   Skin: Negative for color change and pallor.   Neurological: Negative for dizziness, facial asymmetry and light-headedness.   Hematological: Negative for adenopathy.   Psychiatric/Behavioral: Positive for agitation. Negative for decreased concentration. The patient is not nervous/anxious.        Objective:     Temp:  [98.3 °F (36.8 °C)-98.9 °F (37.2 °C)] 98.9 °F (37.2 °C)  Pulse:  [] 98  Resp:  [13-24] 20  SpO2:  [84 %-91 %] 86 %  BP: (130-156)/(62-98) 152/98     Body mass index is 26.61 kg/m².           Drains     Drain                 Urostomy RLQ -- days         Ureteral Drain/Stent 01/02/18 1156 Right ureter 8 Fr. 896 days                Physical Exam   Constitutional: He is oriented to person, place, and time. He appears well-developed. No distress.   HENT:   Head: Normocephalic and atraumatic.   Eyes: No scleral icterus.   Neck: No tracheal deviation present.   Cardiovascular: Normal rate.    Pulmonary/Chest: Effort normal. No respiratory distress.   Abdominal: Soft. He exhibits no distension. There is abdominal tenderness (generalized, tender to even the lightest palpation ).   Stoma pink and healthy appearing draining clear yellow urine   Multiple healed surgical scars    Genitourinary:    Genitourinary Comments: Bilateral flanks tender to the lightest touch      Musculoskeletal: Normal range of motion.   Neurological: He is alert and oriented to person, place, and time.   Skin: Skin is warm and dry.     Psychiatric: His behavior is normal.       Significant  Labs:    BMP:  Recent Labs   Lab 06/16/20  1803 06/17/20  0443    139   K 4.1 4.7    108   CO2 23 19*   BUN 40* 37*   CREATININE 2.8* 3.0*   CALCIUM 8.7 8.9       CBC:  Recent Labs   Lab 06/16/20  1803 06/17/20  0443   WBC 8.30 16.38*   HGB 14.1 14.1   HCT 41.9 42.5    202       All pertinent labs results from the past 24 hours have been reviewed.    Significant Imaging:  CT: I have reviewed all results within the past 24 hours and my personal findings are:  Right kidney appears atrophic with multiple small stones in the proximal ureter. There is left hydroureteronephrosis to the level of a distal stone (4mm) in close proximity to the conduit.

## 2020-06-17 NOTE — HPI
Mr. Guillory is a 60 year old man who presented to [facility] with pain in his left side.  Patient reported he has multiple kidney stones and has been awaiting removal of the stones.  CT was done and patient was found to have moderate to severe hydronephrosis of the left kidney and ureter secondary to a 4 mm stone in the distal left ureter.  There was also left renal perinephric stranding. Transfer to [facility] was recommended for placement of nephrostomy tube with IR.    Patient has a complicated urologic history detailed in Dr. Bo's office note from last month.  He was diagnosed with bladder cancer and underwent cystectomy with ileal conduit in November 2012.  Since then he has had issues with bilateral ureteral strictures, and underwent a bilateral ureteral reimplantation into his conduit several months after the cystectomy.  He was managed with ureteral stent changes until one of the stents eroded through his conduit and into his bowel requiring him to undergo an exploratory lap with replacement of ileal conduit and small bowel anastomosis on 12/30/2015.  He had a right PCN placed by IR in 2018 followed by percutaneous right ureteral dilation and stent placement.  The stent was removed 2 months later.  He has also had a parastomal hernia repair in 4/2020 and he has had a small bowel obstruction.  A CT done recently showed only mild bilateral hydronephrosis with non-obstructing stones.  Patient smoked cigarettes for 42 years and quit in 2017.  He drinks 2-3 beers/day.  Chart review notes a history of COPD, HTN, CKD III and cervical radiculopathy.  He is adopted.

## 2020-06-17 NOTE — ASSESSMENT & PLAN NOTE
- Smoked for 42 years, quit around 2018.  - On Trelegy Ellipta and Duonebs as needed at home.  - Will order Duonebs prn and daily Breo.

## 2020-06-17 NOTE — ASSESSMENT & PLAN NOTE
- Patient s/p cystectomy with ileal conduit.  - Appears to be functioning well with clear urine noted in bag.

## 2020-06-17 NOTE — ASSESSMENT & PLAN NOTE
- Creatinine 2.8-3 with baseline probably around 2.4-2.5  - Increased creatinine likely due to obstruction  - Avoid nephrotoxic medications, renally dose meds.  - Patient on allopurinol 100 mg daily at home.  Check uric acid level.  - Monitor labs

## 2020-06-17 NOTE — PROGRESS NOTES
Transport here to move patient to [facility] for Nephrostomy tube placement. He is restless and keeps moving around complaining of pain to his Left flank at 8/10. PRN Bel[place] given and patient placed on O2 due to low sats prior to transport.

## 2020-06-17 NOTE — NURSING
Pt remains free from falls. Vitals were stable throughout the night, O2 Sats 86 -87%, notified SYLVIA Porras. Positions self independently. Pain managed with  PO medications, no complaints of nausea. Bed in low position and call light within reach. Will continue to monitor.

## 2020-06-17 NOTE — PLAN OF CARE
CM attempted to complete initial assessment, Pt asleep on bipap following RR postop.     CM will need to revisit at a later time.

## 2020-06-17 NOTE — PLAN OF CARE
Rapid response called in cath lab. RT notified of pt needing a breathing tx. Aerosol tx given in cath lab. Verbal order to place pt on BIPAP in ICU.

## 2020-06-17 NOTE — CONSULTS
Interventional Radiology    Consulted for placement of left nephrostomy tube and possible right nephrostomy tube. CT scan reviewed and right nephrostomy tube deferred at this point. Please contact if right neph tube desired in future. Left nephrostomy tube placed due to left ureteral calculus and left hydronephrosis. Urine sample sent for cultures. Tube in good position and functioning well. No immediate post-procedure complications noted.    Chino Vo MD  [phone]

## 2020-06-17 NOTE — H&P
[facility] Hospital Medicine  History & Physical    Patient Name: Blake Guillory  MRN: [id number]  Admission Date: 6/17/2020  Attending Physician: Tasha Centeno MD   Primary Care Provider: Varun Zeng MD         Patient information was obtained from patient, past medical records and ER records.     Subjective:     Principal Problem:Hydronephrosis with urinary obstruction due to ureteral calculus    Chief Complaint: No chief complaint on file.       HPI: The patient is a 61 y/o male with PMH of recurrent kidney stones and complex urologic history including urostomy placement. He presented with left sided pain and knows that he has multiple kidney stones but is awaiting removal of the stones.  Dr. Dempsey recommended transfer to [facility] for IR placement of nephrostomy tube tomorrow.  The patient was transferred to [facility] [place] for Urology and IR services.    Past Medical History:   Diagnosis Date    Arthritis     Blood transfusion     Cancer     bladder    COPD (chronic obstructive pulmonary disease)     Frequency of urination     General anesthetics causing adverse effect in therapeutic use     pt states he wakes up very violently from anesthesia    History of urostomy     Hypertension     Limited joint range of motion right hip and leg    Mixed anxiety and depressive disorder     Personal history of bladder cancer     Thyroid disease     Ureteral stent displacement     Urinary retention     Wears glasses        Past Surgical History:   Procedure Laterality Date    ABDOMINAL SURGERY      APPENDECTOMY  12/30/2015    Procedure: APPENDECTOMY;  Surgeon: CHAD Bo MD;  Location: [facility];  Service: Urology;;    bladder removed      BOWEL RESECTION      CYSTOSCOPY      >1  episodes for bilat ureteral stent exchange    FRACTURE SURGERY      HAND SURGERY      HERNIA REPAIR      amalia    HIP SURGERY  2012    Rt hip septic    MOUTH SURGERY  2000    all teeth  extracted    nephrostomy tube placement      turbt      urostomy         Review of patient's allergies indicates:  No Known Allergies    Current Facility-Administered Medications on File Prior to Encounter   Medication    [COMPLETED] albuterol-ipratropium 2.5 mg-0.5 mg/3 mL nebulizer solution 3 mL    [COMPLETED] cefTRIAXone (ROCEPHIN) 2 g/50 mL D5W IVPB    [COMPLETED] diphenhydrAMINE injection 50 mg    [COMPLETED] fentaNYL injection 100 mcg    [COMPLETED] haloperidol lactate injection 10 mg    [COMPLETED] HYDROmorphone injection 1 mg    [COMPLETED] lorazepam injection 2 mg    [COMPLETED] morphine injection 4 mg    [COMPLETED] ondansetron injection 4 mg     Current Outpatient Medications on File Prior to Encounter   Medication Sig    albuterol-ipratropium (DUO-NEB) 2.5 mg-0.5 mg/3 mL nebulizer solution Take 3 mLs by nebulization every 6 (six) hours as needed for Wheezing. Rescue    allopurinoL (ZYLOPRIM) 100 MG tablet TAKE 1 TABLET BY MOUTH ONCE DAILY    amLODIPine (NORVASC) 10 MG tablet Take 1 tablet (10 mg total) by mouth once daily.    aspirin (ECOTRIN) 81 MG EC tablet Take 81 mg by mouth once daily.    atenoloL (TENORMIN) 50 MG tablet TAKE 1 TABLET(50 MG) BY MOUTH EVERY DAY    ergocalciferol (VITAMIN D2) 50,000 unit Cap Take 1 capsule (50,000 Units total) by mouth every 7 days.    escitalopram oxalate (LEXAPRO) 10 MG tablet Take 1 tablet (10 mg total) by mouth once daily. Pt to take 15 mg daily (1.5) pills for depression    fluticasone-umeclidin-vilanter (TRELEGY ELLIPTA) 100-62.5-25 mcg DsDv Take 1 puff by mouth once daily.    gabapentin (NEURONTIN) 400 MG capsule Take 1 capsule by mouth twice daily    hydrocodone-acetaminophen 10-325mg (NORCO)  mg Tab Take 1 tablet by mouth every 6 (six) hours as needed for Pain.    levothyroxine (SYNTHROID) 88 MCG tablet Take 1 tablet (88 mcg total) by mouth once daily.    multivitamin (ONE DAILY MULTIVITAMIN) per tablet Take 1 tablet by mouth  once daily.    pantoprazole (PROTONIX) 40 MG tablet Take 1 tablet (40 mg total) by mouth once daily.    sodium bicarbonate 325 MG tablet Take 325 mg by mouth 2 (two) times daily.    traZODone (DESYREL) 150 MG tablet Take 1 tablet (150 mg total) by mouth every evening.     Family History     None        Tobacco Use    Smoking status: Former Smoker     Packs/day: 0.30     Years: 42.00     Pack years: 12.60     Quit date: 2/1/2017     Years since quitting: 3.3    Smokeless tobacco: Never Used    Tobacco comment: in cessation program   Substance and Sexual Activity    Alcohol use: Yes     Alcohol/week: 14.0 - 21.0 standard drinks     Types: 14 - 21 Cans of beer per week     Comment: occassional    Drug use: Yes     Types: Marijuana     Comment: occass    Sexual activity: Yes     Partners: Female     Birth control/protection: None     Review of Systems   Constitutional: Negative for activity change, appetite change, fatigue and fever.   HENT: Negative for congestion, ear pain and postnasal drip.    Eyes: Negative for discharge.   Respiratory: Negative for apnea, shortness of breath and wheezing.    Cardiovascular: Negative for chest pain and leg swelling.   Gastrointestinal: Negative for abdominal distention, abdominal pain, nausea and vomiting.   Endocrine: Negative for polydipsia, polyphagia and polyuria.   Genitourinary: Positive for flank pain. Negative for difficulty urinating, frequency, hematuria and urgency.   Musculoskeletal: Negative for arthralgias and joint swelling.   Skin: Negative for pallor and rash.   Allergic/Immunologic: Negative for environmental allergies and food allergies.   Neurological: Negative for dizziness, speech difficulty, weakness, light-headedness and headaches.   Hematological: Does not bruise/bleed easily.   Psychiatric/Behavioral: Negative for agitation.     Objective:     Vital Signs (Most Recent):  Temp: 98.9 °F (37.2 °C) (06/17/20 0125)  Pulse: 104 (06/17/20 0125)  Resp: 20  (06/17/20 0125)  BP: (!) 156/83 (06/17/20 0125)  SpO2: (!) 87 % (06/17/20 0125) Vital Signs (24h Range):  Temp:  [98.3 °F (36.8 °C)-98.9 °F (37.2 °C)] 98.9 °F (37.2 °C)  Pulse:  [] 104  Resp:  [13-24] 20  SpO2:  [84 %-91 %] 87 %  BP: (130-156)/(62-93) 156/83     Weight: 89 kg (196 lb 3.4 oz)  Body mass index is 26.61 kg/m².    Physical Exam  Constitutional:       Appearance: Normal appearance. He is well-developed.   HENT:      Head: Normocephalic.   Eyes:      Conjunctiva/sclera: Conjunctivae normal.   Neck:      Musculoskeletal: Normal range of motion and neck supple.   Cardiovascular:      Rate and Rhythm: Tachycardia present. Rhythm irregular.      Pulses:           Radial pulses are 2+ on the right side and 2+ on the left side.      Heart sounds: Normal heart sounds.   Pulmonary:      Effort: Pulmonary effort is normal. Tachypnea present.      Breath sounds: Examination of the right-middle field reveals decreased breath sounds. Examination of the left-middle field reveals decreased breath sounds. Examination of the right-lower field reveals decreased breath sounds. Examination of the left-lower field reveals decreased breath sounds. Decreased breath sounds present.   Abdominal:      General: The ostomy site is clean. Bowel sounds are normal. There is no distension.      Palpations: Abdomen is soft.      Tenderness: There is no abdominal tenderness.   Musculoskeletal: Normal range of motion.   Skin:     General: Skin is warm and dry.   Neurological:      Mental Status: He is alert and oriented to person, place, and time.   Psychiatric:         Mood and Affect: Mood is depressed. Affect is blunt.         Speech: He is noncommunicative.         Behavior: Behavior is withdrawn.             Significant Labs:   CBC:   Recent Labs   Lab 06/16/20  1803   WBC 8.30   HGB 14.1   HCT 41.9        CMP:   Recent Labs   Lab 06/16/20  1803      K 4.1      CO2 23   *   BUN 40*   CREATININE 2.8*    CALCIUM 8.7   PROT 7.3   ALBUMIN 4.3   BILITOT 0.3   ALKPHOS 82   AST 30   ALT 33   ANIONGAP 11   EGFRNONAA 23.1*       Significant Imaging: I have reviewed all pertinent imaging results/findings within the past 24 hours.    Assessment/Plan:     * Hydronephrosis with urinary obstruction due to ureteral calculus  CT- Moderate-severe hydronephrosis and hydroureter on the left secondary to a 4 mm stone in the distal left ureter.  There is left perinephric inflammatory stranding noted.  Recommend follow-up.  Bilateral nonobstructing nephrolithiasis at the lower poles also.  Minimal cholelithiasis with no evidence of cholecystitis.  Postop changes of prior cystectomy with right lower quadrant ureteral diversion procedure.  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  Recommend vascular surveillance.    Consult Urology/Interventional Radiology for nephrostomy tube placement per Urology recommendation  Continue Rocephin    Nephrolithiasis  Patient with known bilateral nephrolithiasis.     Consult Urology      CKD (chronic kidney disease), stage III  Creatinine 2.8, baseline 2.4-2.5; small increase likely due to obstruction    Avoid nephrotoxics  CMP in AM          Acquired hypothyroidism  Continue levothyroxine      Essential hypertension  Hypertensive currently    Continue atenolol, amlodipine        VTE Risk Mitigation (From admission, onward)         Ordered     Place ANNAMARIE hose  Until discontinued      06/17/20 0211     Place sequential compression device  Until discontinued      06/17/20 0211     IP VTE LOW RISK PATIENT  Once      06/17/20 0211                   Josh Palm NP  Department of Hospital Medicine   [facility]

## 2020-06-17 NOTE — PLAN OF CARE
(Physician in Lead of Transfers)   Outside Transfer Acceptance Note / Regional Referral Center        Transferring Physician: Dr. Villagomez    Accepting Physician: Monica Abad MD / Dr Nehemias Brantley     Date of Acceptance: 06/16/2020    Transferring Facility: [facility]    Reason for Transfer: Needs IR nephrostomy drain     Report from Transferring Physician/Hospital course: The patient is a 61 y/o male with PMH of recurrent kidney stones and complex urologic history including urostomy placement. He presented with left sided pain and knows that he has multiple kidney stones but is awaiting removal of the stones. Ct renal stone study showed:  1. Moderate-severe hydronephrosis and hydroureter on the left secondary to a 4 mm stone in the distal left ureter.  There is left perinephric inflammatory stranding noted.  Recommend follow-up.  2. Bilateral nonobstructing nephrolithiasis at the lower poles also.  3. Minimal cholelithiasis with no evidence of cholecystitis.  4. Postop changes of prior cystectomy with right lower quadrant ureteral diversion procedure.  5. 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 Recommend vascular surveillance.     The patient's urologist is at [facility] but will not be available tomorrow. On call urology at [facility], Dr. Dempsey, recommended transfer to [facility] for IR placement of nephrostomy tube. Sats initially low as he was given haldol due to agitation from poorly controlled pain. Sats now 91% on 4L. Creatinine at baseline. No leukocytosis and given ceftriaxone for UTI.       Labs & Radiographs: see EPIC      To Do List:   1) Consult IR for nephrostomy placement  2) Keep NPO  3) Ceftriaxone       Monica Abad MD  Hospital Medicine Staff

## 2020-06-17 NOTE — CONSULTS
"[facility]  Urology  Consult Note    Patient Name: Blake Guillory  MRN: [id number]  Admission Date: 6/17/2020  Hospital Length of Stay: 0   Code Status: Full Code   Attending Provider: Tasha Centeno MD   Consulting Provider: Lissy Cuenca MD  Primary Care Physician: Varun Zeng MD  Principal Problem:Hydronephrosis with urinary obstruction due to ureteral calculus    Inpatient consult to Urology  Consult performed by: Lissy Cuenca MD  Consult ordered by: Josh Palm NP          Subjective:     HPI:  This is a 60 YOM with HTN, COPD, and hx bladder cancer s/p cystectomy with ileal conduit (11/2012) who presents to [facility] as a transfer for management of left hydronephrosis. He initially presented to OSH with left abdominal pain radiating in the direction of his conduit. He is states that he frequently has pain like this. He denies fevers. He reports blood in his urine "sometimes" but not recently.     Since arrival, he has been afebrile, HR , and hypertensive.   WBC 16  Cr 3.0 (baseline 2.5)    CT scan shows bilateral ureteral stones and severe left hydroureteronephrosis to the level of a distal ureteral stone in close proximity to the conduit.     Since his cystectomy, he has had bilateral ureteral strictures for which he underwent bilateral ureteral reimplantation into his conduit several months later. His strictures recurred shortly afterwards and he was managed with ureteral stent changes until one of the stent eroded through his conduit and into his bowels. For this he underwent an exploratory Laparotomy with replacement of ileal conduit and small bowel anastomosis on 12/2015. He also had a parastomal hernia repair 4/2017 by Dr. Mack.  He most recently underwent balloon dilation of the right ureter 1/2018 and his right ureteral stent was removed 3/2018.     Past Medical History:   Diagnosis Date    Arthritis     Blood transfusion     Cancer     " bladder    COPD (chronic obstructive pulmonary disease)     Frequency of urination     General anesthetics causing adverse effect in therapeutic use     pt states he wakes up very violently from anesthesia    History of urostomy     Hypertension     Limited joint range of motion right hip and leg    Mixed anxiety and depressive disorder     Personal history of bladder cancer     Thyroid disease     Ureteral stent displacement     Urinary retention     Wears glasses        Past Surgical History:   Procedure Laterality Date    ABDOMINAL SURGERY      APPENDECTOMY  12/30/2015    Procedure: APPENDECTOMY;  Surgeon: CHAD Bo MD;  Location: [facility];  Service: Urology;;    bladder removed      BOWEL RESECTION      CYSTOSCOPY      >1  episodes for bilat ureteral stent exchange    FRACTURE SURGERY      HAND SURGERY      HERNIA REPAIR      amalia    HIP SURGERY  2012    Rt hip septic    MOUTH SURGERY  2000    all teeth extracted    nephrostomy tube placement      turbt      urostomy         Review of patient's allergies indicates:  No Known Allergies    Family History     None          Tobacco Use    Smoking status: Former Smoker     Packs/day: 0.30     Years: 42.00     Pack years: 12.60     Quit date: 2/1/2017     Years since quitting: 3.3    Smokeless tobacco: Never Used    Tobacco comment: in cessation program   Substance and Sexual Activity    Alcohol use: Yes     Alcohol/week: 14.0 - 21.0 standard drinks     Types: 14 - 21 Cans of beer per week     Comment: occassional    Drug use: Yes     Types: Marijuana     Comment: occass    Sexual activity: Yes     Partners: Female     Birth control/protection: None       Review of Systems   Constitutional: Negative for activity change, appetite change, fatigue and fever.   HENT: Negative for facial swelling and hearing loss.    Eyes: Negative for discharge and itching.   Respiratory: Negative for chest tightness and shortness of breath.     Cardiovascular: Negative for chest pain and leg swelling.   Gastrointestinal: Positive for abdominal pain. Negative for abdominal distention, constipation, diarrhea, nausea and vomiting.   Genitourinary: Positive for flank pain. Negative for difficulty urinating, frequency and hematuria.   Musculoskeletal: Negative for arthralgias, back pain and neck pain.   Skin: Negative for color change and pallor.   Neurological: Negative for dizziness, facial asymmetry and light-headedness.   Hematological: Negative for adenopathy.   Psychiatric/Behavioral: Positive for agitation. Negative for decreased concentration. The patient is not nervous/anxious.        Objective:     Temp:  [98.3 °F (36.8 °C)-98.9 °F (37.2 °C)] 98.9 °F (37.2 °C)  Pulse:  [] 98  Resp:  [13-24] 20  SpO2:  [84 %-91 %] 86 %  BP: (130-156)/(62-98) 152/98     Body mass index is 26.61 kg/m².           Drains     Drain                 Urostomy RLQ -- days         Ureteral Drain/Stent 01/02/18 1156 Right ureter 8 Fr. 896 days                Physical Exam   Constitutional: He is oriented to person, place, and time. He appears well-developed. No distress.   HENT:   Head: Normocephalic and atraumatic.   Eyes: No scleral icterus.   Neck: No tracheal deviation present.   Cardiovascular: Normal rate.    Pulmonary/Chest: Effort normal. No respiratory distress.   Abdominal: Soft. He exhibits no distension. There is abdominal tenderness (generalized, tender to even the lightest palpation ).   Stoma pink and healthy appearing draining clear yellow urine   Multiple healed surgical scars    Genitourinary:    Genitourinary Comments: Bilateral flanks tender to the lightest touch      Musculoskeletal: Normal range of motion.   Neurological: He is alert and oriented to person, place, and time.   Skin: Skin is warm and dry.     Psychiatric: His behavior is normal.       Significant Labs:    BMP:  Recent Labs   Lab 06/16/20  1803 06/17/20  0443    139   K 4.1 4.7     108   CO2 23 19*   BUN 40* 37*   CREATININE 2.8* 3.0*   CALCIUM 8.7 8.9       CBC:  Recent Labs   Lab 06/16/20  1803 06/17/20  0443   WBC 8.30 16.38*   HGB 14.1 14.1   HCT 41.9 42.5    202       All pertinent labs results from the past 24 hours have been reviewed.    Significant Imaging:  CT: I have reviewed all results within the past 24 hours and my personal findings are:  Right kidney has thinned parenchyma, multiple small stones present in the proximal ureter. There is left hydroureteronephrosis to the level of a distal stone (4mm) in close proximity to the conduit.                     Assessment and Plan:     * Hydronephrosis with urinary obstruction due to ureteral calculus  -- recommend IR to place left nephrostomy tube and also draw urine culture at the time of placement   -- if possible, recommend IR also place a right nephrostomy tube given proximal ureteral stones may need treatment in the future   -- continue rocephin, follow cultures   -- urology will follow along         VTE Risk Mitigation (From admission, onward)         Ordered     Place ANNAMARIE hose  Until discontinued      06/17/20 0211     Place sequential compression device  Until discontinued      06/17/20 0211     IP VTE LOW RISK PATIENT  Once      06/17/20 0211                  Lissy Cuenca MD  Urology  [facility]

## 2020-06-17 NOTE — ASSESSMENT & PLAN NOTE
-- recommend IR to place left nephrostomy tube and also draw urine culture at the time of placement   -- if possible, recommend IR also place a right nephrostomy tube given proximal ureteral stones may need treatment in the future   -- continue rocephin, follow cultures   -- urology will follow along

## 2020-06-17 NOTE — PROGRESS NOTES
[facility] Hospital Medicine  Progress Note    Patient Name: Blake Guillory  MRN: [id number]  Patient Class: IP- Inpatient   Admission Date: 6/17/2020  Length of Stay: 0 days  Attending Physician: Tasha Centeno MD  Primary Care Provider: Varun Zeng MD        Subjective:     Principal Problem:Hydronephrosis with urinary obstruction due to ureteral calculus        HPI:  Mr. Guillory is a 60 year old man who presented to [facility] with pain in his left side.  Patient reported he has multiple kidney stones and has been awaiting removal of the stones.  CT was done and patient was found to have moderate to severe hydronephrosis of the left kidney and ureter secondary to a 4 mm stone in the distal left ureter.  There was also left renal perinephric stranding. Transfer to [facility] was recommended for placement of nephrostomy tube with IR.    Patient has a complicated urologic history detailed in Dr. Bo's office note from last month.  He was diagnosed with bladder cancer and underwent cystectomy with ileal conduit in November 2012.  Since then he has had issues with bilateral ureteral strictures, and underwent a bilateral ureteral reimplantation into his conduit several months after the cystectomy.  He was managed with ureteral stent changes until one of the stents eroded through his conduit and into his bowel requiring him to undergo an exploratory lap with replacement of ileal conduit and small bowel anastomosis on 12/30/2015.  He had a right PCN placed by IR in 2018 followed by percutaneous right ureteral dilation and stent placement.  The stent was removed 2 months later.  He has also had a parastomal hernia repair in 4/2020 and he has had a small bowel obstruction.  A CT done recently showed only mild bilateral hydronephrosis with non-obstructing stones.  Patient smoked cigarettes for 42 years and quit in 2017.  He drinks 2-3 beers/day.  Chart review notes a history of COPD, HTN, CKD III and  cervical radiculopathy.  He is adopted.    Overview/Hospital Course:  Patient was started on IV antibiotics to cover for urinary pathogens and in the AM 6/17 went for left nephrostomy tube placement with IR.  He tolerated tube placement but while waiting for transport he became tachycardic, tachypneic and dropped his oxygen level to 85% on 6 liters NC.  A 100% NRB was applied and he was given a respiratory treatment for audible wheezing.  Patient's sats improved and he became calmer, then was transferred to the ICU for monitoring.    Interval History:  Notified patient became hypoxemic and tachycardic following left nephrostomy tube placement.  Initially required NRB and respiratory treatment for wheezing, then became calm and was able to be transferred to ICU on BiPAP.    Review of Systems   Unable to perform ROS: Acuity of condition     Objective:     Vital Signs (Most Recent):  Temp: 99.8 °F (37.7 °C) (06/17/20 1100)  Pulse: 102 (06/17/20 1100)  Resp: 20 (06/17/20 1100)  BP: (!) 141/69 (06/17/20 1100)  SpO2: (!) 92 % (06/17/20 1100) Vital Signs (24h Range):  Temp:  [98.3 °F (36.8 °C)-99.8 °F (37.7 °C)] 99.8 °F (37.7 °C)  Pulse:  [] 102  Resp:  [13-36] 20  SpO2:  [84 %-97 %] 92 %  BP: (119-172)/(61-98) 141/69     Weight: 89 kg (196 lb 3.4 oz)  Body mass index is 26.61 kg/m².    Intake/Output Summary (Last 24 hours) at 6/17/2020 1409  Last data filed at 6/17/2020 1200  Gross per 24 hour   Intake --   Output 1750 ml   Net -1750 ml      Physical Exam  Constitutional:       Appearance: He is well-developed.      Comments: Somnolent, chronically ill-appearing man.   HENT:      Head: Normocephalic.   Eyes:      Conjunctiva/sclera: Conjunctivae normal.      Pupils: Pupils are equal, round, and reactive to light.   Neck:      Musculoskeletal: Neck supple.      Thyroid: No thyromegaly.   Cardiovascular:      Rate and Rhythm: Regular rhythm.      Heart sounds: No murmur. No friction rub. No gallop.       Comments: HR  120 on exam.  Pulmonary:      Breath sounds: No stridor. No wheezing or rales.      Comments: Breathing unlabored, occasional expiratory wheeze noted (after respiratory treatment).    Abdominal:      General: Bowel sounds are normal. There is distension.      Tenderness: There is no abdominal tenderness.      Comments: Firm, distended, urostomy on the right, multiple scars.   Musculoskeletal: Normal range of motion.   Lymphadenopathy:      Cervical: No cervical adenopathy.   Skin:     General: Skin is warm and dry.      Findings: No rash.   Neurological:      Comments: Somnolent         Significant Labs: All pertinent labs within the past 24 hours have been reviewed.    Significant Imaging: I have reviewed all pertinent imaging results/findings within the past 24 hours.      Assessment/Plan:      * Hydronephrosis with urinary obstruction due to ureteral calculus  - CT showed moderate to severe hydronephrosis and hydroureter on the left due to a 4 mm stone in the distal left ureter associated withleft perinephric inflammatory stranding.  - S/P placement of left nephrostomy tube with IR.  Initial urine noted to be cloudy and malodorous.   - Patient on Rocephin for likely pyelonephritis, also had perioperative Ancef.  - Urine cultures pending.  - Will also get blood cultures as procedure makes him high risk for development of bacteremia.    Nephrolithiasis  - Urology follows patient for multiple stones.    Iliac artery aneurysm  - Noted on CT vascular atherosclerosis with aneurysmal dilation of the right common iliac artery with maximum diameter 3.4 cm.  Surveillance recommended.      CKD (chronic kidney disease), stage IV  - Creatinine 2.8-3 with baseline probably around 2.4-2.5  - Increased creatinine likely due to obstruction  - Avoid nephrotoxic medications, renally dose meds.  - Patient on allopurinol 100 mg daily at home.  Check uric acid level.  - Monitor labs    COPD (chronic obstructive pulmonary disease)  -  Smoked for 42 years, quit around 2018.  - On Trelegy Ellipta and Duonebs as needed at home.  - Will order Duonebs prn and daily Breo.      Acquired hypothyroidism  Continue levothyroxine      Essential hypertension  Hypertensive on presentation.  Continue home atenolol, amlodipine      H/O primary malignant neoplasm of urinary bladder  - Patient s/p cystectomy with ileal conduit.  - Appears to be functioning well with clear urine noted in bag.          VTE Risk Mitigation (From admission, onward)         Ordered     Place ANNAMARIE hose  Until discontinued      06/17/20 0211     Place sequential compression device  Until discontinued      06/17/20 0211     IP VTE LOW RISK PATIENT  Once      06/17/20 0211                      Tasha Brantley MD  Department of Hospital Medicine   [facility]

## 2020-06-17 NOTE — PROCEDURES
[facility] CheckIn  Interventional Radiology  High Risk Procedure - Inpatient    Date: 06/17/2020 Time: 9:37 AM    Pre-Op Diagnosis: Left hydronephrosis and ureteral calculus    Post-Op Diagnosis: same    Procedure Performed by: Chino Vo MD    Assistant: none    Procedure: Placement of left nephrostomy tube    Specimen/Tissue Removed: 10 mL of urine for cultures    Estimated Blood Loss: Less than 5 mL    Procedure Note/Findings: Access to left lower pole collecting system using U/S and fluoro guidance with placement of 8 Romansh nephrostomy tube. Draining well. Secured in place. No immediate post-procedure complications noted.            Please refer to dictated report for additional details.

## 2020-06-17 NOTE — PROGRESS NOTES
"1200 Bed alarm went off, upon arrival to patient's room, pt was sitting on bedside commode and had taken off all monotoring devices and BIPAP. Pt SOB and cyanotic. Wife began yelling "Get out! Get out now!! Everyone out! This is what he needs!" Assisted patient back to bed, reconnected monitors and reapplied BIPAP. Reminded wife and patient that he is not to get up without assistance. Bed alarm reset to zone 2. Dr. Centeno notified.    7485 MD notified that patient may be experiencing withdrawal.  Patient currently agitated and experiencing loose stools. Patient given xannax po and pain medication. Will monitor patient and await orders.     1500 Dr. Centeno at bedside. Pt denies drinking more than "2 ponies of beer." . No new orders at this time, will monitor. Pt resting with eyes closed.   "

## 2020-06-17 NOTE — H&P
[facility]  History & Physical - Short Stay  Interventional Radiology    SUBJECTIVE:     Chief Complaint/Reason for Admission: Left ureteral calculus and left hydronephrosis    Informant(s):  self and Electronic Health Record    History of Present Illness:  Blake Guillory is a 60 y.o. male with a history of cystectomy with ileal conduit with distal left ureteral calculus and left hydronephrosis.    Patient presents for left percutaneous nephrostomy tube placement.    Scheduled Meds:    amLODIPine  10 mg Oral Daily    atenoloL  50 mg Oral Daily    [START ON 6/18/2020] cefTRIAXone (ROCEPHIN) IVPB  1 g Intravenous Q24H    escitalopram oxalate  10 mg Oral Daily    fluticasone furoate-vilanteroL  1 puff Inhalation Daily    gabapentin  300 mg Oral BID    levothyroxine  88 mcg Oral Before breakfast    multivitamin  1 tablet Oral Daily    pantoprazole  40 mg Oral Daily    sodium bicarbonate  325 mg Oral BID    traZODone  150 mg Oral QHS     Continuous Infusions:   PRN Meds: acetaminophen, albuterol-ipratropium, HYDROcodone-acetaminophen, ondansetron, sodium chloride 0.9%    Review of patient's allergies indicates:  No Known Allergies    Past Medical History:   Diagnosis Date    Arthritis     Blood transfusion     Cancer     bladder    COPD (chronic obstructive pulmonary disease)     Frequency of urination     General anesthetics causing adverse effect in therapeutic use     pt states he wakes up very violently from anesthesia    History of urostomy     Hypertension     Limited joint range of motion right hip and leg    Mixed anxiety and depressive disorder     Personal history of bladder cancer     Thyroid disease     Ureteral stent displacement     Urinary retention     Wears glasses      Past Surgical History:   Procedure Laterality Date    ABDOMINAL SURGERY      APPENDECTOMY  12/30/2015    Procedure: APPENDECTOMY;  Surgeon: CHAD Bo MD;  Location: [facility];   Service: Urology;;    bladder removed      BOWEL RESECTION      CYSTOSCOPY      >1  episodes for bilat ureteral stent exchange    FRACTURE SURGERY      HAND SURGERY      HERNIA REPAIR      amalia    HIP SURGERY  2012    Rt hip septic    MOUTH SURGERY  2000    all teeth extracted    nephrostomy tube placement      turbt      urostomy       Family History   Adopted: Yes     Social History     Tobacco Use    Smoking status: Former Smoker     Packs/day: 0.30     Years: 42.00     Pack years: 12.60     Quit date: 2/1/2017     Years since quitting: 3.3    Smokeless tobacco: Never Used    Tobacco comment: in cessation program   Substance Use Topics    Alcohol use: Yes     Alcohol/week: 14.0 - 21.0 standard drinks     Types: 14 - 21 Cans of beer per week     Comment: occassional    Drug use: Yes     Types: Marijuana     Comment: occass        Review of Systems:  ROS not obtained    OBJECTIVE:     Vital Signs (Most Recent):  Temp: 98.8 °F (37.1 °C) (06/17/20 0815)  Pulse: 102 (06/17/20 0815)  Resp: 18 (06/17/20 0815)  BP: (!) 172/86 (06/17/20 0815)  SpO2: (!) 86 % (06/17/20 0815)    Physical Exam:  Lungs: No respiratory distress  Cardiac: regular rate and rhythm  Alert and oriented    Laboratory  CBC:   Lab Results   Component Value Date/Time    WBC 16.38 (H) 06/17/2020 04:43 AM    RBC 4.34 (L) 06/17/2020 04:43 AM    HGB 14.1 06/17/2020 04:43 AM    HCT 42.5 06/17/2020 04:43 AM     06/17/2020 04:43 AM    MCV 98 06/17/2020 04:43 AM    MCH 32.5 (H) 06/17/2020 04:43 AM    MCHC 33.2 06/17/2020 04:43 AM     Coagulation:   Lab Results   Component Value Date/Time    INR 0.9 12/28/2017 02:34 PM    APTT 24.3 08/01/2014 08:00 AM         ASSESSMENT/PLAN:     Left hydronephrosis and distal left ureteral calculus.    Patient will undergo left nephrostomy tube placement.    Sedation/Anesthesia Assessment:  ASA Classification: II = Mild systemic disease  Mallampati Score: II (hard and soft palate, upper portion of tonsils  anduvula visible)    Sedation History: No problems    Sedation Plan: Conscious sedation

## 2020-06-18 PROBLEM — J96.01 ACUTE RESPIRATORY FAILURE WITH HYPOXIA: Status: ACTIVE | Noted: 2020-06-18

## 2020-06-18 LAB
ALBUMIN SERPL BCP-MCNC: 3 G/DL (ref 3.5–5.2)
ALLENS TEST: ABNORMAL
ALP SERPL-CCNC: 63 U/L (ref 55–135)
ALT SERPL W/O P-5'-P-CCNC: 23 U/L (ref 10–44)
ANION GAP SERPL CALC-SCNC: 13 MMOL/L (ref 8–16)
AST SERPL-CCNC: 31 U/L (ref 10–40)
BACTERIA UR CULT: NORMAL
BACTERIA UR CULT: NORMAL
BASOPHILS # BLD AUTO: 0.04 K/UL (ref 0–0.2)
BASOPHILS NFR BLD: 0.2 % (ref 0–1.9)
BILIRUB SERPL-MCNC: 0.4 MG/DL (ref 0.1–1)
BNP SERPL-MCNC: 321 PG/ML (ref 0–99)
BUN SERPL-MCNC: 56 MG/DL (ref 8–23)
CALCIUM SERPL-MCNC: 8.7 MG/DL (ref 8.7–10.5)
CHLORIDE SERPL-SCNC: 107 MMOL/L (ref 95–110)
CO2 SERPL-SCNC: 17 MMOL/L (ref 23–29)
CREAT SERPL-MCNC: 3.9 MG/DL (ref 0.5–1.4)
DELSYS: ABNORMAL
DIFFERENTIAL METHOD: ABNORMAL
EOSINOPHIL # BLD AUTO: 0.1 K/UL (ref 0–0.5)
EOSINOPHIL NFR BLD: 0.4 % (ref 0–8)
EP: 6
ERYTHROCYTE [DISTWIDTH] IN BLOOD BY AUTOMATED COUNT: 13.3 % (ref 11.5–14.5)
ERYTHROCYTE [SEDIMENTATION RATE] IN BLOOD BY WESTERGREN METHOD: 18 MM/H
EST. GFR  (AFRICAN AMERICAN): 18 ML/MIN/1.73 M^2
EST. GFR  (NON AFRICAN AMERICAN): 15 ML/MIN/1.73 M^2
FIO2: 40
GLUCOSE SERPL-MCNC: 107 MG/DL (ref 70–110)
HCO3 UR-SCNC: 23.5 MMOL/L (ref 24–28)
HCT VFR BLD AUTO: 39.2 % (ref 40–54)
HGB BLD-MCNC: 12.8 G/DL (ref 14–18)
IMM GRANULOCYTES # BLD AUTO: 0.74 K/UL (ref 0–0.04)
IMM GRANULOCYTES NFR BLD AUTO: 3.4 % (ref 0–0.5)
IP: 12
LYMPHOCYTES # BLD AUTO: 0.3 K/UL (ref 1–4.8)
LYMPHOCYTES NFR BLD: 1.5 % (ref 18–48)
MAGNESIUM SERPL-MCNC: 1.7 MG/DL (ref 1.6–2.6)
MCH RBC QN AUTO: 31.8 PG (ref 27–31)
MCHC RBC AUTO-ENTMCNC: 32.7 G/DL (ref 32–36)
MCV RBC AUTO: 98 FL (ref 82–98)
MODE: ABNORMAL
MONOCYTES # BLD AUTO: 1.1 K/UL (ref 0.3–1)
MONOCYTES NFR BLD: 5.2 % (ref 4–15)
NEUTROPHILS # BLD AUTO: 19.6 K/UL (ref 1.8–7.7)
NEUTROPHILS NFR BLD: 89.3 % (ref 38–73)
NRBC BLD-RTO: 0 /100 WBC
PCO2 BLDA: 47.3 MMHG (ref 35–45)
PH SMN: 7.3 [PH] (ref 7.35–7.45)
PHOSPHATE SERPL-MCNC: 3.9 MG/DL (ref 2.7–4.5)
PLATELET # BLD AUTO: 155 K/UL (ref 150–350)
PLATELET BLD QL SMEAR: ABNORMAL
PMV BLD AUTO: 11 FL (ref 9.2–12.9)
PO2 BLDA: 24 MMHG (ref 40–60)
POC BE: -3 MMOL/L
POC SATURATED O2: 37 % (ref 95–100)
POTASSIUM SERPL-SCNC: 5.1 MMOL/L (ref 3.5–5.1)
PROT SERPL-MCNC: 6.4 G/DL (ref 6–8.4)
RBC # BLD AUTO: 4.02 M/UL (ref 4.6–6.2)
SAMPLE: ABNORMAL
SITE: ABNORMAL
SODIUM SERPL-SCNC: 137 MMOL/L (ref 136–145)
SP02: 93
URATE SERPL-MCNC: 9.4 MG/DL (ref 3.4–7)
WBC # BLD AUTO: 21.96 K/UL (ref 3.9–12.7)

## 2020-06-18 PROCEDURE — 99233 SBSQ HOSP IP/OBS HIGH 50: CPT | Mod: ,,, | Performed by: UROLOGY

## 2020-06-18 PROCEDURE — 94761 N-INVAS EAR/PLS OXIMETRY MLT: CPT

## 2020-06-18 PROCEDURE — 99900035 HC TECH TIME PER 15 MIN (STAT)

## 2020-06-18 PROCEDURE — 25000242 PHARM REV CODE 250 ALT 637 W/ HCPCS

## 2020-06-18 PROCEDURE — 99233 SBSQ HOSP IP/OBS HIGH 50: CPT | Mod: ,,, | Performed by: HOSPITALIST

## 2020-06-18 PROCEDURE — 83735 ASSAY OF MAGNESIUM: CPT

## 2020-06-18 PROCEDURE — 83880 ASSAY OF NATRIURETIC PEPTIDE: CPT

## 2020-06-18 PROCEDURE — 94640 AIRWAY INHALATION TREATMENT: CPT

## 2020-06-18 PROCEDURE — 99233 SBSQ HOSP IP/OBS HIGH 50: CPT | Mod: ,,, | Performed by: INTERNAL MEDICINE

## 2020-06-18 PROCEDURE — 82803 BLOOD GASES ANY COMBINATION: CPT

## 2020-06-18 PROCEDURE — 63600175 PHARM REV CODE 636 W HCPCS: Performed by: NURSE PRACTITIONER

## 2020-06-18 PROCEDURE — 80053 COMPREHEN METABOLIC PANEL: CPT

## 2020-06-18 PROCEDURE — 27000221 HC OXYGEN, UP TO 24 HOURS

## 2020-06-18 PROCEDURE — 99233 PR SUBSEQUENT HOSPITAL CARE,LEVL III: ICD-10-PCS | Mod: ,,, | Performed by: INTERNAL MEDICINE

## 2020-06-18 PROCEDURE — 36415 COLL VENOUS BLD VENIPUNCTURE: CPT

## 2020-06-18 PROCEDURE — 63600175 PHARM REV CODE 636 W HCPCS: Performed by: HOSPITALIST

## 2020-06-18 PROCEDURE — 27100171 HC OXYGEN HIGH FLOW UP TO 24 HOURS

## 2020-06-18 PROCEDURE — 25000003 PHARM REV CODE 250: Performed by: NURSE PRACTITIONER

## 2020-06-18 PROCEDURE — 94660 CPAP INITIATION&MGMT: CPT

## 2020-06-18 PROCEDURE — 99233 PR SUBSEQUENT HOSPITAL CARE,LEVL III: ICD-10-PCS | Mod: ,,, | Performed by: UROLOGY

## 2020-06-18 PROCEDURE — 84550 ASSAY OF BLOOD/URIC ACID: CPT

## 2020-06-18 PROCEDURE — 84100 ASSAY OF PHOSPHORUS: CPT

## 2020-06-18 PROCEDURE — 20000000 HC ICU ROOM

## 2020-06-18 PROCEDURE — 85025 COMPLETE CBC W/AUTO DIFF WBC: CPT

## 2020-06-18 PROCEDURE — 25000003 PHARM REV CODE 250: Performed by: HOSPITALIST

## 2020-06-18 PROCEDURE — 99233 PR SUBSEQUENT HOSPITAL CARE,LEVL III: ICD-10-PCS | Mod: ,,, | Performed by: HOSPITALIST

## 2020-06-18 PROCEDURE — 25000242 PHARM REV CODE 250 ALT 637 W/ HCPCS: Performed by: NURSE PRACTITIONER

## 2020-06-18 RX ORDER — CEFEPIME HYDROCHLORIDE 1 G/50ML
2 INJECTION, SOLUTION INTRAVENOUS
Status: DISCONTINUED | OUTPATIENT
Start: 2020-06-18 | End: 2020-06-20

## 2020-06-18 RX ORDER — HYDROCODONE BITARTRATE AND ACETAMINOPHEN 10; 325 MG/1; MG/1
1 TABLET ORAL EVERY 4 HOURS PRN
Status: DISCONTINUED | OUTPATIENT
Start: 2020-06-18 | End: 2020-06-20 | Stop reason: HOSPADM

## 2020-06-18 RX ORDER — CEFEPIME HYDROCHLORIDE 1 G/50ML
1 INJECTION, SOLUTION INTRAVENOUS
Status: DISCONTINUED | OUTPATIENT
Start: 2020-06-18 | End: 2020-06-18

## 2020-06-18 RX ORDER — IPRATROPIUM BROMIDE AND ALBUTEROL SULFATE 2.5; .5 MG/3ML; MG/3ML
3 SOLUTION RESPIRATORY (INHALATION) EVERY 4 HOURS
Status: DISCONTINUED | OUTPATIENT
Start: 2020-06-18 | End: 2020-06-20 | Stop reason: HOSPADM

## 2020-06-18 RX ORDER — TALC
9 POWDER (GRAM) TOPICAL ONCE
Status: DISCONTINUED | OUTPATIENT
Start: 2020-06-18 | End: 2020-06-19

## 2020-06-18 RX ADMIN — HYDROCODONE BITARTRATE AND ACETAMINOPHEN 1 TABLET: 10; 325 TABLET ORAL at 02:06

## 2020-06-18 RX ADMIN — IPRATROPIUM BROMIDE AND ALBUTEROL SULFATE 3 ML: .5; 3 SOLUTION RESPIRATORY (INHALATION) at 12:06

## 2020-06-18 RX ADMIN — IPRATROPIUM BROMIDE AND ALBUTEROL SULFATE 3 ML: .5; 3 SOLUTION RESPIRATORY (INHALATION) at 03:06

## 2020-06-18 RX ADMIN — HYDROCODONE BITARTRATE AND ACETAMINOPHEN 1 TABLET: 10; 325 TABLET ORAL at 07:06

## 2020-06-18 RX ADMIN — ESCITALOPRAM OXALATE 10 MG: 10 TABLET ORAL at 08:06

## 2020-06-18 RX ADMIN — SODIUM BICARBONATE TAB 325 MG 325 MG: 325 TAB at 08:06

## 2020-06-18 RX ADMIN — IPRATROPIUM BROMIDE AND ALBUTEROL SULFATE 3 ML: .5; 3 SOLUTION RESPIRATORY (INHALATION) at 07:06

## 2020-06-18 RX ADMIN — LEVOTHYROXINE SODIUM 88 MCG: 88 TABLET ORAL at 05:06

## 2020-06-18 RX ADMIN — HYDROCODONE BITARTRATE AND ACETAMINOPHEN 1 TABLET: 10; 325 TABLET ORAL at 04:06

## 2020-06-18 RX ADMIN — SODIUM BICARBONATE TAB 325 MG 325 MG: 325 TAB at 09:06

## 2020-06-18 RX ADMIN — GABAPENTIN 300 MG: 300 CAPSULE ORAL at 09:06

## 2020-06-18 RX ADMIN — PANTOPRAZOLE SODIUM 40 MG: 40 TABLET, DELAYED RELEASE ORAL at 08:06

## 2020-06-18 RX ADMIN — ONDANSETRON 8 MG: 8 TABLET, ORALLY DISINTEGRATING ORAL at 04:06

## 2020-06-18 RX ADMIN — GABAPENTIN 300 MG: 300 CAPSULE ORAL at 08:06

## 2020-06-18 RX ADMIN — CEFEPIME HYDROCHLORIDE 2 G: 2 INJECTION, SOLUTION INTRAVENOUS at 08:06

## 2020-06-18 RX ADMIN — TRAZODONE HYDROCHLORIDE 150 MG: 50 TABLET ORAL at 09:06

## 2020-06-18 RX ADMIN — HYDROCODONE BITARTRATE AND ACETAMINOPHEN 1 TABLET: 10; 325 TABLET ORAL at 09:06

## 2020-06-18 RX ADMIN — ALPRAZOLAM 0.5 MG: 0.5 TABLET ORAL at 07:06

## 2020-06-18 RX ADMIN — IPRATROPIUM BROMIDE AND ALBUTEROL SULFATE 3 ML: .5; 3 SOLUTION RESPIRATORY (INHALATION) at 11:06

## 2020-06-18 RX ADMIN — THERA TABS 1 TABLET: TAB at 08:06

## 2020-06-18 RX ADMIN — FLUTICASONE FUROATE AND VILANTEROL TRIFENATATE 1 PUFF: 100; 25 POWDER RESPIRATORY (INHALATION) at 08:06

## 2020-06-18 NOTE — PLAN OF CARE
Pts VS stable throughout shift. Febrile at beginning of shift; PO tylenol effective. Pain and anxiety managed with PRN meds, see MAR. Pt has remained on bipap 40% FiO2 throughout night; SaO2>=90%; tachypneic at times; not in distress. RLQ urostomy in place, draining yellow urine. L sided nephrostomy tube remains in place; site C/D/I; draining concentrated urine. Pt cooperative but extremely anxious/restless at times. Will continue to monitor.

## 2020-06-18 NOTE — PROGRESS NOTES
Patient free from falls and injury, free from skin breakdown. Oriented x 4. Systolic blood pressures this am in low 100s. Atenolol and Amlodipine held (Dr. Centeno notified). Tolerating high flow 20 L 40%. Poor appetite this shift. Left nephrostomy tube drainage now light yellow.

## 2020-06-18 NOTE — PROGRESS NOTES
Pharmacist Renal Dose Adjustment Note    Blake Guillory is a 60 y.o. male being treated with the medication cefepime    Patient Data:    Vital Signs (Most Recent):  Temp: 98.2 °F (36.8 °C) (06/18/20 0701)  Pulse: 88 (06/18/20 0701)  Resp: (!) 21 (06/18/20 0701)  BP: 134/69 (06/18/20 0701)  SpO2: 95 % (06/18/20 0701)   Vital Signs (72h Range):  Temp:  [97.9 °F (36.6 °C)-101.1 °F (38.4 °C)]   Pulse:  []   Resp:  [13-36]   BP: ()/(50-98)   SpO2:  [84 %-97 %]      Recent Labs   Lab 06/16/20  1803 06/17/20  0443 06/18/20  0311   CREATININE 2.8* 3.0* 3.9*     Serum creatinine: 3.9 mg/dL (H) 06/18/20 0311  Estimated creatinine clearance: 21.6 mL/min (A)     Medication Dose Route Frequency   Previous Order Cefepime 1 gm IV q12h   New Order Cefepime 2 gm IV Q24h     Renal dose adjustments performed as noted above.  We will continue monitoring and adjusting as necessary.    Pharmacist: LALO MICHAELS  [phone]

## 2020-06-18 NOTE — PLAN OF CARE
Patient in no apparent distress. Patient received on BIPAP with settings as documented. PRN treatments not needed. Patient has large leak due to facial hair. Sat's  91-97 % on 40% BIPAP. Patient is tachypneic at times . Will continue to monitor.

## 2020-06-18 NOTE — PROGRESS NOTES
"[facility]  Urology  Progress Note    Patient Name: Blake Guillory  MRN: [id number]  Admission Date: 6/17/2020  Hospital Length of Stay: 1 days  Code Status: Full Code   Attending Provider: Tasha Centeno MD   Primary Care Physician: Varun Zeng MD    Subjective:     HPI:  This is a 60 YOM with HTN, COPD, and hx bladder cancer s/p cystectomy with ileal conduit (11/2012) who presents to [facility] as a transfer for management of left hydronephrosis. He initially presented to OSH with left abdominal pain radiating in the direction of his conduit. He is states that he frequently has pain like this. He denies fevers. He reports blood in his urine "sometimes" but not recently.     Since arrival, he has been afebrile, HR , and hypertensive.   WBC 16  Cr 3.0 (baseline 2.5)    CT scan shows bilateral ureteral stones and severe left hydroureteronephrosis to the level of a distal ureteral stone in close proximity to the conduit. The right kidney appears atrophic.     Since his cystectomy, he has had bilateral ureteral strictures for which he underwent bilateral ureteral reimplantation into his conduit several months later. His strictures recurred shortly afterwards and he was managed with ureteral stent changes until one of the stent eroded through his conduit and into his bowels. For this he underwent an exploratory Laparotomy with replacement of ileal conduit and small bowel anastomosis on 12/2015. He also had a parastomal hernia repair 4/2017 by Dr. Mack.  He most recently underwent balloon dilation of the right ureter 1/2018 and his right ureteral stent was removed 3/2018.     Interval History:  Left nephrostomy tube placed yesterday, patient was unaware that this had happened   He did have to be stepped up to ICU after becoming hypoxemic, tachypneic, and tachycardic after NT placement   Restless in bed this morning, biPAP on   Cr elevated 3.9 (3.0), WBC elevated 21 (16) "     Objective:     Temp:  [97.9 °F (36.6 °C)-101.1 °F (38.4 °C)] 97.9 °F (36.6 °C)  Pulse:  [] 88  Resp:  [18-36] 22  SpO2:  [86 %-97 %] 97 %  BP: ()/(50-86) 124/68     Body mass index is 26.61 kg/m².           Drains     Drain                 Urostomy RLQ -- days         Ureteral Drain/Stent 01/02/18 1156 Right ureter 8 Fr. 896 days                Physical Exam   Constitutional: He is oriented to person, place, and time. He appears well-developed. No distress.   HENT:   Head: Normocephalic and atraumatic.   Eyes: No scleral icterus.   Neck: No tracheal deviation present.   Cardiovascular: Normal rate.    Pulmonary/Chest: Effort normal. No respiratory distress.   Abdominal: Soft. He exhibits no distension. There is no abdominal tenderness.   Stoma pink and healthy appearing draining clear yellow urine   Multiple healed surgical scars    Genitourinary:    Genitourinary Comments: Left nephrostomy tube in place draining clear dejuan urine     Musculoskeletal: Normal range of motion.   Neurological: He is alert and oriented to person, place, and time.   Skin: Skin is warm and dry.     Psychiatric: His behavior is normal.       Significant Labs:    BMP:  Recent Labs   Lab 06/16/20 1803 06/17/20  0443 06/18/20  0311    139 137   K 4.1 4.7 5.1    108 107   CO2 23 19* 17*   BUN 40* 37* 56*   CREATININE 2.8* 3.0* 3.9*   CALCIUM 8.7 8.9 8.7       CBC:  Recent Labs   Lab 06/16/20 1803 06/17/20  0443 06/18/20  0311   WBC 8.30 16.38* 21.96*   HGB 14.1 14.1 12.8*   HCT 41.9 42.5 39.2*    202 155       All pertinent labs results from the past 24 hours have been reviewed.    Significant Imaging:  CT: I have reviewed all results within the past 24 hours and my personal findings are:  Right kidney appears atrophic with multiple small stones in the proximal ureter. There is left hydroureteronephrosis to the level of a distal stone (4mm) in close proximity to the conduit.                      Assessment/Plan:     * Hydronephrosis with urinary obstruction due to ureteral calculus  -- s/p placement of Left nephrostomy tube 6/17, cultures pending  -- continue rocephin, follow culture from nephrostomy tube    -- trend Cr, WBC   -- monitor for fevers         VTE Risk Mitigation (From admission, onward)         Ordered     Place ANNAMARIE hose  Until discontinued      06/17/20 0211     Place sequential compression device  Until discontinued      06/17/20 0211     IP VTE LOW RISK PATIENT  Once      06/17/20 0211                Lissy Cuenca MD  Urology  [facility]

## 2020-06-18 NOTE — PLAN OF CARE
Initial Discharge Planning Assessment:  Patient admitted on 6/17/2020    Chart reviewed, Care plan discussed with treatment team,  attending Dr Duval    PCP updated in Epic: Dr. Zeng  Pharmacy, updated in [facility]: [facility] in [place]    DME at home: straight cane    Current dispo: home with family  Transportation: Family can drive home    Power of  or Living Will: none    Case management  to follow.  No anticipated DC needs from CM perspective.       06/18/20 1540   Discharge Assessment   Assessment Type Discharge Planning Assessment   Confirmed/corrected address and phone number on facesheet? Yes   Assessment information obtained from? Patient   Communicated expected length of stay with patient/caregiver yes   Prior to hospitilization cognitive status: Alert/Oriented   Prior to hospitalization functional status: Assistive Equipment   Current cognitive status: Alert/Oriented   Current Functional Status: Assistive Equipment   Lives With spouse   Able to Return to Prior Arrangements yes   Is patient able to care for self after discharge? Yes   Who are your caregiver(s) and their phone number(s)? pt wife: Luke Guillory [phone]   Patient's perception of discharge disposition home or selfcare   Readmission Within the Last 30 Days no previous admission in last 30 days   Patient currently being followed by outpatient case management? No   Patient currently receives any other outside agency services? No   Equipment Currently Used at Home cane, straight   Do you have any problems affording any of your prescribed medications? No   Is the patient taking medications as prescribed? yes   Does the patient have transportation home? Yes   Transportation Anticipated family or friend will provide   Does the patient receive services at the [facility]madin Clinic? No   Discharge Plan A Home   Discharge Plan B Home with family   DME Needed Upon Discharge  none

## 2020-06-18 NOTE — ASSESSMENT & PLAN NOTE
- Noted on CT vascular atherosclerosis with aneurysmal dilation of the right common iliac artery with maximum diameter 3.4 cm.  Surveillance recommended.

## 2020-06-18 NOTE — ASSESSMENT & PLAN NOTE
- CT showed moderate to severe hydronephrosis and hydroureter on the left due to a 4 mm stone in the distal left ureter associated withleft perinephric inflammatory stranding.  - S/P placement of left nephrostomy tube with IR.  Initial urine noted to be cloudy and malodorous.   - Patient on cefepime for likely pyelonephritis, also had perioperative Ancef.  - Urine cultures pending, blood culture (taken after antibiotics started) NGTD.  - Procedure makes him high risk for development of bacteremia.

## 2020-06-18 NOTE — PROGRESS NOTES
[facility] Hospital Medicine  Progress Note    Patient Name: Blake Guillory  MRN: [id number]  Patient Class: IP- Inpatient   Admission Date: 6/17/2020  Length of Stay: 1 days  Attending Physician: Tasha Centeno MD  Primary Care Provider: Varun Zeng MD        Subjective:     Principal Problem:Hydronephrosis with urinary obstruction due to ureteral calculus        HPI:  Mr. Guillory is a 60 year old man who presented to [facility] with pain in his left side.  Patient reported he has multiple kidney stones and has been awaiting removal of the stones.  CT was done and patient was found to have moderate to severe hydronephrosis of the left kidney and ureter secondary to a 4 mm stone in the distal left ureter.  There was also left renal perinephric stranding. Transfer to [facility] was recommended for placement of nephrostomy tube with IR.    Patient has a complicated urologic history detailed in Dr. Bo's office note from last month.  He was diagnosed with bladder cancer and underwent cystectomy with ileal conduit in November 2012.  Since then he has had issues with bilateral ureteral strictures, and underwent a bilateral ureteral reimplantation into his conduit several months after the cystectomy.  He was managed with ureteral stent changes until one of the stents eroded through his conduit and into his bowel requiring him to undergo an exploratory lap with replacement of ileal conduit and small bowel anastomosis on 12/30/2015.  He had a right PCN placed by IR in 2018 followed by percutaneous right ureteral dilation and stent placement.  The stent was removed 2 months later.  He has also had a parastomal hernia repair in 4/2020 and he has had a small bowel obstruction.  A CT done recently showed only mild bilateral hydronephrosis with non-obstructing stones.  Patient smoked cigarettes for 42 years and quit in 2017.  He drinks 2-3 beers/day.  Chart review notes a history of COPD, HTN, CKD III and  cervical radiculopathy.  He is adopted.    Overview/Hospital Course:  Patient was started on IV antibiotics to cover for urinary pathogens and in the AM 6/17 went for left nephrostomy tube placement with IR.  He tolerated tube placement but while waiting for transport he became tachycardic, tachypneic and dropped his oxygen level to 85% on 6 liters NC.  A 100% NRB was applied and he was given a respiratory treatment for audible wheezing.  Patient's sats improved and he became calmer, then was transferred to the ICU for monitoring on BiPAP.  He was difficult to wean off BiPAP, dropped oxygen level to the 80's and became short of breath when it was removed.  Pulmonology was consulted for evaluation.    Interval History:  Somnolent this morning.  He desats and gets short of breath, anxious when BiPAP is removed.  Vitals otherwise stable and he isn't wheezing.  Keeps asking to go home.    Review of Systems   Constitutional: Negative for chills and fever.   Respiratory: Positive for shortness of breath. Negative for cough.    Cardiovascular: Negative for chest pain and palpitations.     Objective:     Vital Signs (Most Recent):  Temp: 98.2 °F (36.8 °C) (06/18/20 0701)  Pulse: 82 (06/18/20 0833)  Resp: 19 (06/18/20 0833)  BP: 134/69 (06/18/20 0701)  SpO2: (!) 93 % (06/18/20 0833) Vital Signs (24h Range):  Temp:  [97.9 °F (36.6 °C)-101.1 °F (38.4 °C)] 98.2 °F (36.8 °C)  Pulse:  [] 82  Resp:  [18-36] 19  SpO2:  [87 %-97 %] 93 %  BP: ()/(50-73) 134/69     Weight: 89 kg (196 lb 3.4 oz)  Body mass index is 26.61 kg/m².    Intake/Output Summary (Last 24 hours) at 6/18/2020 0859  Last data filed at 6/18/2020 0506  Gross per 24 hour   Intake 650 ml   Output 1227 ml   Net -577 ml      Physical Exam  Constitutional:       Appearance: He is well-developed.      Comments: Somnolent, chronically ill-appearing man with BiPAP on.   HENT:      Head: Normocephalic.   Eyes:      Conjunctiva/sclera: Conjunctivae normal.       Pupils: Pupils are equal, round, and reactive to light.   Neck:      Musculoskeletal: Neck supple.      Thyroid: No thyromegaly.   Cardiovascular:      Rate and Rhythm: Normal rate and regular rhythm.      Heart sounds: No murmur. No friction rub. No gallop.    Pulmonary:      Effort: Pulmonary effort is normal.      Breath sounds: Normal breath sounds. No stridor. No wheezing or rales.   Abdominal:      General: Bowel sounds are normal. There is distension.      Tenderness: There is no abdominal tenderness.      Comments: Firm, distended, urostomy on the right, multiple scars.   Musculoskeletal: Normal range of motion.   Lymphadenopathy:      Cervical: No cervical adenopathy.   Skin:     General: Skin is warm and dry.      Findings: No rash.   Neurological:      Comments: Somnolent         Significant Labs: All pertinent labs within the past 24 hours have been reviewed.    Significant Imaging: I have reviewed all pertinent imaging results/findings within the past 24 hours.      Assessment/Plan:      * Hydronephrosis with urinary obstruction due to ureteral calculus  - CT showed moderate to severe hydronephrosis and hydroureter on the left due to a 4 mm stone in the distal left ureter associated withleft perinephric inflammatory stranding.  - S/P placement of left nephrostomy tube with IR.  Initial urine noted to be cloudy and malodorous.   - Patient on cefepime for likely pyelonephritis, also had perioperative Ancef.  - Urine cultures pending, blood culture (taken after antibiotics started) NGTD.  - Procedure makes him high risk for development of bacteremia.    Acute respiratory failure with hypoxia  - Patient placed on BiPAP after he had episode of tachycardia/tachypnea and respiratory distress with hypoxemia right after procedure completed yesterday.  - Remains on BiPAP this morning and desats when it is removed.  No evidence of CO2 retention.  ABG ordered.  - Note reported history of COPD but he is not  wheezing.  - Consult pulmonology for evaluation.  - Continue on BiPAP for now, might wean to HFNC but seems to need the inspiratory pressure.      Nephrolithiasis  - Urology follows patient for multiple stones.    Iliac artery aneurysm  - Noted on CT vascular atherosclerosis with aneurysmal dilation of the right common iliac artery with maximum diameter 3.4 cm.  Surveillance recommended.      CKD (chronic kidney disease), stage IV  - Creatinine 2.8-3 with baseline probably around 2.4-2.5  - Increased creatinine likely due to obstruction  - Avoid nephrotoxic medications, renally dose meds.  - Patient on allopurinol 100 mg daily at home.  Uric acid level 9.4.  - Monitor labs    COPD (chronic obstructive pulmonary disease)  - Smoked for 42 years, quit around 2018.  - Unclear whether chronic bronchitis or emphysema.  - On Trelegy Ellipta and Duonebs as needed at home.  - Will order Duonebs prn and daily Breo.      Acquired hypothyroidism  Continue levothyroxine      Essential hypertension  Hypertensive on presentation.  Continue home atenolol, amlodipine      H/O primary malignant neoplasm of urinary bladder  - Patient s/p cystectomy with ileal conduit.  - Appears to be functioning well with clear urine noted in bag.          VTE Risk Mitigation (From admission, onward)         Ordered     Place ANNAMARIE hose  Until discontinued      06/17/20 0211     Place sequential compression device  Until discontinued      06/17/20 0211     IP VTE LOW RISK PATIENT  Once      06/17/20 0211                      Tasha Brantley MD  Department of Hospital Medicine   [facility]

## 2020-06-18 NOTE — ASSESSMENT & PLAN NOTE
- Creatinine 2.8-3 with baseline probably around 2.4-2.5  - Increased creatinine likely due to obstruction  - Avoid nephrotoxic medications, renally dose meds.  - Patient on allopurinol 100 mg daily at home.  Uric acid level 9.4.  - Monitor labs

## 2020-06-18 NOTE — ASSESSMENT & PLAN NOTE
-- s/p placement of Left nephrostomy tube 6/17, cultures pending  -- continue rocephin, follow culture from nephrostomy tube    -- trend Cr, WBC   -- monitor for fevers

## 2020-06-18 NOTE — SUBJECTIVE & OBJECTIVE
Interval History:  Left nephrostomy tube placed yesterday, patient was unaware that this had happened   He did have to be stepped up to ICU after becoming hypoxemic, tachypneic, and tachycardic after NT placement   Restless in bed this morning, biPAP on   Cr elevated 3.9 (3.0), WBC elevated 21 (16)     Objective:     Temp:  [97.9 °F (36.6 °C)-101.1 °F (38.4 °C)] 97.9 °F (36.6 °C)  Pulse:  [] 88  Resp:  [18-36] 22  SpO2:  [86 %-97 %] 97 %  BP: ()/(50-86) 124/68     Body mass index is 26.61 kg/m².           Drains     Drain                 Urostomy RLQ -- days         Ureteral Drain/Stent 01/02/18 1156 Right ureter 8 Fr. 896 days                Physical Exam   Constitutional: He is oriented to person, place, and time. He appears well-developed. No distress.   HENT:   Head: Normocephalic and atraumatic.   Eyes: No scleral icterus.   Neck: No tracheal deviation present.   Cardiovascular: Normal rate.    Pulmonary/Chest: Effort normal. No respiratory distress.   Abdominal: Soft. He exhibits no distension. There is no abdominal tenderness.   Stoma pink and healthy appearing draining clear yellow urine   Multiple healed surgical scars    Genitourinary:    Genitourinary Comments: Left nephrostomy tube in place draining clear dejuan urine     Musculoskeletal: Normal range of motion.   Neurological: He is alert and oriented to person, place, and time.   Skin: Skin is warm and dry.     Psychiatric: His behavior is normal.       Significant Labs:    BMP:  Recent Labs   Lab 06/16/20  1803 06/17/20  0443 06/18/20  0311    139 137   K 4.1 4.7 5.1    108 107   CO2 23 19* 17*   BUN 40* 37* 56*   CREATININE 2.8* 3.0* 3.9*   CALCIUM 8.7 8.9 8.7       CBC:  Recent Labs   Lab 06/16/20  1803 06/17/20  0443 06/18/20  0311   WBC 8.30 16.38* 21.96*   HGB 14.1 14.1 12.8*   HCT 41.9 42.5 39.2*    202 155       All pertinent labs results from the past 24 hours have been reviewed.    Significant Imaging:  CT: I  have reviewed all results within the past 24 hours and my personal findings are:  Right kidney appears atrophic with multiple small stones in the proximal ureter. There is left hydroureteronephrosis to the level of a distal stone (4mm) in close proximity to the conduit.

## 2020-06-18 NOTE — PHYSICIAN QUERY
PT Name: Blake Guillory  MR #: [id number]  CKD CLARIFICATION     CDS/: Yolette Bell RN                 Contact information:[email or web address]  This form is a permanent document in the medical record.     Query Date: June 18, 2020    By submitting this query, we are merely seeking further clarification of documentation. Please utilize your independent clinical judgment when addressing the question(s) below.    The Medical Record contains the following:     Indicators   Supporting Clinical Findings   Location in Medical Record   x CKD or Chronic Kidney (Renal) Failure / Disease CKD (chronic kidney disease), stage IV  - Creatinine 2.8-3 with baseline probably around 2.4-2.5  - Increased creatinine likely due to obstruction    CKD (chronic kidney disease), stage III  Creatinine 2.8, baseline 2.4-2.5; small increase likely due to obstruction Hosp Med PN 6/18            Hosp Med H&P 6/17    BUN/Creatinine                          GFR      Dehydration      Nausea / Vomiting      Dialysis / CRRT     x Medication - Patient on allopurinol 100 mg daily at home.  Hosp Med PN 6/18   x Treatment - Avoid nephrotoxic medications, renally dose meds.   Check uric acid level.  - Monitor labs Hosp Med PN 6/18   x Other Chronic Conditions  The patient is a 61 y/o male with PMH of recurrent kidney stones and complex urologic history including urostomy placement. He presented with left sided pain and knows that he has multiple kidney stones but is awaiting removal of the stones.  Dr. Dempsey recommended transfer to [facility] for IR placement of nephrostomy tube tomorrow Hosp Med H&P 6/17    Other       Provider, please further clarify the conflicting stage of Chronic Kidney Disease (CKD).     [employer] Definitions     CKD Stage Description eGFR (mL/min)   [   ]    III Moderately reduced kidney function 30-59   [  x ]    IV Severely reduced kidney function 15-29   [   ] Other (please specify):  ____________    [   ]  Clinically Undetermined        Please document in your progress notes daily for the duration of treatment until resolved and include in your discharge summary.    Patient was incorrectly classified as stage III on admission because he had stage III in the past.  He has been stage IV for quite some time.

## 2020-06-18 NOTE — SUBJECTIVE & OBJECTIVE
Interval History:  Somnolent this morning.  He desats and gets short of breath, anxious when BiPAP is removed.  Vitals otherwise stable and he isn't wheezing.  Keeps asking to go home.    Review of Systems   Constitutional: Negative for chills and fever.   Respiratory: Positive for shortness of breath. Negative for cough.    Cardiovascular: Negative for chest pain and palpitations.     Objective:     Vital Signs (Most Recent):  Temp: 98.2 °F (36.8 °C) (06/18/20 0701)  Pulse: 82 (06/18/20 0833)  Resp: 19 (06/18/20 0833)  BP: 134/69 (06/18/20 0701)  SpO2: (!) 93 % (06/18/20 0833) Vital Signs (24h Range):  Temp:  [97.9 °F (36.6 °C)-101.1 °F (38.4 °C)] 98.2 °F (36.8 °C)  Pulse:  [] 82  Resp:  [18-36] 19  SpO2:  [87 %-97 %] 93 %  BP: ()/(50-73) 134/69     Weight: 89 kg (196 lb 3.4 oz)  Body mass index is 26.61 kg/m².    Intake/Output Summary (Last 24 hours) at 6/18/2020 0859  Last data filed at 6/18/2020 0506  Gross per 24 hour   Intake 650 ml   Output 1227 ml   Net -577 ml      Physical Exam  Constitutional:       Appearance: He is well-developed.      Comments: Somnolent, chronically ill-appearing man with BiPAP on.   HENT:      Head: Normocephalic.   Eyes:      Conjunctiva/sclera: Conjunctivae normal.      Pupils: Pupils are equal, round, and reactive to light.   Neck:      Musculoskeletal: Neck supple.      Thyroid: No thyromegaly.   Cardiovascular:      Rate and Rhythm: Normal rate and regular rhythm.      Heart sounds: No murmur. No friction rub. No gallop.    Pulmonary:      Effort: Pulmonary effort is normal.      Breath sounds: Normal breath sounds. No stridor. No wheezing or rales.   Abdominal:      General: Bowel sounds are normal. There is distension.      Tenderness: There is no abdominal tenderness.      Comments: Firm, distended, urostomy on the right, multiple scars.   Musculoskeletal: Normal range of motion.   Lymphadenopathy:      Cervical: No cervical adenopathy.   Skin:     General: Skin  is warm and dry.      Findings: No rash.   Neurological:      Comments: Somnolent         Significant Labs: All pertinent labs within the past 24 hours have been reviewed.    Significant Imaging: I have reviewed all pertinent imaging results/findings within the past 24 hours.

## 2020-06-18 NOTE — PLAN OF CARE
Patient received on BIPAP on documented setting daily inhaler done with [facility]. Later changed pt to comfort flow post VBG per Dr Summers PCC.  Q4 aerosol treatment started tolerated well,moitoring ongoing.

## 2020-06-18 NOTE — ASSESSMENT & PLAN NOTE
- Smoked for 42 years, quit around 2018.  - Unclear whether chronic bronchitis or emphysema.  - On Trelegy Ellipta and Duonebs as needed at home.  - Will order Duonebs prn and daily Breo.

## 2020-06-18 NOTE — PLAN OF CARE
"F - Veronica and Belief: No Denominational. Pt did share that he has "his own thing" going on with God and that he "talks to him often."     I - Importance: Yes, though he prefers to access his spirituality on his own.     C - Community: Pt mentioned his wife was a support in his care.      A - Address in Care:  Introduced and offered pastoral support with reflective listening and compassionate presence. No further spiritual needs expressed. Pt made aware of 's presence as needed.   "

## 2020-06-18 NOTE — CONSULTS
[facility] Medicine  Initial Consult Note      Primary Attending:  Tasha Centeno MD   Consultant Attending: Tito Riggs MD   Consultant Fellow: Pool Summers MD       Chief Complaint/Reason for Consult      Respiratory Failure     History of Present Illness      Mr. Guillory is a 60 year old man with significant history of COPD (on trelegy, no home O2), ~ 100 pk year smoking and bladder cancer post resection. His urologic history is complex. He was admitted yesterday for percutaneous nephrolithotomy tube placement for worsening left sided hydroureter/nephrosis and signs of systemic infection. The procedure was technically successful, however shortly afterward he became acutely dyspneic and was reportedly hypoxemic and wheezing. He was placed on BiPAP after a short attempt on NRB and moved to the ICU. His breathing stabilized overnight, however he spent the entire time on BiPAP. This morning he is somnolent but rousable.      Past Medical History      Medical:  has a past medical history of Arthritis, Blood transfusion, Cancer, COPD (chronic obstructive pulmonary disease), Frequency of urination, General anesthetics causing adverse effect in therapeutic use, History of urostomy, Hypertension, Limited joint range of motion, Mixed anxiety and depressive disorder, Personal history of bladder cancer, Thyroid disease, Ureteral stent displacement, Urinary retention, and Wears glasses.    Surgical:  has a past surgical history that includes turbt; Hand surgery; Hernia repair; Abdominal surgery; Fracture surgery; Hip surgery (2012); bladder removed; urostomy; Cystoscopy; Mouth surgery (2000); Bowel resection; nephrostomy tube placement; and Appendectomy (12/30/2015).    Family: family history is not on file. He was adopted.    Social:  reports that he quit smoking about 3 years ago. He has a 12.60 pack-year smoking history. He has never used smokeless tobacco. He reports current alcohol use of  about 14.0 - 21.0 standard drinks of alcohol per week. He reports current drug use. Drug: Marijuana.    Allergies and Medications reviewed     Review of Systems        · A complete review of systems was not obtainable due to the current medical condition of the patient.       On Examination     Vital Signs   Temp:  [97.9 °F (36.6 °C)-101.1 °F (38.4 °C)]   Pulse:  []   Resp:  [18-36]   BP: ()/(50-73)   SpO2:  [88 %-97 %]    Physical Exam   GENERAL: The patient is somnolent but rousable.his is oriented, in no apparent distress.     HEENT: Pupils are equally round and briskly reactive to light. Extraocular muscles are grossly intact. Oral mucous membranes are moist without lesions.    NECK: The patient has no noted JVD. No adenopathy is appreciated.    CHEST/LUNGS:his lungs are clear bilaterally without rhonchi, rales, or wheezes. Breath sounds diminished bilaterally. There is no subcutaneous air appreciated. There is no tenderness to the chest wall.    HEART: The patient has a regular rate and rhythm. No murmurs, rubs, or gallops are appreciated. Distal pulses are 2+. Extremities are warm without evidence of cyanosis or poor perfusion.    ABDOMEN: The patients abdomen is completely soft, nontender, and nondistended. Bowel sounds are present. No organomegaly is appreciated. No masses are appreciated. There are no peritoneal signs.    EXTREMITIES: The patient has no peripheral edema. There is no focal long bone tenderness or deformity.    SKIN: The patients skin is warm and dry, without rashes or lesions.    PSYCHIATRIC: The patient has normal mental status and has an appropriate affect.    NEUROLOGIC: The patient has equal strength in both upper and lower extremities without focal deficit. There are no gross deficits to the cranial nerves.       All recent labs and imaging studies have been reviewed    Ultrasound - LV and RV appear normal in size and function, however difficult parasternal views. IVC not  evaluated as patient on BiPAP. The lungs bilaterally demonstrate good slide anteriorly and no evidence of Blines anteriorly or laterally. Signficant b lines posteriorly in dependent lung fields only    Pertinent findings include:    Lab Results   Component Value Date    WBC 21.96 (H) 06/18/2020    HGB 12.8 (L) 06/18/2020    HCT 39.2 (L) 06/18/2020    MCV 98 06/18/2020     06/18/2020         Assessment and Plan / Recommendations     Blake Guillory    · Acute on chronic respiratory failure, now dependent on BiPAP with increased WOB on NC  · Recent instrumentation of left renal pelvis with likely urinary tract infection  · History of COPD using trelegy, no PFTs in system and denies every seeing a pulmonary doc   · Small left sided pleural effusion, possible sympathetic exudate due to pyelonephritis, too small to tap    - Etiology for respiratory decompensation following nephrostomy tube can include atelectasis, pneumothorax, hemo- or hydro-thorax, pleural effusion, pulmonary edema, pneumonia, and acute respiratory distress syndrome.  - Ultrasound examination not suggestive of pulmonary edema, pneumo, hemothorax, effusion or pneumonia.   - Will obtain CXR, BNP and VBG  - Suspect this is a combination of mild pulmonary edema, sepsis and his baseline COPD, possible bronchospasm post procedure  - History does not necessarily fit with AECOPD (no new sputum, apparently very acute onset)  - Start scheduled duonebs this morning  - Continue BiPAP and attempt NC later today  - Do not recommend starting steroids at this time      This plan was discussed with staff pulmonologist Dr. Riggs    We will continue to follow with you, thank you for the opportunity to be involved in Mr. Guillory's care.    Please call or message directly through EPIC with any additional questions or concerns.    Pool Summers MD  [facility] Fellow  Pager: [phone]  Cell: [phone]    06/18/2020  9:32 AM

## 2020-06-18 NOTE — ASSESSMENT & PLAN NOTE
- Patient placed on BiPAP after he had episode of tachycardia/tachypnea and respiratory distress with hypoxemia right after procedure completed yesterday.  - Remains on BiPAP this morning and desats when it is removed.  No evidence of CO2 retention.  ABG ordered.  - Note reported history of COPD but he is not wheezing.  - Consult pulmonology for evaluation.  - Continue on BiPAP for now, might wean to HFNC but seems to need the inspiratory pressure.

## 2020-06-19 PROBLEM — J44.1 CHRONIC OBSTRUCTIVE PULMONARY DISEASE WITH ACUTE EXACERBATION: Status: ACTIVE | Noted: 2018-03-22

## 2020-06-19 LAB
ALBUMIN SERPL BCP-MCNC: 3.1 G/DL (ref 3.5–5.2)
ALP SERPL-CCNC: 70 U/L (ref 55–135)
ALT SERPL W/O P-5'-P-CCNC: 24 U/L (ref 10–44)
ANION GAP SERPL CALC-SCNC: 14 MMOL/L (ref 8–16)
AST SERPL-CCNC: 32 U/L (ref 10–40)
BASOPHILS # BLD AUTO: ABNORMAL K/UL (ref 0–0.2)
BASOPHILS NFR BLD: 0 % (ref 0–1.9)
BILIRUB SERPL-MCNC: 0.3 MG/DL (ref 0.1–1)
BUN SERPL-MCNC: 64 MG/DL (ref 8–23)
CALCIUM SERPL-MCNC: 9.6 MG/DL (ref 8.7–10.5)
CHLORIDE SERPL-SCNC: 106 MMOL/L (ref 95–110)
CO2 SERPL-SCNC: 20 MMOL/L (ref 23–29)
CREAT SERPL-MCNC: 3 MG/DL (ref 0.5–1.4)
DIFFERENTIAL METHOD: ABNORMAL
EOSINOPHIL # BLD AUTO: ABNORMAL K/UL (ref 0–0.5)
EOSINOPHIL NFR BLD: 1 % (ref 0–8)
ERYTHROCYTE [DISTWIDTH] IN BLOOD BY AUTOMATED COUNT: 14 % (ref 11.5–14.5)
EST. GFR  (AFRICAN AMERICAN): 25 ML/MIN/1.73 M^2
EST. GFR  (NON AFRICAN AMERICAN): 21 ML/MIN/1.73 M^2
GLUCOSE SERPL-MCNC: 101 MG/DL (ref 70–110)
HCT VFR BLD AUTO: 46.2 % (ref 40–54)
HGB BLD-MCNC: 15 G/DL (ref 14–18)
IMM GRANULOCYTES # BLD AUTO: ABNORMAL K/UL (ref 0–0.04)
IMM GRANULOCYTES NFR BLD AUTO: ABNORMAL % (ref 0–0.5)
LYMPHOCYTES # BLD AUTO: ABNORMAL K/UL (ref 1–4.8)
LYMPHOCYTES NFR BLD: 3 % (ref 18–48)
MAGNESIUM SERPL-MCNC: 2.3 MG/DL (ref 1.6–2.6)
MCH RBC QN AUTO: 32.3 PG (ref 27–31)
MCHC RBC AUTO-ENTMCNC: 32.5 G/DL (ref 32–36)
MCV RBC AUTO: 99 FL (ref 82–98)
METAMYELOCYTES NFR BLD MANUAL: 1 %
MONOCYTES # BLD AUTO: ABNORMAL K/UL (ref 0.3–1)
MONOCYTES NFR BLD: 3 % (ref 4–15)
NEUTROPHILS NFR BLD: 92 % (ref 38–73)
NRBC BLD-RTO: 0 /100 WBC
PHOSPHATE SERPL-MCNC: 3.7 MG/DL (ref 2.7–4.5)
PLATELET # BLD AUTO: 122 K/UL (ref 150–350)
PLATELET BLD QL SMEAR: ABNORMAL
PMV BLD AUTO: 11.3 FL (ref 9.2–12.9)
POTASSIUM SERPL-SCNC: 4 MMOL/L (ref 3.5–5.1)
PROT SERPL-MCNC: 7 G/DL (ref 6–8.4)
RBC # BLD AUTO: 4.65 M/UL (ref 4.6–6.2)
SODIUM SERPL-SCNC: 140 MMOL/L (ref 136–145)
WBC # BLD AUTO: 13.78 K/UL (ref 3.9–12.7)

## 2020-06-19 PROCEDURE — 83735 ASSAY OF MAGNESIUM: CPT

## 2020-06-19 PROCEDURE — 63600175 PHARM REV CODE 636 W HCPCS: Performed by: HOSPITALIST

## 2020-06-19 PROCEDURE — 25000003 PHARM REV CODE 250: Performed by: NURSE PRACTITIONER

## 2020-06-19 PROCEDURE — 99900035 HC TECH TIME PER 15 MIN (STAT)

## 2020-06-19 PROCEDURE — 99233 PR SUBSEQUENT HOSPITAL CARE,LEVL III: ICD-10-PCS | Mod: ,,, | Performed by: HOSPITALIST

## 2020-06-19 PROCEDURE — 25000003 PHARM REV CODE 250: Performed by: HOSPITALIST

## 2020-06-19 PROCEDURE — 94761 N-INVAS EAR/PLS OXIMETRY MLT: CPT

## 2020-06-19 PROCEDURE — 27100171 HC OXYGEN HIGH FLOW UP TO 24 HOURS

## 2020-06-19 PROCEDURE — 80053 COMPREHEN METABOLIC PANEL: CPT

## 2020-06-19 PROCEDURE — 84100 ASSAY OF PHOSPHORUS: CPT

## 2020-06-19 PROCEDURE — 94640 AIRWAY INHALATION TREATMENT: CPT

## 2020-06-19 PROCEDURE — 25000242 PHARM REV CODE 250 ALT 637 W/ HCPCS

## 2020-06-19 PROCEDURE — 11000001 HC ACUTE MED/SURG PRIVATE ROOM

## 2020-06-19 PROCEDURE — 99233 SBSQ HOSP IP/OBS HIGH 50: CPT | Mod: ,,, | Performed by: HOSPITALIST

## 2020-06-19 PROCEDURE — 25000242 PHARM REV CODE 250 ALT 637 W/ HCPCS: Performed by: NURSE PRACTITIONER

## 2020-06-19 PROCEDURE — 27000221 HC OXYGEN, UP TO 24 HOURS

## 2020-06-19 PROCEDURE — 85007 BL SMEAR W/DIFF WBC COUNT: CPT

## 2020-06-19 PROCEDURE — 36415 COLL VENOUS BLD VENIPUNCTURE: CPT

## 2020-06-19 PROCEDURE — 85027 COMPLETE CBC AUTOMATED: CPT

## 2020-06-19 RX ORDER — CALCIUM CARBONATE 200(500)MG
500 TABLET,CHEWABLE ORAL DAILY PRN
Status: DISCONTINUED | OUTPATIENT
Start: 2020-06-19 | End: 2020-06-20 | Stop reason: HOSPADM

## 2020-06-19 RX ORDER — ALLOPURINOL 100 MG/1
100 TABLET ORAL DAILY
Status: DISCONTINUED | OUTPATIENT
Start: 2020-06-19 | End: 2020-06-20 | Stop reason: HOSPADM

## 2020-06-19 RX ADMIN — FLUTICASONE FUROATE AND VILANTEROL TRIFENATATE 1 PUFF: 100; 25 POWDER RESPIRATORY (INHALATION) at 07:06

## 2020-06-19 RX ADMIN — CALCIUM CARBONATE (ANTACID) CHEW TAB 500 MG 500 MG: 500 CHEW TAB at 07:06

## 2020-06-19 RX ADMIN — IPRATROPIUM BROMIDE AND ALBUTEROL SULFATE 3 ML: .5; 3 SOLUTION RESPIRATORY (INHALATION) at 07:06

## 2020-06-19 RX ADMIN — GABAPENTIN 300 MG: 300 CAPSULE ORAL at 08:06

## 2020-06-19 RX ADMIN — LEVOTHYROXINE SODIUM 88 MCG: 88 TABLET ORAL at 05:06

## 2020-06-19 RX ADMIN — THERA TABS 1 TABLET: TAB at 08:06

## 2020-06-19 RX ADMIN — ALLOPURINOL 100 MG: 100 TABLET ORAL at 08:06

## 2020-06-19 RX ADMIN — ATENOLOL 50 MG: 25 TABLET ORAL at 08:06

## 2020-06-19 RX ADMIN — ESCITALOPRAM OXALATE 10 MG: 10 TABLET ORAL at 08:06

## 2020-06-19 RX ADMIN — IPRATROPIUM BROMIDE AND ALBUTEROL SULFATE 3 ML: .5; 3 SOLUTION RESPIRATORY (INHALATION) at 03:06

## 2020-06-19 RX ADMIN — TRAZODONE HYDROCHLORIDE 150 MG: 50 TABLET ORAL at 08:06

## 2020-06-19 RX ADMIN — IPRATROPIUM BROMIDE AND ALBUTEROL SULFATE 3 ML: .5; 3 SOLUTION RESPIRATORY (INHALATION) at 11:06

## 2020-06-19 RX ADMIN — HYDROCODONE BITARTRATE AND ACETAMINOPHEN 1 TABLET: 10; 325 TABLET ORAL at 08:06

## 2020-06-19 RX ADMIN — CEFEPIME HYDROCHLORIDE 2 G: 2 INJECTION, SOLUTION INTRAVENOUS at 08:06

## 2020-06-19 RX ADMIN — SODIUM BICARBONATE TAB 325 MG 325 MG: 325 TAB at 08:06

## 2020-06-19 RX ADMIN — PANTOPRAZOLE SODIUM 40 MG: 40 TABLET, DELAYED RELEASE ORAL at 08:06

## 2020-06-19 NOTE — ASSESSMENT & PLAN NOTE
-- s/p placement of Left nephrostomy tube 6/17, cultures resulted multiple organisms   -- rec completing full course of antibiotics   -- cr and wbc trending down  -- recommend following up with Dr. Glass in 2 weeks to discuss definitive stone management, will send message to his staff   -- urology will sign off, please call with questions

## 2020-06-19 NOTE — ASSESSMENT & PLAN NOTE
- CT showed moderate to severe hydronephrosis and hydroureter on the left due to a 4 mm stone in the distal left ureter associated withleft perinephric inflammatory stranding.  - S/P placement of left nephrostomy tube with IR.  Initial urine noted to be cloudy and malodorous.   - Patient on cefepime for likely pyelonephritis, also had perioperative Ancef.  - Urine cultures showed multiple organisms with none in predominance, blood culture (taken after antibiotics started) NGTD.  - Procedure makes him high risk for development of bacteremia.  - Continue cefepime for now.  WBC decreasing, patient afebrile, creatinine finally decreasing.

## 2020-06-19 NOTE — PROGRESS NOTES
[facility] Hospital Medicine  Progress Note    Patient Name: Blake Guillory  MRN: [id number]  Patient Class: IP- Inpatient   Admission Date: 6/17/2020  Length of Stay: 2 days  Attending Physician: Tasha Centeno MD  Primary Care Provider: Varun Zeng MD        Subjective:     Principal Problem:Hydronephrosis with urinary obstruction due to ureteral calculus        HPI:  Mr. Guillory is a 60 year old man who presented to [facility] with pain in his left side.  Patient reported he has multiple kidney stones and has been awaiting removal of the stones.  CT was done and patient was found to have moderate to severe hydronephrosis of the left kidney and ureter secondary to a 4 mm stone in the distal left ureter.  There was also left renal perinephric stranding. Transfer to [facility] was recommended for placement of nephrostomy tube with IR.    Patient has a complicated urologic history detailed in Dr. Bo's office note from last month.  He was diagnosed with bladder cancer and underwent cystectomy with ileal conduit in November 2012.  Since then he has had issues with bilateral ureteral strictures, and underwent a bilateral ureteral reimplantation into his conduit several months after the cystectomy.  He was managed with ureteral stent changes until one of the stents eroded through his conduit and into his bowel requiring him to undergo an exploratory lap with replacement of ileal conduit and small bowel anastomosis on 12/30/2015.  He had a right PCN placed by IR in 2018 followed by percutaneous right ureteral dilation and stent placement.  The stent was removed 2 months later.  He has also had a parastomal hernia repair in 4/2020 and he has had a small bowel obstruction.  A CT done recently showed only mild bilateral hydronephrosis with non-obstructing stones.  Patient smoked cigarettes for 42 years and quit in 2017.  He drinks 2-3 beers/day.  Chart review notes a history of COPD, HTN, CKD III and  cervical radiculopathy.  He is adopted.    Overview/Hospital Course:  Patient was started on IV antibiotics to cover for urinary pathogens and in the AM 6/17 went for left nephrostomy tube placement with IR.  He tolerated tube placement but while waiting for transport he became tachycardic, tachypneic and dropped his oxygen level to 85% on 6 liters NC.  A 100% NRB was applied and he was given a respiratory treatment for audible wheezing.  Patient's sats improved and he became calmer, then was transferred to the ICU for monitoring on BiPAP.  He was difficult to wean off BiPAP, dropped oxygen level to the 80's and became short of breath when it was removed.  Pulmonology was consulted for evaluation and treated him for COPD exacerbation.  He was transitioned to a high flow NC and transferred to the floor 6/19 when he was tolerating a diet and oxygen level stabilized.  Urine culture grew multiple organisms with none in predominance.  Creatinine gradually improved after reaching a parisa of 3.9.    Interval History:  Feels fine, tolerating HFNC with sats around 90%.  Has not qualified for home oxygen in the past.      Review of Systems   Constitutional: Negative for chills and fever.   Respiratory: Negative for cough and shortness of breath.    Cardiovascular: Negative for chest pain and palpitations.     Objective:     Vital Signs (Most Recent):  Temp: 98.2 °F (36.8 °C) (06/19/20 0500)  Pulse: 102 (06/19/20 0600)  Resp: (!) 21 (06/19/20 0600)  BP: 117/77 (06/19/20 0600)  SpO2: (!) 90 % (06/19/20 0600) Vital Signs (24h Range):  Temp:  [97.9 °F (36.6 °C)-98.4 °F (36.9 °C)] 98.2 °F (36.8 °C)  Pulse:  [] 102  Resp:  [14-39] 21  SpO2:  [86 %-97 %] 90 %  BP: ()/(52-77) 117/77     Weight: 89 kg (196 lb 3.4 oz)  Body mass index is 26.61 kg/m².    Intake/Output Summary (Last 24 hours) at 6/19/2020 2949  Last data filed at 6/19/2020 0510  Gross per 24 hour   Intake 650 ml   Output 1300 ml   Net -650 ml      Physical  Exam  Constitutional:       Appearance: He is well-developed.      Comments: Chronically ill-appearing man with HFNC on.   HENT:      Head: Normocephalic.   Eyes:      Conjunctiva/sclera: Conjunctivae normal.      Pupils: Pupils are equal, round, and reactive to light.   Neck:      Musculoskeletal: Neck supple.      Thyroid: No thyromegaly.   Cardiovascular:      Rate and Rhythm: Normal rate and regular rhythm.      Heart sounds: No murmur. No friction rub. No gallop.    Pulmonary:      Effort: Pulmonary effort is normal.      Breath sounds: Normal breath sounds. No stridor. No wheezing or rales.   Abdominal:      General: Bowel sounds are normal. There is distension.      Tenderness: There is no abdominal tenderness.      Comments: Firm, distended, urostomy on the right, multiple scars.   Musculoskeletal: Normal range of motion.   Lymphadenopathy:      Cervical: No cervical adenopathy.   Skin:     General: Skin is warm and dry.      Findings: No rash.   Neurological:      General: No focal deficit present.      Mental Status: He is oriented to person, place, and time.   Psychiatric:         Mood and Affect: Mood normal.         Behavior: Behavior normal.         Thought Content: Thought content normal.         Significant Labs:   CMP:   Recent Labs   Lab 06/18/20  0311 06/19/20  0622    140   K 5.1 4.0    106   CO2 17* 20*    101   BUN 56* 64*   CREATININE 3.9* 3.0*   CALCIUM 8.7 9.6   PROT 6.4 7.0   ALBUMIN 3.0* 3.1*   BILITOT 0.4 0.3   ALKPHOS 63 70   AST 31 32   ALT 23 24   ANIONGAP 13 14   EGFRNONAA 15* 21*     All pertinent labs within the past 24 hours have been reviewed.    Significant Imaging: I have reviewed all pertinent imaging results/findings within the past 24 hours.      Assessment/Plan:      * Hydronephrosis with urinary obstruction due to ureteral calculus  - CT showed moderate to severe hydronephrosis and hydroureter on the left due to a 4 mm stone in the distal left ureter  associated withleft perinephric inflammatory stranding.  - S/P placement of left nephrostomy tube with IR.  Initial urine noted to be cloudy and malodorous.   - Patient on cefepime for likely pyelonephritis, also had perioperative Ancef.  - Urine cultures showed multiple organisms with none in predominance, blood culture (taken after antibiotics started) NGTD.  - Procedure makes him high risk for development of bacteremia.  - Continue cefepime for now.  WBC decreasing, patient afebrile, creatinine finally decreasing.    Acute respiratory failure with hypoxia  - Patient placed on BiPAP after he had episode of tachycardia/tachypnea and respiratory distress with hypoxemia right after procedure completed.  - Weaned off BiPAP to HFNC, sats around 90%.  No evidence of CO2 retention.  PCC following.  - Treat for COPD exacerbation with nebs, Breo, oxygen supplementation.  - Continue BiPAP prn, nightly if tolerated (he refused it last night).  Will need home oxygen on discharge.  - Transfer to floor.      Chronic obstructive pulmonary disease with acute exacerbation  - Smoked for 42 years, quit around 2018.  - Unclear whether chronic bronchitis or emphysema.  - On Trelegy Ellipta and Duonebs as needed at home.  - Continue Duonebs prn and daily Breo.  - PCC following.      Nephrolithiasis  - Urology follows patient for multiple stones.    Iliac artery aneurysm  - Noted on CT vascular atherosclerosis with aneurysmal dilation of the right common iliac artery with maximum diameter 3.4 cm.  Surveillance recommended.      CKD (chronic kidney disease), stage IV  - Creatinine 2.8-3 with baseline probably around 2.4-2.5  - Increased creatinine likely due to obstruction  - Avoid nephrotoxic medications, renally dose meds.  - Patient on allopurinol 100 mg daily at home.  Uric acid level 9.4.  Resume allopurinol  - Monitor labs - creatinine decreased to 3 today from 3.9 yesterday.    Acquired hypothyroidism  Continue  levothyroxine      Essential hypertension  Hypertensive on presentation but now BP on the low side and meds (atenolol, amlodipine) held yesterday.  Monitor closely      H/O primary malignant neoplasm of urinary bladder  - Patient s/p cystectomy with ileal conduit.  - Appears to be functioning well with clear urine noted in bag.          VTE Risk Mitigation (From admission, onward)         Ordered     Place ANNAMARIE hose  Until discontinued      06/17/20 0211     Place sequential compression device  Until discontinued      06/17/20 0211     IP VTE LOW RISK PATIENT  Once      06/17/20 0211                      Tasha Brantley MD  Department of Hospital Medicine   [facility]

## 2020-06-19 NOTE — SUBJECTIVE & OBJECTIVE
Interval History:  Appears clinically improved this morning   No c/o pain   Cr and WBC trending down   Urine clear yellow in nephrostomy tube      Objective:     Temp:  [97.9 °F (36.6 °C)-98.4 °F (36.9 °C)] 98 °F (36.7 °C)  Pulse:  [] 104  Resp:  [14-39] 24  SpO2:  [86 %-97 %] 90 %  BP: ()/(52-77) 128/61     Body mass index is 26.61 kg/m².    Date 06/19/20 0700 - 06/20/20 0659   Shift 1155-4401 5210-8060 6933-9660 24 Hour Total   INTAKE   Shift Total(mL/kg)       OUTPUT   Urine(mL/kg/hr) 110   110   Shift Total(mL/kg) 110(1.2)   110(1.2)   Weight (kg) 89 89 89 89          Drains     Drain                 Urostomy RLQ -- days         Ureteral Drain/Stent 01/02/18 1156 Right ureter 8 Fr. 896 days                Physical Exam   Constitutional: He is oriented to person, place, and time. He appears well-developed. No distress.   HENT:   Head: Normocephalic and atraumatic.   Eyes: No scleral icterus.   Neck: No tracheal deviation present.   Cardiovascular: Normal rate.    Pulmonary/Chest: Effort normal. No respiratory distress.   Abdominal: Soft. He exhibits no distension. There is no abdominal tenderness.   Stoma pink and healthy appearing draining clear yellow urine   Multiple healed surgical scars    Genitourinary:    Genitourinary Comments: Left nephrostomy tube in place draining clear yellow urine     Musculoskeletal: Normal range of motion.   Neurological: He is alert and oriented to person, place, and time.   Skin: Skin is warm and dry.     Psychiatric: His behavior is normal.       Significant Labs:    BMP:  Recent Labs   Lab 06/17/20 0443 06/18/20  0311 06/19/20  0622    137 140   K 4.7 5.1 4.0    107 106   CO2 19* 17* 20*   BUN 37* 56* 64*   CREATININE 3.0* 3.9* 3.0*   CALCIUM 8.9 8.7 9.6       CBC:  Recent Labs   Lab 06/17/20  0443 06/18/20  0311 06/19/20  0622   WBC 16.38* 21.96* 13.78*   HGB 14.1 12.8* 15.0   HCT 42.5 39.2* 46.2    155 122*       All pertinent labs results from  the past 24 hours have been reviewed.    Significant Imaging:  CT: I have reviewed all results within the past 24 hours and my personal findings are:  Right kidney appears atrophic with multiple small stones in the proximal ureter. There is left hydroureteronephrosis to the level of a distal stone (4mm) in close proximity to the conduit.                 Review of Systems

## 2020-06-19 NOTE — SUBJECTIVE & OBJECTIVE
Interval History:  Feels fine, tolerating HFNC with sats around 90%.  Has not qualified for home oxygen in the past.      Review of Systems   Constitutional: Negative for chills and fever.   Respiratory: Negative for cough and shortness of breath.    Cardiovascular: Negative for chest pain and palpitations.     Objective:     Vital Signs (Most Recent):  Temp: 98.2 °F (36.8 °C) (06/19/20 0500)  Pulse: 102 (06/19/20 0600)  Resp: (!) 21 (06/19/20 0600)  BP: 117/77 (06/19/20 0600)  SpO2: (!) 90 % (06/19/20 0600) Vital Signs (24h Range):  Temp:  [97.9 °F (36.6 °C)-98.4 °F (36.9 °C)] 98.2 °F (36.8 °C)  Pulse:  [] 102  Resp:  [14-39] 21  SpO2:  [86 %-97 %] 90 %  BP: ()/(52-77) 117/77     Weight: 89 kg (196 lb 3.4 oz)  Body mass index is 26.61 kg/m².    Intake/Output Summary (Last 24 hours) at 6/19/2020 0728  Last data filed at 6/19/2020 0510  Gross per 24 hour   Intake 650 ml   Output 1300 ml   Net -650 ml      Physical Exam  Constitutional:       Appearance: He is well-developed.      Comments: Chronically ill-appearing man with HFNC on.   HENT:      Head: Normocephalic.   Eyes:      Conjunctiva/sclera: Conjunctivae normal.      Pupils: Pupils are equal, round, and reactive to light.   Neck:      Musculoskeletal: Neck supple.      Thyroid: No thyromegaly.   Cardiovascular:      Rate and Rhythm: Normal rate and regular rhythm.      Heart sounds: No murmur. No friction rub. No gallop.    Pulmonary:      Effort: Pulmonary effort is normal.      Breath sounds: Normal breath sounds. No stridor. No wheezing or rales.   Abdominal:      General: Bowel sounds are normal. There is distension.      Tenderness: There is no abdominal tenderness.      Comments: Firm, distended, urostomy on the right, multiple scars.   Musculoskeletal: Normal range of motion.   Lymphadenopathy:      Cervical: No cervical adenopathy.   Skin:     General: Skin is warm and dry.      Findings: No rash.   Neurological:      General: No focal  deficit present.      Mental Status: He is oriented to person, place, and time.   Psychiatric:         Mood and Affect: Mood normal.         Behavior: Behavior normal.         Thought Content: Thought content normal.         Significant Labs:   CMP:   Recent Labs   Lab 06/18/20  0311 06/19/20  0622    140   K 5.1 4.0    106   CO2 17* 20*    101   BUN 56* 64*   CREATININE 3.9* 3.0*   CALCIUM 8.7 9.6   PROT 6.4 7.0   ALBUMIN 3.0* 3.1*   BILITOT 0.4 0.3   ALKPHOS 63 70   AST 31 32   ALT 23 24   ANIONGAP 13 14   EGFRNONAA 15* 21*     All pertinent labs within the past 24 hours have been reviewed.    Significant Imaging: I have reviewed all pertinent imaging results/findings within the past 24 hours.

## 2020-06-19 NOTE — PHYSICIAN QUERY
PT Name: Blake Guillory  MR #: [id number]     Diagnosis Clarification      CDS/: Yolette Bell RN                 Contact information:[email or web address]    This form is a permanent document in the medical record.     Query Date: June 19, 2020    Dear Provider,  By submitting this query, we are merely seeking further clarification of documentation.  Please utilize your independent clinical judgment when addressing the question(s) below.     The medical record contains the following:    Supporting Clinical Information Location in Medical Record   - Etiology for respiratory decompensation following nephrostomy tube can include atelectasis, pneumothorax, hemo- or hydro-thorax, pleural effusion, pulmonary edema, pneumonia, and acute respiratory distress syndrome.  - Ultrasound examination not suggestive of pulmonary edema, pneumo, hemothorax, effusion or pneumonia.   - Will obtain CXR, BNP and VBG  - Suspect this is a combination of mild pulmonary edema, sepsis and his baseline COPD, possible bronchospasm post procedure  - History does not necessarily fit with AECOPD (no new sputum, apparently very acute onset)  - Start scheduled duonebs this morning  - Continue BiPAP and attempt NC later today  - Do not recommend starting steroids at this time    Will plan to treat UTI and monitor stone for passage.  If the stone does not pass, he will need left percutaneous antegrade ureteroscopy at [facility] as an outpatiet once UTI is treated    Patient has a complicated urological history detailed in Dr. Bo's office note of 5/25/20.  He was transferred here for availability of IR and Urology services, placement of nephrostomy tube to relieve obstruction caused by a 4 mm stone in the distal left ureter and subsequent hydroureteronephrosis.  Perinephric stranding also noted and patient likely has pyelonephritis on that side.  We will treat with antibiotics and monitor cultures.    Vital Signs (Most Recent):  Temp: 98.9 °F  (37.2 °C) (06/17/20 0125)  Pulse: 104 (06/17/20 0125)  Resp: 20 (06/17/20 0125)  BP: (!) 156/83 (06/17/20 0125)  SpO2: (!) 87 % (06/17/20 0125)    Hydronephrosis with urinary obstruction due to ureteral calculus  - CT showed moderate to severe hydronephrosis and hydroureter on the left due to a 4 mm stone in the distal left ureter associated withleft perinephric inflammatory stranding.  - S/P placement of left nephrostomy tube with IR.  Initial urine noted to be cloudy and malodorous.   - Patient on cefepime for likely pyelonephritis, also had perioperative Ancef.  - Urine cultures showed multiple organisms with none in predominance, blood culture (taken after antibiotics started) NGTD.  - Procedure makes him high risk for development of bacteremia.  - Continue cefepime for now.  WBC decreasing, patient afebrile, creatinine finally decreasing.    WBC 13.78-21.96  Bands 7 Pulm CN 6/18                            Urology CN 6/17      Hosp Med H&P 6/17                              Hosp Med PN 6/19                          Lab 6/17-6/19       Please clarify if the __sepsis__ diagnosis has been:    [  ] Ruled In   [  ] Ruled In, Now Resolved   [x  ] Ruled Out   [  ] Other/Clarification of findings (please specify)_______________    [   ] Clinically undetermined       Present on admission (POA) status:   [   ] Yes (Y)                          [  ] Clinically Undetermined (W)  [   ] No (N)                            [   ] Documentation insufficient to determine if condition is POA (U)       Please document in your progress notes daily for the duration of treatment, until resolved, and include in your discharge summary.

## 2020-06-19 NOTE — NURSING TRANSFER
Nursing Transfer Note      6/19/2020     Transfer To: [id number]    Transfer via bed    Transfer with cardiac monitoring, 02 @ 5L    Transported by transport team and PATRICIA Smith    Medicines sent: [email or web address]    Chart send with patient: Yes      Patient reassessed at: 6/19/2- 08:49    Upon arrival to floor: cardiac monitor applied, patient oriented to room, call bell in reach and bed in lowest position    Report given PATRICIA Malik

## 2020-06-19 NOTE — ASSESSMENT & PLAN NOTE
- Patient placed on BiPAP after he had episode of tachycardia/tachypnea and respiratory distress with hypoxemia right after procedure completed.  - Weaned off BiPAP to HFNC, sats around 90%.  No evidence of CO2 retention.  PCC following.  - Treat for COPD exacerbation with nebs, Breo, oxygen supplementation.  - Continue BiPAP prn, nightly if tolerated (he refused it last night).  Will need home oxygen on discharge.  - Transfer to floor.

## 2020-06-19 NOTE — ASSESSMENT & PLAN NOTE
- Creatinine 2.8-3 with baseline probably around 2.4-2.5  - Increased creatinine likely due to obstruction  - Avoid nephrotoxic medications, renally dose meds.  - Patient on allopurinol 100 mg daily at home.  Uric acid level 9.4.  Resume allopurinol  - Monitor labs - creatinine decreased to 3 today from 3.9 yesterday.

## 2020-06-19 NOTE — PROGRESS NOTES
"[facility]  Urology  Progress Note    Patient Name: Blake Guillory  MRN: [id number]  Admission Date: 6/17/2020  Hospital Length of Stay: 2 days  Code Status: Full Code   Attending Provider: Tasha Centeno MD   Primary Care Physician: Varun Zeng MD    Subjective:     HPI:  This is a 60 YOM with HTN, COPD, and hx bladder cancer s/p cystectomy with ileal conduit (11/2012) who presents to [facility] as a transfer for management of left hydronephrosis. He initially presented to OSH with left abdominal pain radiating in the direction of his conduit. He is states that he frequently has pain like this. He denies fevers. He reports blood in his urine "sometimes" but not recently.     Since arrival, he has been afebrile, HR , and hypertensive.   WBC 16  Cr 3.0 (baseline 2.5)    CT scan shows bilateral ureteral stones and severe left hydroureteronephrosis to the level of a distal ureteral stone in close proximity to the conduit. The right kidney appears atrophic.     Since his cystectomy, he has had bilateral ureteral strictures for which he underwent bilateral ureteral reimplantation into his conduit several months later. His strictures recurred shortly afterwards and he was managed with ureteral stent changes until one of the stent eroded through his conduit and into his bowels. For this he underwent an exploratory Laparotomy with replacement of ileal conduit and small bowel anastomosis on 12/2015. He also had a parastomal hernia repair 4/2017 by Dr. Mack.  He most recently underwent balloon dilation of the right ureter 1/2018 and his right ureteral stent was removed 3/2018.     Interval History:  Appears clinically improved this morning   No c/o pain   Cr and WBC trending down   Urine clear yellow in nephrostomy tube      Objective:     Temp:  [97.9 °F (36.6 °C)-98.4 °F (36.9 °C)] 98 °F (36.7 °C)  Pulse:  [] 104  Resp:  [14-39] 24  SpO2:  [86 %-97 %] 90 %  BP: ()/(52-77) " 128/61     Body mass index is 26.61 kg/m².    Date 06/19/20 0700 - 06/20/20 0659   Shift 3475-0137 2745-6070 4741-0972 24 Hour Total   INTAKE   Shift Total(mL/kg)       OUTPUT   Urine(mL/kg/hr) 110   110   Shift Total(mL/kg) 110(1.2)   110(1.2)   Weight (kg) 89 89 89 89          Drains     Drain                 Urostomy RLQ -- days         Ureteral Drain/Stent 01/02/18 1156 Right ureter 8 Fr. 896 days                Physical Exam   Constitutional: He is oriented to person, place, and time. He appears well-developed. No distress.   HENT:   Head: Normocephalic and atraumatic.   Eyes: No scleral icterus.   Neck: No tracheal deviation present.   Cardiovascular: Normal rate.    Pulmonary/Chest: Effort normal. No respiratory distress.   Abdominal: Soft. He exhibits no distension. There is no abdominal tenderness.   Stoma pink and healthy appearing draining clear yellow urine   Multiple healed surgical scars    Genitourinary:    Genitourinary Comments: Left nephrostomy tube in place draining clear yellow urine     Musculoskeletal: Normal range of motion.   Neurological: He is alert and oriented to person, place, and time.   Skin: Skin is warm and dry.     Psychiatric: His behavior is normal.       Significant Labs:    BMP:  Recent Labs   Lab 06/17/20 0443 06/18/20 0311 06/19/20  0622    137 140   K 4.7 5.1 4.0    107 106   CO2 19* 17* 20*   BUN 37* 56* 64*   CREATININE 3.0* 3.9* 3.0*   CALCIUM 8.9 8.7 9.6       CBC:  Recent Labs   Lab 06/17/20 0443 06/18/20  0311 06/19/20  0622   WBC 16.38* 21.96* 13.78*   HGB 14.1 12.8* 15.0   HCT 42.5 39.2* 46.2    155 122*       All pertinent labs results from the past 24 hours have been reviewed.    Significant Imaging:  CT: I have reviewed all results within the past 24 hours and my personal findings are:  Right kidney appears atrophic with multiple small stones in the proximal ureter. There is left hydroureteronephrosis to the level of a distal stone (4mm) in  close proximity to the conduit.                 Review of Systems          Assessment/Plan:     * Hydronephrosis with urinary obstruction due to ureteral calculus  -- s/p placement of Left nephrostomy tube 6/17, cultures resulted multiple organisms   -- rec completing full course of antibiotics   -- cr and wbc trending down  -- recommend following up with Dr. Glass in 2 weeks to discuss definitive stone management, will send message to his staff   -- urology will sign off, please call with questions         VTE Risk Mitigation (From admission, onward)         Ordered     Place ANNAMARIE hose  Until discontinued      06/17/20 0211     Place sequential compression device  Until discontinued      06/17/20 0211     IP VTE LOW RISK PATIENT  Once      06/17/20 0211                Lissy Cuenca MD  Urology  [facility]

## 2020-06-19 NOTE — ASSESSMENT & PLAN NOTE
Hypertensive on presentation but now BP on the low side and meds (atenolol, amlodipine) held yesterday.  Monitor closely

## 2020-06-19 NOTE — PHYSICIAN QUERY
PT Name: Blake Guillory  MR #: [id number]    Physician Query Form - Respiratory Condition Clarification      CDS/: Yolette Bell RN                 Contact information: [email or web address]    This form is a permanent document in the medical record.    Query Date: June 19, 2020    By submitting this query, we are merely seeking further clarification of documentation. Please utilize your independent clinical judgment when addressing the question(s) below.    The Medical record contains the following   Indicators   Supporting Clinical Findings Location in Medical Record   x   SOB, SHAFFER, Wheezing, Productive Cough, Use of Accessory Muscles, etc. The procedure was technically successful, however shortly afterward he became acutely dyspneic and was reportedly hypoxemic and wheezing. He was placed on BiPAP after a short attempt on NRB and moved to the ICU. His breathing stabilized overnight, however he spent the entire time on BiPAP Pulm CN 6/18   x   Acute/Chronic Illness History of COPD using trelegy, no PFTs in system and denies every seeing a pulmonary doc     History does not necessarily fit with AECOPD (no new sputum, apparently very acute onset) Pulm CN 6/18      Radiology Findings     x   Respiratory Distress or Failure Mr. Guillory likely has significant COPD (home Trelegy and nebulizer treatments) with extensive past smoking history.  No PFTs are available.  He has complicated urologic history and now returns for percutaneous nephrostomy for left-side hydroureter/hydronephrosis.  Admitted to ICU post-procedure for respiratory distress.    Acute on chronic respiratory failure, now dependent on BiPAP with increased WOB on NC    Acute respiratory failure with hypoxia  - Patient placed on BiPAP after he had episode of tachycardia/tachypnea and respiratory distress with hypoxemia right after procedure completed yesterday.  - Remains on BiPAP this morning and desats when it is removed.  No evidence of CO2  retention.  ABG ordered.  - Note reported history of COPD but he is not wheezing.  - Consult pulmonology for evaluation.  - Continue on BiPAP for now, might wean to HFNC but seems to need the inspiratory pressure. Pulm CN 6/18                      Hosp Med PN 6/18     x   Hypoxia or Hypercapnia Patient tolerated nephrostomy tube placement well, VSS per flowsheet. When awaiting transport back to floor, patient became tachycardic to 130's, tachypneic, spo2 85% on 6L NC, with audible wheezing. 100% NRB applied, Dr. Vo notified, rapid response team called. Patient transferred to ICU room 2, report given at bedside to ICU RN. Nurse note 6/17 1042      RR         ABGs         O2 sat     x   BiPAP/Intubation He is much improved today and is now on high-flow nasal cannula with relatively low FiO2. Pulm CN 6/18      Supplemental O2        Home O2, Oxygen Dependence     x   Treatment - Start scheduled duonebs this morning  - Continue BiPAP and attempt NC later today  - Do not recommend starting steroids at this time Pulm CN 6/18      Other       Respiratory failure can be acute, chronic or both. It is generally further specified as hypoxic, hypercapnic or both. Lastly, it is important to identify an etiology, if known or suspected.   References::  https://www.acphospitalist.org/archives/2013/10/coding.htm http://[employer]/acute-respiratory-failure-know     The clinical guidelines noted below are only system guidelines, and do not replace the providers clinical judgment.    Provider, please clarify the conflicting respiratory diagnoses associated with above clinical findings.   [  x ] Acute Respiratory Failure with Hypoxia - ABG pO2 < 60 mmHg or O2 sat of 88% on RA and respiratory symptoms documented   [   ] Other Acute Respiratory Failure     [   ] Acute and (on) Chronic Respiratory Failure with Hypoxia - pO2 >10 mmHg below baseline OR SpO2 < 91% on usual home O2 OR O2 > 2L/min over baseline home O2    [   ]  Other Acute and (on) Chronic Respiratory Failure    [   ] Acute Respiratory Insufficiency - Generally describes less severe respiratory symptoms and measurements (pO2, SpO2, pH, and pCO2) NOT meeting criteria for respiratory failure     [   ] Acute Respiratory Distress - Generally describes less severe respiratory symptoms (tachypnea, in respiratory distress, increased work of breathing, unable to speak in complete sentences, labored breathing, use of accessory muscles, RR> 24, cyanosis, dyspnea, wheezing, stridor, lethargy) without sufficient measurements (pO2, SpO2, pH, and pCO2) to meet criteria for respiratory failure    [   ] Other Respiratory Diagnosis (please specify): _________________________________   [   ]  Clinically Undetermined       Please document in your progress notes daily for the duration of treatment until resolved and include in your discharge summary.

## 2020-06-19 NOTE — ASSESSMENT & PLAN NOTE
- Smoked for 42 years, quit around 2018.  - Unclear whether chronic bronchitis or emphysema.  - On Trelegy Ellipta and Duonebs as needed at home.  - Continue Duonebs prn and daily Breo.  - PCC following.

## 2020-06-20 VITALS
OXYGEN SATURATION: 91 % | BODY MASS INDEX: 26.57 KG/M2 | DIASTOLIC BLOOD PRESSURE: 75 MMHG | WEIGHT: 196.19 LBS | HEART RATE: 89 BPM | TEMPERATURE: 99 F | SYSTOLIC BLOOD PRESSURE: 132 MMHG | HEIGHT: 72 IN | RESPIRATION RATE: 20 BRPM

## 2020-06-20 PROBLEM — J96.01 ACUTE RESPIRATORY FAILURE WITH HYPOXIA: Status: RESOLVED | Noted: 2020-06-18 | Resolved: 2020-06-20

## 2020-06-20 LAB
ALBUMIN SERPL BCP-MCNC: 3.4 G/DL (ref 3.5–5.2)
ANION GAP SERPL CALC-SCNC: 14 MMOL/L (ref 8–16)
BASOPHILS # BLD AUTO: 0.03 K/UL (ref 0–0.2)
BASOPHILS NFR BLD: 0.2 % (ref 0–1.9)
BUN SERPL-MCNC: 62 MG/DL (ref 8–23)
CALCIUM SERPL-MCNC: 10.7 MG/DL (ref 8.7–10.5)
CHLORIDE SERPL-SCNC: 105 MMOL/L (ref 95–110)
CO2 SERPL-SCNC: 20 MMOL/L (ref 23–29)
CREAT SERPL-MCNC: 2.7 MG/DL (ref 0.5–1.4)
DIFFERENTIAL METHOD: ABNORMAL
EOSINOPHIL # BLD AUTO: 0 K/UL (ref 0–0.5)
EOSINOPHIL NFR BLD: 0.1 % (ref 0–8)
ERYTHROCYTE [DISTWIDTH] IN BLOOD BY AUTOMATED COUNT: 13.3 % (ref 11.5–14.5)
EST. GFR  (AFRICAN AMERICAN): 28 ML/MIN/1.73 M^2
EST. GFR  (NON AFRICAN AMERICAN): 24 ML/MIN/1.73 M^2
GLUCOSE SERPL-MCNC: 111 MG/DL (ref 70–110)
HCT VFR BLD AUTO: 46.8 % (ref 40–54)
HGB BLD-MCNC: 15.1 G/DL (ref 14–18)
IMM GRANULOCYTES # BLD AUTO: 0.1 K/UL (ref 0–0.04)
IMM GRANULOCYTES NFR BLD AUTO: 0.6 % (ref 0–0.5)
LYMPHOCYTES # BLD AUTO: 0.5 K/UL (ref 1–4.8)
LYMPHOCYTES NFR BLD: 3.2 % (ref 18–48)
MAGNESIUM SERPL-MCNC: 2.2 MG/DL (ref 1.6–2.6)
MCH RBC QN AUTO: 31.5 PG (ref 27–31)
MCHC RBC AUTO-ENTMCNC: 32.3 G/DL (ref 32–36)
MCV RBC AUTO: 98 FL (ref 82–98)
MONOCYTES # BLD AUTO: 0.7 K/UL (ref 0.3–1)
MONOCYTES NFR BLD: 4.6 % (ref 4–15)
NEUTROPHILS # BLD AUTO: 14.2 K/UL (ref 1.8–7.7)
NEUTROPHILS NFR BLD: 91.3 % (ref 38–73)
NRBC BLD-RTO: 0 /100 WBC
PHOSPHATE SERPL-MCNC: 2.7 MG/DL (ref 2.7–4.5)
PHOSPHATE SERPL-MCNC: 2.7 MG/DL (ref 2.7–4.5)
PLATELET # BLD AUTO: 172 K/UL (ref 150–350)
PMV BLD AUTO: 11 FL (ref 9.2–12.9)
POTASSIUM SERPL-SCNC: 4.6 MMOL/L (ref 3.5–5.1)
RBC # BLD AUTO: 4.79 M/UL (ref 4.6–6.2)
SODIUM SERPL-SCNC: 139 MMOL/L (ref 136–145)
WBC # BLD AUTO: 15.55 K/UL (ref 3.9–12.7)

## 2020-06-20 PROCEDURE — 85025 COMPLETE CBC W/AUTO DIFF WBC: CPT

## 2020-06-20 PROCEDURE — 36415 COLL VENOUS BLD VENIPUNCTURE: CPT

## 2020-06-20 PROCEDURE — 83735 ASSAY OF MAGNESIUM: CPT

## 2020-06-20 PROCEDURE — 94761 N-INVAS EAR/PLS OXIMETRY MLT: CPT

## 2020-06-20 PROCEDURE — 94640 AIRWAY INHALATION TREATMENT: CPT

## 2020-06-20 PROCEDURE — 80069 RENAL FUNCTION PANEL: CPT

## 2020-06-20 PROCEDURE — 25000003 PHARM REV CODE 250: Performed by: HOSPITALIST

## 2020-06-20 PROCEDURE — 25000003 PHARM REV CODE 250: Performed by: NURSE PRACTITIONER

## 2020-06-20 PROCEDURE — 99238 PR HOSPITAL DISCHARGE DAY,<30 MIN: ICD-10-PCS | Mod: ,,, | Performed by: HOSPITALIST

## 2020-06-20 PROCEDURE — 25000242 PHARM REV CODE 250 ALT 637 W/ HCPCS: Performed by: NURSE PRACTITIONER

## 2020-06-20 PROCEDURE — 25000242 PHARM REV CODE 250 ALT 637 W/ HCPCS

## 2020-06-20 PROCEDURE — 27000221 HC OXYGEN, UP TO 24 HOURS

## 2020-06-20 PROCEDURE — 99238 HOSP IP/OBS DSCHRG MGMT 30/<: CPT | Mod: ,,, | Performed by: HOSPITALIST

## 2020-06-20 RX ORDER — AMOXICILLIN AND CLAVULANATE POTASSIUM 875; 125 MG/1; MG/1
1 TABLET, FILM COATED ORAL EVERY 12 HOURS
Qty: 9 TABLET | Refills: 0 | Status: SHIPPED | OUTPATIENT
Start: 2020-06-20 | End: 2020-06-25

## 2020-06-20 RX ORDER — AMOXICILLIN AND CLAVULANATE POTASSIUM 875; 125 MG/1; MG/1
1 TABLET, FILM COATED ORAL EVERY 12 HOURS
Status: DISCONTINUED | OUTPATIENT
Start: 2020-06-20 | End: 2020-06-20 | Stop reason: HOSPADM

## 2020-06-20 RX ADMIN — ALLOPURINOL 100 MG: 100 TABLET ORAL at 08:06

## 2020-06-20 RX ADMIN — IPRATROPIUM BROMIDE AND ALBUTEROL SULFATE 3 ML: .5; 3 SOLUTION RESPIRATORY (INHALATION) at 11:06

## 2020-06-20 RX ADMIN — PANTOPRAZOLE SODIUM 40 MG: 40 TABLET, DELAYED RELEASE ORAL at 08:06

## 2020-06-20 RX ADMIN — FLUTICASONE FUROATE AND VILANTEROL TRIFENATATE 1 PUFF: 100; 25 POWDER RESPIRATORY (INHALATION) at 08:06

## 2020-06-20 RX ADMIN — IPRATROPIUM BROMIDE AND ALBUTEROL SULFATE 3 ML: .5; 3 SOLUTION RESPIRATORY (INHALATION) at 08:06

## 2020-06-20 RX ADMIN — SODIUM BICARBONATE TAB 325 MG 325 MG: 325 TAB at 08:06

## 2020-06-20 RX ADMIN — LEVOTHYROXINE SODIUM 88 MCG: 88 TABLET ORAL at 05:06

## 2020-06-20 RX ADMIN — AMOXICILLIN AND CLAVULANATE POTASSIUM 1 TABLET: 875; 125 TABLET, FILM COATED ORAL at 08:06

## 2020-06-20 RX ADMIN — ESCITALOPRAM OXALATE 10 MG: 10 TABLET ORAL at 08:06

## 2020-06-20 RX ADMIN — ATENOLOL 50 MG: 25 TABLET ORAL at 08:06

## 2020-06-20 RX ADMIN — HYDROCODONE BITARTRATE AND ACETAMINOPHEN 1 TABLET: 10; 325 TABLET ORAL at 12:06

## 2020-06-20 RX ADMIN — GABAPENTIN 300 MG: 300 CAPSULE ORAL at 08:06

## 2020-06-20 RX ADMIN — IPRATROPIUM BROMIDE AND ALBUTEROL SULFATE 3 ML: .5; 3 SOLUTION RESPIRATORY (INHALATION) at 03:06

## 2020-06-20 RX ADMIN — THERA TABS 1 TABLET: TAB at 08:06

## 2020-06-20 RX ADMIN — HYDROCODONE BITARTRATE AND ACETAMINOPHEN 1 TABLET: 10; 325 TABLET ORAL at 05:06

## 2020-06-20 NOTE — NURSING
AVS reviewed with pt.  Follow up and new medications reviewed.  Pt denies questions at this time.  Family will provide transport home.  Waiting for oxygen delivery and oxygen therapy teaching.  IV removed.

## 2020-06-20 NOTE — PLAN OF CARE
Problem: Adult Inpatient Plan of Care  Goal: Plan of Care Review  Outcome: Ongoing, Progressing  Flowsheets (Taken 6/20/2020 0559)  Plan of Care Reviewed With: patient   Pain managed with PRN medications. Vitals stable. Will continue to monitor.  Problem: Skin Injury Risk Increased  Goal: Skin Health and Integrity  Outcome: Ongoing, Progressing  Intervention: Optimize Skin Protection  Flowsheets (Taken 6/20/2020 0559)  Pressure Reduction Techniques:   frequent weight shift encouraged   sit time limited to 2 hours     Problem: Fall Injury Risk  Goal: Absence of Fall and Fall-Related Injury  Outcome: Ongoing, Progressing  Intervention: Identify and Manage Contributors to Fall Injury Risk  Flowsheets (Taken 6/20/2020 0559)  Medication Review/Management: medications reviewed  Intervention: Promote Injury-Free Environment  Flowsheets (Taken 6/20/2020 0559)  Environmental Safety Modification:   assistive device/personal items within reach   room near unit station   room organization consistent   clutter free environment maintained

## 2020-06-20 NOTE — PLAN OF CARE
Pt found on RA and SOB. Pt is non compliant on wearing O2 and non compliant with nebs. Continues to remove neb from mouth and waste neb. Pt educated on neb and MDI differences and importance of O2. Pt remains stable.

## 2020-06-20 NOTE — DISCHARGE SUMMARY
[facility] Hospital Medicine  Discharge Summary      Patient Name: Blake Guillory  MRN: [id number]  Admission Date: 6/17/2020  Hospital Length of Stay: 3 days  Discharge Date and Time: 6/20/2020 12:31 PM  Attending Physician: No att. providers found   Discharging Provider: Tasha Brantley MD  Primary Care Provider: Varun Zeng MD      HPI:   Mr. Guillory is a 60 year old man who presented to [facility] [place] with pain in his left side.  Patient reported he has multiple kidney stones and has been awaiting removal of the stones.  CT was done and patient was found to have moderate to severe hydronephrosis of the left kidney and ureter secondary to a 4 mm stone in the distal left ureter.  There was also left renal perinephric stranding. [place] [facility] [place] for placement of nephrostomy tube with IR.    Patient has a complicated urologic history detailed in Dr. Bo's office note from last month.  He was diagnosed with bladder cancer and underwent cystectomy with ileal conduit in November 2012.  Since then he has had issues with bilateral ureteral strictures, and underwent a bilateral ureteral reimplantation into his conduit several months after the cystectomy.  He was managed with ureteral stent changes until one of the stents eroded through his conduit and into his bowel requiring him to undergo an exploratory lap with replacement of ileal conduit and small bowel anastomosis on 12/30/2015.  He had a right PCN placed by IR in 2018 followed by percutaneous right ureteral dilation and stent placement.  The stent was removed 2 months later.  He has also had a parastomal hernia repair in 4/2020 and he has had a small bowel obstruction.  A CT done recently showed only mild bilateral hydronephrosis with non-obstructing stones.  Patient smoked cigarettes for 42 years and quit in 2017.  He drinks 2-3 beers/day.  Chart review notes a history of COPD, HTN, CKD III and cervical radiculopathy.  He is  adopted.    Procedure(s) (LRB):  DRAINAGE (Left)      Hospital Course:   Patient was started on IV antibiotics to cover for urinary pathogens and in the AM 6/17 went for left nephrostomy tube placement with IR.  He tolerated tube placement but while waiting for transport he became tachycardic, tachypneic and dropped his oxygen level to 85% on 6 liters NC.  A 100% NRB was applied and he was given a respiratory treatment for audible wheezing.  Patient's sats improved and he became calmer, then was transferred to the ICU for monitoring on BiPAP.  He was difficult to wean off BiPAP, dropped oxygen level to the 80's and became short of breath when it was removed.  Pulmonology was consulted for evaluation and treated him for COPD exacerbation.  He was transitioned to a high flow NC and transferred to the floor 6/19 when he was tolerating a diet and oxygen level stabilized on a regular nasal cannula.  Urine culture grew multiple organisms with none in predominance.  Creatinine gradually improved after reaching a parisa of 3.9 and was down to 2.7 on discharge.  He continued to require oxygen and on discharge day dropped to 82% on room air with oxygen removed.  He was discharged on 4 liters, which may be weaned down by his PCP as tolerated.  He was prescribed a 5 day course of Augmentin to cover potential urinary tract organisms and will follow up with Dr. Bo for definitive treatment of renal stones after discharge.    Note also that renal stone study (detailed below) showed a right common iliac artery aneurysm measuring 3.4 cm.  Vascular surveillance of this aneurysm is recommended.     Consults:   Consults (From admission, onward)        Status Ordering Provider     Inpatient consult to Interventional Radiology  Once     Provider:  Chino Vo MD    Completed SULMA LEHMAN     Inpatient consult to Pulmonary Critical Care  Once     Provider:  Pool Summers MD    Completed TERI VILLA     Inpatient  consult to Urology  Once     Provider:  Timbo Dempsey MD    Completed SULMA LEHMAN            Final Active Diagnoses:    Diagnosis Date Noted POA    PRINCIPAL PROBLEM:  Hydronephrosis with urinary obstruction due to ureteral calculus [N13.2] 01/22/2018 Yes    Acute respiratory failure with hypoxia [J96.01] 06/18/2020 No    Chronic obstructive pulmonary disease with acute exacerbation [J44.1] 03/22/2018 Yes    Nephrolithiasis [N20.0] 06/17/2020 Yes    Iliac artery aneurysm [I72.3] 11/16/2018 Yes     Chronic    CKD (chronic kidney disease), stage IV [N18.4] 03/22/2018 Yes    Acquired hypothyroidism [E03.9] 10/21/2016 Yes     Chronic    Essential hypertension [I10] 01/02/2016 Yes    H/O primary malignant neoplasm of urinary bladder [Z85.51] 10/07/2015 Not Applicable     Chronic      Problems Resolved During this Admission:       Discharged Condition: stable    Disposition: Home or Self Care    Follow Up:  Follow-up Information     Varun Zeng MD.    Specialties: General Practice, Physical Medicine and Rehabilitation  Why: Follow up in 1-2 weeks  Contact information:  [address] [facility] [address]  [phone]             SHIN Bo MD.    Specialty: Urology  Why: Follow up in 2 weeks  Contact information:  [address]  [address]  [facility] [address]  [phone]             [facility]    Specialty: DME Provider  Why: DME: O2 set up  Contact information:  [address]  [facility] [address]  [phone]                 Patient Instructions:      OXYGEN FOR HOME USE     Order Specific Question Answer Comments   Liter Flow 3    Duration Continuous    Qualifying SpO2: 82    Testing done at: Rest    Route nasal cannula    Portable mode: pulse dose acceptable    Device home concentrator with portable unit    Length of need (in months): 99 mos    Patient condition with qualifying saturation COPD    Height: 6' (1.829 m)    Weight: 89 kg (196 lb 3.4 oz)    Does  patient have medical equipment at home? cane, straight    Alternative treatment measures have been tried or considered and deemed clinically ineffective. Yes      Diet Adult Regular     Activity as tolerated       Significant Diagnostic Studies:  CT Renal Stone Study  FINDINGS:  Heart: Normal in size. No pericardial effusion.     Lung Bases: Mild right basilar atelectasis or scarring.     Liver: Normal in size and attenuation, with no focal hepatic lesions.     Gallbladder: Minimal cholelithiasis.  No evidence of cholecystitis.     Bile Ducts: No evidence of dilated ducts.     Pancreas: No mass or peripancreatic fat stranding.     Spleen: Unremarkable.     Adrenals: Unremarkable.     Kidneys/ Ureters: Moderate to severe hydronephrosis of the left kidney and ureter secondary to a 4 mm stone in the distal left ureter.  Ureteral diversion procedure is noted to the right lower quadrant with right lower quadrant ostomy noted.  Left renal perinephric stranding.  Vascular calcifications of the left kidney are noted.  Lower pole bulky nonobstructing stones are present on the left also.  Similar vascular calcifications and nonobstructing stones at the lower pole of the right kidney also.  No hydronephrosis on the right.     Bladder: Suspected prior cystectomy.     GI Tract/Mesentery: No evidence of bowel obstruction or inflammation.     Peritoneal Space: No ascites. No free air.     Retroperitoneum: No significant adenopathy.     Abdominal wall: Right lower quadrant ostomy with mild parastomal herniation of fat also.     Vasculature: Vascular atherosclerosis.  There is aneurysmal dilation of the right common iliac artery maximum diameter 3.4 cm.  Recommend vascular surveillance.     Bones: No acute fracture.     Impression:     1. Moderate-severe hydronephrosis and hydroureter on the left secondary to a 4 mm stone in the distal left ureter.  There is left perinephric inflammatory stranding noted.  Recommend follow-up.  2.  Bilateral nonobstructing nephrolithiasis at the lower poles also.  3. Minimal cholelithiasis with no evidence of cholecystitis.  4. Postop changes of prior cystectomy with right lower quadrant ureteral diversion procedure.  5. 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  Recommend vascular surveillance.  6.  This report was flagged in Epic as abnormal.        Electronically signed by: Lazarus Ballesteros  Date:                                            06/16/2020      Medications:  Reconciled Home Medications:      Medication List      START taking these medications    amoxicillin-clavulanate 875-125mg 875-125 mg per tablet  Commonly known as: AUGMENTIN  Take 1 tablet by mouth every 12 (twelve) hours. for 5 days        CONTINUE taking these medications    albuterol-ipratropium 2.5 mg-0.5 mg/3 mL nebulizer solution  Commonly known as: DUO-NEB  Take 3 mLs by nebulization every 6 (six) hours as needed for Wheezing. Rescue     allopurinoL 100 MG tablet  Commonly known as: ZYLOPRIM  TAKE 1 TABLET BY MOUTH ONCE DAILY     amLODIPine 10 MG tablet  Commonly known as: NORVASC  Take 1 tablet (10 mg total) by mouth once daily.     aspirin 81 MG EC tablet  Commonly known as: ECOTRIN  Take 81 mg by mouth once daily.     atenoloL 50 MG tablet  Commonly known as: TENORMIN  TAKE 1 TABLET(50 MG) BY MOUTH EVERY DAY     ergocalciferol 50,000 unit Cap  Commonly known as: VITAMIN D2  Take 1 capsule (50,000 Units total) by mouth every 7 days.     escitalopram oxalate 10 MG tablet  Commonly known as: LEXAPRO  Take 1 tablet (10 mg total) by mouth once daily. Pt to take 15 mg daily (1.5) pills for depression     fluticasone-umeclidin-vilanter 100-62.5-25 mcg Dsdv  Commonly known as: TRELEGY ELLIPTA  Take 1 puff by mouth once daily.     gabapentin 400 MG capsule  Commonly known as: NEURONTIN  Take 1 capsule by mouth twice daily     HYDROcodone-acetaminophen  mg per  tablet  Commonly known as: NORCO  Take 1 tablet by mouth every 6 (six) hours as needed for Pain.     levothyroxine 88 MCG tablet  Commonly known as: SYNTHROID  Take 1 tablet (88 mcg total) by mouth once daily.     ONE DAILY MULTIVITAMIN per tablet  Generic drug: multivitamin  Take 1 tablet by mouth once daily.     pantoprazole 40 MG tablet  Commonly known as: PROTONIX  Take 1 tablet (40 mg total) by mouth once daily.     sodium bicarbonate 325 MG tablet  Take 325 mg by mouth 2 (two) times daily.     traZODone 150 MG tablet  Commonly known as: DESYREL  Take 1 tablet (150 mg total) by mouth every evening.            Indwelling Lines/Drains at time of discharge:   Lines/Drains/Airways     Drain                 Urostomy RLQ -- days         Ureteral Drain/Stent 01/02/18 1156 Right ureter 8 Fr. 900 days         Nephrostomy 06/17/20 0930 Left 8 Fr. 3 days         Urostomy 06/17/20 1055 3 days                Time spent on the discharge of patient: <30 minutes  Patient was seen and examined on the date of discharge and determined to be suitable for discharge.         Tasha Brantley MD  Department of Hospital Medicine  [facility]

## 2020-06-20 NOTE — PLAN OF CARE
O2 set up delivered to pt's room, RT called for teaching.    [facility] approved O2 per Brant, weekend on call.

## 2020-06-20 NOTE — PROGRESS NOTES
Patient had desaturation to 82% on room air at rest within minutes of oxygen being removed.  Improved to 90% with replacement of 4 liters oxygen.

## 2020-06-20 NOTE — PLAN OF CARE
Pt discharging home today, family to provide transportation home.    O2 set-up delivered to pt's room prior to discharge, teaching by RT.    Family to provide transportation home.    No additional CM needs or barriers for discharge.   06/20/20 1217   Final Note   Assessment Type Final Discharge Note   Anticipated Discharge Disposition Home   What phone number can be called within the next 1-3 days to see how you are doing after discharge? [phone]   Hospital Follow Up  Appt(s) scheduled? Yes   Discharge plans and expectations educations in teach back method with documentation complete? Yes   Right Care Referral Info   Post Acute Recommendation No Care

## 2020-06-22 ENCOUNTER — TELEPHONE (OUTPATIENT)
Dept: UROLOGY | Facility: CLINIC | Age: 60
End: 2020-06-22

## 2020-06-22 LAB — BACTERIA BLD CULT: NORMAL

## 2020-06-22 NOTE — TELEPHONE ENCOUNTER
----- Message from Terri Fierro sent at 6/22/2020  9:28 AM CDT -----  Regarding: Rhian/ Spouse/  [phone]  Type: Patient Call Back    Who called:  Spouse    What is the request in detail:  Patients spouse would like staff to give her a call regarding other surgeries patient need to have.  Thank you    Would the patient rather a call back or a response via [facility]?   Call back    Best call back number:  [phone]

## 2020-06-23 ENCOUNTER — PATIENT OUTREACH (OUTPATIENT)
Dept: ADMINISTRATIVE | Facility: CLINIC | Age: 60
End: 2020-06-23

## 2020-06-23 NOTE — PATIENT INSTRUCTIONS
Ureteral Stents  A ureteral stent is a soft plastic tube with holes in it. Its temporarily inserted into a ureter to help drain urine into the bladder. One end goes in the kidney. The other end goes in the bladder. A coil on each end holds the stent in place. The stent cant be seen from outside the body. It shouldnt interfere with your normal routine. Your stent will be put in by a doctor trained in treating the urinary tract (a urologist) or another specialist. The procedure is done in a hospital or surgery center. Youll likely go home the same day.  When is a ureteral stent used?  A ureteral stent may be used:  · To bypass a blockage in a kidney or ureter.  · During kidney stone removal.  · To let a ureter heal after surgery.    Before the Procedure  Your healthcare provider will give you instructions to prepare for the procedure. X-rays or other imaging tests of your kidneys and ureters may be done beforehand.  During the procedure  · You receive medicine to prevent pain and help you relax or sleep during the procedure. Once this takes effect, the procedure starts.  · The doctor inserts a cystoscope (lighted instrument) through the urethra and into the bladder. This shows the opening to the ureter.  · A thin wire is carefully threaded through the cystoscope, up the ureter, and into the kidney. The stent is inserted over the wire.  · A fluoroscope (special X-ray machine) is used to help position the stent. When the stent is in place, the wire and cystoscope are removed.  While you have a stent  · Some discomfort is normal. Certain movements may trigger pain or a feeling that you need to urinate. You may also feel mild soreness or pressure before or during urination. These symptoms will go away a few days after the stent is removed.  · Medicine to control pain or bladder spasms or to prevent infection may be prescribed. Take this as directed.  · Drink plenty of fluids to help flush out your urinary  tract.  · Your urine may be slightly pink or red. This is due to bleeding caused by minor irritation from the stent. This may happen on and off while you have the stent.  · As with any synthetic device placed in the body, there is a risk of infection. The stent may have to be removed if this happens.   How long will you need a stent?  The stent is often taken out after the blockage in the ureter is treated or the ureter has healed. This may take 1 week to 2 weeks, or longer. If a stent is needed for a long time, it may need to be changed every few months.  When to call your healthcare provider  Contact your healthcare provider right away if:  · Your urine contains blood clots or you see a large amount of blood-tinged urine  · You have symptoms similar to those you had before the stent was placed  · You constantly leak urine  · You have a fever over 100.4°F (38°C), chills, nausea, or vomiting  · Your pain is not relieved with medicine  · The end of the stent comes out of the urethra   Date Last Reviewed: 1/1/2017  © 4916-9921 [employer]. [address] [place] [address]. All rights reserved. This information is not intended as a substitute for professional medical care. Always follow your healthcare professional's instructions.

## 2020-06-29 ENCOUNTER — TELEPHONE (OUTPATIENT)
Dept: PULMONOLOGY | Facility: CLINIC | Age: 60
End: 2020-06-29

## 2020-06-29 NOTE — TELEPHONE ENCOUNTER
----- Message from Lucy Bolanos sent at 6/29/2020  8:20 AM CDT -----  Patient lives in [place] and was released from [facility].  PCP Lucy wants patient to follow up due to COPD, patient is on oxygen and has Medicaid.  Patient also has appointment with a Dr. Agarwal in August for sleep apnea.  We were told that if patient was not seen here and does not have PCP in [facility] network, we do not see.  Patient wanted me to ask you if he could be an exception.          Thank You      Lucy

## 2020-07-10 ENCOUNTER — OFFICE VISIT (OUTPATIENT)
Dept: UROLOGY | Facility: CLINIC | Age: 60
End: 2020-07-10
Payer: MEDICAID

## 2020-07-10 VITALS — HEIGHT: 72 IN | WEIGHT: 183.88 LBS | TEMPERATURE: 94 F | BODY MASS INDEX: 24.91 KG/M2

## 2020-07-10 DIAGNOSIS — N20.0 KIDNEY STONES: Primary | ICD-10-CM

## 2020-07-10 DIAGNOSIS — Z85.51 H/O PRIMARY MALIGNANT NEOPLASM OF URINARY BLADDER: ICD-10-CM

## 2020-07-10 DIAGNOSIS — N13.30 HYDRONEPHROSIS, UNSPECIFIED HYDRONEPHROSIS TYPE: ICD-10-CM

## 2020-07-10 DIAGNOSIS — N13.1 HYDRONEPHROSIS WITH URETERAL STRICTURE, NOT ELSEWHERE CLASSIFIED: ICD-10-CM

## 2020-07-10 PROCEDURE — 99215 OFFICE O/P EST HI 40 MIN: CPT | Mod: PBBFAC | Performed by: UROLOGY

## 2020-07-10 PROCEDURE — 99999 PR PBB SHADOW E&M-EST. PATIENT-LVL V: CPT | Mod: PBBFAC,,, | Performed by: UROLOGY

## 2020-07-10 PROCEDURE — 99214 OFFICE O/P EST MOD 30 MIN: CPT | Mod: S$PBB,,, | Performed by: UROLOGY

## 2020-07-10 PROCEDURE — 99214 PR OFFICE/OUTPT VISIT, EST, LEVL IV, 30-39 MIN: ICD-10-PCS | Mod: S$PBB,,, | Performed by: UROLOGY

## 2020-07-10 PROCEDURE — 99999 PR PBB SHADOW E&M-EST. PATIENT-LVL V: ICD-10-PCS | Mod: PBBFAC,,, | Performed by: UROLOGY

## 2020-07-10 RX ORDER — CIPROFLOXACIN 2 MG/ML
400 INJECTION, SOLUTION INTRAVENOUS
Status: CANCELLED | OUTPATIENT
Start: 2020-07-10

## 2020-07-10 RX ORDER — HYDROCODONE BITARTRATE AND ACETAMINOPHEN 10; 325 MG/1; MG/1
1 TABLET ORAL EVERY 6 HOURS PRN
Qty: 28 TABLET | Refills: 0 | Status: ON HOLD | OUTPATIENT
Start: 2020-07-10 | End: 2020-07-23 | Stop reason: SDUPTHER

## 2020-07-10 NOTE — H&P (VIEW-ONLY)
Subjective:       Patient ID: Blake Guillory is a 60 y.o. male who was referred by No ref. provider found    Chief Complaint:   Chief Complaint   Patient presents with    Post-op Evaluation     post op from procedure         History of Present Illness  Left Ureteral/Kidney Stone  He went to the ED on 6/16/2020 with left sided flank pain.  CT showed left hydroureteronephrosis down to a 4 mm distal ureteral stone just proximal to his conduit.  He now has a nephrostomy tube in place.  He has had some pain and bleeding from the tube but is afebrile and tolerating the tube okay.    History of Bladder Cancer  He has a history of bladder cancer. He is s/p cystectomy with ileal conduit in November 2012. From an oncologic standpoint, he is doing well. He has had issues with bilateral ureteral stricture. He underwent a bilateral ureteral reimplantation into his conduit several months later. His strictures recurred shortly afterwards.  He was managed with ureteral stent changes until one of the stent eroded through his conduit and into his bowels.    He had an Exploratory Laparotomy with replacement of ileal conduit and small bowel anastomosis on 12/30/2015.     He had a parastomal hernia repair in April by Dr. Mack.  He is healing nicely from that and has been discharged from his office.  He had a small bowel obstruction. that resolved.        He continues to have back pain in the morning and in the evening.  His stoma is not bothering him and he tells me that he has good urine output during the day and less at night.  He has worsening renal function and his right sided pain is becoming worse.       He had a right PCN placed by IR on 1/2/2018.  He then had a percutaneous right ureteral dilation and stent placement.  He noted increased urine output afterwards.  He still has pain and has noted significant pain in the right testicle.  He denies swelling or redness to the testicle.     He had his stent removed on 3/9/2018.   He is feeling okay, his chronic pain is about the same.       ACTIVE MEDICAL ISSUES:  Patient Active Problem List   Diagnosis    Insomnia    Lumbar facet arthropathy    Sacroiliitis    Spondylosis without myelopathy    DDD (degenerative disc disease)    History of bladder cancer    Hydronephrosis, bilateral    H/O primary malignant neoplasm of urinary bladder    Essential hypertension    Anemia of chronic disease    Moderate protein malnutrition    Chronic gout    Renal insufficiency    Body mass index (BMI) 20.0-20.9, adult    Personal history of bladder cancer    Acquired hypothyroidism    Hypoxemia    Increased PTH level    Severe malnutrition    Hydronephrosis    History of primary malignant neoplasm of urinary bladder    Hydronephrosis with urinary obstruction due to ureteral calculus    Chest pain of uncertain etiology    Chronic obstructive pulmonary disease with acute exacerbation    Epigastric abdominal tenderness without rebound tenderness    CKD (chronic kidney disease), stage IV    S/P colonoscopy    Iliac artery aneurysm    Kidney stones    Hypertriglyceridemia    Nephrolithiasis    Acute respiratory failure with hypoxia       PAST MEDICAL HISTORY  Past Medical History:   Diagnosis Date    Arthritis     Blood transfusion     Cancer     bladder    COPD (chronic obstructive pulmonary disease)     Frequency of urination     General anesthetics causing adverse effect in therapeutic use     pt states he wakes up very violently from anesthesia    History of urostomy     Hypertension     Limited joint range of motion right hip and leg    Mixed anxiety and depressive disorder     Personal history of bladder cancer     Thyroid disease     Ureteral stent displacement     Urinary retention     Wears glasses        PAST SURGICAL HISTORY:  Past Surgical History:   Procedure Laterality Date    ABDOMINAL SURGERY      APPENDECTOMY  12/30/2015    Procedure: APPENDECTOMY;   Surgeon: CHAD Bo MD;  Location: [facility] OR;  Service: Urology;;    bladder removed      BOWEL RESECTION      CYSTOSCOPY      >1  episodes for bilat ureteral stent exchange    FRACTURE SURGERY      HAND SURGERY      HERNIA REPAIR      amalia    HIP SURGERY  2012    Rt hip septic    MOUTH SURGERY  2000    all teeth extracted    nephrostomy tube placement      turbt      urostomy         SOCIAL HISTORY:  Social History     Tobacco Use    Smoking status: Former Smoker     Packs/day: 0.30     Years: 42.00     Pack years: 12.60     Quit date: 2/1/2017     Years since quitting: 3.4    Smokeless tobacco: Never Used    Tobacco comment: in cessation program   Substance Use Topics    Alcohol use: Yes     Alcohol/week: 14.0 - 21.0 standard drinks     Types: 14 - 21 Cans of beer per week     Comment: occassional    Drug use: Yes     Types: Marijuana     Comment: occass       FAMILY HISTORY:  Family History   Adopted: Yes       ALLERGIES AND MEDICATIONS: updated and reviewed.  Review of patient's allergies indicates:  No Known Allergies  Current Outpatient Medications   Medication Sig    albuterol-ipratropium (DUO-NEB) 2.5 mg-0.5 mg/3 mL nebulizer solution Take 3 mLs by nebulization every 6 (six) hours as needed for Wheezing. Rescue    allopurinoL (ZYLOPRIM) 100 MG tablet TAKE 1 TABLET BY MOUTH ONCE DAILY    amLODIPine (NORVASC) 10 MG tablet Take 1 tablet (10 mg total) by mouth once daily.    aspirin (ECOTRIN) 81 MG EC tablet Take 81 mg by mouth once daily.    atenoloL (TENORMIN) 50 MG tablet TAKE 1 TABLET(50 MG) BY MOUTH EVERY DAY    busPIRone (BUSPAR) 5 MG Tab Take 1 tablet (5 mg total) by mouth 2 (two) times daily as needed.    ergocalciferol (VITAMIN D2) 50,000 unit Cap Take 1 capsule (50,000 Units total) by mouth every 7 days.    escitalopram oxalate (LEXAPRO) 20 MG tablet Take 1 tablet (20 mg total) by mouth once daily.    fluticasone-umeclidin-vilanter (TRELEGY ELLIPTA) 100-62.5-25 mcg DsDv  Take 1 puff by mouth once daily.    gabapentin (NEURONTIN) 400 MG capsule Take 1 capsule by mouth twice daily    hydrocodone-acetaminophen 10-325mg (NORCO)  mg Tab Take 1 tablet by mouth every 6 (six) hours as needed for Pain.    levothyroxine (SYNTHROID) 88 MCG tablet Take 1 tablet (88 mcg total) by mouth once daily.    multivitamin (ONE DAILY MULTIVITAMIN) per tablet Take 1 tablet by mouth once daily.    pantoprazole (PROTONIX) 40 MG tablet Take 1 tablet (40 mg total) by mouth once daily.    sodium bicarbonate 325 MG tablet Take 325 mg by mouth 2 (two) times daily.    traZODone (DESYREL) 150 MG tablet Take 1 tablet (150 mg total) by mouth every evening.     No current facility-administered medications for this visit.        Review of Systems   Constitutional: Negative for activity change, fatigue, fever and unexpected weight change.   HENT: Negative for congestion.    Eyes: Negative for redness.   Respiratory: Negative for chest tightness and shortness of breath.    Cardiovascular: Negative for chest pain and leg swelling.   Gastrointestinal: Negative for abdominal pain, constipation, diarrhea, nausea and vomiting.   Genitourinary: Negative for dysuria, flank pain, frequency, hematuria, penile pain, penile swelling, scrotal swelling, testicular pain and urgency.   Musculoskeletal: Negative for arthralgias and back pain.   Neurological: Negative for dizziness and light-headedness.   Psychiatric/Behavioral: Negative for behavioral problems and confusion. The patient is not nervous/anxious.    All other systems reviewed and are negative.      Objective:      Vitals:    07/10/20 1421   Temp: (!) 94.1 °F (34.5 °C)   Weight: 83.4 kg (183 lb 13.8 oz)   Height: 6' (1.829 m)     Physical Exam   Nursing note and vitals reviewed.  Constitutional: He is oriented to person, place, and time. He appears well-developed.   HENT:   Head: Normocephalic.   Eyes: Conjunctivae are normal.   Neck: Normal range of motion.  Neck supple. No tracheal deviation present. No thyromegaly present.   Cardiovascular: Normal rate and normal heart sounds.    Pulmonary/Chest: Effort normal and breath sounds normal. No respiratory distress. He has no wheezes.   Abdominal: Soft. Bowel sounds are normal. There is no abdominal tenderness. There is no rebound. No hernia.   Genitourinary:    Genitourinary Comments: Left PCN in place, yellow urine  Stoma pink     Musculoskeletal: Normal range of motion. No tenderness.   Lymphadenopathy:     He has no cervical adenopathy.   Neurological: He is alert and oriented to person, place, and time.   Skin: Skin is warm and dry. No rash noted. No erythema.     Psychiatric: His behavior is normal. Judgment and thought content normal.     Contains abnormal data CT Renal Stone Study ABD Pelvis WO  Order: [id number]  Status:  Final result   Visible to patient:  Yes (Patient Portal)   Next appt:  07/13/2020 at 01:00 PM in Pulmonology ([facility])  Details    Reading Physician Reading Date Result Priority   Lazarus Joaquin MD  [phone] 6/16/2020 STAT      Narrative & Impression     EXAMINATION:  CT RENAL STONE STUDY ABD PELVIS WO     CLINICAL HISTORY:  Flank pain,??   Kidney stone suspected;     TECHNIQUE:  Low dose axial images, sagittal and coronal reformations were obtained from the lung bases to the pubic symphysis.  Contrast was not administered.     COMPARISON:  07/11/2019     FINDINGS:  Heart: Normal in size. No pericardial effusion.     Lung Bases: Mild right basilar atelectasis or scarring.     Liver: Normal in size and attenuation, with no focal hepatic lesions.     Gallbladder: Minimal cholelithiasis.  No evidence of cholecystitis.     Bile Ducts: No evidence of dilated ducts.     Pancreas: No mass or peripancreatic fat stranding.     Spleen: Unremarkable.     Adrenals: Unremarkable.     Kidneys/ Ureters: Moderate to severe hydronephrosis of the left kidney and ureter secondary to a 4 mm stone  in the distal left ureter.  Ureteral diversion procedure is noted to the right lower quadrant with right lower quadrant ostomy noted.  Left renal perinephric stranding.  Vascular calcifications of the left kidney are noted.  Lower pole bulky nonobstructing stones are present on the left also.  Similar vascular calcifications and nonobstructing stones at the lower pole of the right kidney also.  No hydronephrosis on the right.     Bladder: Suspected prior cystectomy.     GI Tract/Mesentery: No evidence of bowel obstruction or inflammation.     Peritoneal Space: No ascites. No free air.     Retroperitoneum: No significant adenopathy.     Abdominal wall: Right lower quadrant ostomy with mild parastomal herniation of fat also.     Vasculature: Vascular atherosclerosis.  There is aneurysmal dilation of the right common iliac artery maximum diameter 3.4 cm.  Recommend vascular surveillance.     Bones: No acute fracture.     Impression:     1. Moderate-severe hydronephrosis and hydroureter on the left secondary to a 4 mm stone in the distal left ureter.  There is left perinephric inflammatory stranding noted.  Recommend follow-up.  2. Bilateral nonobstructing nephrolithiasis at the lower poles also.  3. Minimal cholelithiasis with no evidence of cholecystitis.  4. Postop changes of prior cystectomy with right lower quadrant ureteral diversion procedure.  5. 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  Recommend vascular surveillance.  6.  This report was flagged in Epic as abnormal.        Electronically signed by: Lazarus Ballesteros  Date:                                            06/16/2020  Time:                                           18:34            Last Resulted: 06/16/20 18:34                  Assessment:       1. Kidney stones    2. Hydronephrosis with ureteral stricture, not elsewhere classified    3. H/O primary malignant neoplasm of urinary  bladder          Plan:       1. Kidney stones  Plan for a left PCNL and antegrade ureteroscopy on Wednesday 7/22/2020    2. Hydronephrosis with ureteral stricture, not elsewhere classified  As above    3. H/O primary malignant neoplasm of urinary bladder  stable            Follow up in about 17 days (around 7/27/2020) for Follow up.

## 2020-07-10 NOTE — PROGRESS NOTES
Subjective:       Patient ID: Blake Guillory is a 60 y.o. male who was referred by No ref. provider found    Chief Complaint:   Chief Complaint   Patient presents with    Post-op Evaluation     post op from procedure         History of Present Illness  Left Ureteral/Kidney Stone  He went to the ED on 6/16/2020 with left sided flank pain.  CT showed left hydroureteronephrosis down to a 4 mm distal ureteral stone just proximal to his conduit.  He now has a nephrostomy tube in place.  He has had some pain and bleeding from the tube but is afebrile and tolerating the tube okay.    History of Bladder Cancer  He has a history of bladder cancer. He is s/p cystectomy with ileal conduit in November 2012. From an oncologic standpoint, he is doing well. He has had issues with bilateral ureteral stricture. He underwent a bilateral ureteral reimplantation into his conduit several months later. His strictures recurred shortly afterwards.  He was managed with ureteral stent changes until one of the stent eroded through his conduit and into his bowels.    He had an Exploratory Laparotomy with replacement of ileal conduit and small bowel anastomosis on 12/30/2015.     He had a parastomal hernia repair in April by Dr. Mack.  He is healing nicely from that and has been discharged from his office.  He had a small bowel obstruction. that resolved.        He continues to have back pain in the morning and in the evening.  His stoma is not bothering him and he tells me that he has good urine output during the day and less at night.  He has worsening renal function and his right sided pain is becoming worse.       He had a right PCN placed by IR on 1/2/2018.  He then had a percutaneous right ureteral dilation and stent placement.  He noted increased urine output afterwards.  He still has pain and has noted significant pain in the right testicle.  He denies swelling or redness to the testicle.     He had his stent removed on 3/9/2018.   He is feeling okay, his chronic pain is about the same.       ACTIVE MEDICAL ISSUES:  Patient Active Problem List   Diagnosis    Insomnia    Lumbar facet arthropathy    Sacroiliitis    Spondylosis without myelopathy    DDD (degenerative disc disease)    History of bladder cancer    Hydronephrosis, bilateral    H/O primary malignant neoplasm of urinary bladder    Essential hypertension    Anemia of chronic disease    Moderate protein malnutrition    Chronic gout    Renal insufficiency    Body mass index (BMI) 20.0-20.9, adult    Personal history of bladder cancer    Acquired hypothyroidism    Hypoxemia    Increased PTH level    Severe malnutrition    Hydronephrosis    History of primary malignant neoplasm of urinary bladder    Hydronephrosis with urinary obstruction due to ureteral calculus    Chest pain of uncertain etiology    Chronic obstructive pulmonary disease with acute exacerbation    Epigastric abdominal tenderness without rebound tenderness    CKD (chronic kidney disease), stage IV    S/P colonoscopy    Iliac artery aneurysm    Kidney stones    Hypertriglyceridemia    Nephrolithiasis    Acute respiratory failure with hypoxia       PAST MEDICAL HISTORY  Past Medical History:   Diagnosis Date    Arthritis     Blood transfusion     Cancer     bladder    COPD (chronic obstructive pulmonary disease)     Frequency of urination     General anesthetics causing adverse effect in therapeutic use     pt states he wakes up very violently from anesthesia    History of urostomy     Hypertension     Limited joint range of motion right hip and leg    Mixed anxiety and depressive disorder     Personal history of bladder cancer     Thyroid disease     Ureteral stent displacement     Urinary retention     Wears glasses        PAST SURGICAL HISTORY:  Past Surgical History:   Procedure Laterality Date    ABDOMINAL SURGERY      APPENDECTOMY  12/30/2015    Procedure: APPENDECTOMY;   Surgeon: CHAD Bo MD;  Location: [facility];  Service: Urology;;    bladder removed      BOWEL RESECTION      CYSTOSCOPY      >1  episodes for bilat ureteral stent exchange    FRACTURE SURGERY      HAND SURGERY      HERNIA REPAIR      amalia    HIP SURGERY  2012    Rt hip septic    MOUTH SURGERY  2000    all teeth extracted    nephrostomy tube placement      turbt      urostomy         SOCIAL HISTORY:  Social History     Tobacco Use    Smoking status: Former Smoker     Packs/day: 0.30     Years: 42.00     Pack years: 12.60     Quit date: 2/1/2017     Years since quitting: 3.4    Smokeless tobacco: Never Used    Tobacco comment: in cessation program   Substance Use Topics    Alcohol use: Yes     Alcohol/week: 14.0 - 21.0 standard drinks     Types: 14 - 21 Cans of beer per week     Comment: occassional    Drug use: Yes     Types: Marijuana     Comment: occass       FAMILY HISTORY:  Family History   Adopted: Yes       ALLERGIES AND MEDICATIONS: updated and reviewed.  Review of patient's allergies indicates:  No Known Allergies  Current Outpatient Medications   Medication Sig    albuterol-ipratropium (DUO-NEB) 2.5 mg-0.5 mg/3 mL nebulizer solution Take 3 mLs by nebulization every 6 (six) hours as needed for Wheezing. Rescue    allopurinoL (ZYLOPRIM) 100 MG tablet TAKE 1 TABLET BY MOUTH ONCE DAILY    amLODIPine (NORVASC) 10 MG tablet Take 1 tablet (10 mg total) by mouth once daily.    aspirin (ECOTRIN) 81 MG EC tablet Take 81 mg by mouth once daily.    atenoloL (TENORMIN) 50 MG tablet TAKE 1 TABLET(50 MG) BY MOUTH EVERY DAY    busPIRone (BUSPAR) 5 MG Tab Take 1 tablet (5 mg total) by mouth 2 (two) times daily as needed.    ergocalciferol (VITAMIN D2) 50,000 unit Cap Take 1 capsule (50,000 Units total) by mouth every 7 days.    escitalopram oxalate (LEXAPRO) 20 MG tablet Take 1 tablet (20 mg total) by mouth once daily.    fluticasone-umeclidin-vilanter (TRELEGY ELLIPTA) 100-62.5-25 mcg DsDv  Take 1 puff by mouth once daily.    gabapentin (NEURONTIN) 400 MG capsule Take 1 capsule by mouth twice daily    hydrocodone-acetaminophen 10-325mg (NORCO)  mg Tab Take 1 tablet by mouth every 6 (six) hours as needed for Pain.    levothyroxine (SYNTHROID) 88 MCG tablet Take 1 tablet (88 mcg total) by mouth once daily.    multivitamin (ONE DAILY MULTIVITAMIN) per tablet Take 1 tablet by mouth once daily.    pantoprazole (PROTONIX) 40 MG tablet Take 1 tablet (40 mg total) by mouth once daily.    sodium bicarbonate 325 MG tablet Take 325 mg by mouth 2 (two) times daily.    traZODone (DESYREL) 150 MG tablet Take 1 tablet (150 mg total) by mouth every evening.     No current facility-administered medications for this visit.        Review of Systems   Constitutional: Negative for activity change, fatigue, fever and unexpected weight change.   HENT: Negative for congestion.    Eyes: Negative for redness.   Respiratory: Negative for chest tightness and shortness of breath.    Cardiovascular: Negative for chest pain and leg swelling.   Gastrointestinal: Negative for abdominal pain, constipation, diarrhea, nausea and vomiting.   Genitourinary: Negative for dysuria, flank pain, frequency, hematuria, penile pain, penile swelling, scrotal swelling, testicular pain and urgency.   Musculoskeletal: Negative for arthralgias and back pain.   Neurological: Negative for dizziness and light-headedness.   Psychiatric/Behavioral: Negative for behavioral problems and confusion. The patient is not nervous/anxious.    All other systems reviewed and are negative.      Objective:      Vitals:    07/10/20 1421   Temp: (!) 94.1 °F (34.5 °C)   Weight: 83.4 kg (183 lb 13.8 oz)   Height: 6' (1.829 m)     Physical Exam   Nursing note and vitals reviewed.  Constitutional: He is oriented to person, place, and time. He appears well-developed.   HENT:   Head: Normocephalic.   Eyes: Conjunctivae are normal.   Neck: Normal range of motion.  Neck supple. No tracheal deviation present. No thyromegaly present.   Cardiovascular: Normal rate and normal heart sounds.    Pulmonary/Chest: Effort normal and breath sounds normal. No respiratory distress. He has no wheezes.   Abdominal: Soft. Bowel sounds are normal. There is no abdominal tenderness. There is no rebound. No hernia.   Genitourinary:    Genitourinary Comments: Left PCN in place, yellow urine  Stoma pink     Musculoskeletal: Normal range of motion. No tenderness.   Lymphadenopathy:     He has no cervical adenopathy.   Neurological: He is alert and oriented to person, place, and time.   Skin: Skin is warm and dry. No rash noted. No erythema.     Psychiatric: His behavior is normal. Judgment and thought content normal.     Contains abnormal data CT Renal Stone Study ABD Pelvis WO  Order: [id number]  Status:  Final result   Visible to patient:  Yes (Patient Portal)   Next appt:  07/13/2020 at 01:00 PM in Pulmonology ([facility])  Details    Reading Physician Reading Date Result Priority   Lazarus Joaquin MD  [phone] 6/16/2020 STAT      Narrative & Impression     EXAMINATION:  CT RENAL STONE STUDY ABD PELVIS WO     CLINICAL HISTORY:  Flank pain,??   Kidney stone suspected;     TECHNIQUE:  Low dose axial images, sagittal and coronal reformations were obtained from the lung bases to the pubic symphysis.  Contrast was not administered.     COMPARISON:  07/11/2019     FINDINGS:  Heart: Normal in size. No pericardial effusion.     Lung Bases: Mild right basilar atelectasis or scarring.     Liver: Normal in size and attenuation, with no focal hepatic lesions.     Gallbladder: Minimal cholelithiasis.  No evidence of cholecystitis.     Bile Ducts: No evidence of dilated ducts.     Pancreas: No mass or peripancreatic fat stranding.     Spleen: Unremarkable.     Adrenals: Unremarkable.     Kidneys/ Ureters: Moderate to severe hydronephrosis of the left kidney and ureter secondary to a 4 mm stone  in the distal left ureter.  Ureteral diversion procedure is noted to the right lower quadrant with right lower quadrant ostomy noted.  Left renal perinephric stranding.  Vascular calcifications of the left kidney are noted.  Lower pole bulky nonobstructing stones are present on the left also.  Similar vascular calcifications and nonobstructing stones at the lower pole of the right kidney also.  No hydronephrosis on the right.     Bladder: Suspected prior cystectomy.     GI Tract/Mesentery: No evidence of bowel obstruction or inflammation.     Peritoneal Space: No ascites. No free air.     Retroperitoneum: No significant adenopathy.     Abdominal wall: Right lower quadrant ostomy with mild parastomal herniation of fat also.     Vasculature: Vascular atherosclerosis.  There is aneurysmal dilation of the right common iliac artery maximum diameter 3.4 cm.  Recommend vascular surveillance.     Bones: No acute fracture.     Impression:     1. Moderate-severe hydronephrosis and hydroureter on the left secondary to a 4 mm stone in the distal left ureter.  There is left perinephric inflammatory stranding noted.  Recommend follow-up.  2. Bilateral nonobstructing nephrolithiasis at the lower poles also.  3. Minimal cholelithiasis with no evidence of cholecystitis.  4. Postop changes of prior cystectomy with right lower quadrant ureteral diversion procedure.  5. 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  Recommend vascular surveillance.  6.  This report was flagged in Epic as abnormal.        Electronically signed by: Lazarus Ballesteros  Date:                                            06/16/2020  Time:                                           18:34            Last Resulted: 06/16/20 18:34                  Assessment:       1. Kidney stones    2. Hydronephrosis with ureteral stricture, not elsewhere classified    3. H/O primary malignant neoplasm of urinary  bladder          Plan:       1. Kidney stones  Plan for a left PCNL and antegrade ureteroscopy on Wednesday 7/22/2020    2. Hydronephrosis with ureteral stricture, not elsewhere classified  As above    3. H/O primary malignant neoplasm of urinary bladder  stable            Follow up in about 17 days (around 7/27/2020) for Follow up.

## 2020-07-10 NOTE — H&P
Subjective:       Patient ID: Blake Guillory is a 60 y.o. male who was referred by No ref. provider found    Chief Complaint:   Chief Complaint   Patient presents with    Post-op Evaluation     post op from procedure         History of Present Illness  Left Ureteral/Kidney Stone  He went to the ED on 6/16/2020 with left sided flank pain.  CT showed left hydroureteronephrosis down to a 4 mm distal ureteral stone just proximal to his conduit.  He now has a nephrostomy tube in place.  He has had some pain and bleeding from the tube but is afebrile and tolerating the tube okay.    History of Bladder Cancer  He has a history of bladder cancer. He is s/p cystectomy with ileal conduit in November 2012. From an oncologic standpoint, he is doing well. He has had issues with bilateral ureteral stricture. He underwent a bilateral ureteral reimplantation into his conduit several months later. His strictures recurred shortly afterwards.  He was managed with ureteral stent changes until one of the stent eroded through his conduit and into his bowels.    He had an Exploratory Laparotomy with replacement of ileal conduit and small bowel anastomosis on 12/30/2015.     He had a parastomal hernia repair in April by Dr. Mack.  He is healing nicely from that and has been discharged from his office.  He had a small bowel obstruction. that resolved.        He continues to have back pain in the morning and in the evening.  His stoma is not bothering him and he tells me that he has good urine output during the day and less at night.  He has worsening renal function and his right sided pain is becoming worse.       He had a right PCN placed by IR on 1/2/2018.  He then had a percutaneous right ureteral dilation and stent placement.  He noted increased urine output afterwards.  He still has pain and has noted significant pain in the right testicle.  He denies swelling or redness to the testicle.     He had his stent removed on 3/9/2018.   He is feeling okay, his chronic pain is about the same.       ACTIVE MEDICAL ISSUES:  Patient Active Problem List   Diagnosis    Insomnia    Lumbar facet arthropathy    Sacroiliitis    Spondylosis without myelopathy    DDD (degenerative disc disease)    History of bladder cancer    Hydronephrosis, bilateral    H/O primary malignant neoplasm of urinary bladder    Essential hypertension    Anemia of chronic disease    Moderate protein malnutrition    Chronic gout    Renal insufficiency    Body mass index (BMI) 20.0-20.9, adult    Personal history of bladder cancer    Acquired hypothyroidism    Hypoxemia    Increased PTH level    Severe malnutrition    Hydronephrosis    History of primary malignant neoplasm of urinary bladder    Hydronephrosis with urinary obstruction due to ureteral calculus    Chest pain of uncertain etiology    Chronic obstructive pulmonary disease with acute exacerbation    Epigastric abdominal tenderness without rebound tenderness    CKD (chronic kidney disease), stage IV    S/P colonoscopy    Iliac artery aneurysm    Kidney stones    Hypertriglyceridemia    Nephrolithiasis    Acute respiratory failure with hypoxia       PAST MEDICAL HISTORY  Past Medical History:   Diagnosis Date    Arthritis     Blood transfusion     Cancer     bladder    COPD (chronic obstructive pulmonary disease)     Frequency of urination     General anesthetics causing adverse effect in therapeutic use     pt states he wakes up very violently from anesthesia    History of urostomy     Hypertension     Limited joint range of motion right hip and leg    Mixed anxiety and depressive disorder     Personal history of bladder cancer     Thyroid disease     Ureteral stent displacement     Urinary retention     Wears glasses        PAST SURGICAL HISTORY:  Past Surgical History:   Procedure Laterality Date    ABDOMINAL SURGERY      APPENDECTOMY  12/30/2015    Procedure: APPENDECTOMY;   Surgeon: CHAD Bo MD;  Location: [facility];  Service: Urology;;    bladder removed      BOWEL RESECTION      CYSTOSCOPY      >1  episodes for bilat ureteral stent exchange    FRACTURE SURGERY      HAND SURGERY      HERNIA REPAIR      amalia    HIP SURGERY  2012    Rt hip septic    MOUTH SURGERY  2000    all teeth extracted    nephrostomy tube placement      turbt      urostomy         SOCIAL HISTORY:  Social History     Tobacco Use    Smoking status: Former Smoker     Packs/day: 0.30     Years: 42.00     Pack years: 12.60     Quit date: 2/1/2017     Years since quitting: 3.4    Smokeless tobacco: Never Used    Tobacco comment: in cessation program   Substance Use Topics    Alcohol use: Yes     Alcohol/week: 14.0 - 21.0 standard drinks     Types: 14 - 21 Cans of beer per week     Comment: occassional    Drug use: Yes     Types: Marijuana     Comment: occass       FAMILY HISTORY:  Family History   Adopted: Yes       ALLERGIES AND MEDICATIONS: updated and reviewed.  Review of patient's allergies indicates:  No Known Allergies  Current Outpatient Medications   Medication Sig    albuterol-ipratropium (DUO-NEB) 2.5 mg-0.5 mg/3 mL nebulizer solution Take 3 mLs by nebulization every 6 (six) hours as needed for Wheezing. Rescue    allopurinoL (ZYLOPRIM) 100 MG tablet TAKE 1 TABLET BY MOUTH ONCE DAILY    amLODIPine (NORVASC) 10 MG tablet Take 1 tablet (10 mg total) by mouth once daily.    aspirin (ECOTRIN) 81 MG EC tablet Take 81 mg by mouth once daily.    atenoloL (TENORMIN) 50 MG tablet TAKE 1 TABLET(50 MG) BY MOUTH EVERY DAY    busPIRone (BUSPAR) 5 MG Tab Take 1 tablet (5 mg total) by mouth 2 (two) times daily as needed.    ergocalciferol (VITAMIN D2) 50,000 unit Cap Take 1 capsule (50,000 Units total) by mouth every 7 days.    escitalopram oxalate (LEXAPRO) 20 MG tablet Take 1 tablet (20 mg total) by mouth once daily.    fluticasone-umeclidin-vilanter (TRELEGY ELLIPTA) 100-62.5-25 mcg DsDv  Take 1 puff by mouth once daily.    gabapentin (NEURONTIN) 400 MG capsule Take 1 capsule by mouth twice daily    hydrocodone-acetaminophen 10-325mg (NORCO)  mg Tab Take 1 tablet by mouth every 6 (six) hours as needed for Pain.    levothyroxine (SYNTHROID) 88 MCG tablet Take 1 tablet (88 mcg total) by mouth once daily.    multivitamin (ONE DAILY MULTIVITAMIN) per tablet Take 1 tablet by mouth once daily.    pantoprazole (PROTONIX) 40 MG tablet Take 1 tablet (40 mg total) by mouth once daily.    sodium bicarbonate 325 MG tablet Take 325 mg by mouth 2 (two) times daily.    traZODone (DESYREL) 150 MG tablet Take 1 tablet (150 mg total) by mouth every evening.     No current facility-administered medications for this visit.        Review of Systems   Constitutional: Negative for activity change, fatigue, fever and unexpected weight change.   HENT: Negative for congestion.    Eyes: Negative for redness.   Respiratory: Negative for chest tightness and shortness of breath.    Cardiovascular: Negative for chest pain and leg swelling.   Gastrointestinal: Negative for abdominal pain, constipation, diarrhea, nausea and vomiting.   Genitourinary: Negative for dysuria, flank pain, frequency, hematuria, penile pain, penile swelling, scrotal swelling, testicular pain and urgency.   Musculoskeletal: Negative for arthralgias and back pain.   Neurological: Negative for dizziness and light-headedness.   Psychiatric/Behavioral: Negative for behavioral problems and confusion. The patient is not nervous/anxious.    All other systems reviewed and are negative.      Objective:      Vitals:    07/10/20 1421   Temp: (!) 94.1 °F (34.5 °C)   Weight: 83.4 kg (183 lb 13.8 oz)   Height: 6' (1.829 m)     Physical Exam   Nursing note and vitals reviewed.  Constitutional: He is oriented to person, place, and time. He appears well-developed.   HENT:   Head: Normocephalic.   Eyes: Conjunctivae are normal.   Neck: Normal range of motion.  Neck supple. No tracheal deviation present. No thyromegaly present.   Cardiovascular: Normal rate and normal heart sounds.    Pulmonary/Chest: Effort normal and breath sounds normal. No respiratory distress. He has no wheezes.   Abdominal: Soft. Bowel sounds are normal. There is no abdominal tenderness. There is no rebound. No hernia.   Genitourinary:    Genitourinary Comments: Left PCN in place, yellow urine  Stoma pink     Musculoskeletal: Normal range of motion. No tenderness.   Lymphadenopathy:     He has no cervical adenopathy.   Neurological: He is alert and oriented to person, place, and time.   Skin: Skin is warm and dry. No rash noted. No erythema.     Psychiatric: His behavior is normal. Judgment and thought content normal.     Contains abnormal data CT Renal Stone Study ABD Pelvis WO  Order: [id number]  Status:  Final result   Visible to patient:  Yes (Patient Portal)   Next appt:  07/13/2020 at 01:00 PM in Pulmonology ([facility])  Details    Reading Physician Reading Date Result Priority   Lazarus Joaquin MD  [phone] 6/16/2020 STAT      Narrative & Impression     EXAMINATION:  CT RENAL STONE STUDY ABD PELVIS WO     CLINICAL HISTORY:  Flank pain,??   Kidney stone suspected;     TECHNIQUE:  Low dose axial images, sagittal and coronal reformations were obtained from the lung bases to the pubic symphysis.  Contrast was not administered.     COMPARISON:  07/11/2019     FINDINGS:  Heart: Normal in size. No pericardial effusion.     Lung Bases: Mild right basilar atelectasis or scarring.     Liver: Normal in size and attenuation, with no focal hepatic lesions.     Gallbladder: Minimal cholelithiasis.  No evidence of cholecystitis.     Bile Ducts: No evidence of dilated ducts.     Pancreas: No mass or peripancreatic fat stranding.     Spleen: Unremarkable.     Adrenals: Unremarkable.     Kidneys/ Ureters: Moderate to severe hydronephrosis of the left kidney and ureter secondary to a 4 mm stone  in the distal left ureter.  Ureteral diversion procedure is noted to the right lower quadrant with right lower quadrant ostomy noted.  Left renal perinephric stranding.  Vascular calcifications of the left kidney are noted.  Lower pole bulky nonobstructing stones are present on the left also.  Similar vascular calcifications and nonobstructing stones at the lower pole of the right kidney also.  No hydronephrosis on the right.     Bladder: Suspected prior cystectomy.     GI Tract/Mesentery: No evidence of bowel obstruction or inflammation.     Peritoneal Space: No ascites. No free air.     Retroperitoneum: No significant adenopathy.     Abdominal wall: Right lower quadrant ostomy with mild parastomal herniation of fat also.     Vasculature: Vascular atherosclerosis.  There is aneurysmal dilation of the right common iliac artery maximum diameter 3.4 cm.  Recommend vascular surveillance.     Bones: No acute fracture.     Impression:     1. Moderate-severe hydronephrosis and hydroureter on the left secondary to a 4 mm stone in the distal left ureter.  There is left perinephric inflammatory stranding noted.  Recommend follow-up.  2. Bilateral nonobstructing nephrolithiasis at the lower poles also.  3. Minimal cholelithiasis with no evidence of cholecystitis.  4. Postop changes of prior cystectomy with right lower quadrant ureteral diversion procedure.  5. 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  Recommend vascular surveillance.  6.  This report was flagged in Epic as abnormal.        Electronically signed by: Lazarus Ballesteros  Date:                                            06/16/2020  Time:                                           18:34            Last Resulted: 06/16/20 18:34                  Assessment:       1. Kidney stones    2. Hydronephrosis with ureteral stricture, not elsewhere classified    3. H/O primary malignant neoplasm of urinary  bladder          Plan:       1. Kidney stones  Plan for a left PCNL and antegrade ureteroscopy on Wednesday 7/22/2020    2. Hydronephrosis with ureteral stricture, not elsewhere classified  As above    3. H/O primary malignant neoplasm of urinary bladder  stable            Follow up in about 17 days (around 7/27/2020) for Follow up.

## 2020-07-13 ENCOUNTER — OFFICE VISIT (OUTPATIENT)
Dept: PULMONOLOGY | Facility: CLINIC | Age: 60
End: 2020-07-13
Payer: MEDICAID

## 2020-07-13 VITALS
TEMPERATURE: 98 F | SYSTOLIC BLOOD PRESSURE: 120 MMHG | HEART RATE: 80 BPM | OXYGEN SATURATION: 87 % | DIASTOLIC BLOOD PRESSURE: 75 MMHG | WEIGHT: 187 LBS | HEIGHT: 72 IN | BODY MASS INDEX: 25.33 KG/M2

## 2020-07-13 DIAGNOSIS — R09.02 HYPOXEMIA: ICD-10-CM

## 2020-07-13 DIAGNOSIS — J44.9 CHRONIC OBSTRUCTIVE PULMONARY DISEASE, UNSPECIFIED COPD TYPE: Primary | ICD-10-CM

## 2020-07-13 PROCEDURE — 99204 OFFICE O/P NEW MOD 45 MIN: CPT | Mod: S$GLB,,, | Performed by: NURSE PRACTITIONER

## 2020-07-13 PROCEDURE — 99204 PR OFFICE/OUTPT VISIT, NEW, LEVL IV, 45-59 MIN: ICD-10-PCS | Mod: S$GLB,,, | Performed by: NURSE PRACTITIONER

## 2020-07-13 NOTE — Clinical Note
I saw this patient today, will try to help but he did not seem very interested in anything I had to say.  He said he does not and is not going to have a sleep study.  His sats were 87% here he does not wear his oxygen out, nor to sleep. He wears it a little in morning and evening. I ordered PFT's and a walk in [place] to see how bad his COPD is and how much oxygen he needs.

## 2020-07-13 NOTE — PROGRESS NOTES
SUBJECTIVE:    Patient ID: Blake Guillory is a 60 y.o. male.    Chief Complaint: Establish Care and COPD    HPI     Patient here today to establish care for COPD and hypoxemia.  He states he is not wearing the oxygen all the time. He states he can not wear it with sleep it wakes him up. He is here without his oxygen and has sats of 87%.  He is using his Trelegy every morning,  He uses his nebulizer occasionally.  He states he refuses to have a sleep study.  He does not recall ever having PFT's before.  He has no energy, takes care of his disabled child at home that is total care.    Past Medical History:   Diagnosis Date    Arthritis     Blood transfusion     Cancer     bladder    COPD (chronic obstructive pulmonary disease)     Frequency of urination     General anesthetics causing adverse effect in therapeutic use     pt states he wakes up very violently from anesthesia    History of urostomy     Hypertension     Limited joint range of motion right hip and leg    Mixed anxiety and depressive disorder     Personal history of bladder cancer     Thyroid disease     Ureteral stent displacement     Urinary retention     Wears glasses      Past Surgical History:   Procedure Laterality Date    ABDOMINAL SURGERY      APPENDECTOMY  12/30/2015    Procedure: APPENDECTOMY;  Surgeon: CHAD Bo MD;  Location: [facility];  Service: Urology;;    bladder removed      BOWEL RESECTION      CYSTOSCOPY      >1  episodes for bilat ureteral stent exchange    FRACTURE SURGERY      HAND SURGERY      HERNIA REPAIR      amalia    HIP SURGERY  2012    Rt hip septic    MOUTH SURGERY  2000    all teeth extracted    nephrostomy tube placement      turbt      urostomy       Family History   Adopted: Yes        Social History:   Marital Status:   Occupation: Data Unavailable  Alcohol History:  reports current alcohol use of about 14.0 - 21.0 standard drinks of alcohol per week.  Tobacco History:  reports  that he quit smoking about 3 years ago. He has a 12.60 pack-year smoking history. He has never used smokeless tobacco.  Drug History:  reports current drug use. Drug: Marijuana.    Review of patient's allergies indicates:  No Known Allergies    Current Outpatient Medications   Medication Sig Dispense Refill    albuterol-ipratropium (DUO-NEB) 2.5 mg-0.5 mg/3 mL nebulizer solution Take 3 mLs by nebulization every 6 (six) hours as needed for Wheezing. Rescue 50 vial 2    allopurinoL (ZYLOPRIM) 100 MG tablet TAKE 1 TABLET BY MOUTH ONCE DAILY 90 tablet 0    amLODIPine (NORVASC) 10 MG tablet Take 1 tablet (10 mg total) by mouth once daily. 90 tablet 0    aspirin (ECOTRIN) 81 MG EC tablet Take 81 mg by mouth once daily.      atenoloL (TENORMIN) 50 MG tablet TAKE 1 TABLET(50 MG) BY MOUTH EVERY DAY 90 tablet 0    busPIRone (BUSPAR) 5 MG Tab Take 1 tablet (5 mg total) by mouth 2 (two) times daily as needed. 60 tablet 1    ergocalciferol (VITAMIN D2) 50,000 unit Cap Take 1 capsule (50,000 Units total) by mouth every 7 days. 12 capsule 0    escitalopram oxalate (LEXAPRO) 20 MG tablet Take 1 tablet (20 mg total) by mouth once daily. 30 tablet 2    fluticasone-umeclidin-vilanter (TRELEGY ELLIPTA) 100-62.5-25 mcg DsDv Take 1 puff by mouth once daily. 60 each 2    gabapentin (NEURONTIN) 400 MG capsule Take 1 capsule by mouth twice daily 180 capsule 0    HYDROcodone-acetaminophen (NORCO)  mg per tablet Take 1 tablet by mouth every 6 (six) hours as needed for Pain. 28 tablet 0    levothyroxine (SYNTHROID) 88 MCG tablet Take 1 tablet (88 mcg total) by mouth once daily. 90 tablet 0    multivitamin (ONE DAILY MULTIVITAMIN) per tablet Take 1 tablet by mouth once daily.      pantoprazole (PROTONIX) 40 MG tablet Take 1 tablet (40 mg total) by mouth once daily. 90 tablet 0    sodium bicarbonate 325 MG tablet Take 325 mg by mouth 2 (two) times daily.      traZODone (DESYREL) 150 MG tablet Take 1 tablet (150 mg total) by  mouth every evening. 90 tablet 0     No current facility-administered medications for this visit.            Review of Systems  General: Feeling drained since he has had surgery, no energy   Eyes: Vision is good.  ENT:  No sinusitis or pharyngitis.   Heart:: No chest pain or palpitations.  Lungs: productive cough with green mucous at times,  Dyspnea comes and goes, feels it most with taking a shower   GI: No Nausea, vomiting, constipation, diarrhea, or reflux.  : No dysuria, hesitancy, or nocturia.  Musculoskeletal: No joint pain or myalgias.  Skin: No lesions or rashes.  Neuro: No headaches or neuropathy.  Lymph: ankles swell at times   Psych: No anxiety or depression.  Endo: No weight change.    OBJECTIVE:      /75 (BP Location: Left arm, Patient Position: Sitting, BP Method: Medium (Manual))   Pulse 80   Temp 98.2 °F (36.8 °C)   Ht 6' (1.829 m)   Wt 84.8 kg (187 lb)   SpO2 (!) 87%   BMI 25.36 kg/m²     Physical Exam  GENERAL: Older patient in no distress.  HEENT: Pupils equal and reactive. Extraocular movements intact. Nose intact.      Pharynx moist.  NECK: Supple.   HEART: Regular rate and rhythm. No murmur or gallop auscultated.  LUNGS: Clear to auscultation and percussion. Lung excursion symmetrical. No change in fremitus. No adventitial noises.  ABDOMEN: Bowel sounds present. Non-tender, no masses palpated.  EXTREMITIES: Normal muscle tone and joint movement, no cyanosis or clubbing.   LYMPHATICS: No adenopathy palpated, no edema.  SKIN: Dry, intact, no lesions.   NEURO: Cranial nerves II-XII intact. Motor strength 5/5 bilaterally, upper and lower extremities.  PSYCH: Appropriate affect.    Assessment:       1. Chronic obstructive pulmonary disease, unspecified COPD type    2. Hypoxemia          Plan:       Chronic obstructive pulmonary disease, unspecified COPD type  -     Complete PFT with bronchodilator; Future    Hypoxemia  -     Stress test, pulmonary; Future; Expected date:  07/13/2020         Follow up in about 6 months (around 1/13/2021).    6 minute walk to determine how much oxygen he should be ambulating with  PFT at [facility]   You need to be wearing your oxygen   Will call with results of PFT  Continue the Trelegy 1 puff daily, rinse after you use it.

## 2020-07-13 NOTE — PATIENT INSTRUCTIONS
Diagnosing COPD  Your healthcare provider will use your past health history, a physical exam, and certain tests to diagnose COPD.  Health history  Your healthcare provider learns about your health history by asking questions. Topics include:  · History of present illness. Tell your healthcare provider about your symptoms. Also tell him or her how long you have had them.  · Past medical and surgical history. Share other health problems and surgery you have had.  · Family history. Report serious health problems in close family members. This is especially true of any lung problems.  · Social and environmental history. The most important factor in COPD is whether you smoke or have smoked in the past. You should also tell your provider if you have been around secondhand smoke, harmful chemicals, or air pollution.  · Functional assessment. Report whether breathing gets in the way of your daily activities.  Physical exam    Your provider will examine you. He or she will check your heart and lungs with a stethoscope. The focus will be on your airways, including your nose and throat.   Diagnostic tests  · Pulmonary function tests measure the flow of air into and out of your lungs. They also check how much air your lungs can hold. The most common pulmonary function test is spirometry. This measures how fast and how much air you can blow out (exhale). This test is important to help diagnose COPD.  · Pulse oximetry shows how much oxygen is in your blood (oxygen saturation). This may be done at rest. It may also be done during and after exercise.  · Arterial blood gas tests measure levels of oxygen and carbon dioxide in your blood.  · Chest X-rays show the size and shape of your lungs. They can also show certain problems in the lungs.  · CT scans show detailed images of the lungs.  Date Last Reviewed: 10/1/2016  © 1708-1397 [employer]. [address] [place] [address], [place] [address]. All rights reserved. This  information is not intended as a substitute for professional medical care. Always follow your healthcare professional's instructions.    6 minute walk to determine how much oxygen he should be ambulating with   PFT at [facility]   You need to be wearing your oxygen   Will call with results of PFT  Continue the Trelegy 1 puff daily, rinse after you use it.

## 2020-07-20 ENCOUNTER — HOSPITAL ENCOUNTER (OUTPATIENT)
Dept: PREADMISSION TESTING | Facility: HOSPITAL | Age: 60
Discharge: HOME | End: 2020-07-20
Attending: UROLOGY
Payer: MEDICAID

## 2020-07-20 ENCOUNTER — OFFICE VISIT (OUTPATIENT)
Dept: CARDIOLOGY | Facility: CLINIC | Age: 60
End: 2020-07-20
Payer: MEDICAID

## 2020-07-20 VITALS
OXYGEN SATURATION: 94 % | TEMPERATURE: 98 F | DIASTOLIC BLOOD PRESSURE: 70 MMHG | SYSTOLIC BLOOD PRESSURE: 124 MMHG | BODY MASS INDEX: 25.24 KG/M2 | RESPIRATION RATE: 18 BRPM | WEIGHT: 186.31 LBS | HEIGHT: 72 IN | HEART RATE: 69 BPM

## 2020-07-20 DIAGNOSIS — I10 ESSENTIAL HYPERTENSION: ICD-10-CM

## 2020-07-20 DIAGNOSIS — Z01.818 PREOP TESTING: ICD-10-CM

## 2020-07-20 DIAGNOSIS — N18.4 CKD (CHRONIC KIDNEY DISEASE), STAGE IV: Primary | ICD-10-CM

## 2020-07-20 DIAGNOSIS — J44.1 CHRONIC OBSTRUCTIVE PULMONARY DISEASE WITH ACUTE EXACERBATION: ICD-10-CM

## 2020-07-20 DIAGNOSIS — N20.0 KIDNEY STONES: Chronic | ICD-10-CM

## 2020-07-20 DIAGNOSIS — N13.30 HYDRONEPHROSIS, BILATERAL: Chronic | ICD-10-CM

## 2020-07-20 LAB — SARS-COV-2 RDRP RESP QL NAA+PROBE: NEGATIVE

## 2020-07-20 PROCEDURE — 99214 OFFICE O/P EST MOD 30 MIN: CPT | Mod: 95,,, | Performed by: INTERNAL MEDICINE

## 2020-07-20 PROCEDURE — U0002 COVID-19 LAB TEST NON-CDC: HCPCS

## 2020-07-20 PROCEDURE — 99214 PR OFFICE/OUTPT VISIT, EST, LEVL IV, 30-39 MIN: ICD-10-PCS | Mod: 95,,, | Performed by: INTERNAL MEDICINE

## 2020-07-20 NOTE — DISCHARGE INSTRUCTIONS
Your procedure  is scheduled for _7/22/2020_________.    Call [phone] between 2pm and 5pm on _7/21/2020______to find out your arrival time for the day of surgery.    Report to the Emergency Department on the day of your surgery.  You will be escorted to the admitting unit.    Important instructions:   Do not eat or drink after 12 midnight, including water.  It is okay to brush your teeth.  Do not have gum, candy or mints.     Take only these medications with a small swallow of water on the morning of your surgery _   Inhalers, amlodipine, atenolol, buspirone, lexapro, gabapentin, hydrocodone, levothyroxine___        Stop taking Aspirin, Ibuprofen, Motrin and Aleve , Fish oil, and Vitamin E for at least 7 days before your surgery. You may use Tylenol unless otherwise instructed by your doctor.         Please shower the night before and the morning of your surgery.       You may wear deodorant only.      Do not wear powder, body lotion or perfume/cologne.     Do not wear any jewelry or have any metal on your body.     You will be asked to remove any dentures or partials for the procedure.     Please bring any documents given to you by your doctor.     If you are going home on the same day of surgery, you must arrange for a family member or a friend to drive you home.  Public transportation is prohibited.  You will not be able to drive home if you were given anesthesia or sedation.     Wear loose fitting clothes allowing for bandages.     Please leave money and valuables home.       You may bring your cell phone.     Call the doctor if fever or illness should occur before your surgery.    Call [phone] to contact us here if needed.

## 2020-07-20 NOTE — PROGRESS NOTES
"  Subjective:      Patient ID: Blake Guillory is a 60 y.o. male.    Chief Complaint: No chief complaint on file.    HPI:    Review of Systems   Cardiovascular: Positive for dyspnea on exertion and leg swelling. Negative for chest pain, claudication, irregular heartbeat, near-syncope, orthopnea, palpitations and syncope.    The patient location is: [place], [place]    The chief complaint leading to consultation is: elevated BNP during recent hospitalization for kidney stones    Visit type: audiovisual    Face to Face time with patient: 22  minutes of total time spent on the encounter, which includes face to face time and non-face to face time preparing to see the patient (eg, review of tests), Obtaining and/or reviewing separately obtained history, Documenting clinical information in the electronic or other health record, Independently interpreting results (not separately reported) and communicating results to the patient/family/caregiver, or Care coordination (not separately reported).     Pt is scheduled for procedure to remove the kidney stones     Each patient to whom he or she provides medical services by telemedicine is:  (1) informed of the relationship between the physician and patient and the respective role of any other health care provider with respect to management of the patient; and (2) notified that he or she may decline to receive medical services by telemedicine and may withdraw from such care at any time.    Notes:     "I have COPD.  I always have shortness of breath and cough"  "They discharged me on home oxygen"  Pt saw a NP for pulmonary disease at [facility] last week and has a f/u appt in a month with her.    Legs feel heavy when walking    Pt stays at home to avoid coronavirus.    Pt can walk a half block before having to pause due to shortness of breath.  Past Medical History:   Diagnosis Date    Arthritis     Blood transfusion     Cancer     bladder    COPD (chronic " obstructive pulmonary disease)     Frequency of urination     General anesthetics causing adverse effect in therapeutic use     pt states he wakes up very violently from anesthesia    History of urostomy     Hypertension     Limited joint range of motion right hip and leg    Mixed anxiety and depressive disorder     Personal history of bladder cancer     Thyroid disease     Ureteral stent displacement     Urinary retention     Wears glasses         Past Surgical History:   Procedure Laterality Date    ABDOMINAL SURGERY      APPENDECTOMY  12/30/2015    Procedure: APPENDECTOMY;  Surgeon: CHAD Bo MD;  Location: [facility] OR;  Service: Urology;;    bladder removed      BOWEL RESECTION      CYSTOSCOPY      >1  episodes for bilat ureteral stent exchange    FRACTURE SURGERY      HAND SURGERY      HERNIA REPAIR      amalia    HIP SURGERY  2012    Rt hip septic    MOUTH SURGERY  2000    all teeth extracted    nephrostomy tube placement      turbt      urostomy         Family History   Adopted: Yes       Social History     Socioeconomic History    Marital status:      Spouse name: Not on file    Number of children: Not on file    Years of education: Not on file    Highest education level: Not on file   Occupational History    Occupation: [employer]     Employer: [employer]   Social Needs    Financial resource strain: Not on file    Food insecurity     Worry: Not on file     Inability: Not on file    Transportation needs     Medical: Not on file     Non-medical: Not on file   Tobacco Use    Smoking status: Former Smoker     Packs/day: 0.30     Years: 42.00     Pack years: 12.60     Quit date: 2/1/2017     Years since quitting: 3.4    Smokeless tobacco: Never Used    Tobacco comment: in cessation program   Substance and Sexual Activity    Alcohol use: Yes     Alcohol/week: 14.0 - 21.0 standard drinks     Types: 14 - 21 Cans of beer per week     Comment:  occassional    Drug use: Yes     Types: Marijuana     Comment: occass    Sexual activity: Yes     Partners: Female     Birth control/protection: None   Lifestyle    Physical activity     Days per week: Not on file     Minutes per session: Not on file    Stress: Not on file   Relationships    Social connections     Talks on phone: Not on file     Gets together: Not on file     Attends Gnosticist service: Not on file     Active member of club or organization: Not on file     Attends meetings of clubs or organizations: Not on file     Relationship status: Not on file   Other Topics Concern    Not on file   Social History Narrative    Not on file       Current Outpatient Medications on File Prior to Visit   Medication Sig Dispense Refill    albuterol-ipratropium (DUO-NEB) 2.5 mg-0.5 mg/3 mL nebulizer solution Take 3 mLs by nebulization every 6 (six) hours as needed for Wheezing. Rescue 50 vial 2    allopurinoL (ZYLOPRIM) 100 MG tablet TAKE 1 TABLET BY MOUTH ONCE DAILY 90 tablet 0    amLODIPine (NORVASC) 10 MG tablet Take 1 tablet (10 mg total) by mouth once daily. 90 tablet 0    aspirin (ECOTRIN) 81 MG EC tablet Take 81 mg by mouth once daily.      atenoloL (TENORMIN) 50 MG tablet TAKE 1 TABLET(50 MG) BY MOUTH EVERY DAY 90 tablet 0    busPIRone (BUSPAR) 5 MG Tab Take 1 tablet (5 mg total) by mouth 2 (two) times daily as needed. 60 tablet 1    ergocalciferol (VITAMIN D2) 50,000 unit Cap Take 1 capsule (50,000 Units total) by mouth every 7 days. 12 capsule 0    escitalopram oxalate (LEXAPRO) 20 MG tablet Take 1 tablet (20 mg total) by mouth once daily. 30 tablet 2    fluticasone-umeclidin-vilanter (TRELEGY ELLIPTA) 100-62.5-25 mcg DsDv Take 1 puff by mouth once daily. 60 each 2    gabapentin (NEURONTIN) 400 MG capsule Take 1 capsule by mouth twice daily 180 capsule 0    HYDROcodone-acetaminophen (NORCO)  mg per tablet Take 1 tablet by mouth every 6 (six) hours as needed for Pain. 28 tablet 0     levothyroxine (SYNTHROID) 88 MCG tablet Take 1 tablet (88 mcg total) by mouth once daily. 90 tablet 0    multivitamin (ONE DAILY MULTIVITAMIN) per tablet Take 1 tablet by mouth once daily.      pantoprazole (PROTONIX) 40 MG tablet Take 1 tablet (40 mg total) by mouth once daily. 90 tablet 0    sodium bicarbonate 325 MG tablet Take 325 mg by mouth 2 (two) times daily.      traZODone (DESYREL) 150 MG tablet Take 1 tablet (150 mg total) by mouth every evening. 90 tablet 0     No current facility-administered medications on file prior to visit.        Review of patient's allergies indicates:  No Known Allergies  Objective:   There were no vitals filed for this visit.     Physical Exam   WDWNNAD  Alert and oriented  Speech is normal    /70 at pre-op today    BNP 6/18/20: 321  CXR 6/18/20--heart size WNL, no CHF, minimal atalectasis    ECG 7/6/20:  NSR, WNL    Lab 7/6/20:  BNP 23  Creat 2.2  BUN 37  hgb 14.3    Assessment:     1. CKD (chronic kidney disease), stage IV    2. Hydronephrosis, bilateral    3. Kidney stones    4. Essential hypertension    5. Chronic obstructive pulmonary disease with acute exacerbation      Plan:   Diagnoses and all orders for this visit:    CKD (chronic kidney disease), stage IV  -     Echo Color Flow Doppler? Yes; Future    Hydronephrosis, bilateral    Kidney stones    Essential hypertension  -     Echo Color Flow Doppler? Yes; Future    Chronic obstructive pulmonary disease with acute exacerbation  -     Echo Color Flow Doppler? Yes; Future       Will repeat the echocardiogram electively in the future. (no need to repeat the echo pre-op)   Echo in 2018 showed normal LVEF\    Cardiac status is stable for urologic surgery    Same meds  Follow up in about 3 months (around 10/20/2020).

## 2020-07-21 ENCOUNTER — ANESTHESIA EVENT (OUTPATIENT)
Dept: SURGERY | Facility: HOSPITAL | Age: 60
End: 2020-07-21
Payer: MEDICAID

## 2020-07-22 ENCOUNTER — HOSPITAL ENCOUNTER (OUTPATIENT)
Facility: HOSPITAL | Age: 60
Discharge: HOME | End: 2020-07-23
Attending: UROLOGY | Admitting: UROLOGY
Payer: MEDICAID

## 2020-07-22 ENCOUNTER — ANESTHESIA (OUTPATIENT)
Dept: SURGERY | Facility: HOSPITAL | Age: 60
End: 2020-07-22
Payer: MEDICAID

## 2020-07-22 DIAGNOSIS — N13.30 HYDRONEPHROSIS, UNSPECIFIED HYDRONEPHROSIS TYPE: ICD-10-CM

## 2020-07-22 DIAGNOSIS — N20.0 KIDNEY STONES: ICD-10-CM

## 2020-07-22 DIAGNOSIS — Z01.818 PREOP TESTING: ICD-10-CM

## 2020-07-22 DIAGNOSIS — N20.0 KIDNEY STONE ON LEFT SIDE: Primary | ICD-10-CM

## 2020-07-22 LAB
ANION GAP SERPL CALC-SCNC: 6 MMOL/L (ref 8–16)
BUN SERPL-MCNC: 25 MG/DL (ref 8–23)
CALCIUM SERPL-MCNC: 8.7 MG/DL (ref 8.7–10.5)
CHLORIDE SERPL-SCNC: 106 MMOL/L (ref 95–110)
CO2 SERPL-SCNC: 26 MMOL/L (ref 23–29)
CREAT SERPL-MCNC: 2.1 MG/DL (ref 0.5–1.4)
EST. GFR  (AFRICAN AMERICAN): 38 ML/MIN/1.73 M^2
EST. GFR  (NON AFRICAN AMERICAN): 33 ML/MIN/1.73 M^2
GLUCOSE SERPL-MCNC: 146 MG/DL (ref 70–110)
HCT VFR BLD AUTO: 42 % (ref 40–54)
HGB BLD-MCNC: 13.6 G/DL (ref 14–18)
POTASSIUM SERPL-SCNC: 5.2 MMOL/L (ref 3.5–5.1)
SODIUM SERPL-SCNC: 138 MMOL/L (ref 136–145)

## 2020-07-22 PROCEDURE — D9220A PRA ANESTHESIA: Mod: CRNA,,, | Performed by: NURSE ANESTHETIST, CERTIFIED REGISTERED

## 2020-07-22 PROCEDURE — 82365 CALCULUS SPECTROSCOPY: CPT

## 2020-07-22 PROCEDURE — D9220A PRA ANESTHESIA: ICD-10-PCS | Mod: CRNA,,, | Performed by: NURSE ANESTHETIST, CERTIFIED REGISTERED

## 2020-07-22 PROCEDURE — 88300 PR  SURG PATH,GROSS,LEVEL I: ICD-10-PCS | Mod: 26,,, | Performed by: PATHOLOGY

## 2020-07-22 PROCEDURE — D9220A PRA ANESTHESIA: Mod: ANES,,, | Performed by: ANESTHESIOLOGY

## 2020-07-22 PROCEDURE — 85014 HEMATOCRIT: CPT

## 2020-07-22 PROCEDURE — 63600175 PHARM REV CODE 636 W HCPCS: Performed by: NURSE ANESTHETIST, CERTIFIED REGISTERED

## 2020-07-22 PROCEDURE — 00862 ANES XTRPRTL LWR ABD RNL PX: CPT | Performed by: UROLOGY

## 2020-07-22 PROCEDURE — 50081 PR REMV KID STONE, 2+ CM, PERC: ICD-10-PCS | Mod: LT,,, | Performed by: UROLOGY

## 2020-07-22 PROCEDURE — 63600175 PHARM REV CODE 636 W HCPCS: Performed by: UROLOGY

## 2020-07-22 PROCEDURE — 94761 N-INVAS EAR/PLS OXIMETRY MLT: CPT | Mod: 59

## 2020-07-22 PROCEDURE — 99900035 HC TECH TIME PER 15 MIN (STAT)

## 2020-07-22 PROCEDURE — 27201423 OPTIME MED/SURG SUP & DEVICES STERILE SUPPLY: Performed by: UROLOGY

## 2020-07-22 PROCEDURE — 80048 BASIC METABOLIC PNL TOTAL CA: CPT

## 2020-07-22 PROCEDURE — 25000003 PHARM REV CODE 250: Performed by: NURSE ANESTHETIST, CERTIFIED REGISTERED

## 2020-07-22 PROCEDURE — C1729 CATH, DRAINAGE: HCPCS | Performed by: UROLOGY

## 2020-07-22 PROCEDURE — 71000033 HC RECOVERY, INTIAL HOUR: Performed by: UROLOGY

## 2020-07-22 PROCEDURE — D9220A PRA ANESTHESIA: ICD-10-PCS | Mod: ANES,,, | Performed by: ANESTHESIOLOGY

## 2020-07-22 PROCEDURE — 50081 PERQ NL/PL LITHOTRP CPLX>2CM: CPT | Mod: LT,,, | Performed by: UROLOGY

## 2020-07-22 PROCEDURE — 50435 EXCHANGE NEPHROSTOMY CATH: CPT | Mod: 51,LT,, | Performed by: UROLOGY

## 2020-07-22 PROCEDURE — 37000008 HC ANESTHESIA 1ST 15 MINUTES: Performed by: UROLOGY

## 2020-07-22 PROCEDURE — 36415 COLL VENOUS BLD VENIPUNCTURE: CPT

## 2020-07-22 PROCEDURE — C1887 CATHETER, GUIDING: HCPCS | Performed by: UROLOGY

## 2020-07-22 PROCEDURE — 25000003 PHARM REV CODE 250: Performed by: UROLOGY

## 2020-07-22 PROCEDURE — C1726 CATH, BAL DIL, NON-VASCULAR: HCPCS | Performed by: UROLOGY

## 2020-07-22 PROCEDURE — A4217 STERILE WATER/SALINE, 500 ML: HCPCS | Performed by: UROLOGY

## 2020-07-22 PROCEDURE — 36000708 HC OR TIME LEV III 1ST 15 MIN: Performed by: UROLOGY

## 2020-07-22 PROCEDURE — 71000039 HC RECOVERY, EACH ADD'L HOUR: Performed by: UROLOGY

## 2020-07-22 PROCEDURE — 50435 PR EXCHANGE NEPHROSTOMY CATH, INCL GUID, S&I: ICD-10-PCS | Mod: 51,LT,, | Performed by: UROLOGY

## 2020-07-22 PROCEDURE — 76000 FLUOROSCOPY <1 HR PHYS/QHP: CPT | Mod: 26,59,, | Performed by: UROLOGY

## 2020-07-22 PROCEDURE — 37000009 HC ANESTHESIA EA ADD 15 MINS: Performed by: UROLOGY

## 2020-07-22 PROCEDURE — 25500020 PHARM REV CODE 255: Performed by: UROLOGY

## 2020-07-22 PROCEDURE — 76000 PR  FLUOROSCOPE EXAMINATION: ICD-10-PCS | Mod: 26,59,, | Performed by: UROLOGY

## 2020-07-22 PROCEDURE — 88300 SURGICAL PATH GROSS: CPT | Mod: 26,,, | Performed by: PATHOLOGY

## 2020-07-22 PROCEDURE — C1758 CATHETER, URETERAL: HCPCS | Performed by: UROLOGY

## 2020-07-22 PROCEDURE — 27000221 HC OXYGEN, UP TO 24 HOURS

## 2020-07-22 PROCEDURE — 88300 SURGICAL PATH GROSS: CPT | Mod: 59 | Performed by: PATHOLOGY

## 2020-07-22 PROCEDURE — 85018 HEMOGLOBIN: CPT

## 2020-07-22 PROCEDURE — 36000709 HC OR TIME LEV III EA ADD 15 MIN: Performed by: UROLOGY

## 2020-07-22 PROCEDURE — C1769 GUIDE WIRE: HCPCS | Performed by: UROLOGY

## 2020-07-22 PROCEDURE — 25000242 PHARM REV CODE 250 ALT 637 W/ HCPCS: Performed by: ANESTHESIOLOGY

## 2020-07-22 PROCEDURE — 63600175 PHARM REV CODE 636 W HCPCS: Performed by: ANESTHESIOLOGY

## 2020-07-22 RX ORDER — FENTANYL CITRATE 50 UG/ML
INJECTION, SOLUTION INTRAMUSCULAR; INTRAVENOUS
Status: DISCONTINUED | OUTPATIENT
Start: 2020-07-22 | End: 2020-07-22

## 2020-07-22 RX ORDER — GLYCOPYRROLATE 0.2 MG/ML
INJECTION INTRAMUSCULAR; INTRAVENOUS
Status: DISCONTINUED | OUTPATIENT
Start: 2020-07-22 | End: 2020-07-22

## 2020-07-22 RX ORDER — ROCURONIUM BROMIDE 10 MG/ML
INJECTION, SOLUTION INTRAVENOUS
Status: DISCONTINUED | OUTPATIENT
Start: 2020-07-22 | End: 2020-07-22

## 2020-07-22 RX ORDER — SODIUM CHLORIDE 0.9 % (FLUSH) 0.9 %
10 SYRINGE (ML) INJECTION
Status: DISCONTINUED | OUTPATIENT
Start: 2020-07-22 | End: 2020-07-22 | Stop reason: HOSPADM

## 2020-07-22 RX ORDER — SODIUM BICARBONATE 325 MG/1
325 TABLET ORAL 2 TIMES DAILY
Status: DISCONTINUED | OUTPATIENT
Start: 2020-07-22 | End: 2020-07-23 | Stop reason: HOSPADM

## 2020-07-22 RX ORDER — ALLOPURINOL 100 MG/1
100 TABLET ORAL DAILY
Status: DISCONTINUED | OUTPATIENT
Start: 2020-07-22 | End: 2020-07-23 | Stop reason: HOSPADM

## 2020-07-22 RX ORDER — HYDROCODONE BITARTRATE AND ACETAMINOPHEN 10; 325 MG/1; MG/1
1 TABLET ORAL EVERY 6 HOURS PRN
Status: DISCONTINUED | OUTPATIENT
Start: 2020-07-22 | End: 2020-07-23 | Stop reason: HOSPADM

## 2020-07-22 RX ORDER — SODIUM CHLORIDE 9 MG/ML
INJECTION, SOLUTION INTRAVENOUS CONTINUOUS
Status: DISCONTINUED | OUTPATIENT
Start: 2020-07-22 | End: 2020-07-23 | Stop reason: HOSPADM

## 2020-07-22 RX ORDER — ONDANSETRON 2 MG/ML
INJECTION INTRAMUSCULAR; INTRAVENOUS
Status: DISCONTINUED | OUTPATIENT
Start: 2020-07-22 | End: 2020-07-22

## 2020-07-22 RX ORDER — LEVOTHYROXINE SODIUM 88 UG/1
88 TABLET ORAL DAILY
Status: DISCONTINUED | OUTPATIENT
Start: 2020-07-22 | End: 2020-07-23 | Stop reason: HOSPADM

## 2020-07-22 RX ORDER — CIPROFLOXACIN 2 MG/ML
400 INJECTION, SOLUTION INTRAVENOUS
Status: COMPLETED | OUTPATIENT
Start: 2020-07-22 | End: 2020-07-23

## 2020-07-22 RX ORDER — ASPIRIN 81 MG/1
81 TABLET ORAL DAILY
Status: DISCONTINUED | OUTPATIENT
Start: 2020-07-22 | End: 2020-07-23 | Stop reason: HOSPADM

## 2020-07-22 RX ORDER — DEXAMETHASONE SODIUM PHOSPHATE 4 MG/ML
INJECTION, SOLUTION INTRA-ARTICULAR; INTRALESIONAL; INTRAMUSCULAR; INTRAVENOUS; SOFT TISSUE
Status: DISCONTINUED | OUTPATIENT
Start: 2020-07-22 | End: 2020-07-22

## 2020-07-22 RX ORDER — TRAZODONE HYDROCHLORIDE 50 MG/1
150 TABLET ORAL NIGHTLY
Status: DISCONTINUED | OUTPATIENT
Start: 2020-07-22 | End: 2020-07-23 | Stop reason: HOSPADM

## 2020-07-22 RX ORDER — BUSPIRONE HYDROCHLORIDE 5 MG/1
5 TABLET ORAL 2 TIMES DAILY
Status: DISCONTINUED | OUTPATIENT
Start: 2020-07-22 | End: 2020-07-23 | Stop reason: HOSPADM

## 2020-07-22 RX ORDER — PANTOPRAZOLE SODIUM 40 MG/1
40 TABLET, DELAYED RELEASE ORAL DAILY
Status: DISCONTINUED | OUTPATIENT
Start: 2020-07-22 | End: 2020-07-23 | Stop reason: HOSPADM

## 2020-07-22 RX ORDER — ERGOCALCIFEROL 1.25 MG/1
50000 CAPSULE ORAL
Status: DISCONTINUED | OUTPATIENT
Start: 2020-07-22 | End: 2020-07-23 | Stop reason: HOSPADM

## 2020-07-22 RX ORDER — NEOSTIGMINE METHYLSULFATE 1 MG/ML
INJECTION, SOLUTION INTRAVENOUS
Status: DISCONTINUED | OUTPATIENT
Start: 2020-07-22 | End: 2020-07-22

## 2020-07-22 RX ORDER — CIPROFLOXACIN 2 MG/ML
400 INJECTION, SOLUTION INTRAVENOUS
Status: COMPLETED | OUTPATIENT
Start: 2020-07-22 | End: 2020-07-22

## 2020-07-22 RX ORDER — ONDANSETRON 2 MG/ML
4 INJECTION INTRAMUSCULAR; INTRAVENOUS EVERY 12 HOURS PRN
Status: DISCONTINUED | OUTPATIENT
Start: 2020-07-22 | End: 2020-07-23 | Stop reason: HOSPADM

## 2020-07-22 RX ORDER — IPRATROPIUM BROMIDE AND ALBUTEROL SULFATE 2.5; .5 MG/3ML; MG/3ML
3 SOLUTION RESPIRATORY (INHALATION)
Status: COMPLETED | OUTPATIENT
Start: 2020-07-22 | End: 2020-07-22

## 2020-07-22 RX ORDER — IPRATROPIUM BROMIDE AND ALBUTEROL SULFATE 2.5; .5 MG/3ML; MG/3ML
3 SOLUTION RESPIRATORY (INHALATION) EVERY 6 HOURS PRN
Status: DISCONTINUED | OUTPATIENT
Start: 2020-07-22 | End: 2020-07-22

## 2020-07-22 RX ORDER — WATER 1 ML/ML
IRRIGANT IRRIGATION
Status: DISCONTINUED | OUTPATIENT
Start: 2020-07-22 | End: 2020-07-22 | Stop reason: HOSPADM

## 2020-07-22 RX ORDER — PROPOFOL 10 MG/ML
VIAL (ML) INTRAVENOUS
Status: DISCONTINUED | OUTPATIENT
Start: 2020-07-22 | End: 2020-07-22

## 2020-07-22 RX ORDER — SODIUM CHLORIDE 0.9 G/100ML
IRRIGANT IRRIGATION
Status: DISCONTINUED | OUTPATIENT
Start: 2020-07-22 | End: 2020-07-22 | Stop reason: HOSPADM

## 2020-07-22 RX ORDER — ESCITALOPRAM OXALATE 10 MG/1
20 TABLET ORAL DAILY
Status: DISCONTINUED | OUTPATIENT
Start: 2020-07-22 | End: 2020-07-23 | Stop reason: HOSPADM

## 2020-07-22 RX ORDER — HYDROMORPHONE HYDROCHLORIDE 2 MG/ML
0.2 INJECTION, SOLUTION INTRAMUSCULAR; INTRAVENOUS; SUBCUTANEOUS EVERY 5 MIN PRN
Status: DISCONTINUED | OUTPATIENT
Start: 2020-07-22 | End: 2020-07-22 | Stop reason: HOSPADM

## 2020-07-22 RX ORDER — MIDAZOLAM HYDROCHLORIDE 1 MG/ML
INJECTION, SOLUTION INTRAMUSCULAR; INTRAVENOUS
Status: DISCONTINUED | OUTPATIENT
Start: 2020-07-22 | End: 2020-07-22

## 2020-07-22 RX ORDER — ONDANSETRON 2 MG/ML
4 INJECTION INTRAMUSCULAR; INTRAVENOUS DAILY PRN
Status: DISCONTINUED | OUTPATIENT
Start: 2020-07-22 | End: 2020-07-22 | Stop reason: HOSPADM

## 2020-07-22 RX ORDER — SUCCINYLCHOLINE CHLORIDE 20 MG/ML
INJECTION INTRAMUSCULAR; INTRAVENOUS
Status: DISCONTINUED | OUTPATIENT
Start: 2020-07-22 | End: 2020-07-22

## 2020-07-22 RX ORDER — AMLODIPINE BESYLATE 5 MG/1
10 TABLET ORAL DAILY
Status: DISCONTINUED | OUTPATIENT
Start: 2020-07-22 | End: 2020-07-23 | Stop reason: HOSPADM

## 2020-07-22 RX ORDER — HYDROMORPHONE HYDROCHLORIDE 2 MG/ML
1 INJECTION, SOLUTION INTRAMUSCULAR; INTRAVENOUS; SUBCUTANEOUS EVERY 4 HOURS PRN
Status: DISCONTINUED | OUTPATIENT
Start: 2020-07-22 | End: 2020-07-23 | Stop reason: HOSPADM

## 2020-07-22 RX ORDER — LIDOCAINE HYDROCHLORIDE 20 MG/ML
INJECTION INTRAVENOUS
Status: DISCONTINUED | OUTPATIENT
Start: 2020-07-22 | End: 2020-07-22

## 2020-07-22 RX ORDER — SODIUM CHLORIDE, SODIUM LACTATE, POTASSIUM CHLORIDE, CALCIUM CHLORIDE 600; 310; 30; 20 MG/100ML; MG/100ML; MG/100ML; MG/100ML
INJECTION, SOLUTION INTRAVENOUS CONTINUOUS PRN
Status: DISCONTINUED | OUTPATIENT
Start: 2020-07-22 | End: 2020-07-22

## 2020-07-22 RX ORDER — GABAPENTIN 400 MG/1
400 CAPSULE ORAL 2 TIMES DAILY
Status: DISCONTINUED | OUTPATIENT
Start: 2020-07-22 | End: 2020-07-23 | Stop reason: HOSPADM

## 2020-07-22 RX ORDER — ZOLPIDEM TARTRATE 5 MG/1
5 TABLET ORAL NIGHTLY PRN
Status: DISCONTINUED | OUTPATIENT
Start: 2020-07-22 | End: 2020-07-23 | Stop reason: HOSPADM

## 2020-07-22 RX ORDER — IPRATROPIUM BROMIDE AND ALBUTEROL SULFATE 2.5; .5 MG/3ML; MG/3ML
3 SOLUTION RESPIRATORY (INHALATION) EVERY 6 HOURS PRN
Status: DISCONTINUED | OUTPATIENT
Start: 2020-07-22 | End: 2020-07-23 | Stop reason: HOSPADM

## 2020-07-22 RX ORDER — ATENOLOL 50 MG/1
50 TABLET ORAL DAILY
Status: DISCONTINUED | OUTPATIENT
Start: 2020-07-22 | End: 2020-07-23 | Stop reason: HOSPADM

## 2020-07-22 RX ADMIN — Medication 100 MG: at 07:07

## 2020-07-22 RX ADMIN — FENTANYL CITRATE 100 MCG: 50 INJECTION INTRAMUSCULAR; INTRAVENOUS at 07:07

## 2020-07-22 RX ADMIN — HYDROMORPHONE HYDROCHLORIDE 0.2 MG: 2 INJECTION, SOLUTION INTRAMUSCULAR; INTRAVENOUS; SUBCUTANEOUS at 09:07

## 2020-07-22 RX ADMIN — ONDANSETRON 4 MG: 2 INJECTION, SOLUTION INTRAMUSCULAR; INTRAVENOUS at 08:07

## 2020-07-22 RX ADMIN — PROPOFOL 140 MG: 10 INJECTION, EMULSION INTRAVENOUS at 07:07

## 2020-07-22 RX ADMIN — SODIUM CHLORIDE: 0.9 INJECTION, SOLUTION INTRAVENOUS at 09:07

## 2020-07-22 RX ADMIN — SODIUM CHLORIDE: 0.9 INJECTION, SOLUTION INTRAVENOUS at 02:07

## 2020-07-22 RX ADMIN — HYDROMORPHONE HYDROCHLORIDE 1 MG: 2 INJECTION, SOLUTION INTRAMUSCULAR; INTRAVENOUS; SUBCUTANEOUS at 02:07

## 2020-07-22 RX ADMIN — IPRATROPIUM BROMIDE AND ALBUTEROL SULFATE 3 ML: .5; 3 SOLUTION RESPIRATORY (INHALATION) at 07:07

## 2020-07-22 RX ADMIN — DEXAMETHASONE SODIUM PHOSPHATE 4 MG: 4 INJECTION, SOLUTION INTRAMUSCULAR; INTRAVENOUS at 07:07

## 2020-07-22 RX ADMIN — THERA TABS 1 TABLET: TAB at 02:07

## 2020-07-22 RX ADMIN — LEVOTHYROXINE SODIUM 88 MCG: 88 TABLET ORAL at 02:07

## 2020-07-22 RX ADMIN — HYDROMORPHONE HYDROCHLORIDE 0.2 MG: 2 INJECTION, SOLUTION INTRAMUSCULAR; INTRAVENOUS; SUBCUTANEOUS at 10:07

## 2020-07-22 RX ADMIN — ROCURONIUM BROMIDE 30 MG: 10 INJECTION, SOLUTION INTRAVENOUS at 07:07

## 2020-07-22 RX ADMIN — ATENOLOL 50 MG: 50 TABLET ORAL at 02:07

## 2020-07-22 RX ADMIN — ERGOCALCIFEROL 50000 UNITS: 1.25 CAPSULE ORAL at 02:07

## 2020-07-22 RX ADMIN — SODIUM CHLORIDE, SODIUM LACTATE, POTASSIUM CHLORIDE, AND CALCIUM CHLORIDE: .6; .31; .03; .02 INJECTION, SOLUTION INTRAVENOUS at 08:07

## 2020-07-22 RX ADMIN — ALLOPURINOL 100 MG: 100 TABLET ORAL at 02:07

## 2020-07-22 RX ADMIN — GABAPENTIN 400 MG: 400 CAPSULE ORAL at 09:07

## 2020-07-22 RX ADMIN — ESCITALOPRAM OXALATE 20 MG: 10 TABLET ORAL at 02:07

## 2020-07-22 RX ADMIN — CIPROFLOXACIN 400 MG: 2 INJECTION, SOLUTION INTRAVENOUS at 07:07

## 2020-07-22 RX ADMIN — HYDROMORPHONE HYDROCHLORIDE 1 MG: 2 INJECTION, SOLUTION INTRAMUSCULAR; INTRAVENOUS; SUBCUTANEOUS at 07:07

## 2020-07-22 RX ADMIN — ROCURONIUM BROMIDE 10 MG: 10 INJECTION, SOLUTION INTRAVENOUS at 07:07

## 2020-07-22 RX ADMIN — PANTOPRAZOLE SODIUM 40 MG: 40 TABLET, DELAYED RELEASE ORAL at 02:07

## 2020-07-22 RX ADMIN — BUSPIRONE HYDROCHLORIDE 5 MG: 5 TABLET ORAL at 09:07

## 2020-07-22 RX ADMIN — MIDAZOLAM HYDROCHLORIDE 2 MG: 1 INJECTION, SOLUTION INTRAMUSCULAR; INTRAVENOUS at 07:07

## 2020-07-22 RX ADMIN — HYDROCODONE BITARTRATE AND ACETAMINOPHEN 1 TABLET: 10; 325 TABLET ORAL at 11:07

## 2020-07-22 RX ADMIN — ASPIRIN 81 MG: 81 TABLET, COATED ORAL at 02:07

## 2020-07-22 RX ADMIN — AMLODIPINE BESYLATE 10 MG: 5 TABLET ORAL at 02:07

## 2020-07-22 RX ADMIN — TRAZODONE HYDROCHLORIDE 150 MG: 50 TABLET ORAL at 09:07

## 2020-07-22 RX ADMIN — SODIUM CHLORIDE, SODIUM LACTATE, POTASSIUM CHLORIDE, AND CALCIUM CHLORIDE: .6; .31; .03; .02 INJECTION, SOLUTION INTRAVENOUS at 07:07

## 2020-07-22 RX ADMIN — NEOSTIGMINE METHYLSULFATE 3 MG: 1 INJECTION INTRAVENOUS at 08:07

## 2020-07-22 RX ADMIN — SODIUM BICARBONATE TAB 325 MG 325 MG: 325 TAB at 09:07

## 2020-07-22 RX ADMIN — SUCCINYLCHOLINE CHLORIDE 100 MG: 20 INJECTION, SOLUTION INTRAMUSCULAR; INTRAVENOUS at 07:07

## 2020-07-22 RX ADMIN — GLYCOPYRROLATE 0.6 MG: 0.2 INJECTION, SOLUTION INTRAMUSCULAR; INTRAVENOUS at 08:07

## 2020-07-22 RX ADMIN — PROPOFOL 30 MG: 10 INJECTION, EMULSION INTRAVENOUS at 07:07

## 2020-07-22 NOTE — INTERVAL H&P NOTE
The patient has been examined and the H&P has been reviewed:    I concur with the findings and no changes have occurred since H&P was written.    Anesthesia/Surgery risks, benefits and alternative options discussed and understood by patient/family.          Active Hospital Problems    Diagnosis  POA    Kidney stone on left side [N20.0]  Yes      Resolved Hospital Problems   No resolved problems to display.

## 2020-07-22 NOTE — BRIEF OP NOTE
[facility]  Brief Operative Note    SUMMARY     Surgery Date: 7/22/2020     Surgeon(s) and Role:     * CHAD Bo MD - Primary    Assisting Surgeon: None    Pre-op Diagnosis:  Kidney stones [N20.0]    Post-op Diagnosis:  Post-Op Diagnosis Codes:     * Kidney stones [N20.0]    Procedure(s) (LRB):  NEPHROLITHOTOMY, PERCUTANEOUS (Left)    Anesthesia: General    Description of Procedure: Ureter cleared, Lower pole collection of stones removed.  All calyces visualized, no more stone visible    Description of the findings of the procedure: as above    Estimated Blood Loss: * No values recorded between 7/22/2020  7:44 AM and 7/22/2020  9:01 AM *         Specimens:   Specimen (12h ago, onward)    None

## 2020-07-22 NOTE — PLAN OF CARE
In NAD.  Easily awakens from sleep and oriented x 4.  Reports pain to L-flank area 5/10.  PIV continues to infuse without difficulty.  Gissel 9/10.  O2 continues at 3lpm/NC.  Assessment per flowsheet.  Transported by [facility]A on stretcher to hospital room.  Face mask on during transport.

## 2020-07-22 NOTE — OP NOTE
Date of Procedure: 07/22/2020     Preoperative Diagnosis:  Left ureteral and kidney stone    Postoperative Diagnosis:  Left ureteral and kidney stone    Primary Surgeon: CHAD Bo MD    Anesthesia:  General    Procedure Performed:  Left percutaneous nephrolithotomy less than 2.5 cm, left nephrostomy tract dilation left antegrade pyelogram, left nephrostomy tube exchange, fluoroscopy    Estimated Blood Loss:  Minimal    Drains:  24 Citizen of [place] nephro ureteral reentry sheath    Specimens Removed:  Left kidney stone    Complications:  None    Indications: Blake Guillory is a 60-year-old male with a history of kidney stones.  He also has a history of bladder cancer status post cystectomy with ileal conduit.  He had a nephrostomy tube placed several weeks ago due to a distal ureteral stone.  On imaging he also had lower pole renal stones present.  He is here today for nephrolithotomy.    Procedure in Detail:    Blake Guillory was taken to the operating room where he was positively identified by kevin.  He has placed supine the operating table.  Following induction of adequate general anesthesia he was placed in the prone position on the operating room table.    His nephrostomy tube was then prepped and draped in usual sterile fashion.    A  film was taking using fluoroscopy.    An antegrade pyelogram was performed to delineate the pelvicaliceal structures.  The nephrostomy tube access was in the lower pole.    I then passed a hydrophilic tip Sensor wire through the nephrostomy tube.  The nephrostomy tube was then removed leaving the wire in place.    I then passed a 5 Citizen of [place] Kumpe catheter over the Sensor wire and then was able to direct the Sensor wire down into the ureter.  The the wire was then passed down easily into the expected area of the ileal conduit.  The Kumpe catheter was withdrawn.    I then incised the skin with a 11 blade scalpel.  I then dilated the nephrostomy tract from 6 Citizen of [place] to 12  Turkmen with serial dilators.  This was performed with fluoroscopic guidance.  I then passed a dual-lumen catheter over the wire into the proximal ureter.  A superstiff wire was then passed through the 2nd port down into the ureter alongside the Sensor wire.    I withdrew the dual-lumen catheter and then passed a 30 Turkmen NephroMax balloon catheter over the wire into the renal collecting system.    Using a pressure syringe to then dilated the nephrostomy tube balloon until the waisting was eliminated.    I then passed the sheath over the balloon into the left renal collecting system.  The balloon was then deflated and the catheter was withdrawn leaving the wire in place.    I then passed a rigid nephroscope through the sheath into the kidney.  I did this to visually confirmed proper placement of the nephrostomy tube.    I then switched to a digital flexible ureteroscope passed this through the sheath into the renal collecting system.  I then followed the wires down the ureter where there were no stones seen.  The scope was passed into the ileal conduit and at no distal stones were seen today.        Then switch back to the nephroscope.  I evaluated the calices within the renal collecting system in the lower pole there was a collection of small stones consistent with on was seen a CT scan.  I then passed a ultrasonic probe through the scope and used this to evacuate the stone pieces.    There were no other obvious stones seen with the nephroscope.  I then switched to a flexible cystoscope.    Passed the flexible cystoscope into all the visible calices.  I performed an antegrade pyelogram to once again to delineate the pelvicaliceal structures.  I was able to pass the scope into all calices including the lower pole calices.  No further stones were seen.    I then switched back to the ureteroscope.  I passed the ureteroscope into the lower pole calices to confirm no other stones were seen.  No other calices were  missed.    I then passed the ureteroscope 1 last time down the ureter to ensure that no stone fragments had passed during the procedure.  The ureter was clean.    I then passed a 24 Emir[place] nephroureteral reentry sheath over the superstiff wire.  Under live fluoroscopy ice visualized ureteral tail going into the proximal ureter.  I then deployed the flanges of the sheath within the renal pelvis.    The performed an antegrade pyelogram through the sheath to confirm proper placement.  The NephroMax sheath was withdrawn under live fluoroscopy as well as both wires.  The nephrostomy tube remained in place.    I then secured the reentry sheath to the skin with 0 silk suture.  The sheath was then placed to gravity drainage.    His then placed in the supine position and anesthesia was reversed he was then taken to the recovery area in stable condition.

## 2020-07-22 NOTE — PROGRESS NOTES
Arrived from PACU. Dressing noted to left flank region, intact. Perc tube connected to torres bag. Bloody urine noted in bag. Pt complains of pain. SR up x3, bed low, call bell in reach.

## 2020-07-22 NOTE — TRANSFER OF CARE
Anesthesia Transfer of Care Note    Patient: Blake Guillory    Procedure(s) Performed: Procedure(s) (LRB):  NEPHROLITHOTOMY, PERCUTANEOUS (Left)    Patient location: PACU    Transport from OR: Transported from OR on 6-10 L/min O2 by face mask with adequate spontaneous ventilation    Post pain: adequate analgesia    Post assessment: no apparent anesthetic complications and tolerated procedure well    Post vital signs: stable    Level of consciousness: sedated    Nausea/Vomiting: no nausea/vomiting    Complications: none    Transfer of care protocol was followed      Last vitals:   Visit Vitals  /78 (BP Location: Right arm, Patient Position: Lying)   Pulse 76   Temp 36.5 °C (97.7 °F) (Oral)   Resp 18   Wt 84.5 kg (186 lb 4.6 oz)   SpO2 98%   BMI 25.27 kg/m²

## 2020-07-22 NOTE — ANESTHESIA PREPROCEDURE EVALUATION
07/22/2020  Blake Guillory is a 60 y.o., male.    Anesthesia Evaluation    I have reviewed the Patient Summary Reports.    I have reviewed the Nursing Notes.       Review of Systems  Anesthesia Hx:  No problems with previous Anesthesia  Denies Family Hx of Anesthesia complications.   Denies Personal Hx of Anesthesia complications.   Social:  Former Smoker, Alcohol Use marijuana   Hematology/Oncology:         -- Cancer in past history: Other (see Oncology comments) Oncology Comments: bladder     Cardiovascular:   Exercise tolerance: good Hypertension Past MI  Denies Dysrhythmias.   Denies Angina.     2018 stress: negative for ischemia; normal EF    Cardiology-cleared for surgery   Pulmonary:   COPD Asthma Uses home O2 prn; 3.5L   Renal/:   Chronic Renal Disease renal calculi    Hepatic/GI:  Hepatic/GI Normal    Musculoskeletal:   Arthritis     Neurological:   Neuromuscular Disease,    Endocrine:   Hypothyroidism    Psych:   Psychiatric History          Physical Exam  General:  Well nourished    Airway/Jaw/Neck:  Airway Findings: Mouth Opening: Normal Tongue: Normal  Mallampati: II  TM Distance: 4 - 6 cm  Jaw/Neck Findings:   Big beard    Dental:  Dental Findings: Edentulous   Chest/Lungs:  Chest/Lungs Findings: Normal Respiratory Rate, Clear to auscultation     Heart/Vascular:  Heart Findings: Rate: Normal  Rhythm: Regular Rhythm        Mental Status:  Mental Status Findings:  Cooperative, Alert and Oriented       Wt Readings from Last 3 Encounters:   07/20/20 84.5 kg (186 lb 4.6 oz)   07/20/20 84.5 kg (186 lb 4.6 oz)   07/13/20 84.8 kg (187 lb)     Temp Readings from Last 3 Encounters:   07/22/20 36.9 °C (98.4 °F) (Oral)   07/20/20 36.9 °C (98.4 °F) (Oral)   07/13/20 36.8 °C (98.2 °F)     BP Readings from Last 3 Encounters:   07/22/20 136/78   07/20/20 124/70   07/13/20 120/75     Pulse Readings from  Last 3 Encounters:   07/22/20 76   07/20/20 69   07/13/20 80     Lab Results   Component Value Date    WBC 5.30 07/06/2020    HGB 14.3 07/06/2020    HCT 43.0 07/06/2020    MCV 97 07/06/2020     07/06/2020       CMP  Sodium   Date Value Ref Range Status   07/06/2020 139 136 - 145 mmol/L Final     Potassium   Date Value Ref Range Status   07/06/2020 4.3 3.5 - 5.1 mmol/L Final     Chloride   Date Value Ref Range Status   07/06/2020 104 101 - 111 mmol/L Final     CO2   Date Value Ref Range Status   07/06/2020 25 23 - 29 mmol/L Final     Glucose   Date Value Ref Range Status   07/06/2020 108 74 - 118 mg/dL Final     BUN, Bld   Date Value Ref Range Status   07/06/2020 37 (H) 8 - 23 mg/dL Final     Creatinine   Date Value Ref Range Status   07/06/2020 2.2 (H) 0.5 - 1.4 mg/dL Final     Calcium   Date Value Ref Range Status   07/06/2020 9.3 8.6 - 10.0 mg/dL Final     Total Protein   Date Value Ref Range Status   07/06/2020 7.3 6.0 - 8.4 g/dL Final     Albumin   Date Value Ref Range Status   07/06/2020 4.1 3.5 - 5.2 g/dL Final     Total Bilirubin   Date Value Ref Range Status   07/06/2020 0.7 0.3 - 1.2 mg/dL Final     Comment:     For infants and newborns, interpretation of results should be based  on gestational age, weight and in agreement with clinical  observations.  Premature Infant recommended reference ranges:  Up to 24 hours.............<8.0 mg/dL  Up to 48 hours............<12.0 mg/dL  3-5 days..................<15.0 mg/dL  6-29 days.................<15.0 mg/dL       Alkaline Phosphatase   Date Value Ref Range Status   07/06/2020 80 38 - 126 U/L Final     AST   Date Value Ref Range Status   07/06/2020 32 15 - 41 U/L Final     ALT   Date Value Ref Range Status   07/06/2020 46 17 - 63 U/L Final     Anion Gap   Date Value Ref Range Status   07/06/2020 10 8 - 16 mmol/L Final     eGFR if    Date Value Ref Range Status   07/06/2020 35.8 (A) >60 mL/min/1.73 m^2 Final     eGFR if non African American    Date Value Ref Range Status   07/06/2020 31.0 (A) >60 mL/min/1.73 m^2 Final     Comment:     Calculation used to obtain the estimated glomerular filtration  rate (eGFR) is the CKD-EPI equation.        7/20/2020 COVID negative    Anesthesia Plan  Type of Anesthesia, risks & benefits discussed:  Anesthesia Type:  general  Patient's Preference:   Intra-op Monitoring Plan: standard ASA monitors  Intra-op Monitoring Plan Comments:   Post Op Pain Control Plan: multimodal analgesia and per primary service following discharge from PACU  Post Op Pain Control Plan Comments:   Induction:   IV  Beta Blocker:  Patient is not currently on a Beta-Blocker (No further documentation required).       Informed Consent: Patient understands risks and agrees with Anesthesia plan.  Questions answered. Anesthesia consent signed with patient.  ASA Score: 3     Day of Surgery Review of History & Physical: I have interviewed and examined the patient. I have reviewed the patient's H&P dated:            Ready For Surgery From Anesthesia Perspective.

## 2020-07-23 ENCOUNTER — TELEPHONE (OUTPATIENT)
Dept: UROLOGY | Facility: CLINIC | Age: 60
End: 2020-07-23

## 2020-07-23 VITALS
RESPIRATION RATE: 18 BRPM | WEIGHT: 177.5 LBS | DIASTOLIC BLOOD PRESSURE: 85 MMHG | TEMPERATURE: 98 F | HEIGHT: 72 IN | OXYGEN SATURATION: 92 % | HEART RATE: 63 BPM | SYSTOLIC BLOOD PRESSURE: 164 MMHG | BODY MASS INDEX: 24.04 KG/M2

## 2020-07-23 LAB
ALBUMIN SERPL BCP-MCNC: 3.6 G/DL (ref 3.5–5.2)
ALP SERPL-CCNC: 91 U/L (ref 55–135)
ALT SERPL W/O P-5'-P-CCNC: 22 U/L (ref 10–44)
ANION GAP SERPL CALC-SCNC: 8 MMOL/L (ref 8–16)
AST SERPL-CCNC: 22 U/L (ref 10–40)
BASOPHILS # BLD AUTO: 0.01 K/UL (ref 0–0.2)
BASOPHILS NFR BLD: 0.1 % (ref 0–1.9)
BILIRUB SERPL-MCNC: 0.3 MG/DL (ref 0.1–1)
BUN SERPL-MCNC: 22 MG/DL (ref 8–23)
CALCIUM SERPL-MCNC: 8.8 MG/DL (ref 8.7–10.5)
CHLORIDE SERPL-SCNC: 106 MMOL/L (ref 95–110)
CO2 SERPL-SCNC: 25 MMOL/L (ref 23–29)
CREAT SERPL-MCNC: 1.7 MG/DL (ref 0.5–1.4)
DIFFERENTIAL METHOD: ABNORMAL
EOSINOPHIL # BLD AUTO: 0 K/UL (ref 0–0.5)
EOSINOPHIL NFR BLD: 0 % (ref 0–8)
ERYTHROCYTE [DISTWIDTH] IN BLOOD BY AUTOMATED COUNT: 13.4 % (ref 11.5–14.5)
EST. GFR  (AFRICAN AMERICAN): 49 ML/MIN/1.73 M^2
EST. GFR  (NON AFRICAN AMERICAN): 42 ML/MIN/1.73 M^2
GLUCOSE SERPL-MCNC: 104 MG/DL (ref 70–110)
HCT VFR BLD AUTO: 43.9 % (ref 40–54)
HGB BLD-MCNC: 14.2 G/DL (ref 14–18)
IMM GRANULOCYTES # BLD AUTO: 0.01 K/UL (ref 0–0.04)
IMM GRANULOCYTES NFR BLD AUTO: 0.1 % (ref 0–0.5)
LYMPHOCYTES # BLD AUTO: 0.6 K/UL (ref 1–4.8)
LYMPHOCYTES NFR BLD: 6.6 % (ref 18–48)
MCH RBC QN AUTO: 32.3 PG (ref 27–31)
MCHC RBC AUTO-ENTMCNC: 32.3 G/DL (ref 32–36)
MCV RBC AUTO: 100 FL (ref 82–98)
MONOCYTES # BLD AUTO: 0.6 K/UL (ref 0.3–1)
MONOCYTES NFR BLD: 6.5 % (ref 4–15)
NEUTROPHILS # BLD AUTO: 8.2 K/UL (ref 1.8–7.7)
NEUTROPHILS NFR BLD: 86.7 % (ref 38–73)
NRBC BLD-RTO: 0 /100 WBC
PLATELET # BLD AUTO: 183 K/UL (ref 150–350)
PMV BLD AUTO: 10.5 FL (ref 9.2–12.9)
POTASSIUM SERPL-SCNC: 4.2 MMOL/L (ref 3.5–5.1)
PROT SERPL-MCNC: 7.1 G/DL (ref 6–8.4)
RBC # BLD AUTO: 4.4 M/UL (ref 4.6–6.2)
SODIUM SERPL-SCNC: 139 MMOL/L (ref 136–145)
WBC # BLD AUTO: 9.43 K/UL (ref 3.9–12.7)

## 2020-07-23 PROCEDURE — 80053 COMPREHEN METABOLIC PANEL: CPT

## 2020-07-23 PROCEDURE — 85025 COMPLETE CBC W/AUTO DIFF WBC: CPT

## 2020-07-23 PROCEDURE — 99900035 HC TECH TIME PER 15 MIN (STAT)

## 2020-07-23 PROCEDURE — 27000221 HC OXYGEN, UP TO 24 HOURS

## 2020-07-23 PROCEDURE — 36415 COLL VENOUS BLD VENIPUNCTURE: CPT

## 2020-07-23 PROCEDURE — 94761 N-INVAS EAR/PLS OXIMETRY MLT: CPT

## 2020-07-23 PROCEDURE — 94640 AIRWAY INHALATION TREATMENT: CPT

## 2020-07-23 PROCEDURE — 63600175 PHARM REV CODE 636 W HCPCS: Performed by: UROLOGY

## 2020-07-23 PROCEDURE — 25000003 PHARM REV CODE 250: Performed by: UROLOGY

## 2020-07-23 RX ORDER — CIPROFLOXACIN 500 MG/1
500 TABLET ORAL 2 TIMES DAILY
Qty: 10 TABLET | Refills: 0 | Status: SHIPPED | OUTPATIENT
Start: 2020-07-23 | End: 2020-07-28

## 2020-07-23 RX ORDER — HYDROCODONE BITARTRATE AND ACETAMINOPHEN 10; 325 MG/1; MG/1
1 TABLET ORAL EVERY 6 HOURS PRN
Qty: 28 TABLET | Refills: 0 | Status: ON HOLD | OUTPATIENT
Start: 2020-07-23 | End: 2021-06-07 | Stop reason: SDUPTHER

## 2020-07-23 RX ADMIN — LEVOTHYROXINE SODIUM 88 MCG: 88 TABLET ORAL at 05:07

## 2020-07-23 RX ADMIN — HYDROMORPHONE HYDROCHLORIDE 1 MG: 2 INJECTION, SOLUTION INTRAMUSCULAR; INTRAVENOUS; SUBCUTANEOUS at 04:07

## 2020-07-23 RX ADMIN — CIPROFLOXACIN 400 MG: 2 INJECTION, SOLUTION INTRAVENOUS at 10:07

## 2020-07-23 RX ADMIN — ONDANSETRON HYDROCHLORIDE 4 MG: 2 SOLUTION INTRAMUSCULAR; INTRAVENOUS at 07:07

## 2020-07-23 RX ADMIN — SODIUM CHLORIDE: 0.9 INJECTION, SOLUTION INTRAVENOUS at 04:07

## 2020-07-23 RX ADMIN — HYDROMORPHONE HYDROCHLORIDE 1 MG: 2 INJECTION, SOLUTION INTRAMUSCULAR; INTRAVENOUS; SUBCUTANEOUS at 10:07

## 2020-07-23 RX ADMIN — GABAPENTIN 400 MG: 400 CAPSULE ORAL at 10:07

## 2020-07-23 RX ADMIN — SODIUM BICARBONATE TAB 325 MG 325 MG: 325 TAB at 10:07

## 2020-07-23 RX ADMIN — PANTOPRAZOLE SODIUM 40 MG: 40 TABLET, DELAYED RELEASE ORAL at 10:07

## 2020-07-23 RX ADMIN — ALLOPURINOL 100 MG: 100 TABLET ORAL at 10:07

## 2020-07-23 RX ADMIN — THERA TABS 1 TABLET: TAB at 10:07

## 2020-07-23 RX ADMIN — ATENOLOL 50 MG: 50 TABLET ORAL at 10:07

## 2020-07-23 RX ADMIN — BUSPIRONE HYDROCHLORIDE 5 MG: 5 TABLET ORAL at 10:07

## 2020-07-23 RX ADMIN — ASPIRIN 81 MG: 81 TABLET, COATED ORAL at 10:07

## 2020-07-23 RX ADMIN — HYDROMORPHONE HYDROCHLORIDE 1 MG: 2 INJECTION, SOLUTION INTRAMUSCULAR; INTRAVENOUS; SUBCUTANEOUS at 12:07

## 2020-07-23 RX ADMIN — AMLODIPINE BESYLATE 10 MG: 5 TABLET ORAL at 10:07

## 2020-07-23 RX ADMIN — ESCITALOPRAM OXALATE 20 MG: 10 TABLET ORAL at 10:07

## 2020-07-23 NOTE — SUBJECTIVE & OBJECTIVE
Interval History: no acute events overnight.  Pain controlled, blood in urine noted.    Review of Systems   Constitutional: Negative.  Negative for fever.   HENT: Negative.    Eyes: Negative.    Respiratory: Negative for cough, chest tightness and shortness of breath.    Cardiovascular: Negative for chest pain.   Gastrointestinal: Negative.  Negative for constipation, diarrhea and nausea.   Genitourinary: Positive for flank pain and hematuria.   Neurological: Negative.    Psychiatric/Behavioral: Negative.      Objective:     Temp:  [97.6 °F (36.4 °C)-98.4 °F (36.9 °C)] 98 °F (36.7 °C)  Pulse:  [63-82] 63  Resp:  [12-20] 18  SpO2:  [87 %-94 %] 92 %  BP: (126-169)/(63-95) 164/85     Body mass index is 24.07 kg/m².           Drains     Drain                 Urostomy RLQ -- days         Ureteral Drain/Stent 01/02/18 1156 Right ureter 8 Fr. 932 days         Urostomy 06/17/20 1055 36 days         Nephrostomy 07/22/20 0855 Left 24 Fr. 1 day                Physical Exam   Nursing note and vitals reviewed.  Constitutional: He is oriented to person, place, and time. He appears well-developed.   HENT:   Head: Normocephalic.   Eyes: Conjunctivae are normal.   Neck: Normal range of motion. Neck supple. No tracheal deviation present. No thyromegaly present.   Cardiovascular: Normal rate and normal heart sounds.    Pulmonary/Chest: Effort normal and breath sounds normal. No respiratory distress. He has no wheezes.   Abdominal: Soft. Bowel sounds are normal. There is no abdominal tenderness. There is no rebound. No hernia.   Genitourinary:    Genitourinary Comments: Stoma pink  Urine thin pink  Left PCN in place, red thin urine     Musculoskeletal: Normal range of motion. No tenderness.   Lymphadenopathy:     He has no cervical adenopathy.   Neurological: He is alert and oriented to person, place, and time.   Skin: Skin is warm and dry. No rash noted. No erythema.     Psychiatric: His behavior is normal. Judgment and thought  content normal.       Significant Labs:    BMP:  Recent Labs   Lab 07/22/20  0933 07/23/20  0449    139   K 5.2* 4.2    106   CO2 26 25   BUN 25* 22   CREATININE 2.1* 1.7*   CALCIUM 8.7 8.8       CBC:   Recent Labs   Lab 07/22/20  0933 07/23/20  0449   WBC  --  9.43   HGB 13.6* 14.2   HCT 42.0 43.9   PLT  --  183       Blood Culture: No results for input(s): LABBLOO in the last 168 hours.  Urine Culture: No results for input(s): LABURIN in the last 168 hours.    Significant Imaging:  CT: I have reviewed all results within the past 24 hours and my personal findings are:  PCN in good position, no stones seen

## 2020-07-23 NOTE — NURSING
Report received from PATRICIA Delgado. Patient resting comfortably in bed, no acute distress noted at this time. He is awake and alert. Plan of care reviewed with patient. Instructed patient to call for assistance whenever needed, side rails up x2, bed alarm set, call light in reach, non skid socks in use. IV fluids infusing to L Peripheral IV site, no redness or swelling noted. Nephrostomy tube in placed at left lower back, dressing intact, urostomy at RLQ. Patient verbalized understanding of instructions. Will continue to monitor.

## 2020-07-23 NOTE — PROGRESS NOTES
[facility]  Urology  Progress Note    Patient Name: Blake Guillory  MRN: [id number]  Admission Date: 7/22/2020  Hospital Length of Stay: 0 days  Code Status: Prior   Attending Provider: CHAD Bo MD   Primary Care Physician: Varun Zeng MD    Subjective:     HPI:  No notes on file    Interval History: no acute events overnight.  Pain controlled, blood in urine noted.    Review of Systems   Constitutional: Negative.  Negative for fever.   HENT: Negative.    Eyes: Negative.    Respiratory: Negative for cough, chest tightness and shortness of breath.    Cardiovascular: Negative for chest pain.   Gastrointestinal: Negative.  Negative for constipation, diarrhea and nausea.   Genitourinary: Positive for flank pain and hematuria.   Neurological: Negative.    Psychiatric/Behavioral: Negative.      Objective:     Temp:  [97.6 °F (36.4 °C)-98.4 °F (36.9 °C)] 98 °F (36.7 °C)  Pulse:  [63-82] 63  Resp:  [12-20] 18  SpO2:  [87 %-94 %] 92 %  BP: (126-169)/(63-95) 164/85     Body mass index is 24.07 kg/m².           Drains     Drain                 Urostomy RLQ -- days         Ureteral Drain/Stent 01/02/18 1156 Right ureter 8 Fr. 932 days         Urostomy 06/17/20 1055 36 days         Nephrostomy 07/22/20 0855 Left 24 Fr. 1 day                Physical Exam   Nursing note and vitals reviewed.  Constitutional: He is oriented to person, place, and time. He appears well-developed.   HENT:   Head: Normocephalic.   Eyes: Conjunctivae are normal.   Neck: Normal range of motion. Neck supple. No tracheal deviation present. No thyromegaly present.   Cardiovascular: Normal rate and normal heart sounds.    Pulmonary/Chest: Effort normal and breath sounds normal. No respiratory distress. He has no wheezes.   Abdominal: Soft. Bowel sounds are normal. There is no abdominal tenderness. There is no rebound. No hernia.   Genitourinary:    Genitourinary Comments: Stoma pink  Urine thin pink  Left PCN in place, red  thin urine     Musculoskeletal: Normal range of motion. No tenderness.   Lymphadenopathy:     He has no cervical adenopathy.   Neurological: He is alert and oriented to person, place, and time.   Skin: Skin is warm and dry. No rash noted. No erythema.     Psychiatric: His behavior is normal. Judgment and thought content normal.       Significant Labs:    BMP:  Recent Labs   Lab 07/22/20  0933 07/23/20  0449    139   K 5.2* 4.2    106   CO2 26 25   BUN 25* 22   CREATININE 2.1* 1.7*   CALCIUM 8.7 8.8       CBC:   Recent Labs   Lab 07/22/20  0933 07/23/20  0449   WBC  --  9.43   HGB 13.6* 14.2   HCT 42.0 43.9   PLT  --  183       Blood Culture: No results for input(s): LABBLOO in the last 168 hours.  Urine Culture: No results for input(s): LABURIN in the last 168 hours.    Significant Imaging:  CT: I have reviewed all results within the past 24 hours and my personal findings are:  PCN in good position, no stones seen                  Assessment/Plan:     * Kidney stone on left side  Okay to go home today        VTE Risk Mitigation (From admission, onward)         Ordered     IP VTE HIGH RISK PATIENT  Once      07/22/20 1412     Place sequential compression device  Until discontinued      07/22/20 1412                SHIN Bo MD  Urology  [facility]

## 2020-07-23 NOTE — PROGRESS NOTES
WRITTEN HEALTHCARE DISCHARGE INFORMATION      Things that YOU are responsible for to Manage Your Care At Home:  1. Getting your prescriptions filled.  2. Taking you medications as directed. DO NOT MISS ANY DOSES!  3. Going to your follow-up doctor appointments. This is important because it allows the doctor to monitor your progress and to determine if any changes need to be made to your treatment plan.     If you are unable to make your follow up appointments, please call the number listed and reschedule this appointment.      After discharge, if you need assistance, you can call [facility] for 24/7 assistance at [phone]     If you are experience any signs or symptoms, Call your doctor or Call 911 and come to your nearest Emergency Room.     Thank you for choosing [facility] and allowing us to care for you.        You should receive a call from [facility] within 48-72 hours to help manage your care after discharge. Please try to make sure that you answer your phone for this important phone call.     Follow-up Information     SHIN Bo MD. Go on 7/27/2020.    Specialty: Urology  Why: nephrostomy tube removal  Contact information:  [address]  [address]  [facility] [address]  [phone]

## 2020-07-23 NOTE — PLAN OF CARE
EDUCATION:  Patient discharge instructions updated to include educational information on Kidney Stone and Percutaneous Nephrostomy that will be printed on patient's AVS to review upon discharge; Information reviewed via phone with patient and include  signs and symptoms to look for and call the doctor if experiencing, and symptoms that may indicate a medical emergency: CALL 911.    All questions answered.  Teach back method used.  Repeated that he will call MD for fever or bleeding      Reviewed with patient things that he can do to better manage his care at home to include taking all medications as precscribed and make sure patient attend all follow up visits;     F/U appointments with PCP/Urology were reviewed;  verbalized understanding     NurseDanny notified that all discharge planning needs have been addressed and patient can be discharged from  standpoint         07/23/20 1219   Final Note   Assessment Type Final Discharge Note   Anticipated Discharge Disposition Home   What phone number can be called within the next 1-3 days to see how you are doing after discharge? [phone]   Hospital Follow Up  Appt(s) scheduled? Yes   Discharge plans and expectations educations in teach back method with documentation complete? Yes   Right Care Referral Info   Post Acute Recommendation No Care   Post-Acute Status   Post-Acute Authorization Other   Other Status No Post-Acute Service Needs   Discharge Delays None known at this time

## 2020-07-23 NOTE — DISCHARGE INSTRUCTIONS
WRITTEN HEALTHCARE DISCHARGE INFORMATION      Things that YOU are responsible for to Manage Your Care At Home:  1. Getting your prescriptions filled.  2. Taking you medications as directed. DO NOT MISS ANY DOSES!  3. Going to your follow-up doctor appointments. This is important because it allows the doctor to monitor your progress and to determine if any changes need to be made to your treatment plan.     If you are unable to make your follow up appointments, please call the number listed and reschedule this appointment.      After discharge, if you need assistance, you can call [facility] for 24/7 assistance at [phone]     If you are experience any signs or symptoms, Call your doctor or Call [phone] and come to your nearest Emergency Room.     Thank you for choosing [facility] and allowing us to care for you.        You should receive a call from [facility] within 48-72 hours to help manage your care after discharge. Please try to make sure that you answer your phone for this important phone call.

## 2020-07-23 NOTE — TELEPHONE ENCOUNTER
----- Message from Soumya Sweet sent at 7/23/2020 11:59 AM CDT -----  Type: Patient Call Back       What is the request in detail:  pt calling to speak to a nurse regarding an sooner appt.      Can the clinic reply by [facility]? No       Would the patient rather a call back or a response via [facility]? Call back       Best call back number: [phone]

## 2020-07-23 NOTE — PLAN OF CARE
Problem: Adult Inpatient Plan of Care  Goal: Plan of Care Review  Outcome: Ongoing, Progressing  Goal: Patient-Specific Goal (Individualization)  Outcome: Ongoing, Progressing  Goal: Absence of Hospital-Acquired Illness or Injury  Outcome: Ongoing, Progressing  Goal: Optimal Comfort and Wellbeing  Outcome: Ongoing, Progressing  Goal: Readiness for Transition of Care  Outcome: Ongoing, Progressing     Problem: Fall Injury Risk  Goal: Absence of Fall and Fall-Related Injury  Outcome: Ongoing, Progressing  Intervention: Identify and Manage Contributors to Fall Injury Risk  Flowsheets (Taken 7/23/2020 0506)  Self-Care Promotion:   independence encouraged   BADL personal objects within reach   BADL personal routines maintained  Medication Review/Management:   medications reviewed   high risk medications identified  Intervention: Promote Injury-Free Environment  Flowsheets (Taken 7/23/2020 0506)  Safety Promotion/Fall Prevention:   assistive device/personal item within reach   bed alarm set   Fall Risk reviewed with patient/family   nonskid shoes/socks when out of bed   instructed to call staff for mobility   supervised activity   medications reviewed  Environmental Safety Modification:   assistive device/personal items within reach   clutter free environment maintained   lighting adjusted     Problem: Pain Acute  Goal: Optimal Pain Control  Outcome: Ongoing, Progressing  Intervention: Develop Pain Management Plan  Flowsheets (Taken 7/23/2020 0508)  Pain Management Interventions:   awakened for pain meds per patient request   breathing exercises   care clustered   pain management plan reviewed with patient/caregiver   pillow support provided   relaxation techniques promoted   quiet environment facilitated  Intervention: Prevent or Manage Pain  Flowsheets (Taken 7/23/2020 0508)  Sleep/Rest Enhancement:   relaxation techniques promoted   regular sleep/rest pattern promoted  Intervention: Optimize Psychosocial  Wellbeing  Flowsheets (Taken 7/23/2020 6489)  Supportive Measures:   relaxation techniques promoted   positive reinforcement provided   verbalization of feelings encouraged

## 2020-07-23 NOTE — RESPIRATORY THERAPY
"Attempted IS therapy however pt. Refused.  Pt. States that he is "not interested in doing it right now."  Pt. Explained the benefits of IS therapy but he still refused.  Pt. VSS and is Resting comfortably on 3LPM NC, SpO2 94%;HR 77;RR 17.  "

## 2020-07-23 NOTE — PLAN OF CARE
07/23/20 1108   Discharge Assessment   Assessment Type Discharge Planning Assessment   Confirmed/corrected address and phone number on facesheet? Yes   Assessment information obtained from? Patient   Prior to hospitilization cognitive status: Alert/Oriented   Prior to hospitalization functional status: Independent   Current cognitive status: Alert/Oriented   Current Functional Status: Independent   Facility Arrived From: home   Lives With spouse   Able to Return to Prior Arrangements yes   Is patient able to care for self after discharge? Yes   Who are your caregiver(s) and their phone number(s)? [facility] [phone]   Patient's perception of discharge disposition home or selfcare   Readmission Within the Last 30 Days other (see comments)   Patient currently being followed by outpatient case management? No   Patient currently receives any other outside agency services? No   Equipment Currently Used at Home cane, straight;oxygen   Do you have any problems affording any of your prescribed medications? No   Is the patient taking medications as prescribed? yes   Does the patient have transportation home? Yes   Transportation Anticipated car, drives self;family or friend will provide   Dialysis Name and Scheduled days N/A   Does the patient receive services at the [facility]? No   Discharge Plan A Home with family;Home Health   Discharge Plan B Home with family   DME Needed Upon Discharge  none   Patient/Family in Agreement with Plan yes         [id number]  [place] - [address]  [address] [place] [place] [address]  Phone: [phone] Fax: [phone]

## 2020-07-23 NOTE — DISCHARGE SUMMARY
[facility]  Urology  Discharge Summary      Patient Name: Blake Guillory  MRN: [id number]  Admission Date: 7/22/2020  Hospital Length of Stay: 0 days  Discharge Date and Time:  07/23/2020 11:56 AM  Attending Physician: CHAD Bo MD   Discharging Provider: SHIN Bo MD  Primary Care Physician: Varun Zeng MD    HPI:   No notes on file    Procedure(s) (LRB):  NEPHROLITHOTOMY, PERCUTANEOUS (Left)  PLACEMENT (Left)     Indwelling Lines/Drains at time of discharge:   Lines/Drains/Airways     Drain                 Urostomy RLQ -- days         Ureteral Drain/Stent 01/02/18 1156 Right ureter 8 Fr. 932 days         Urostomy 06/17/20 1055 36 days         Nephrostomy 07/22/20 0855 Left 24 Fr. 1 day                Hospital Course (synopsis of major diagnoses, care, treatment, and services provided during the course of the hospital stay): He was monitored overnight.  He had no postop issues.  He was discharged home the next day.    Consults:     Significant Diagnostic Studies: CXR, CT scan    Pending Diagnostic Studies:     Procedure Component Value Units Date/Time    CT Renal Stone Study Abd Pelvis WO [[id number]] Resulted: 07/23/20 1150    Order Status: Sent Lab Status: In process Updated: 07/23/20 1133    Specimen to Pathology, Surgery Urology [[id number]] Collected: 07/22/20 0856    Order Status: Sent Lab Status: In process Updated: 07/22/20 1136    Specimen to Pathology, Surgery Urology [[id number]] Collected: 07/22/20 0921    Order Status: Sent Lab Status: In process Updated: 07/22/20 0921    Urinary Stone Analysis [[id number]] Collected: 07/22/20 0922    Order Status: Sent Lab Status: In process Updated: 07/22/20 0923    Specimen: Urine from Stone           Final Active Diagnoses:    Diagnosis Date Noted POA    PRINCIPAL PROBLEM:  Kidney stone on left side [N20.0] 07/22/2020 Yes      Problems Resolved During this Admission:         Discharged Condition: stable    Disposition: Home or  Self Care    Follow Up:  Follow-up Information     W Vikash Bo MD. Go on 7/27/2020.    Specialty: Urology  Why: nephrostomy tube removal  Contact information:  [address]  [address]  [place] [address] [phone]                 Patient Instructions:      Diet Adult Regular     Sponge bath only until clinic visit     Leave dressing on - Keep it clean, dry, and intact until clinic visit     Activity as tolerated     Medications:  Reconciled Home Medications:      Medication List      START taking these medications    ciprofloxacin HCl 500 MG tablet  Commonly known as: CIPRO  Take 1 tablet (500 mg total) by mouth 2 (two) times daily. for 5 days        CONTINUE taking these medications    albuterol-ipratropium 2.5 mg-0.5 mg/3 mL nebulizer solution  Commonly known as: DUO-NEB  Take 3 mLs by nebulization every 6 (six) hours as needed for Wheezing. Rescue     allopurinoL 100 MG tablet  Commonly known as: ZYLOPRIM  TAKE 1 TABLET BY MOUTH ONCE DAILY     amLODIPine 10 MG tablet  Commonly known as: NORVASC  Take 1 tablet (10 mg total) by mouth once daily.     aspirin 81 MG EC tablet  Commonly known as: ECOTRIN  Take 81 mg by mouth once daily.     atenoloL 50 MG tablet  Commonly known as: TENORMIN  TAKE 1 TABLET(50 MG) BY MOUTH EVERY DAY     busPIRone 5 MG Tab  Commonly known as: BUSPAR  Take 1 tablet (5 mg total) by mouth 2 (two) times daily as needed.     ergocalciferol 50,000 unit Cap  Commonly known as: VITAMIN D2  Take 1 capsule (50,000 Units total) by mouth every 7 days.     escitalopram oxalate 20 MG tablet  Commonly known as: LEXAPRO  Take 1 tablet (20 mg total) by mouth once daily.     fluticasone-umeclidin-vilanter 100-62.5-25 mcg Dsdv  Commonly known as: TRELEGY ELLIPTA  Take 1 puff by mouth once daily.     gabapentin 400 MG capsule  Commonly known as: NEURONTIN  Take 1 capsule by mouth twice daily     HYDROcodone-acetaminophen  mg per tablet  Commonly known as: NORCO  Take 1 tablet by mouth every 6  (six) hours as needed for Pain.     levothyroxine 88 MCG tablet  Commonly known as: SYNTHROID  Take 1 tablet (88 mcg total) by mouth once daily.     ONE DAILY MULTIVITAMIN per tablet  Generic drug: multivitamin  Take 1 tablet by mouth once daily.     pantoprazole 40 MG tablet  Commonly known as: PROTONIX  Take 1 tablet (40 mg total) by mouth once daily.     sodium bicarbonate 325 MG tablet  Take 325 mg by mouth 2 (two) times daily.     traZODone 150 MG tablet  Commonly known as: DESYREL  Take 1 tablet (150 mg total) by mouth every evening.            Time spent on the discharge of patient: 20 minutes    W Vikash Bo MD  Urology  [facility]

## 2020-07-23 NOTE — NURSING
SPO2 dropped to 87-89% in room air. Denies SOB, difficulty breathing and chest pain. Oxygen at 3L via nasal cannula started. Patient uses oxygen 3.5L at home PRN.

## 2020-07-24 ENCOUNTER — TELEPHONE (OUTPATIENT)
Dept: UROLOGY | Facility: CLINIC | Age: 60
End: 2020-07-24

## 2020-07-26 NOTE — ANESTHESIA POSTPROCEDURE EVALUATION
Anesthesia Post Evaluation    Patient: Blake Guillory    Procedure(s) Performed: Procedure(s) (LRB):  NEPHROLITHOTOMY, PERCUTANEOUS (Left)  PLACEMENT (Left)    Final Anesthesia Type: general    Patient location during evaluation: PACU  Patient participation: Yes- Able to Participate  Level of consciousness: awake and alert  Post-procedure vital signs: reviewed and stable  Pain management: adequate  Airway patency: patent    PONV status at discharge: No PONV  Anesthetic complications: no      Cardiovascular status: blood pressure returned to baseline and hemodynamically stable  Respiratory status: unassisted and spontaneous ventilation  Hydration status: euvolemic  Follow-up not needed.          Vitals Value Taken Time   /85 07/23/20 1140   Temp 36.7 °C (98 °F) 07/23/20 1140   Pulse 63 07/23/20 1140   Resp 18 07/23/20 1140   SpO2 92 % 07/23/20 1140         Event Time   Out of Recovery 13:55:21         Pain/Gissel Score: No data recorded

## 2020-07-27 ENCOUNTER — OFFICE VISIT (OUTPATIENT)
Dept: UROLOGY | Facility: CLINIC | Age: 60
End: 2020-07-27
Payer: MEDICAID

## 2020-07-27 VITALS
WEIGHT: 177 LBS | DIASTOLIC BLOOD PRESSURE: 80 MMHG | BODY MASS INDEX: 23.98 KG/M2 | HEIGHT: 72 IN | SYSTOLIC BLOOD PRESSURE: 134 MMHG

## 2020-07-27 DIAGNOSIS — N20.0 KIDNEY STONE ON LEFT SIDE: Primary | ICD-10-CM

## 2020-07-27 PROCEDURE — 99999 PR PBB SHADOW E&M-EST. PATIENT-LVL IV: CPT | Mod: PBBFAC,,, | Performed by: NURSE PRACTITIONER

## 2020-07-27 PROCEDURE — 99024 PR POST-OP FOLLOW-UP VISIT: ICD-10-PCS | Mod: ,,, | Performed by: NURSE PRACTITIONER

## 2020-07-27 PROCEDURE — 99999 PR PBB SHADOW E&M-EST. PATIENT-LVL IV: ICD-10-PCS | Mod: PBBFAC,,, | Performed by: NURSE PRACTITIONER

## 2020-07-27 PROCEDURE — 99214 OFFICE O/P EST MOD 30 MIN: CPT | Mod: PBBFAC | Performed by: NURSE PRACTITIONER

## 2020-07-27 PROCEDURE — 99024 POSTOP FOLLOW-UP VISIT: CPT | Mod: ,,, | Performed by: NURSE PRACTITIONER

## 2020-07-27 NOTE — PROGRESS NOTES
Subjective:       Patient ID: Blake Guillory is a 60 y.o. male who is an established patient of Dr Bo though new to me was last seen in this office 7/10/2020    Chief Complaint:   Chief Complaint   Patient presents with    Follow-up     Pt. is here becasue he states he was told that his tube was coming out .. states no other issues        Post-Operative Follow-up  Patient here for post-op follow-up. He went to the ED on 6/16/2020 with left sided flank pain.  CT showed left hydroureteronephrosis down to a 4 mm distal ureteral stone just proximal to his conduit. On imaging he also had lower pole renal stones present.   He had a nephrostomy tube placed by IR at that time.      Patient is 5 days status post L PCNL by Dr Bo.  He is here for removal of his nephrostomy tube. The patient reports no problems with eating, bowel movements, or their wound. The patient is not having any pain.       ACTIVE MEDICAL ISSUES:  Patient Active Problem List   Diagnosis    Insomnia    Lumbar facet arthropathy    Sacroiliitis    Spondylosis without myelopathy    DDD (degenerative disc disease)    History of bladder cancer    Hydronephrosis, bilateral    H/O primary malignant neoplasm of urinary bladder    Essential hypertension    Anemia of chronic disease    Moderate protein malnutrition    Chronic gout    Renal insufficiency    Body mass index (BMI) 20.0-20.9, adult    Personal history of bladder cancer    Acquired hypothyroidism    Hypoxemia    Increased PTH level    Severe malnutrition    Hydronephrosis    History of primary malignant neoplasm of urinary bladder    Hydronephrosis with urinary obstruction due to ureteral calculus    Chest pain of uncertain etiology    Chronic obstructive pulmonary disease with acute exacerbation    Epigastric abdominal tenderness without rebound tenderness    CKD (chronic kidney disease), stage IV    S/P colonoscopy    Iliac artery aneurysm    Kidney stones     Hypertriglyceridemia    Nephrolithiasis    Acute respiratory failure with hypoxia    Kidney stone on left side       ALLERGIES AND MEDICATIONS: updated and reviewed.  Review of patient's allergies indicates:  No Known Allergies  Current Outpatient Medications   Medication Sig    albuterol-ipratropium (DUO-NEB) 2.5 mg-0.5 mg/3 mL nebulizer solution Take 3 mLs by nebulization every 6 (six) hours as needed for Wheezing. Rescue    allopurinoL (ZYLOPRIM) 100 MG tablet TAKE 1 TABLET BY MOUTH ONCE DAILY    amLODIPine (NORVASC) 10 MG tablet Take 1 tablet (10 mg total) by mouth once daily.    aspirin (ECOTRIN) 81 MG EC tablet Take 81 mg by mouth once daily.    atenoloL (TENORMIN) 50 MG tablet TAKE 1 TABLET(50 MG) BY MOUTH EVERY DAY    busPIRone (BUSPAR) 5 MG Tab Take 1 tablet (5 mg total) by mouth 2 (two) times daily as needed.    ciprofloxacin HCl (CIPRO) 500 MG tablet Take 1 tablet (500 mg total) by mouth 2 (two) times daily. for 5 days    ergocalciferol (VITAMIN D2) 50,000 unit Cap Take 1 capsule (50,000 Units total) by mouth every 7 days.    escitalopram oxalate (LEXAPRO) 20 MG tablet Take 1 tablet (20 mg total) by mouth once daily.    fluticasone-umeclidin-vilanter (TRELEGY ELLIPTA) 100-62.5-25 mcg DsDv Take 1 puff by mouth once daily.    gabapentin (NEURONTIN) 400 MG capsule Take 1 capsule by mouth twice daily    HYDROcodone-acetaminophen (NORCO)  mg per tablet Take 1 tablet by mouth every 6 (six) hours as needed for Pain.    levothyroxine (SYNTHROID) 88 MCG tablet Take 1 tablet (88 mcg total) by mouth once daily.    multivitamin (ONE DAILY MULTIVITAMIN) per tablet Take 1 tablet by mouth once daily.    pantoprazole (PROTONIX) 40 MG tablet Take 1 tablet (40 mg total) by mouth once daily.    sodium bicarbonate 325 MG tablet Take 325 mg by mouth 2 (two) times daily.    traZODone (DESYREL) 150 MG tablet Take 1 tablet (150 mg total) by mouth every evening.     No current facility-administered  medications for this visit.        Review of Systems   Constitutional: Negative for activity change, chills, fatigue, fever and unexpected weight change.   Eyes: Negative for discharge, redness and visual disturbance.   Respiratory: Negative for cough, shortness of breath and wheezing.    Cardiovascular: Negative for chest pain and leg swelling.   Gastrointestinal: Negative for abdominal distention, abdominal pain, constipation, diarrhea, nausea and vomiting.   Genitourinary: Negative for decreased urine volume, dysuria, frequency, hematuria, testicular pain and urgency.   Musculoskeletal: Negative for arthralgias, joint swelling and myalgias.   Skin: Negative for color change and rash.   Neurological: Negative for dizziness and light-headedness.   Psychiatric/Behavioral: Negative for behavioral problems and confusion. The patient is not nervous/anxious.        Objective:      Vitals:    07/27/20 1258   BP: 134/80   Weight: 80.3 kg (177 lb)   Height: 6' (1.829 m)     Physical Exam   Constitutional: He is oriented to person, place, and time. He appears well-developed.   HENT:   Head: Normocephalic and atraumatic.   Nose: Nose normal.   Eyes: Conjunctivae are normal. Right eye exhibits no discharge. Left eye exhibits no discharge.   Neck: Normal range of motion. Neck supple. No tracheal deviation present. No thyromegaly present.   Cardiovascular: Normal rate and regular rhythm.    Pulmonary/Chest: Effort normal. No respiratory distress. He has no wheezes.   Abdominal: Soft. He exhibits no distension. There is no abdominal tenderness. No hernia.   Genitourinary:    Genitourinary Comments: L PCN draining thin pink tinged urine    Stoma pink--yellow urine noted to urostomy bag     Musculoskeletal: Normal range of motion.   Neurological: He is alert and oriented to person, place, and time.   Skin: Skin is warm and dry. No rash noted. No erythema.     Psychiatric: His behavior is normal. Judgment normal.       Urine  dipstick shows not done.      L PCN removed by myself without difficulty. PCN noted to be intact. Patient tolerated procedure well without any immediate complications. Gauze pressure dressing secured with tape placed to old PCN site    Assessment:       1. Kidney stone on left side          Plan:       1. Kidney stone on left side  -s/p L PCNL on 7/22/20 by Dr Bo  -L PCN removed without difficulty today  -F/u in 6-8 weeks with renal ultrasound  -He was instructed to RTC/ER with fever, foul odor, purulent drainage, increase in drainage, intractable pain            Follow up in about 2 months (around 9/27/2020) for review renal ultrasound.

## 2020-07-29 LAB
COMPN STONE: NORMAL
SPECIMEN SOURCE: NORMAL
STONE ANALYSIS IR-IMP: NORMAL

## 2020-07-30 LAB
FINAL PATHOLOGIC DIAGNOSIS: NORMAL
GROSS: NORMAL

## 2020-07-31 LAB
BRPFT: ABNORMAL
DLCO ADJ PRE: 12.79 ML/(MIN*MMHG) (ref 23.56–37.41)
DLCO SINGLE BREATH LLN: 23.56
DLCO SINGLE BREATH PRE REF: 41.5 %
DLCO SINGLE BREATH REF: 30.48
DLCOC SBVA LLN: 2.93
DLCOC SBVA PRE REF: 62.1 %
DLCOC SBVA REF: 4.04
DLCOC SINGLE BREATH LLN: 23.56
DLCOC SINGLE BREATH PRE REF: 41.9 %
DLCOC SINGLE BREATH REF: 30.48
DLCOVA LLN: 2.93
DLCOVA PRE REF: 61.4 %
DLCOVA PRE: 2.48 ML/(MIN*MMHG*L) (ref 2.93–5.15)
DLCOVA REF: 4.04
DLVAADJ PRE: 2.51 ML/(MIN*MMHG*L) (ref 2.93–5.15)
ERV LLN: -16448.78
ERV PRE REF: 27.6 %
ERV REF: 1.22
FEF 25 75 CHG: 22.6 %
FEF 25 75 LLN: 1.43
FEF 25 75 POST REF: 23 %
FEF 25 75 PRE REF: 18.8 %
FEF 25 75 REF: 2.99
FET100 CHG: 2.2 %
FEV1 CHG: 14 %
FEV1 FVC CHG: 5 %
FEV1 FVC LLN: 64
FEV1 FVC POST REF: 70.2 %
FEV1 FVC PRE REF: 66.9 %
FEV1 FVC REF: 77
FEV1 LLN: 2.78
FEV1 POST REF: 41.9 %
FEV1 PRE REF: 36.8 %
FEV1 REF: 3.73
FRCPLETH LLN: 2.76
FRCPLETH PREREF: 175.9 %
FRCPLETH REF: 3.75
FVC CHG: 8.6 %
FVC LLN: 3.69
FVC POST REF: 59.5 %
FVC PRE REF: 54.8 %
FVC REF: 4.87
IVC PRE: 2.78 L (ref 3.69–6.05)
IVC SINGLE BREATH LLN: 3.69
IVC SINGLE BREATH PRE REF: 57.1 %
IVC SINGLE BREATH REF: 4.87
PEF CHG: -13 %
PEF LLN: 7.09
PEF POST REF: 34.8 %
PEF PRE REF: 40 %
PEF REF: 9.54
POST FEF 25 75: 0.69 L/S (ref 1.43–4.55)
POST FET 100: 6.98 SEC
POST FEV1 FVC: 53.9 % (ref 64.48–89.02)
POST FEV1: 1.56 L (ref 2.78–4.67)
POST FVC: 2.9 L (ref 3.69–6.05)
POST PEF: 3.32 L/S (ref 7.09–11.99)
PRE DLCO: 12.64 ML/(MIN*MMHG) (ref 23.56–37.41)
PRE ERV: 0.34 L (ref -16448.78–16451.22)
PRE FEF 25 75: 0.56 L/S (ref 1.43–4.55)
PRE FET 100: 6.83 SEC
PRE FEV1 FVC: 51.35 % (ref 64.48–89.02)
PRE FEV1: 1.37 L (ref 2.78–4.67)
PRE FRC PL: 6.6 L
PRE FVC: 2.67 L (ref 3.69–6.05)
PRE PEF: 3.82 L/S (ref 7.09–11.99)
PRE RV: 6.23 L (ref 1.86–3.21)
PRE TLC: 9.29 L (ref 6.39–8.69)
RAW LLN: 3.06
RAW PRE REF: 181.5 %
RAW PRE: 5.55 CMH2O*S/L (ref 3.06–3.06)
RAW REF: 3.06
RV LLN: 1.86
RV PRE REF: 246.2 %
RV REF: 2.53
RVTLC LLN: 29
RVTLC PRE REF: 175.9 %
RVTLC PRE: 67.08 % (ref 29.16–47.12)
RVTLC REF: 38
TLC LLN: 6.39
TLC PRE REF: 123.2 %
TLC REF: 7.54
VA PRE: 5.1 L (ref 7.39–7.39)
VA SINGLE BREATH LLN: 7.39
VA SINGLE BREATH PRE REF: 68.9 %
VA SINGLE BREATH REF: 7.39
VC LLN: 3.69
VC PRE REF: 62.8 %
VC PRE: 3.06 L (ref 3.69–6.05)
VC REF: 4.87
VTGRAWPRE: 7.81 L

## 2020-08-03 ENCOUNTER — TELEPHONE (OUTPATIENT)
Dept: PULMONOLOGY | Facility: CLINIC | Age: 60
End: 2020-08-03

## 2020-08-03 NOTE — TELEPHONE ENCOUNTER
PFT with severe obstruction, moderate diffusion defect, hyper inflation.  His walk was non hypoxemic.

## 2020-08-24 PROBLEM — R79.89 INCREASED PTH LEVEL: Status: RESOLVED | Noted: 2017-04-18 | Resolved: 2020-08-24

## 2020-08-24 PROBLEM — E21.3 HYPERPARATHYROIDISM: Status: ACTIVE | Noted: 2020-08-24

## 2020-08-31 ENCOUNTER — OFFICE VISIT (OUTPATIENT)
Dept: ENDOCRINOLOGY | Facility: CLINIC | Age: 60
End: 2020-08-31
Attending: INTERNAL MEDICINE
Payer: MEDICAID

## 2020-08-31 VITALS
BODY MASS INDEX: 23.96 KG/M2 | HEART RATE: 76 BPM | WEIGHT: 180.75 LBS | HEIGHT: 73 IN | DIASTOLIC BLOOD PRESSURE: 76 MMHG | SYSTOLIC BLOOD PRESSURE: 138 MMHG

## 2020-08-31 DIAGNOSIS — E03.9 ACQUIRED HYPOTHYROIDISM: Chronic | ICD-10-CM

## 2020-08-31 DIAGNOSIS — N18.4 CKD (CHRONIC KIDNEY DISEASE), STAGE IV: ICD-10-CM

## 2020-08-31 DIAGNOSIS — E21.3 HYPERPARATHYROIDISM: Primary | ICD-10-CM

## 2020-08-31 PROCEDURE — 99204 PR OFFICE/OUTPT VISIT, NEW, LEVL IV, 45-59 MIN: ICD-10-PCS | Mod: S$GLB,,, | Performed by: INTERNAL MEDICINE

## 2020-08-31 PROCEDURE — 99204 OFFICE O/P NEW MOD 45 MIN: CPT | Mod: S$GLB,,, | Performed by: INTERNAL MEDICINE
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
## 2020-09-04 ENCOUNTER — TELEPHONE (OUTPATIENT)
Dept: ENDOCRINOLOGY | Facility: CLINIC | Age: 60
End: 2020-09-04

## 2020-09-04 DIAGNOSIS — N20.0 NEPHROLITHIASIS: Primary | ICD-10-CM

## 2020-09-04 NOTE — TELEPHONE ENCOUNTER
Left message for patient and let them know that Dr Powell has looked over your  bone density scan came back normal. His kidney function has declined somewhat from when it was last checked so please advise him to stay well hydrated. I will let him know when the rest of his labs are back. Dr Powell would also like him to give a urine sample for calcium and creatinine when able.

## 2020-09-04 NOTE — TELEPHONE ENCOUNTER
Please advise patient that I am still waiting for some of his results to return. His bone density scan came back normal. His kidney function has declined somewhat from when it was last checked so please advise him to stay well hydrated. I will let him know when the rest of his labs are back. I would also like him to give a urine sample for calcium and creatinine when able.

## 2020-10-10 ENCOUNTER — HOSPITAL ENCOUNTER (OUTPATIENT)
Dept: RADIOLOGY | Facility: HOSPITAL | Age: 60
Discharge: HOME | End: 2020-10-10
Attending: UROLOGY
Payer: MEDICAID

## 2020-10-10 DIAGNOSIS — N13.1 HYDRONEPHROSIS WITH URETERAL STRICTURE, NOT ELSEWHERE CLASSIFIED: ICD-10-CM

## 2020-10-10 PROCEDURE — 76770 US RETROPERITONEAL COMPLETE: ICD-10-PCS | Mod: 26,,, | Performed by: RADIOLOGY

## 2020-10-10 PROCEDURE — 76770 US EXAM ABDO BACK WALL COMP: CPT | Mod: TC

## 2020-10-10 PROCEDURE — 76770 US EXAM ABDO BACK WALL COMP: CPT | Mod: 26,,, | Performed by: RADIOLOGY

## 2020-10-14 ENCOUNTER — OFFICE VISIT (OUTPATIENT)
Dept: UROLOGY | Facility: CLINIC | Age: 60
End: 2020-10-14
Payer: MEDICAID

## 2020-10-14 VITALS — HEIGHT: 72 IN | BODY MASS INDEX: 25.05 KG/M2 | WEIGHT: 184.94 LBS

## 2020-10-14 DIAGNOSIS — Z85.51 HISTORY OF BLADDER CANCER: ICD-10-CM

## 2020-10-14 DIAGNOSIS — N13.1 HYDRONEPHROSIS WITH URETERAL STRICTURE, NOT ELSEWHERE CLASSIFIED: ICD-10-CM

## 2020-10-14 DIAGNOSIS — N20.0 KIDNEY STONES: Primary | ICD-10-CM

## 2020-10-14 PROCEDURE — 99214 OFFICE O/P EST MOD 30 MIN: CPT | Mod: PBBFAC | Performed by: UROLOGY

## 2020-10-14 PROCEDURE — 99024 PR POST-OP FOLLOW-UP VISIT: ICD-10-PCS | Mod: S$PBB,,, | Performed by: UROLOGY

## 2020-10-14 PROCEDURE — 81001 URINALYSIS AUTO W/SCOPE: CPT | Mod: PBBFAC | Performed by: UROLOGY

## 2020-10-14 PROCEDURE — 99024 POSTOP FOLLOW-UP VISIT: CPT | Mod: S$PBB,,, | Performed by: UROLOGY

## 2020-10-14 PROCEDURE — 99999 PR PBB SHADOW E&M-EST. PATIENT-LVL IV: CPT | Mod: PBBFAC,,, | Performed by: UROLOGY

## 2020-10-14 PROCEDURE — 99999 PR PBB SHADOW E&M-EST. PATIENT-LVL IV: ICD-10-PCS | Mod: PBBFAC,,, | Performed by: UROLOGY

## 2020-10-14 NOTE — PROGRESS NOTES
History of Present Illness  Left Ureteral/Kidney Stone  He went to the ED on 6/16/2020 with left sided flank pain.  CT showed left hydroureteronephrosis down to a 4 mm distal ureteral stone just proximal to his conduit.     He is now s/p left PCNL 7/2020.  He is doing okay.  He continues to have pain from both kidneys.  He is making a lot of urine.        History of Bladder Cancer  He has a history of bladder cancer. He is s/p cystectomy with ileal conduit in November 2012. From an oncologic standpoint, he is doing well. He has had issues with bilateral ureteral stricture. He underwent a bilateral ureteral reimplantation into his conduit several months later. His strictures recurred shortly afterwards.  He was managed with ureteral stent changes until one of the stent eroded through his conduit and into his bowels.    He had an Exploratory Laparotomy with replacement of ileal conduit and small bowel anastomosis on 12/30/2015.     He had a parastomal hernia repair in April by Dr. Mack.  He is healing nicely from that and has been discharged from his office.  He had a small bowel obstruction. that resolved.        He continues to have back pain in the morning and in the evening.  His stoma is not bothering him and he tells me that he has good urine output during the day and less at night.  He has worsening renal function and his right sided pain is becoming worse.       He had a right PCN placed by IR on 1/2/2018.  He then had a percutaneous right ureteral dilation and stent placement.  He noted increased urine output afterwards.  He still has pain and has noted significant pain in the right testicle.  He denies swelling or redness to the testicle.     He had his stent removed on 3/9/2018.  He is feeling okay, his chronic pain is about the same.       ACTIVE MEDICAL ISSUES:  Patient Active Problem List   Diagnosis    Insomnia    Lumbar facet arthropathy    Sacroiliitis    Spondylosis without myelopathy    DDD  (degenerative disc disease)    History of bladder cancer    Hydronephrosis, bilateral    H/O primary malignant neoplasm of urinary bladder    Essential hypertension    Anemia of chronic disease    Moderate protein malnutrition    Chronic gout    Renal insufficiency    Body mass index (BMI) 20.0-20.9, adult    Personal history of bladder cancer    Acquired hypothyroidism    Hypoxemia    Severe malnutrition    Hydronephrosis    History of primary malignant neoplasm of urinary bladder    Hydronephrosis with urinary obstruction due to ureteral calculus    Chest pain of uncertain etiology    Chronic obstructive pulmonary disease with acute exacerbation    Epigastric abdominal tenderness without rebound tenderness    CKD (chronic kidney disease), stage IV    S/P colonoscopy    Iliac artery aneurysm    Kidney stones    Hypertriglyceridemia    Nephrolithiasis    Acute respiratory failure with hypoxia    Kidney stone on left side    Hyperparathyroidism       PAST MEDICAL HISTORY  Past Medical History:   Diagnosis Date    Arthritis     Blood transfusion     Cancer     bladder    COPD (chronic obstructive pulmonary disease)     Frequency of urination     General anesthetics causing adverse effect in therapeutic use     pt states he wakes up very violently from anesthesia    History of urostomy     Hypertension     Limited joint range of motion right hip and leg    Mixed anxiety and depressive disorder     Personal history of bladder cancer     Thyroid disease     Ureteral stent displacement     Urinary retention     Wears glasses        PAST SURGICAL HISTORY:  Past Surgical History:   Procedure Laterality Date    ABDOMINAL SURGERY      APPENDECTOMY  12/30/2015    Procedure: APPENDECTOMY;  Surgeon: CHAD Bo MD;  Location: [facility];  Service: Urology;;    bladder removed      BOWEL RESECTION      CYSTOSCOPY      >1  episodes for bilat ureteral stent exchange    FRACTURE  SURGERY      HAND SURGERY      HERNIA REPAIR      amalia    HIP SURGERY  2012    Rt hip septic    MOUTH SURGERY  2000    all teeth extracted    nephrostomy tube placement      PERCUTANEOUS NEPHROLITHOTOMY Left 7/22/2020    Procedure: NEPHROLITHOTOMY, PERCUTANEOUS;  Surgeon: CHAD Bo MD;  Location: [facility];  Service: Urology;  Laterality: Left;  OR 9 ONLY  RN PREOP 7/20/2020----COVID NEGATIVE ON 7/20    turbt      urostomy         SOCIAL HISTORY:  Social History     Tobacco Use    Smoking status: Former Smoker     Packs/day: 0.30     Years: 42.00     Pack years: 12.60     Quit date: 2/1/2017     Years since quitting: 3.7    Smokeless tobacco: Never Used    Tobacco comment: in cessation program   Substance Use Topics    Alcohol use: Yes     Alcohol/week: 14.0 - 21.0 standard drinks     Types: 14 - 21 Cans of beer per week     Comment: occassional    Drug use: Yes     Types: Marijuana     Comment: occass       FAMILY HISTORY:  Family History   Adopted: Yes       ALLERGIES AND MEDICATIONS: updated and reviewed.  Review of patient's allergies indicates:  No Known Allergies  Current Outpatient Medications   Medication Sig    albuterol-ipratropium (DUO-NEB) 2.5 mg-0.5 mg/3 mL nebulizer solution Take 3 mLs by nebulization every 6 (six) hours as needed for Wheezing. Rescue    allopurinoL (ZYLOPRIM) 100 MG tablet TAKE 1 TABLET BY MOUTH ONCE DAILY    amLODIPine (NORVASC) 10 MG tablet Take 1 tablet (10 mg total) by mouth once daily.    aspirin (ECOTRIN) 81 MG EC tablet Take 81 mg by mouth once daily.    atenoloL (TENORMIN) 50 MG tablet TAKE 1 TABLET(50 MG) BY MOUTH EVERY DAY    busPIRone (BUSPAR) 5 MG Tab Take 1 tablet (5 mg total) by mouth 2 (two) times daily as needed.    ergocalciferol (VITAMIN D2) 50,000 unit Cap Take 1 capsule (50,000 Units total) by mouth every 7 days.    escitalopram oxalate (LEXAPRO) 20 MG tablet Take 1 tablet (20 mg total) by mouth once daily.     fluticasone-umeclidin-vilanter (TRELEGY ELLIPTA) 100-62.5-25 mcg DsDv Take 1 puff by mouth once daily.    gabapentin (NEURONTIN) 400 MG capsule Take 1 capsule (400 mg total) by mouth 2 (two) times daily.    HYDROcodone-acetaminophen (NORCO)  mg per tablet Take 1 tablet by mouth every 6 (six) hours as needed for Pain.    levothyroxine (SYNTHROID) 88 MCG tablet Take 1 tablet (88 mcg total) by mouth once daily.    multivitamin (ONE DAILY MULTIVITAMIN) per tablet Take 1 tablet by mouth once daily.    pantoprazole (PROTONIX) 40 MG tablet Take 1 tablet (40 mg total) by mouth once daily.    sodium bicarbonate 325 MG tablet Take 325 mg by mouth 2 (two) times daily.    traZODone (DESYREL) 150 MG tablet Take 1 tablet (150 mg total) by mouth every evening.     No current facility-administered medications for this visit.        Review of Systems   Constitutional: Negative for activity change, fatigue, fever and unexpected weight change.   HENT: Negative for congestion.    Eyes: Negative for redness.   Respiratory: Negative for chest tightness and shortness of breath.    Cardiovascular: Negative for chest pain and leg swelling.   Gastrointestinal: Negative for abdominal pain, constipation, diarrhea, nausea and vomiting.   Genitourinary: Negative for dysuria, flank pain, frequency, hematuria, penile pain, penile swelling, scrotal swelling, testicular pain and urgency.   Musculoskeletal: Negative for arthralgias and back pain.   Neurological: Negative for dizziness and light-headedness.   Psychiatric/Behavioral: Negative for behavioral problems and confusion. The patient is not nervous/anxious.    All other systems reviewed and are negative.      Objective:      Vitals:    10/14/20 1545   Weight: 83.9 kg (184 lb 15.5 oz)   Height: 6' (1.829 m)     Physical Exam   Nursing note and vitals reviewed.  Constitutional: He is oriented to person, place, and time. He appears well-developed.   HENT:   Head: Normocephalic.    Eyes: Conjunctivae are normal.   Neck: Normal range of motion. Neck supple. No tracheal deviation present. No thyromegaly present.   Cardiovascular: Normal rate and normal heart sounds.    Pulmonary/Chest: Effort normal and breath sounds normal. No respiratory distress. He has no wheezes.   Abdominal: Soft. Bowel sounds are normal. There is no abdominal tenderness. There is no rebound. No hernia.   Genitourinary:    Genitourinary Comments: Left PCN in place, yellow urine  Stoma pink     Musculoskeletal: Normal range of motion. No tenderness.   Lymphadenopathy:     He has no cervical adenopathy.   Neurological: He is alert and oriented to person, place, and time.   Skin: Skin is warm and dry. No rash noted. No erythema.     Psychiatric: His behavior is normal. Judgment and thought content normal.     Contains abnormal data CT Renal Stone Study ABD Pelvis WO  Order: [id number]  Status:  Final result   Visible to patient:  Yes (Patient Portal)   Next appt:  07/13/2020 at 01:00 PM in Pulmonology ([facility])  Details    Reading Physician Reading Date Result Priority   Lazarus Joaquin MD  [phone] 6/16/2020 STAT      Narrative & Impression     EXAMINATION:  CT RENAL STONE STUDY ABD PELVIS WO     CLINICAL HISTORY:  Flank pain,??   Kidney stone suspected;     TECHNIQUE:  Low dose axial images, sagittal and coronal reformations were obtained from the lung bases to the pubic symphysis.  Contrast was not administered.     COMPARISON:  07/11/2019     FINDINGS:  Heart: Normal in size. No pericardial effusion.     Lung Bases: Mild right basilar atelectasis or scarring.     Liver: Normal in size and attenuation, with no focal hepatic lesions.     Gallbladder: Minimal cholelithiasis.  No evidence of cholecystitis.     Bile Ducts: No evidence of dilated ducts.     Pancreas: No mass or peripancreatic fat stranding.     Spleen: Unremarkable.     Adrenals: Unremarkable.     Kidneys/ Ureters: Moderate to severe  hydronephrosis of the left kidney and ureter secondary to a 4 mm stone in the distal left ureter.  Ureteral diversion procedure is noted to the right lower quadrant with right lower quadrant ostomy noted.  Left renal perinephric stranding.  Vascular calcifications of the left kidney are noted.  Lower pole bulky nonobstructing stones are present on the left also.  Similar vascular calcifications and nonobstructing stones at the lower pole of the right kidney also.  No hydronephrosis on the right.     Bladder: Suspected prior cystectomy.     GI Tract/Mesentery: No evidence of bowel obstruction or inflammation.     Peritoneal Space: No ascites. No free air.     Retroperitoneum: No significant adenopathy.     Abdominal wall: Right lower quadrant ostomy with mild parastomal herniation of fat also.     Vasculature: Vascular atherosclerosis.  There is aneurysmal dilation of the right common iliac artery maximum diameter 3.4 cm.  Recommend vascular surveillance.     Bones: No acute fracture.     Impression:     1. Moderate-severe hydronephrosis and hydroureter on the left secondary to a 4 mm stone in the distal left ureter.  There is left perinephric inflammatory stranding noted.  Recommend follow-up.  2. Bilateral nonobstructing nephrolithiasis at the lower poles also.  3. Minimal cholelithiasis with no evidence of cholecystitis.  4. Postop changes of prior cystectomy with right lower quadrant ureteral diversion procedure.  5. 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  Recommend vascular surveillance.  6.  This report was flagged in Epic as abnormal.        Electronically signed by: Lazarus Ballesteros  Date:                                            06/16/2020  Time:                                           18:34            Last Resulted: 06/16/20 18:34                  Assessment:       1. Kidney stones    2. Hydronephrosis with ureteral stricture, not  elsewhere classified    3. History of bladder cancer          Plan:       1. Kidney stones    - CT Renal Stone Study ABD Pelvis WO; Future    2. Hydronephrosis with ureteral stricture, not elsewhere classified  resolved    3. History of bladder cancer  stable            No follow-ups on file.

## 2020-10-19 ENCOUNTER — TELEPHONE (OUTPATIENT)
Dept: CARDIOLOGY | Facility: CLINIC | Age: 60
End: 2020-10-19

## 2020-10-21 ENCOUNTER — OFFICE VISIT (OUTPATIENT)
Dept: CARDIOLOGY | Facility: CLINIC | Age: 60
End: 2020-10-21
Payer: MEDICAID

## 2020-10-21 VITALS
HEIGHT: 72 IN | BODY MASS INDEX: 25.36 KG/M2 | WEIGHT: 187.25 LBS | OXYGEN SATURATION: 92 % | HEART RATE: 82 BPM | DIASTOLIC BLOOD PRESSURE: 65 MMHG | SYSTOLIC BLOOD PRESSURE: 135 MMHG

## 2020-10-21 DIAGNOSIS — N13.2 HYDRONEPHROSIS WITH URINARY OBSTRUCTION DUE TO URETERAL CALCULUS: ICD-10-CM

## 2020-10-21 DIAGNOSIS — N18.4 CKD (CHRONIC KIDNEY DISEASE), STAGE IV: ICD-10-CM

## 2020-10-21 DIAGNOSIS — E78.1 HYPERTRIGLYCERIDEMIA: Chronic | ICD-10-CM

## 2020-10-21 DIAGNOSIS — Z85.51 HISTORY OF BLADDER CANCER: ICD-10-CM

## 2020-10-21 DIAGNOSIS — E03.9 ACQUIRED HYPOTHYROIDISM: Chronic | ICD-10-CM

## 2020-10-21 DIAGNOSIS — J44.1 CHRONIC OBSTRUCTIVE PULMONARY DISEASE WITH ACUTE EXACERBATION: ICD-10-CM

## 2020-10-21 DIAGNOSIS — R07.9 CHEST PAIN OF UNCERTAIN ETIOLOGY: ICD-10-CM

## 2020-10-21 DIAGNOSIS — I10 ESSENTIAL HYPERTENSION: Primary | ICD-10-CM

## 2020-10-21 PROCEDURE — 93010 ELECTROCARDIOGRAM REPORT: CPT | Mod: S$PBB,,, | Performed by: INTERNAL MEDICINE

## 2020-10-21 PROCEDURE — 93010 EKG 12-LEAD: ICD-10-PCS | Mod: S$PBB,,, | Performed by: INTERNAL MEDICINE

## 2020-10-21 PROCEDURE — 99214 PR OFFICE/OUTPT VISIT, EST, LEVL IV, 30-39 MIN: ICD-10-PCS | Mod: S$PBB,25,, | Performed by: INTERNAL MEDICINE

## 2020-10-21 PROCEDURE — 99999 PR PBB SHADOW E&M-EST. PATIENT-LVL IV: CPT | Mod: PBBFAC,,, | Performed by: INTERNAL MEDICINE

## 2020-10-21 PROCEDURE — 99214 OFFICE O/P EST MOD 30 MIN: CPT | Mod: PBBFAC,PN,25 | Performed by: INTERNAL MEDICINE

## 2020-10-21 PROCEDURE — 93005 ELECTROCARDIOGRAM TRACING: CPT | Mod: PBBFAC,PN | Performed by: INTERNAL MEDICINE

## 2020-10-21 PROCEDURE — 99999 PR PBB SHADOW E&M-EST. PATIENT-LVL IV: ICD-10-PCS | Mod: PBBFAC,,, | Performed by: INTERNAL MEDICINE

## 2020-10-21 PROCEDURE — 99214 OFFICE O/P EST MOD 30 MIN: CPT | Mod: S$PBB,25,, | Performed by: INTERNAL MEDICINE

## 2020-10-21 RX ORDER — CALCITRIOL 0.5 UG/1
CAPSULE ORAL
Status: ON HOLD | COMMUNITY
Start: 2020-09-14 | End: 2022-05-11 | Stop reason: HOSPADM

## 2020-10-21 NOTE — PROGRESS NOTES
"  Subjective:      Patient ID: Blake Guillory is a 60 y.o. male.    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    C/o fatigue.    Pt used to do cabinet work.    Pt has chronic shortness of breath.      After recent kidney stone surgery pt was sent home on oxygen.    Pt used to see a pulmonologist.    Pt quit smoking 3 years ago after 3rd smoking cessation class.    Review of Systems   Cardiovascular: Positive for chest pain, dyspnea on exertion and leg swelling (mild, intermittent). Negative for claudication, irregular heartbeat, near-syncope, orthopnea, palpitations and syncope.      Pt has occasional retrosternal pains attributed to GERD.  Last episode occurred a couple of days ago and occurred while watching TV and abated after a couple of minutes with chewing Tums.    There is no hx of chest pain with exertion.    "I get worn out when taking a shower."     "I get short of breath with any activity."    Pt now takes Tums prn.    Pt does not feel that there has been any change in the pattern of his chest pain over the past year or two      Past Medical History:   Diagnosis Date    Arthritis     Blood transfusion     Cancer     bladder    COPD (chronic obstructive pulmonary disease)     Frequency of urination     General anesthetics causing adverse effect in therapeutic use     pt states he wakes up very violently from anesthesia    History of urostomy     Hypertension     Limited joint range of motion right hip and leg    Mixed anxiety and depressive disorder     Personal history of bladder cancer     Thyroid disease     Ureteral stent displacement     Urinary retention     Wears glasses         Past Surgical History:   Procedure Laterality Date    ABDOMINAL SURGERY      APPENDECTOMY  12/30/2015    Procedure: APPENDECTOMY;  Surgeon: CHAD Bo MD;  Location: [facility] OR;  Service: Urology;;    bladder removed      BOWEL RESECTION      " CYSTOSCOPY      >1  episodes for bilat ureteral stent exchange    FRACTURE SURGERY      HAND SURGERY      HERNIA REPAIR      amalia    HIP SURGERY  2012    Rt hip septic    MOUTH SURGERY  2000    all teeth extracted    nephrostomy tube placement      PERCUTANEOUS NEPHROLITHOTOMY Left 7/22/2020    Procedure: NEPHROLITHOTOMY, PERCUTANEOUS;  Surgeon: CHAD Bo MD;  Location: [facility] OR;  Service: Urology;  Laterality: Left;  OR 9 ONLY  RN PREOP 7/20/2020----COVID NEGATIVE ON 7/20    turbt      urostomy         Family History   Adopted: Yes       Social History     Socioeconomic History    Marital status:      Spouse name: Not on file    Number of children: Not on file    Years of education: Not on file    Highest education level: Not on file   Occupational History    Occupation: [employer]     Employer: [employer]   Social Needs    Financial resource strain: Not on file    Food insecurity     Worry: Not on file     Inability: Not on file    Transportation needs     Medical: Not on file     Non-medical: Not on file   Tobacco Use    Smoking status: Former Smoker     Packs/day: 0.30     Years: 42.00     Pack years: 12.60     Quit date: 2/1/2017     Years since quitting: 3.7    Smokeless tobacco: Never Used    Tobacco comment: in cessation program   Substance and Sexual Activity    Alcohol use: Yes     Alcohol/week: 14.0 - 21.0 standard drinks     Types: 14 - 21 Cans of beer per week     Comment: occassional    Drug use: Yes     Types: Marijuana     Comment: occass    Sexual activity: Yes     Partners: Female     Birth control/protection: None   Lifestyle    Physical activity     Days per week: Not on file     Minutes per session: Not on file    Stress: Not on file   Relationships    Social connections     Talks on phone: Not on file     Gets together: Not on file     Attends Gnosticism service: Not on file     Active member of club or organization: Not on file      Attends meetings of clubs or organizations: Not on file     Relationship status: Not on file   Other Topics Concern    Not on file   Social History Narrative    Not on file       Current Outpatient Medications on File Prior to Visit   Medication Sig Dispense Refill    albuterol-ipratropium (DUO-NEB) 2.5 mg-0.5 mg/3 mL nebulizer solution Take 3 mLs by nebulization every 6 (six) hours as needed for Wheezing. Rescue 50 vial 2    allopurinoL (ZYLOPRIM) 100 MG tablet TAKE 1 TABLET BY MOUTH ONCE DAILY 90 tablet 0    amLODIPine (NORVASC) 10 MG tablet Take 1 tablet (10 mg total) by mouth once daily. 90 tablet 0    aspirin (ECOTRIN) 81 MG EC tablet Take 81 mg by mouth once daily.      atenoloL (TENORMIN) 50 MG tablet TAKE 1 TABLET(50 MG) BY MOUTH EVERY DAY 90 tablet 0    busPIRone (BUSPAR) 5 MG Tab Take 1 tablet (5 mg total) by mouth 2 (two) times daily as needed. 60 tablet 1    ergocalciferol (VITAMIN D2) 50,000 unit Cap Take 1 capsule (50,000 Units total) by mouth every 7 days. 12 capsule 0    escitalopram oxalate (LEXAPRO) 20 MG tablet Take 1 tablet (20 mg total) by mouth once daily. 30 tablet 2    fluticasone-umeclidin-vilanter (TRELEGY ELLIPTA) 100-62.5-25 mcg DsDv Take 1 puff by mouth once daily. 60 each 2    gabapentin (NEURONTIN) 400 MG capsule Take 1 capsule (400 mg total) by mouth 2 (two) times daily. 180 capsule 0    HYDROcodone-acetaminophen (NORCO)  mg per tablet Take 1 tablet by mouth every 6 (six) hours as needed for Pain. 28 tablet 0    levothyroxine (SYNTHROID) 88 MCG tablet Take 1 tablet (88 mcg total) by mouth once daily. 90 tablet 0    multivitamin (ONE DAILY MULTIVITAMIN) per tablet Take 1 tablet by mouth once daily.      pantoprazole (PROTONIX) 40 MG tablet Take 1 tablet (40 mg total) by mouth once daily. 90 tablet 0    sodium bicarbonate 325 MG tablet Take 325 mg by mouth 2 (two) times daily.      traZODone (DESYREL) 150 MG tablet Take 1 tablet (150 mg total) by mouth every  evening. 90 tablet 0    calcitRIOL (ROCALTROL) 0.5 MCG Cap        No current facility-administered medications on file prior to visit.        Review of patient's allergies indicates:  No Known Allergies  Objective:     Vitals:    10/21/20 1006   BP: 135/65   BP Location: Left arm   Patient Position: Sitting   BP Method: Large (Automatic)   Pulse: 82   SpO2: (!) 92%   Weight: 84.9 kg (187 lb 4.5 oz)   Height: 6' (1.829 m)        Physical Exam   Constitutional: He is oriented to person, place, and time. He appears well-developed and well-nourished. No distress.   Eyes: No scleral icterus.   Neck: No JVD present. Carotid bruit is not present.   Cardiovascular: Regular rhythm and normal heart sounds. Exam reveals no gallop and no friction rub.   No murmur heard.  Pulmonary/Chest: Effort normal and breath sounds normal. No respiratory distress.   Musculoskeletal:         General: No edema.   Neurological: He is alert and oriented to person, place, and time.   Skin: Skin is warm and dry. He is not diaphoretic.   Psychiatric: He has a normal mood and affect. His behavior is normal. Judgment and thought content normal.   Vitals reviewed.     Wt up 10 lbs over the past 3 months    Note stress ECG and stress echo in 2018 were normal at a submaximal rate    Echocardiogram this week showed normal left ventricular systolic function and mild LVH and mild diastolic dysfunction    ECG today reviewed by me:  ORVILLE SERNA    Hospital Outpatient Visit on 10/19/2020   Component Date Value Ref Range Status    AORTIC VALVE CUSP SEPERATION 10/19/2020 1.91  cm Final    LVIDd 10/19/2020 5.06  3.5 - 6.0 cm Final    IVS 10/19/2020 1.20  0.6 - 1.1 cm Final    Posterior Wall 10/19/2020 1.10  0.6 - 1.1 cm Final    Ao root annulus 10/19/2020 4.02  cm Final    LVIDs 10/19/2020 4.05* 2.1 - 4.0 cm Final    FS 10/19/2020 20  28 - 44 % Final    LV mass 10/19/2020 224.52  g Final    LA size 10/19/2020 4.00  cm Final    RVDD 10/19/2020 2.57  cm  Final    Left Ventricle Relative Wall Thick* 10/19/2020 0.43  cm Final    MV valve area p 1/2 method 10/19/2020 2.71  cm2 Final    E/A ratio 10/19/2020 0.49   Final    E wave decelartion time 10/19/2020 243.58  msec Final    IVRT 10/19/2020 106.11  msec Final    LVOT diameter 10/19/2020 2.02  cm Final    LVOT area 10/19/2020 3.2  cm2 Final    MV Peak E Abraham 10/19/2020 0.36  m/s Final    MV stenosis pressure 1/2 time 10/19/2020 81.29  ms Final    MV Peak A Abraham 10/19/2020 0.74  m/s Final    LV Systolic Volume 10/19/2020 72.07  mL Final    LV Diastolic Volume 10/19/2020 121.30  mL Final    Right Atrial Pressure (from IVC) 10/19/2020 3  mmHg Final   Lab Visit on 09/04/2020   Component Date Value Ref Range Status    Glucose 09/04/2020 104  74 - 118 mg/dL Final    Sodium 09/04/2020 135* 136 - 145 mmol/L Final    Potassium 09/04/2020 4.6  3.5 - 5.1 mmol/L Final    Chloride 09/04/2020 103  101 - 111 mmol/L Final    CO2 09/04/2020 22* 23 - 29 mmol/L Final    BUN, Bld 09/04/2020 52* 8 - 23 mg/dL Final    Calcium 09/04/2020 9.1  8.6 - 10.0 mg/dL Final    Creatinine 09/04/2020 2.4* 0.5 - 1.4 mg/dL Final    Albumin 09/04/2020 4.4  3.5 - 5.2 g/dL Final    Phosphorus 09/04/2020 4.0  2.4 - 4.6 mg/dL Final    eGFR if  09/04/2020 32.2* >60 mL/min/1.73 m^2 Final    eGFR if non African American 09/04/2020 27.9* >60 mL/min/1.73 m^2 Final    Anion Gap 09/04/2020 10  8 - 16 mmol/L Final    PTH, Intact 09/04/2020 157.0* 9.0 - 77.0 pg/mL Final    Vit D, 25-Hydroxy 09/04/2020 35  30 - 96 ng/mL Final   Admission on 07/22/2020, Discharged on 07/23/2020   Component Date Value Ref Range Status    Final Pathologic Diagnosis 07/22/2020    Final                    Value:1.  Left kidney stones, removal: Gross diagnosis:  Stone material.         This material is sent out for chemical analysis and a separate report  will follow-up with those results.  2.  Left nephrostomy tube, removal: Gross diagnosis:   "Nephrostomy tube.      Gross 07/22/2020    Final                    Value:1:  Part 1 Container Label: Surgery MRN and Pathology MRN:  [id number]  Received in fresh labeled "left kidney stones" and consist of multiple dark  brown small stones measuring in aggregate 0.4 x 0.3 x 0.2 cm.  The specimen  is sent out for chemical analysis (OBV--2).  Grossed by: Nirmal Schmidt   2: Part 2 Container Label: Surgery MRN and Pathology MRN:  [id number]  Received in fresh labeled "left nephrostomy tube removal" and consist of a  33.5 cm long x 0.4 cm diameter blue synthetic material tube.  Gross  examination only (AGM--2).  Grossed by: Nirmal Schmidt      Sodium 07/22/2020 138  136 - 145 mmol/L Final    Potassium 07/22/2020 5.2* 3.5 - 5.1 mmol/L Final    Chloride 07/22/2020 106  95 - 110 mmol/L Final    CO2 07/22/2020 26  23 - 29 mmol/L Final    Glucose 07/22/2020 146* 70 - 110 mg/dL Final    BUN, Bld 07/22/2020 25* 8 - 23 mg/dL Final    Creatinine 07/22/2020 2.1* 0.5 - 1.4 mg/dL Final    Calcium 07/22/2020 8.7  8.7 - 10.5 mg/dL Final    Anion Gap 07/22/2020 6* 8 - 16 mmol/L Final    eGFR if  07/22/2020 38* >60 mL/min/1.73 m^2 Final    eGFR if non African American 07/22/2020 33* >60 mL/min/1.73 m^2 Final    Hemoglobin 07/22/2020 13.6* 14.0 - 18.0 g/dL Final    Hematocrit 07/22/2020 42.0  40.0 - 54.0 % Final    Stone Source 07/22/2020 Passed Stone   Corrected    Stone Analysis 07/22/2020 Test Not Performed   Final    Stone Analysis Comment 07/22/2020 SEE BELOW   Final    Sodium 07/23/2020 139  136 - 145 mmol/L Final    Potassium 07/23/2020 4.2  3.5 - 5.1 mmol/L Final    Chloride 07/23/2020 106  95 - 110 mmol/L Final    CO2 07/23/2020 25  23 - 29 mmol/L Final    Glucose 07/23/2020 104  70 - 110 mg/dL Final    BUN, Bld 07/23/2020 22  8 - 23 mg/dL Final    Creatinine 07/23/2020 1.7* 0.5 - 1.4 mg/dL Final    Calcium 07/23/2020 8.8  8.7 - 10.5 mg/dL Final    Total Protein 07/23/2020 7.1  " 6.0 - 8.4 g/dL Final    Albumin 07/23/2020 3.6  3.5 - 5.2 g/dL Final    Total Bilirubin 07/23/2020 0.3  0.1 - 1.0 mg/dL Final    Alkaline Phosphatase 07/23/2020 91  55 - 135 U/L Final    AST 07/23/2020 22  10 - 40 U/L Final    ALT 07/23/2020 22  10 - 44 U/L Final    Anion Gap 07/23/2020 8  8 - 16 mmol/L Final    eGFR if  07/23/2020 49* >60 mL/min/1.73 m^2 Final    eGFR if non African American 07/23/2020 42* >60 mL/min/1.73 m^2 Final    WBC 07/23/2020 9.43  3.90 - 12.70 K/uL Final    RBC 07/23/2020 4.40* 4.60 - 6.20 M/uL Final    Hemoglobin 07/23/2020 14.2  14.0 - 18.0 g/dL Final    Hematocrit 07/23/2020 43.9  40.0 - 54.0 % Final    Mean Corpuscular Volume 07/23/2020 100* 82 - 98 fL Final    Mean Corpuscular Hemoglobin 07/23/2020 32.3* 27.0 - 31.0 pg Final    Mean Corpuscular Hemoglobin Conc 07/23/2020 32.3  32.0 - 36.0 g/dL Final    RDW 07/23/2020 13.4  11.5 - 14.5 % Final    Platelets 07/23/2020 183  150 - 350 K/uL Final    MPV 07/23/2020 10.5  9.2 - 12.9 fL Final    Immature Granulocytes 07/23/2020 0.1  0.0 - 0.5 % Final    Gran # (ANC) 07/23/2020 8.2* 1.8 - 7.7 K/uL Final    Immature Grans (Abs) 07/23/2020 0.01  0.00 - 0.04 K/uL Final    Lymph # 07/23/2020 0.6* 1.0 - 4.8 K/uL Final    Mono # 07/23/2020 0.6  0.3 - 1.0 K/uL Final    Eos # 07/23/2020 0.0  0.0 - 0.5 K/uL Final    Baso # 07/23/2020 0.01  0.00 - 0.20 K/uL Final    nRBC 07/23/2020 0  0 /100 WBC Final    Gran% 07/23/2020 86.7* 38.0 - 73.0 % Final    Lymph% 07/23/2020 6.6* 18.0 - 48.0 % Final    Mono% 07/23/2020 6.5  4.0 - 15.0 % Final    Eosinophil% 07/23/2020 0.0  0.0 - 8.0 % Final    Basophil% 07/23/2020 0.1  0.0 - 1.9 % Final    Differential Method 07/23/2020 Automated   Final   Hospital Outpatient Visit on 07/20/2020   Component Date Value Ref Range Status    SARS-CoV-2 RNA, Amplification, Qual 07/20/2020 Negative  Negative Final   Office Visit on 07/13/2020   Component Date Value Ref Range Status     Interpretation 07/31/2020    Final                    Value:Spirometry shows severe obstruction. Lung volumes show mild pulmonary over inflation. Spirometry shows borderline improvement following bronchodilator. Airway mechanics are abnormal showing increased airway resistance and decreased conductance. DLCO is   moderately decreased.     Notes:    The failure to demonstrate improvement in spirometry does not preclude a clinical response to a trial of bronchodilators.       Post FVC 07/31/2020 2.90* 3.69 - 6.05 L Final    Post FEV1 07/31/2020 1.56* 2.78 - 4.67 L Final    Post FEV1 FVC 07/31/2020 53.90* 64.48 - 89.02 % Final    Post FEF 25 75 07/31/2020 0.69* 1.43 - 4.55 L/s Final    Post PEF 07/31/2020 3.32* 7.09 - 11.99 L/s Final    Post  07/31/2020 6.98  sec Final    Pre DLCO 07/31/2020 12.64* 23.56 - 37.41 ml/(min*mmHg) Final    DLCO ADJ PRE 07/31/2020 12.79* 23.56 - 37.41 ml/(min*mmHg) Final    DLCOVA PRE 07/31/2020 2.48* 2.93 - 5.15 ml/(min*mmHg*L) Final    DLVAAdj PRE 07/31/2020 2.51* 2.93 - 5.15 ml/(min*mmHg*L) Final    VA PRE 07/31/2020 5.10* 7.39 - 7.39 L Final    IVC PRE 07/31/2020 2.78* 3.69 - 6.05 L Final    Pre TLC 07/31/2020 9.29* 6.39 - 8.69 L Final    VC PRE 07/31/2020 3.06* 3.69 - 6.05 L Final    Pre FRC PL 07/31/2020 6.60  L Final    Pre ERV 07/31/2020 0.34  -95507.78 - 82475.22 L Final    Pre RV 07/31/2020 6.23* 1.86 - 3.21 L Final    RVTLC PRE 07/31/2020 67.08* 29.16 - 47.12 % Final    Raw PRE 07/31/2020 5.55* 3.06 - 3.06 cmH2O*s/L Final    VTGRAWPRE 07/31/2020 7.81  L Final    Pre FVC 07/31/2020 2.67* 3.69 - 6.05 L Final    Pre FEV1 07/31/2020 1.37* 2.78 - 4.67 L Final    Pre FEV1 FVC 07/31/2020 51.35* 64.48 - 89.02 % Final    Pre FEF 25 75 07/31/2020 0.56* 1.43 - 4.55 L/s Final    Pre PEF 07/31/2020 3.82* 7.09 - 11.99 L/s Final    Pre  07/31/2020 6.83  sec Final    FVC Ref 07/31/2020 4.87   Final    FVC LLN 07/31/2020 3.69   Final    FVC Pre Ref  07/31/2020 54.8  % Final    FVC Post Ref 07/31/2020 59.5  % Final    FVC Chg 07/31/2020 8.6  % Final    FEV1 Ref 07/31/2020 3.73   Final    FEV1 LLN 07/31/2020 2.78   Final    FEV1 Pre Ref 07/31/2020 36.8  % Final    FEV1 Post Ref 07/31/2020 41.9  % Final    FEV1 Chg 07/31/2020 14.0  % Final    FEV1 FVC Ref 07/31/2020 77   Final    FEV1 FVC LLN 07/31/2020 64   Final    FEV1 FVC Pre Ref 07/31/2020 66.9  % Final    FEV1 FVC Post Ref 07/31/2020 70.2  % Final    FEV1 FVC Chg 07/31/2020 5.0  % Final    FEF 25 75 Ref 07/31/2020 2.99   Final    FEF 25 75 LLN 07/31/2020 1.43   Final    FEF 25 75 Pre Ref 07/31/2020 18.8  % Final    FEF 25 75 Post Ref 07/31/2020 23.0  % Final    FEF 25 75 Chg 07/31/2020 22.6  % Final    PEF Ref 07/31/2020 9.54   Final    PEF LLN 07/31/2020 7.09   Final    PEF Pre Ref 07/31/2020 40.0  % Final    PEF Post Ref 07/31/2020 34.8  % Final    PEF Chg 07/31/2020 -13.0  % Final    [id number] Chg 07/31/2020 2.2  % Final    TLC Ref 07/31/2020 7.54   Final    TLC LLN 07/31/2020 6.39   Final    TLC Pre Ref 07/31/2020 123.2  % Final    VC Ref 07/31/2020 4.87   Final    VC LLN 07/31/2020 3.69   Final    VC Pre Ref 07/31/2020 62.8  % Final    FRCpleth Ref 07/31/2020 3.75   Final    FRCpleth LLN 07/31/2020 2.76   Final    FRCpleth PreRef 07/31/2020 175.9  % Final    ERV Ref 07/31/2020 1.22   Final    ERV LLN 07/31/2020 -33107.78   Final    ERV Pre Ref 07/31/2020 27.6  % Final    RV Ref 07/31/2020 2.53   Final    RV LLN 07/31/2020 1.86   Final    RV Pre Ref 07/31/2020 246.2  % Final    RVTLC Ref 07/31/2020 38   Final    RVTLC LLN 07/31/2020 29   Final    RVTLC Pre Ref 07/31/2020 175.9  % Final    Raw Ref 07/31/2020 3.06   Final    Raw LLN 07/31/2020 3.06   Final    Raw Pre Ref 07/31/2020 181.5  % Final    DLCO Single Breath Ref 07/31/2020 30.48   Final    DLCO Single Breath LLN 07/31/2020 23.56   Final    DLCO Single Breath Pre Ref 07/31/2020 41.5  % Final    DLCOc  Single Breath Ref 07/31/2020 30.48   Final    DLCOc Single Breath LLN 07/31/2020 23.56   Final    DLCOc Single Breath Pre Ref 07/31/2020 41.9  % Final    DLCOVA Ref 07/31/2020 4.04   Final    DLCOVA LLN 07/31/2020 2.93   Final    DLCOVA Pre Ref 07/31/2020 61.4  % Final    DLCOc SBVA Ref 07/31/2020 4.04   Final    DLCOc SBVA LLN 07/31/2020 2.93   Final    DLCOc SBVA Pre Ref 07/31/2020 62.1  % Final    VA Single Breath Ref 07/31/2020 7.39   Final    VA Single Breath LLN 07/31/2020 7.39   Final    VA Single Breath Pre Ref 07/31/2020 68.9  % Final    IVC Single Breath Ref 07/31/2020 4.87   Final    IVC Single Breath LLN 07/31/2020 3.69   Final    IVC Single Breath Pre Ref 07/31/2020 57.1  % Final   Admission on 07/06/2020, Discharged on 07/06/2020   Component Date Value Ref Range Status    Specimen UA 07/06/2020 Urine, Clean Catch   Final    Color, UA 07/06/2020 Yellow  Yellow, Straw, Bri Final    Appearance, UA 07/06/2020 Clear  Clear Final    pH, UA 07/06/2020 7.0  5.0 - 8.0 Final    Specific [place], UA 07/06/2020 1.015  1.005 - 1.030 Final    Protein, UA 07/06/2020 Trace* Negative Final    Glucose, UA 07/06/2020 Negative  Negative Final    Ketones, UA 07/06/2020 Negative  Negative Final    Bilirubin (UA) 07/06/2020 Negative  Negative Final    Occult Blood UA 07/06/2020 2+* Negative Final    Nitrite, UA 07/06/2020 Positive* Negative Final    Urobilinogen, UA 07/06/2020 Negative  Negative EU/dL Final    Leukocytes, UA 07/06/2020 3+* Negative Final    Anaerobic Culture 07/06/2020 No anaerobes isolated   Final    RBC, UA 07/06/2020 80* 0 - 4 /hpf Final    WBC, UA 07/06/2020 >100* 0 - 5 /hpf Final    WBC Clumps, UA 07/06/2020 Moderate* None-Rare Final    Bacteria 07/06/2020 Many* None-Occ /hpf Final    Squam Epithel, UA 07/06/2020 3  /hpf Final    Microscopic Comment 07/06/2020 SEE COMMENT   Final    Urine Culture, Routine 07/06/2020 *  Final                    Value:ESCHERICHIA  COLI  >100,000 cfu/ml      Urine Culture, Routine 07/06/2020 *  Final                    Value:ENTEROCOCCUS FAECALIS  > 100,000 cfu/ml     Lab Visit on 07/06/2020   Component Date Value Ref Range Status    WBC 07/06/2020 5.30  3.90 - 12.70 K/uL Final    RBC 07/06/2020 4.44* 4.60 - 6.20 M/uL Final    Hemoglobin 07/06/2020 14.3  14.0 - 18.0 g/dL Final    Hematocrit 07/06/2020 43.0  40.0 - 54.0 % Final    Mean Corpuscular Volume 07/06/2020 97  82 - 98 fL Final    Mean Corpuscular Hemoglobin 07/06/2020 32.1* 27.0 - 31.0 pg Final    Mean Corpuscular Hemoglobin Conc 07/06/2020 33.2  32.0 - 36.0 g/dL Final    RDW 07/06/2020 14.2  11.5 - 14.5 % Final    Platelets 07/06/2020 266  150 - 350 K/uL Final    MPV 07/06/2020 9.8  9.2 - 12.9 fL Final    Gran # (ANC) 07/06/2020 3.5  1.8 - 7.7 K/uL Final    Lymph # 07/06/2020 1.0  1.0 - 4.8 K/uL Final    Mono # 07/06/2020 0.5  0.3 - 1.0 K/uL Final    Eos # 07/06/2020 0.1  0.0 - 0.5 K/uL Final    Baso # 07/06/2020 0.10  0.00 - 0.20 K/uL Final    Gran% 07/06/2020 66.7  38.0 - 73.0 % Final    Lymph% 07/06/2020 19.3  18.0 - 48.0 % Final    Mono% 07/06/2020 10.0  4.0 - 15.0 % Final    Eosinophil% 07/06/2020 2.5  0.0 - 8.0 % Final    Basophil% 07/06/2020 1.5  0.0 - 1.9 % Final    Differential Method 07/06/2020 Automated   Final    Sodium 07/06/2020 139  136 - 145 mmol/L Final    Potassium 07/06/2020 4.3  3.5 - 5.1 mmol/L Final    Chloride 07/06/2020 104  101 - 111 mmol/L Final    CO2 07/06/2020 25  23 - 29 mmol/L Final    Glucose 07/06/2020 108  74 - 118 mg/dL Final    BUN, Bld 07/06/2020 37* 8 - 23 mg/dL Final    Creatinine 07/06/2020 2.2* 0.5 - 1.4 mg/dL Final    Calcium 07/06/2020 9.3  8.6 - 10.0 mg/dL Final    Total Protein 07/06/2020 7.3  6.0 - 8.4 g/dL Final    Albumin 07/06/2020 4.1  3.5 - 5.2 g/dL Final    Total Bilirubin 07/06/2020 0.7  0.3 - 1.2 mg/dL Final    Alkaline Phosphatase 07/06/2020 80  38 - 126 U/L Final    AST 07/06/2020 32  15 - 41  U/L Final    ALT 07/06/2020 46  17 - 63 U/L Final    Anion Gap 07/06/2020 10  8 - 16 mmol/L Final    eGFR if African American 07/06/2020 35.8* >60 mL/min/1.73 m^2 Final    eGFR if non African American 07/06/2020 31.0* >60 mL/min/1.73 m^2 Final    BNP 07/06/2020 23  0 - 99 pg/mL Final    Uric Acid 07/06/2020 7.7  4.8 - 8.7 mg/dL Final   Admission on 06/17/2020, Discharged on 06/20/2020   Component Date Value Ref Range Status    Sodium 06/17/2020 139  136 - 145 mmol/L Final    Potassium 06/17/2020 4.7  3.5 - 5.1 mmol/L Final    Chloride 06/17/2020 108  95 - 110 mmol/L Final    CO2 06/17/2020 19* 23 - 29 mmol/L Final    Glucose 06/17/2020 160* 70 - 110 mg/dL Final    BUN, Bld 06/17/2020 37* 8 - 23 mg/dL Final    Creatinine 06/17/2020 3.0* 0.5 - 1.4 mg/dL Final    Calcium 06/17/2020 8.9  8.7 - 10.5 mg/dL Final    Total Protein 06/17/2020 7.1  6.0 - 8.4 g/dL Final    Albumin 06/17/2020 3.7  3.5 - 5.2 g/dL Final    Total Bilirubin 06/17/2020 0.4  0.1 - 1.0 mg/dL Final    Alkaline Phosphatase 06/17/2020 82  55 - 135 U/L Final    AST 06/17/2020 26  10 - 40 U/L Final    ALT 06/17/2020 28  10 - 44 U/L Final    Anion Gap 06/17/2020 12  8 - 16 mmol/L Final    eGFR if African American 06/17/2020 25* >60 mL/min/1.73 m^2 Final    eGFR if non African American 06/17/2020 21* >60 mL/min/1.73 m^2 Final    Magnesium 06/17/2020 2.0  1.6 - 2.6 mg/dL Final    Phosphorus 06/17/2020 2.4* 2.7 - 4.5 mg/dL Final    WBC 06/17/2020 16.38* 3.90 - 12.70 K/uL Final    RBC 06/17/2020 4.34* 4.60 - 6.20 M/uL Final    Hemoglobin 06/17/2020 14.1  14.0 - 18.0 g/dL Final    Hematocrit 06/17/2020 42.5  40.0 - 54.0 % Final    Mean Corpuscular Volume 06/17/2020 98  82 - 98 fL Final    Mean Corpuscular Hemoglobin 06/17/2020 32.5* 27.0 - 31.0 pg Final    Mean Corpuscular Hemoglobin Conc 06/17/2020 33.2  32.0 - 36.0 g/dL Final    RDW 06/17/2020 12.9  11.5 - 14.5 % Final    Platelets 06/17/2020 202  150 - 350 K/uL Final    MPV  06/17/2020 11.1  9.2 - 12.9 fL Final    Immature Granulocytes 06/17/2020 CANCELED  0.0 - 0.5 % Final-Edited    Immature Grans (Abs) 06/17/2020 CANCELED  0.00 - 0.04 K/uL Final-Edited    Lymph # 06/17/2020 CANCELED  1.0 - 4.8 K/uL Final-Edited    Mono # 06/17/2020 CANCELED  0.3 - 1.0 K/uL Final-Edited    Eos # 06/17/2020 CANCELED  0.0 - 0.5 K/uL Final-Edited    Baso # 06/17/2020 CANCELED  0.00 - 0.20 K/uL Final-Edited    nRBC 06/17/2020 0  0 /100 WBC Final    Gran% 06/17/2020 89.0* 38.0 - 73.0 % Final    Lymph% 06/17/2020 1.0* 18.0 - 48.0 % Final    Mono% 06/17/2020 3.0* 4.0 - 15.0 % Final    Eosinophil% 06/17/2020 0.0  0.0 - 8.0 % Final    Basophil% 06/17/2020 0.0  0.0 - 1.9 % Final    Bands 06/17/2020 7.0  % Final    Differential Method 06/17/2020 Manual   Corrected    Urine Culture, Routine 06/17/2020 Multiple organisms isolated. None in predominance.  Repeat if   Final    Urine Culture, Routine 06/17/2020 clinically necessary.   Final    Blood Culture, Routine 06/17/2020 No growth after 5 days.   Final    Sodium 06/18/2020 137  136 - 145 mmol/L Final    Potassium 06/18/2020 5.1  3.5 - 5.1 mmol/L Final    Chloride 06/18/2020 107  95 - 110 mmol/L Final    CO2 06/18/2020 17* 23 - 29 mmol/L Final    Glucose 06/18/2020 107  70 - 110 mg/dL Final    BUN, Bld 06/18/2020 56* 8 - 23 mg/dL Final    Creatinine 06/18/2020 3.9* 0.5 - 1.4 mg/dL Final    Calcium 06/18/2020 8.7  8.7 - 10.5 mg/dL Final    Total Protein 06/18/2020 6.4  6.0 - 8.4 g/dL Final    Albumin 06/18/2020 3.0* 3.5 - 5.2 g/dL Final    Total Bilirubin 06/18/2020 0.4  0.1 - 1.0 mg/dL Final    Alkaline Phosphatase 06/18/2020 63  55 - 135 U/L Final    AST 06/18/2020 31  10 - 40 U/L Final    ALT 06/18/2020 23  10 - 44 U/L Final    Anion Gap 06/18/2020 13  8 - 16 mmol/L Final    eGFR if African American 06/18/2020 18* >60 mL/min/1.73 m^2 Final    eGFR if non African American 06/18/2020 15* >60 mL/min/1.73 m^2 Final    Magnesium  06/18/2020 1.7  1.6 - 2.6 mg/dL Final    Phosphorus 06/18/2020 3.9  2.7 - 4.5 mg/dL Final    WBC 06/18/2020 21.96* 3.90 - 12.70 K/uL Final    RBC 06/18/2020 4.02* 4.60 - 6.20 M/uL Final    Hemoglobin 06/18/2020 12.8* 14.0 - 18.0 g/dL Final    Hematocrit 06/18/2020 39.2* 40.0 - 54.0 % Final    Mean Corpuscular Volume 06/18/2020 98  82 - 98 fL Final    Mean Corpuscular Hemoglobin 06/18/2020 31.8* 27.0 - 31.0 pg Final    Mean Corpuscular Hemoglobin Conc 06/18/2020 32.7  32.0 - 36.0 g/dL Final    RDW 06/18/2020 13.3  11.5 - 14.5 % Final    Platelets 06/18/2020 155  150 - 350 K/uL Final    MPV 06/18/2020 11.0  9.2 - 12.9 fL Final    Immature Granulocytes 06/18/2020 3.4* 0.0 - 0.5 % Final    Gran # (ANC) 06/18/2020 19.6* 1.8 - 7.7 K/uL Final    Immature Grans (Abs) 06/18/2020 0.74* 0.00 - 0.04 K/uL Final    Lymph # 06/18/2020 0.3* 1.0 - 4.8 K/uL Final    Mono # 06/18/2020 1.1* 0.3 - 1.0 K/uL Final    Eos # 06/18/2020 0.1  0.0 - 0.5 K/uL Final    Baso # 06/18/2020 0.04  0.00 - 0.20 K/uL Final    nRBC 06/18/2020 0  0 /100 WBC Final    Gran% 06/18/2020 89.3* 38.0 - 73.0 % Final    Lymph% 06/18/2020 1.5* 18.0 - 48.0 % Final    Mono% 06/18/2020 5.2  4.0 - 15.0 % Final    Eosinophil% 06/18/2020 0.4  0.0 - 8.0 % Final    Basophil% 06/18/2020 0.2  0.0 - 1.9 % Final    Platelet Estimate 06/18/2020 Appears normal   Final    Differential Method 06/18/2020 Automated   Final    Uric Acid 06/18/2020 9.4* 3.4 - 7.0 mg/dL Final    BNP 06/18/2020 321* 0 - 99 pg/mL Final    POC PH 06/18/2020 7.304* 7.35 - 7.45 Final    POC PCO2 06/18/2020 47.3* 35 - 45 mmHg Final    POC PO2 06/18/2020 24* 40 - 60 mmHg Final    POC HCO3 06/18/2020 23.5* 24 - 28 mmol/L Final    POC BE 06/18/2020 -3  -2 to 2 mmol/L Final    POC SATURATED O2 06/18/2020 37* 95 - 100 % Final    Rate 06/18/2020 18   Final    Sample 06/18/2020 VENOUS   Final    Site 06/18/2020 Other   Final    Allens Test 06/18/2020 N/A   Final    DelSys  06/18/2020 CPAP/BiPAP   Final    Mode 06/18/2020 BiPAP   Final    FiO2 06/18/2020 40   Final    Sp02 06/18/2020 93   Final    IP 06/18/2020 12   Final    EP 06/18/2020 6   Final    Sodium 06/19/2020 140  136 - 145 mmol/L Final    Potassium 06/19/2020 4.0  3.5 - 5.1 mmol/L Final    Chloride 06/19/2020 106  95 - 110 mmol/L Final    CO2 06/19/2020 20* 23 - 29 mmol/L Final    Glucose 06/19/2020 101  70 - 110 mg/dL Final    BUN, Bld 06/19/2020 64* 8 - 23 mg/dL Final    Creatinine 06/19/2020 3.0* 0.5 - 1.4 mg/dL Final    Calcium 06/19/2020 9.6  8.7 - 10.5 mg/dL Final    Total Protein 06/19/2020 7.0  6.0 - 8.4 g/dL Final    Albumin 06/19/2020 3.1* 3.5 - 5.2 g/dL Final    Total Bilirubin 06/19/2020 0.3  0.1 - 1.0 mg/dL Final    Alkaline Phosphatase 06/19/2020 70  55 - 135 U/L Final    AST 06/19/2020 32  10 - 40 U/L Final    ALT 06/19/2020 24  10 - 44 U/L Final    Anion Gap 06/19/2020 14  8 - 16 mmol/L Final    eGFR if African American 06/19/2020 25* >60 mL/min/1.73 m^2 Final    eGFR if non African American 06/19/2020 21* >60 mL/min/1.73 m^2 Final    Magnesium 06/19/2020 2.3  1.6 - 2.6 mg/dL Final    Phosphorus 06/19/2020 3.7  2.7 - 4.5 mg/dL Final    WBC 06/19/2020 13.78* 3.90 - 12.70 K/uL Final    RBC 06/19/2020 4.65  4.60 - 6.20 M/uL Final    Hemoglobin 06/19/2020 15.0  14.0 - 18.0 g/dL Final    Hematocrit 06/19/2020 46.2  40.0 - 54.0 % Final    Mean Corpuscular Volume 06/19/2020 99* 82 - 98 fL Final    Mean Corpuscular Hemoglobin 06/19/2020 32.3* 27.0 - 31.0 pg Final    Mean Corpuscular Hemoglobin Conc 06/19/2020 32.5  32.0 - 36.0 g/dL Final    RDW 06/19/2020 14.0  11.5 - 14.5 % Final    Platelets 06/19/2020 122* 150 - 350 K/uL Final    MPV 06/19/2020 11.3  9.2 - 12.9 fL Final    Immature Granulocytes 06/19/2020 CANCELED  0.0 - 0.5 % Final    Immature Grans (Abs) 06/19/2020 CANCELED  0.00 - 0.04 K/uL Final    Lymph # 06/19/2020 CANCELED  1.0 - 4.8 K/uL Final    Mono # 06/19/2020  CANCELED  0.3 - 1.0 K/uL Final    Eos # 06/19/2020 CANCELED  0.0 - 0.5 K/uL Final    Baso # 06/19/2020 CANCELED  0.00 - 0.20 K/uL Final    nRBC 06/19/2020 0  0 /100 WBC Final    Gran% 06/19/2020 92.0* 38.0 - 73.0 % Final    Lymph% 06/19/2020 3.0* 18.0 - 48.0 % Final    Mono% 06/19/2020 3.0* 4.0 - 15.0 % Final    Eosinophil% 06/19/2020 1.0  0.0 - 8.0 % Final    Basophil% 06/19/2020 0.0  0.0 - 1.9 % Final    Metamyelocytes 06/19/2020 1.0  % Final    Platelet Estimate 06/19/2020 Appears normal   Final    Differential Method 06/19/2020 Manual   Final    Magnesium 06/20/2020 2.2  1.6 - 2.6 mg/dL Final    Phosphorus 06/20/2020 2.7  2.7 - 4.5 mg/dL Final    WBC 06/20/2020 15.55* 3.90 - 12.70 K/uL Final    RBC 06/20/2020 4.79  4.60 - 6.20 M/uL Final    Hemoglobin 06/20/2020 15.1  14.0 - 18.0 g/dL Final    Hematocrit 06/20/2020 46.8  40.0 - 54.0 % Final    Mean Corpuscular Volume 06/20/2020 98  82 - 98 fL Final    Mean Corpuscular Hemoglobin 06/20/2020 31.5* 27.0 - 31.0 pg Final    Mean Corpuscular Hemoglobin Conc 06/20/2020 32.3  32.0 - 36.0 g/dL Final    RDW 06/20/2020 13.3  11.5 - 14.5 % Final    Platelets 06/20/2020 172  150 - 350 K/uL Final    MPV 06/20/2020 11.0  9.2 - 12.9 fL Final    Immature Granulocytes 06/20/2020 0.6* 0.0 - 0.5 % Final    Gran # (ANC) 06/20/2020 14.2* 1.8 - 7.7 K/uL Final    Immature Grans (Abs) 06/20/2020 0.10* 0.00 - 0.04 K/uL Final    Lymph # 06/20/2020 0.5* 1.0 - 4.8 K/uL Final    Mono # 06/20/2020 0.7  0.3 - 1.0 K/uL Final    Eos # 06/20/2020 0.0  0.0 - 0.5 K/uL Final    Baso # 06/20/2020 0.03  0.00 - 0.20 K/uL Final    nRBC 06/20/2020 0  0 /100 WBC Final    Gran% 06/20/2020 91.3* 38.0 - 73.0 % Final    Lymph% 06/20/2020 3.2* 18.0 - 48.0 % Final    Mono% 06/20/2020 4.6  4.0 - 15.0 % Final    Eosinophil% 06/20/2020 0.1  0.0 - 8.0 % Final    Basophil% 06/20/2020 0.2  0.0 - 1.9 % Final    Differential Method 06/20/2020 Automated   Final    Glucose 06/20/2020  111* 70 - 110 mg/dL Final    Sodium 06/20/2020 139  136 - 145 mmol/L Final    Potassium 06/20/2020 4.6  3.5 - 5.1 mmol/L Final    Chloride 06/20/2020 105  95 - 110 mmol/L Final    CO2 06/20/2020 20* 23 - 29 mmol/L Final    BUN, Bld 06/20/2020 62* 8 - 23 mg/dL Final    Calcium 06/20/2020 10.7* 8.7 - 10.5 mg/dL Final    Creatinine 06/20/2020 2.7* 0.5 - 1.4 mg/dL Final    Albumin 06/20/2020 3.4* 3.5 - 5.2 g/dL Final    Phosphorus 06/20/2020 2.7  2.7 - 4.5 mg/dL Final    eGFR if  06/20/2020 28* >60 mL/min/1.73 m^2 Final    eGFR if non African American 06/20/2020 24* >60 mL/min/1.73 m^2 Final    Anion Gap 06/20/2020 14  8 - 16 mmol/L Final   Admission on 06/16/2020, Discharged on 06/16/2020   Component Date Value Ref Range Status    Sodium 06/16/2020 138  136 - 145 mmol/L Final    Potassium 06/16/2020 4.1  3.5 - 5.1 mmol/L Final    Chloride 06/16/2020 104  101 - 111 mmol/L Final    CO2 06/16/2020 23  23 - 29 mmol/L Final    Glucose 06/16/2020 136* 74 - 118 mg/dL Final    BUN, Bld 06/16/2020 40* 8 - 23 mg/dL Final    Creatinine 06/16/2020 2.8* 0.5 - 1.4 mg/dL Final    Calcium 06/16/2020 8.7  8.6 - 10.0 mg/dL Final    Total Protein 06/16/2020 7.3  6.0 - 8.4 g/dL Final    Albumin 06/16/2020 4.3  3.5 - 5.2 g/dL Final    Total Bilirubin 06/16/2020 0.3  0.3 - 1.2 mg/dL Final    Alkaline Phosphatase 06/16/2020 82  38 - 126 U/L Final    AST 06/16/2020 30  15 - 41 U/L Final    ALT 06/16/2020 33  17 - 63 U/L Final    Anion Gap 06/16/2020 11  8 - 16 mmol/L Final    eGFR if  06/16/2020 26.7* >60 mL/min/1.73 m^2 Final    eGFR if non  06/16/2020 23.1* >60 mL/min/1.73 m^2 Final    WBC 06/16/2020 8.30  3.90 - 12.70 K/uL Final    RBC 06/16/2020 4.32* 4.60 - 6.20 M/uL Final    Hemoglobin 06/16/2020 14.1  14.0 - 18.0 g/dL Final    Hematocrit 06/16/2020 41.9  40.0 - 54.0 % Final    Mean Corpuscular Volume 06/16/2020 97  82 - 98 fL Final    Mean Corpuscular  Hemoglobin 06/16/2020 32.8* 27.0 - 31.0 pg Final    Mean Corpuscular Hemoglobin Conc 06/16/2020 33.8  32.0 - 36.0 g/dL Final    RDW 06/16/2020 13.8  11.5 - 14.5 % Final    Platelets 06/16/2020 221  150 - 350 K/uL Final    MPV 06/16/2020 8.4* 9.2 - 12.9 fL Final    Gran # (ANC) 06/16/2020 6.5  1.8 - 7.7 K/uL Final    Lymph # 06/16/2020 1.0  1.0 - 4.8 K/uL Final    Mono # 06/16/2020 0.5  0.3 - 1.0 K/uL Final    Eos # 06/16/2020 0.2  0.0 - 0.5 K/uL Final    Baso # 06/16/2020 0.10  0.00 - 0.20 K/uL Final    Gran% 06/16/2020 78.1* 38.0 - 73.0 % Final    Lymph% 06/16/2020 12.2* 18.0 - 48.0 % Final    Mono% 06/16/2020 6.4  4.0 - 15.0 % Final    Eosinophil% 06/16/2020 2.5  0.0 - 8.0 % Final    Basophil% 06/16/2020 0.8  0.0 - 1.9 % Final    Differential Method 06/16/2020 Automated   Final    Lipase 06/16/2020 34  4 - 60 U/L Final    Magnesium 06/16/2020 2.2  1.6 - 2.6 mg/dL Final    Specimen UA 06/16/2020 Urine, Clean Catch   Final    Color, UA 06/16/2020 Yellow  Yellow, Straw, Bri Final    Appearance, UA 06/16/2020 Hazy* Clear Final    pH, UA 06/16/2020 7.0  5.0 - 8.0 Final    Specific [place], UA 06/16/2020 1.020  1.005 - 1.030 Final    Protein, UA 06/16/2020 1+* Negative Final    Glucose, UA 06/16/2020 Negative  Negative Final    Ketones, UA 06/16/2020 Negative  Negative Final    Bilirubin (UA) 06/16/2020 Negative  Negative Final    Occult Blood UA 06/16/2020 3+* Negative Final    Nitrite, UA 06/16/2020 Positive* Negative Final    Urobilinogen, UA 06/16/2020 Negative  Negative EU/dL Final    Leukocytes, UA 06/16/2020 3+* Negative Final    RBC, UA 06/16/2020 >100* 0 - 4 /hpf Final    WBC, UA 06/16/2020 15* 0 - 5 /hpf Final    WBC Clumps, UA 06/16/2020 Occasional* None-Rare Final    Bacteria 06/16/2020 Moderate* None-Occ /hpf Final    Hyaline Casts, UA 06/16/2020 0  0-1/lpf /lpf Final    Microscopic Comment 06/16/2020 SEE COMMENT   Final    Urine Culture, Routine 06/16/2020 Multiple  organisms isolated. None in predominance.  Repeat if   Final    Urine Culture, Routine 06/16/2020 clinically necessary.   Final    SARS-CoV-2 RNA, Amplification, Qual 06/16/2020 Negative  Negative Final   Lab Visit on 05/06/2020   Component Date Value Ref Range Status    Sodium 05/06/2020 138  136 - 145 mmol/L Final    Potassium 05/06/2020 4.6  3.5 - 5.1 mmol/L Final    Chloride 05/06/2020 104  101 - 111 mmol/L Final    CO2 05/06/2020 22* 23 - 29 mmol/L Final    Glucose 05/06/2020 104  74 - 118 mg/dL Final    BUN, Bld 05/06/2020 52* 8 - 23 mg/dL Final    Creatinine 05/06/2020 2.4* 0.5 - 1.4 mg/dL Final    Calcium 05/06/2020 9.3  8.6 - 10.0 mg/dL Final    Anion Gap 05/06/2020 12  8 - 16 mmol/L Final    eGFR if  05/06/2020 32.2* >60 mL/min/1.73 m^2 Final    eGFR if non African American 05/06/2020 27.9* >60 mL/min/1.73 m^2 Final    Hemoglobin A1C 05/06/2020 5.4  4.0 - 5.6 % Final    Estimated Avg Glucose 05/06/2020 108  68 - 131 mg/dL Final    PSA, SCREEN 05/06/2020 <0.10  0.00 - 4.00 ng/mL Final    Vit D, 25-Hydroxy 05/06/2020 37  30 - 96 ng/mL Final    TSH 05/06/2020 1.70  0.45 - 5.33 uIU/mL Final    Cholesterol 05/06/2020 223* 80 - 200 mg/dL Final    Triglycerides 05/06/2020 372* 30 - 150 mg/dL Final    HDL 05/06/2020 40  40 - 75 mg/dL Final    LDL Cholesterol 05/06/2020 109* <100 mg/dL Final    Hdl/Cholesterol Ratio 05/06/2020 17.9* 20.0 - 50.0 % Final    Total Cholesterol/HDL Ratio 05/06/2020 5.6* 2.0 - 5.0 Final    Non-HDL Cholesterol 05/06/2020 183  mg/dL Final    PTH, Intact 05/06/2020 254.0* 9.0 - 77.0 pg/mL Final   There may be more visits with results that are not included.   (    Assessment:     1. Essential hypertension    2. Hypertriglyceridemia    3. CKD (chronic kidney disease), stage IV    4. Hydronephrosis with urinary obstruction due to ureteral calculus    5. History of bladder cancer    6. Acquired hypothyroidism    7. Chest pain of uncertain etiology    8.  Chronic obstructive pulmonary disease with acute exacerbation      Plan:   Blake was seen today for follow-up.    Diagnoses and all orders for this visit:    Essential hypertension    Hypertriglyceridemia    CKD (chronic kidney disease), stage IV    Hydronephrosis with urinary obstruction due to ureteral calculus    History of bladder cancer    Acquired hypothyroidism    Chest pain of uncertain etiology  -     IN OFFICE EKG 12-LEAD (to Muse)    Chronic obstructive pulmonary disease with acute exacerbation  -     Ambulatory referral/consult to Pulmonology; Future       Pt is scheduled for removal of additional kidney stones.f/u with urology    F/u with Dr Zeng, PCP    Cardiac status is stable    Chest pains are likely due to GERD.   Continue pantoprazole    If pattern of chest pains changes will repeat the treadmill stress test    Pulmonary consult for COPD    Follow up in about 4 months (around 2/21/2021).

## 2020-10-30 ENCOUNTER — TELEPHONE (OUTPATIENT)
Dept: PULMONOLOGY | Facility: CLINIC | Age: 60
End: 2020-10-30

## 2020-10-30 NOTE — TELEPHONE ENCOUNTER
No answer. Message left to call clinic back to reschedule due to clinics not being able to operate, no power.

## 2020-11-02 ENCOUNTER — TELEPHONE (OUTPATIENT)
Dept: PULMONOLOGY | Facility: CLINIC | Age: 60
End: 2020-11-02

## 2020-11-04 ENCOUNTER — TELEPHONE (OUTPATIENT)
Dept: PULMONOLOGY | Facility: CLINIC | Age: 60
End: 2020-11-04

## 2020-11-04 ENCOUNTER — OFFICE VISIT (OUTPATIENT)
Dept: PULMONOLOGY | Facility: CLINIC | Age: 60
End: 2020-11-04
Payer: MEDICAID

## 2020-11-04 VITALS
SYSTOLIC BLOOD PRESSURE: 152 MMHG | WEIGHT: 181.69 LBS | DIASTOLIC BLOOD PRESSURE: 90 MMHG | OXYGEN SATURATION: 97 % | HEIGHT: 72 IN | HEART RATE: 82 BPM | BODY MASS INDEX: 24.61 KG/M2

## 2020-11-04 DIAGNOSIS — J44.9 CHRONIC OBSTRUCTIVE PULMONARY DISEASE, UNSPECIFIED COPD TYPE: ICD-10-CM

## 2020-11-04 DIAGNOSIS — J44.89 OBSTRUCTIVE CHRONIC BRONCHITIS WITHOUT EXACERBATION: ICD-10-CM

## 2020-11-04 DIAGNOSIS — Z12.2 ENCOUNTER FOR SCREENING FOR MALIGNANT NEOPLASM OF RESPIRATORY ORGANS: ICD-10-CM

## 2020-11-04 DIAGNOSIS — Z87.891 PERSONAL HISTORY OF NICOTINE DEPENDENCE: ICD-10-CM

## 2020-11-04 PROCEDURE — 99214 PR OFFICE/OUTPT VISIT, EST, LEVL IV, 30-39 MIN: ICD-10-PCS | Mod: S$PBB,,, | Performed by: NURSE PRACTITIONER

## 2020-11-04 PROCEDURE — 99215 OFFICE O/P EST HI 40 MIN: CPT | Mod: PBBFAC,PN | Performed by: NURSE PRACTITIONER

## 2020-11-04 PROCEDURE — 99999 PR PBB SHADOW E&M-EST. PATIENT-LVL V: ICD-10-PCS | Mod: PBBFAC,,, | Performed by: NURSE PRACTITIONER

## 2020-11-04 PROCEDURE — 99214 OFFICE O/P EST MOD 30 MIN: CPT | Mod: S$PBB,,, | Performed by: NURSE PRACTITIONER

## 2020-11-04 PROCEDURE — 99999 PR PBB SHADOW E&M-EST. PATIENT-LVL V: CPT | Mod: PBBFAC,,, | Performed by: NURSE PRACTITIONER

## 2020-11-04 NOTE — PATIENT INSTRUCTIONS
Continue trelegy. Rinse mouth after use.   Continue albuterol as needed  Please obtain CT of chest  Will discuss more about pulmonary rehab on next visitation.   Follow up in 8 weeks.

## 2020-11-04 NOTE — TELEPHONE ENCOUNTER
----- Message from Renan Sharp MA sent at 11/4/2020  9:45 AM CST -----  Regarding: FW: pt    ----- Message -----  From: Cynthia Sun  Sent: 11/4/2020   9:41 AM CST  To: [facility]hotra Staff  Subject: pt                                               Pt is returning the nurses phone call about his appt this afternoon can you please call pt at [phone].    MAY

## 2020-11-04 NOTE — LETTER
November 9, 2020      Sg Reed MD  [address] [place]  [address]  [facility] [address]           [facility] [address]  [address], [facility] [address]  [facility] [address]  Phone: [phone]  Fax: [phone]          Patient: Blake Guillory   MR Number: [id number]   YOB: 1960   Date of Visit: 11/4/2020       Dear Dr. Sg Reed:    Thank you for referring Blake Guillory to me for evaluation. Attached you will find relevant portions of my assessment and plan of care.    If you have questions, please do not hesitate to call me. I look forward to following Blake Guillory along with you.    Sincerely,    Tiki Francis NP    Enclosure  CC:  No Recipients    If you would like to receive this communication electronically, please contact [email or web address] or [phone] to request more information on [employer] Link access.    For providers and/or their staff who would like to refer a patient to [facility], please contact us through our one-stop-shop provider referral line, [facility], at [phone].    If you feel you have received this communication in error or would no longer like to receive these types of communications, please e-mail [email or web address]

## 2020-11-04 NOTE — PROGRESS NOTES
"  Subjective:       Patient ID: Blake Guillory is a 60 y.o. male.    Chief Complaint: COPD and Shortness of Breath    HPI   Mr. Guillory is a 59 y/o M with PMH of bladder CA and CAD (follows with Nephrology outside [facility].)  presenting to pulmonary clinic for COPD and shortness of breath evaluation.   Reports followed with Don Gaviria on [place].  She no longer takes his insurance so like to be followed here.    Patient  experiences chronic mMRC 3 symptoms of dyspnea.  Reports his dyspnea on exertion has progressively gotten worse over the last few months.  Discussed with patient his pulmonary stress test (08/03/2020) was not suggestive of oxygen requirement.  Notes to have mild chronic cough. Explains had 0 COPD exacerbations in the last one year; 0 hospitalizations for respiratory problems.  Currently, reports on Telegy inhaler.  States has been on trelegy for greater than 1 year.  Has not been to pulmonary rehab.    Reports smoking cigarettes for 48 years at 2 packs a day. Quit in 2017.   States, " When I could not breath anymore, I was still smoking." Tells has followed with smoking cessessation 3 times in the past.   Discloses smoking marijuana few times a week has been doing this for 48 years.  Also drinks 4-6 alcoholic beverages a day.  Patient's reports not having CT of chest.  Discussed with patient CT renal stone study showed mild interstitial thickening/chronic atelectasis in the lung bases.    Work history:  Currently takes care of his special needs child at home.  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  He did work at [employer]- performing sandblasting.  He does admit side not always wearing a mask    Additional Pulmonary History:   Travel History:  Denies  History of exposures to TB:  Denies  Family History of Lung Cancer:  Denies  Childhood history of Lung Disease:  Denies     Review of Systems "   Constitutional: Negative for fever, chills, weight loss and night sweats.   HENT: Negative for postnasal drip, rhinorrhea, trouble swallowing and congestion.    Respiratory: Negative for apnea, cough, sputum production, choking, chest tightness, wheezing, dyspnea on extertion and use of rescue inhaler.    Cardiovascular: Negative for chest pain and leg swelling.   Musculoskeletal: Negative for joint swelling.   Skin: Negative for rash.   Gastrointestinal: Negative for acid reflux.   Neurological: Negative for dizziness and light-headedness.   Hematological: Negative for adenopathy.   Psychiatric/Behavioral: Negative for sleep disturbance.       Reviewed allergies and medications.  Reviewed medical and surgical history.  Reviewed social and family history.  Objective:      Vitals:    11/04/20 1458   BP: (!) 152/90   BP Location: Left arm   Patient Position: Sitting   BP Method: Small (Manual)   Pulse: 82   SpO2: 97%   Weight: 82.4 kg (181 lb 10.5 oz)   Height: 6' (1.829 m)      Physical Exam   Constitutional: He is oriented to person, place, and time. He appears well-developed and well-nourished. No distress.   HENT:   Head: Normocephalic.   Neck: Normal range of motion. Neck supple.   Cardiovascular: Normal rate, regular rhythm and normal heart sounds.   Pulmonary/Chest: Normal expansion, effort normal and breath sounds normal. No respiratory distress. He has no wheezes. He has no rhonchi.   Abdominal: Soft. Bowel sounds are normal. There is no abdominal tenderness.   Musculoskeletal: Normal range of motion.         General: No edema.   Lymphadenopathy: No supraclavicular adenopathy is present.     He has no cervical adenopathy.   Neurological: He is alert and oriented to person, place, and time. Gait normal.   Skin: Skin is warm and dry. Nails show no clubbing.   Psychiatric: He has a normal mood and affect. His behavior is normal.   Vitals reviewed.    Personal Diagnostic Review    Lab Results   Component Value  Date    PREFEV1 1.37 (L) 07/31/2020    ATQ6XNVCEL 36.8 07/31/2020    PREFVC 2.67 (L) 07/31/2020    FVCPREREF 54.8 07/31/2020    PYRBNE5KXS 51.35 (L) 07/31/2020    VGT3PMEDRYI 66.9 07/31/2020    PREDLCO 12.64 (L) 07/31/2020    DLCOSBPRRF 41.5 07/31/2020    DLCOADJPRE 12.79 (L) 07/31/2020    DLCOCSBRPRRF 41.9 07/31/2020    POSTFEV1 1.56 (L) 07/31/2020    POSTFVC 2.90 (L) 07/31/2020    IAIHNRQ4JJA 53.90 (L) 07/31/2020   PFTs impression:  Spirometry shows severe obstruction. Lung volumes show mild pulmonary over inflation. Spirometry shows borderline improvement following bronchodilator. Airway mechanics are abnormal showing increased airway resistance and decreased conductance. DLCO is moderately decreased.    Stress test 8/3/2020-  Six minute walk distance is 331 meters (1085 feet) with very heavy dyspnea.   During exercise, there was significant desaturation from 93% to 91% while breathing room air.   Heart rate increased and blood pressure remained stable with walking.   The patient reported non-pulmonary symptoms during exercise.   The patient did complete the study, walking 360 seconds of the 360 second test.   Based upon age and body mass index, exercise capacity is less than predicted.  (Physician 08/03/2020 05:44PM, Dr. Tito Riggs / Final: 08/03/2020 05:44PM, Dr. Tito Riggs)    Echo Color Flow Doppler? Yes  · There is mild left ventricular concentric hypertrophy.  · The left ventricle is normal in size with normal systolic function. The   estimated ejection fraction is 60%.  · Grade I diastolic dysfunction.  · Normal right ventricular systolic function.  · Normal central venous pressure (3 mmHg).     CT Renal Stone 7/23/2020-  Mild interstitial thickening/chronic atelectasis in the lung bases.      Assessment:       1. Chronic obstructive pulmonary disease, unspecified COPD type    2. Encounter for screening for malignant neoplasm of respiratory organs    3. Personal history of nicotine dependence     4. Obstructive chronic bronchitis without exacerbation        Outpatient Encounter Medications as of 11/4/2020   Medication Sig Dispense Refill    albuterol-ipratropium (DUO-NEB) 2.5 mg-0.5 mg/3 mL nebulizer solution Take 3 mLs by nebulization every 6 (six) hours as needed for Wheezing. Rescue 50 vial 2    allopurinoL (ZYLOPRIM) 100 MG tablet TAKE 1 TABLET BY MOUTH ONCE DAILY 90 tablet 0    amLODIPine (NORVASC) 10 MG tablet Take 1 tablet (10 mg total) by mouth once daily. 90 tablet 0    aspirin (ECOTRIN) 81 MG EC tablet Take 81 mg by mouth once daily.      atenoloL (TENORMIN) 50 MG tablet TAKE 1 TABLET(50 MG) BY MOUTH EVERY DAY 90 tablet 0    busPIRone (BUSPAR) 5 MG Tab Take 1 tablet (5 mg total) by mouth 2 (two) times daily as needed. 60 tablet 1    calcitRIOL (ROCALTROL) 0.5 MCG Cap       ergocalciferol (VITAMIN D2) 50,000 unit Cap Take 1 capsule (50,000 Units total) by mouth every 7 days. 12 capsule 0    escitalopram oxalate (LEXAPRO) 20 MG tablet Take 1 tablet (20 mg total) by mouth once daily. 30 tablet 2    fluticasone-umeclidin-vilanter (TRELEGY ELLIPTA) 100-62.5-25 mcg DsDv Take 1 puff by mouth once daily. 60 each 2    gabapentin (NEURONTIN) 400 MG capsule Take 1 capsule (400 mg total) by mouth 2 (two) times daily. 180 capsule 0    HYDROcodone-acetaminophen (NORCO)  mg per tablet Take 1 tablet by mouth every 6 (six) hours as needed for Pain. 28 tablet 0    levothyroxine (SYNTHROID) 88 MCG tablet Take 1 tablet (88 mcg total) by mouth once daily. 90 tablet 0    multivitamin (ONE DAILY MULTIVITAMIN) per tablet Take 1 tablet by mouth once daily.      pantoprazole (PROTONIX) 40 MG tablet Take 1 tablet (40 mg total) by mouth once daily. 90 tablet 0    sodium bicarbonate 325 MG tablet Take 325 mg by mouth 2 (two) times daily.      traZODone (DESYREL) 150 MG tablet Take 1 tablet (150 mg total) by mouth every evening. 90 tablet 0     No facility-administered encounter medications on file as  of 11/4/2020.      Orders Placed This Encounter   Procedures    CT Chest Lung Screening Low Dose     Standing Status:   Future     Standing Expiration Date:   11/4/2021     Order Specific Question:   Is there documentation of shared decision making for this lung screening exam?     Answer:   Yes     Order Specific Question:   Are you a current or former smoker who quit within the past 15 years?     Answer:   Yes     Order Specific Question:   Are you between age 55-77 years old?     Answer:   Yes     Order Specific Question:   Has the patient smoked 30 or more packs of cigarettes/year?     Answer:   Yes     Order Specific Question:   Does the patient show any signs or symptoms of lung cancer?     Answer:   No     Order Specific Question:   Is this the first (baseline) CT or an annual exam?     Answer:   Baseline [1]     Order Specific Question:   May the Radiologist modify the order per protocol to meet the clinical needs of the patient?     Answer:   Yes       Plan:         Problem List Items Addressed This Visit        Pulmonary    Obstructive chronic bronchitis without exacerbation    Overview     Patient with significant smoking history.  Forty-eight years at 2 packs a day quit in 2017.  Presents with mm RC 3 symptoms of dyspnea.  Reports 0 hospitalizations or exacerbations in relation to breathing issues.  He denies any increase in cough or sputum production.  Does report progressively worse shortness of breath on exertion.    Plan:  -continue Trelegy inhaler.  Rinse mouth after use  -continue albuterol as needed  -congratulated on smoking cessation since 2017  -advise to wean off marijuana cigarettes  -Patient has not had a low-dose CT screening in relation to his smoking history.  Was noted to have a renal stone study CT which showed mild interstitial thickening and chronic atelectasis in the lung bases.  Discussed with patient to have low-dose noncontrast CT of designated chest.  Patient agrees.  -Discussed  with patient possible ambulatory referral to pulmonary rehabilitation.  Patient is not sure if he wants to do this as he takes care of his special needs child.         Encounter for screening for malignant neoplasm of respiratory organs    Overview     Low Dose Screening CT Chest    Cigarettes - Reports smoking cigarettes for 48 years at 2 packs a day. Quit in 2017.   Still smoking - NO  QUIT 2017    Date of last LD CT - None    Result - Noted renal stone study CT impression as mild interstitial thickening/chronic atelectasis in the lung bases    I have discussed with pt about using a screening CT of the chest due to history of cigarette smoking.  We have discussed the possible findings and possible actions as a result of these findings.  The pt would like to proceed.             Relevant Orders    CT Chest Lung Screening Low Dose       Other    Personal history of nicotine dependence    Overview     Low Dose Screening CT Chest    Cigarettes - Reports smoking cigarettes for 48 years at 2 packs a day. Quit in 2017.   Still smoking - NO  QUIT 2017    Date of last LD CT - None    Result - Noted renal stone study CT impression as mild interstitial thickening/chronic atelectasis in the lung bases    I have discussed with pt about using a screening CT of the chest due to history of cigarette smoking.  We have discussed the possible findings and possible actions as a result of these findings.  The pt would like to proceed.            Follow-up in  8 weeks or sooner if needed    This note is dictated on M*Modal word recognition program.  There are word recognition mistakes that are occasionally missed on review.

## 2020-11-09 PROBLEM — J44.89 OBSTRUCTIVE CHRONIC BRONCHITIS WITHOUT EXACERBATION: Status: ACTIVE | Noted: 2018-03-22

## 2020-11-09 PROBLEM — Z12.2 ENCOUNTER FOR SCREENING FOR MALIGNANT NEOPLASM OF RESPIRATORY ORGANS: Status: ACTIVE | Noted: 2020-11-09

## 2020-11-09 PROBLEM — Z87.891 PERSONAL HISTORY OF NICOTINE DEPENDENCE: Status: ACTIVE | Noted: 2020-11-09

## 2020-11-11 ENCOUNTER — TELEPHONE (OUTPATIENT)
Dept: PULMONOLOGY | Facility: CLINIC | Age: 60
End: 2020-11-11

## 2020-11-11 NOTE — TELEPHONE ENCOUNTER
Spoke with the patient's wife about the recent denial of the chest CT. She said the test was not approved due to his not meeting the criteria.    A call was placed to pre service department about the information that the insurance needed. The information that is being asked for is in the recent note from the patient's visit with the provider dated 11/4/2020. (Patient smoked for 48 years--quit 2017, 60 years of age, recent renal stone study CT indicated mild interstitial thickening/chronic atelectasis in the lung bases, worked at [employer] and worked around asbestos). Information will be resubmitted to the insurance provider to review.    Patient's wife was contacted and informed that the information would be resubmitted to the insurance company for review and will again be contacted with the status of the approval for the CT. Verbalized understanding of the information provided to her. No further issues discussed.

## 2020-12-11 ENCOUNTER — TELEPHONE (OUTPATIENT)
Dept: PULMONOLOGY | Facility: CLINIC | Age: 60
End: 2020-12-11

## 2020-12-11 NOTE — TELEPHONE ENCOUNTER
Spoke with the patient's wife, Luke. She was informed that the chest CT had been approved. CT has been scheduled for 12/16. No further issues discussed.

## 2021-01-06 ENCOUNTER — OFFICE VISIT (OUTPATIENT)
Dept: PULMONOLOGY | Facility: CLINIC | Age: 61
End: 2021-01-06
Payer: MEDICAID

## 2021-01-06 VITALS
DIASTOLIC BLOOD PRESSURE: 70 MMHG | HEIGHT: 72 IN | SYSTOLIC BLOOD PRESSURE: 124 MMHG | BODY MASS INDEX: 25.26 KG/M2 | WEIGHT: 186.5 LBS | OXYGEN SATURATION: 91 % | HEART RATE: 76 BPM

## 2021-01-06 DIAGNOSIS — R91.1 LUNG NODULE: ICD-10-CM

## 2021-01-06 DIAGNOSIS — J44.89 OBSTRUCTIVE CHRONIC BRONCHITIS WITHOUT EXACERBATION: ICD-10-CM

## 2021-01-06 PROCEDURE — 99213 OFFICE O/P EST LOW 20 MIN: CPT | Mod: S$PBB,,, | Performed by: NURSE PRACTITIONER

## 2021-01-06 PROCEDURE — 99214 OFFICE O/P EST MOD 30 MIN: CPT | Mod: PBBFAC,PN | Performed by: NURSE PRACTITIONER

## 2021-01-06 PROCEDURE — 99999 PR PBB SHADOW E&M-EST. PATIENT-LVL IV: ICD-10-PCS | Mod: PBBFAC,,, | Performed by: NURSE PRACTITIONER

## 2021-01-06 PROCEDURE — 99999 PR PBB SHADOW E&M-EST. PATIENT-LVL IV: CPT | Mod: PBBFAC,,, | Performed by: NURSE PRACTITIONER

## 2021-01-06 PROCEDURE — 99213 PR OFFICE/OUTPT VISIT, EST, LEVL III, 20-29 MIN: ICD-10-PCS | Mod: S$PBB,,, | Performed by: NURSE PRACTITIONER

## 2021-01-12 ENCOUNTER — TELEPHONE (OUTPATIENT)
Dept: PULMONOLOGY | Facility: CLINIC | Age: 61
End: 2021-01-12
Payer: MEDICAID

## 2021-01-12 PROBLEM — R91.1 LUNG NODULE: Status: ACTIVE | Noted: 2021-01-12

## 2021-01-12 NOTE — TELEPHONE ENCOUNTER
----- Message from Tiki Francis NP sent at 1/12/2021 12:13 PM CST -----  Regarding: follow up CT  [facility],    Can you help to schedule follow up CT of chest around March 2021    Thanks,   Tiki

## 2021-01-21 ENCOUNTER — TELEPHONE (OUTPATIENT)
Dept: PULMONOLOGY | Facility: CLINIC | Age: 61
End: 2021-01-21

## 2021-02-22 ENCOUNTER — OFFICE VISIT (OUTPATIENT)
Dept: CARDIOLOGY | Facility: CLINIC | Age: 61
End: 2021-02-22
Payer: MEDICAID

## 2021-02-22 VITALS
DIASTOLIC BLOOD PRESSURE: 62 MMHG | SYSTOLIC BLOOD PRESSURE: 130 MMHG | OXYGEN SATURATION: 95 % | BODY MASS INDEX: 25.3 KG/M2 | HEIGHT: 72 IN | HEART RATE: 77 BPM | WEIGHT: 186.81 LBS

## 2021-02-22 DIAGNOSIS — N18.4 CKD (CHRONIC KIDNEY DISEASE), STAGE IV: ICD-10-CM

## 2021-02-22 DIAGNOSIS — I10 ESSENTIAL HYPERTENSION: Primary | ICD-10-CM

## 2021-02-22 DIAGNOSIS — R21 SKIN RASH: ICD-10-CM

## 2021-02-22 DIAGNOSIS — I72.3 ILIAC ARTERY ANEURYSM: Chronic | ICD-10-CM

## 2021-02-22 DIAGNOSIS — R91.1 LUNG NODULE: ICD-10-CM

## 2021-02-22 DIAGNOSIS — E78.1 HYPERTRIGLYCERIDEMIA: Chronic | ICD-10-CM

## 2021-02-22 DIAGNOSIS — E03.9 ACQUIRED HYPOTHYROIDISM: Chronic | ICD-10-CM

## 2021-02-22 DIAGNOSIS — N13.30 HYDRONEPHROSIS, UNSPECIFIED HYDRONEPHROSIS TYPE: ICD-10-CM

## 2021-02-22 DIAGNOSIS — J44.89 OBSTRUCTIVE CHRONIC BRONCHITIS WITHOUT EXACERBATION: ICD-10-CM

## 2021-02-22 DIAGNOSIS — N20.0 NEPHROLITHIASIS: ICD-10-CM

## 2021-02-22 PROCEDURE — 99214 OFFICE O/P EST MOD 30 MIN: CPT | Mod: PBBFAC,PN | Performed by: INTERNAL MEDICINE

## 2021-02-22 PROCEDURE — 99999 PR PBB SHADOW E&M-EST. PATIENT-LVL IV: ICD-10-PCS | Mod: PBBFAC,,, | Performed by: INTERNAL MEDICINE

## 2021-02-22 PROCEDURE — 99214 OFFICE O/P EST MOD 30 MIN: CPT | Mod: S$PBB,25,, | Performed by: INTERNAL MEDICINE

## 2021-02-22 PROCEDURE — 93005 ELECTROCARDIOGRAM TRACING: CPT | Mod: PBBFAC,PN | Performed by: INTERNAL MEDICINE

## 2021-02-22 PROCEDURE — 93010 EKG 12-LEAD: ICD-10-PCS | Mod: S$PBB,,, | Performed by: INTERNAL MEDICINE

## 2021-02-22 PROCEDURE — 93010 ELECTROCARDIOGRAM REPORT: CPT | Mod: S$PBB,,, | Performed by: INTERNAL MEDICINE

## 2021-02-22 PROCEDURE — 99214 PR OFFICE/OUTPT VISIT, EST, LEVL IV, 30-39 MIN: ICD-10-PCS | Mod: S$PBB,25,, | Performed by: INTERNAL MEDICINE

## 2021-02-22 PROCEDURE — 99999 PR PBB SHADOW E&M-EST. PATIENT-LVL IV: CPT | Mod: PBBFAC,,, | Performed by: INTERNAL MEDICINE

## 2021-03-05 ENCOUNTER — IMMUNIZATION (OUTPATIENT)
Dept: PRIMARY CARE CLINIC | Facility: CLINIC | Age: 61
End: 2021-03-05
Payer: MEDICAID

## 2021-03-05 DIAGNOSIS — Z23 NEED FOR VACCINATION: Primary | ICD-10-CM

## 2021-03-05 PROCEDURE — 0031A COVID-19,VECTOR-NR,RS-AD26,PF,0.5 ML DOSE VACCINE (JANSSEN): CPT | Mod: PBBFAC,PN

## 2021-03-12 ENCOUNTER — OFFICE VISIT (OUTPATIENT)
Dept: DERMATOLOGY | Facility: CLINIC | Age: 61
End: 2021-03-12
Payer: MEDICAID

## 2021-03-12 DIAGNOSIS — L98.1 NEUROTIC EXCORIATIONS: ICD-10-CM

## 2021-03-12 PROCEDURE — 99999 PR PBB SHADOW E&M-EST. PATIENT-LVL III: CPT | Mod: PBBFAC,,, | Performed by: DERMATOLOGY

## 2021-03-12 PROCEDURE — 99213 OFFICE O/P EST LOW 20 MIN: CPT | Mod: PBBFAC | Performed by: DERMATOLOGY

## 2021-03-12 PROCEDURE — 99999 PR PBB SHADOW E&M-EST. PATIENT-LVL III: ICD-10-PCS | Mod: PBBFAC,,, | Performed by: DERMATOLOGY

## 2021-03-12 PROCEDURE — 99204 PR OFFICE/OUTPT VISIT, NEW, LEVL IV, 45-59 MIN: ICD-10-PCS | Mod: S$PBB,,, | Performed by: DERMATOLOGY

## 2021-03-12 PROCEDURE — 99204 OFFICE O/P NEW MOD 45 MIN: CPT | Mod: S$PBB,,, | Performed by: DERMATOLOGY

## 2021-03-12 RX ORDER — CLOBETASOL PROPIONATE 0.46 MG/ML
SOLUTION TOPICAL
Qty: 50 ML | Refills: 3 | Status: SHIPPED | OUTPATIENT
Start: 2021-03-12 | End: 2022-07-07

## 2021-03-24 ENCOUNTER — PATIENT MESSAGE (OUTPATIENT)
Dept: ENDOCRINOLOGY | Facility: CLINIC | Age: 61
End: 2021-03-24

## 2021-03-24 ENCOUNTER — OFFICE VISIT (OUTPATIENT)
Dept: PULMONOLOGY | Facility: CLINIC | Age: 61
End: 2021-03-24
Payer: MEDICAID

## 2021-03-24 VITALS
OXYGEN SATURATION: 93 % | WEIGHT: 188.19 LBS | HEART RATE: 71 BPM | HEIGHT: 72 IN | SYSTOLIC BLOOD PRESSURE: 133 MMHG | DIASTOLIC BLOOD PRESSURE: 72 MMHG | BODY MASS INDEX: 25.49 KG/M2

## 2021-03-24 DIAGNOSIS — R91.1 LUNG NODULE: Primary | ICD-10-CM

## 2021-03-24 DIAGNOSIS — J44.89 OBSTRUCTIVE CHRONIC BRONCHITIS WITHOUT EXACERBATION: ICD-10-CM

## 2021-03-24 PROCEDURE — 99999 PR PBB SHADOW E&M-EST. PATIENT-LVL V: ICD-10-PCS | Mod: PBBFAC,,, | Performed by: NURSE PRACTITIONER

## 2021-03-24 PROCEDURE — 99215 OFFICE O/P EST HI 40 MIN: CPT | Mod: PBBFAC,PN | Performed by: NURSE PRACTITIONER

## 2021-03-24 PROCEDURE — 99213 PR OFFICE/OUTPT VISIT, EST, LEVL III, 20-29 MIN: ICD-10-PCS | Mod: S$PBB,,, | Performed by: NURSE PRACTITIONER

## 2021-03-24 PROCEDURE — 99213 OFFICE O/P EST LOW 20 MIN: CPT | Mod: S$PBB,,, | Performed by: NURSE PRACTITIONER

## 2021-03-24 PROCEDURE — 99999 PR PBB SHADOW E&M-EST. PATIENT-LVL V: CPT | Mod: PBBFAC,,, | Performed by: NURSE PRACTITIONER

## 2021-04-13 ENCOUNTER — HOSPITAL ENCOUNTER (OUTPATIENT)
Dept: RADIOLOGY | Facility: HOSPITAL | Age: 61
Discharge: HOME | End: 2021-04-13
Attending: UROLOGY
Payer: MEDICAID

## 2021-04-13 DIAGNOSIS — N20.0 KIDNEY STONES: ICD-10-CM

## 2021-04-13 PROCEDURE — 74176 CT RENAL STONE STUDY ABD PELVIS WO: ICD-10-PCS | Mod: 26,,, | Performed by: RADIOLOGY

## 2021-04-13 PROCEDURE — 74176 CT ABD & PELVIS W/O CONTRAST: CPT | Mod: TC

## 2021-04-13 PROCEDURE — 74176 CT ABD & PELVIS W/O CONTRAST: CPT | Mod: 26,,, | Performed by: RADIOLOGY

## 2021-04-20 ENCOUNTER — OFFICE VISIT (OUTPATIENT)
Dept: UROLOGY | Facility: CLINIC | Age: 61
End: 2021-04-20
Payer: MEDICAID

## 2021-04-20 VITALS — BODY MASS INDEX: 24.91 KG/M2 | HEIGHT: 72 IN | WEIGHT: 183.88 LBS

## 2021-04-20 DIAGNOSIS — N28.9 RENAL INSUFFICIENCY: ICD-10-CM

## 2021-04-20 DIAGNOSIS — N20.0 KIDNEY STONES: Primary | ICD-10-CM

## 2021-04-20 DIAGNOSIS — Z85.51 HISTORY OF BLADDER CANCER: ICD-10-CM

## 2021-04-20 PROCEDURE — 99999 PR PBB SHADOW E&M-EST. PATIENT-LVL III: ICD-10-PCS | Mod: PBBFAC,,, | Performed by: UROLOGY

## 2021-04-20 PROCEDURE — 99213 PR OFFICE/OUTPT VISIT, EST, LEVL III, 20-29 MIN: ICD-10-PCS | Mod: S$PBB,,, | Performed by: UROLOGY

## 2021-04-20 PROCEDURE — 99999 PR PBB SHADOW E&M-EST. PATIENT-LVL III: CPT | Mod: PBBFAC,,, | Performed by: UROLOGY

## 2021-04-20 PROCEDURE — 99213 OFFICE O/P EST LOW 20 MIN: CPT | Mod: PBBFAC | Performed by: UROLOGY

## 2021-04-20 PROCEDURE — 99213 OFFICE O/P EST LOW 20 MIN: CPT | Mod: S$PBB,,, | Performed by: UROLOGY

## 2021-05-04 ENCOUNTER — TELEPHONE (OUTPATIENT)
Dept: UROLOGY | Facility: CLINIC | Age: 61
End: 2021-05-04

## 2021-05-05 ENCOUNTER — TELEPHONE (OUTPATIENT)
Dept: UROLOGY | Facility: CLINIC | Age: 61
End: 2021-05-05

## 2021-05-14 ENCOUNTER — OFFICE VISIT (OUTPATIENT)
Dept: UROLOGY | Facility: CLINIC | Age: 61
End: 2021-05-14
Payer: MEDICAID

## 2021-05-14 VITALS
DIASTOLIC BLOOD PRESSURE: 80 MMHG | HEIGHT: 72 IN | SYSTOLIC BLOOD PRESSURE: 116 MMHG | BODY MASS INDEX: 24.79 KG/M2 | WEIGHT: 183 LBS

## 2021-05-14 DIAGNOSIS — N20.0 KIDNEY STONES: Primary | Chronic | ICD-10-CM

## 2021-05-14 DIAGNOSIS — N20.0 KIDNEY STONES: Primary | ICD-10-CM

## 2021-05-14 PROCEDURE — 99213 OFFICE O/P EST LOW 20 MIN: CPT | Mod: S$PBB,,, | Performed by: NURSE PRACTITIONER

## 2021-05-14 PROCEDURE — 99999 PR PBB SHADOW E&M-EST. PATIENT-LVL IV: CPT | Mod: PBBFAC,,, | Performed by: NURSE PRACTITIONER

## 2021-05-14 PROCEDURE — 99999 PR PBB SHADOW E&M-EST. PATIENT-LVL IV: ICD-10-PCS | Mod: PBBFAC,,, | Performed by: NURSE PRACTITIONER

## 2021-05-14 PROCEDURE — 99213 PR OFFICE/OUTPT VISIT, EST, LEVL III, 20-29 MIN: ICD-10-PCS | Mod: S$PBB,,, | Performed by: NURSE PRACTITIONER

## 2021-05-14 PROCEDURE — 99214 OFFICE O/P EST MOD 30 MIN: CPT | Mod: PBBFAC | Performed by: NURSE PRACTITIONER

## 2021-05-31 ENCOUNTER — HOSPITAL ENCOUNTER (OUTPATIENT)
Dept: PREADMISSION TESTING | Facility: HOSPITAL | Age: 61
Discharge: HOME | End: 2021-05-31
Attending: UROLOGY
Payer: MEDICAID

## 2021-05-31 ENCOUNTER — ANESTHESIA EVENT (OUTPATIENT)
Dept: SURGERY | Facility: HOSPITAL | Age: 61
End: 2021-05-31
Payer: MEDICAID

## 2021-05-31 VITALS
HEIGHT: 72 IN | TEMPERATURE: 98 F | RESPIRATION RATE: 18 BRPM | WEIGHT: 179.44 LBS | OXYGEN SATURATION: 93 % | SYSTOLIC BLOOD PRESSURE: 122 MMHG | BODY MASS INDEX: 24.3 KG/M2 | HEART RATE: 72 BPM | DIASTOLIC BLOOD PRESSURE: 71 MMHG

## 2021-06-07 ENCOUNTER — ANESTHESIA (OUTPATIENT)
Dept: SURGERY | Facility: HOSPITAL | Age: 61
End: 2021-06-07
Payer: MEDICAID

## 2021-06-07 ENCOUNTER — HOSPITAL ENCOUNTER (OUTPATIENT)
Facility: HOSPITAL | Age: 61
Discharge: HOME | End: 2021-06-07
Attending: UROLOGY | Admitting: UROLOGY
Payer: MEDICAID

## 2021-06-07 VITALS
HEART RATE: 64 BPM | SYSTOLIC BLOOD PRESSURE: 116 MMHG | TEMPERATURE: 98 F | RESPIRATION RATE: 20 BRPM | OXYGEN SATURATION: 93 % | WEIGHT: 179.44 LBS | DIASTOLIC BLOOD PRESSURE: 64 MMHG | BODY MASS INDEX: 24.34 KG/M2

## 2021-06-07 DIAGNOSIS — N20.0 KIDNEY STONES: Primary | ICD-10-CM

## 2021-06-07 DIAGNOSIS — N13.30 HYDRONEPHROSIS, UNSPECIFIED HYDRONEPHROSIS TYPE: ICD-10-CM

## 2021-06-07 PROCEDURE — 37000009 HC ANESTHESIA EA ADD 15 MINS: Performed by: UROLOGY

## 2021-06-07 PROCEDURE — D9220A PRA ANESTHESIA: ICD-10-PCS | Mod: CRNA,,, | Performed by: NURSE ANESTHETIST, CERTIFIED REGISTERED

## 2021-06-07 PROCEDURE — 36000704 HC OR TIME LEV I 1ST 15 MIN: Performed by: UROLOGY

## 2021-06-07 PROCEDURE — 63600175 PHARM REV CODE 636 W HCPCS: Performed by: UROLOGY

## 2021-06-07 PROCEDURE — 63600175 PHARM REV CODE 636 W HCPCS: Performed by: NURSE ANESTHETIST, CERTIFIED REGISTERED

## 2021-06-07 PROCEDURE — 27200651 HC AIRWAY, LMA: Performed by: ANESTHESIOLOGY

## 2021-06-07 PROCEDURE — 71000016 HC POSTOP RECOV ADDL HR: Performed by: UROLOGY

## 2021-06-07 PROCEDURE — 00873 ANES LITHOTRP ESW WO WTR BTH: CPT | Performed by: UROLOGY

## 2021-06-07 PROCEDURE — D9220A PRA ANESTHESIA: Mod: CRNA,,, | Performed by: NURSE ANESTHETIST, CERTIFIED REGISTERED

## 2021-06-07 PROCEDURE — 50590 FRAGMENTING OF KIDNEY STONE: CPT | Mod: RT,,, | Performed by: UROLOGY

## 2021-06-07 PROCEDURE — 36000705 HC OR TIME LEV I EA ADD 15 MIN: Performed by: UROLOGY

## 2021-06-07 PROCEDURE — 50590 PR FRAGMENT KIDNEY STONE/ ESWL: ICD-10-PCS | Mod: RT,,, | Performed by: UROLOGY

## 2021-06-07 PROCEDURE — 25000003 PHARM REV CODE 250: Performed by: UROLOGY

## 2021-06-07 PROCEDURE — 25000242 PHARM REV CODE 250 ALT 637 W/ HCPCS

## 2021-06-07 PROCEDURE — D9220A PRA ANESTHESIA: ICD-10-PCS | Mod: ANES,,, | Performed by: ANESTHESIOLOGY

## 2021-06-07 PROCEDURE — 71000033 HC RECOVERY, INTIAL HOUR: Performed by: UROLOGY

## 2021-06-07 PROCEDURE — 25000003 PHARM REV CODE 250: Performed by: NURSE ANESTHETIST, CERTIFIED REGISTERED

## 2021-06-07 PROCEDURE — D9220A PRA ANESTHESIA: Mod: ANES,,, | Performed by: ANESTHESIOLOGY

## 2021-06-07 PROCEDURE — 71000015 HC POSTOP RECOV 1ST HR: Performed by: UROLOGY

## 2021-06-07 PROCEDURE — 71000039 HC RECOVERY, EACH ADD'L HOUR: Performed by: UROLOGY

## 2021-06-07 PROCEDURE — 37000008 HC ANESTHESIA 1ST 15 MINUTES: Performed by: UROLOGY

## 2021-06-07 RX ORDER — MIDAZOLAM HYDROCHLORIDE 1 MG/ML
INJECTION, SOLUTION INTRAMUSCULAR; INTRAVENOUS
Status: DISCONTINUED | OUTPATIENT
Start: 2021-06-07 | End: 2021-06-07

## 2021-06-07 RX ORDER — ACETAMINOPHEN 10 MG/ML
INJECTION, SOLUTION INTRAVENOUS
Status: DISCONTINUED
Start: 2021-06-07 | End: 2021-06-07 | Stop reason: WASHOUT

## 2021-06-07 RX ORDER — ONDANSETRON 2 MG/ML
INJECTION INTRAMUSCULAR; INTRAVENOUS
Status: DISCONTINUED | OUTPATIENT
Start: 2021-06-07 | End: 2021-06-07

## 2021-06-07 RX ORDER — FENTANYL CITRATE 50 UG/ML
INJECTION, SOLUTION INTRAMUSCULAR; INTRAVENOUS
Status: DISCONTINUED | OUTPATIENT
Start: 2021-06-07 | End: 2021-06-07

## 2021-06-07 RX ORDER — HYDROCODONE BITARTRATE AND ACETAMINOPHEN 10; 325 MG/1; MG/1
1 TABLET ORAL EVERY 4 HOURS PRN
Status: DISCONTINUED | OUTPATIENT
Start: 2021-06-07 | End: 2021-06-07 | Stop reason: HOSPADM

## 2021-06-07 RX ORDER — SODIUM CHLORIDE, SODIUM LACTATE, POTASSIUM CHLORIDE, CALCIUM CHLORIDE 600; 310; 30; 20 MG/100ML; MG/100ML; MG/100ML; MG/100ML
INJECTION, SOLUTION INTRAVENOUS CONTINUOUS
Status: DISCONTINUED | OUTPATIENT
Start: 2021-06-07 | End: 2021-06-07 | Stop reason: HOSPADM

## 2021-06-07 RX ORDER — PROPOFOL 10 MG/ML
VIAL (ML) INTRAVENOUS
Status: DISCONTINUED | OUTPATIENT
Start: 2021-06-07 | End: 2021-06-07

## 2021-06-07 RX ORDER — ACETAMINOPHEN 500 MG
1000 TABLET ORAL
Status: DISCONTINUED | OUTPATIENT
Start: 2021-06-08 | End: 2021-06-07

## 2021-06-07 RX ORDER — HYDROCODONE BITARTRATE AND ACETAMINOPHEN 10; 325 MG/1; MG/1
1 TABLET ORAL EVERY 6 HOURS PRN
Qty: 28 TABLET | Refills: 0 | Status: ON HOLD | OUTPATIENT
Start: 2021-06-07 | End: 2022-05-11 | Stop reason: HOSPADM

## 2021-06-07 RX ORDER — IPRATROPIUM BROMIDE AND ALBUTEROL SULFATE 2.5; .5 MG/3ML; MG/3ML
SOLUTION RESPIRATORY (INHALATION)
Status: COMPLETED
Start: 2021-06-07 | End: 2021-06-07

## 2021-06-07 RX ORDER — LIDOCAINE HYDROCHLORIDE 20 MG/ML
INJECTION INTRAVENOUS
Status: DISCONTINUED | OUTPATIENT
Start: 2021-06-07 | End: 2021-06-07

## 2021-06-07 RX ORDER — IPRATROPIUM BROMIDE AND ALBUTEROL SULFATE 2.5; .5 MG/3ML; MG/3ML
3 SOLUTION RESPIRATORY (INHALATION) ONCE
Status: COMPLETED | OUTPATIENT
Start: 2021-06-07 | End: 2021-06-07

## 2021-06-07 RX ORDER — ONDANSETRON 2 MG/ML
4 INJECTION INTRAMUSCULAR; INTRAVENOUS DAILY PRN
Status: DISCONTINUED | OUTPATIENT
Start: 2021-06-07 | End: 2021-06-07 | Stop reason: HOSPADM

## 2021-06-07 RX ORDER — PHENYLEPHRINE HYDROCHLORIDE 10 MG/ML
INJECTION INTRAVENOUS
Status: DISCONTINUED | OUTPATIENT
Start: 2021-06-07 | End: 2021-06-07

## 2021-06-07 RX ORDER — HYDROCODONE BITARTRATE AND ACETAMINOPHEN 5; 325 MG/1; MG/1
1 TABLET ORAL EVERY 4 HOURS PRN
Status: DISCONTINUED | OUTPATIENT
Start: 2021-06-07 | End: 2021-06-07 | Stop reason: HOSPADM

## 2021-06-07 RX ORDER — ACETAMINOPHEN 500 MG
1000 TABLET ORAL
Status: COMPLETED | OUTPATIENT
Start: 2021-06-07 | End: 2021-06-07

## 2021-06-07 RX ORDER — HYDROMORPHONE HYDROCHLORIDE 2 MG/ML
0.2 INJECTION, SOLUTION INTRAMUSCULAR; INTRAVENOUS; SUBCUTANEOUS EVERY 5 MIN PRN
Status: DISCONTINUED | OUTPATIENT
Start: 2021-06-07 | End: 2021-06-07 | Stop reason: HOSPADM

## 2021-06-07 RX ORDER — LIDOCAINE HYDROCHLORIDE 10 MG/ML
1 INJECTION, SOLUTION EPIDURAL; INFILTRATION; INTRACAUDAL; PERINEURAL ONCE
Status: DISCONTINUED | OUTPATIENT
Start: 2021-06-07 | End: 2021-06-07 | Stop reason: HOSPADM

## 2021-06-07 RX ORDER — SODIUM CHLORIDE 0.9 % (FLUSH) 0.9 %
10 SYRINGE (ML) INJECTION
Status: DISCONTINUED | OUTPATIENT
Start: 2021-06-07 | End: 2021-06-07 | Stop reason: HOSPADM

## 2021-06-07 RX ORDER — DEXAMETHASONE SODIUM PHOSPHATE 4 MG/ML
INJECTION, SOLUTION INTRA-ARTICULAR; INTRALESIONAL; INTRAMUSCULAR; INTRAVENOUS; SOFT TISSUE
Status: DISCONTINUED | OUTPATIENT
Start: 2021-06-07 | End: 2021-06-07

## 2021-06-07 RX ADMIN — GENTAMICIN SULFATE 118.65 MG: 40 INJECTION, SOLUTION INTRAMUSCULAR; INTRAVENOUS at 07:06

## 2021-06-07 RX ADMIN — FENTANYL CITRATE 50 MCG: 50 INJECTION, SOLUTION INTRAMUSCULAR; INTRAVENOUS at 07:06

## 2021-06-07 RX ADMIN — GLYCOPYRROLATE 0.1 MG: 0.2 INJECTION, SOLUTION INTRAMUSCULAR; INTRAVITREAL at 07:06

## 2021-06-07 RX ADMIN — ONDANSETRON 4 MG: 2 INJECTION, SOLUTION INTRAMUSCULAR; INTRAVENOUS at 07:06

## 2021-06-07 RX ADMIN — PHENYLEPHRINE HYDROCHLORIDE 100 MCG: 10 INJECTION INTRAVENOUS at 07:06

## 2021-06-07 RX ADMIN — IPRATROPIUM BROMIDE AND ALBUTEROL SULFATE 3 ML: 2.5; .5 SOLUTION RESPIRATORY (INHALATION) at 07:06

## 2021-06-07 RX ADMIN — MIDAZOLAM HYDROCHLORIDE 2 MG: 1 INJECTION, SOLUTION INTRAMUSCULAR; INTRAVENOUS at 07:06

## 2021-06-07 RX ADMIN — IPRATROPIUM BROMIDE AND ALBUTEROL SULFATE 3 ML: .5; 2.5 SOLUTION RESPIRATORY (INHALATION) at 07:06

## 2021-06-07 RX ADMIN — Medication 100 MG: at 07:06

## 2021-06-07 RX ADMIN — DEXAMETHASONE SODIUM PHOSPHATE 4 MG: 4 INJECTION, SOLUTION INTRAMUSCULAR; INTRAVENOUS at 07:06

## 2021-06-07 RX ADMIN — PROPOFOL 130 MG: 10 INJECTION, EMULSION INTRAVENOUS at 07:06

## 2021-06-07 RX ADMIN — ACETAMINOPHEN 1000 MG: 500 TABLET, FILM COATED ORAL at 06:06

## 2021-06-25 ENCOUNTER — OFFICE VISIT (OUTPATIENT)
Dept: UROLOGY | Facility: CLINIC | Age: 61
End: 2021-06-25
Payer: MEDICAID

## 2021-06-25 VITALS — BODY MASS INDEX: 24.69 KG/M2 | HEIGHT: 72 IN | WEIGHT: 182.31 LBS

## 2021-06-25 DIAGNOSIS — Z85.51 HISTORY OF BLADDER CANCER: ICD-10-CM

## 2021-06-25 DIAGNOSIS — N20.0 KIDNEY STONES: Primary | ICD-10-CM

## 2021-06-25 PROCEDURE — 99214 OFFICE O/P EST MOD 30 MIN: CPT | Mod: PBBFAC | Performed by: UROLOGY

## 2021-06-25 PROCEDURE — 99999 PR PBB SHADOW E&M-EST. PATIENT-LVL IV: CPT | Mod: PBBFAC,,, | Performed by: UROLOGY

## 2021-06-25 PROCEDURE — 99999 PR PBB SHADOW E&M-EST. PATIENT-LVL IV: ICD-10-PCS | Mod: PBBFAC,,, | Performed by: UROLOGY

## 2021-06-25 PROCEDURE — 99024 PR POST-OP FOLLOW-UP VISIT: ICD-10-PCS | Mod: ,,, | Performed by: UROLOGY

## 2021-06-25 PROCEDURE — 99024 POSTOP FOLLOW-UP VISIT: CPT | Mod: ,,, | Performed by: UROLOGY

## 2021-07-09 ENCOUNTER — TELEPHONE (OUTPATIENT)
Dept: SURGERY | Facility: CLINIC | Age: 61
End: 2021-07-09

## 2021-07-15 ENCOUNTER — OFFICE VISIT (OUTPATIENT)
Dept: SURGERY | Facility: CLINIC | Age: 61
End: 2021-07-15
Payer: MEDICAID

## 2021-07-15 VITALS
BODY MASS INDEX: 24.48 KG/M2 | OXYGEN SATURATION: 94 % | RESPIRATION RATE: 18 BRPM | HEIGHT: 72 IN | DIASTOLIC BLOOD PRESSURE: 77 MMHG | SYSTOLIC BLOOD PRESSURE: 129 MMHG | WEIGHT: 180.75 LBS | HEART RATE: 72 BPM

## 2021-07-15 DIAGNOSIS — K80.20 SYMPTOMATIC CHOLELITHIASIS: ICD-10-CM

## 2021-07-15 DIAGNOSIS — K80.20 CALCULUS OF GALLBLADDER WITHOUT CHOLECYSTITIS WITHOUT OBSTRUCTION: Primary | ICD-10-CM

## 2021-07-15 PROCEDURE — 99204 PR OFFICE/OUTPT VISIT, NEW, LEVL IV, 45-59 MIN: ICD-10-PCS | Mod: S$PBB,,, | Performed by: SURGERY

## 2021-07-15 PROCEDURE — 99999 PR PBB SHADOW E&M-EST. PATIENT-LVL V: CPT | Mod: PBBFAC,,, | Performed by: SURGERY

## 2021-07-15 PROCEDURE — 99215 OFFICE O/P EST HI 40 MIN: CPT | Mod: PBBFAC,PN | Performed by: SURGERY

## 2021-07-15 PROCEDURE — 99999 PR PBB SHADOW E&M-EST. PATIENT-LVL V: ICD-10-PCS | Mod: PBBFAC,,, | Performed by: SURGERY

## 2021-07-15 PROCEDURE — 99204 OFFICE O/P NEW MOD 45 MIN: CPT | Mod: S$PBB,,, | Performed by: SURGERY

## 2021-07-15 RX ORDER — ENOXAPARIN SODIUM 100 MG/ML
40 INJECTION SUBCUTANEOUS
Status: CANCELLED | OUTPATIENT
Start: 2021-07-15

## 2021-07-15 RX ORDER — CEFAZOLIN SODIUM 2 G/50ML
2 SOLUTION INTRAVENOUS
Status: CANCELLED | OUTPATIENT
Start: 2021-07-15

## 2021-08-02 ENCOUNTER — OFFICE VISIT (OUTPATIENT)
Dept: CARDIOLOGY | Facility: CLINIC | Age: 61
End: 2021-08-02
Payer: MEDICAID

## 2021-08-02 ENCOUNTER — TELEPHONE (OUTPATIENT)
Dept: CARDIOLOGY | Facility: CLINIC | Age: 61
End: 2021-08-02

## 2021-08-02 DIAGNOSIS — R91.1 LUNG NODULE: ICD-10-CM

## 2021-08-02 DIAGNOSIS — I10 ESSENTIAL HYPERTENSION: ICD-10-CM

## 2021-08-02 DIAGNOSIS — J44.89 OBSTRUCTIVE CHRONIC BRONCHITIS WITHOUT EXACERBATION: Primary | ICD-10-CM

## 2021-08-02 DIAGNOSIS — I25.10 CORONARY ARTERY CALCIFICATION: ICD-10-CM

## 2021-08-02 DIAGNOSIS — R07.9 CHEST PAIN OF UNCERTAIN ETIOLOGY: ICD-10-CM

## 2021-08-02 DIAGNOSIS — I25.84 CORONARY ARTERY CALCIFICATION: ICD-10-CM

## 2021-08-02 DIAGNOSIS — I72.3 ILIAC ARTERY ANEURYSM: Chronic | ICD-10-CM

## 2021-08-02 DIAGNOSIS — E78.1 HYPERTRIGLYCERIDEMIA: Chronic | ICD-10-CM

## 2021-08-02 DIAGNOSIS — Z85.51 HISTORY OF BLADDER CANCER: ICD-10-CM

## 2021-08-02 DIAGNOSIS — N18.4 CKD (CHRONIC KIDNEY DISEASE), STAGE IV: ICD-10-CM

## 2021-08-02 PROCEDURE — 99214 OFFICE O/P EST MOD 30 MIN: CPT | Mod: 95,,, | Performed by: INTERNAL MEDICINE

## 2021-08-02 PROCEDURE — 99214 PR OFFICE/OUTPT VISIT, EST, LEVL IV, 30-39 MIN: ICD-10-PCS | Mod: 95,,, | Performed by: INTERNAL MEDICINE

## 2021-08-05 ENCOUNTER — TELEPHONE (OUTPATIENT)
Dept: CARDIOLOGY | Facility: CLINIC | Age: 61
End: 2021-08-05

## 2021-08-05 LAB
CV STRESS BASE HR: 100 BPM
DIASTOLIC BLOOD PRESSURE: 79 MMHG
OHS CV CPX 1 MINUTE RECOVERY HEART RATE: 125 BPM
OHS CV CPX 85 PERCENT MAX PREDICTED HEART RATE MALE: 133
OHS CV CPX MAX PREDICTED HEART RATE: 157
OHS CV CPX PATIENT IS FEMALE: 0
OHS CV CPX PATIENT IS MALE: 1
OHS CV CPX PEAK DIASTOLIC BLOOD PRESSURE: 81 MMHG
OHS CV CPX PEAK HEAR RATE: 125 BPM
OHS CV CPX PEAK RATE PRESSURE PRODUCT: NORMAL
OHS CV CPX PEAK SYSTOLIC BLOOD PRESSURE: 193 MMHG
OHS CV CPX PERCENT MAX PREDICTED HEART RATE ACHIEVED: 80
OHS CV CPX RATE PRESSURE PRODUCT PRESENTING: NORMAL
STRESS ECHO POST EXERCISE DUR MIN: 5 MINUTES
STRESS ECHO POST EXERCISE DUR SEC: 37 SECONDS
SYSTOLIC BLOOD PRESSURE: 131 MMHG

## 2021-08-11 PROBLEM — K80.20 SYMPTOMATIC CHOLELITHIASIS: Status: ACTIVE | Noted: 2021-08-11

## 2021-08-26 ENCOUNTER — OFFICE VISIT (OUTPATIENT)
Dept: SURGERY | Facility: CLINIC | Age: 61
End: 2021-08-26
Payer: MEDICAID

## 2021-08-26 VITALS
HEART RATE: 82 BPM | DIASTOLIC BLOOD PRESSURE: 78 MMHG | RESPIRATION RATE: 18 BRPM | HEIGHT: 72 IN | BODY MASS INDEX: 23.85 KG/M2 | SYSTOLIC BLOOD PRESSURE: 139 MMHG | TEMPERATURE: 99 F | WEIGHT: 176.06 LBS

## 2021-08-26 DIAGNOSIS — Z98.890 POST-OPERATIVE STATE: Primary | ICD-10-CM

## 2021-08-26 PROCEDURE — 99999 PR PBB SHADOW E&M-EST. PATIENT-LVL IV: ICD-10-PCS | Mod: PBBFAC,,, | Performed by: SURGERY

## 2021-08-26 PROCEDURE — 99999 PR PBB SHADOW E&M-EST. PATIENT-LVL IV: CPT | Mod: PBBFAC,,, | Performed by: SURGERY

## 2021-08-26 PROCEDURE — 99214 OFFICE O/P EST MOD 30 MIN: CPT | Mod: PBBFAC,PN | Performed by: SURGERY

## 2021-08-26 PROCEDURE — 99024 PR POST-OP FOLLOW-UP VISIT: ICD-10-PCS | Mod: ,,, | Performed by: SURGERY

## 2021-08-26 PROCEDURE — 99024 POSTOP FOLLOW-UP VISIT: CPT | Mod: ,,, | Performed by: SURGERY

## 2021-09-13 ENCOUNTER — OFFICE VISIT (OUTPATIENT)
Dept: UROLOGY | Facility: CLINIC | Age: 61
End: 2021-09-13
Payer: MEDICARE

## 2021-09-13 VITALS — WEIGHT: 176 LBS | HEIGHT: 72 IN | BODY MASS INDEX: 23.84 KG/M2

## 2021-09-13 DIAGNOSIS — N20.0 KIDNEY STONES: ICD-10-CM

## 2021-09-13 DIAGNOSIS — N28.9 RENAL INSUFFICIENCY: ICD-10-CM

## 2021-09-13 DIAGNOSIS — Z85.51 HISTORY OF BLADDER CANCER: Primary | ICD-10-CM

## 2021-09-13 PROCEDURE — 99214 OFFICE O/P EST MOD 30 MIN: CPT | Mod: PBBFAC | Performed by: UROLOGY

## 2021-09-13 PROCEDURE — 99999 PR PBB SHADOW E&M-EST. PATIENT-LVL IV: ICD-10-PCS | Mod: PBBFAC,,, | Performed by: UROLOGY

## 2021-09-13 PROCEDURE — 99213 OFFICE O/P EST LOW 20 MIN: CPT | Mod: S$PBB,,, | Performed by: UROLOGY

## 2021-09-13 PROCEDURE — 99213 PR OFFICE/OUTPT VISIT, EST, LEVL III, 20-29 MIN: ICD-10-PCS | Mod: S$PBB,,, | Performed by: UROLOGY

## 2021-09-13 PROCEDURE — 99999 PR PBB SHADOW E&M-EST. PATIENT-LVL IV: CPT | Mod: PBBFAC,,, | Performed by: UROLOGY

## 2021-10-19 ENCOUNTER — PATIENT MESSAGE (OUTPATIENT)
Dept: PULMONOLOGY | Facility: CLINIC | Age: 61
End: 2021-10-19
Payer: MEDICARE

## 2021-10-22 ENCOUNTER — PATIENT MESSAGE (OUTPATIENT)
Dept: PULMONOLOGY | Facility: CLINIC | Age: 61
End: 2021-10-22
Payer: MEDICARE

## 2021-10-22 ENCOUNTER — TELEPHONE (OUTPATIENT)
Dept: PULMONOLOGY | Facility: CLINIC | Age: 61
End: 2021-10-22

## 2021-10-26 ENCOUNTER — TELEPHONE (OUTPATIENT)
Dept: PULMONOLOGY | Facility: CLINIC | Age: 61
End: 2021-10-26
Payer: MEDICARE

## 2021-10-26 DIAGNOSIS — R91.1 LUNG NODULE: Primary | ICD-10-CM

## 2021-11-04 ENCOUNTER — HOSPITAL ENCOUNTER (OUTPATIENT)
Dept: RADIOLOGY | Facility: HOSPITAL | Age: 61
Discharge: HOME | End: 2021-11-04
Attending: NURSE PRACTITIONER
Payer: MEDICARE

## 2021-11-04 DIAGNOSIS — R91.1 LUNG NODULE: ICD-10-CM

## 2021-11-04 LAB — POCT GLUCOSE: 122 MG/DL (ref 70–110)

## 2021-11-04 PROCEDURE — 78815 NM PET CT ROUTINE: ICD-10-PCS | Mod: 26,PI,, | Performed by: RADIOLOGY

## 2021-11-04 PROCEDURE — A9698 NON-RAD CONTRAST MATERIALNOC: HCPCS | Performed by: NURSE PRACTITIONER

## 2021-11-04 PROCEDURE — 78815 PET IMAGE W/CT SKULL-THIGH: CPT | Mod: TC

## 2021-11-04 PROCEDURE — 25500020 PHARM REV CODE 255: Performed by: NURSE PRACTITIONER

## 2021-11-04 PROCEDURE — 78815 PET IMAGE W/CT SKULL-THIGH: CPT | Mod: 26,PI,, | Performed by: RADIOLOGY

## 2021-11-04 RX ADMIN — IOHEXOL 500 ML: 9 SOLUTION ORAL at 10:11

## 2021-12-23 ENCOUNTER — PATIENT MESSAGE (OUTPATIENT)
Dept: PULMONOLOGY | Facility: CLINIC | Age: 61
End: 2021-12-23
Payer: MEDICARE

## 2021-12-23 DIAGNOSIS — R91.1 LUNG NODULE: Primary | ICD-10-CM

## 2022-01-04 ENCOUNTER — TELEPHONE (OUTPATIENT)
Dept: PULMONOLOGY | Facility: CLINIC | Age: 62
End: 2022-01-04
Payer: MEDICARE

## 2022-01-04 NOTE — TELEPHONE ENCOUNTER
Again, left message for patient to return call if needed to discuss PET CT results.  It looks as though a medical assistant was able to answer his questions and scheduled for CT of chest in February.

## 2022-01-25 ENCOUNTER — TELEPHONE (OUTPATIENT)
Dept: PULMONOLOGY | Facility: CLINIC | Age: 62
End: 2022-01-25
Payer: MEDICARE

## 2022-01-25 NOTE — TELEPHONE ENCOUNTER
----- Message from Renan Sharp MA sent at 1/25/2022 12:05 PM CST -----  Regarding: FW: Pt Appt    ----- Message -----  From: Trixie Burger  Sent: 1/25/2022  11:58 AM CST  To: Hussein Malhotra Staff  Subject: Pt Appt                                          Pt advised he has been playing phone tag for a while asking to speak with [facility]       [phone] (home)

## 2022-01-25 NOTE — TELEPHONE ENCOUNTER
Informed patient follow up appointment for CT results is scheduled on 3/9/22 @ 9:30 AM with JOHNIE Francis, also placed on cancel list for sooner appointment.

## 2022-02-02 ENCOUNTER — OFFICE VISIT (OUTPATIENT)
Dept: VASCULAR SURGERY | Facility: CLINIC | Age: 62
End: 2022-02-02
Payer: MEDICARE

## 2022-02-02 VITALS — SYSTOLIC BLOOD PRESSURE: 126 MMHG | DIASTOLIC BLOOD PRESSURE: 62 MMHG

## 2022-02-02 DIAGNOSIS — I72.3 ANEURYSM OF RIGHT ILIAC ARTERY: Primary | ICD-10-CM

## 2022-02-02 PROCEDURE — 99999 PR PBB SHADOW E&M-EST. PATIENT-LVL III: ICD-10-PCS | Mod: PBBFAC,,, | Performed by: SURGERY

## 2022-02-02 PROCEDURE — 99214 OFFICE O/P EST MOD 30 MIN: CPT | Mod: S$PBB,,, | Performed by: SURGERY

## 2022-02-02 PROCEDURE — 99214 PR OFFICE/OUTPT VISIT, EST, LEVL IV, 30-39 MIN: ICD-10-PCS | Mod: S$PBB,,, | Performed by: SURGERY

## 2022-02-02 PROCEDURE — 99213 OFFICE O/P EST LOW 20 MIN: CPT | Mod: PBBFAC | Performed by: SURGERY

## 2022-02-02 PROCEDURE — 99999 PR PBB SHADOW E&M-EST. PATIENT-LVL III: CPT | Mod: PBBFAC,,, | Performed by: SURGERY

## 2022-02-02 NOTE — PROGRESS NOTES
Neptali Adame MD [facility]                         02/02/2022    HPI:  Blake Guillory is a 61 y.o. male with   Patient Active Problem List   Diagnosis    Insomnia    Lumbar facet arthropathy    Sacroiliitis    Spondylosis without myelopathy    DDD (degenerative disc disease)    History of bladder cancer    Hydronephrosis, bilateral    H/O primary malignant neoplasm of urinary bladder    Essential hypertension    Anemia of chronic disease    Moderate protein malnutrition    Chronic gout    Renal insufficiency    Body mass index (BMI) 20.0-20.9, adult    Personal history of bladder cancer    Acquired hypothyroidism    Hypoxemia    Severe malnutrition    Hydronephrosis    History of primary malignant neoplasm of urinary bladder    Hydronephrosis with urinary obstruction due to ureteral calculus    Chest pain of uncertain etiology    Obstructive chronic bronchitis without exacerbation    Epigastric abdominal tenderness without rebound tenderness    CKD (chronic kidney disease), stage IV    S/P colonoscopy    Iliac artery aneurysm    Kidney stones    Hypertriglyceridemia    Nephrolithiasis    Acute respiratory failure with hypoxia    Kidney stone on left side    Hyperparathyroidism    Encounter for screening for malignant neoplasm of respiratory organs    Personal history of nicotine dependence    Lung nodule    Coronary artery calcification    Symptomatic cholelithiasis    being managed by PCP and specialists who is here today for evaluation of R ANTHONY aneurysm.  Pt has a history of bladder cancer who status post cystectomy with ileal conduit.  He had a revision of his ileal ureteral anastomoses several months after his cystectomy.  He developed recurrent ureteral strictures and he was managed with stents.  His last ureteral stent change through the conduit was in October 2015.  Within days of the last ureteral stent change,  he developed profuse diarrhea.  He saw Gastroenterology who performed a colonoscopy, who discovered that he had an ureteral stent in the colon.  CT scan showing that there was the stent indeed coming from the conduit into the colon. and is s/p exploratory laparotomy, lysis of adhesions, excision of ileal conduit to distal ileum fistula, small bowel resection, appendectomy, small bowel to small bowel anastomosis, replacement of ileal conduit 2015.  Pt is without complaints today.  No abd pain or pelvic pain.  Prev 2.9 cm on 4/2017 non contrasted CT A/P.  Pt states he has severe SHAFFER and is unable to climb one flight of stairs.    no MI  no Stroke  Tobacco use: quit 1/2018    2019:  Denies abdominal, pelvic and back pain.  C/o BLE heaviness at 1 block for a few months.  +tingling BLE.  No BLE edema.    2/2022:  No new complaints.    Past Medical History:   Diagnosis Date    Anemia of chronic disease     Aneurysm of right common iliac artery     Arthritis     Blood transfusion     CAD (coronary artery disease)     Chest pain of uncertain etiology     CKD (chronic kidney disease), stage IV     COPD (chronic obstructive pulmonary disease)     Coronary artery calcification 8/2/2021    DDD (degenerative disc disease), lumbar     Frequency of urination     General anesthetics causing adverse effect in therapeutic use     pt states he wakes up very violently from anesthesia    GERD (gastroesophageal reflux disease)     Gout     History of bladder cancer     History of urostomy     Hydronephrosis     Hyperparathyroidism     Hypertension     Hypertriglyceridemia     Hypothyroidism (acquired)     Insomnia     Kidney stone     Limited joint range of motion right hip and leg    Lumbar facet arthropathy     Lumbar spondylosis     Mixed anxiety and depressive disorder     Neuropathy     Orchalgia     SHARMIN (obstructive sleep apnea)     Personal history of bladder cancer     PVD (peripheral vascular  disease)     Sacroiliitis     Stricture or kinking of ureter     Ureteral stent displacement     Urinary retention     Vitamin D deficiency     Wears glasses      Past Surgical History:   Procedure Laterality Date    APPENDECTOMY  12/30/2015    Procedure: APPENDECTOMY;  Surgeon: CHAD Bo MD;  Location: [facility] OR;  Service: Urology;;    BALLOON DILATION OF URETER  01/22/2018    BOWEL RESECTION      COLONOSCOPY      CREATION OF URETEROILEAL CONDUIT Left 07/05/2013    CYSTOSCOPY      >1  episodes for bilat ureteral stent exchange    CYSTOSCOPY W/ URETERAL STENT PLACEMENT Right 01/22/2018    and 6/9/2015    CYSTOSCOPY W/ URETERAL STENT PLACEMENT Bilateral 10/07/2015    EXPLORATORY LAPAROTOMY  07/05/2013    EXPLORATORY LAPAROTOMY W/ BOWEL RESECTION  12/30/2015    EXTRACORPOREAL SHOCK WAVE LITHOTRIPSY Right 6/7/2021    Procedure: LITHOTRIPSY, ESWL;  Surgeon: CHAD Bo MD;  Location: [facility] OR;  Service: Urology;  Laterality: Right;  RN PREOP 5/31/2021   VACCINATED    EXTRACORPOREAL SHOCK WAVE LITHOTRIPSY  06/07/2021    FRACTURE SURGERY      HAND SURGERY      HEMICOLECTOMY  12/30/2015    HIP SURGERY  2012    Rt hip septic    LAPAROSCOPIC CHOLECYSTECTOMY N/A 8/11/2021    Procedure: CHOLECYSTECTOMY, LAPAROSCOPIC;  Surgeon: Remy Xiong MD;  Location: [facility] OR;  Service: General;  Laterality: N/A;  gianni video confirmed 8/5/dme    LYMPH NODE DISSECTION  11/08/2012    pelvic    LYSIS OF ADHESIONS  12/30/2015    MOUTH SURGERY  2000    all teeth extracted    PERCUTANEOUS NEPHROLITHOTOMY Left 7/22/2020    Procedure: NEPHROLITHOTOMY, PERCUTANEOUS;  Surgeon: CHAD Bo MD;  Location: [facility] OR;  Service: Urology;  Laterality: Left;  OR 9 ONLY  RN PREOP 7/20/2020----COVID NEGATIVE ON 7/20    PERCUTANEOUS NEPHROSTOMY Left 01/02/2018    RADICAL CYSTECTOMY  11/08/2012    RADIOFREQUENCY THERMOCOAGULATION  12/12/2014    median branch lumbar    RADIOFREQUENCY THERMOCOAGULATION   2014    median branch lumbar    REPAIR, HERNIA, PARASTOMAL, LAPAROSCOPIC  2017    SMALL INTESTINE SURGERY  2017    urosotomy bag  2011    R abdomen     Family History   Adopted: Yes     Social History     Socioeconomic History    Marital status:    Occupational History    Occupation: [employer]     Employer: [employer]   Tobacco Use    Smoking status: Former Smoker     Packs/day: 0.30     Years: 42.00     Pack years: 12.60     Quit date: 2017     Years since quittin.0    Smokeless tobacco: Never Used    Tobacco comment: in cessation program   Substance and Sexual Activity    Alcohol use: Yes     Alcohol/week: 14.0 - 21.0 standard drinks     Types: 14 - 21 Cans of beer per week     Comment: occassional    Drug use: Yes     Types: Marijuana     Comment: occass    Sexual activity: Yes     Partners: Female     Birth control/protection: None       Current Outpatient Medications:     albuterol (PROAIR HFA) 90 mcg/actuation inhaler, Inhale 2 puffs into the lungs every 6 (six) hours as needed for Wheezing. Rescue, Disp: 18 g, Rfl: 0    albuterol-ipratropium (DUO-NEB) 2.5 mg-0.5 mg/3 mL nebulizer solution, Take 3 mLs by nebulization every 6 (six) hours as needed for Wheezing. Rescue, Disp: 50 vial, Rfl: 2    allopurinoL (ZYLOPRIM) 100 MG tablet, Take 1 tablet (100 mg total) by mouth once daily., Disp: 90 tablet, Rfl: 0    amLODIPine (NORVASC) 10 MG tablet, Take 1 tablet (10 mg total) by mouth once daily., Disp: 90 tablet, Rfl: 0    aspirin (ECOTRIN) 81 MG EC tablet, Take 81 mg by mouth once daily., Disp: , Rfl:     atenoloL (TENORMIN) 50 MG tablet, TAKE 1 TABLET(50 MG) BY MOUTH EVERY DAY, Disp: 90 tablet, Rfl: 0    busPIRone (BUSPAR) 5 MG Tab, Take 1 tablet (5 mg total) by mouth 2 (two) times daily as needed., Disp: 60 tablet, Rfl: 1    calcitRIOL (ROCALTROL) 0.5 MCG Cap, , Disp: , Rfl:     clobetasoL (TEMOVATE) 0.05 % external solution, Pt to mix in 1  jar of cerave cream and apply to affected areas bid, Disp: 50 mL, Rfl: 3    ergocalciferol (VITAMIN D2) 50,000 unit Cap, Take 1 capsule (50,000 Units total) by mouth every 7 days., Disp: 12 capsule, Rfl: 0    EScitalopram oxalate (LEXAPRO) 20 MG tablet, Take 1 tablet (20 mg total) by mouth once daily., Disp: 30 tablet, Rfl: 2    gabapentin (NEURONTIN) 400 MG capsule, Take 1 capsule (400 mg total) by mouth 2 (two) times daily., Disp: 180 capsule, Rfl: 0    HYDROcodone-acetaminophen (NORCO)  mg per tablet, Take 1 tablet by mouth every 6 (six) hours as needed for Pain., Disp: 28 tablet, Rfl: 0    levothyroxine (SYNTHROID) 88 MCG tablet, Take 1 tablet (88 mcg total) by mouth once daily., Disp: 90 tablet, Rfl: 0    multivitamin (THERAGRAN) per tablet, Take 1 tablet by mouth once daily., Disp: , Rfl:     pantoprazole (PROTONIX) 40 MG tablet, Take 1 tablet (40 mg total) by mouth once daily., Disp: 90 tablet, Rfl: 0    sodium bicarbonate 325 MG tablet, Take 325 mg by mouth 2 (two) times daily., Disp: , Rfl:     traZODone (DESYREL) 150 MG tablet, Take 1 tablet (150 mg total) by mouth every evening., Disp: 90 tablet, Rfl: 0    ipratropium-albuteroL (COMBIVENT)  mcg/actuation inhaler, Inhale 1 puff into the lungs 4 (four) times daily. Rescue (Patient not taking: No sig reported), Disp: 4 g, Rfl: 4    oxyCODONE-acetaminophen (PERCOCET) 7.5-325 mg per tablet, Take 1 tablet by mouth every 4 (four) hours as needed for Pain. (Patient not taking: No sig reported), Disp: 20 tablet, Rfl: 0    REVIEW OF SYSTEMS:  General: No fevers or chills; ENT: No sore throat; Allergy and Immunology: no persistent infections; Hematological and Lymphatic: No history of bleeding or easy bruising; Endocrine: negative; Respiratory: no cough, shortness of breath, or wheezing; Cardiovascular: no chest pain or dyspnea on exertion; Gastrointestinal: no abdominal pain/back, change in bowel habits, or bloody stools; Genito-Urinary: no  dysuria, trouble voiding, or hematuria; Musculoskeletal: negative; Neurological: no TIA or stroke symptoms; Psychiatric: no nervousness, anxiety or depression.    PHYSICAL EXAM:                General appearance:  Alert, well-appearing, and in no distress.  Oriented to person, place, and time                    Neurological: Normal speech, no focal findings noted; CN II - XII grossly intact. RLE with sensation to light touch, LLE with sensation to light touch.            Musculoskeletal: Digits/nail without cyanosis/clubbing.  Strength 5/5 BLE.                    Neck: Supple, no significant adenopathy, no carotid bruit can be auscultated                  Chest:  Clear to auscultation, no wheezes, rales or rhonchi, symmetric air entry. No use of accessory muscles               Cardiac: Normal rate and regular rhythm, S1 and S2 normal            Abdomen: Soft, nontender, nondistended, no masses or organomegaly, no hernia     No rebound tenderness noted; bowel sounds normal     No groin adenopathy      Extremities:   2+ R femoral pulse, 2+ L femoral pulse     2+ R popliteal pulse, 2+ L popliteal pulse     2+ R PT pulse, 1+ L PT pulse     2+ R DP pulse, 1+ L DP pulse     no RLE edema, no LLE edema    Skin: RLE without tissue loss; LLE without tissue loss    LAB RESULTS:  No results found for: CBC  Lab Results   Component Value Date    LABPROT 9.7 12/28/2017    INR 0.9 12/28/2017     Lab Results   Component Value Date     01/10/2022    K 4.7 01/10/2022     01/10/2022    CO2 23 01/10/2022    GLU 84 01/10/2022    BUN 30 (H) 01/10/2022    CREATININE 1.9 (H) 01/10/2022    CALCIUM 9.5 01/10/2022    ANIONGAP 13 01/10/2022    EGFRNONAA 36.7 (A) 01/10/2022     Lab Results   Component Value Date    WBC 5.92 01/10/2022    RBC 4.46 (L) 01/10/2022    HGB 14.3 01/10/2022    HCT 44.0 01/10/2022    MCV 99 (H) 01/10/2022    MCH 32.1 (H) 01/10/2022    MCHC 32.5 01/10/2022    RDW 12.3 01/10/2022     01/10/2022    ROLAND  10.7 01/10/2022    GRAN 4.1 01/10/2022    GRAN 69.2 01/10/2022    LYMPH 1.1 01/10/2022    LYMPH 19.3 01/10/2022    MONO 0.5 01/10/2022    MONO 8.8 01/10/2022    EOS 0.1 01/10/2022    BASO 0.03 01/10/2022    EOSINOPHIL 2.0 01/10/2022    BASOPHIL 0.5 01/10/2022    DIFFMETHOD Automated 01/10/2022     .  Lab Results   Component Value Date    HGBA1C 5.4 05/06/2020       IMAGING:  All pertinent imaging has been reviewed and interpreted independently.    CT A/P without contrast 11/2018, 4/2017, 7/2019 reviewed:  3 cm R ANTHONY aneurysm, stable in size.    CT non con 4/2021 - 3.2 cm R ANTHONY aneurysm    IMP/PLAN:  61 y.o. male with   Patient Active Problem List   Diagnosis    Insomnia    Lumbar facet arthropathy    Sacroiliitis    Spondylosis without myelopathy    DDD (degenerative disc disease)    History of bladder cancer    Hydronephrosis, bilateral    H/O primary malignant neoplasm of urinary bladder    Essential hypertension    Anemia of chronic disease    Moderate protein malnutrition    Chronic gout    Renal insufficiency    Body mass index (BMI) 20.0-20.9, adult    Personal history of bladder cancer    Acquired hypothyroidism    Hypoxemia    Severe malnutrition    Hydronephrosis    History of primary malignant neoplasm of urinary bladder    Hydronephrosis with urinary obstruction due to ureteral calculus    Chest pain of uncertain etiology    Obstructive chronic bronchitis without exacerbation    Epigastric abdominal tenderness without rebound tenderness    CKD (chronic kidney disease), stage IV    S/P colonoscopy    Iliac artery aneurysm    Kidney stones    Hypertriglyceridemia    Nephrolithiasis    Acute respiratory failure with hypoxia    Kidney stone on left side    Hyperparathyroidism    Encounter for screening for malignant neoplasm of respiratory organs    Personal history of nicotine dependence    Lung nodule    Coronary artery calcification    Symptomatic cholelithiasis     being managed by PCP and specialists who is here today for evaluation of R ANTHONY aneurysm.    -Stable 3 cm R ANTHONY aneurysm (2.9 cm 4/2017), asymptomatic; CKD stage 3, GFR 28 (37) with extensive intraabdominal open surgical history with complications from ileal conduit and stent erosion into colon and severe SHAFFER - risks outweigh benefits of obtaining contrasted imaging at this time, if aneurysm increases in size >3.5cm he would benefit from a CTA for possible endovascular vs open repair  -Will perform routine surveillance with repeat CT A/P non contrast now  -Can proceed with kidney stone treatment per Urology  -Rec daily ASA, BP control and cont smoking cessation  -RTC after CT to discuss further mgmt    I spent 12 minutes evaluating this patient and greater than 50% of the time was spent counseling, coordinator care and discussing the plan of care.  All questions were answered and patient stated understanding with agreement with the above treatment plan.    Neptali Adame MD [facility] [place]  Vascular and Endovascular Surgery

## 2022-02-11 ENCOUNTER — TELEPHONE (OUTPATIENT)
Dept: PULMONOLOGY | Facility: CLINIC | Age: 62
End: 2022-02-11
Payer: MEDICARE

## 2022-02-11 DIAGNOSIS — R91.1 LUNG NODULE: Primary | ICD-10-CM

## 2022-02-11 NOTE — TELEPHONE ENCOUNTER
Tried to call patient cellphone-patient was unavailable.  Was able to speak with wife.  Patient has given me permission to speak with his wife in the past.    I discussed CT of chest results from February 2022.    I have reviewed this case with Dr. Rodriguez.  Recommend obtaining a 6 month CT follow-up. Dr. Rodriguez CC'd.     Patient wife verbalizes understanding.  She will give  message to her .  Additionally, I will send patient a portal message.    All questions have been answered.  I did explain to please reach out to me if having any further questions.

## 2022-03-09 ENCOUNTER — OFFICE VISIT (OUTPATIENT)
Dept: PULMONOLOGY | Facility: CLINIC | Age: 62
End: 2022-03-09
Payer: MEDICARE

## 2022-03-09 VITALS
OXYGEN SATURATION: 92 % | SYSTOLIC BLOOD PRESSURE: 148 MMHG | WEIGHT: 173.19 LBS | HEART RATE: 82 BPM | HEIGHT: 73 IN | BODY MASS INDEX: 22.95 KG/M2 | DIASTOLIC BLOOD PRESSURE: 72 MMHG

## 2022-03-09 DIAGNOSIS — J44.89 OBSTRUCTIVE CHRONIC BRONCHITIS WITHOUT EXACERBATION: Primary | ICD-10-CM

## 2022-03-09 DIAGNOSIS — R91.1 LUNG NODULE: ICD-10-CM

## 2022-03-09 PROCEDURE — 99215 OFFICE O/P EST HI 40 MIN: CPT | Mod: PBBFAC,PN | Performed by: NURSE PRACTITIONER

## 2022-03-09 PROCEDURE — 99214 PR OFFICE/OUTPT VISIT, EST, LEVL IV, 30-39 MIN: ICD-10-PCS | Mod: S$PBB,,, | Performed by: NURSE PRACTITIONER

## 2022-03-09 PROCEDURE — 99999 PR PBB SHADOW E&M-EST. PATIENT-LVL V: ICD-10-PCS | Mod: PBBFAC,,, | Performed by: NURSE PRACTITIONER

## 2022-03-09 PROCEDURE — 99999 PR PBB SHADOW E&M-EST. PATIENT-LVL V: CPT | Mod: PBBFAC,,, | Performed by: NURSE PRACTITIONER

## 2022-03-09 PROCEDURE — 99214 OFFICE O/P EST MOD 30 MIN: CPT | Mod: S$PBB,,, | Performed by: NURSE PRACTITIONER

## 2022-03-09 NOTE — PROGRESS NOTES
"  Subjective:       Patient ID: Blake Guillory is a 62 y.o. male.    Chief Complaint:  CT follow-up    HPI   Mr. Guillory is a 59 y/o M with PMH of bladder CA and CAD (follows with Nephrology outside [facility].)  presenting to pulmonary clinic for COPD and shortness of breath evaluation.   Reports followed with [facility] on [place].  She no longer takes his insurance so like to be followed here.    Patient  experiences chronic mMRC 3 symptoms of dyspnea.  Reports his dyspnea on exertion has progressively gotten worse over the last few months.  Discussed with patient his pulmonary stress test (08/03/2020) was not suggestive of oxygen requirement.  Notes to have mild chronic cough. Explains had 0 COPD exacerbations in the last one year; 0 hospitalizations for respiratory problems.  Currently, reports on Telegy inhaler.  States has been on trelegy for greater than 1 year.  Has not been to pulmonary rehab.    Reports smoking cigarettes for 48 years at 2 packs a day. Quit in 2017.   States, " When I could not breath anymore, I was still smoking." Tells has followed with smoking cessessation 3 times in the past.   Discloses smoking marijuana few times a week has been doing this for 48 years.  Also drinks 4-6 alcoholic beverages a day.  Patient's reports not having CT of chest.  Discussed with patient CT renal stone study showed mild interstitial thickening/chronic atelectasis in the lung bases.    Work history:  Currently takes care of his special needs child at home.  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  He did work at [employer]- performing sandblasting.  He does admit side not always wearing a mask.     Interval Hx 1/6/2021:    Mr. Guillory presents to Pulmonary Clinic for COPD and CT of chest follow-up.  Overall, patient reports doing well.  Explains he is compliant with daily use of Trelegy..  States has not had any " "increase in shortness of breath, sputum production or cough.  Reports has not required use of albuterol inhaler.  He denies any recent hospitalization or steroid bursts.  Discloses remains smoking marijuana  On 12/16/2020, patient obtain low-dose CT screening relation to his smoking history.  Discussed with patient he has had 3 pulmonary nodules in the right upper lobe largest is 1.3 cm.  He denies fever, chills, hemoptysis, weight loss or night sweats. He is not interested in smoking cessation at this time.     Interval HX 03/24/2021-  Mr. Guillory presents to Pulmonary Clinic for CT of chest follow-up.  Denies any complaints today.  Overall, reports feeling well.  Explains compliant with trelegy inhaler.  Denies any hospitalizations or COPD exacerbations since our last visitation in January.  He denies any fever, chills, hemoptysis, weight loss or night sweats.  Tells he is smoke-free.     Interval Hx 3/9/2022-    Mr. Guillory present to pulmonary clinic for CT of chest follow up and COPD    Patient  experiences chronic mMRC 3 symptoms of dyspnea. Notes to have mild chronic cough. Explains had 0 COPD exacerbations in the last one year; 0 hospitalizations for respiratory problems. He describes his breathing as the "same as it has always been."     Currently, he reports unable to afford inhalers.   Discloses only using samples of Trelegy when obtained from PCP.   He reports mostly uses duo nebs 1-2 times a day.   Explains on a fixed income and takes care of special needs child.     Remains smoke free.     We discussed pulmonary nodules seen on CT and NM PET CT.   I explained that I have collaborated with Dr. Rodriguez and we recommend a CT of chest follow up in 6 months.   Denies fever, chills, hemoptysis, night sweats or weight loss.    Additional Pulmonary History:   Travel History:  Denies  History of exposures to TB:  Denies  Family History of Lung Cancer:  Denies  Childhood history of Lung Disease:  Denies     Review of " "Systems   Constitutional: Negative for fever, chills, weight loss and night sweats.   HENT: Negative for postnasal drip, rhinorrhea, trouble swallowing and congestion.    Respiratory: Negative for apnea, cough, sputum production, choking, chest tightness, wheezing, dyspnea on extertion and use of rescue inhaler.    Cardiovascular: Negative for chest pain and leg swelling.   Musculoskeletal: Negative for joint swelling.   Skin:  Positive rash- reports following with Psychiatry thinks it is in anxiety rash.  Gastrointestinal: Negative for acid reflux.   Neurological: Negative for dizziness and light-headedness.   Psychiatric/Behavioral: Negative for sleep disturbance.       Reviewed allergies and medications.  Reviewed medical and surgical history.  Reviewed social and family history.  Objective:      Vitals:    03/09/22 0936   BP: (!) 148/72   BP Location: Right arm   Patient Position: Sitting   BP Method: Large (Automatic)   Pulse: 82   SpO2: (!) 92%   Weight: 78.5 kg (173 lb 2.7 oz)   Height: 6' 1" (1.854 m)      Physical Exam   Constitutional: He is oriented to person, place, and time. He appears well-developed and well-nourished. No distress.   HENT:   Head: Normocephalic.   Neck: Normal range of motion. Neck supple.   Cardiovascular: Normal rate, regular rhythm and normal heart sounds.   Pulmonary/Chest: Normal expansion, effort normal and breath sounds normal. No respiratory distress. He has no wheezes. He has no rhonchi.   Abdominal: Soft. Bowel sounds are normal. There is no abdominal tenderness.   Musculoskeletal: Normal range of motion.         General: No edema.    Neurological: He is alert and oriented to person, place, and time. Gait normal.   Skin: Skin is warm and dry.  Psychiatric: He has a normal mood and affect. His behavior is normal.   Vitals reviewed.    Personal Diagnostic Review    Lab Results   Component Value Date    PREFEV1 1.37 (L) 07/31/2020    AYF8QHTKUA 36.8 07/31/2020    PREFVC 2.67 (L) " 07/31/2020    FVCPREREF 54.8 07/31/2020    WZOVIR2ZEO 51.35 (L) 07/31/2020    RKJ9PUNUCIS 66.9 07/31/2020    PREDLCO 12.64 (L) 07/31/2020    DLCOSBPRRF 41.5 07/31/2020    DLCOADJPRE 12.79 (L) 07/31/2020    DLCOCSBRPRRF 41.9 07/31/2020    POSTFEV1 1.56 (L) 07/31/2020    POSTFVC 2.90 (L) 07/31/2020    XAOWPMT1LLM 53.90 (L) 07/31/2020   PFTs impression:  Spirometry shows severe obstruction. Lung volumes show mild pulmonary over inflation. Spirometry shows borderline improvement following bronchodilator. Airway mechanics are abnormal showing increased airway resistance and decreased conductance. DLCO is moderately decreased.    Ct of chest 2/9/2022-  Impression:     Stable clustered area of nodularity and architectural distortion/pleuroparenchymal scarring in the right apex.     Recommendation: Yearly low-dose lung cancer screening CT     Emphysema, atherosclerosis, and sequela of chronic medical renal disease    NM PET CT 11/4/2021-    Impression:     1. Two mildly hypermetabolic right upper lobe spiculated nodules as above, concerning for primary lung cancer.  Few adjacent subcentimeter satellite nodules, too small to characterize by PET-CT.  2. No evidence of ana paula or distant metastasis.  3. Incidental asymmetric uptake in the right true vocal cord without discrete nodularity.  Recommend correlation with history and consideration of direct visualization for this former smoker.  Otherwise, attention on follow-up.  This report was flagged in Epic as containing an incidental finding.    CT of chest 03/22/2021-  No consolidation.  No pneumothorax.  Right upper lobe spiculated nodule measures 1.1 cm, previously 1.2 cm (series 3, image 63).  Nearby right upper lobe pulmonary nodule measures 0.7 cm, previously 0.8 cm (series 3, image 59). More inferiorly located right upper lobe nodule measures 0.6 cm, previously 0.6 cm (series 3, image 100).  No new suspicious pulmonary nodules or masses.  Scattered linear  scarring/atelectasis noted bilaterally.  Central airways are patent, without endobronchial lesions.       LDCT 12/16/2020-  FINDINGS:  Lungs: There is a 1.3 cm nodule in the right upper lobe (series 3, image 40).  There is adjacent 8 mm nodule (series 3, image 36).  3rd nodule in the right upper lobe measuring 0.6 cm open (series 3, image 65).     There is mild interstitial thickening in the dependent lungs.  There is mild centrilobular and paraseptal emphysematous lung changes with upper lobe predominance.  Trace amount of biapical pleuroparenchymal thickening/scarring.      Stress test 8/3/2020-  Six minute walk distance is 331 meters (1085 feet) with very heavy dyspnea.   During exercise, there was significant desaturation from 93% to 91% while breathing room air.   Heart rate increased and blood pressure remained stable with walking.   The patient reported non-pulmonary symptoms during exercise.   The patient did complete the study, walking 360 seconds of the 360 second test.   Based upon age and body mass index, exercise capacity is less than predicted.  (Physician 08/03/2020 05:44PM, Dr. Tito Riggs / Final: 08/03/2020 05:44PM, Dr. Tito Riggs)      CT Renal Stone 7/23/2020-  Mild interstitial thickening/chronic atelectasis in the lung bases.      Assessment:       1. Obstructive chronic bronchitis without exacerbation    2. Lung nodule        Outpatient Encounter Medications as of 3/9/2022   Medication Sig Dispense Refill    albuterol-ipratropium (DUO-NEB) 2.5 mg-0.5 mg/3 mL nebulizer solution Take 3 mLs by nebulization every 6 (six) hours as needed for Wheezing. Rescue 50 vial 2    allopurinoL (ZYLOPRIM) 100 MG tablet Take 1 tablet by mouth once daily 30 tablet 0    amLODIPine (NORVASC) 10 MG tablet Take 1 tablet (10 mg total) by mouth once daily. 90 tablet 0    aspirin (ECOTRIN) 81 MG EC tablet Take 81 mg by mouth once daily.      atenoloL (TENORMIN) 50 MG tablet TAKE 1 TABLET(50 MG) BY MOUTH  EVERY DAY 90 tablet 0    busPIRone (BUSPAR) 5 MG Tab Take 1 tablet (5 mg total) by mouth 2 (two) times daily as needed. 60 tablet 1    calcitRIOL (ROCALTROL) 0.5 MCG Cap       clobetasoL (TEMOVATE) 0.05 % external solution Pt to mix in 1 jar of cerave cream and apply to affected areas bid 50 mL 3    ergocalciferol (VITAMIN D2) 50,000 unit Cap Take 1 capsule (50,000 Units total) by mouth every 7 days. 12 capsule 0    EScitalopram oxalate (LEXAPRO) 20 MG tablet Take 1 tablet (20 mg total) by mouth once daily. 30 tablet 2    HYDROcodone-acetaminophen (NORCO)  mg per tablet Take 1 tablet by mouth every 6 (six) hours as needed for Pain. 28 tablet 0    levothyroxine (SYNTHROID) 88 MCG tablet Take 1 tablet (88 mcg total) by mouth once daily. 90 tablet 0    multivitamin (THERAGRAN) per tablet Take 1 tablet by mouth once daily.      pantoprazole (PROTONIX) 40 MG tablet Take 1 tablet (40 mg total) by mouth once daily. 90 tablet 0    sodium bicarbonate 325 MG tablet Take 325 mg by mouth 2 (two) times daily.      traZODone (DESYREL) 150 MG tablet Take 1 tablet (150 mg total) by mouth every evening. 90 tablet 0    [DISCONTINUED] gabapentin (NEURONTIN) 400 MG capsule Take 1 capsule (400 mg total) by mouth 2 (two) times daily. 180 capsule 0    albuterol (PROAIR HFA) 90 mcg/actuation inhaler Inhale 2 puffs into the lungs every 6 (six) hours as needed for Wheezing. Rescue (Patient not taking: Reported on 3/9/2022) 18 g 0    fluticasone-umeclidin-vilanter (TRELEGY ELLIPTA) 100-62.5-25 mcg DsDv Inhale 1 puff into the lungs once daily. Rinse mouth after use. 60 each 11    ipratropium-albuteroL (COMBIVENT)  mcg/actuation inhaler Inhale 1 puff into the lungs 4 (four) times daily. Rescue (Patient not taking: No sig reported) 4 g 4    oxyCODONE-acetaminophen (PERCOCET) 7.5-325 mg per tablet Take 1 tablet by mouth every 4 (four) hours as needed for Pain. (Patient not taking: Reported on 3/9/2022) 20 tablet 0     [DISCONTINUED] fluticasone-umeclidin-vilanter (TRELEGY ELLIPTA) 100-62.5-25 mcg DsDv Inhale 1 puff into the lungs once daily. Rinse mouth after use. 60 each 11     No facility-administered encounter medications on file as of 3/9/2022.     No orders of the defined types were placed in this encounter.      Plan:         Problem List Items Addressed This Visit        Pulmonary    Obstructive chronic bronchitis without exacerbation - Primary    Overview     Stable. Patient with significant smoking history.  48 years at 2 packs a day quit in 2017.  Presents with mmRC 3 symptoms of dyspnea.  Reports 0 hospitalizations or exacerbations in relation to breathing issues.  He denies any increase in cough or sputum production.  Has not been able to afford Trelegy.     Plan:  -Will help to provided prescrition coverage options for Trelegy with [employer] or prescription assistance program.   -continue Trelegy inhaler. Please obtain sample from PCP today- as we await prescription Rinse mouth after use  -continue albuterol as needed  -congratulated on smoking cessation since 2017  -advise to wean off marijuana cigarettes  -CT of chest follow up in 5 months. To follow with Dr. Rodriguez after CT at [facility].   -Explains Pulmonary rehab does not fit schedule.   Not interested.            Relevant Medications    fluticasone-umeclidin-vilanter (TRELEGY ELLIPTA) 100-62.5-25 mcg DsDv    Lung nodule    Overview     Patient with significant smoking history, Hx of bladder CA and occupational exposure.       CT of chest 02/09/2022-  showing stable cluster area of nodularity and architectural distortion/ pleuro parenchymal scarring in the right apex         NM PET  11/04/2021-  to mild hypermetabolic rightupper lobe speculated nodules.             Current Assessment & Plan     Collaborated with Dr. Rodriguez- needs CT of chest follow-up in 5 months.  Discussed with patient that I will be leaving the pulmonary practice in the next several weeks.  In that his  follow-up visitation will be with Dr. Rodriguez at [facility].  Patient verbalizes understanding and agrees with treatment plan              Follow-up in  5 months.     This note is dictated on M*Modal word recognition program.  There are word recognition mistakes that are occasionally missed on review.

## 2022-03-09 NOTE — PATIENT INSTRUCTIONS
Will attempt to provide prescription assistance for Trelegy.   Please follow with your PCP to obtain sample of Trelegy in the meantime.     Continue with Duo Nebs  as needed.     Please be sure to obtain CT of chest and follow up with Dr. Rodriguez in 5 months.

## 2022-03-15 NOTE — ASSESSMENT & PLAN NOTE
Collaborated with Dr. Rodriguez- needs CT of chest follow-up in 5 months.  Discussed with patient that I will be leaving the pulmonary practice in the next several weeks.  In that his follow-up visitation will be with Dr. Rodriguez at [facility].  Patient verbalizes understanding and agrees with treatment plan

## 2022-04-13 ENCOUNTER — TELEPHONE (OUTPATIENT)
Dept: DIABETES | Facility: CLINIC | Age: 62
End: 2022-04-13
Payer: MEDICARE

## 2022-04-13 NOTE — TELEPHONE ENCOUNTER
Called pt to scheduled pt stated he did not know what he was scheduling for, asked to call wife to schedule    Provider Procedure Text (A): After obtaining clear surgical margins the defect was repaired by another provider.

## 2022-04-13 NOTE — TELEPHONE ENCOUNTER
Called stated pt did not need to be seen for Diabetes, recommended pt reach out to pcp to be referred to someone to manage  Hyperthyroidism, verbalized understanding

## 2022-04-13 NOTE — TELEPHONE ENCOUNTER
----- Message from Tonja Giles sent at 4/13/2022  4:25 PM CDT -----  Contact: Luke (wife)  Pt's wife requesting call back re: scheduling appt.     Confirmed contact below:  Contact Name: Luke  Phone Number: [phone]

## 2022-05-02 ENCOUNTER — TELEPHONE (OUTPATIENT)
Dept: PULMONOLOGY | Facility: CLINIC | Age: 62
End: 2022-05-02
Payer: MEDICARE

## 2022-05-02 DIAGNOSIS — J44.89 OBSTRUCTIVE CHRONIC BRONCHITIS WITHOUT EXACERBATION: Primary | ICD-10-CM

## 2022-05-02 DIAGNOSIS — J44.9 CHRONIC OBSTRUCTIVE PULMONARY DISEASE, UNSPECIFIED COPD TYPE: Primary | ICD-10-CM

## 2022-05-02 DIAGNOSIS — J96.01 ACUTE RESPIRATORY FAILURE WITH HYPOXIA: ICD-10-CM

## 2022-05-02 NOTE — TELEPHONE ENCOUNTER
I called Mrs Guillory to let her know her  will have a walk test for oxygen plus a spirometry right before he sees Dr Tolentino. Mr Guillory is new to this Department and has a insurance change that requires a 6 minute walk test. Seema Lopez LPN

## 2022-05-02 NOTE — TELEPHONE ENCOUNTER
----- Message from Sendy Sin sent at 5/2/2022  8:55 AM CDT -----  Regarding: Orders  Contact: Rhian/wife [phone]  Calling to ensure six minute walk is scheduled before visit. Please call to confirm.

## 2022-05-10 ENCOUNTER — HOSPITAL ENCOUNTER (INPATIENT)
Facility: OTHER | Age: 62
LOS: 1 days | Discharge: HOME | DRG: 694 | End: 2022-05-11
Attending: HOSPITALIST | Admitting: HOSPITALIST
Payer: MEDICARE

## 2022-05-10 ENCOUNTER — PATIENT OUTREACH (OUTPATIENT)
Dept: ADMINISTRATIVE | Facility: OTHER | Age: 62
End: 2022-05-10
Payer: MEDICARE

## 2022-05-10 DIAGNOSIS — N13.30 HYDRONEPHROSIS: ICD-10-CM

## 2022-05-10 DIAGNOSIS — N20.0 NEPHROLITHIASIS: Primary | ICD-10-CM

## 2022-05-10 PROBLEM — N17.9 ACUTE KIDNEY INJURY SUPERIMPOSED ON CKD: Status: ACTIVE | Noted: 2018-03-22

## 2022-05-10 PROBLEM — J96.11 CHRONIC RESPIRATORY FAILURE WITH HYPOXIA: Status: ACTIVE | Noted: 2022-05-10

## 2022-05-10 PROBLEM — N18.9 ACUTE KIDNEY INJURY SUPERIMPOSED ON CKD: Status: ACTIVE | Noted: 2018-03-22

## 2022-05-10 LAB
ALBUMIN SERPL BCP-MCNC: 3.7 G/DL (ref 3.5–5.2)
ALP SERPL-CCNC: 50 U/L (ref 55–135)
ALT SERPL W/O P-5'-P-CCNC: 14 U/L (ref 10–44)
ANION GAP SERPL CALC-SCNC: 9 MMOL/L (ref 8–16)
AST SERPL-CCNC: 25 U/L (ref 10–40)
BASOPHILS # BLD AUTO: 0.01 K/UL (ref 0–0.2)
BASOPHILS NFR BLD: 0.1 % (ref 0–1.9)
BILIRUB SERPL-MCNC: 0.3 MG/DL (ref 0.1–1)
BUN SERPL-MCNC: 50 MG/DL (ref 8–23)
CALCIUM SERPL-MCNC: 8.8 MG/DL (ref 8.7–10.5)
CHLORIDE SERPL-SCNC: 108 MMOL/L (ref 95–110)
CO2 SERPL-SCNC: 22 MMOL/L (ref 23–29)
CREAT SERPL-MCNC: 2.7 MG/DL (ref 0.5–1.4)
DIFFERENTIAL METHOD: ABNORMAL
EOSINOPHIL # BLD AUTO: 0 K/UL (ref 0–0.5)
EOSINOPHIL NFR BLD: 0 % (ref 0–8)
ERYTHROCYTE [DISTWIDTH] IN BLOOD BY AUTOMATED COUNT: 12.5 % (ref 11.5–14.5)
EST. GFR  (AFRICAN AMERICAN): 28 ML/MIN/1.73 M^2
EST. GFR  (NON AFRICAN AMERICAN): 24 ML/MIN/1.73 M^2
GLUCOSE SERPL-MCNC: 107 MG/DL (ref 70–110)
HCT VFR BLD AUTO: 43.5 % (ref 40–54)
HGB BLD-MCNC: 14.4 G/DL (ref 14–18)
IMM GRANULOCYTES # BLD AUTO: 0.03 K/UL (ref 0–0.04)
IMM GRANULOCYTES NFR BLD AUTO: 0.4 % (ref 0–0.5)
LACTATE SERPL-SCNC: 0.7 MMOL/L (ref 0.5–2.2)
LYMPHOCYTES # BLD AUTO: 0.4 K/UL (ref 1–4.8)
LYMPHOCYTES NFR BLD: 5.1 % (ref 18–48)
MAGNESIUM SERPL-MCNC: 2.3 MG/DL (ref 1.6–2.6)
MCH RBC QN AUTO: 33.5 PG (ref 27–31)
MCHC RBC AUTO-ENTMCNC: 33.1 G/DL (ref 32–36)
MCV RBC AUTO: 101 FL (ref 82–98)
MONOCYTES # BLD AUTO: 0.4 K/UL (ref 0.3–1)
MONOCYTES NFR BLD: 5 % (ref 4–15)
NEUTROPHILS # BLD AUTO: 7.5 K/UL (ref 1.8–7.7)
NEUTROPHILS NFR BLD: 89.4 % (ref 38–73)
NRBC BLD-RTO: 0 /100 WBC
PHOSPHATE SERPL-MCNC: 4.2 MG/DL (ref 2.7–4.5)
PLATELET # BLD AUTO: 151 K/UL (ref 150–450)
PMV BLD AUTO: 10.9 FL (ref 9.2–12.9)
POCT GLUCOSE: 193 MG/DL (ref 70–110)
POTASSIUM SERPL-SCNC: 5.4 MMOL/L (ref 3.5–5.1)
PROCALCITONIN SERPL IA-MCNC: 0.05 NG/ML
PROT SERPL-MCNC: 6.6 G/DL (ref 6–8.4)
RBC # BLD AUTO: 4.3 M/UL (ref 4.6–6.2)
SODIUM SERPL-SCNC: 139 MMOL/L (ref 136–145)
WBC # BLD AUTO: 8.41 K/UL (ref 3.9–12.7)

## 2022-05-10 PROCEDURE — 63600175 PHARM REV CODE 636 W HCPCS: Performed by: INTERNAL MEDICINE

## 2022-05-10 PROCEDURE — 36415 COLL VENOUS BLD VENIPUNCTURE: CPT | Performed by: NURSE PRACTITIONER

## 2022-05-10 PROCEDURE — 25500020 PHARM REV CODE 255: Performed by: RADIOLOGY

## 2022-05-10 PROCEDURE — 25000003 PHARM REV CODE 250: Performed by: INTERNAL MEDICINE

## 2022-05-10 PROCEDURE — 63600175 PHARM REV CODE 636 W HCPCS: Performed by: NURSE PRACTITIONER

## 2022-05-10 PROCEDURE — 80053 COMPREHEN METABOLIC PANEL: CPT | Performed by: NURSE PRACTITIONER

## 2022-05-10 PROCEDURE — 85025 COMPLETE CBC W/AUTO DIFF WBC: CPT | Performed by: NURSE PRACTITIONER

## 2022-05-10 PROCEDURE — C1769 GUIDE WIRE: HCPCS | Performed by: RADIOLOGY

## 2022-05-10 PROCEDURE — 83605 ASSAY OF LACTIC ACID: CPT | Performed by: NURSE PRACTITIONER

## 2022-05-10 PROCEDURE — 25000003 PHARM REV CODE 250: Performed by: NURSE PRACTITIONER

## 2022-05-10 PROCEDURE — 27000207 HC ISOLATION

## 2022-05-10 PROCEDURE — C1729 CATH, DRAINAGE: HCPCS | Performed by: RADIOLOGY

## 2022-05-10 PROCEDURE — 25000003 PHARM REV CODE 250: Performed by: RADIOLOGY

## 2022-05-10 PROCEDURE — 94640 AIRWAY INHALATION TREATMENT: CPT

## 2022-05-10 PROCEDURE — 27000221 HC OXYGEN, UP TO 24 HOURS

## 2022-05-10 PROCEDURE — 84145 PROCALCITONIN (PCT): CPT | Performed by: NURSE PRACTITIONER

## 2022-05-10 PROCEDURE — 99223 1ST HOSP IP/OBS HIGH 75: CPT | Mod: ,,, | Performed by: UROLOGY

## 2022-05-10 PROCEDURE — 99223 1ST HOSP IP/OBS HIGH 75: CPT | Mod: ,,, | Performed by: INTERNAL MEDICINE

## 2022-05-10 PROCEDURE — 84100 ASSAY OF PHOSPHORUS: CPT | Performed by: NURSE PRACTITIONER

## 2022-05-10 PROCEDURE — 99152 MOD SED SAME PHYS/QHP 5/>YRS: CPT | Performed by: RADIOLOGY

## 2022-05-10 PROCEDURE — 83735 ASSAY OF MAGNESIUM: CPT | Performed by: NURSE PRACTITIONER

## 2022-05-10 PROCEDURE — 94761 N-INVAS EAR/PLS OXIMETRY MLT: CPT

## 2022-05-10 PROCEDURE — 99223 PR INITIAL HOSPITAL CARE,LEVL III: ICD-10-PCS | Mod: ,,, | Performed by: INTERNAL MEDICINE

## 2022-05-10 PROCEDURE — 21400001 HC TELEMETRY ROOM

## 2022-05-10 PROCEDURE — 99153 MOD SED SAME PHYS/QHP EA: CPT | Performed by: RADIOLOGY

## 2022-05-10 PROCEDURE — 99223 PR INITIAL HOSPITAL CARE,LEVL III: ICD-10-PCS | Mod: ,,, | Performed by: UROLOGY

## 2022-05-10 PROCEDURE — 25000242 PHARM REV CODE 250 ALT 637 W/ HCPCS: Performed by: NURSE PRACTITIONER

## 2022-05-10 PROCEDURE — 63600175 PHARM REV CODE 636 W HCPCS: Performed by: RADIOLOGY

## 2022-05-10 RX ORDER — ACETAMINOPHEN 325 MG/1
650 TABLET ORAL EVERY 4 HOURS PRN
Status: DISCONTINUED | OUTPATIENT
Start: 2022-05-10 | End: 2022-05-11 | Stop reason: HOSPADM

## 2022-05-10 RX ORDER — HYDROMORPHONE HYDROCHLORIDE 1 MG/ML
1 INJECTION, SOLUTION INTRAMUSCULAR; INTRAVENOUS; SUBCUTANEOUS EVERY 4 HOURS PRN
Status: DISCONTINUED | OUTPATIENT
Start: 2022-05-10 | End: 2022-05-11 | Stop reason: HOSPADM

## 2022-05-10 RX ORDER — MIDAZOLAM HYDROCHLORIDE 1 MG/ML
INJECTION, SOLUTION INTRAMUSCULAR; INTRAVENOUS
Status: DISCONTINUED | OUTPATIENT
Start: 2022-05-10 | End: 2022-05-10 | Stop reason: HOSPADM

## 2022-05-10 RX ORDER — ESCITALOPRAM OXALATE 10 MG/1
20 TABLET ORAL DAILY
Status: DISCONTINUED | OUTPATIENT
Start: 2022-05-10 | End: 2022-05-11 | Stop reason: HOSPADM

## 2022-05-10 RX ORDER — LEVOTHYROXINE SODIUM 88 UG/1
88 TABLET ORAL DAILY
Status: DISCONTINUED | OUTPATIENT
Start: 2022-05-10 | End: 2022-05-11 | Stop reason: HOSPADM

## 2022-05-10 RX ORDER — ATENOLOL 25 MG/1
50 TABLET ORAL DAILY
Status: DISCONTINUED | OUTPATIENT
Start: 2022-05-10 | End: 2022-05-11 | Stop reason: HOSPADM

## 2022-05-10 RX ORDER — MORPHINE SULFATE 4 MG/ML
4 INJECTION, SOLUTION INTRAMUSCULAR; INTRAVENOUS EVERY 4 HOURS PRN
Status: DISCONTINUED | OUTPATIENT
Start: 2022-05-10 | End: 2022-05-10

## 2022-05-10 RX ORDER — LIDOCAINE HYDROCHLORIDE 10 MG/ML
INJECTION, SOLUTION EPIDURAL; INFILTRATION; INTRACAUDAL; PERINEURAL
Status: DISCONTINUED | OUTPATIENT
Start: 2022-05-10 | End: 2022-05-10 | Stop reason: HOSPADM

## 2022-05-10 RX ORDER — NALOXONE HCL 0.4 MG/ML
0.02 VIAL (ML) INJECTION
Status: DISCONTINUED | OUTPATIENT
Start: 2022-05-10 | End: 2022-05-11 | Stop reason: HOSPADM

## 2022-05-10 RX ORDER — FLUTICASONE FUROATE AND VILANTEROL 200; 25 UG/1; UG/1
1 POWDER RESPIRATORY (INHALATION) DAILY
Status: DISCONTINUED | OUTPATIENT
Start: 2022-05-10 | End: 2022-05-11 | Stop reason: HOSPADM

## 2022-05-10 RX ORDER — SODIUM CHLORIDE 9 MG/ML
INJECTION, SOLUTION INTRAVENOUS CONTINUOUS
Status: DISCONTINUED | OUTPATIENT
Start: 2022-05-10 | End: 2022-05-11

## 2022-05-10 RX ORDER — ONDANSETRON 2 MG/ML
4 INJECTION INTRAMUSCULAR; INTRAVENOUS EVERY 8 HOURS PRN
Status: DISCONTINUED | OUTPATIENT
Start: 2022-05-10 | End: 2022-05-11 | Stop reason: HOSPADM

## 2022-05-10 RX ORDER — FENOFIBRATE 145 MG/1
145 TABLET, FILM COATED ORAL DAILY
Status: DISCONTINUED | OUTPATIENT
Start: 2022-05-10 | End: 2022-05-11 | Stop reason: HOSPADM

## 2022-05-10 RX ORDER — FENTANYL CITRATE 50 UG/ML
INJECTION, SOLUTION INTRAMUSCULAR; INTRAVENOUS
Status: DISCONTINUED | OUTPATIENT
Start: 2022-05-10 | End: 2022-05-10 | Stop reason: HOSPADM

## 2022-05-10 RX ORDER — AMLODIPINE BESYLATE 5 MG/1
10 TABLET ORAL DAILY
Status: DISCONTINUED | OUTPATIENT
Start: 2022-05-10 | End: 2022-05-11 | Stop reason: HOSPADM

## 2022-05-10 RX ORDER — SODIUM CHLORIDE 0.9 % (FLUSH) 0.9 %
10 SYRINGE (ML) INJECTION EVERY 8 HOURS PRN
Status: DISCONTINUED | OUTPATIENT
Start: 2022-05-10 | End: 2022-05-11 | Stop reason: HOSPADM

## 2022-05-10 RX ORDER — SUCRALFATE 1 G/1
1 TABLET ORAL 4 TIMES DAILY
Status: DISCONTINUED | OUTPATIENT
Start: 2022-05-10 | End: 2022-05-11 | Stop reason: HOSPADM

## 2022-05-10 RX ORDER — SODIUM BICARBONATE 325 MG/1
325 TABLET ORAL 2 TIMES DAILY
Status: DISCONTINUED | OUTPATIENT
Start: 2022-05-10 | End: 2022-05-11 | Stop reason: HOSPADM

## 2022-05-10 RX ORDER — ALLOPURINOL 100 MG/1
100 TABLET ORAL DAILY
Status: DISCONTINUED | OUTPATIENT
Start: 2022-05-10 | End: 2022-05-11 | Stop reason: HOSPADM

## 2022-05-10 RX ORDER — PANTOPRAZOLE SODIUM 40 MG/1
40 TABLET, DELAYED RELEASE ORAL DAILY
Status: DISCONTINUED | OUTPATIENT
Start: 2022-05-10 | End: 2022-05-11 | Stop reason: HOSPADM

## 2022-05-10 RX ORDER — ASPIRIN 81 MG/1
81 TABLET ORAL DAILY
Status: DISCONTINUED | OUTPATIENT
Start: 2022-05-10 | End: 2022-05-11 | Stop reason: HOSPADM

## 2022-05-10 RX ADMIN — HYDROMORPHONE HYDROCHLORIDE 1 MG: 1 INJECTION, SOLUTION INTRAMUSCULAR; INTRAVENOUS; SUBCUTANEOUS at 11:05

## 2022-05-10 RX ADMIN — HYDROMORPHONE HYDROCHLORIDE 1 MG: 1 INJECTION, SOLUTION INTRAMUSCULAR; INTRAVENOUS; SUBCUTANEOUS at 04:05

## 2022-05-10 RX ADMIN — ATENOLOL 50 MG: 25 TABLET ORAL at 09:05

## 2022-05-10 RX ADMIN — PANTOPRAZOLE SODIUM 40 MG: 40 TABLET, DELAYED RELEASE ORAL at 09:05

## 2022-05-10 RX ADMIN — CEFTRIAXONE 1 G: 1 INJECTION, SOLUTION INTRAVENOUS at 03:05

## 2022-05-10 RX ADMIN — GABAPENTIN 400 MG: 100 CAPSULE ORAL at 08:05

## 2022-05-10 RX ADMIN — TIOTROPIUM BROMIDE INHALATION SPRAY 2 PUFF: 1.56 SPRAY, METERED RESPIRATORY (INHALATION) at 08:05

## 2022-05-10 RX ADMIN — FENOFIBRATE 145 MG: 145 TABLET, FILM COATED ORAL at 09:05

## 2022-05-10 RX ADMIN — SUCRALFATE 1 G: 1 TABLET ORAL at 08:05

## 2022-05-10 RX ADMIN — AMLODIPINE BESYLATE 10 MG: 5 TABLET ORAL at 09:05

## 2022-05-10 RX ADMIN — SODIUM CHLORIDE: 0.9 INJECTION, SOLUTION INTRAVENOUS at 02:05

## 2022-05-10 RX ADMIN — GABAPENTIN 400 MG: 100 CAPSULE ORAL at 09:05

## 2022-05-10 RX ADMIN — FLUTICASONE FUROATE AND VILANTEROL TRIFENATATE 1 PUFF: 200; 25 POWDER RESPIRATORY (INHALATION) at 08:05

## 2022-05-10 RX ADMIN — LEVOTHYROXINE SODIUM 88 MCG: 88 TABLET ORAL at 09:05

## 2022-05-10 RX ADMIN — HYDROMORPHONE HYDROCHLORIDE 1 MG: 1 INJECTION, SOLUTION INTRAMUSCULAR; INTRAVENOUS; SUBCUTANEOUS at 08:05

## 2022-05-10 RX ADMIN — MORPHINE SULFATE 4 MG: 4 INJECTION, SOLUTION INTRAMUSCULAR; INTRAVENOUS at 06:05

## 2022-05-10 RX ADMIN — SUCRALFATE 1 G: 1 TABLET ORAL at 04:05

## 2022-05-10 RX ADMIN — PROMETHAZINE HYDROCHLORIDE 12.5 MG: 25 INJECTION INTRAMUSCULAR; INTRAVENOUS at 09:05

## 2022-05-10 RX ADMIN — SODIUM BICARBONATE 325 MG: 325 TABLET ORAL at 09:05

## 2022-05-10 RX ADMIN — ESCITALOPRAM OXALATE 20 MG: 10 TABLET ORAL at 09:05

## 2022-05-10 RX ADMIN — SODIUM ZIRCONIUM CYCLOSILICATE 5 G: 5 POWDER, FOR SUSPENSION ORAL at 09:05

## 2022-05-10 RX ADMIN — THERA TABS 1 TABLET: TAB at 09:05

## 2022-05-10 RX ADMIN — SUCRALFATE 1 G: 1 TABLET ORAL at 09:05

## 2022-05-10 RX ADMIN — ONDANSETRON 4 MG: 2 INJECTION INTRAMUSCULAR; INTRAVENOUS at 03:05

## 2022-05-10 RX ADMIN — ALLOPURINOL 100 MG: 100 TABLET ORAL at 09:05

## 2022-05-10 NOTE — HPI
The patient is a 62M with a past medical history of CAD, chronic kidney disease IIIb, COPD on Home O2 4L, hypertension, hyperparathyroidism, hypertriglyceridemia, hypothyroidism, history of kidney stones, and vitamin-D deficiency who presented for evaluation of R flank pain.  Patient describes a constant, sharp right flank pain without radiation that started approximately 3 hours prior to arrival. Patient rates his current pain a 10/10. Movement and  palpation makes his pain worse.  Nothing makes his pain better.  Patient has had multiple previous episodes of kidney stones requiring urological intervention.  Patient also has associated nausea without vomiting and multiple episodes of watery, non foul-smelling, nonbloody diarrhea.  Patient with R sided CVA tenderness, no distress noted.  Labs notable for INDIO and U/A with 3+ occult blood and pyuria. CT renal stone study with severe right hydronephrosis and hydroureter. Two obstructing calculus are in the distal right ureter, with the more proximal measuring approximately 5-6 mm in the slightly more distal measuring approximately 4 mm.  Additionally noted moderate left-sided hydronephrosis or hydroureter. Questioned 1.5-2 mm calculus distal left ureter.  The patient was transferred for further management of his bilateral obstructing kidney stones and Urology consult.

## 2022-05-10 NOTE — ASSESSMENT & PLAN NOTE
Creatinine- 3, baseline appears to be 2; likely due to bilateral obstruction    Avoid nephrotoxics  IVF     Hemigard Postcare Instructions: The HEMIGARD strips are to remain completely dry for at least 5-7 days.

## 2022-05-10 NOTE — CONSULTS
[facility] ([facility])  Urology  Consult Note    Patient Name: Blake Guillory  MRN: [id number]  Admission Date: 5/10/2022  Hospital Length of Stay: 0   Code Status: Full Code   Attending Provider: Ольга Elena MD   Consulting Provider: Louise Tsang MD  Primary Care Physician: Varun Zeng MD  Principal Problem:Hydronephrosis with urinary obstruction due to ureteral calculus    Inpatient consult to Urology  Consult performed by: Louise Tsang MD  Consult ordered by: Josh Palm NP          Subjective:     HPI:  Patient is a 63 yo M with PMH of CAD, CKD IIIb, COPD on Home O2 4L, HTN, hyperparathyroidism, hypertriglyceridemia, hypothyroidism, history of kidney stones (s/p ESWL  2021 and PCNL 2020), and vitamin-D deficiency who presented for evaluation of R flank pain.  He reports yesterday he began having right flank pain and nausea around 4pm. He also experienced vomiting since admission. He denies issues with his stoma or urostomy appliance. He denies fevers, chills, gross hematuria, abdominal pain. He has been NPO since yesterday afternoon.    CT RSS obtained 5/9 shows moderate to severe right hydronephrosis with 2 small stones in the distal right ureter. Also mild to moderate left hydronephrosis with possible small calcification in the distal ureter vs stricture. WBC 8. Cr 2.7 (3.0 on admission, BL around 2). Lactate 0.7. UA nitrite positive, 18 RBC, 22 WBC, moderate bacteria.     History of Bladder Cancer  He has a history of bladder cancer. He is s/p cystectomy with ileal conduit in November 2012. From an oncologic standpoint, he is doing well. He has had issues with bilateral ureteral stricture. He underwent a bilateral ureteral reimplantation into his conduit several months later. His strictures recurred shortly afterwards.  He was managed with ureteral stent changes until one of the stent eroded through his conduit and into his bowels.    He had an Exploratory Laparotomy  with replacement of ileal conduit and small bowel anastomosis on 12/30/2015.  He had a parastomal hernia repair in April by Dr. Mack.  He is healing nicely from that and has been discharged from his office.  He had a small bowel obstruction. that resolved.    He continues to have back pain in the morning and in the evening.  His stoma is not bothering him and he tells me that he has good urine output during the day and less at night.  He has worsening renal function and his right sided pain is becoming worse.    He had a right PCN placed by IR on 1/2/2018.  He then had a percutaneous right ureteral dilation and stent placement.  He noted increased urine output afterwards.  He still has pain and has noted significant pain in the right testicle.  He denies swelling or redness to the testicle.  He had his stent removed on 3/9/2018.       Past Medical History:   Diagnosis Date    Anemia of chronic disease     Aneurysm of right common iliac artery     Arthritis     Blood transfusion     CAD (coronary artery disease)     Chest pain of uncertain etiology     CKD (chronic kidney disease), stage IV     COPD (chronic obstructive pulmonary disease)     Coronary artery calcification 8/2/2021    DDD (degenerative disc disease), lumbar     Frequency of urination     General anesthetics causing adverse effect in therapeutic use     pt states he wakes up very violently from anesthesia    GERD (gastroesophageal reflux disease)     Gout     History of bladder cancer     History of urostomy     Hydronephrosis     Hyperparathyroidism     Hypertension     Hypertriglyceridemia     Hypothyroidism (acquired)     Insomnia     Kidney stone     Limited joint range of motion right hip and leg    Lumbar facet arthropathy     Lumbar spondylosis     Mixed anxiety and depressive disorder     Neuropathy     Orchalgia     SHARMIN (obstructive sleep apnea)     Personal history of bladder cancer     PVD (peripheral vascular  disease)     Sacroiliitis     Stricture or kinking of ureter     Ureteral stent displacement     Urinary retention     Vitamin D deficiency     Wears glasses        Past Surgical History:   Procedure Laterality Date    APPENDECTOMY  12/30/2015    Procedure: APPENDECTOMY;  Surgeon: CHAD Bo MD;  Location: [facility] OR;  Service: Urology;;    BALLOON DILATION OF URETER  01/22/2018    BOWEL RESECTION      COLONOSCOPY      CREATION OF URETEROILEAL CONDUIT Left 07/05/2013    CYSTOSCOPY      >1  episodes for bilat ureteral stent exchange    CYSTOSCOPY W/ URETERAL STENT PLACEMENT Right 01/22/2018    and 6/9/2015    CYSTOSCOPY W/ URETERAL STENT PLACEMENT Bilateral 10/07/2015    ESOPHAGOGASTRODUODENOSCOPY  04/12/2022    W/ BIOPSY    ESOPHAGOGASTRODUODENOSCOPY N/A 4/12/2022    Procedure: EGD (ESOPHAGOGASTRODUODENOSCOPY);  Surgeon: Tito Fan MD;  Location[facility];  Service: Endoscopy;  Laterality: N/A;    EXPLORATORY LAPAROTOMY  07/05/2013    EXPLORATORY LAPAROTOMY W/ BOWEL RESECTION  12/30/2015    EXTRACORPOREAL SHOCK WAVE LITHOTRIPSY Right 06/07/2021    Procedure: LITHOTRIPSY, ESWL;  Surgeon: CHAD Bo MD;  Location: [facility] OR;  Service: Urology;  Laterality: Right;  RN PREOP 5/31/2021   VACCINATED    EXTRACORPOREAL SHOCK WAVE LITHOTRIPSY  06/07/2021    FRACTURE SURGERY      HAND SURGERY      HEMICOLECTOMY  12/30/2015    HIP SURGERY  2012    Rt hip septic    LAPAROSCOPIC CHOLECYSTECTOMY N/A 08/11/2021    Procedure: CHOLECYSTECTOMY, LAPAROSCOPIC;  Surgeon: Remy Xiong MD;  Location: [facility] OR;  Service: General;  Laterality: N/A;  gianni video confirmed 8/5/dme    LYMPH NODE DISSECTION  11/08/2012    pelvic    LYSIS OF ADHESIONS  12/30/2015    MOUTH SURGERY  2000    all teeth extracted    PERCUTANEOUS NEPHROLITHOTOMY Left 07/22/2020    Procedure: NEPHROLITHOTOMY, PERCUTANEOUS;  Surgeon: CHAD Bo MD;  Location: [facility] OR;  Service: Urology;  Laterality: Left;   OR 9 ONLY  RN PREOP 2020----COVID NEGATIVE ON     PERCUTANEOUS NEPHROSTOMY Left 2018    RADICAL CYSTECTOMY  2012    RADIOFREQUENCY THERMOCOAGULATION  2014    median branch lumbar    RADIOFREQUENCY THERMOCOAGULATION  2014    median branch lumbar    REPAIR, HERNIA, PARASTOMAL, LAPAROSCOPIC  2017    SMALL INTESTINE SURGERY  2017    urosotomy bag  2011    R abdomen       Review of patient's allergies indicates:  No Known Allergies    Family History       Family history is unknown by patient.            Tobacco Use    Smoking status: Former Smoker     Packs/day: 0.30     Years: 42.00     Pack years: 12.60     Quit date: 2017     Years since quittin.2    Smokeless tobacco: Never Used    Tobacco comment: in cessation program   Substance and Sexual Activity    Alcohol use: Yes     Alcohol/week: 14.0 - 21.0 standard drinks     Types: 14 - 21 Cans of beer per week     Comment: occassional    Drug use: Yes     Types: Marijuana     Comment: occass    Sexual activity: Yes     Partners: Female     Birth control/protection: None       Review of Systems   Constitutional:  Negative for activity change, appetite change, chills, fever and unexpected weight change.   Respiratory:  Negative for shortness of breath.    Cardiovascular:  Negative for chest pain.   Gastrointestinal:  Positive for nausea and vomiting. Negative for abdominal pain, constipation and diarrhea.   Genitourinary:  Positive for flank pain. Negative for difficulty urinating and hematuria.   Musculoskeletal:  Negative for back pain.   Skin:  Negative for wound.   Neurological:  Negative for headaches.   Psychiatric/Behavioral:  Negative for confusion.      Objective:     Temp:  [94 °F (34.4 °C)-98.6 °F (37 °C)] 97.5 °F (36.4 °C)  Pulse:  [61-97] 71  Resp:  [16-26] 19  SpO2:  [90 %-94 %] 94 %  BP: (117-163)/(56-81) 123/66     Body mass index is 23.47 kg/m².           Drains       Drain  Duration                   Urostomy RLQ -- days         Ureteral Drain/Stent 01/02/18 1156 Right ureter 8 Fr. 1588 days         Urostomy 06/17/20 1055 691 days         Nephrostomy 07/22/20 0855 Left 24 Fr. 656 days                    Physical Exam  Constitutional:       General: He is not in acute distress.     Appearance: He is not diaphoretic.   HENT:      Head: Normocephalic and atraumatic.      Nose: Nose normal.   Eyes:      Conjunctiva/sclera: Conjunctivae normal.   Cardiovascular:      Rate and Rhythm: Normal rate.   Pulmonary:      Effort: Pulmonary effort is normal. No respiratory distress.   Abdominal:      General: There is no distension.      Palpations: Abdomen is soft.      Comments: RLQ stoma pink and healthy appearing. Urostomy appliance with clear yellow urine in bag. Previous surgical scars well healed.   Genitourinary:     Comments: B/L flank scars from previous PCN placement. Right CVA tenderness  Musculoskeletal:         General: Normal range of motion.      Cervical back: Normal range of motion.   Skin:     General: Skin is dry.   Neurological:      Mental Status: He is alert.   Psychiatric:         Behavior: Behavior normal.         Thought Content: Thought content normal.       Significant Labs:    BMP:  Recent Labs   Lab 05/09/22  2132 05/10/22  0618    139   K 4.7 5.4*    108   CO2 20* 22*   BUN 52* 50*   CREATININE 3.0* 2.7*   CALCIUM 9.4 8.8       CBC:  Recent Labs   Lab 05/09/22 2132 05/10/22  0618   WBC 8.01 8.41   HGB 15.6 14.4   HCT 47.9 43.5    151       All pertinent labs results from the past 24 hours have been reviewed.    Significant Imaging:  All pertinent imaging results/findings from the past 24 hours have been reviewed.                      Assessment and Plan:     * Hydronephrosis with urinary obstruction due to ureteral calculus  63 yo M with history of bladder cancer s/p cystectomy with ileal conduit creation who presents with bilateral hydronephrosis and bilateral  ureteral stones.    - Recommend IR consult for bilateral nephrostomy tube placement.  - NPO.  - Hold ASA 81.  - Agree with antibiotics, tailor as sensitivities result.  - F/u urine culture.  - Patient will need antegrade ureteroscopy outpatient for definitive management of his stones.  - Rest of care per primary        VTE Risk Mitigation (From admission, onward)         Ordered     IP VTE HIGH RISK PATIENT  Once         05/10/22 0221     Place sequential compression device  Until discontinued         05/10/22 0221     Reason for No Pharmacological VTE Prophylaxis  Once        Question:  Reasons:  Answer:  Risk of Bleeding    05/10/22 0221                Thank you for your consult. I will follow-up with patient. Please contact us if you have any additional questions.    Louise Tsang MD  Urology  [facility]  Med Surg ([facility])

## 2022-05-10 NOTE — ASSESSMENT & PLAN NOTE
61 yo M with history of bladder cancer s/p cystectomy with ileal conduit creation who presents with bilateral hydronephrosis and bilateral ureteral stones.    - Recommend IR consult for bilateral nephrostomy tube placement.  - NPO.  - Hold ASA 81.  - Agree with antibiotics, tailor as sensitivities result.  - F/u urine culture.  - Patient will need antegrade ureteroscopy outpatient for definitive management of his stones.  - Rest of care per primary

## 2022-05-10 NOTE — OR NURSING
Patient has an extremely hairy beard. It's long and full of hair, which is affecting the consistency of the CO2 detector. He came to the Cath Lab on room air and his Oxygen saturation is ranging from 87% to 90%. Once a nasal cannula was in  place and he was receiving Oxygen, his saturation was WNL. The patient has no complaints and appears comfortable. Pt in NAD.

## 2022-05-10 NOTE — H&P
[facility] Medicine  History & Physical    Patient Name: Blake Guillory  MRN: [id number]  Patient Class: IP- Inpatient  Admission Date: 5/10/2022  Attending Physician: Ольга Elena MD   Primary Care Provider: Varun Zeng MD         Patient information was obtained from patient, past medical records and ER records.     Subjective:     Principal Problem:Hydronephrosis with urinary obstruction due to ureteral calculus    Chief Complaint: No chief complaint on file.       HPI: The patient is a 62M with a past medical history of CAD, chronic kidney disease IIIb, COPD on Home O2 4L, hypertension, hyperparathyroidism, hypertriglyceridemia, hypothyroidism, history of kidney stones, and vitamin-D deficiency who presented for evaluation of R flank pain.  Patient describes a constant, sharp right flank pain without radiation that started approximately 3 hours prior to arrival. Patient rates his current pain a 10/10. Movement and  palpation makes his pain worse.  Nothing makes his pain better.  Patient has had multiple previous episodes of kidney stones requiring urological intervention.  Patient also has associated nausea without vomiting and multiple episodes of watery, non foul-smelling, nonbloody diarrhea.  Patient with R sided CVA tenderness, no distress noted.  Labs notable for INDIO and U/A with 3+ occult blood and pyuria. CT renal stone study with severe right hydronephrosis and hydroureter. Two obstructing calculus are in the distal right ureter, with the more proximal measuring approximately 5-6 mm in the slightly more distal measuring approximately 4 mm.  Additionally noted moderate left-sided hydronephrosis or hydroureter. Questioned 1.5-2 mm calculus distal left ureter.  The patient was transferred for further management of his bilateral obstructing kidney stones and Urology consult.      Past Medical History:   Diagnosis Date    Anemia of chronic disease     Aneurysm of  PICC Team Note    Procedure: Insertion of double Lumen Bard PICC Line    Indications: IV abx    Procedure Details:  Order for PICC line received and acknowledged, labs and diagnostics were also reviewed.  Risks and benefits were discussed with patient/family including use of local anesthetic, risks of thrombosis, bleeding, infection, tissue damage, and possible arterial puncture.  Informed consent was obtained for placement of PICC line.  Stop sign was placed on patient door prior to procedure.  There was/were one additional person(fer) present during procedure who also donned gloves, hat, and mask after performing hand hygiene.      #4 FR double Lumen Bard Power PICC Line inserted in the right brachial vein with one poke(s) using maximum sterile technique including chloraprep, cap, gown, gloves, hand hygiene, mask and drape per hospital protocol.  PICC line inserted at 44cm with 0 cm exposed, Per [facility] [employer] measurement PICC line occupies 29 % of vessel.  PICC line secured using a Stat-lock device, biopatch placed over insertion site followed by a sterile dressing.  Mid upper arm circumference is 41 cm.  Brisk blood return noted to all port(s) and catheter was flushed with 20 cc NS.  There were no changes in vital signs throughout procedure and patient did tolerate procedure well.    LOT # [id number]    Recommendations:  PICC line placement confirmed with ECG technology and maximum P waves noted.  Rani MUHAMMAD notified and line is ready to use, and to change IV tubing prior to using PICC line.  Bard PICC Brochure given to patient with teaching instructions.    Amarilis Cordero RN  [facility] PICC Team  [phone]   right common iliac artery     Arthritis     Blood transfusion     CAD (coronary artery disease)     Chest pain of uncertain etiology     CKD (chronic kidney disease), stage IV     COPD (chronic obstructive pulmonary disease)     Coronary artery calcification 8/2/2021    DDD (degenerative disc disease), lumbar     Frequency of urination     General anesthetics causing adverse effect in therapeutic use     pt states he wakes up very violently from anesthesia    GERD (gastroesophageal reflux disease)     Gout     History of bladder cancer     History of urostomy     Hydronephrosis     Hyperparathyroidism     Hypertension     Hypertriglyceridemia     Hypothyroidism (acquired)     Insomnia     Kidney stone     Limited joint range of motion right hip and leg    Lumbar facet arthropathy     Lumbar spondylosis     Mixed anxiety and depressive disorder     Neuropathy     Orchalgia     SHARMIN (obstructive sleep apnea)     Personal history of bladder cancer     PVD (peripheral vascular disease)     Sacroiliitis     Stricture or kinking of ureter     Ureteral stent displacement     Urinary retention     Vitamin D deficiency     Wears glasses        Past Surgical History:   Procedure Laterality Date    APPENDECTOMY  12/30/2015    Procedure: APPENDECTOMY;  Surgeon: CHAD Bo MD;  Location: [facility] OR;  Service: Urology;;    BALLOON DILATION OF URETER  01/22/2018    BOWEL RESECTION      COLONOSCOPY      CREATION OF URETEROILEAL CONDUIT Left 07/05/2013    CYSTOSCOPY      >1  episodes for bilat ureteral stent exchange    CYSTOSCOPY W/ URETERAL STENT PLACEMENT Right 01/22/2018    and 6/9/2015    CYSTOSCOPY W/ URETERAL STENT PLACEMENT Bilateral 10/07/2015    ESOPHAGOGASTRODUODENOSCOPY  04/12/2022    W/ BIOPSY    ESOPHAGOGASTRODUODENOSCOPY N/A 4/12/2022    Procedure: EGD (ESOPHAGOGASTRODUODENOSCOPY);  Surgeon: Tito Fan MD;  Location: [facility] ENDO;  Service: Endoscopy;  Laterality:  N/A;    EXPLORATORY LAPAROTOMY  07/05/2013    EXPLORATORY LAPAROTOMY W/ BOWEL RESECTION  12/30/2015    EXTRACORPOREAL SHOCK WAVE LITHOTRIPSY Right 06/07/2021    Procedure: LITHOTRIPSY, ESWL;  Surgeon: CHAD Bo MD;  Location: [facility] OR;  Service: Urology;  Laterality: Right;  RN PREOP 5/31/2021   VACCINATED    EXTRACORPOREAL SHOCK WAVE LITHOTRIPSY  06/07/2021    FRACTURE SURGERY      HAND SURGERY      HEMICOLECTOMY  12/30/2015    HIP SURGERY  2012    Rt hip septic    LAPAROSCOPIC CHOLECYSTECTOMY N/A 08/11/2021    Procedure: CHOLECYSTECTOMY, LAPAROSCOPIC;  Surgeon: Remy Xiong MD;  Location: [facility] OR;  Service: General;  Laterality: N/A;  [employer] video confirmed 8/5/dme    LYMPH NODE DISSECTION  11/08/2012    pelvic    LYSIS OF ADHESIONS  12/30/2015    MOUTH SURGERY  2000    all teeth extracted    PERCUTANEOUS NEPHROLITHOTOMY Left 07/22/2020    Procedure: NEPHROLITHOTOMY, PERCUTANEOUS;  Surgeon: CHAD Bo MD;  Location: [facility] OR;  Service: Urology;  Laterality: Left;  OR 9 ONLY  RN PREOP 7/20/2020----COVID NEGATIVE ON 7/20    PERCUTANEOUS NEPHROSTOMY Left 01/02/2018    RADICAL CYSTECTOMY  11/08/2012    RADIOFREQUENCY THERMOCOAGULATION  12/12/2014    median branch lumbar    RADIOFREQUENCY THERMOCOAGULATION  12/01/2014    median branch lumbar    REPAIR, HERNIA, PARASTOMAL, LAPAROSCOPIC  04/12/2017    SMALL INTESTINE SURGERY  04/17/2017    urosotomy bag  2011    R abdomen       Review of patient's allergies indicates:  No Known Allergies    Current Facility-Administered Medications on File Prior to Encounter   Medication    [COMPLETED] ciprofloxacin (CIPRO)400mg/200ml D5W IVPB 400 mg    [COMPLETED] HYDROmorphone (PF) injection 2 mg    [COMPLETED] morphine injection 8 mg    [COMPLETED] ondansetron injection 4 mg    [COMPLETED] sodium chloride 0.9% bolus 1,000 mL     Current Outpatient Medications on File Prior to Encounter   Medication Sig    albuterol (PROAIR HFA) 90  mcg/actuation inhaler Inhale 2 puffs into the lungs every 6 (six) hours as needed for Wheezing. Rescue    albuterol-ipratropium (DUO-NEB) 2.5 mg-0.5 mg/3 mL nebulizer solution Take 3 mLs by nebulization every 6 (six) hours as needed for Wheezing. Rescue    allopurinoL (ZYLOPRIM) 100 MG tablet Take 1 tablet (100 mg total) by mouth once daily.    amLODIPine (NORVASC) 10 MG tablet Take 1 tablet (10 mg total) by mouth once daily.    aspirin (ECOTRIN) 81 MG EC tablet Take 81 mg by mouth once daily.    atenoloL (TENORMIN) 50 MG tablet TAKE 1 TABLET(50 MG) BY MOUTH EVERY DAY    calcitRIOL (ROCALTROL) 0.5 MCG Cap     clobetasoL (TEMOVATE) 0.05 % external solution Pt to mix in 1 jar of cerave cream and apply to affected areas bid    ergocalciferol (VITAMIN D2) 50,000 unit Cap Take 1 capsule (50,000 Units total) by mouth every 7 days.    EScitalopram oxalate (LEXAPRO) 20 MG tablet Take 1 tablet by mouth once daily    fenofibrate (TRICOR) 145 MG tablet Take 1 tablet (145 mg total) by mouth once daily.    fluticasone-umeclidin-vilanter (TRELEGY ELLIPTA) 100-62.5-25 mcg DsDv Inhale 1 puff into the lungs once daily. Rinse mouth after use.    gabapentin (NEURONTIN) 400 MG capsule Take 1 capsule (400 mg total) by mouth 2 (two) times daily.    HYDROcodone-acetaminophen (NORCO)  mg per tablet Take 1 tablet by mouth every 6 (six) hours as needed for Pain.    ipratropium-albuteroL (COMBIVENT)  mcg/actuation inhaler Inhale 1 puff into the lungs 4 (four) times daily. Rescue    levothyroxine (SYNTHROID) 88 MCG tablet Take 1 tablet (88 mcg total) by mouth once daily.    multivitamin (THERAGRAN) per tablet Take 1 tablet by mouth once daily.    pantoprazole (PROTONIX) 40 MG tablet Take 1 tablet (40 mg total) by mouth once daily.    sodium bicarbonate 325 MG tablet Take 325 mg by mouth 2 (two) times daily.    sucralfate (CARAFATE) 1 gram tablet Take 1 tablet (1 g total) by mouth 4 (four) times daily.     traZODone (DESYREL) 150 MG tablet Take 1 tablet (150 mg total) by mouth every evening.     Family History       Family history is unknown by patient.          Tobacco Use    Smoking status: Former Smoker     Packs/day: 0.30     Years: 42.00     Pack years: 12.60     Quit date: 2017     Years since quittin.2    Smokeless tobacco: Never Used    Tobacco comment: in cessation program   Substance and Sexual Activity    Alcohol use: Yes     Alcohol/week: 14.0 - 21.0 standard drinks     Types: 14 - 21 Cans of beer per week     Comment: occassional    Drug use: Yes     Types: Marijuana     Comment: occass    Sexual activity: Yes     Partners: Female     Birth control/protection: None     Review of Systems   Constitutional:  Positive for activity change, appetite change and fatigue. Negative for fever.   HENT:  Negative for congestion, ear pain and postnasal drip.    Eyes:  Negative for discharge.   Respiratory:  Negative for apnea, shortness of breath and wheezing.    Cardiovascular:  Negative for chest pain and leg swelling.   Gastrointestinal:  Positive for abdominal pain, nausea and vomiting. Negative for abdominal distention.   Endocrine: Negative for polydipsia, polyphagia and polyuria.   Genitourinary:  Positive for flank pain and frequency. Negative for difficulty urinating, hematuria and urgency.   Musculoskeletal:  Positive for myalgias. Negative for arthralgias and joint swelling.   Skin:  Negative for pallor and rash.   Allergic/Immunologic: Negative for environmental allergies and food allergies.   Neurological:  Negative for dizziness, speech difficulty, weakness, light-headedness and headaches.   Hematological:  Does not bruise/bleed easily.   Psychiatric/Behavioral:  Negative for agitation.    Objective:     Vital Signs (Most Recent):    Vital Signs (24h Range):  Temp:  [98.6 °F (37 °C)] 98.6 °F (37 °C)  Pulse:  [64-97] 74  Resp:  [18-26] 18  SpO2:  [92 %-94 %] 94 %  BP: (117-163)/(56-81) 130/61      Weight: 78.5 kg (173 lb 1 oz)  Body mass index is 23.47 kg/m².    Physical Exam  Constitutional:       General: He is awake.      Appearance: He is well-developed.      Interventions: He is sedated. Nasal cannula in place.   HENT:      Head: Normocephalic.   Eyes:      Conjunctiva/sclera: Conjunctivae normal.   Cardiovascular:      Rate and Rhythm: Normal rate. Rhythm irregular.      Pulses:           Radial pulses are 2+ on the right side and 2+ on the left side.      Heart sounds: Normal heart sounds.   Pulmonary:      Effort: Pulmonary effort is normal. Tachypnea present.      Breath sounds: Examination of the right-middle field reveals decreased breath sounds. Examination of the right-lower field reveals decreased breath sounds. Examination of the left-lower field reveals decreased breath sounds. Decreased breath sounds present.   Abdominal:      General: Bowel sounds are decreased. There is no distension.      Palpations: Abdomen is soft.      Tenderness: abdominal tenderness in the right upper quadrant There is right CVA tenderness.   Musculoskeletal:         General: Normal range of motion.      Cervical back: Normal range of motion and neck supple.   Skin:     General: Skin is warm and dry.      Coloration: Skin is pale.   Neurological:      Mental Status: He is oriented to person, place, and time. He is lethargic.      GCS: GCS eye subscore is 3. GCS verbal subscore is 4. GCS motor subscore is 6.      Motor: Motor function is intact.   Psychiatric:         Mood and Affect: Mood normal.         Speech: Speech is delayed.         Behavior: Behavior is slowed.           Significant Labs: All pertinent labs within the past 24 hours have been reviewed.  CBC:   Recent Labs   Lab 05/09/22  2132   WBC 8.01   HGB 15.6   HCT 47.9        CMP:   Recent Labs   Lab 05/09/22  2132      K 4.7      CO2 20*   *   BUN 52*   CREATININE 3.0*   CALCIUM 9.4   PROT 7.7   ALBUMIN 4.3   BILITOT 0.3    ALKPHOS 56   AST 24   ALT 17   ANIONGAP 15   EGFRNONAA 21.0*       Significant Imaging: I have reviewed all pertinent imaging results/findings within the past 24 hours.    Assessment/Plan:     * Hydronephrosis with urinary obstruction due to ureteral calculus  CT- Severe right hydronephrosis and hydroureter. Two obstructing calculus are in the distal right ureter, with the more proximal measuring approximately 5-6 mm in the slightly more distal measuring approximately 4 mm.  Additionally noted moderate left-sided hydronephrosis or hydroureter. Questioned 1.5-2 mm calculus distal left ureter.    Rocephin  IVF  Consult Urology  NPO  Morphine for pain  Zofran      Hydronephrosis, bilateral  See above      Chronic respiratory failure with hypoxia  Appears at baseline, on home O2 at 4 liters    Monitor for acute decompensation        Acute kidney injury superimposed on CKD  Creatinine- 3, baseline appears to be 2; likely due to bilateral obstruction    Avoid nephrotoxics  IVF      Acquired hypothyroidism  Continue levothyroxine      Essential hypertension  Normotensive currently    Continue amlodipine, atenolol        VTE Risk Mitigation (From admission, onward)         Ordered     IP VTE HIGH RISK PATIENT  Once         05/10/22 0221     Place sequential compression device  Until discontinued         05/10/22 0221     Reason for No Pharmacological VTE Prophylaxis  Once        Question:  Reasons:  Answer:  Risk of Bleeding    05/10/22 0221                   Josh Palm NP  Department of Hospital Medicine   [facility] ([facility])

## 2022-05-10 NOTE — NURSING
Unable to get temperature on patient, attempt to get rectal temperature on patient, patient refuse. Patient educated, but still refuse. PERLA Palm NP notified.

## 2022-05-10 NOTE — SUBJECTIVE & OBJECTIVE
Past Medical History:   Diagnosis Date    Anemia of chronic disease     Aneurysm of right common iliac artery     Arthritis     Blood transfusion     CAD (coronary artery disease)     Chest pain of uncertain etiology     CKD (chronic kidney disease), stage IV     COPD (chronic obstructive pulmonary disease)     Coronary artery calcification 8/2/2021    DDD (degenerative disc disease), lumbar     Frequency of urination     General anesthetics causing adverse effect in therapeutic use     pt states he wakes up very violently from anesthesia    GERD (gastroesophageal reflux disease)     Gout     History of bladder cancer     History of urostomy     Hydronephrosis     Hyperparathyroidism     Hypertension     Hypertriglyceridemia     Hypothyroidism (acquired)     Insomnia     Kidney stone     Limited joint range of motion right hip and leg    Lumbar facet arthropathy     Lumbar spondylosis     Mixed anxiety and depressive disorder     Neuropathy     Orchalgia     SHARMIN (obstructive sleep apnea)     Personal history of bladder cancer     PVD (peripheral vascular disease)     Sacroiliitis     Stricture or kinking of ureter     Ureteral stent displacement     Urinary retention     Vitamin D deficiency     Wears glasses        Past Surgical History:   Procedure Laterality Date    APPENDECTOMY  12/30/2015    Procedure: APPENDECTOMY;  Surgeon: CHAD Bo MD;  Location: [facility];  Service: Urology;;    BALLOON DILATION OF URETER  01/22/2018    BOWEL RESECTION      COLONOSCOPY      CREATION OF URETEROILEAL CONDUIT Left 07/05/2013    CYSTOSCOPY      >1  episodes for bilat ureteral stent exchange    CYSTOSCOPY W/ URETERAL STENT PLACEMENT Right 01/22/2018    and 6/9/2015    CYSTOSCOPY W/ URETERAL STENT PLACEMENT Bilateral 10/07/2015    ESOPHAGOGASTRODUODENOSCOPY  04/12/2022    W/ BIOPSY    ESOPHAGOGASTRODUODENOSCOPY N/A 4/12/2022    Procedure: EGD (ESOPHAGOGASTRODUODENOSCOPY);  Surgeon: Tito Fan MD;  Location: [facility];   Service: Endoscopy;  Laterality: N/A;    EXPLORATORY LAPAROTOMY  2013    EXPLORATORY LAPAROTOMY W/ BOWEL RESECTION  2015    EXTRACORPOREAL SHOCK WAVE LITHOTRIPSY Right 2021    Procedure: LITHOTRIPSY, ESWL;  Surgeon: CHAD Bo MD;  Location: [facility] OR;  Service: Urology;  Laterality: Right;  RN PREOP 2021   VACCINATED    EXTRACORPOREAL SHOCK WAVE LITHOTRIPSY  2021    FRACTURE SURGERY      HAND SURGERY      HEMICOLECTOMY  2015    HIP SURGERY      Rt hip septic    LAPAROSCOPIC CHOLECYSTECTOMY N/A 2021    Procedure: CHOLECYSTECTOMY, LAPAROSCOPIC;  Surgeon: Remy Xiong MD;  Location: [facility] OR;  Service: General;  Laterality: N/A;  [employer] video confirmed     LYMPH NODE DISSECTION  2012    pelvic    LYSIS OF ADHESIONS  2015    MOUTH SURGERY      all teeth extracted    PERCUTANEOUS NEPHROLITHOTOMY Left 2020    Procedure: NEPHROLITHOTOMY, PERCUTANEOUS;  Surgeon: CHAD Bo MD;  Location: [facility] OR;  Service: Urology;  Laterality: Left;  OR 9 ONLY  RN PREOP 2020----COVID NEGATIVE ON     PERCUTANEOUS NEPHROSTOMY Left 2018    RADICAL CYSTECTOMY  2012    RADIOFREQUENCY THERMOCOAGULATION  2014    median branch lumbar    RADIOFREQUENCY THERMOCOAGULATION  2014    median branch lumbar    REPAIR, HERNIA, PARASTOMAL, LAPAROSCOPIC  2017    SMALL INTESTINE SURGERY  2017    urosotomy bag  2011    R abdomen       Review of patient's allergies indicates:  No Known Allergies    Family History       Family history is unknown by patient.            Tobacco Use    Smoking status: Former Smoker     Packs/day: 0.30     Years: 42.00     Pack years: 12.60     Quit date: 2017     Years since quittin.2    Smokeless tobacco: Never Used    Tobacco comment: in cessation program   Substance and Sexual Activity    Alcohol use: Yes     Alcohol/week: 14.0 - 21.0 standard drinks     Types: 14 - 21 Cans of  beer per week     Comment: occassional    Drug use: Yes     Types: Marijuana     Comment: occass    Sexual activity: Yes     Partners: Female     Birth control/protection: None       Review of Systems   Constitutional:  Negative for activity change, appetite change, chills, fever and unexpected weight change.   Respiratory:  Negative for shortness of breath.    Cardiovascular:  Negative for chest pain.   Gastrointestinal:  Positive for nausea and vomiting. Negative for abdominal pain, constipation and diarrhea.   Genitourinary:  Positive for flank pain. Negative for difficulty urinating and hematuria.   Musculoskeletal:  Negative for back pain.   Skin:  Negative for wound.   Neurological:  Negative for headaches.   Psychiatric/Behavioral:  Negative for confusion.      Objective:     Temp:  [94 °F (34.4 °C)-98.6 °F (37 °C)] 97.5 °F (36.4 °C)  Pulse:  [61-97] 71  Resp:  [16-26] 19  SpO2:  [90 %-94 %] 94 %  BP: (117-163)/(56-81) 123/66     Body mass index is 23.47 kg/m².           Drains       Drain  Duration                  Urostomy RLQ -- days         Ureteral Drain/Stent 01/02/18 1156 Right ureter 8 Fr. 1588 days         Urostomy 06/17/20 1055 691 days         Nephrostomy 07/22/20 0855 Left 24 Fr. 656 days                    Physical Exam  Constitutional:       General: He is not in acute distress.     Appearance: He is not diaphoretic.   HENT:      Head: Normocephalic and atraumatic.      Nose: Nose normal.   Eyes:      Conjunctiva/sclera: Conjunctivae normal.   Cardiovascular:      Rate and Rhythm: Normal rate.   Pulmonary:      Effort: Pulmonary effort is normal. No respiratory distress.   Abdominal:      General: There is no distension.      Palpations: Abdomen is soft.      Comments: RLQ stoma pink and healthy appearing. Urostomy appliance with clear yellow urine in bag. Previous surgical scars well healed.   Genitourinary:     Comments: B/L flank scars from previous PCN placement. Right CVA  tenderness  Musculoskeletal:         General: Normal range of motion.      Cervical back: Normal range of motion.   Skin:     General: Skin is dry.   Neurological:      Mental Status: He is alert.   Psychiatric:         Behavior: Behavior normal.         Thought Content: Thought content normal.       Significant Labs:    BMP:  Recent Labs   Lab 05/09/22  2132 05/10/22  0618    139   K 4.7 5.4*    108   CO2 20* 22*   BUN 52* 50*   CREATININE 3.0* 2.7*   CALCIUM 9.4 8.8       CBC:  Recent Labs   Lab 05/09/22  2132 05/10/22  0618   WBC 8.01 8.41   HGB 15.6 14.4   HCT 47.9 43.5    151       All pertinent labs results from the past 24 hours have been reviewed.    Significant Imaging:  All pertinent imaging results/findings from the past 24 hours have been reviewed.

## 2022-05-10 NOTE — ASSESSMENT & PLAN NOTE
CT- Severe right hydronephrosis and hydroureter. Two obstructing calculus are in the distal right ureter, with the more proximal measuring approximately 5-6 mm in the slightly more distal measuring approximately 4 mm.  Additionally noted moderate left-sided hydronephrosis or hydroureter. Questioned 1.5-2 mm calculus distal left ureter.    Rocephin  IVF  Consult Urology  NPO  Morphine for pain  Zofran

## 2022-05-10 NOTE — HPI
Patient is a 63 yo M with PMH of CAD, CKD IIIb, COPD on Home O2 4L, HTN, hyperparathyroidism, hypertriglyceridemia, hypothyroidism, history of kidney stones (s/p ESWL  2021 and PCNL 2020), and vitamin-D deficiency who presented for evaluation of R flank pain.  He reports yesterday he began having right flank pain and nausea around 4pm. He also experienced vomiting since admission. He denies issues with his stoma or urostomy appliance. He denies fevers, chills, gross hematuria, abdominal pain. He has been NPO since yesterday afternoon.    CT RSS obtained 5/9 shows moderate to severe right hydronephrosis with 2 small stones in the distal right ureter. Also mild to moderate left hydronephrosis with possible small calcification in the distal ureter vs stricture. WBC 8. Cr 2.7 (3.0 on admission, BL around 2). Lactate 0.7. UA nitrite positive, 18 RBC, 22 WBC, moderate bacteria.     History of Bladder Cancer  He has a history of bladder cancer. He is s/p cystectomy with ileal conduit in November 2012. From an oncologic standpoint, he is doing well. He has had issues with bilateral ureteral stricture. He underwent a bilateral ureteral reimplantation into his conduit several months later. His strictures recurred shortly afterwards.  He was managed with ureteral stent changes until one of the stent eroded through his conduit and into his bowels.    He had an Exploratory Laparotomy with replacement of ileal conduit and small bowel anastomosis on 12/30/2015.  He had a parastomal hernia repair in April by Dr. Mack.  He is healing nicely from that and has been discharged from his office.  He had a small bowel obstruction. that resolved.    He continues to have back pain in the morning and in the evening.  His stoma is not bothering him and he tells me that he has good urine output during the day and less at night.  He has worsening renal function and his right sided pain is becoming worse.    He had a right PCN placed by IR on  1/2/2018.  He then had a percutaneous right ureteral dilation and stent placement.  He noted increased urine output afterwards.  He still has pain and has noted significant pain in the right testicle.  He denies swelling or redness to the testicle.  He had his stent removed on 3/9/2018.

## 2022-05-10 NOTE — PLAN OF CARE
Patient A&OX4. Patient pain managed with PRN meds. Patient return from procedure with dressing to bilateral back. Purposeful rounding completed. Call light within reach. Side rails X2.   Problem: Adult Inpatient Plan of Care  Goal: Plan of Care Review  Outcome: Ongoing, Progressing  Goal: Patient-Specific Goal (Individualized)  Outcome: Ongoing, Progressing  Goal: Optimal Comfort and Wellbeing  Outcome: Ongoing, Progressing

## 2022-05-10 NOTE — PROCEDURES
[facility] - Cath Lab ([place])  Interventional Radiology  High Risk Procedure - Inpatient    Date: 05/10/2022 Time: 3:25 PM    Pre-Op Diagnosis: Bilateral hydronephrosis    Post-Op Diagnosis: same    Procedure Performed by: Chino Vo MD    Assistant: none    Procedure: Placement of bilateral nephrostomy tubes    Specimen/Tissue Removed: None    Estimated Blood Loss: Less than 20 mL    Procedure Note/Findings: Bilateral 8 Thai nephrostomy tubes placed and functioning well with blood tinged urine noted bilaterally. Catheters secured in place. No immediate post-procedure complications noted.    Please refer to dictated report for additional details.

## 2022-05-10 NOTE — PROGRESS NOTES
05/10/2022 @ 11:45am- RSW attempted to meet with patient. RSW unable to complete initial visit due to pt sleeping and not responding to RSW attempt to wake pt up. RSW will follow up with patient at a later time.    MILLIE Stone

## 2022-05-10 NOTE — CONSULTS
Interventional Radiology    Consulted for placement of bilateral nephrostomy tubes. Tubes placed and functioning with drainage of blood tinged urine. No immediate post-procedure complications noted.    Chino Vo MD  [phone]

## 2022-05-10 NOTE — SUBJECTIVE & OBJECTIVE
Past Medical History:   Diagnosis Date    Anemia of chronic disease     Aneurysm of right common iliac artery     Arthritis     Blood transfusion     CAD (coronary artery disease)     Chest pain of uncertain etiology     CKD (chronic kidney disease), stage IV     COPD (chronic obstructive pulmonary disease)     Coronary artery calcification 8/2/2021    DDD (degenerative disc disease), lumbar     Frequency of urination     General anesthetics causing adverse effect in therapeutic use     pt states he wakes up very violently from anesthesia    GERD (gastroesophageal reflux disease)     Gout     History of bladder cancer     History of urostomy     Hydronephrosis     Hyperparathyroidism     Hypertension     Hypertriglyceridemia     Hypothyroidism (acquired)     Insomnia     Kidney stone     Limited joint range of motion right hip and leg    Lumbar facet arthropathy     Lumbar spondylosis     Mixed anxiety and depressive disorder     Neuropathy     Orchalgia     SHARMIN (obstructive sleep apnea)     Personal history of bladder cancer     PVD (peripheral vascular disease)     Sacroiliitis     Stricture or kinking of ureter     Ureteral stent displacement     Urinary retention     Vitamin D deficiency     Wears glasses        Past Surgical History:   Procedure Laterality Date    APPENDECTOMY  12/30/2015    Procedure: APPENDECTOMY;  Surgeon: CHAD Bo MD;  Location: [facility];  Service: Urology;;    BALLOON DILATION OF URETER  01/22/2018    BOWEL RESECTION      COLONOSCOPY      CREATION OF URETEROILEAL CONDUIT Left 07/05/2013    CYSTOSCOPY      >1  episodes for bilat ureteral stent exchange    CYSTOSCOPY W/ URETERAL STENT PLACEMENT Right 01/22/2018    and 6/9/2015    CYSTOSCOPY W/ URETERAL STENT PLACEMENT Bilateral 10/07/2015    ESOPHAGOGASTRODUODENOSCOPY  04/12/2022    W/ BIOPSY    ESOPHAGOGASTRODUODENOSCOPY N/A 4/12/2022    Procedure: EGD (ESOPHAGOGASTRODUODENOSCOPY);  Surgeon: Tito Fan MD;  Location: [facility];   Service: Endoscopy;  Laterality: N/A;    EXPLORATORY LAPAROTOMY  07/05/2013    EXPLORATORY LAPAROTOMY W/ BOWEL RESECTION  12/30/2015    EXTRACORPOREAL SHOCK WAVE LITHOTRIPSY Right 06/07/2021    Procedure: LITHOTRIPSY, ESWL;  Surgeon: CHAD Bo MD;  Location: [facility] OR;  Service: Urology;  Laterality: Right;  RN PREOP 5/31/2021   VACCINATED    EXTRACORPOREAL SHOCK WAVE LITHOTRIPSY  06/07/2021    FRACTURE SURGERY      HAND SURGERY      HEMICOLECTOMY  12/30/2015    HIP SURGERY  2012    Rt hip septic    LAPAROSCOPIC CHOLECYSTECTOMY N/A 08/11/2021    Procedure: CHOLECYSTECTOMY, LAPAROSCOPIC;  Surgeon: Remy Xiong MD;  Location: [facility] OR;  Service: General;  Laterality: N/A;  gianni video confirmed 8/5/dme    LYMPH NODE DISSECTION  11/08/2012    pelvic    LYSIS OF ADHESIONS  12/30/2015    MOUTH SURGERY  2000    all teeth extracted    PERCUTANEOUS NEPHROLITHOTOMY Left 07/22/2020    Procedure: NEPHROLITHOTOMY, PERCUTANEOUS;  Surgeon: CHAD Bo MD;  Location: [facility] OR;  Service: Urology;  Laterality: Left;  OR 9 ONLY  RN PREOP 7/20/2020----COVID NEGATIVE ON 7/20    PERCUTANEOUS NEPHROSTOMY Left 01/02/2018    RADICAL CYSTECTOMY  11/08/2012    RADIOFREQUENCY THERMOCOAGULATION  12/12/2014    median branch lumbar    RADIOFREQUENCY THERMOCOAGULATION  12/01/2014    median branch lumbar    REPAIR, HERNIA, PARASTOMAL, LAPAROSCOPIC  04/12/2017    SMALL INTESTINE SURGERY  04/17/2017    urosotomy bag  2011    R abdomen       Review of patient's allergies indicates:  No Known Allergies    Current Facility-Administered Medications on File Prior to Encounter   Medication    [COMPLETED] ciprofloxacin (CIPRO)400mg/200ml D5W IVPB 400 mg    [COMPLETED] HYDROmorphone (PF) injection 2 mg    [COMPLETED] morphine injection 8 mg    [COMPLETED] ondansetron injection 4 mg    [COMPLETED] sodium chloride 0.9% bolus 1,000 mL     Current Outpatient Medications on File Prior to Encounter   Medication Sig    albuterol (PROAIR  HFA) 90 mcg/actuation inhaler Inhale 2 puffs into the lungs every 6 (six) hours as needed for Wheezing. Rescue    albuterol-ipratropium (DUO-NEB) 2.5 mg-0.5 mg/3 mL nebulizer solution Take 3 mLs by nebulization every 6 (six) hours as needed for Wheezing. Rescue    allopurinoL (ZYLOPRIM) 100 MG tablet Take 1 tablet (100 mg total) by mouth once daily.    amLODIPine (NORVASC) 10 MG tablet Take 1 tablet (10 mg total) by mouth once daily.    aspirin (ECOTRIN) 81 MG EC tablet Take 81 mg by mouth once daily.    atenoloL (TENORMIN) 50 MG tablet TAKE 1 TABLET(50 MG) BY MOUTH EVERY DAY    calcitRIOL (ROCALTROL) 0.5 MCG Cap     clobetasoL (TEMOVATE) 0.05 % external solution Pt to mix in 1 jar of cerave cream and apply to affected areas bid    ergocalciferol (VITAMIN D2) 50,000 unit Cap Take 1 capsule (50,000 Units total) by mouth every 7 days.    EScitalopram oxalate (LEXAPRO) 20 MG tablet Take 1 tablet by mouth once daily    fenofibrate (TRICOR) 145 MG tablet Take 1 tablet (145 mg total) by mouth once daily.    fluticasone-umeclidin-vilanter (TRELEGY ELLIPTA) 100-62.5-25 mcg DsDv Inhale 1 puff into the lungs once daily. Rinse mouth after use.    gabapentin (NEURONTIN) 400 MG capsule Take 1 capsule (400 mg total) by mouth 2 (two) times daily.    HYDROcodone-acetaminophen (NORCO)  mg per tablet Take 1 tablet by mouth every 6 (six) hours as needed for Pain.    ipratropium-albuteroL (COMBIVENT)  mcg/actuation inhaler Inhale 1 puff into the lungs 4 (four) times daily. Rescue    levothyroxine (SYNTHROID) 88 MCG tablet Take 1 tablet (88 mcg total) by mouth once daily.    multivitamin (THERAGRAN) per tablet Take 1 tablet by mouth once daily.    pantoprazole (PROTONIX) 40 MG tablet Take 1 tablet (40 mg total) by mouth once daily.    sodium bicarbonate 325 MG tablet Take 325 mg by mouth 2 (two) times daily.    sucralfate (CARAFATE) 1 gram tablet Take 1 tablet (1 g total) by mouth 4 (four) times daily.    traZODone (DESYREL)  150 MG tablet Take 1 tablet (150 mg total) by mouth every evening.     Family History       Family history is unknown by patient.          Tobacco Use    Smoking status: Former Smoker     Packs/day: 0.30     Years: 42.00     Pack years: 12.60     Quit date: 2017     Years since quittin.2    Smokeless tobacco: Never Used    Tobacco comment: in cessation program   Substance and Sexual Activity    Alcohol use: Yes     Alcohol/week: 14.0 - 21.0 standard drinks     Types: 14 - 21 Cans of beer per week     Comment: occassional    Drug use: Yes     Types: Marijuana     Comment: occass    Sexual activity: Yes     Partners: Female     Birth control/protection: None     Review of Systems   Constitutional:  Positive for activity change, appetite change and fatigue. Negative for fever.   HENT:  Negative for congestion, ear pain and postnasal drip.    Eyes:  Negative for discharge.   Respiratory:  Negative for apnea, shortness of breath and wheezing.    Cardiovascular:  Negative for chest pain and leg swelling.   Gastrointestinal:  Positive for abdominal pain, nausea and vomiting. Negative for abdominal distention.   Endocrine: Negative for polydipsia, polyphagia and polyuria.   Genitourinary:  Positive for flank pain and frequency. Negative for difficulty urinating, hematuria and urgency.   Musculoskeletal:  Positive for myalgias. Negative for arthralgias and joint swelling.   Skin:  Negative for pallor and rash.   Allergic/Immunologic: Negative for environmental allergies and food allergies.   Neurological:  Negative for dizziness, speech difficulty, weakness, light-headedness and headaches.   Hematological:  Does not bruise/bleed easily.   Psychiatric/Behavioral:  Negative for agitation.    Objective:     Vital Signs (Most Recent):    Vital Signs (24h Range):  Temp:  [98.6 °F (37 °C)] 98.6 °F (37 °C)  Pulse:  [64-97] 74  Resp:  [18-26] 18  SpO2:  [92 %-94 %] 94 %  BP: (117-163)/(56-81) 130/61     Weight: 78.5 kg (173  lb 1 oz)  Body mass index is 23.47 kg/m².    Physical Exam  Constitutional:       General: He is awake.      Appearance: He is well-developed.      Interventions: He is sedated. Nasal cannula in place.   HENT:      Head: Normocephalic.   Eyes:      Conjunctiva/sclera: Conjunctivae normal.   Cardiovascular:      Rate and Rhythm: Normal rate. Rhythm irregular.      Pulses:           Radial pulses are 2+ on the right side and 2+ on the left side.      Heart sounds: Normal heart sounds.   Pulmonary:      Effort: Pulmonary effort is normal. Tachypnea present.      Breath sounds: Examination of the right-middle field reveals decreased breath sounds. Examination of the right-lower field reveals decreased breath sounds. Examination of the left-lower field reveals decreased breath sounds. Decreased breath sounds present.   Abdominal:      General: Bowel sounds are decreased. There is no distension.      Palpations: Abdomen is soft.      Tenderness: abdominal tenderness in the right upper quadrant There is right CVA tenderness.   Musculoskeletal:         General: Normal range of motion.      Cervical back: Normal range of motion and neck supple.   Skin:     General: Skin is warm and dry.      Coloration: Skin is pale.   Neurological:      Mental Status: He is oriented to person, place, and time. He is lethargic.      GCS: GCS eye subscore is 3. GCS verbal subscore is 4. GCS motor subscore is 6.      Motor: Motor function is intact.   Psychiatric:         Mood and Affect: Mood normal.         Speech: Speech is delayed.         Behavior: Behavior is slowed.           Significant Labs: All pertinent labs within the past 24 hours have been reviewed.  CBC:   Recent Labs   Lab 05/09/22  2132   WBC 8.01   HGB 15.6   HCT 47.9        CMP:   Recent Labs   Lab 05/09/22  2132      K 4.7      CO2 20*   *   BUN 52*   CREATININE 3.0*   CALCIUM 9.4   PROT 7.7   ALBUMIN 4.3   BILITOT 0.3   ALKPHOS 56   AST 24   ALT  17   ANIONGAP 15   EGFRNONAA 21.0*       Significant Imaging: I have reviewed all pertinent imaging results/findings within the past 24 hours.

## 2022-05-10 NOTE — H&P
[facility] - Cath Lab ([place])  History & Physical - Short Stay  Interventional Radiology    SUBJECTIVE:     Chief Complaint/Reason for Admission: Bilateral hydronephrosis, ureteral calculi    Informant(s):  self and Electronic Health Record    History of Present Illness:  lBake Guillory is a 62 y.o. male with a history of hydronephrosis and calculi.    Patient presents for bilateral nephrostomy tubes.    Scheduled Meds:    allopurinoL  100 mg Oral Daily    amLODIPine  10 mg Oral Daily    aspirin  81 mg Oral Daily    atenoloL  50 mg Oral Daily    cefTRIAXone (ROCEPHIN) IVPB  1 g Intravenous Q24H    EScitalopram oxalate  20 mg Oral Daily    fenofibrate  145 mg Oral Daily    fluticasone furoate-vilanteroL  1 puff Inhalation Daily    gabapentin  400 mg Oral BID    levothyroxine  88 mcg Oral Daily    multivitamin  1 tablet Oral Daily    pantoprazole  40 mg Oral Daily    sodium bicarbonate  325 mg Oral BID    sodium zirconium cyclosilicate  5 g Oral Once    sucralfate  1 g Oral QID    tiotropium bromide  2 puff Inhalation Daily     Continuous Infusions:    sodium chloride 0.9% 125 mL/hr at 05/10/22 0438     PRN Meds: acetaminophen, HYDROmorphone, naloxone, ondansetron, sodium chloride 0.9%    Review of patient's allergies indicates:  No Known Allergies    Past Medical History:   Diagnosis Date    Anemia of chronic disease     Aneurysm of right common iliac artery     Arthritis     Blood transfusion     CAD (coronary artery disease)     Chest pain of uncertain etiology     CKD (chronic kidney disease), stage IV     COPD (chronic obstructive pulmonary disease)     Coronary artery calcification 8/2/2021    DDD (degenerative disc disease), lumbar     Frequency of urination     General anesthetics causing adverse effect in therapeutic use     pt states he wakes up very violently from anesthesia    GERD (gastroesophageal reflux disease)     Gout     History of bladder cancer     History of  urostomy     Hydronephrosis     Hyperparathyroidism     Hypertension     Hypertriglyceridemia     Hypothyroidism (acquired)     Insomnia     Kidney stone     Limited joint range of motion right hip and leg    Lumbar facet arthropathy     Lumbar spondylosis     Mixed anxiety and depressive disorder     Neuropathy     Orchalgia     SHARMIN (obstructive sleep apnea)     Personal history of bladder cancer     PVD (peripheral vascular disease)     Sacroiliitis     Stricture or kinking of ureter     Ureteral stent displacement     Urinary retention     Vitamin D deficiency     Wears glasses      Past Surgical History:   Procedure Laterality Date    APPENDECTOMY  12/30/2015    Procedure: APPENDECTOMY;  Surgeon: CHAD Bo MD;  Location: [facility];  Service: Urology;;    BALLOON DILATION OF URETER  01/22/2018    BOWEL RESECTION      COLONOSCOPY      CREATION OF URETEROILEAL CONDUIT Left 07/05/2013    CYSTOSCOPY      >1  episodes for bilat ureteral stent exchange    CYSTOSCOPY W/ URETERAL STENT PLACEMENT Right 01/22/2018    and 6/9/2015    CYSTOSCOPY W/ URETERAL STENT PLACEMENT Bilateral 10/07/2015    ESOPHAGOGASTRODUODENOSCOPY  04/12/2022    W/ BIOPSY    ESOPHAGOGASTRODUODENOSCOPY N/A 4/12/2022    Procedure: EGD (ESOPHAGOGASTRODUODENOSCOPY);  Surgeon: Tito Fan MD;  Location[facility];  Service: Endoscopy;  Laterality: N/A;    EXPLORATORY LAPAROTOMY  07/05/2013    EXPLORATORY LAPAROTOMY W/ BOWEL RESECTION  12/30/2015    EXTRACORPOREAL SHOCK WAVE LITHOTRIPSY Right 06/07/2021    Procedure: LITHOTRIPSY, ESWL;  Surgeon: CHAD Bo MD;  Location: [facility];  Service: Urology;  Laterality: Right;  RN PREOP 5/31/2021   VACCINATED    EXTRACORPOREAL SHOCK WAVE LITHOTRIPSY  06/07/2021    FRACTURE SURGERY      HAND SURGERY      HEMICOLECTOMY  12/30/2015    HIP SURGERY  2012    Rt hip septic    LAPAROSCOPIC CHOLECYSTECTOMY N/A 08/11/2021    Procedure: CHOLECYSTECTOMY, LAPAROSCOPIC;   Surgeon: Remy Xiong MD;  Location: [facility] OR;  Service: General;  Laterality: N/A;  gianni video confirmed     LYMPH NODE DISSECTION  2012    pelvic    LYSIS OF ADHESIONS  2015    MOUTH SURGERY  2000    all teeth extracted    PERCUTANEOUS NEPHROLITHOTOMY Left 2020    Procedure: NEPHROLITHOTOMY, PERCUTANEOUS;  Surgeon: CHAD Bo MD;  Location: [facility] OR;  Service: Urology;  Laterality: Left;  OR 9 ONLY  RN PREOP 2020----COVID NEGATIVE ON     PERCUTANEOUS NEPHROSTOMY Left 2018    RADICAL CYSTECTOMY  2012    RADIOFREQUENCY THERMOCOAGULATION  2014    median branch lumbar    RADIOFREQUENCY THERMOCOAGULATION  2014    median branch lumbar    REPAIR, HERNIA, PARASTOMAL, LAPAROSCOPIC  2017    SMALL INTESTINE SURGERY  2017    urosotomy bag  2011    R abdomen     Family History   Adopted: Yes   Family history unknown: Yes     Social History     Tobacco Use    Smoking status: Former Smoker     Packs/day: 0.30     Years: 42.00     Pack years: 12.60     Quit date: 2017     Years since quittin.2    Smokeless tobacco: Never Used    Tobacco comment: in cessation program   Substance Use Topics    Alcohol use: Yes     Alcohol/week: 14.0 - 21.0 standard drinks     Types: 14 - 21 Cans of beer per week     Comment: occassional    Drug use: Yes     Types: Marijuana     Comment: occass        Review of Systems:  ROS not obtained    OBJECTIVE:     Vital Signs (Most Recent):  Temp: 98.1 °F (36.7 °C) (05/10/22 1214)  Pulse: 68 (05/10/22 1214)  Resp: 19 (05/10/22 1214)  BP: 121/68 (05/10/22 1214)  SpO2: 96 % (05/10/22 1214)    Physical Exam:  Lungs: No respiratory distress  Cardiac: regular rate and rhythm    Laboratory  CBC:   Lab Results   Component Value Date/Time    WBC 8.41 05/10/2022 06:18 AM    RBC 4.30 (L) 05/10/2022 06:18 AM    HGB 14.4 05/10/2022 06:18 AM    HCT 43.5 05/10/2022 06:18 AM     05/10/2022 06:18 AM    MCV  101 (H) 05/10/2022 06:18 AM    MCH 33.5 (H) 05/10/2022 06:18 AM    MCHC 33.1 05/10/2022 06:18 AM     Coagulation:   Lab Results   Component Value Date/Time    INR 0.9 12/28/2017 02:34 PM    APTT 24.3 08/01/2014 08:00 AM             ASSESSMENT/PLAN:     Bilateral hydronphrosis.    Patient will undergo bilateral nephrostomy tubes.    Sedation/Anesthesia Assessment:  ASA Classification: II = Mild systemic disease  Mallampati Score: II (hard and soft palate, upper portion of tonsils anduvula visible)    Sedation History: No problems    Sedation Plan: Conscious sedation

## 2022-05-11 ENCOUNTER — TELEPHONE (OUTPATIENT)
Dept: FAMILY MEDICINE | Facility: CLINIC | Age: 62
End: 2022-05-11
Payer: MEDICARE

## 2022-05-11 VITALS
RESPIRATION RATE: 18 BRPM | WEIGHT: 173.06 LBS | SYSTOLIC BLOOD PRESSURE: 155 MMHG | HEIGHT: 72 IN | TEMPERATURE: 99 F | BODY MASS INDEX: 23.44 KG/M2 | HEART RATE: 72 BPM | OXYGEN SATURATION: 92 % | DIASTOLIC BLOOD PRESSURE: 96 MMHG

## 2022-05-11 LAB
ANION GAP SERPL CALC-SCNC: 11 MMOL/L (ref 8–16)
BASOPHILS # BLD AUTO: 0.02 K/UL (ref 0–0.2)
BASOPHILS NFR BLD: 0.3 % (ref 0–1.9)
BUN SERPL-MCNC: 39 MG/DL (ref 8–23)
CALCIUM SERPL-MCNC: 8.6 MG/DL (ref 8.7–10.5)
CHLORIDE SERPL-SCNC: 110 MMOL/L (ref 95–110)
CO2 SERPL-SCNC: 23 MMOL/L (ref 23–29)
CREAT SERPL-MCNC: 2.1 MG/DL (ref 0.5–1.4)
DIFFERENTIAL METHOD: ABNORMAL
EOSINOPHIL # BLD AUTO: 0.1 K/UL (ref 0–0.5)
EOSINOPHIL NFR BLD: 1 % (ref 0–8)
ERYTHROCYTE [DISTWIDTH] IN BLOOD BY AUTOMATED COUNT: 12.8 % (ref 11.5–14.5)
EST. GFR  (AFRICAN AMERICAN): 37 ML/MIN/1.73 M^2
EST. GFR  (NON AFRICAN AMERICAN): 32 ML/MIN/1.73 M^2
GLUCOSE SERPL-MCNC: 86 MG/DL (ref 70–110)
HCT VFR BLD AUTO: 42.6 % (ref 40–54)
HGB BLD-MCNC: 13.9 G/DL (ref 14–18)
IMM GRANULOCYTES # BLD AUTO: 0.02 K/UL (ref 0–0.04)
IMM GRANULOCYTES NFR BLD AUTO: 0.3 % (ref 0–0.5)
LYMPHOCYTES # BLD AUTO: 0.7 K/UL (ref 1–4.8)
LYMPHOCYTES NFR BLD: 9.5 % (ref 18–48)
MAGNESIUM SERPL-MCNC: 1.9 MG/DL (ref 1.6–2.6)
MCH RBC QN AUTO: 33.2 PG (ref 27–31)
MCHC RBC AUTO-ENTMCNC: 32.6 G/DL (ref 32–36)
MCV RBC AUTO: 102 FL (ref 82–98)
MONOCYTES # BLD AUTO: 0.5 K/UL (ref 0.3–1)
MONOCYTES NFR BLD: 7.7 % (ref 4–15)
NEUTROPHILS # BLD AUTO: 5.7 K/UL (ref 1.8–7.7)
NEUTROPHILS NFR BLD: 81.2 % (ref 38–73)
NRBC BLD-RTO: 0 /100 WBC
PHOSPHATE SERPL-MCNC: 2.1 MG/DL (ref 2.7–4.5)
PLATELET # BLD AUTO: 146 K/UL (ref 150–450)
PMV BLD AUTO: 11.1 FL (ref 9.2–12.9)
POTASSIUM SERPL-SCNC: 4.4 MMOL/L (ref 3.5–5.1)
RBC # BLD AUTO: 4.19 M/UL (ref 4.6–6.2)
SODIUM SERPL-SCNC: 144 MMOL/L (ref 136–145)
WBC # BLD AUTO: 7.02 K/UL (ref 3.9–12.7)

## 2022-05-11 PROCEDURE — 36415 COLL VENOUS BLD VENIPUNCTURE: CPT | Performed by: NURSE PRACTITIONER

## 2022-05-11 PROCEDURE — 99900035 HC TECH TIME PER 15 MIN (STAT)

## 2022-05-11 PROCEDURE — 80048 BASIC METABOLIC PNL TOTAL CA: CPT | Performed by: INTERNAL MEDICINE

## 2022-05-11 PROCEDURE — 99239 PR HOSPITAL DISCHARGE DAY,>30 MIN: ICD-10-PCS | Mod: ,,, | Performed by: INTERNAL MEDICINE

## 2022-05-11 PROCEDURE — 94761 N-INVAS EAR/PLS OXIMETRY MLT: CPT

## 2022-05-11 PROCEDURE — 99239 HOSP IP/OBS DSCHRG MGMT >30: CPT | Mod: ,,, | Performed by: INTERNAL MEDICINE

## 2022-05-11 PROCEDURE — 25000003 PHARM REV CODE 250: Performed by: NURSE PRACTITIONER

## 2022-05-11 PROCEDURE — 25000242 PHARM REV CODE 250 ALT 637 W/ HCPCS: Performed by: NURSE PRACTITIONER

## 2022-05-11 PROCEDURE — 63600175 PHARM REV CODE 636 W HCPCS: Performed by: INTERNAL MEDICINE

## 2022-05-11 PROCEDURE — 85025 COMPLETE CBC W/AUTO DIFF WBC: CPT | Performed by: NURSE PRACTITIONER

## 2022-05-11 PROCEDURE — 99232 SBSQ HOSP IP/OBS MODERATE 35: CPT | Mod: ,,, | Performed by: UROLOGY

## 2022-05-11 PROCEDURE — 25000003 PHARM REV CODE 250: Performed by: INTERNAL MEDICINE

## 2022-05-11 PROCEDURE — 27000221 HC OXYGEN, UP TO 24 HOURS

## 2022-05-11 PROCEDURE — 63600175 PHARM REV CODE 636 W HCPCS: Performed by: NURSE PRACTITIONER

## 2022-05-11 PROCEDURE — 83735 ASSAY OF MAGNESIUM: CPT | Performed by: NURSE PRACTITIONER

## 2022-05-11 PROCEDURE — 84100 ASSAY OF PHOSPHORUS: CPT | Performed by: NURSE PRACTITIONER

## 2022-05-11 PROCEDURE — 99232 PR SUBSEQUENT HOSPITAL CARE,LEVL II: ICD-10-PCS | Mod: ,,, | Performed by: UROLOGY

## 2022-05-11 PROCEDURE — 94640 AIRWAY INHALATION TREATMENT: CPT

## 2022-05-11 RX ORDER — CIPROFLOXACIN 500 MG/1
500 TABLET ORAL DAILY
Qty: 12 TABLET | Refills: 0 | Status: SHIPPED | OUTPATIENT
Start: 2022-05-11 | End: 2022-05-23

## 2022-05-11 RX ORDER — OXYCODONE AND ACETAMINOPHEN 5; 325 MG/1; MG/1
1 TABLET ORAL EVERY 8 HOURS PRN
Qty: 15 TABLET | Refills: 0 | Status: SHIPPED | OUTPATIENT
Start: 2022-05-11 | End: 2022-05-16 | Stop reason: ALTCHOICE

## 2022-05-11 RX ORDER — OXYCODONE AND ACETAMINOPHEN 5; 325 MG/1; MG/1
1 TABLET ORAL EVERY 4 HOURS PRN
Status: DISCONTINUED | OUTPATIENT
Start: 2022-05-11 | End: 2022-05-11 | Stop reason: HOSPADM

## 2022-05-11 RX ADMIN — TIOTROPIUM BROMIDE INHALATION SPRAY 2 PUFF: 1.56 SPRAY, METERED RESPIRATORY (INHALATION) at 07:05

## 2022-05-11 RX ADMIN — HYDROMORPHONE HYDROCHLORIDE 1 MG: 1 INJECTION, SOLUTION INTRAMUSCULAR; INTRAVENOUS; SUBCUTANEOUS at 09:05

## 2022-05-11 RX ADMIN — SUCRALFATE 1 G: 1 TABLET ORAL at 09:05

## 2022-05-11 RX ADMIN — HYDROMORPHONE HYDROCHLORIDE 1 MG: 1 INJECTION, SOLUTION INTRAMUSCULAR; INTRAVENOUS; SUBCUTANEOUS at 04:05

## 2022-05-11 RX ADMIN — LEVOTHYROXINE SODIUM 88 MCG: 88 TABLET ORAL at 09:05

## 2022-05-11 RX ADMIN — HYDROMORPHONE HYDROCHLORIDE 1 MG: 1 INJECTION, SOLUTION INTRAMUSCULAR; INTRAVENOUS; SUBCUTANEOUS at 12:05

## 2022-05-11 RX ADMIN — PANTOPRAZOLE SODIUM 40 MG: 40 TABLET, DELAYED RELEASE ORAL at 09:05

## 2022-05-11 RX ADMIN — THERA TABS 1 TABLET: TAB at 09:05

## 2022-05-11 RX ADMIN — FLUTICASONE FUROATE AND VILANTEROL TRIFENATATE 1 PUFF: 200; 25 POWDER RESPIRATORY (INHALATION) at 07:05

## 2022-05-11 RX ADMIN — ASPIRIN 81 MG: 81 TABLET, COATED ORAL at 09:05

## 2022-05-11 RX ADMIN — CEFTRIAXONE 1 G: 1 INJECTION, SOLUTION INTRAVENOUS at 04:05

## 2022-05-11 RX ADMIN — SODIUM BICARBONATE 325 MG: 325 TABLET ORAL at 09:05

## 2022-05-11 RX ADMIN — ATENOLOL 50 MG: 25 TABLET ORAL at 09:05

## 2022-05-11 RX ADMIN — OXYCODONE HYDROCHLORIDE AND ACETAMINOPHEN 1 TABLET: 5; 325 TABLET ORAL at 01:05

## 2022-05-11 RX ADMIN — AMLODIPINE BESYLATE 10 MG: 5 TABLET ORAL at 09:05

## 2022-05-11 RX ADMIN — ALLOPURINOL 100 MG: 100 TABLET ORAL at 09:05

## 2022-05-11 RX ADMIN — SUCRALFATE 1 G: 1 TABLET ORAL at 01:05

## 2022-05-11 RX ADMIN — ESCITALOPRAM OXALATE 20 MG: 10 TABLET ORAL at 09:05

## 2022-05-11 RX ADMIN — GABAPENTIN 400 MG: 100 CAPSULE ORAL at 09:05

## 2022-05-11 RX ADMIN — SODIUM CHLORIDE: 0.9 INJECTION, SOLUTION INTRAVENOUS at 12:05

## 2022-05-11 NOTE — ASSESSMENT & PLAN NOTE
61 yo M with history of bladder cancer s/p cystectomy with ileal conduit creation who presents with bilateral hydronephrosis and bilateral ureteral stones.    - S/p bilateral nephrostomy tube placement on 5/10. Draining well.   - Cr trending down.  - F/u urine culture.  - Patient will need antegrade ureteroscopy outpatient for definitive management of his stones. Will set this up with Dr. Bo.   - Rest of care per primary.    - Urology will sign off, please call with any questions.

## 2022-05-11 NOTE — PROGRESS NOTES
[facility] Surg [facility]  Urology  Progress Note    Patient Name: Blake Guillory  MRN: [id number]  Admission Date: 5/10/2022  Hospital Length of Stay: 1 days  Code Status: Full Code   Attending Provider: Ольга Elena MD   Primary Care Physician: Varun Zeng MD    Subjective:     HPI:  Patient is a 63 yo M with PMH of CAD, CKD IIIb, COPD on Home O2 4L, HTN, hyperparathyroidism, hypertriglyceridemia, hypothyroidism, history of kidney stones (s/p ESWL  2021 and PCNL 2020), and vitamin-D deficiency who presented for evaluation of R flank pain.  He reports yesterday he began having right flank pain and nausea around 4pm. He also experienced vomiting since admission. He denies issues with his stoma or urostomy appliance. He denies fevers, chills, gross hematuria, abdominal pain. He has been NPO since yesterday afternoon.    CT RSS obtained 5/9 shows moderate to severe right hydronephrosis with 2 small stones in the distal right ureter. Also mild to moderate left hydronephrosis with possible small calcification in the distal ureter vs stricture. WBC 8. Cr 2.7 (3.0 on admission, BL around 2). Lactate 0.7. UA nitrite positive, 18 RBC, 22 WBC, moderate bacteria.     History of Bladder Cancer  He has a history of bladder cancer. He is s/p cystectomy with ileal conduit in November 2012. From an oncologic standpoint, he is doing well. He has had issues with bilateral ureteral stricture. He underwent a bilateral ureteral reimplantation into his conduit several months later. His strictures recurred shortly afterwards.  He was managed with ureteral stent changes until one of the stent eroded through his conduit and into his bowels.    He had an Exploratory Laparotomy with replacement of ileal conduit and small bowel anastomosis on 12/30/2015.  He had a parastomal hernia repair in April by Dr. Mack.  He is healing nicely from that and has been discharged from his office.  He had a small bowel obstruction. that  resolved.    He continues to have back pain in the morning and in the evening.  His stoma is not bothering him and he tells me that he has good urine output during the day and less at night.  He has worsening renal function and his right sided pain is becoming worse.    He had a right PCN placed by IR on 1/2/2018.  He then had a percutaneous right ureteral dilation and stent placement.  He noted increased urine output afterwards.  He still has pain and has noted significant pain in the right testicle.  He denies swelling or redness to the testicle.  He had his stent removed on 3/9/2018.       Interval History: NAEON. AFVSS. Bilateral PCN placed yesterday. Draining well. Denies pain. Ambulating. Cr trending down.      Objective:     Temp:  [97.5 °F (36.4 °C)-98.5 °F (36.9 °C)] 97.7 °F (36.5 °C)  Pulse:  [64-80] 67  Resp:  [16-19] 18  SpO2:  [90 %-100 %] 96 %  BP: (118-147)/(64-80) 146/70     Body mass index is 23.47 kg/m².           Drains       Drain  Duration                  Urostomy RLQ -- days         Ureteral Drain/Stent 01/02/18 1156 Right ureter 8 Fr. 1589 days         Urostomy 06/17/20 1055 692 days         Nephrostomy 07/22/20 0855 Left 24 Fr. 657 days         Nephrostomy 05/10/22 1448 Right 8 Fr. <1 day         Nephrostomy 05/10/22 1501 Left 8 Fr. <1 day                    Physical Exam  Constitutional:       General: He is not in acute distress.     Appearance: He is not diaphoretic.   HENT:      Head: Normocephalic and atraumatic.      Nose: Nose normal.   Eyes:      Conjunctiva/sclera: Conjunctivae normal.   Cardiovascular:      Rate and Rhythm: Normal rate.   Pulmonary:      Effort: Pulmonary effort is normal. No respiratory distress.   Abdominal:      General: There is no distension.      Comments: RLQ stoma pink and healthy appearing. Urostomy appliance with light red urine in bag.   Genitourinary:     Comments: Bilateral PCN in place, draining light pink urine.  Musculoskeletal:         General:  Normal range of motion.      Cervical back: Normal range of motion.   Skin:     General: Skin is dry.   Neurological:      Mental Status: He is alert.   Psychiatric:         Behavior: Behavior normal.         Thought Content: Thought content normal.       Significant Labs:    BMP:  Recent Labs   Lab 05/09/22  2132 05/10/22  0618 05/11/22  0531    139 144   K 4.7 5.4* 4.4    108 110   CO2 20* 22* 23   BUN 52* 50* 39*   CREATININE 3.0* 2.7* 2.1*   CALCIUM 9.4 8.8 8.6*       CBC:   Recent Labs   Lab 05/09/22  2132 05/10/22  0618 05/11/22  0531   WBC 8.01 8.41 7.02   HGB 15.6 14.4 13.9*   HCT 47.9 43.5 42.6    151 146*       All pertinent labs results from the past 24 hours have been reviewed.    Significant Imaging:  All pertinent imaging results/findings from the past 24 hours have been reviewed.                    Assessment/Plan:     * Hydronephrosis with urinary obstruction due to ureteral calculus  63 yo M with history of bladder cancer s/p cystectomy with ileal conduit creation who presents with bilateral hydronephrosis and bilateral ureteral stones.    - S/p bilateral nephrostomy tube placement on 5/10. Draining well.   - Cr trending down.  - F/u urine culture.  - Patient will need antegrade ureteroscopy outpatient for definitive management of his stones. Will set this up with Dr. Bo.   - Rest of care per primary.    - Urology will sign off, please call with any questions.        VTE Risk Mitigation (From admission, onward)         Ordered     IP VTE HIGH RISK PATIENT  Once         05/10/22 0221     Place sequential compression device  Until discontinued         05/10/22 0221     Reason for No Pharmacological VTE Prophylaxis  Once        Question:  Reasons:  Answer:  Risk of Bleeding    05/10/22 0221                Louise Tsang MD  Urology  [facility] ([facility])

## 2022-05-11 NOTE — SUBJECTIVE & OBJECTIVE
Interval History: NAEON. AFVSS. Bilateral PCN placed yesterday. Draining well. Denies pain. Ambulating. Cr trending down.      Objective:     Temp:  [97.5 °F (36.4 °C)-98.5 °F (36.9 °C)] 97.7 °F (36.5 °C)  Pulse:  [64-80] 67  Resp:  [16-19] 18  SpO2:  [90 %-100 %] 96 %  BP: (118-147)/(64-80) 146/70     Body mass index is 23.47 kg/m².           Drains       Drain  Duration                  Urostomy RLQ -- days         Ureteral Drain/Stent 01/02/18 1156 Right ureter 8 Fr. 1589 days         Urostomy 06/17/20 1055 692 days         Nephrostomy 07/22/20 0855 Left 24 Fr. 657 days         Nephrostomy 05/10/22 1448 Right 8 Fr. <1 day         Nephrostomy 05/10/22 1501 Left 8 Fr. <1 day                    Physical Exam  Constitutional:       General: He is not in acute distress.     Appearance: He is not diaphoretic.   HENT:      Head: Normocephalic and atraumatic.      Nose: Nose normal.   Eyes:      Conjunctiva/sclera: Conjunctivae normal.   Cardiovascular:      Rate and Rhythm: Normal rate.   Pulmonary:      Effort: Pulmonary effort is normal. No respiratory distress.   Abdominal:      General: There is no distension.      Comments: RLQ stoma pink and healthy appearing. Urostomy appliance with light red urine in bag.   Genitourinary:     Comments: Bilateral PCN in place, draining light pink urine.  Musculoskeletal:         General: Normal range of motion.      Cervical back: Normal range of motion.   Skin:     General: Skin is dry.   Neurological:      Mental Status: He is alert.   Psychiatric:         Behavior: Behavior normal.         Thought Content: Thought content normal.       Significant Labs:    BMP:  Recent Labs   Lab 05/09/22  2132 05/10/22  0618 05/11/22  0531    139 144   K 4.7 5.4* 4.4    108 110   CO2 20* 22* 23   BUN 52* 50* 39*   CREATININE 3.0* 2.7* 2.1*   CALCIUM 9.4 8.8 8.6*       CBC:   Recent Labs   Lab 05/09/22  2132 05/10/22  0618 05/11/22  0531   WBC 8.01 8.41 7.02   HGB 15.6 14.4 13.9*    HCT 47.9 43.5 42.6    151 146*       All pertinent labs results from the past 24 hours have been reviewed.    Significant Imaging:  All pertinent imaging results/findings from the past 24 hours have been reviewed.

## 2022-05-11 NOTE — PLAN OF CARE
Problem: Adult Inpatient Plan of Care  Goal: Plan of Care Review  Outcome: Ongoing, Progressing  Goal: Patient-Specific Goal (Individualized)  Outcome: Ongoing, Progressing  Goal: Absence of Hospital-Acquired Illness or Injury  Outcome: Ongoing, Progressing  Goal: Optimal Comfort and Wellbeing  Outcome: Ongoing, Progressing  Goal: Readiness for Transition of Care  Outcome: Ongoing, Progressing     Problem: Pain Acute  Goal: Acceptable Pain Control and Functional Ability  Outcome: Ongoing, Progressing     Problem: Fall Injury Risk  Goal: Absence of Fall and Fall-Related Injury  Outcome: Ongoing, Progressing     Problem: Infection  Goal: Absence of Infection Signs and Symptoms  Outcome: Ongoing, Progressing

## 2022-05-11 NOTE — TELEPHONE ENCOUNTER
----- Message from Jessy Brantley sent at 5/11/2022  1:51 PM CDT -----  Regarding: wife [phone]  Type:  Sooner Appointment Request    Patient is requesting a sooner appointment.  Patient declined first available appointment listed as well as another facility and provider .  Patient will not accept being placed on the waitlist and is requesting a message be sent to doctor.    Name of Caller:wife    When is the first available appointment?  6/21    Symptoms:  patient is getting discharged from the hospital for kidney stones.    Would the patient rather a call back or a response via [facility]?  Call back    Best Call Back Number: [phone]

## 2022-05-11 NOTE — PROGRESS NOTES
Patient discharged from hospital before DARLENEW was able to complete initial visit.    MILLIE Stone

## 2022-05-12 ENCOUNTER — PATIENT OUTREACH (OUTPATIENT)
Dept: ADMINISTRATIVE | Facility: CLINIC | Age: 62
End: 2022-05-12
Payer: MEDICARE

## 2022-05-12 ENCOUNTER — PATIENT MESSAGE (OUTPATIENT)
Dept: ADMINISTRATIVE | Facility: CLINIC | Age: 62
End: 2022-05-12
Payer: MEDICARE

## 2022-05-12 NOTE — NURSING
Discharge paperwork reviewed with patient and wife, both verbalized understanding. Patient IV removed tip intact pressure applied. Meds received from pharmacy. Patient discharged via wheelchair

## 2022-05-12 NOTE — PROGRESS NOTES
C3 nurse spoke with Blake Guillory's wife Rhian for a TCC post hospital discharge follow up call. The patient has a scheduled HOSFU appointment with Varun Zeng MD. Wife did not know date and time of appointment off hand. Advised that pt should be seen for hosfu within 7 days.

## 2022-05-12 NOTE — PROGRESS NOTES
C3 nurse attempted to contact Blake Guillory for a TCC post hospital discharge follow up call. No answer. Left voicemail with callback information. The patient does not have a scheduled HOSFU appointment. Message sent to PCP staff for assistance with scheduling visit with patient.

## 2022-05-13 NOTE — ASSESSMENT & PLAN NOTE
Creatinine- 3, baseline appears to be 2; likely due to bilateral obstruction    Avoid nephrotoxics  IVF  Improved from admit, back  to baseline

## 2022-05-13 NOTE — DISCHARGE SUMMARY
[facility] Medicine  Discharge Summary      Patient Name: Blake Guillory  MRN: [id number]  Patient Class: IP- Inpatient  Admission Date: 5/10/2022  Hospital Length of Stay: 1 days  Discharge Date and Time: 5/11/2022  2:27 PM  Attending Physician: No att. providers found   Discharging Provider: Ольга Elena MD  Primary Care Provider: Varun Zeng MD      HPI:   The patient is a 62M with a past medical history of CAD, chronic kidney disease IIIb, COPD on Home O2 4L, hypertension, hyperparathyroidism, hypertriglyceridemia, hypothyroidism, history of kidney stones, and vitamin-D deficiency who presented for evaluation of R flank pain.  Patient describes a constant, sharp right flank pain without radiation that started approximately 3 hours prior to arrival. Patient rates his current pain a 10/10. Movement and  palpation makes his pain worse.  Nothing makes his pain better.  Patient has had multiple previous episodes of kidney stones requiring urological intervention.  Patient also has associated nausea without vomiting and multiple episodes of watery, non foul-smelling, nonbloody diarrhea.  Patient with R sided CVA tenderness, no distress noted.  Labs notable for INDIO and U/A with 3+ occult blood and pyuria. CT renal stone study with severe right hydronephrosis and hydroureter. Two obstructing calculus are in the distal right ureter, with the more proximal measuring approximately 5-6 mm in the slightly more distal measuring approximately 4 mm.  Additionally noted moderate left-sided hydronephrosis or hydroureter. Questioned 1.5-2 mm calculus distal left ureter.  The patient was transferred for further management of his bilateral obstructing kidney stones and Urology consult.      Procedure(s) (LRB):  REPLACEMENT, NEPHROSTOMY TUBE, PERCUTANEOUS (Bilateral)      Hospital Course:   Urology was consulted.Patient had bilateral nephrostomy tubes and will follow up with urology outpatient  for stone management.Don improved with ivf.Urine cx grew gram negative rods, patient was eager to go home.He was discharged home with abx.Wife denies the need for home health on dc.       Goals of Care Treatment Preferences:  Code Status: Full Code      Consults:   Consults (From admission, onward)        Status Ordering Provider     Inpatient consult to Interventional Radiology  Once        Provider:  (Not yet assigned)    JONO Concepcion     Inpatient consult to Urology  Once        Provider:  Sg Olvera MD    Completed SULMA LEHMAN          * Hydronephrosis with urinary obstruction due to ureteral calculus  CT- Severe right hydronephrosis and hydroureter. Two obstructing calculus are in the distal right ureter, with the more proximal measuring approximately 5-6 mm in the slightly more distal measuring approximately 4 mm.  Additionally noted moderate left-sided hydronephrosis or hydroureter. Questioned 1.5-2 mm calculus distal left ureter.    S/p bilateral nephrostomy tubes  On rocephin      Chronic respiratory failure with hypoxia  Appears at baseline, on home O2 at 4 liters  stable      Acute kidney injury superimposed on CKD  Creatinine- 3, baseline appears to be 2; likely due to bilateral obstruction    Avoid nephrotoxics  IVF  Improved from admit, back  to baseline      Essential hypertension  Normotensive currently    Continue amlodipine, atenolol        Final Active Diagnoses:    Diagnosis Date Noted POA    PRINCIPAL PROBLEM:  Hydronephrosis with urinary obstruction due to ureteral calculus [N13.2] 01/22/2018 Yes    Chronic respiratory failure with hypoxia [J96.11] 05/10/2022 Yes    Acute kidney injury superimposed on CKD [N17.9, N18.9] 03/22/2018 Yes    Acquired hypothyroidism [E03.9] 10/21/2016 Yes     Chronic    Essential hypertension [I10] 01/02/2016 Yes    Hydronephrosis, bilateral [N13.30] 06/19/2015 Yes     Chronic      Problems Resolved During this Admission:        Discharged Condition: stable    Disposition: Home or Self Care    Follow Up:   Follow-up Information     W Vikash Bo MD Follow up.    Specialty: Urology  Why: to discuss kidney stone surgery  Contact information:  [address]  [address]  [place] [address] [phone]             Varun Zeng MD Follow up in 2 week(s).    Specialties: Internal Medicine, Physical Medicine and Rehabilitation  Contact information:  [address] [place] [address] [phone]                       Patient Instructions:      Diet renal     Activity as tolerated       Significant Diagnostic Studies:   Lab Results   Component Value Date    WBC 7.02 05/11/2022    HGB 13.9 (L) 05/11/2022    HCT 42.6 05/11/2022     (H) 05/11/2022     (L) 05/11/2022       BMP  Lab Results   Component Value Date     05/11/2022    K 4.4 05/11/2022     05/11/2022    CO2 23 05/11/2022    BUN 39 (H) 05/11/2022    CREATININE 2.1 (H) 05/11/2022    CALCIUM 8.6 (L) 05/11/2022    ANIONGAP 11 05/11/2022    ESTGFRAFRICA 37 (A) 05/11/2022    EGFRNONAA 32 (A) 05/11/2022        EXAMINATION:  CT RENAL STONE STUDY ABD PELVIS WO     CLINICAL HISTORY:  Nephrolithiasis, symptomatic/complicated;     TECHNIQUE:  Low dose axial images, sagittal and coronal reformations were obtained from the lung bases to the pubic symphysis.  Contrast was not administered.     COMPARISON:  CT of the abdomen and pelvis performed 04/13/2021.  Retroperitoneal ultrasound examination performed 07/08/2021.     FINDINGS:  Evaluation of the solid organs is limited due to lack of intravenous contrast.     Lower chest: Coronary artery and valvular calcifications.  Assessment of the lungs is markedly limited due to respiratory motion.  Areas of atelectasis and/or scar suggested.     URINARY COLLECTING SYSTEM:     Redemonstrated remote operative change of cystectomy and right lower quadrant ileal conduit.     Severe right hydronephrosis and hydroureter.   Two obstructing calculus are in the distal right ureter, with the more proximal measuring approximately 5-6 mm in the slightly more distal measuring approximately 4 mm.     There is additionally noted moderate left-sided hydronephrosis or hydroureter.  Questioned 1.5-2 mm calculus distal left ureter (coronal reformat series 4, image 73).     Kidneys are again noted to be small in demonstrate multifocal areas of cortical thinning, which may represent sequela of chronic medical renal disease and scarring.  Low-attenuation cortical renal lesions may represent cysts.  Nonobstructing bilateral renal calculi are noted.     Liver: normal contour     Gallbladder and bile ducts: Gallbladder surgically absent.  No concerning biliary dilatation.     Pancreas: Normal contour.     Spleen: Normal contour.     Adrenals: Normal contour.     Lymph nodes: No abdominal or pelvic lymphadenopathy.     Bowel and mesentery: No definite evidence of acute bowel obstruction.  Operative changes of prior partial small bowel obstruction.  Residual small bowel normal caliber.  Colon demonstrates no significant abnormalities.  Appendix suspected surgically absent.  No obstruction or acute inflammatory change.     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  Abdominal aorta tapers normally with extensive atherosclerotic calcification, extending to the vessels.     Inferior vena cava: Unremarkable.     Free fluid or free air: None.     Pelvis: Surgical clips noted within bilateral pelvic sidewalls, which may relate to lymphadenectomy.  No new or enlarging abdominopelvic lymph nodes.  No focal enteric masses.  No omental or peritoneal implants are suggested by noncontrast CT     Body wall: Ileal conduit, as above..  Operative changes of right lower quadrant parastomal and bilateral inguinal hernia repairs.     Bones: No acute change.  Degenerative findings  are again noted involving the spine and hips.     Impression:     1. Severe right hydronephrosis and hydroureter. Two obstructing calculus are in the distal right ureter, with the more proximal measuring approximately 5-6 mm in the slightly more distal measuring approximately 4 mm.  2. Additionally noted moderate left-sided hydronephrosis or hydroureter. Questioned 1.5-2 mm calculus distal left ureter.  3. Additional details of stable chronic and incidental findings, as provided in the body of report.      Pending Diagnostic Studies:     None         Medications:  Reconciled Home Medications:      Medication List      START taking these medications    ciprofloxacin HCl 500 MG tablet  Commonly known as: CIPRO  Take 1 tablet (500 mg total) by mouth once daily. for 12 days     oxyCODONE-acetaminophen 5-325 mg per tablet  Commonly known as: PERCOCET  Take 1 tablet by mouth every 8 (eight) hours as needed for Pain.        CONTINUE taking these medications    albuterol 90 mcg/actuation inhaler  Commonly known as: PROAIR HFA  Inhale 2 puffs into the lungs every 6 (six) hours as needed for Wheezing. Rescue     * albuterol-ipratropium 2.5 mg-0.5 mg/3 mL nebulizer solution  Commonly known as: DUO-NEB  Take 3 mLs by nebulization every 6 (six) hours as needed for Wheezing. Rescue     * ipratropium-albuteroL  mcg/actuation inhaler  Commonly known as: CombiVENT  Inhale 1 puff into the lungs 4 (four) times daily. Rescue     allopurinoL 100 MG tablet  Commonly known as: ZYLOPRIM  Take 1 tablet (100 mg total) by mouth once daily.     amLODIPine 10 MG tablet  Commonly known as: NORVASC  Take 1 tablet (10 mg total) by mouth once daily.     aspirin 81 MG EC tablet  Commonly known as: ECOTRIN  Take 81 mg by mouth once daily.     atenoloL 50 MG tablet  Commonly known as: TENORMIN  TAKE 1 TABLET(50 MG) BY MOUTH EVERY DAY     clobetasoL 0.05 % external solution  Commonly known as: TEMOVATE  Pt to mix in 1 jar of cerave cream and  apply to affected areas bid     ergocalciferol 50,000 unit Cap  Commonly known as: VITAMIN D2  Take 1 capsule (50,000 Units total) by mouth every 7 days.     EScitalopram oxalate 20 MG tablet  Commonly known as: LEXAPRO  Take 1 tablet by mouth once daily     fenofibrate 145 MG tablet  Commonly known as: TRICOR  Take 1 tablet (145 mg total) by mouth once daily.     fluticasone-umeclidin-vilanter 100-62.5-25 mcg Dsdv  Commonly known as: TRELEGY ELLIPTA  Inhale 1 puff into the lungs once daily. Rinse mouth after use.     gabapentin 400 MG capsule  Commonly known as: NEURONTIN  Take 1 capsule (400 mg total) by mouth 2 (two) times daily.     levothyroxine 88 MCG tablet  Commonly known as: SYNTHROID  Take 1 tablet (88 mcg total) by mouth once daily.     multivitamin per tablet  Commonly known as: THERAGRAN  Take 1 tablet by mouth once daily.     pantoprazole 40 MG tablet  Commonly known as: PROTONIX  Take 1 tablet (40 mg total) by mouth once daily.     sodium bicarbonate 325 MG tablet  Take 325 mg by mouth 2 (two) times daily.     sucralfate 1 gram tablet  Commonly known as: CARAFATE  Take 1 tablet (1 g total) by mouth 4 (four) times daily.     traZODone 150 MG tablet  Commonly known as: DESYREL  Take 1 tablet (150 mg total) by mouth every evening.     STOP taking these medications    calcitRIOL 0.5 MCG Cap  Commonly known as: ROCALTROL     HYDROcodone-acetaminophen  mg per tablet  Commonly known as: NORCO            Indwelling Lines/Drains at time of discharge:   Lines/Drains/Airways     Drain  Duration                Urostomy RLQ -- days         Ureteral Drain/Stent 01/02/18 1156 Right ureter 8 Fr. 1592 days         Urostomy 06/17/20 1055 695 days         Nephrostomy 07/22/20 0855 Left 24 Fr. 660 days         Nephrostomy 05/10/22 1448 Right 8 Fr. 3 days         Nephrostomy 05/10/22 1501 Left 8 Fr. 3 days                Time spent on the discharge of patient: 35 minutes         Ольга Elena MD  Department of  [facility] - Med Surg ([facility])

## 2022-05-13 NOTE — ASSESSMENT & PLAN NOTE
CT- Severe right hydronephrosis and hydroureter. Two obstructing calculus are in the distal right ureter, with the more proximal measuring approximately 5-6 mm in the slightly more distal measuring approximately 4 mm.  Additionally noted moderate left-sided hydronephrosis or hydroureter. Questioned 1.5-2 mm calculus distal left ureter.    S/p bilateral nephrostomy tubes  On rocephin

## 2022-05-13 NOTE — HOSPITAL COURSE
Urology was consulted.Patient had bilateral nephrostomy tubes and will follow up with urology outpatient for stone management.Don improved with ivf.Urine cx grew gram negative rods, patient was eager to go home.He was discharged home with abx.Wife denies the need for home health on dc.

## 2022-05-16 ENCOUNTER — OFFICE VISIT (OUTPATIENT)
Dept: UROLOGY | Facility: CLINIC | Age: 62
End: 2022-05-16
Payer: MEDICARE

## 2022-05-16 VITALS — WEIGHT: 171.31 LBS | BODY MASS INDEX: 23.23 KG/M2

## 2022-05-16 DIAGNOSIS — Z85.51 H/O PRIMARY MALIGNANT NEOPLASM OF URINARY BLADDER: ICD-10-CM

## 2022-05-16 DIAGNOSIS — N13.30 HYDRONEPHROSIS, UNSPECIFIED HYDRONEPHROSIS TYPE: ICD-10-CM

## 2022-05-16 DIAGNOSIS — N20.1 URETERAL STONE: Primary | ICD-10-CM

## 2022-05-16 PROCEDURE — 99215 OFFICE O/P EST HI 40 MIN: CPT | Mod: PBBFAC | Performed by: UROLOGY

## 2022-05-16 PROCEDURE — 99999 PR PBB SHADOW E&M-EST. PATIENT-LVL V: CPT | Mod: PBBFAC,,, | Performed by: UROLOGY

## 2022-05-16 PROCEDURE — 99214 OFFICE O/P EST MOD 30 MIN: CPT | Mod: S$PBB,,, | Performed by: UROLOGY

## 2022-05-16 PROCEDURE — 99214 PR OFFICE/OUTPT VISIT, EST, LEVL IV, 30-39 MIN: ICD-10-PCS | Mod: S$PBB,,, | Performed by: UROLOGY

## 2022-05-16 PROCEDURE — 99999 PR PBB SHADOW E&M-EST. PATIENT-LVL V: ICD-10-PCS | Mod: PBBFAC,,, | Performed by: UROLOGY

## 2022-05-16 RX ORDER — HYDROCODONE BITARTRATE AND ACETAMINOPHEN 10; 325 MG/1; MG/1
1 TABLET ORAL EVERY 6 HOURS PRN
Qty: 28 TABLET | Refills: 0 | Status: ON HOLD | OUTPATIENT
Start: 2022-05-16 | End: 2022-06-09 | Stop reason: SDUPTHER

## 2022-05-16 NOTE — PROGRESS NOTES
Subjective:       Patient ID: Blake Guillory is a 62 y.o. male The patient's last visit with me was on 9/13/2021.     Chief Complaint:   Chief Complaint   Patient presents with    Other     Procedure discussion        History of Present Illness  Kidney Stones  He has had PCNL and ESWL in the past.  His most recent procedure was on 6/7/2021.  Left ESWL.    9/13/2021  He has had some abdominal and chest pain.  He had gall bladder surgery recently    05/16/2022  He went to the hospital recently due to flank pain R > L.  He was found to have bilateral ureteral stones in his distal ureters, just proximal to his conduit.  He had bilateral nephrostomy tubes placed.  He is here for evaluation and to set up PCNL.  He is having pain from the tubes.       History of Bladder Cancer  He has a history of bladder cancer. He is s/p cystectomy with ileal conduit in November 2012. From an oncologic standpoint, he is doing well. He has had issues with bilateral ureteral stricture. He underwent a bilateral ureteral reimplantation into his conduit several months later. His strictures recurred shortly afterwards.  He was managed with ureteral stent changes until one of the stent eroded through his conduit and into his bowels.    He had an Exploratory Laparotomy with replacement of ileal conduit and small bowel anastomosis on 12/30/2015.     He had a parastomal hernia repair in April by Dr. Mack.  He is healing nicely from that and has been discharged from his office.  He had a small bowel obstruction. that resolved.        He continues to have back pain in the morning and in the evening.  His stoma is not bothering him and he tells me that he has good urine output during the day and less at night.  He has worsening renal function and his right sided pain is becoming worse.       He had a right PCN placed by IR on 1/2/2018.  He then had a percutaneous right ureteral dilation and stent placement.  He noted increased urine output  afterwards.  He still has pain and has noted significant pain in the right testicle.  He denies swelling or redness to the testicle.     He had his stent removed on 3/9/2018.  He is feeling okay, his chronic pain is about the same.      ACTIVE MEDICAL ISSUES:  Patient Active Problem List   Diagnosis    Insomnia    Lumbar facet arthropathy    Sacroiliitis    Spondylosis without myelopathy    DDD (degenerative disc disease)    History of bladder cancer    Hydronephrosis, bilateral    H/O primary malignant neoplasm of urinary bladder    Essential hypertension    Anemia of chronic disease    Moderate protein malnutrition    Chronic gout    Renal insufficiency    Body mass index (BMI) 20.0-20.9, adult    Personal history of bladder cancer    Acquired hypothyroidism    Hypoxemia    Severe malnutrition    Hydronephrosis    History of primary malignant neoplasm of urinary bladder    Hydronephrosis with urinary obstruction due to ureteral calculus    Chest pain of uncertain etiology    Obstructive chronic bronchitis without exacerbation    Epigastric abdominal tenderness without rebound tenderness    Acute kidney injury superimposed on CKD    S/P colonoscopy    Iliac artery aneurysm    Kidney stones    Hypertriglyceridemia    Nephrolithiasis    Acute respiratory failure with hypoxia    Kidney stone on left side    Hyperparathyroidism    Encounter for screening for malignant neoplasm of respiratory organs    Personal history of nicotine dependence    Lung nodule    Coronary artery calcification    Symptomatic cholelithiasis    Chronic respiratory failure with hypoxia       ALLERGIES AND MEDICATIONS: updated and reviewed.  Review of patient's allergies indicates:  No Known Allergies  Current Outpatient Medications   Medication Sig    albuterol (PROAIR HFA) 90 mcg/actuation inhaler Inhale 2 puffs into the lungs every 6 (six) hours as needed for Wheezing. Rescue    albuterol-ipratropium  (DUO-NEB) 2.5 mg-0.5 mg/3 mL nebulizer solution Take 3 mLs by nebulization every 6 (six) hours as needed for Wheezing. Rescue    allopurinoL (ZYLOPRIM) 100 MG tablet Take 1 tablet (100 mg total) by mouth once daily.    amLODIPine (NORVASC) 10 MG tablet Take 1 tablet (10 mg total) by mouth once daily.    aspirin (ECOTRIN) 81 MG EC tablet Take 81 mg by mouth once daily.    atenoloL (TENORMIN) 50 MG tablet TAKE 1 TABLET(50 MG) BY MOUTH EVERY DAY    ciprofloxacin HCl (CIPRO) 500 MG tablet Take 1 tablet (500 mg total) by mouth once daily. for 12 days    clobetasoL (TEMOVATE) 0.05 % external solution Pt to mix in 1 jar of cerave cream and apply to affected areas bid    ergocalciferol (VITAMIN D2) 50,000 unit Cap Take 1 capsule (50,000 Units total) by mouth every 7 days.    EScitalopram oxalate (LEXAPRO) 20 MG tablet Take 1 tablet by mouth once daily    fenofibrate (TRICOR) 145 MG tablet Take 1 tablet (145 mg total) by mouth once daily.    fluticasone-umeclidin-vilanter (TRELEGY ELLIPTA) 100-62.5-25 mcg DsDv Inhale 1 puff into the lungs once daily. Rinse mouth after use.    gabapentin (NEURONTIN) 400 MG capsule Take 1 capsule (400 mg total) by mouth 2 (two) times daily.    ipratropium-albuteroL (COMBIVENT)  mcg/actuation inhaler Inhale 1 puff into the lungs 4 (four) times daily. Rescue    levothyroxine (SYNTHROID) 88 MCG tablet Take 1 tablet (88 mcg total) by mouth once daily.    multivitamin (THERAGRAN) per tablet Take 1 tablet by mouth once daily.    pantoprazole (PROTONIX) 40 MG tablet Take 1 tablet (40 mg total) by mouth once daily.    sodium bicarbonate 325 MG tablet Take 325 mg by mouth 2 (two) times daily.    sucralfate (CARAFATE) 1 gram tablet Take 1 tablet (1 g total) by mouth 4 (four) times daily.    traZODone (DESYREL) 150 MG tablet Take 1 tablet (150 mg total) by mouth every evening.    HYDROcodone-acetaminophen (NORCO)  mg per tablet Take 1 tablet by mouth every 6 (six) hours as  needed for Pain.     No current facility-administered medications for this visit.       Review of Systems   Constitutional: Negative for activity change, fatigue, fever and unexpected weight change.   HENT: Negative for congestion.    Eyes: Negative for redness.   Respiratory: Negative for chest tightness and shortness of breath.    Cardiovascular: Negative for chest pain and leg swelling.   Gastrointestinal: Negative for abdominal pain, constipation, diarrhea, nausea and vomiting.   Genitourinary: Negative for dysuria, flank pain, frequency, hematuria, penile pain, penile swelling, scrotal swelling, testicular pain and urgency.   Musculoskeletal: Negative for arthralgias and back pain.   Neurological: Negative for dizziness and light-headedness.   Psychiatric/Behavioral: Negative for behavioral problems and confusion. The patient is not nervous/anxious.    All other systems reviewed and are negative.      Objective:      Vitals:    05/16/22 1602   Weight: 77.7 kg (171 lb 4.8 oz)     Physical Exam  Vitals and nursing note reviewed.   Constitutional:       Appearance: He is well-developed.   HENT:      Head: Normocephalic.   Eyes:      Conjunctiva/sclera: Conjunctivae normal.   Neck:      Thyroid: No thyromegaly.      Trachea: No tracheal deviation.   Cardiovascular:      Rate and Rhythm: Normal rate.      Heart sounds: Normal heart sounds.   Pulmonary:      Effort: Pulmonary effort is normal. No respiratory distress.      Breath sounds: Normal breath sounds. No wheezing.   Abdominal:      General: Bowel sounds are normal.      Palpations: Abdomen is soft.      Tenderness: There is no abdominal tenderness. There is no rebound.      Hernia: No hernia is present.      Comments: Stoma pink   Musculoskeletal:         General: No tenderness. Normal range of motion.      Cervical back: Normal range of motion and neck supple.   Lymphadenopathy:      Cervical: No cervical adenopathy.   Skin:     General: Skin is warm and  dry.      Findings: No erythema or rash.   Neurological:      Mental Status: He is alert and oriented to person, place, and time.   Psychiatric:         Behavior: Behavior normal.         Thought Content: Thought content normal.         Judgment: Judgment normal.         Urine dipstick shows .  Micro exam: .         CT Renal Stone Study ABD Pelvis WO  Order: [id number]   Status: Final result     Visible to patient: Yes (not seen)     Next appt: Today at 03:45 PM in Urology (SHIN Bo MD)     0 Result Notes    Details    Reading Physician Reading Date Result Priority   Edmond Rojas MD  [phone]  [phone] 5/9/2022 STAT     Narrative & Impression  EXAMINATION:  CT RENAL STONE STUDY ABD PELVIS WO     CLINICAL HISTORY:  Nephrolithiasis, symptomatic/complicated;     TECHNIQUE:  Low dose axial images, sagittal and coronal reformations were obtained from the lung bases to the pubic symphysis.  Contrast was not administered.     COMPARISON:  CT of the abdomen and pelvis performed 04/13/2021.  Retroperitoneal ultrasound examination performed 07/08/2021.     FINDINGS:  Evaluation of the solid organs is limited due to lack of intravenous contrast.     Lower chest: Coronary artery and valvular calcifications.  Assessment of the lungs is markedly limited due to respiratory motion.  Areas of atelectasis and/or scar suggested.     URINARY COLLECTING SYSTEM:     Redemonstrated remote operative change of cystectomy and right lower quadrant ileal conduit.     Severe right hydronephrosis and hydroureter.  Two obstructing calculus are in the distal right ureter, with the more proximal measuring approximately 5-6 mm in the slightly more distal measuring approximately 4 mm.     There is additionally noted moderate left-sided hydronephrosis or hydroureter.  Questioned 1.5-2 mm calculus distal left ureter (coronal reformat series 4, image 73).     Kidneys are again noted to be small in demonstrate multifocal areas of  cortical thinning, which may represent sequela of chronic medical renal disease and scarring.  Low-attenuation cortical renal lesions may represent cysts.  Nonobstructing bilateral renal calculi are noted.     Liver: normal contour     Gallbladder and bile ducts: Gallbladder surgically absent.  No concerning biliary dilatation.     Pancreas: Normal contour.     Spleen: Normal contour.     Adrenals: Normal contour.     Lymph nodes: No abdominal or pelvic lymphadenopathy.     Bowel and mesentery: No definite evidence of acute bowel obstruction.  Operative changes of prior partial small bowel obstruction.  Residual small bowel normal caliber.  Colon demonstrates no significant abnormalities.  Appendix suspected surgically absent.  No obstruction or acute inflammatory change.     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  Abdominal aorta tapers normally with extensive atherosclerotic calcification, extending to the vessels.     Inferior vena cava: Unremarkable.     Free fluid or free air: None.     Pelvis: Surgical clips noted within bilateral pelvic sidewalls, which may relate to lymphadenectomy.  No new or enlarging abdominopelvic lymph nodes.  No focal enteric masses.  No omental or peritoneal implants are suggested by noncontrast CT     Body wall: Ileal conduit, as above..  Operative changes of right lower quadrant parastomal and bilateral inguinal hernia repairs.     Bones: No acute change.  Degenerative findings are again noted involving the spine and hips.     Impression:     1. Severe right hydronephrosis and hydroureter. Two obstructing calculus are in the distal right ureter, with the more proximal measuring approximately 5-6 mm in the slightly more distal measuring approximately 4 mm.  2. Additionally noted moderate left-sided hydronephrosis or hydroureter. Questioned 1.5-2 mm calculus distal left ureter.  3.  Additional details of stable chronic and incidental findings, as provided in the body of report.        Electronically signed by: Edmond Rojas  Date:                                            05/09/2022  Time:                                           22:38         Assessment:       1. Ureteral stone    2. H/O primary malignant neoplasm of urinary bladder    3. Hydronephrosis, unspecified hydronephrosis type          Plan:       1. Ureteral stone  Bilateral PCNL on Wednesday 6/8/2022.  Will plan on bilateral procedure as long as significant dilation is not needed.  Hopefully will be able to only place PCN that can be removed, he may need stents afterwards.    [place] 10    2. H/O primary malignant neoplasm of urinary bladder  Recent CT without evidence of recurrence    3. Hydronephrosis, unspecified hydronephrosis type  Secondary to stones           Follow up in about 6 weeks (around 6/27/2022) for Follow up.

## 2022-05-16 NOTE — H&P (VIEW-ONLY)
Subjective:       Patient ID: Blake Guillory is a 62 y.o. male The patient's last visit with me was on 9/13/2021.     Chief Complaint:   Chief Complaint   Patient presents with    Other     Procedure discussion        History of Present Illness  Kidney Stones  He has had PCNL and ESWL in the past.  His most recent procedure was on 6/7/2021.  Left ESWL.    9/13/2021  He has had some abdominal and chest pain.  He had gall bladder surgery recently    05/16/2022  He went to the hospital recently due to flank pain R > L.  He was found to have bilateral ureteral stones in his distal ureters, just proximal to his conduit.  He had bilateral nephrostomy tubes placed.  He is here for evaluation and to set up PCNL.  He is having pain from the tubes.       History of Bladder Cancer  He has a history of bladder cancer. He is s/p cystectomy with ileal conduit in November 2012. From an oncologic standpoint, he is doing well. He has had issues with bilateral ureteral stricture. He underwent a bilateral ureteral reimplantation into his conduit several months later. His strictures recurred shortly afterwards.  He was managed with ureteral stent changes until one of the stent eroded through his conduit and into his bowels.    He had an Exploratory Laparotomy with replacement of ileal conduit and small bowel anastomosis on 12/30/2015.     He had a parastomal hernia repair in April by Dr. Mack.  He is healing nicely from that and has been discharged from his office.  He had a small bowel obstruction. that resolved.        He continues to have back pain in the morning and in the evening.  His stoma is not bothering him and he tells me that he has good urine output during the day and less at night.  He has worsening renal function and his right sided pain is becoming worse.       He had a right PCN placed by IR on 1/2/2018.  He then had a percutaneous right ureteral dilation and stent placement.  He noted increased urine output  afterwards.  He still has pain and has noted significant pain in the right testicle.  He denies swelling or redness to the testicle.     He had his stent removed on 3/9/2018.  He is feeling okay, his chronic pain is about the same.      ACTIVE MEDICAL ISSUES:  Patient Active Problem List   Diagnosis    Insomnia    Lumbar facet arthropathy    Sacroiliitis    Spondylosis without myelopathy    DDD (degenerative disc disease)    History of bladder cancer    Hydronephrosis, bilateral    H/O primary malignant neoplasm of urinary bladder    Essential hypertension    Anemia of chronic disease    Moderate protein malnutrition    Chronic gout    Renal insufficiency    Body mass index (BMI) 20.0-20.9, adult    Personal history of bladder cancer    Acquired hypothyroidism    Hypoxemia    Severe malnutrition    Hydronephrosis    History of primary malignant neoplasm of urinary bladder    Hydronephrosis with urinary obstruction due to ureteral calculus    Chest pain of uncertain etiology    Obstructive chronic bronchitis without exacerbation    Epigastric abdominal tenderness without rebound tenderness    Acute kidney injury superimposed on CKD    S/P colonoscopy    Iliac artery aneurysm    Kidney stones    Hypertriglyceridemia    Nephrolithiasis    Acute respiratory failure with hypoxia    Kidney stone on left side    Hyperparathyroidism    Encounter for screening for malignant neoplasm of respiratory organs    Personal history of nicotine dependence    Lung nodule    Coronary artery calcification    Symptomatic cholelithiasis    Chronic respiratory failure with hypoxia       ALLERGIES AND MEDICATIONS: updated and reviewed.  Review of patient's allergies indicates:  No Known Allergies  Current Outpatient Medications   Medication Sig    albuterol (PROAIR HFA) 90 mcg/actuation inhaler Inhale 2 puffs into the lungs every 6 (six) hours as needed for Wheezing. Rescue    albuterol-ipratropium  (DUO-NEB) 2.5 mg-0.5 mg/3 mL nebulizer solution Take 3 mLs by nebulization every 6 (six) hours as needed for Wheezing. Rescue    allopurinoL (ZYLOPRIM) 100 MG tablet Take 1 tablet (100 mg total) by mouth once daily.    amLODIPine (NORVASC) 10 MG tablet Take 1 tablet (10 mg total) by mouth once daily.    aspirin (ECOTRIN) 81 MG EC tablet Take 81 mg by mouth once daily.    atenoloL (TENORMIN) 50 MG tablet TAKE 1 TABLET(50 MG) BY MOUTH EVERY DAY    ciprofloxacin HCl (CIPRO) 500 MG tablet Take 1 tablet (500 mg total) by mouth once daily. for 12 days    clobetasoL (TEMOVATE) 0.05 % external solution Pt to mix in 1 jar of cerave cream and apply to affected areas bid    ergocalciferol (VITAMIN D2) 50,000 unit Cap Take 1 capsule (50,000 Units total) by mouth every 7 days.    EScitalopram oxalate (LEXAPRO) 20 MG tablet Take 1 tablet by mouth once daily    fenofibrate (TRICOR) 145 MG tablet Take 1 tablet (145 mg total) by mouth once daily.    fluticasone-umeclidin-vilanter (TRELEGY ELLIPTA) 100-62.5-25 mcg DsDv Inhale 1 puff into the lungs once daily. Rinse mouth after use.    gabapentin (NEURONTIN) 400 MG capsule Take 1 capsule (400 mg total) by mouth 2 (two) times daily.    ipratropium-albuteroL (COMBIVENT)  mcg/actuation inhaler Inhale 1 puff into the lungs 4 (four) times daily. Rescue    levothyroxine (SYNTHROID) 88 MCG tablet Take 1 tablet (88 mcg total) by mouth once daily.    multivitamin (THERAGRAN) per tablet Take 1 tablet by mouth once daily.    pantoprazole (PROTONIX) 40 MG tablet Take 1 tablet (40 mg total) by mouth once daily.    sodium bicarbonate 325 MG tablet Take 325 mg by mouth 2 (two) times daily.    sucralfate (CARAFATE) 1 gram tablet Take 1 tablet (1 g total) by mouth 4 (four) times daily.    traZODone (DESYREL) 150 MG tablet Take 1 tablet (150 mg total) by mouth every evening.    HYDROcodone-acetaminophen (NORCO)  mg per tablet Take 1 tablet by mouth every 6 (six) hours as  needed for Pain.     No current facility-administered medications for this visit.       Review of Systems   Constitutional: Negative for activity change, fatigue, fever and unexpected weight change.   HENT: Negative for congestion.    Eyes: Negative for redness.   Respiratory: Negative for chest tightness and shortness of breath.    Cardiovascular: Negative for chest pain and leg swelling.   Gastrointestinal: Negative for abdominal pain, constipation, diarrhea, nausea and vomiting.   Genitourinary: Negative for dysuria, flank pain, frequency, hematuria, penile pain, penile swelling, scrotal swelling, testicular pain and urgency.   Musculoskeletal: Negative for arthralgias and back pain.   Neurological: Negative for dizziness and light-headedness.   Psychiatric/Behavioral: Negative for behavioral problems and confusion. The patient is not nervous/anxious.    All other systems reviewed and are negative.      Objective:      Vitals:    05/16/22 1602   Weight: 77.7 kg (171 lb 4.8 oz)     Physical Exam  Vitals and nursing note reviewed.   Constitutional:       Appearance: He is well-developed.   HENT:      Head: Normocephalic.   Eyes:      Conjunctiva/sclera: Conjunctivae normal.   Neck:      Thyroid: No thyromegaly.      Trachea: No tracheal deviation.   Cardiovascular:      Rate and Rhythm: Normal rate.      Heart sounds: Normal heart sounds.   Pulmonary:      Effort: Pulmonary effort is normal. No respiratory distress.      Breath sounds: Normal breath sounds. No wheezing.   Abdominal:      General: Bowel sounds are normal.      Palpations: Abdomen is soft.      Tenderness: There is no abdominal tenderness. There is no rebound.      Hernia: No hernia is present.      Comments: Stoma pink   Musculoskeletal:         General: No tenderness. Normal range of motion.      Cervical back: Normal range of motion and neck supple.   Lymphadenopathy:      Cervical: No cervical adenopathy.   Skin:     General: Skin is warm and  dry.      Findings: No erythema or rash.   Neurological:      Mental Status: He is alert and oriented to person, place, and time.   Psychiatric:         Behavior: Behavior normal.         Thought Content: Thought content normal.         Judgment: Judgment normal.         Urine dipstick shows .  Micro exam: .         CT Renal Stone Study ABD Pelvis WO  Order: [id number]   Status: Final result     Visible to patient: Yes (not seen)     Next appt: Today at 03:45 PM in Urology (SHIN Bo MD)     0 Result Notes    Details    Reading Physician Reading Date Result Priority   Edmond Rojas MD  [phone]  [phone] 5/9/2022 STAT     Narrative & Impression  EXAMINATION:  CT RENAL STONE STUDY ABD PELVIS WO     CLINICAL HISTORY:  Nephrolithiasis, symptomatic/complicated;     TECHNIQUE:  Low dose axial images, sagittal and coronal reformations were obtained from the lung bases to the pubic symphysis.  Contrast was not administered.     COMPARISON:  CT of the abdomen and pelvis performed 04/13/2021.  Retroperitoneal ultrasound examination performed 07/08/2021.     FINDINGS:  Evaluation of the solid organs is limited due to lack of intravenous contrast.     Lower chest: Coronary artery and valvular calcifications.  Assessment of the lungs is markedly limited due to respiratory motion.  Areas of atelectasis and/or scar suggested.     URINARY COLLECTING SYSTEM:     Redemonstrated remote operative change of cystectomy and right lower quadrant ileal conduit.     Severe right hydronephrosis and hydroureter.  Two obstructing calculus are in the distal right ureter, with the more proximal measuring approximately 5-6 mm in the slightly more distal measuring approximately 4 mm.     There is additionally noted moderate left-sided hydronephrosis or hydroureter.  Questioned 1.5-2 mm calculus distal left ureter (coronal reformat series 4, image 73).     Kidneys are again noted to be small in demonstrate multifocal areas of  cortical thinning, which may represent sequela of chronic medical renal disease and scarring.  Low-attenuation cortical renal lesions may represent cysts.  Nonobstructing bilateral renal calculi are noted.     Liver: normal contour     Gallbladder and bile ducts: Gallbladder surgically absent.  No concerning biliary dilatation.     Pancreas: Normal contour.     Spleen: Normal contour.     Adrenals: Normal contour.     Lymph nodes: No abdominal or pelvic lymphadenopathy.     Bowel and mesentery: No definite evidence of acute bowel obstruction.  Operative changes of prior partial small bowel obstruction.  Residual small bowel normal caliber.  Colon demonstrates no significant abnormalities.  Appendix suspected surgically absent.  No obstruction or acute inflammatory change.     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  Abdominal aorta tapers normally with extensive atherosclerotic calcification, extending to the vessels.     Inferior vena cava: Unremarkable.     Free fluid or free air: None.     Pelvis: Surgical clips noted within bilateral pelvic sidewalls, which may relate to lymphadenectomy.  No new or enlarging abdominopelvic lymph nodes.  No focal enteric masses.  No omental or peritoneal implants are suggested by noncontrast CT     Body wall: Ileal conduit, as above..  Operative changes of right lower quadrant parastomal and bilateral inguinal hernia repairs.     Bones: No acute change.  Degenerative findings are again noted involving the spine and hips.     Impression:     1. Severe right hydronephrosis and hydroureter. Two obstructing calculus are in the distal right ureter, with the more proximal measuring approximately 5-6 mm in the slightly more distal measuring approximately 4 mm.  2. Additionally noted moderate left-sided hydronephrosis or hydroureter. Questioned 1.5-2 mm calculus distal left ureter.  3.  Additional details of stable chronic and incidental findings, as provided in the body of report.        Electronically signed by: Edmond Rojas  Date:                                            05/09/2022  Time:                                           22:38         Assessment:       1. Ureteral stone    2. H/O primary malignant neoplasm of urinary bladder    3. Hydronephrosis, unspecified hydronephrosis type          Plan:       1. Ureteral stone  Bilateral PCNL on Wednesday 6/8/2022.  Will plan on bilateral procedure as long as significant dilation is not needed.  Hopefully will be able to only place PCN that can be removed, he may need stents afterwards.    [place] 10    2. H/O primary malignant neoplasm of urinary bladder  Recent CT without evidence of recurrence    3. Hydronephrosis, unspecified hydronephrosis type  Secondary to stones           Follow up in about 6 weeks (around 6/27/2022) for Follow up.

## 2022-05-16 NOTE — H&P
Subjective:       Patient ID: Blake Guillory is a 62 y.o. male The patient's last visit with me was on 9/13/2021.     Chief Complaint:   Chief Complaint   Patient presents with    Other     Procedure discussion        History of Present Illness  Kidney Stones  He has had PCNL and ESWL in the past.  His most recent procedure was on 6/7/2021.  Left ESWL.    9/13/2021  He has had some abdominal and chest pain.  He had gall bladder surgery recently    05/16/2022  He went to the hospital recently due to flank pain R > L.  He was found to have bilateral ureteral stones in his distal ureters, just proximal to his conduit.  He had bilateral nephrostomy tubes placed.  He is here for evaluation and to set up PCNL.  He is having pain from the tubes.       History of Bladder Cancer  He has a history of bladder cancer. He is s/p cystectomy with ileal conduit in November 2012. From an oncologic standpoint, he is doing well. He has had issues with bilateral ureteral stricture. He underwent a bilateral ureteral reimplantation into his conduit several months later. His strictures recurred shortly afterwards.  He was managed with ureteral stent changes until one of the stent eroded through his conduit and into his bowels.    He had an Exploratory Laparotomy with replacement of ileal conduit and small bowel anastomosis on 12/30/2015.     He had a parastomal hernia repair in April by Dr. Mack.  He is healing nicely from that and has been discharged from his office.  He had a small bowel obstruction. that resolved.        He continues to have back pain in the morning and in the evening.  His stoma is not bothering him and he tells me that he has good urine output during the day and less at night.  He has worsening renal function and his right sided pain is becoming worse.       He had a right PCN placed by IR on 1/2/2018.  He then had a percutaneous right ureteral dilation and stent placement.  He noted increased urine output  afterwards.  He still has pain and has noted significant pain in the right testicle.  He denies swelling or redness to the testicle.     He had his stent removed on 3/9/2018.  He is feeling okay, his chronic pain is about the same.      ACTIVE MEDICAL ISSUES:  Patient Active Problem List   Diagnosis    Insomnia    Lumbar facet arthropathy    Sacroiliitis    Spondylosis without myelopathy    DDD (degenerative disc disease)    History of bladder cancer    Hydronephrosis, bilateral    H/O primary malignant neoplasm of urinary bladder    Essential hypertension    Anemia of chronic disease    Moderate protein malnutrition    Chronic gout    Renal insufficiency    Body mass index (BMI) 20.0-20.9, adult    Personal history of bladder cancer    Acquired hypothyroidism    Hypoxemia    Severe malnutrition    Hydronephrosis    History of primary malignant neoplasm of urinary bladder    Hydronephrosis with urinary obstruction due to ureteral calculus    Chest pain of uncertain etiology    Obstructive chronic bronchitis without exacerbation    Epigastric abdominal tenderness without rebound tenderness    Acute kidney injury superimposed on CKD    S/P colonoscopy    Iliac artery aneurysm    Kidney stones    Hypertriglyceridemia    Nephrolithiasis    Acute respiratory failure with hypoxia    Kidney stone on left side    Hyperparathyroidism    Encounter for screening for malignant neoplasm of respiratory organs    Personal history of nicotine dependence    Lung nodule    Coronary artery calcification    Symptomatic cholelithiasis    Chronic respiratory failure with hypoxia       ALLERGIES AND MEDICATIONS: updated and reviewed.  Review of patient's allergies indicates:  No Known Allergies  Current Outpatient Medications   Medication Sig    albuterol (PROAIR HFA) 90 mcg/actuation inhaler Inhale 2 puffs into the lungs every 6 (six) hours as needed for Wheezing. Rescue    albuterol-ipratropium  (DUO-NEB) 2.5 mg-0.5 mg/3 mL nebulizer solution Take 3 mLs by nebulization every 6 (six) hours as needed for Wheezing. Rescue    allopurinoL (ZYLOPRIM) 100 MG tablet Take 1 tablet (100 mg total) by mouth once daily.    amLODIPine (NORVASC) 10 MG tablet Take 1 tablet (10 mg total) by mouth once daily.    aspirin (ECOTRIN) 81 MG EC tablet Take 81 mg by mouth once daily.    atenoloL (TENORMIN) 50 MG tablet TAKE 1 TABLET(50 MG) BY MOUTH EVERY DAY    ciprofloxacin HCl (CIPRO) 500 MG tablet Take 1 tablet (500 mg total) by mouth once daily. for 12 days    clobetasoL (TEMOVATE) 0.05 % external solution Pt to mix in 1 jar of cerave cream and apply to affected areas bid    ergocalciferol (VITAMIN D2) 50,000 unit Cap Take 1 capsule (50,000 Units total) by mouth every 7 days.    EScitalopram oxalate (LEXAPRO) 20 MG tablet Take 1 tablet by mouth once daily    fenofibrate (TRICOR) 145 MG tablet Take 1 tablet (145 mg total) by mouth once daily.    fluticasone-umeclidin-vilanter (TRELEGY ELLIPTA) 100-62.5-25 mcg DsDv Inhale 1 puff into the lungs once daily. Rinse mouth after use.    gabapentin (NEURONTIN) 400 MG capsule Take 1 capsule (400 mg total) by mouth 2 (two) times daily.    ipratropium-albuteroL (COMBIVENT)  mcg/actuation inhaler Inhale 1 puff into the lungs 4 (four) times daily. Rescue    levothyroxine (SYNTHROID) 88 MCG tablet Take 1 tablet (88 mcg total) by mouth once daily.    multivitamin (THERAGRAN) per tablet Take 1 tablet by mouth once daily.    pantoprazole (PROTONIX) 40 MG tablet Take 1 tablet (40 mg total) by mouth once daily.    sodium bicarbonate 325 MG tablet Take 325 mg by mouth 2 (two) times daily.    sucralfate (CARAFATE) 1 gram tablet Take 1 tablet (1 g total) by mouth 4 (four) times daily.    traZODone (DESYREL) 150 MG tablet Take 1 tablet (150 mg total) by mouth every evening.    HYDROcodone-acetaminophen (NORCO)  mg per tablet Take 1 tablet by mouth every 6 (six) hours as  needed for Pain.     No current facility-administered medications for this visit.       Review of Systems   Constitutional: Negative for activity change, fatigue, fever and unexpected weight change.   HENT: Negative for congestion.    Eyes: Negative for redness.   Respiratory: Negative for chest tightness and shortness of breath.    Cardiovascular: Negative for chest pain and leg swelling.   Gastrointestinal: Negative for abdominal pain, constipation, diarrhea, nausea and vomiting.   Genitourinary: Negative for dysuria, flank pain, frequency, hematuria, penile pain, penile swelling, scrotal swelling, testicular pain and urgency.   Musculoskeletal: Negative for arthralgias and back pain.   Neurological: Negative for dizziness and light-headedness.   Psychiatric/Behavioral: Negative for behavioral problems and confusion. The patient is not nervous/anxious.    All other systems reviewed and are negative.      Objective:      Vitals:    05/16/22 1602   Weight: 77.7 kg (171 lb 4.8 oz)     Physical Exam  Vitals and nursing note reviewed.   Constitutional:       Appearance: He is well-developed.   HENT:      Head: Normocephalic.   Eyes:      Conjunctiva/sclera: Conjunctivae normal.   Neck:      Thyroid: No thyromegaly.      Trachea: No tracheal deviation.   Cardiovascular:      Rate and Rhythm: Normal rate.      Heart sounds: Normal heart sounds.   Pulmonary:      Effort: Pulmonary effort is normal. No respiratory distress.      Breath sounds: Normal breath sounds. No wheezing.   Abdominal:      General: Bowel sounds are normal.      Palpations: Abdomen is soft.      Tenderness: There is no abdominal tenderness. There is no rebound.      Hernia: No hernia is present.      Comments: Stoma pink   Musculoskeletal:         General: No tenderness. Normal range of motion.      Cervical back: Normal range of motion and neck supple.   Lymphadenopathy:      Cervical: No cervical adenopathy.   Skin:     General: Skin is warm and  dry.      Findings: No erythema or rash.   Neurological:      Mental Status: He is alert and oriented to person, place, and time.   Psychiatric:         Behavior: Behavior normal.         Thought Content: Thought content normal.         Judgment: Judgment normal.         Urine dipstick shows .  Micro exam: .         CT Renal Stone Study ABD Pelvis WO  Order: [id number]   Status: Final result     Visible to patient: Yes (not seen)     Next appt: Today at 03:45 PM in Urology (SHIN Bo MD)     0 Result Notes    Details    Reading Physician Reading Date Result Priority   Edmond Rojas MD  [phone]  [phone] 5/9/2022 STAT     Narrative & Impression  EXAMINATION:  CT RENAL STONE STUDY ABD PELVIS WO     CLINICAL HISTORY:  Nephrolithiasis, symptomatic/complicated;     TECHNIQUE:  Low dose axial images, sagittal and coronal reformations were obtained from the lung bases to the pubic symphysis.  Contrast was not administered.     COMPARISON:  CT of the abdomen and pelvis performed 04/13/2021.  Retroperitoneal ultrasound examination performed 07/08/2021.     FINDINGS:  Evaluation of the solid organs is limited due to lack of intravenous contrast.     Lower chest: Coronary artery and valvular calcifications.  Assessment of the lungs is markedly limited due to respiratory motion.  Areas of atelectasis and/or scar suggested.     URINARY COLLECTING SYSTEM:     Redemonstrated remote operative change of cystectomy and right lower quadrant ileal conduit.     Severe right hydronephrosis and hydroureter.  Two obstructing calculus are in the distal right ureter, with the more proximal measuring approximately 5-6 mm in the slightly more distal measuring approximately 4 mm.     There is additionally noted moderate left-sided hydronephrosis or hydroureter.  Questioned 1.5-2 mm calculus distal left ureter (coronal reformat series 4, image 73).     Kidneys are again noted to be small in demonstrate multifocal areas of  cortical thinning, which may represent sequela of chronic medical renal disease and scarring.  Low-attenuation cortical renal lesions may represent cysts.  Nonobstructing bilateral renal calculi are noted.     Liver: normal contour     Gallbladder and bile ducts: Gallbladder surgically absent.  No concerning biliary dilatation.     Pancreas: Normal contour.     Spleen: Normal contour.     Adrenals: Normal contour.     Lymph nodes: No abdominal or pelvic lymphadenopathy.     Bowel and mesentery: No definite evidence of acute bowel obstruction.  Operative changes of prior partial small bowel obstruction.  Residual small bowel normal caliber.  Colon demonstrates no significant abnormalities.  Appendix suspected surgically absent.  No obstruction or acute inflammatory change.     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  Abdominal aorta tapers normally with extensive atherosclerotic calcification, extending to the vessels.     Inferior vena cava: Unremarkable.     Free fluid or free air: None.     Pelvis: Surgical clips noted within bilateral pelvic sidewalls, which may relate to lymphadenectomy.  No new or enlarging abdominopelvic lymph nodes.  No focal enteric masses.  No omental or peritoneal implants are suggested by noncontrast CT     Body wall: Ileal conduit, as above..  Operative changes of right lower quadrant parastomal and bilateral inguinal hernia repairs.     Bones: No acute change.  Degenerative findings are again noted involving the spine and hips.     Impression:     1. Severe right hydronephrosis and hydroureter. Two obstructing calculus are in the distal right ureter, with the more proximal measuring approximately 5-6 mm in the slightly more distal measuring approximately 4 mm.  2. Additionally noted moderate left-sided hydronephrosis or hydroureter. Questioned 1.5-2 mm calculus distal left ureter.  3.  Additional details of stable chronic and incidental findings, as provided in the body of report.        Electronically signed by: Edmond Rojas  Date:                                            05/09/2022  Time:                                           22:38         Assessment:       1. Ureteral stone    2. H/O primary malignant neoplasm of urinary bladder    3. Hydronephrosis, unspecified hydronephrosis type          Plan:       1. Ureteral stone  Bilateral PCNL on Wednesday 6/8/2022.  Will plan on bilateral procedure as long as significant dilation is not needed.  Hopefully will be able to only place PCN that can be removed, he may need stents afterwards.    Norco 10    2. H/O primary malignant neoplasm of urinary bladder  Recent CT without evidence of recurrence    3. Hydronephrosis, unspecified hydronephrosis type  Secondary to stones           Follow up in about 6 weeks (around 6/27/2022) for Follow up.

## 2022-05-29 ENCOUNTER — NURSE TRIAGE (OUTPATIENT)
Dept: ADMINISTRATIVE | Facility: CLINIC | Age: 62
End: 2022-05-29
Payer: MEDICARE

## 2022-05-29 NOTE — TELEPHONE ENCOUNTER
Bilateral nephrostomy tubes placed 5/16, has urostomy bag.     Valve is leaking where you empty catheter bag, still collecting into drainage bag. Advised per protocol. F/u with Urology tomorrow or go to ED today. VU and agreed.     Reason for Disposition   [1] Catheter is broken or cracked AND [2] is still usable    Additional Information   Negative: Shock suspected (e.g., cold/pale/clammy skin, too weak to stand, low BP, rapid pulse)   Negative: Sounds like a life-threatening emergency to the triager   Negative: [1] Catheter was accidentally pulled-out AND [2] bright red continuous bleeding   Negative: SEVERE abdominal pain   Negative: Fever > 100.5 F (38.1 C)   Negative: [1] Drinking very little AND [2] dehydration suspected (e.g., no urine > 12 hours, very dry mouth, very lightheaded)   Negative: Patient sounds very sick or weak to the triager   Negative: Catheter was accidentally pulled-out   Negative: [1] Catheter is broken AND [2] is not usable   Negative: Bleeding around catheter (e.g., from penis or female urethra)   Negative: Lower abdominal pain or distention   Negative: [1] No urine in bag > 4 hours AND [2] catheter is not kinked    Protocols used: ST URINARY CATHETER SYMPTOMS AND [id number]

## 2022-05-30 ENCOUNTER — TELEPHONE (OUTPATIENT)
Dept: UROLOGY | Facility: CLINIC | Age: 62
End: 2022-05-30
Payer: MEDICARE

## 2022-05-30 NOTE — TELEPHONE ENCOUNTER
"I spoke with the pt's wife after speaking to Dr. Olvera who suggested for me to have the pt contact IR to try and have his bag changed. I gave his wife the number and stated that if she had any problems or any questions, to let me know.    "FYI, see triage note and please f/u with pt tomorrow morning regarding leaking bag. Thanks, Riddhi CHIN, RN with Nurse on Call"  "

## 2022-06-01 ENCOUNTER — HOSPITAL ENCOUNTER (OUTPATIENT)
Dept: PREADMISSION TESTING | Facility: HOSPITAL | Age: 62
Discharge: HOME | End: 2022-06-01
Attending: UROLOGY
Payer: MEDICARE

## 2022-06-01 ENCOUNTER — ANESTHESIA EVENT (OUTPATIENT)
Dept: SURGERY | Facility: HOSPITAL | Age: 62
End: 2022-06-01
Payer: MEDICARE

## 2022-06-01 VITALS
DIASTOLIC BLOOD PRESSURE: 67 MMHG | WEIGHT: 171.06 LBS | HEART RATE: 67 BPM | SYSTOLIC BLOOD PRESSURE: 132 MMHG | OXYGEN SATURATION: 94 % | HEIGHT: 72 IN | BODY MASS INDEX: 23.17 KG/M2 | TEMPERATURE: 98 F | RESPIRATION RATE: 16 BRPM

## 2022-06-01 DIAGNOSIS — Z01.818 PREOPERATIVE TESTING: Primary | ICD-10-CM

## 2022-06-01 NOTE — ANESTHESIA PREPROCEDURE EVALUATION
06/01/2022  Blake Guillory is a 62 y.o., male scheduled for NEPHROLITHOTOMY, PERCUTANEOUS (Bilateral Abdomen) on 6/8/2022.    Anesthesia was contacted 6/2/22 by preop clinic due to patient refusal for preop evaluation appointment. He was recently admitted to the hospital for ureteral stones proximal to conduit requiring bilateral nephrostomy tubes. He was sent home with antibiotics and hospital course was otherwise uncomplicated. Other medical issues include COPD on home O2, CAD, HTN, HLD, GERD for which he regularly sees specialists including pulmonology. Despite recent hospital admission, the patient's health appears to be managed and stable and he can proceed to his scheduled PCNL without additional investigation.    Past Medical History:   Diagnosis Date    Anemia of chronic disease     Aneurysm of right common iliac artery     Arthritis     Blood transfusion     CAD (coronary artery disease)     Chest pain of uncertain etiology     CKD (chronic kidney disease), stage IV     COPD (chronic obstructive pulmonary disease)     Coronary artery calcification 8/2/2021    DDD (degenerative disc disease), lumbar     Frequency of urination     General anesthetics causing adverse effect in therapeutic use     pt states he wakes up very violently from anesthesia    GERD (gastroesophageal reflux disease)     Gout     History of bladder cancer     History of urostomy     Hydronephrosis     Hyperparathyroidism     Hypertension     Hypertriglyceridemia     Hypothyroidism (acquired)     Insomnia     Kidney stone     Limited joint range of motion right hip and leg    Lumbar facet arthropathy     Lumbar spondylosis     Mixed anxiety and depressive disorder     Neuropathy     Orchalgia     SHARMIN (obstructive sleep apnea)     Personal history of bladder cancer     PVD (peripheral vascular  disease)     Sacroiliitis     Stricture or kinking of ureter     Ureteral stent displacement     Urinary retention     Vitamin D deficiency     Wears glasses        Past Surgical History:   Procedure Laterality Date    APPENDECTOMY  12/30/2015    Procedure: APPENDECTOMY;  Surgeon: CHAD Bo MD;  Location: [facility] OR;  Service: Urology;;    BALLOON DILATION OF URETER  01/22/2018    BOWEL RESECTION      COLONOSCOPY      CREATION OF URETEROILEAL CONDUIT Left 07/05/2013    CYSTOSCOPY      >1  episodes for bilat ureteral stent exchange    CYSTOSCOPY W/ URETERAL STENT PLACEMENT Right 01/22/2018    and 6/9/2015    CYSTOSCOPY W/ URETERAL STENT PLACEMENT Bilateral 10/07/2015    ESOPHAGOGASTRODUODENOSCOPY  04/12/2022    W/ BIOPSY    ESOPHAGOGASTRODUODENOSCOPY N/A 4/12/2022    Procedure: EGD (ESOPHAGOGASTRODUODENOSCOPY);  Surgeon: Tito Fan MD;  Location[facility];  Service: Endoscopy;  Laterality: N/A;    EXPLORATORY LAPAROTOMY  07/05/2013    EXPLORATORY LAPAROTOMY W/ BOWEL RESECTION  12/30/2015    EXTRACORPOREAL SHOCK WAVE LITHOTRIPSY Right 06/07/2021    Procedure: LITHOTRIPSY, ESWL;  Surgeon: CHAD Bo MD;  Location: [facility] OR;  Service: Urology;  Laterality: Right;  RN PREOP 5/31/2021   VACCINATED    EXTRACORPOREAL SHOCK WAVE LITHOTRIPSY  06/07/2021    FRACTURE SURGERY      HAND SURGERY      HEMICOLECTOMY  12/30/2015    HIP SURGERY  2012    Rt hip septic    LAPAROSCOPIC CHOLECYSTECTOMY N/A 08/11/2021    Procedure: CHOLECYSTECTOMY, LAPAROSCOPIC;  Surgeon: Remy Xiong MD;  Location: [facility] OR;  Service: General;  Laterality: N/A;  gianni video confirmed 8/5/dme    LYMPH NODE DISSECTION  11/08/2012    pelvic    LYSIS OF ADHESIONS  12/30/2015    MOUTH SURGERY  2000    all teeth extracted    PERCUTANEOUS NEPHROLITHOTOMY Left 07/22/2020    Procedure: NEPHROLITHOTOMY, PERCUTANEOUS;  Surgeon: CHAD Bo MD;  Location: [facility] OR;  Service: Urology;  Laterality: Left;   OR 9 ONLY  RN PREOP 7/20/2020----COVID NEGATIVE ON 7/20    PERCUTANEOUS NEPHROSTOMY Left 01/02/2018    PERCUTANEOUS REPLACEMENT OF NEPHROSTOMY TUBE Bilateral 5/10/2022    Procedure: REPLACEMENT, NEPHROSTOMY TUBE, PERCUTANEOUS;  Surgeon: Chino Vo MD;  Location: [facility] CATH LAB;  Service: Radiology;  Laterality: Bilateral;    RADICAL CYSTECTOMY  11/08/2012    RADIOFREQUENCY THERMOCOAGULATION  12/12/2014    median branch lumbar    RADIOFREQUENCY THERMOCOAGULATION  12/01/2014    median branch lumbar    REPAIR, HERNIA, PARASTOMAL, LAPAROSCOPIC  04/12/2017    SMALL INTESTINE SURGERY  04/17/2017    urosotomy bag  2011    R abdomen           Pre-op Assessment    I have reviewed the Patient Summary Reports.     I have reviewed the Nursing Notes. I have reviewed the NPO Status.   I have reviewed the Medications.     Review of Systems  Anesthesia Hx:  Hx of Anesthetic complications Patient denies any issues but in history states wakes up violent Denies Family Hx of Anesthesia complications.  Personal Hx of Anesthesia complications   Social:  Former Smoker, Alcohol Use    Hematology/Oncology:  Hematology Normal       -- Cancer in past history:  Oncology Comments: bladder     Cardiovascular:   Exercise tolerance: poor Hypertension CAD   SHAFFER ECG has been reviewed.    Pulmonary:   COPD, severe Denies Sleep Apnea. Uses 02 at home 4L NC PRN   Renal/:   Chronic Renal Disease  Ureteral stone,   Hydronephrosis    Hepatic/GI:   GERD colostomy   Musculoskeletal:   Arthritis     Neurological:   Neuromuscular Disease,    Endocrine:   Hypothyroidism    Dermatological:  Skin Normal    Psych:   Psychiatric History          Physical Exam  General: Well nourished, Cooperative, Alert and Oriented    Airway:  Mallampati: III   Mouth Opening: Small, but > 3cm  TM Distance: 4 - 6 cm  Tongue: Normal  Neck ROM: Normal ROM    Dental:  Edentulous  Large beard  Chest/Lungs:  Tachypnea  Decreased Breath Sounds: left and right  expiratory  wheezes        Anesthesia Plan  Type of Anesthesia, risks & benefits discussed:    Anesthesia Type: Gen ETT  Intra-op Monitoring Plan: Standard ASA Monitors  Post Op Pain Control Plan: multimodal analgesia  Induction:  IV  Airway Plan: Video and Direct, Post-Induction  Informed Consent: Informed consent signed with the Patient and all parties understand the risks and agree with anesthesia plan.  All questions answered.   ASA Score: 3  Day of Surgery Review of History & Physical: H&P Update referred to the surgeon/provider.  Anesthesia Plan Notes: Duo neb preop, 4-5L NC at home at all times    Ready For Surgery From Anesthesia Perspective.     .

## 2022-06-01 NOTE — DISCHARGE INSTRUCTIONS
Before 7 AM, enter through the Emergency Entrance..   After 7 AM enter through the Main Entrance.      Your procedure  is scheduled for __6/8/2022________.    Call [phone] between 2pm and 5pm on _6/7/2022______to find out your arrival time for the day of surgery.    You may use the main entrance to the hospital on the [facility] side, or the entrance that is next to the [facility].    You may have two visitors.  Visiting hours for non-COVID-19 patients expanded to 24/7 (still restricted to one visitor)  Youth visitation changed from age 18 to age 12.      You will be going to the Same Day Surgery Unit on the 2nd floor of the hospital.    Important instructions:  Do not eat anything after midnight.  You may have plain water, non carbonated.  You may also have Gatorade or Powerade after midnight.    Stop all fluids 2 hours before your surgery.    It is okay to brush your teeth.  Do not have gum, candy or mints.    SEE MEDICATION SHEET.   TAKE MEDICATIONS AS DIRECTED WITH SIPS OF WATER.      Do not take any diabetic medication on the morning of surgery unless instructed to do so by your doctor or pre op nurse.    STOP taking Aspirin, Ibuprofen,  Advil, Motrin, Mobic(meloxicam), Aleve (naproxen), Fish oil, and Vitamin E for at least 7 days before your surgery.     You may take Tylenol if needed which is not a blood thinner.    Please shower the night before and the morning of your surgery.      Contact lenses and removable denture work may not be worn during your procedure.    You may wear deodorant only. If you are having breast surgery, do not wear deodorant on the operative side.    Do not wear powder, body lotion, perfume/cologne or make-up.    Do not wear any jewelry or have any metal on your body.    You will be asked to remove any dentures or partials for the procedure.    If you are going home on the same day of surgery, you must arrange for a family member or a friend to drive you home.  Public  transportation is prohibited.  You will not be able to drive home if you were given anesthesia or sedation.    Patients who want to have their Post-op prescriptions filled from our in-house [facility], bring a Credit/Debit Card  or cash with you. A co-pay may be required.  The pharmacy closes at 5:30 pm.    Wear loose fitting clothes allowing for bandages.    Please leave money and valuables home.      You may bring your cell phone.    Call the doctor if fever or illness should occur before your surgery.    Call [phone] to contact us here if needed.

## 2022-06-08 ENCOUNTER — ANESTHESIA (OUTPATIENT)
Dept: SURGERY | Facility: HOSPITAL | Age: 62
End: 2022-06-08
Payer: MEDICARE

## 2022-06-08 ENCOUNTER — HOSPITAL ENCOUNTER (OUTPATIENT)
Facility: HOSPITAL | Age: 62
Discharge: HOME | End: 2022-06-09
Attending: UROLOGY | Admitting: UROLOGY
Payer: MEDICARE

## 2022-06-08 DIAGNOSIS — Z85.51 H/O PRIMARY MALIGNANT NEOPLASM OF URINARY BLADDER: Chronic | ICD-10-CM

## 2022-06-08 DIAGNOSIS — N13.30 HYDRONEPHROSIS, UNSPECIFIED HYDRONEPHROSIS TYPE: ICD-10-CM

## 2022-06-08 DIAGNOSIS — N13.2 HYDRONEPHROSIS WITH URINARY OBSTRUCTION DUE TO URETERAL CALCULUS: Primary | ICD-10-CM

## 2022-06-08 DIAGNOSIS — N20.1 URETERAL STONE: ICD-10-CM

## 2022-06-08 DIAGNOSIS — Z01.818 PREOPERATIVE TESTING: ICD-10-CM

## 2022-06-08 LAB
ABO + RH BLD: NORMAL
BLD GP AB SCN CELLS X3 SERPL QL: NORMAL

## 2022-06-08 PROCEDURE — 50551 KIDNEY ENDOSCOPY: CPT | Mod: 50,,, | Performed by: UROLOGY

## 2022-06-08 PROCEDURE — 36000709 HC OR TIME LEV III EA ADD 15 MIN: Performed by: UROLOGY

## 2022-06-08 PROCEDURE — C1726 CATH, BAL DIL, NON-VASCULAR: HCPCS | Performed by: UROLOGY

## 2022-06-08 PROCEDURE — 25000003 PHARM REV CODE 250: Performed by: UROLOGY

## 2022-06-08 PROCEDURE — 64999 PR BLOCK, ERECTOR SPINAE PLANE, SINGLE SHOT: ICD-10-PCS | Mod: ,,, | Performed by: ANESTHESIOLOGY

## 2022-06-08 PROCEDURE — 86900 BLOOD TYPING SEROLOGIC ABO: CPT | Performed by: UROLOGY

## 2022-06-08 PROCEDURE — C1887 CATHETER, GUIDING: HCPCS | Performed by: UROLOGY

## 2022-06-08 PROCEDURE — 63600175 PHARM REV CODE 636 W HCPCS: Performed by: UROLOGY

## 2022-06-08 PROCEDURE — D9220A PRA ANESTHESIA: Mod: CRNA,,, | Performed by: NURSE ANESTHETIST, CERTIFIED REGISTERED

## 2022-06-08 PROCEDURE — C1894 INTRO/SHEATH, NON-LASER: HCPCS | Performed by: UROLOGY

## 2022-06-08 PROCEDURE — 36000708 HC OR TIME LEV III 1ST 15 MIN: Performed by: UROLOGY

## 2022-06-08 PROCEDURE — 50435 EXCHANGE NEPHROSTOMY CATH: CPT | Mod: 50,51,, | Performed by: UROLOGY

## 2022-06-08 PROCEDURE — C1729 CATH, DRAINAGE: HCPCS | Performed by: UROLOGY

## 2022-06-08 PROCEDURE — 50551 PR RENAL ENDOSCOPY: ICD-10-PCS | Mod: 50,,, | Performed by: UROLOGY

## 2022-06-08 PROCEDURE — 63600175 PHARM REV CODE 636 W HCPCS: Performed by: STUDENT IN AN ORGANIZED HEALTH CARE EDUCATION/TRAINING PROGRAM

## 2022-06-08 PROCEDURE — C1758 CATHETER, URETERAL: HCPCS | Performed by: UROLOGY

## 2022-06-08 PROCEDURE — 25000003 PHARM REV CODE 250: Performed by: ANESTHESIOLOGY

## 2022-06-08 PROCEDURE — 64999 UNLISTED PX NERVOUS SYSTEM: CPT | Mod: ,,, | Performed by: ANESTHESIOLOGY

## 2022-06-08 PROCEDURE — 37000008 HC ANESTHESIA 1ST 15 MINUTES: Performed by: UROLOGY

## 2022-06-08 PROCEDURE — 63600175 PHARM REV CODE 636 W HCPCS: Performed by: NURSE ANESTHETIST, CERTIFIED REGISTERED

## 2022-06-08 PROCEDURE — D9220A PRA ANESTHESIA: Mod: ANES,,, | Performed by: ANESTHESIOLOGY

## 2022-06-08 PROCEDURE — 71000039 HC RECOVERY, EACH ADD'L HOUR: Performed by: UROLOGY

## 2022-06-08 PROCEDURE — 37000009 HC ANESTHESIA EA ADD 15 MINS: Performed by: UROLOGY

## 2022-06-08 PROCEDURE — 86901 BLOOD TYPING SEROLOGIC RH(D): CPT | Performed by: UROLOGY

## 2022-06-08 PROCEDURE — 50435 PR EXCHANGE NEPHROSTOMY CATH, INCL GUID, S&I: ICD-10-PCS | Mod: 50,51,, | Performed by: UROLOGY

## 2022-06-08 PROCEDURE — 71000033 HC RECOVERY, INTIAL HOUR: Performed by: UROLOGY

## 2022-06-08 PROCEDURE — 64999 UNLISTED PX NERVOUS SYSTEM: CPT | Mod: 50 | Performed by: STUDENT IN AN ORGANIZED HEALTH CARE EDUCATION/TRAINING PROGRAM

## 2022-06-08 PROCEDURE — 25000003 PHARM REV CODE 250: Performed by: NURSE ANESTHETIST, CERTIFIED REGISTERED

## 2022-06-08 PROCEDURE — 76942 PR U/S GUIDANCE FOR NEEDLE GUIDANCE: ICD-10-PCS | Mod: 26,,, | Performed by: ANESTHESIOLOGY

## 2022-06-08 PROCEDURE — 25000003 PHARM REV CODE 250: Performed by: STUDENT IN AN ORGANIZED HEALTH CARE EDUCATION/TRAINING PROGRAM

## 2022-06-08 PROCEDURE — C1769 GUIDE WIRE: HCPCS | Performed by: UROLOGY

## 2022-06-08 PROCEDURE — 76942 ECHO GUIDE FOR BIOPSY: CPT | Mod: 26,,, | Performed by: ANESTHESIOLOGY

## 2022-06-08 PROCEDURE — D9220A PRA ANESTHESIA: ICD-10-PCS | Mod: CRNA,,, | Performed by: NURSE ANESTHETIST, CERTIFIED REGISTERED

## 2022-06-08 PROCEDURE — 36415 COLL VENOUS BLD VENIPUNCTURE: CPT | Performed by: UROLOGY

## 2022-06-08 PROCEDURE — D9220A PRA ANESTHESIA: ICD-10-PCS | Mod: ANES,,, | Performed by: ANESTHESIOLOGY

## 2022-06-08 PROCEDURE — C2627 CATH, SUPRAPUBIC/CYSTOSCOPIC: HCPCS | Performed by: UROLOGY

## 2022-06-08 PROCEDURE — 25000242 PHARM REV CODE 250 ALT 637 W/ HCPCS: Performed by: ANESTHESIOLOGY

## 2022-06-08 PROCEDURE — C2628 CATHETER, OCCLUSION: HCPCS | Performed by: UROLOGY

## 2022-06-08 PROCEDURE — 25500020 PHARM REV CODE 255: Performed by: UROLOGY

## 2022-06-08 DEVICE — IMPLANTABLE DEVICE: Type: IMPLANTABLE DEVICE | Site: KIDNEY | Status: FUNCTIONAL

## 2022-06-08 RX ORDER — FENTANYL CITRATE 50 UG/ML
25 INJECTION, SOLUTION INTRAMUSCULAR; INTRAVENOUS EVERY 5 MIN PRN
Status: DISCONTINUED | OUTPATIENT
Start: 2022-06-08 | End: 2022-06-08 | Stop reason: HOSPADM

## 2022-06-08 RX ORDER — CIPROFLOXACIN 2 MG/ML
400 INJECTION, SOLUTION INTRAVENOUS
Status: COMPLETED | OUTPATIENT
Start: 2022-06-08 | End: 2022-06-09

## 2022-06-08 RX ORDER — SODIUM BICARBONATE 325 MG/1
325 TABLET ORAL 2 TIMES DAILY
Status: DISCONTINUED | OUTPATIENT
Start: 2022-06-08 | End: 2022-06-09 | Stop reason: HOSPADM

## 2022-06-08 RX ORDER — PROCHLORPERAZINE EDISYLATE 5 MG/ML
5 INJECTION INTRAMUSCULAR; INTRAVENOUS EVERY 30 MIN PRN
Status: DISCONTINUED | OUTPATIENT
Start: 2022-06-08 | End: 2022-06-08 | Stop reason: HOSPADM

## 2022-06-08 RX ORDER — SODIUM CHLORIDE 9 MG/ML
INJECTION, SOLUTION INTRAVENOUS CONTINUOUS
Status: DISCONTINUED | OUTPATIENT
Start: 2022-06-08 | End: 2022-06-09 | Stop reason: HOSPADM

## 2022-06-08 RX ORDER — HYDROMORPHONE HYDROCHLORIDE 2 MG/ML
1 INJECTION, SOLUTION INTRAMUSCULAR; INTRAVENOUS; SUBCUTANEOUS EVERY 4 HOURS PRN
Status: DISCONTINUED | OUTPATIENT
Start: 2022-06-08 | End: 2022-06-09 | Stop reason: HOSPADM

## 2022-06-08 RX ORDER — SUCRALFATE 1 G/1
1 TABLET ORAL 2 TIMES DAILY
Status: DISCONTINUED | OUTPATIENT
Start: 2022-06-08 | End: 2022-06-09 | Stop reason: HOSPADM

## 2022-06-08 RX ORDER — MIDAZOLAM HYDROCHLORIDE 1 MG/ML
INJECTION, SOLUTION INTRAMUSCULAR; INTRAVENOUS
Status: DISCONTINUED | OUTPATIENT
Start: 2022-06-08 | End: 2022-06-08

## 2022-06-08 RX ORDER — IPRATROPIUM BROMIDE AND ALBUTEROL SULFATE 2.5; .5 MG/3ML; MG/3ML
3 SOLUTION RESPIRATORY (INHALATION) EVERY 6 HOURS PRN
Status: DISCONTINUED | OUTPATIENT
Start: 2022-06-08 | End: 2022-06-09 | Stop reason: HOSPADM

## 2022-06-08 RX ORDER — BUPIVACAINE HYDROCHLORIDE 7.5 MG/ML
INJECTION, SOLUTION EPIDURAL; RETROBULBAR
Status: COMPLETED | OUTPATIENT
Start: 2022-06-08 | End: 2022-06-08

## 2022-06-08 RX ORDER — EPHEDRINE SULFATE 50 MG/ML
INJECTION, SOLUTION INTRAVENOUS
Status: DISCONTINUED | OUTPATIENT
Start: 2022-06-08 | End: 2022-06-08

## 2022-06-08 RX ORDER — ROCURONIUM BROMIDE 10 MG/ML
INJECTION, SOLUTION INTRAVENOUS
Status: DISCONTINUED | OUTPATIENT
Start: 2022-06-08 | End: 2022-06-08

## 2022-06-08 RX ORDER — ESCITALOPRAM OXALATE 10 MG/1
20 TABLET ORAL DAILY
Status: DISCONTINUED | OUTPATIENT
Start: 2022-06-09 | End: 2022-06-09 | Stop reason: HOSPADM

## 2022-06-08 RX ORDER — SODIUM CHLORIDE 0.9 % (FLUSH) 0.9 %
10 SYRINGE (ML) INJECTION
Status: DISCONTINUED | OUTPATIENT
Start: 2022-06-08 | End: 2022-06-08 | Stop reason: HOSPADM

## 2022-06-08 RX ORDER — HYDROCODONE BITARTRATE AND ACETAMINOPHEN 10; 325 MG/1; MG/1
1 TABLET ORAL EVERY 6 HOURS PRN
Status: DISCONTINUED | OUTPATIENT
Start: 2022-06-08 | End: 2022-06-09 | Stop reason: HOSPADM

## 2022-06-08 RX ORDER — LEVOTHYROXINE SODIUM 88 UG/1
88 TABLET ORAL DAILY
Status: DISCONTINUED | OUTPATIENT
Start: 2022-06-09 | End: 2022-06-09 | Stop reason: HOSPADM

## 2022-06-08 RX ORDER — FLUTICASONE FUROATE AND VILANTEROL 100; 25 UG/1; UG/1
1 POWDER RESPIRATORY (INHALATION) DAILY
Status: DISCONTINUED | OUTPATIENT
Start: 2022-06-09 | End: 2022-06-09 | Stop reason: HOSPADM

## 2022-06-08 RX ORDER — FENTANYL CITRATE 50 UG/ML
INJECTION, SOLUTION INTRAMUSCULAR; INTRAVENOUS
Status: DISCONTINUED | OUTPATIENT
Start: 2022-06-08 | End: 2022-06-08

## 2022-06-08 RX ORDER — ALLOPURINOL 100 MG/1
100 TABLET ORAL DAILY
Status: DISCONTINUED | OUTPATIENT
Start: 2022-06-09 | End: 2022-06-09 | Stop reason: HOSPADM

## 2022-06-08 RX ORDER — PROPOFOL 10 MG/ML
VIAL (ML) INTRAVENOUS
Status: DISCONTINUED | OUTPATIENT
Start: 2022-06-08 | End: 2022-06-08

## 2022-06-08 RX ORDER — ONDANSETRON 2 MG/ML
INJECTION INTRAMUSCULAR; INTRAVENOUS
Status: DISCONTINUED | OUTPATIENT
Start: 2022-06-08 | End: 2022-06-08

## 2022-06-08 RX ORDER — SUCCINYLCHOLINE CHLORIDE 20 MG/ML
INJECTION INTRAMUSCULAR; INTRAVENOUS
Status: DISCONTINUED | OUTPATIENT
Start: 2022-06-08 | End: 2022-06-08

## 2022-06-08 RX ORDER — TRAZODONE HYDROCHLORIDE 50 MG/1
150 TABLET ORAL NIGHTLY
Status: DISCONTINUED | OUTPATIENT
Start: 2022-06-08 | End: 2022-06-09 | Stop reason: HOSPADM

## 2022-06-08 RX ORDER — ATENOLOL 50 MG/1
50 TABLET ORAL DAILY
Status: DISCONTINUED | OUTPATIENT
Start: 2022-06-09 | End: 2022-06-09 | Stop reason: HOSPADM

## 2022-06-08 RX ORDER — GABAPENTIN 400 MG/1
400 CAPSULE ORAL 2 TIMES DAILY
Status: DISCONTINUED | OUTPATIENT
Start: 2022-06-08 | End: 2022-06-09 | Stop reason: HOSPADM

## 2022-06-08 RX ORDER — LIDOCAINE HYDROCHLORIDE 20 MG/ML
INJECTION INTRAVENOUS
Status: DISCONTINUED | OUTPATIENT
Start: 2022-06-08 | End: 2022-06-08

## 2022-06-08 RX ORDER — FENOFIBRATE 145 MG/1
145 TABLET, FILM COATED ORAL DAILY
Status: DISCONTINUED | OUTPATIENT
Start: 2022-06-09 | End: 2022-06-09 | Stop reason: HOSPADM

## 2022-06-08 RX ORDER — AMLODIPINE BESYLATE 5 MG/1
10 TABLET ORAL DAILY
Status: DISCONTINUED | OUTPATIENT
Start: 2022-06-09 | End: 2022-06-09 | Stop reason: HOSPADM

## 2022-06-08 RX ORDER — DEXAMETHASONE SODIUM PHOSPHATE 4 MG/ML
INJECTION, SOLUTION INTRA-ARTICULAR; INTRALESIONAL; INTRAMUSCULAR; INTRAVENOUS; SOFT TISSUE
Status: DISCONTINUED | OUTPATIENT
Start: 2022-06-08 | End: 2022-06-08

## 2022-06-08 RX ORDER — IPRATROPIUM BROMIDE AND ALBUTEROL SULFATE 2.5; .5 MG/3ML; MG/3ML
3 SOLUTION RESPIRATORY (INHALATION) EVERY 4 HOURS PRN
Status: DISCONTINUED | OUTPATIENT
Start: 2022-06-08 | End: 2022-06-08

## 2022-06-08 RX ORDER — PANTOPRAZOLE SODIUM 40 MG/1
40 TABLET, DELAYED RELEASE ORAL DAILY
Status: DISCONTINUED | OUTPATIENT
Start: 2022-06-09 | End: 2022-06-09 | Stop reason: HOSPADM

## 2022-06-08 RX ORDER — ALBUTEROL SULFATE 90 UG/1
2 AEROSOL, METERED RESPIRATORY (INHALATION) EVERY 6 HOURS PRN
Status: DISCONTINUED | OUTPATIENT
Start: 2022-06-08 | End: 2022-06-08

## 2022-06-08 RX ADMIN — FENTANYL CITRATE 50 MCG: 50 INJECTION, SOLUTION INTRAMUSCULAR; INTRAVENOUS at 07:06

## 2022-06-08 RX ADMIN — HYDROCODONE BITARTRATE AND ACETAMINOPHEN 1 TABLET: 10; 325 TABLET ORAL at 09:06

## 2022-06-08 RX ADMIN — GENTAMICIN SULFATE 116.4 MG: 40 INJECTION, SOLUTION INTRAMUSCULAR; INTRAVENOUS at 07:06

## 2022-06-08 RX ADMIN — PROPOFOL 120 MG: 10 INJECTION, EMULSION INTRAVENOUS at 07:06

## 2022-06-08 RX ADMIN — EPHEDRINE SULFATE 10 MG: 50 INJECTION INTRAVENOUS at 07:06

## 2022-06-08 RX ADMIN — BUPIVACAINE HYDROCHLORIDE 30 ML: 7.5 INJECTION, SOLUTION EPIDURAL; RETROBULBAR at 07:06

## 2022-06-08 RX ADMIN — ONDANSETRON 4 MG: 2 INJECTION, SOLUTION INTRAMUSCULAR; INTRAVENOUS at 09:06

## 2022-06-08 RX ADMIN — ROCURONIUM BROMIDE 20 MG: 10 INJECTION, SOLUTION INTRAVENOUS at 07:06

## 2022-06-08 RX ADMIN — SODIUM CHLORIDE, SODIUM LACTATE, POTASSIUM CHLORIDE, AND CALCIUM CHLORIDE: .6; .31; .03; .02 INJECTION, SOLUTION INTRAVENOUS at 07:06

## 2022-06-08 RX ADMIN — GABAPENTIN 400 MG: 400 CAPSULE ORAL at 08:06

## 2022-06-08 RX ADMIN — SUGAMMADEX 200 MG: 100 INJECTION, SOLUTION INTRAVENOUS at 09:06

## 2022-06-08 RX ADMIN — TRAZODONE HYDROCHLORIDE 150 MG: 50 TABLET ORAL at 08:06

## 2022-06-08 RX ADMIN — HYDROCODONE BITARTRATE AND ACETAMINOPHEN 1 TABLET: 10; 325 TABLET ORAL at 05:06

## 2022-06-08 RX ADMIN — SUCRALFATE 1 G: 1 TABLET ORAL at 08:06

## 2022-06-08 RX ADMIN — SODIUM CHLORIDE: 0.9 INJECTION, SOLUTION INTRAVENOUS at 05:06

## 2022-06-08 RX ADMIN — MIDAZOLAM HYDROCHLORIDE 2 MG: 1 INJECTION, SOLUTION INTRAMUSCULAR; INTRAVENOUS at 07:06

## 2022-06-08 RX ADMIN — IPRATROPIUM BROMIDE AND ALBUTEROL SULFATE 3 ML: 2.5; .5 SOLUTION RESPIRATORY (INHALATION) at 07:06

## 2022-06-08 RX ADMIN — LIDOCAINE HYDROCHLORIDE 50 MG: 20 INJECTION, SOLUTION INTRAVENOUS at 07:06

## 2022-06-08 RX ADMIN — SODIUM BICARBONATE 325 MG: 325 TABLET ORAL at 08:06

## 2022-06-08 RX ADMIN — SUCCINYLCHOLINE CHLORIDE 120 MG: 20 INJECTION, SOLUTION INTRAMUSCULAR; INTRAVENOUS at 07:06

## 2022-06-08 RX ADMIN — CIPROFLOXACIN 400 MG: 2 INJECTION, SOLUTION INTRAVENOUS at 05:06

## 2022-06-08 RX ADMIN — SODIUM CHLORIDE, SODIUM LACTATE, POTASSIUM CHLORIDE, AND CALCIUM CHLORIDE: .6; .31; .03; .02 INJECTION, SOLUTION INTRAVENOUS at 08:06

## 2022-06-08 RX ADMIN — DEXAMETHASONE SODIUM PHOSPHATE 4 MG: 4 INJECTION, SOLUTION INTRAMUSCULAR; INTRAVENOUS at 07:06

## 2022-06-08 NOTE — ANESTHESIA POSTPROCEDURE EVALUATION
Anesthesia Post Evaluation    Patient: Blake Guillory    Procedure(s) Performed: Procedure(s) (LRB):  BILATERAL PERCUTANEOUS NEPHROSCOPY, BILATERAL ANTEGRADE PYELOGRAM, BILATERAL NEPHROSTOMY TUBE PLACEMENT (Bilateral)    Final Anesthesia Type: general      Patient location during evaluation: PACU  Patient participation: Yes- Able to Participate  Level of consciousness: awake and alert  Post-procedure vital signs: reviewed and stable  Pain management: adequate  Airway patency: patent  SHARMIN mitigation strategies: Multimodal analgesia and Extubation while patient is awake  PONV status at discharge: No PONV  Anesthetic complications: no      Cardiovascular status: blood pressure returned to baseline  Respiratory status: unassisted and spontaneous ventilation  Hydration status: euvolemic  Follow-up not needed.          Vitals Value Taken Time   /70 06/08/22 1402   Temp 36.2 °C (97.2 °F) 06/08/22 0951   Pulse 60 06/08/22 1402   Resp 15 06/08/22 1402   SpO2 93 % 06/08/22 1402   Vitals shown include unvalidated device data.      No case tracking events are documented in the log.      Pain/Gissel Score: Gissel Score: 8 (6/8/2022 11:00 AM)

## 2022-06-08 NOTE — ANESTHESIA PROCEDURE NOTES
Peripheral Block    Patient location during procedure: pre-op   Block not for primary anesthetic.  Reason for block: at surgeon's request and post-op pain management   Post-op Pain Location: Bilateral flank pain   Start time: 6/8/2022 7:05 AM  Timeout: 6/8/2022 7:05 AM   End time: 6/8/2022 7:25 AM    Staffing  Authorizing Provider: Ayaka Musa MD  Performing Provider: Juvenal Nguyen MD    Preanesthetic Checklist  Completed: patient identified, IV checked, site marked, risks and benefits discussed, surgical consent, monitors and equipment checked, pre-op evaluation and timeout performed  Peripheral Block  Patient position: sitting  Prep: ChloraPrep  Patient monitoring: heart rate, cardiac monitor, continuous pulse ox, continuous capnometry and frequent blood pressure checks  Block type: erector spinae plane  Laterality: bilateral  Injection technique: single shot  Interspace: T7-8    Needle  Needle type: Tuohy   Needle gauge: 17 G  Needle length: 3.5 in  Needle localization: anatomical landmarks and ultrasound guidance   -ultrasound image captured on disc.  Assessment  Injection assessment: negative aspiration, negative parasthesia and local visualized surrounding nerve  Paresthesia pain: none  Heart rate change: no  Slow fractionated injection: yes    Medications:    Medications: bupivacaine (pf) (MARCAINE) injection 0.75% - Perineural   30 mL - 6/8/2022 7:22:00 AM    Additional Notes  Patient tolerated well.  See [facility] RN record for vitals.  30 mL 0.375% bupivacaine injected into each side.

## 2022-06-08 NOTE — NURSING
[facility]  Nurses Note -- 4 Eyes      6/8/2022       Skin assessed on: Admit      [x] No Pressure Injuries Present    []Prevention Measures Documented    [] Yes LDA Previously documented     [] Yes New Pressure Injury Discovered   [] LDA Added      Attending RN:  France Dupont RN     Second RN:  Wendi Enriquez

## 2022-06-08 NOTE — ANESTHESIA PROCEDURE NOTES
Intubation    Date/Time: 6/8/2022 7:38 AM  Performed by: Jose Eduardo García CRNA  Authorized by: Ayaka Musa MD     Intubation:     Induction:  Intravenous    Mask Ventilation:  Easy with oral airway (beard)    Attempts:  1    Attempted By:  CRNA    Method of Intubation:  Video laryngoscopy    Blade:  Tyson 3    Laryngeal View Grade: Grade I - full view of cords      Difficult Airway Encountered?: No      Airway Device:  Oral endotracheal tube    Airway Device Size:  7.5    Style/Cuff Inflation:  Cuffed (inflated to minimal occlusive pressure)    Tube secured:  19    Secured at:  The lips    Placement Verified By:  Capnometry    DIFFICULT INTUBATION DESCRIPTOR: dobbs.    Findings Post-Intubation:  BS equal bilateral and atraumatic/condition of teeth unchanged

## 2022-06-08 NOTE — OP NOTE
Date of Procedure: 06/08/2022     Preoperative Diagnosis:  Bilateral ureteral stones, history of bladder cancer    Postoperative Diagnosis:  Bilateral ureteral stones, history of bladder cancer    Primary Surgeon: CHAD Bo MD    Anesthesia:  General    Procedure Performed:  Bilateral antegrade pyelogram, bilateral nephrostomy tract dilation, bilateral nephroscopy, bilateral ureteroscopy, bilateral ureteral stent placement, fluoroscopy    Estimated Blood Loss:  Minimal    Drains:  12 Belarusian nephrostomy tube, bilaterally    Specimens Removed:  Nephrostomy tubes    Complications:  None    Indications: Blake Guillory is a 62-year-old male with a history of bladder cancer.  He has an ileal conduit.  About 1 month ago he was in the hospital with sepsis secondary to bilateral ureteral stones.  He has nephrostomy tubes in place.  He is here today for clearance of the stones.    Procedure in Detail:    Blake Guillory was taken the operating room where he was positively identified by kevin.  He has placed supine on the stretcher.  Following induction of adequate general anesthesia a 14 Belarusian red rubber catheter was placed into the ileal conduit.  He was then placed in the prone position on the bed.  His pressure points were then padded.    His back was then prepped and draped in usual sterile fashion.    A preoperative time-out was performed as well as confirmation of preoperative antibiotics.    Starting on the right side.  I injected contrast through the nephrostomy tube to perform a antegrade nephrostogram.  The pelvicaliceal structures were delineated as well as the hydronephrotic ureter.    I then passed a hydrophilic tip Sensor wire through the nephrostomy tube.  The nephrostomy tube was withdrawn leaving the wire in place.    Then used a 5 Belarusian Kumpe catheter to direct the wire down the ureter.  The wire was then passed down to the level of the ileal conduit confirmed on fluoroscopy.    Then passed  a 10 Swazi dual-lumen catheter over the wire into the proximal ureter.  I then passed a Sensor wire through the 2nd port down to the level of the ileal conduit.  The catheter was withdrawn leaving the wire in place.  The Sensor wire was then snapped to the drapes for safety.    I then dilated the tract with a 12 Swazi dilator.    I then passed a 12/14 Swazi ureteral access sheath over the wire into the renal pelvis.  The oburator and wire were withdrawn leaving the sheath in place.    I then passed the digital flexible ureteroscope through the sheath into the kidney.  An antegrade pyelogram was once again performed with contrast.    I then directed the scope down the ureter.  The expected level of the stone was inspected carefully there was no stones seen.  I continued the scope down to the level of the conduit there was no stone seen.  I passed the scope into the conduit and again no stones were seen I was able to visualize the 14 Swazi red rubber catheter.    I then withdrew the scope back to the level of the kidney.  I then inspected all the calices.  I retroflexed the scope with upon itself to visualize the scope and sheath.  There were no stones seen within the renal collecting system.    The sheath was withdrawn.    I then passed a 12 Swazi flexible nephrostomy tube over the wire.  This was passed into the renal pelvis.  The coil was then deployed within the right renal pelvis.  An antegrade pyelogram was performed to confirm proper placement within the renal collecting system.  The catheter was then secured to the skin with 0 silk suture.  The catheter was then placed to gravity drainage.    I then turned my attention to the left-sided nephrostomy tube..  I injected contrast through the nephrostomy tube to perform a antegrade nephrostogram.  The pelvicaliceal structures were delineated as well as the hydronephrotic ureter.    I then passed a hydrophilic tip Sensor wire through the nephrostomy tube.  The  nephrostomy tube was withdrawn leaving the wire in place.    Then used a 5 Ecuadorean Kumpe catheter to direct the wire down the ureter.  The wire was then passed down to the level of the ileal conduit confirmed on fluoroscopy.    Then passed a 10 Ecuadorean dual-lumen catheter over the wire into the proximal ureter.  I then passed a Sensor wire through the 2nd port down to the level of the ileal conduit.  The catheter was withdrawn leaving the wire in place.  The Sensor wire was then snapped to the drapes for safety.    I then dilated the tract with a 12 Ecuadorean dilator.    I then passed a 12/14 Ecuadorean ureteral access sheath over the wire into the renal pelvis.  The oburator and wire were withdrawn leaving the sheath in place.    I then passed the digital flexible ureteroscope through the sheath into the kidney.  An antegrade pyelogram was once again performed with contrast.    I then directed the scope down the ureter.  The expected level of the stone was inspected carefully there was no stones seen.  I continued the scope down to the level of the conduit there was no stone seen.  I passed the scope into the conduit and again no stones were seen I was able to visualize the 14 Ecuadorean red rubber catheter.    I then withdrew the scope back to the level of the kidney.  I then inspected all the calices.  I retroflexed the scope with upon itself to visualize the scope and sheath.  There were no stones seen within the renal collecting system.    The sheath was withdrawn.    I then passed a 12 Ecuadorean flexible nephrostomy tube over the wire.  This was passed into the renal pelvis.  The coil was then deployed within the left renal pelvis.  An antegrade pyelogram was performed to confirm proper placement within the renal collecting system.  The catheter was then secured to the skin with 0 silk suture.  The catheter was then placed to gravity drainage.    Bandages were applied to both nephrostomy sites.    He was then placed in the  supine position.  His anesthesia was reversed he was then taken to recovery room in stable condition.

## 2022-06-08 NOTE — TRANSFER OF CARE
Anesthesia Transfer of Care Note    Patient: Blake Guillory    Procedure(s) Performed: Procedure(s) (LRB):  BILATERAL PERCUTANEOUS NEPHROSCOPY, BILATERAL ANTEGRADE PYELOGRAM, BILATERAL NEPHROSTOMY TUBE PLACEMENT (Bilateral)    Patient location: PACU    Anesthesia Type: general    Transport from OR: Transported from OR on room air with adequate spontaneous ventilation    Post pain: adequate analgesia    Post assessment: no apparent anesthetic complications and tolerated procedure well    Post vital signs: stable    Level of consciousness: awake, alert and oriented    Nausea/Vomiting: no nausea/vomiting    Complications: none    Transfer of care protocol was followed      Last vitals:   Visit Vitals  BP (!) 116/56 (BP Location: Left arm, Patient Position: Lying)   Pulse 67   Temp 36.5 °C (97.7 °F) (Oral)   Resp 16   Wt 77.6 kg (171 lb 1 oz)   SpO2 100%   BMI 23.20 kg/m²

## 2022-06-08 NOTE — PLAN OF CARE
Problem: Fluid and Electrolyte Imbalance (Acute Kidney Injury/Impairment)  Goal: Fluid and Electrolyte Balance  Outcome: Ongoing, Progressing     Problem: Oral Intake Inadequate (Acute Kidney Injury/Impairment)  Goal: Optimal Nutrition Intake  Outcome: Ongoing, Progressing     Problem: Renal Function Impairment (Acute Kidney Injury/Impairment)  Goal: Effective Renal Function  Outcome: Ongoing, Progressing     Problem: Adult Inpatient Plan of Care  Goal: Plan of Care Review  Outcome: Ongoing, Progressing  Goal: Patient-Specific Goal (Individualized)  Outcome: Ongoing, Progressing  Goal: Absence of Hospital-Acquired Illness or Injury  Outcome: Ongoing, Progressing  Goal: Optimal Comfort and Wellbeing  Outcome: Ongoing, Progressing  Goal: Readiness for Transition of Care  Outcome: Ongoing, Progressing

## 2022-06-08 NOTE — BRIEF OP NOTE
[facility] - Surgery  Brief Operative Note    SUMMARY     Surgery Date: 6/8/2022     Surgeon(s) and Role:     * CHAD Bo MD - Primary    Assisting Surgeon: None    Pre-op Diagnosis:  Ureteral stone [N20.1]  Hydronephrosis, unspecified hydronephrosis type [N13.30]    Post-op Diagnosis:  Post-Op Diagnosis Codes:     * Ureteral stone [N20.1]     * Hydronephrosis, unspecified hydronephrosis type [N13.30]    Procedure(s) (LRB):  BILATERAL PERCUTANEOUS NEPHROSCOPY, BILATERAL ANTEGRADE PYELOGRAM, BILATERAL NEPHROSTOMY TUBE PLACEMENT (Bilateral)    Anesthesia: General    Operative Findings: bilateral nephroscopy and ureteroscopy.  No stones seen.  Bilateral 12 Fr nephrostomy tubes placed.    Estimated Blood Loss: * No values recorded between 6/8/2022  8:11 AM and 6/8/2022  9:25 AM *    Estimated Blood Loss has not been documented. EBL = 5 ml.         Specimens:   Specimen (24h ago, onward)            None          [id number]

## 2022-06-09 VITALS
SYSTOLIC BLOOD PRESSURE: 153 MMHG | WEIGHT: 171 LBS | HEIGHT: 72 IN | OXYGEN SATURATION: 91 % | TEMPERATURE: 98 F | BODY MASS INDEX: 23.16 KG/M2 | DIASTOLIC BLOOD PRESSURE: 76 MMHG | RESPIRATION RATE: 18 BRPM | HEART RATE: 72 BPM

## 2022-06-09 PROCEDURE — 63600175 PHARM REV CODE 636 W HCPCS: Performed by: UROLOGY

## 2022-06-09 PROCEDURE — 94640 AIRWAY INHALATION TREATMENT: CPT

## 2022-06-09 PROCEDURE — 94760 N-INVAS EAR/PLS OXIMETRY 1: CPT

## 2022-06-09 PROCEDURE — 25000003 PHARM REV CODE 250: Performed by: UROLOGY

## 2022-06-09 PROCEDURE — 25000242 PHARM REV CODE 250 ALT 637 W/ HCPCS: Performed by: UROLOGY

## 2022-06-09 RX ORDER — CIPROFLOXACIN 500 MG/1
500 TABLET ORAL 2 TIMES DAILY
Qty: 6 TABLET | Refills: 0 | Status: SHIPPED | OUTPATIENT
Start: 2022-06-09 | End: 2022-06-12

## 2022-06-09 RX ORDER — HYDROCODONE BITARTRATE AND ACETAMINOPHEN 10; 325 MG/1; MG/1
1 TABLET ORAL EVERY 6 HOURS PRN
Qty: 28 TABLET | Refills: 0 | Status: SHIPPED | OUTPATIENT
Start: 2022-06-09 | End: 2023-04-21

## 2022-06-09 RX ADMIN — CIPROFLOXACIN 400 MG: 2 INJECTION, SOLUTION INTRAVENOUS at 04:06

## 2022-06-09 RX ADMIN — ALLOPURINOL 100 MG: 100 TABLET ORAL at 08:06

## 2022-06-09 RX ADMIN — ATENOLOL 50 MG: 50 TABLET ORAL at 08:06

## 2022-06-09 RX ADMIN — FENOFIBRATE 145 MG: 145 TABLET, FILM COATED ORAL at 08:06

## 2022-06-09 RX ADMIN — PANTOPRAZOLE SODIUM 40 MG: 40 TABLET, DELAYED RELEASE ORAL at 08:06

## 2022-06-09 RX ADMIN — SUCRALFATE 1 G: 1 TABLET ORAL at 08:06

## 2022-06-09 RX ADMIN — AMLODIPINE BESYLATE 10 MG: 5 TABLET ORAL at 08:06

## 2022-06-09 RX ADMIN — HYDROCODONE BITARTRATE AND ACETAMINOPHEN 1 TABLET: 10; 325 TABLET ORAL at 08:06

## 2022-06-09 RX ADMIN — FLUTICASONE FUROATE AND VILANTEROL TRIFENATATE 1 PUFF: 100; 25 POWDER RESPIRATORY (INHALATION) at 08:06

## 2022-06-09 NOTE — PROGRESS NOTES
[facility] Surg  Urology  Progress Note    Patient Name: Blake Guillory  MRN: [id number]  Admission Date: 6/8/2022  Hospital Length of Stay: 0 days  Code Status: Prior   Attending Provider: CHAD Bo MD   Primary Care Physician: Varun Zeng MD    Subjective:     HPI:  No notes on file    Interval History: No acute events overnight.  He has some pain at the PCN sites.  He denies fever.    Review of Systems   Constitutional: Negative.  Negative for fever.   HENT: Negative.     Eyes: Negative.    Respiratory:  Negative for cough, chest tightness and shortness of breath.    Cardiovascular:  Negative for chest pain.   Gastrointestinal: Negative.  Negative for constipation, diarrhea and nausea.   Genitourinary:  Positive for flank pain.   Neurological: Negative.    Psychiatric/Behavioral: Negative.     Objective:     Temp:  [97.2 °F (36.2 °C)-98.4 °F (36.9 °C)] 97.7 °F (36.5 °C)  Pulse:  [57-70] 70  Resp:  [13-20] 18  SpO2:  [91 %-100 %] 93 %  BP: (106-140)/(55-73) 140/73     Body mass index is 23.19 kg/m².           Drains       Drain  Duration                  Urostomy RLQ -- days         Urostomy 06/17/20 1055 721 days         Nephrostomy 06/08/22 0846 Right 12 Fr. <1 day         Nephrostomy 06/08/22 0910 Left 12 Fr. <1 day                    Physical Exam  Vitals and nursing note reviewed.   Constitutional:       Appearance: He is well-developed.   HENT:      Head: Normocephalic.   Eyes:      Conjunctiva/sclera: Conjunctivae normal.   Neck:      Thyroid: No thyromegaly.      Trachea: No tracheal deviation.   Cardiovascular:      Rate and Rhythm: Normal rate.      Heart sounds: Normal heart sounds.   Pulmonary:      Effort: Pulmonary effort is normal. No respiratory distress.      Breath sounds: Normal breath sounds. No wheezing.   Abdominal:      General: Bowel sounds are normal.      Palpations: Abdomen is soft.      Tenderness: There is no abdominal tenderness. There is no rebound.      Hernia: No  hernia is present.   Genitourinary:     Comments: Stoma pink, red rubber removed  Bilateral PCN with light pink urine.  Both removed intact.  Musculoskeletal:         General: No tenderness. Normal range of motion.      Cervical back: Normal range of motion and neck supple.   Lymphadenopathy:      Cervical: No cervical adenopathy.   Skin:     General: Skin is warm and dry.      Findings: No erythema or rash.   Neurological:      Mental Status: He is alert and oriented to person, place, and time.   Psychiatric:         Behavior: Behavior normal.         Thought Content: Thought content normal.         Judgment: Judgment normal.       Significant Labs:    BMP:  No results for input(s): NA, K, CL, CO2, BUN, CREATININE, LABGLOM, GLUCOSE, CALCIUM in the last 168 hours.    CBC:   No results for input(s): WBC, HGB, HCT, PLT in the last 168 hours.    Blood Culture: No results for input(s): LABBLOO in the last 168 hours.  Urine Culture: No results for input(s): LABURIN in the last 168 hours.    Significant Imaging:                      Assessment/Plan:     * Hydronephrosis with urinary obstruction due to ureteral calculus  D/c home today  Follow up in 3 weeks for post op check        VTE Risk Mitigation (From admission, onward)         Ordered     IP VTE HIGH RISK PATIENT  Once         06/08/22 1636     Place sequential compression device  Until discontinued         06/08/22 1636                SHIN Bo MD  Urology  [facility]

## 2022-06-09 NOTE — DISCHARGE SUMMARY
[facility]  Urology  Discharge Summary      Patient Name: Blake Guillory  MRN: [id number]  Admission Date: 6/8/2022  Hospital Length of Stay: 0 days  Discharge Date and Time:  06/09/2022 7:54 AM  Attending Physician: CHAD Bo MD   Discharging Provider: SHIN Bo MD  Primary Care Physician: Varun Zeng MD    HPI:   No notes on file    Procedure(s) (LRB):  BILATERAL PERCUTANEOUS NEPHROSCOPY, BILATERAL ANTEGRADE PYELOGRAM, BILATERAL NEPHROSTOMY TUBE PLACEMENT (Bilateral)     Indwelling Lines/Drains at time of discharge:   Lines/Drains/Airways     Drain  Duration                Urostomy RLQ -- days         Urostomy 06/17/20 1055 721 days         Nephrostomy 06/08/22 0846 Right 12 Fr. <1 day         Nephrostomy 06/08/22 0910 Left 12 Fr. <1 day                Hospital Course (synopsis of major diagnoses, care, treatment, and services provided during the course of the hospital stay): he had an uneventful bilateral percutaneous nephroscopy.  No stones encountered.  New PCN placed, these were removed the next morning.     Goals of Care Treatment Preferences:  Code Status: Full Code      Consults:     Significant Diagnostic Studies: none    Pending Diagnostic Studies:     None          Final Active Diagnoses:    Diagnosis Date Noted POA    PRINCIPAL PROBLEM:  Hydronephrosis with urinary obstruction due to ureteral calculus [N13.2] 01/22/2018 Yes      Problems Resolved During this Admission:         Discharged Condition: stable    Disposition: Home or Self Care    Follow Up:   Follow-up Information     SHIN Bo MD Follow up in 3 week(s).    Specialty: Urology  Why: Post op Check  Contact information:  [address]  [address] [place] [address]  [phone]                       Patient Instructions:      Diet Adult Regular     Remove dressing in 24 hours     Activity as tolerated     Medications:  Reconciled Home Medications:      Medication List      START taking these medications     ciprofloxacin HCl 500 MG tablet  Commonly known as: CIPRO  Take 1 tablet (500 mg total) by mouth 2 (two) times daily. for 3 days        CHANGE how you take these medications    sucralfate 1 gram tablet  Commonly known as: CARAFATE  Take 1 tablet (1 g total) by mouth 4 (four) times daily.  What changed: when to take this        CONTINUE taking these medications    albuterol 90 mcg/actuation inhaler  Commonly known as: PROAIR HFA  Inhale 2 puffs into the lungs every 6 (six) hours as needed for Wheezing. Rescue     albuterol-ipratropium 2.5 mg-0.5 mg/3 mL nebulizer solution  Commonly known as: DUO-NEB  Take 3 mLs by nebulization every 6 (six) hours as needed for Wheezing. Rescue     allopurinoL 100 MG tablet  Commonly known as: ZYLOPRIM  Take 1 tablet (100 mg total) by mouth once daily.     amLODIPine 10 MG tablet  Commonly known as: NORVASC  Take 1 tablet (10 mg total) by mouth once daily.     aspirin 81 MG EC tablet  Commonly known as: ECOTRIN  Take 81 mg by mouth once daily.     atenoloL 50 MG tablet  Commonly known as: TENORMIN  TAKE 1 TABLET(50 MG) BY MOUTH EVERY DAY     clobetasoL 0.05 % external solution  Commonly known as: TEMOVATE  Pt to mix in 1 jar of cerave cream and apply to affected areas bid     EScitalopram oxalate 20 MG tablet  Commonly known as: LEXAPRO  Take 1 tablet by mouth once daily     fenofibrate 145 MG tablet  Commonly known as: TRICOR  Take 1 tablet (145 mg total) by mouth once daily.     fluticasone-umeclidin-vilanter 100-62.5-25 mcg Dsdv  Commonly known as: TRELEGY ELLIPTA  Inhale 1 puff into the lungs once daily. Rinse mouth after use.     gabapentin 400 MG capsule  Commonly known as: NEURONTIN  Take 1 capsule (400 mg total) by mouth 2 (two) times daily.     HYDROcodone-acetaminophen  mg per tablet  Commonly known as: NORCO  Take 1 tablet by mouth every 6 (six) hours as needed for Pain.     levothyroxine 88 MCG tablet  Commonly known as: SYNTHROID  Take 1 tablet (88 mcg total) by  mouth once daily.     multivitamin per tablet  Commonly known as: THERAGRAN  Take 1 tablet by mouth once daily.     pantoprazole 40 MG tablet  Commonly known as: PROTONIX  Take 1 tablet (40 mg total) by mouth once daily.     sodium bicarbonate 325 MG tablet  Take 325 mg by mouth 2 (two) times daily.     traZODone 150 MG tablet  Commonly known as: DESYREL  Take 1 tablet (150 mg total) by mouth every evening.            Time spent on the discharge of patient: 20 minutes    W Vikash Bo MD  Urology  [facility]

## 2022-06-09 NOTE — PLAN OF CARE
06/09/22 0811   Discharge Planning   Assessment Type Discharge Planning Brief Assessment   Resource/Environmental Concerns none   Support Systems Spouse/significant other   Equipment Currently Used at Home none   Current Living Arrangements home/apartment/condo   Patient/Family Anticipates Transition to home with family   Patient/Family Anticipated Services at Transition none   DME Needed Upon Discharge  none   Discharge Plan A Home with family  (with instructions to follow up)     [facility] [place] - [address]  [address] [place] [address]  Phone: [phone] Fax: [phone]    [facility] - [place], [place] - [address]  [address]  [facility] [address]  Phone: [phone] Fax: [phone]    [facility] [address]  [address]   [facility] [address]  Phone: [phone] Fax: [phone]

## 2022-06-09 NOTE — PLAN OF CARE
06/09/22 0813   Final Note   Assessment Type Final Discharge Note   Anticipated Discharge Disposition Home   Hospital Resources/Appts/Education Provided Appointments scheduled and added to AVS   Post-Acute Status   Post-Acute Authorization Other   Other Status No Post-Acute Service Needs   Pts nurse France notified that the pt can d/c from CM standpoint

## 2022-06-09 NOTE — PLAN OF CARE
Problem: Fluid and Electrolyte Imbalance (Acute Kidney Injury/Impairment)  Goal: Fluid and Electrolyte Balance  Outcome: Ongoing, Progressing     Problem: Oral Intake Inadequate (Acute Kidney Injury/Impairment)  Goal: Optimal Nutrition Intake  Outcome: Ongoing, Progressing     Problem: Renal Function Impairment (Acute Kidney Injury/Impairment)  Goal: Effective Renal Function  Outcome: Ongoing, Progressing     Problem: Adult Inpatient Plan of Care  Goal: Plan of Care Review  Outcome: Ongoing, Progressing  Goal: Patient-Specific Goal (Individualized)  Outcome: Ongoing, Progressing  Goal: Absence of Hospital-Acquired Illness or Injury  Outcome: Ongoing, Progressing  Goal: Optimal Comfort and Wellbeing  Outcome: Ongoing, Progressing  Goal: Readiness for Transition of Care  Outcome: Ongoing, Progressing   Pt is alert and oriented x4

## 2022-06-09 NOTE — NURSING
Pt received discharge instructions, pt verbalized understanding of discharge instructions, PT AAOx4, respirations even and unlabored, no acute distress, no complaints of pain, safety precautions in place,Case management has cleared pt for discharge.Wife and son  at the bedside. Transport has been requested.

## 2022-06-09 NOTE — SUBJECTIVE & OBJECTIVE
Interval History: No acute events overnight.  He has some pain at the PCN sites.  He denies fever.    Review of Systems   Constitutional: Negative.  Negative for fever.   HENT: Negative.     Eyes: Negative.    Respiratory:  Negative for cough, chest tightness and shortness of breath.    Cardiovascular:  Negative for chest pain.   Gastrointestinal: Negative.  Negative for constipation, diarrhea and nausea.   Genitourinary:  Positive for flank pain.   Neurological: Negative.    Psychiatric/Behavioral: Negative.     Objective:     Temp:  [97.2 °F (36.2 °C)-98.4 °F (36.9 °C)] 97.7 °F (36.5 °C)  Pulse:  [57-70] 70  Resp:  [13-20] 18  SpO2:  [91 %-100 %] 93 %  BP: (106-140)/(55-73) 140/73     Body mass index is 23.19 kg/m².           Drains       Drain  Duration                  Urostomy RLQ -- days         Urostomy 06/17/20 1055 721 days         Nephrostomy 06/08/22 0846 Right 12 Fr. <1 day         Nephrostomy 06/08/22 0910 Left 12 Fr. <1 day                    Physical Exam  Vitals and nursing note reviewed.   Constitutional:       Appearance: He is well-developed.   HENT:      Head: Normocephalic.   Eyes:      Conjunctiva/sclera: Conjunctivae normal.   Neck:      Thyroid: No thyromegaly.      Trachea: No tracheal deviation.   Cardiovascular:      Rate and Rhythm: Normal rate.      Heart sounds: Normal heart sounds.   Pulmonary:      Effort: Pulmonary effort is normal. No respiratory distress.      Breath sounds: Normal breath sounds. No wheezing.   Abdominal:      General: Bowel sounds are normal.      Palpations: Abdomen is soft.      Tenderness: There is no abdominal tenderness. There is no rebound.      Hernia: No hernia is present.   Genitourinary:     Comments: Stoma pink, red rubber removed  Bilateral PCN with light pink urine.  Both removed intact.  Musculoskeletal:         General: No tenderness. Normal range of motion.      Cervical back: Normal range of motion and neck supple.   Lymphadenopathy:       Cervical: No cervical adenopathy.   Skin:     General: Skin is warm and dry.      Findings: No erythema or rash.   Neurological:      Mental Status: He is alert and oriented to person, place, and time.   Psychiatric:         Behavior: Behavior normal.         Thought Content: Thought content normal.         Judgment: Judgment normal.       Significant Labs:    BMP:  No results for input(s): NA, K, CL, CO2, BUN, CREATININE, LABGLOM, GLUCOSE, CALCIUM in the last 168 hours.    CBC:   No results for input(s): WBC, HGB, HCT, PLT in the last 168 hours.    Blood Culture: No results for input(s): LABBLOO in the last 168 hours.  Urine Culture: No results for input(s): LABURIN in the last 168 hours.    Significant Imaging:

## 2022-06-15 ENCOUNTER — TELEPHONE (OUTPATIENT)
Dept: CARDIOLOGY | Facility: CLINIC | Age: 62
End: 2022-06-15
Payer: MEDICARE

## 2022-06-15 NOTE — TELEPHONE ENCOUNTER
Informed patient's wife appointment not currently available @ [facility].  Phone number for [facility] department given to wife to call and schedule appointment, as I am unable to schedule on their schedule.

## 2022-06-15 NOTE — TELEPHONE ENCOUNTER
----- Message from Tremontana Chevalier sent at 6/15/2022  3:03 PM CDT -----  Regarding: appt  Contact: pt @ [phone]  Daya sparrow/[facility] calling to spk with someone in Alvaro Savage's office to schedule an appts for pt for recert for more oxygen. Daya says pt needs new oxygen orders, doctor's visit, and pulmonary walk test for medicare. Pls call pt @ [phone].

## 2022-06-24 ENCOUNTER — TELEPHONE (OUTPATIENT)
Dept: PULMONOLOGY | Facility: CLINIC | Age: 62
End: 2022-06-24
Payer: MEDICARE

## 2022-06-24 NOTE — TELEPHONE ENCOUNTER
Scheduled an appointment to see provider.    SHANNON Mathis                    ----- Message from Delfina Hua MA sent at 6/23/2022  1:01 PM CDT -----  Huma Quintero V Staff  Caller: Daya (Today, 12:24 PM)  Daya with [facility] calling in regards to having patient seen earlier than scheduled appointment. She stated that the patient needs oxygen orders along a six minute walk test order. Requesting call back.       [phone](direct line)

## 2022-06-28 ENCOUNTER — OFFICE VISIT (OUTPATIENT)
Dept: UROLOGY | Facility: CLINIC | Age: 62
End: 2022-06-28
Payer: MEDICARE

## 2022-06-28 VITALS — HEIGHT: 72 IN | BODY MASS INDEX: 23.17 KG/M2 | WEIGHT: 171.06 LBS

## 2022-06-28 DIAGNOSIS — Z85.51 H/O PRIMARY MALIGNANT NEOPLASM OF URINARY BLADDER: ICD-10-CM

## 2022-06-28 DIAGNOSIS — N20.1 URETERAL STONE: ICD-10-CM

## 2022-06-28 DIAGNOSIS — N22 CALCULUS OF URINARY TRACT IN DISEASES CLASSIFIED ELSEWHERE: Primary | ICD-10-CM

## 2022-06-28 PROCEDURE — 99999 PR PBB SHADOW E&M-EST. PATIENT-LVL IV: ICD-10-PCS | Mod: PBBFAC,,, | Performed by: UROLOGY

## 2022-06-28 PROCEDURE — 99999 PR PBB SHADOW E&M-EST. PATIENT-LVL IV: CPT | Mod: PBBFAC,,, | Performed by: UROLOGY

## 2022-06-28 PROCEDURE — 99024 PR POST-OP FOLLOW-UP VISIT: ICD-10-PCS | Mod: POP,,, | Performed by: UROLOGY

## 2022-06-28 PROCEDURE — 99214 OFFICE O/P EST MOD 30 MIN: CPT | Mod: PBBFAC | Performed by: UROLOGY

## 2022-06-28 PROCEDURE — 99024 POSTOP FOLLOW-UP VISIT: CPT | Mod: POP,,, | Performed by: UROLOGY

## 2022-06-28 NOTE — PROGRESS NOTES
Subjective:       Patient ID: Blake Guillory is a 62 y.o. male who was last seen in this office 5/16/2022    Chief Complaint:   Chief Complaint   Patient presents with    Follow-up       History of Present Illness  Kidney Stones  He has had PCNL and ESWL in the past.  His most recent procedure was on 6/7/2021.  Left ESWL.    9/13/2021  He has had some abdominal and chest pain.  He had gall bladder surgery recently    05/16/2022  He went to the hospital recently due to flank pain R > L.  He was found to have bilateral ureteral stones in his distal ureters, just proximal to his conduit.  He had bilateral nephrostomy tubes placed.  He is here for evaluation and to set up PCNL.  He is having pain from the tubes.     06/28/2022  He had bilateral percutaneous ureteroscopy on 6/8/2022.  The ureteral stones had passed on both sides.  He is still having some pain on the right.        History of Bladder Cancer  He has a history of bladder cancer. He is s/p cystectomy with ileal conduit in November 2012. From an oncologic standpoint, he is doing well. He has had issues with bilateral ureteral stricture. He underwent a bilateral ureteral reimplantation into his conduit several months later. His strictures recurred shortly afterwards.  He was managed with ureteral stent changes until one of the stent eroded through his conduit and into his bowels.    He had an Exploratory Laparotomy with replacement of ileal conduit and small bowel anastomosis on 12/30/2015.     He had a parastomal hernia repair in April by Dr. Mack.  He is healing nicely from that and has been discharged from his office.  He had a small bowel obstruction. that resolved.        He continues to have back pain in the morning and in the evening.  His stoma is not bothering him and he tells me that he has good urine output during the day and less at night.  He has worsening renal function and his right sided pain is becoming worse.        ACTIVE MEDICAL  ISSUES:  Patient Active Problem List   Diagnosis    Insomnia    Lumbar facet arthropathy    Sacroiliitis    Spondylosis without myelopathy    DDD (degenerative disc disease)    History of bladder cancer    Hydronephrosis, bilateral    H/O primary malignant neoplasm of urinary bladder    Essential hypertension    Anemia of chronic disease    Moderate protein malnutrition    Chronic gout    Renal insufficiency    Body mass index (BMI) 20.0-20.9, adult    Personal history of bladder cancer    Acquired hypothyroidism    Hypoxemia    Severe malnutrition    Hydronephrosis    History of primary malignant neoplasm of urinary bladder    Hydronephrosis with urinary obstruction due to ureteral calculus    Chest pain of uncertain etiology    Obstructive chronic bronchitis without exacerbation    Epigastric abdominal tenderness without rebound tenderness    Acute kidney injury superimposed on CKD    S/P colonoscopy    Iliac artery aneurysm    Kidney stones    Hypertriglyceridemia    Nephrolithiasis    Acute respiratory failure with hypoxia    Kidney stone on left side    Hyperparathyroidism    Encounter for screening for malignant neoplasm of respiratory organs    Personal history of nicotine dependence    Lung nodule    Coronary artery calcification    Symptomatic cholelithiasis    Chronic respiratory failure with hypoxia       ALLERGIES AND MEDICATIONS: updated and reviewed.  Review of patient's allergies indicates:  No Known Allergies  Current Outpatient Medications   Medication Sig    albuterol (PROAIR HFA) 90 mcg/actuation inhaler Inhale 2 puffs into the lungs every 6 (six) hours as needed for Wheezing. Rescue    albuterol-ipratropium (DUO-NEB) 2.5 mg-0.5 mg/3 mL nebulizer solution Take 3 mLs by nebulization every 6 (six) hours as needed for Wheezing. Rescue    allopurinoL (ZYLOPRIM) 100 MG tablet Take 1 tablet by mouth once daily    amLODIPine (NORVASC) 10 MG tablet Take 1 tablet  (10 mg total) by mouth once daily.    aspirin (ECOTRIN) 81 MG EC tablet Take 81 mg by mouth once daily.    atenoloL (TENORMIN) 50 MG tablet TAKE 1 TABLET(50 MG) BY MOUTH EVERY DAY    clobetasoL (TEMOVATE) 0.05 % external solution Pt to mix in 1 jar of cerave cream and apply to affected areas bid    EScitalopram oxalate (LEXAPRO) 20 MG tablet Take 1 tablet by mouth once daily    fenofibrate (TRICOR) 145 MG tablet Take 1 tablet (145 mg total) by mouth once daily.    fluticasone-umeclidin-vilanter (TRELEGY ELLIPTA) 100-62.5-25 mcg DsDv Inhale 1 puff into the lungs once daily. Rinse mouth after use.    gabapentin (NEURONTIN) 400 MG capsule Take 1 capsule (400 mg total) by mouth 2 (two) times daily.    HYDROcodone-acetaminophen (NORCO)  mg per tablet Take 1 tablet by mouth every 6 (six) hours as needed for Pain.    levothyroxine (SYNTHROID) 88 MCG tablet Take 1 tablet (88 mcg total) by mouth once daily.    multivitamin (THERAGRAN) per tablet Take 1 tablet by mouth once daily.    pantoprazole (PROTONIX) 40 MG tablet Take 1 tablet (40 mg total) by mouth once daily.    sodium bicarbonate 325 MG tablet Take 325 mg by mouth 2 (two) times daily.    sucralfate (CARAFATE) 1 gram tablet Take 1 tablet (1 g total) by mouth 4 (four) times daily. (Patient taking differently: Take 1 g by mouth 2 (two) times daily.)    traZODone (DESYREL) 150 MG tablet Take 1 tablet (150 mg total) by mouth every evening.     No current facility-administered medications for this visit.       Review of Systems   Constitutional: Negative for activity change, fatigue, fever and unexpected weight change.   HENT: Negative for congestion.    Eyes: Negative for redness.   Respiratory: Negative for chest tightness and shortness of breath.    Cardiovascular: Negative for chest pain and leg swelling.   Gastrointestinal: Negative for abdominal pain, constipation, diarrhea, nausea and vomiting.   Genitourinary: Negative for dysuria, flank pain,  "frequency, hematuria, penile pain, penile swelling, scrotal swelling, testicular pain and urgency.   Musculoskeletal: Negative for arthralgias and back pain.   Neurological: Negative for dizziness and light-headedness.   Psychiatric/Behavioral: Negative for behavioral problems and confusion. The patient is not nervous/anxious.    All other systems reviewed and are negative.      Objective:      Vitals:    06/28/22 1432   Weight: 77.6 kg (171 lb 1.2 oz)   Height: 6' 0.01" (1.829 m)     Physical Exam  Vitals and nursing note reviewed.   Constitutional:       Appearance: He is well-developed.   HENT:      Head: Normocephalic.   Eyes:      Conjunctiva/sclera: Conjunctivae normal.   Neck:      Thyroid: No thyromegaly.      Trachea: No tracheal deviation.   Cardiovascular:      Rate and Rhythm: Normal rate.      Heart sounds: Normal heart sounds.   Pulmonary:      Effort: Pulmonary effort is normal. No respiratory distress.      Breath sounds: Normal breath sounds. No wheezing.   Abdominal:      General: Bowel sounds are normal.      Palpations: Abdomen is soft.      Tenderness: There is no abdominal tenderness. There is no rebound.      Hernia: No hernia is present.   Genitourinary:     Comments: Urostomy   Musculoskeletal:         General: No tenderness. Normal range of motion.      Cervical back: Normal range of motion and neck supple.   Lymphadenopathy:      Cervical: No cervical adenopathy.   Skin:     General: Skin is warm and dry.      Findings: No erythema or rash.   Neurological:      Mental Status: He is alert and oriented to person, place, and time.   Psychiatric:         Behavior: Behavior normal.         Thought Content: Thought content normal.         Judgment: Judgment normal.             Assessment:       1. Calculus of urinary tract in diseases classified elsewhere    2. H/O primary malignant neoplasm of urinary bladder    3. Ureteral stone          Plan:       1. Calculus of urinary tract in diseases " classified elsewhere  Check for any issues that may explain his pain.    - CT Renal Stone Study ABD Pelvis WO; Future    2. H/O primary malignant neoplasm of urinary bladder  None on recent imaging    3. Ureteral stone  S/p bilateral PCNL            Follow up in about 2 weeks (around 7/12/2022) for Follow up, Review X-ray.

## 2022-07-08 ENCOUNTER — TELEPHONE (OUTPATIENT)
Dept: PULMONOLOGY | Facility: CLINIC | Age: 62
End: 2022-07-08
Payer: MEDICARE

## 2022-07-08 DIAGNOSIS — J44.9 CHRONIC OBSTRUCTIVE PULMONARY DISEASE, UNSPECIFIED COPD TYPE: ICD-10-CM

## 2022-07-08 DIAGNOSIS — R09.02 HYPOXEMIA: ICD-10-CM

## 2022-07-08 DIAGNOSIS — R91.1 LUNG NODULE: ICD-10-CM

## 2022-07-08 DIAGNOSIS — J44.89 OBSTRUCTIVE CHRONIC BRONCHITIS WITHOUT EXACERBATION: Primary | ICD-10-CM

## 2022-07-08 DIAGNOSIS — J96.01 ACUTE RESPIRATORY FAILURE WITH HYPOXIA: ICD-10-CM

## 2022-07-08 NOTE — TELEPHONE ENCOUNTER
----- Message from Jeremías Alonso MA sent at 7/8/2022 12:00 PM CDT -----  Type: Patient Call Back    Who called: wife - Luke    What is the request in detail:pt. Needs tests scheduled before his appt. Coming up..    Can the clinic reply by [facility]?no    Would the patient rather a call back or a response via [facility]? yes    Best call back number: [phone]

## 2022-07-11 ENCOUNTER — HOSPITAL ENCOUNTER (OUTPATIENT)
Dept: RESPIRATORY THERAPY | Facility: HOSPITAL | Age: 62
Discharge: HOME | End: 2022-07-11
Attending: NURSE PRACTITIONER
Payer: MEDICARE

## 2022-07-11 DIAGNOSIS — R09.02 HYPOXEMIA: ICD-10-CM

## 2022-07-11 DIAGNOSIS — J44.9 CHRONIC OBSTRUCTIVE PULMONARY DISEASE, UNSPECIFIED COPD TYPE: ICD-10-CM

## 2022-07-11 DIAGNOSIS — J44.89 OBSTRUCTIVE CHRONIC BRONCHITIS WITHOUT EXACERBATION: ICD-10-CM

## 2022-07-11 DIAGNOSIS — R91.1 LUNG NODULE: ICD-10-CM

## 2022-07-11 DIAGNOSIS — J96.01 ACUTE RESPIRATORY FAILURE WITH HYPOXIA: ICD-10-CM

## 2022-07-11 PROCEDURE — 94618 PULMONARY STRESS TESTING: CPT | Mod: 26,,, | Performed by: INTERNAL MEDICINE

## 2022-07-11 PROCEDURE — 94727 PR PULM FUNCTION TEST BY GAS: ICD-10-PCS | Mod: 26,,, | Performed by: INTERNAL MEDICINE

## 2022-07-11 PROCEDURE — 94010 BREATHING CAPACITY TEST: CPT | Mod: 26,59,, | Performed by: INTERNAL MEDICINE

## 2022-07-11 PROCEDURE — 94727 GAS DIL/WSHOT DETER LNG VOL: CPT | Mod: 26,,, | Performed by: INTERNAL MEDICINE

## 2022-07-11 PROCEDURE — 94729 DIFFUSING CAPACITY: CPT

## 2022-07-11 PROCEDURE — 94618 PULMONARY STRESS TESTING: ICD-10-PCS | Mod: 26,,, | Performed by: INTERNAL MEDICINE

## 2022-07-11 PROCEDURE — 94010 BREATHING CAPACITY TEST: ICD-10-PCS | Mod: 26,59,, | Performed by: INTERNAL MEDICINE

## 2022-07-11 PROCEDURE — 94729 DIFFUSING CAPACITY: CPT | Mod: 26,,, | Performed by: INTERNAL MEDICINE

## 2022-07-11 PROCEDURE — 94618 PULMONARY STRESS TESTING: CPT

## 2022-07-11 PROCEDURE — 94727 GAS DIL/WSHOT DETER LNG VOL: CPT

## 2022-07-11 PROCEDURE — 94729 PR C02/MEMBANE DIFFUSE CAPACITY: ICD-10-PCS | Mod: 26,,, | Performed by: INTERNAL MEDICINE

## 2022-07-11 PROCEDURE — 94010 BREATHING CAPACITY TEST: CPT

## 2022-07-12 LAB
6MWT: NORMAL
BRPFT: NORMAL
DLCO ADJ PRE: 13.21 ML/(MIN*MMHG)
DLCO SINGLE BREATH LLN: 23.16
DLCO SINGLE BREATH PRE REF: 43.5 %
DLCO SINGLE BREATH REF: 30.09
DLCOC SBVA LLN: 2.89
DLCOC SBVA PRE REF: 59.8 %
DLCOC SBVA REF: 3.99
DLCOC SINGLE BREATH LLN: 23.16
DLCOC SINGLE BREATH PRE REF: 43.9 %
DLCOC SINGLE BREATH REF: 30.09
DLCOVA LLN: 2.89
DLCOVA PRE REF: 59.3 %
DLCOVA PRE: 2.37 ML/(MIN*MMHG*L)
DLCOVA REF: 3.99
DLVAADJ PRE: 2.39 ML/(MIN*MMHG*L)
ERVN2 LLN: -16448.81
ERVN2 PRE REF: 88.9 %
ERVN2 PRE: 1.06 L
ERVN2 REF: 1.19
FEF 25 75 LLN: 1.34
FEF 25 75 PRE REF: 17.7 %
FEF 25 75 REF: 2.87
FEV1 FVC LLN: 64
FEV1 FVC PRE REF: 65.7 %
FEV1 FVC REF: 76
FEV1 LLN: 2.7
FEV1 PRE REF: 38.3 %
FEV1 REF: 3.65
FRCN2 LLN: 2.78
FRCN2 PRE REF: 101.9 %
FRCN2 REF: 3.77
FVC LLN: 3.61
FVC PRE REF: 58.2 %
FVC REF: 4.79
IVC PRE: 2.73 L
IVC SINGLE BREATH LLN: 3.61
IVC SINGLE BREATH PRE REF: 57 %
IVC SINGLE BREATH REF: 4.79
PEF LLN: 6.92
PEF PRE REF: 47.9 %
PEF REF: 9.38
PRE DLCO: 13.1 ML/(MIN*MMHG)
PRE FEF 25 75: 0.51 L/S
PRE FET 100: 8.76 SEC
PRE FEV1 FVC: 50.2 %
PRE FEV1: 1.4 L
PRE FRC N2: 3.84 L
PRE FVC: 2.79 L
PRE PEF: 4.49 L/S
RVN2 LLN: 1.9
RVN2 PRE REF: 107.9 %
RVN2 PRE: 2.78 L
RVN2 REF: 2.58
RVN2TLCN2 LLN: 29.94
RVN2TLCN2 PRE REF: 126.6 %
RVN2TLCN2 PRE: 49.29 %
RVN2TLCN2 REF: 38.92
TLCN2 LLN: 6.39
TLCN2 PRE REF: 74.8 %
TLCN2 PRE: 5.64 L
TLCN2 REF: 7.54
VA PRE: 5.54 L
VA SINGLE BREATH LLN: 7.39
VA SINGLE BREATH PRE REF: 74.9 %
VA SINGLE BREATH REF: 7.39
VCMAXN2 LLN: 3.61
VCMAXN2 PRE REF: 59.7 %
VCMAXN2 PRE: 2.86 L
VCMAXN2 REF: 4.79

## 2022-07-21 ENCOUNTER — TELEPHONE (OUTPATIENT)
Dept: PULMONOLOGY | Facility: CLINIC | Age: 62
End: 2022-07-21
Payer: MEDICARE

## 2022-07-21 NOTE — TELEPHONE ENCOUNTER
Left voicemail      ----- Message from Moris Keating sent at 7/21/2022  2:02 PM CDT -----  Type: Patient Call Back    Who called:Wife/Rhian    What is the request in detail: Pt calling to reschedule. No appts available    Can the clinic reply by [facility]? no    Would the patient rather a call back or a response via [facility]? Call back    Best call back number: [phone]        Additional Information:

## 2022-08-02 ENCOUNTER — TELEPHONE (OUTPATIENT)
Dept: PULMONOLOGY | Facility: CLINIC | Age: 62
End: 2022-08-02
Payer: MEDICARE

## 2022-08-02 NOTE — TELEPHONE ENCOUNTER
I spoke with patient to schedule follow up with Dr Rodriguez on 8/17/22 at 4pm. Appointment mailed. Patient confirmed and verbalized understanding.

## 2022-08-02 NOTE — TELEPHONE ENCOUNTER
----- Message from Camila Rodriguez MD sent at 8/1/2022  5:27 PM CDT -----  Please bring in to discuss findings.  Camila

## 2022-08-09 ENCOUNTER — OFFICE VISIT (OUTPATIENT)
Dept: ENDOCRINOLOGY | Facility: CLINIC | Age: 62
End: 2022-08-09
Payer: MEDICARE

## 2022-08-09 DIAGNOSIS — E21.3 HYPERPARATHYROIDISM: Primary | ICD-10-CM

## 2022-08-09 DIAGNOSIS — N20.0 KIDNEY STONES: Chronic | ICD-10-CM

## 2022-08-09 DIAGNOSIS — E03.9 ACQUIRED HYPOTHYROIDISM: Chronic | ICD-10-CM

## 2022-08-09 PROCEDURE — 99214 OFFICE O/P EST MOD 30 MIN: CPT | Mod: 95,,, | Performed by: INTERNAL MEDICINE

## 2022-08-09 PROCEDURE — 99214 PR OFFICE/OUTPT VISIT, EST, LEVL IV, 30-39 MIN: ICD-10-PCS | Mod: 95,,, | Performed by: INTERNAL MEDICINE
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
## 2022-08-09 NOTE — ASSESSMENT & PLAN NOTE
--patient found to have elevated PTH with normal calcium  --In review of his labs he had normal serum calcium levels in 2012 and 2013 when creatinine was normal  --PTH was first checked when he developed INDIO on CKD and was elevated, and PTH has remained elevated now that he has CKD4  --Calcium has been low to normal for the most part so this most likely represents secondary HPT from CKD, phos has been intermittently low, but mostly normal  --Bone density including forearm measurement was normal, will repeat BMD now  --If serum calcium becomes consistently elevated over time would need to consider primary or tertiary HPT

## 2022-08-16 ENCOUNTER — OFFICE VISIT (OUTPATIENT)
Dept: UROLOGY | Facility: CLINIC | Age: 62
End: 2022-08-16
Payer: MEDICARE

## 2022-08-16 VITALS — WEIGHT: 161.81 LBS | HEIGHT: 72 IN | BODY MASS INDEX: 21.92 KG/M2

## 2022-08-16 DIAGNOSIS — N22 CALCULUS OF URINARY TRACT IN DISEASES CLASSIFIED ELSEWHERE: Primary | ICD-10-CM

## 2022-08-16 DIAGNOSIS — Z85.51 H/O PRIMARY MALIGNANT NEOPLASM OF URINARY BLADDER: ICD-10-CM

## 2022-08-16 PROCEDURE — 99214 OFFICE O/P EST MOD 30 MIN: CPT | Mod: PBBFAC | Performed by: UROLOGY

## 2022-08-16 PROCEDURE — 99213 OFFICE O/P EST LOW 20 MIN: CPT | Mod: S$PBB,,, | Performed by: UROLOGY

## 2022-08-16 PROCEDURE — 99213 PR OFFICE/OUTPT VISIT, EST, LEVL III, 20-29 MIN: ICD-10-PCS | Mod: S$PBB,,, | Performed by: UROLOGY

## 2022-08-16 PROCEDURE — 99999 PR PBB SHADOW E&M-EST. PATIENT-LVL IV: ICD-10-PCS | Mod: PBBFAC,,, | Performed by: UROLOGY

## 2022-08-16 PROCEDURE — 99999 PR PBB SHADOW E&M-EST. PATIENT-LVL IV: CPT | Mod: PBBFAC,,, | Performed by: UROLOGY

## 2022-08-16 NOTE — PROGRESS NOTES
Subjective:       Patient ID: Blake Guillory is a 62 y.o. male The patient's last visit with me was on 6/28/2022.   Chief Complaint:   Chief Complaint   Patient presents with    Follow-up       History of Present Illness  Kidney Stones  He has had PCNL and ESWL in the past.  His most recent procedure was on 6/7/2021.  Left ESWL.    9/13/2021  He has had some abdominal and chest pain.  He had gall bladder surgery recently    05/16/2022  He went to the hospital recently due to flank pain R > L.  He was found to have bilateral ureteral stones in his distal ureters, just proximal to his conduit.  He had bilateral nephrostomy tubes placed.  He is here for evaluation and to set up PCNL.  He is having pain from the tubes.     6/28/2022  He had bilateral percutaneous ureteroscopy on 6/8/2022.  The ureteral stones had passed on both sides.  He is still having some pain on the right.      08/16/2022  He is back with a CT.  He continues to have right sided pain, but it is stable.  No fever.   History of Bladder Cancer  He has a history of bladder cancer. He is s/p cystectomy with ileal conduit in November 2012. From an oncologic standpoint, he is doing well. He has had issues with bilateral ureteral stricture. He underwent a bilateral ureteral reimplantation into his conduit several months later. His strictures recurred shortly afterwards.  He was managed with ureteral stent changes until one of the stent eroded through his conduit and into his bowels.    He had an Exploratory Laparotomy with replacement of ileal conduit and small bowel anastomosis on 12/30/2015.     He had a parastomal hernia repair in April by Dr. Mack.  He is healing nicely from that and has been discharged from his office.  He had a small bowel obstruction. that resolved.        He continues to have back pain in the morning and in the evening.  His stoma is not bothering him and he tells me that he has good urine output during the day and less at  night.  He has worsening renal function and his right sided pain is becoming worse.        ACTIVE MEDICAL ISSUES:  Patient Active Problem List   Diagnosis    Insomnia    Lumbar facet arthropathy    Sacroiliitis    Spondylosis without myelopathy    DDD (degenerative disc disease)    History of bladder cancer    Hydronephrosis, bilateral    H/O primary malignant neoplasm of urinary bladder    Essential hypertension    Anemia of chronic disease    Moderate protein malnutrition    Chronic gout    Renal insufficiency    Body mass index (BMI) 20.0-20.9, adult    Personal history of bladder cancer    Acquired hypothyroidism    Hypoxemia    Severe malnutrition    Hydronephrosis    History of primary malignant neoplasm of urinary bladder    Hydronephrosis with urinary obstruction due to ureteral calculus    Chest pain of uncertain etiology    Obstructive chronic bronchitis without exacerbation    Epigastric abdominal tenderness without rebound tenderness    Acute kidney injury superimposed on CKD    S/P colonoscopy    Iliac artery aneurysm    Kidney stones    Hypertriglyceridemia    Nephrolithiasis    Acute respiratory failure with hypoxia    Kidney stone on left side    Hyperparathyroidism    Encounter for screening for malignant neoplasm of respiratory organs    Personal history of nicotine dependence    Lung nodule    Coronary artery calcification    Symptomatic cholelithiasis    Chronic respiratory failure with hypoxia       ALLERGIES AND MEDICATIONS: updated and reviewed.  Review of patient's allergies indicates:  No Known Allergies  Current Outpatient Medications   Medication Sig    albuterol (PROAIR HFA) 90 mcg/actuation inhaler Inhale 2 puffs into the lungs every 6 (six) hours as needed for Wheezing. Rescue    allopurinoL (ZYLOPRIM) 100 MG tablet Take 1 tablet (100 mg total) by mouth once daily.    amLODIPine (NORVASC) 10 MG tablet Take 1 tablet (10 mg total) by mouth once  daily.    aspirin (ECOTRIN) 81 MG EC tablet Take 81 mg by mouth once daily.    atenoloL (TENORMIN) 50 MG tablet TAKE 1 TABLET(50 MG) BY MOUTH EVERY DAY    EScitalopram oxalate (LEXAPRO) 20 MG tablet Take 1 tablet (20 mg total) by mouth once daily.    fenofibrate (TRICOR) 145 MG tablet Take 1 tablet (145 mg total) by mouth once daily.    fluticasone-umeclidin-vilanter (TRELEGY ELLIPTA) 100-62.5-25 mcg DsDv Inhale 1 puff into the lungs once daily. Rinse mouth after use.    gabapentin (NEURONTIN) 400 MG capsule Take 1 capsule (400 mg total) by mouth 2 (two) times daily.    HYDROcodone-acetaminophen (NORCO)  mg per tablet Take 1 tablet by mouth every 6 (six) hours as needed for Pain.    levothyroxine (SYNTHROID) 88 MCG tablet Take 1 tablet (88 mcg total) by mouth once daily.    multivitamin (THERAGRAN) per tablet Take 1 tablet by mouth once daily.    pantoprazole (PROTONIX) 40 MG tablet Take 1 tablet (40 mg total) by mouth once daily.    sodium bicarbonate 325 MG tablet Take 325 mg by mouth 2 (two) times daily.    traZODone (DESYREL) 150 MG tablet Take 1 tablet (150 mg total) by mouth every evening.    albuterol-ipratropium (DUO-NEB) 2.5 mg-0.5 mg/3 mL nebulizer solution Take 3 mLs by nebulization every 6 (six) hours as needed for Wheezing. Rescue     No current facility-administered medications for this visit.       Review of Systems   Constitutional: Negative for activity change, fatigue, fever and unexpected weight change.   HENT: Negative for congestion.    Eyes: Negative for redness.   Respiratory: Negative for chest tightness and shortness of breath.    Cardiovascular: Negative for chest pain and leg swelling.   Gastrointestinal: Negative for abdominal pain, constipation, diarrhea, nausea and vomiting.   Genitourinary: Negative for dysuria, flank pain, frequency, hematuria, penile pain, penile swelling, scrotal swelling, testicular pain and urgency.   Musculoskeletal: Negative for arthralgias  and back pain.   Neurological: Negative for dizziness and light-headedness.   Psychiatric/Behavioral: Negative for behavioral problems and confusion. The patient is not nervous/anxious.    All other systems reviewed and are negative.      Objective:      Vitals:    08/16/22 1419   Weight: 73.4 kg (161 lb 13.1 oz)   Height: 6' (1.829 m)     Physical Exam  Vitals and nursing note reviewed.   Constitutional:       Appearance: He is well-developed.   HENT:      Head: Normocephalic.   Eyes:      Conjunctiva/sclera: Conjunctivae normal.   Neck:      Thyroid: No thyromegaly.      Trachea: No tracheal deviation.   Cardiovascular:      Rate and Rhythm: Normal rate.      Heart sounds: Normal heart sounds.   Pulmonary:      Effort: Pulmonary effort is normal. No respiratory distress.      Breath sounds: Normal breath sounds. No wheezing.   Abdominal:      General: Bowel sounds are normal.      Palpations: Abdomen is soft.      Tenderness: There is no abdominal tenderness. There is no rebound.      Hernia: No hernia is present.   Genitourinary:     Comments: Urostomy   Musculoskeletal:         General: No tenderness. Normal range of motion.      Cervical back: Normal range of motion and neck supple.   Lymphadenopathy:      Cervical: No cervical adenopathy.   Skin:     General: Skin is warm and dry.      Findings: No erythema or rash.   Neurological:      Mental Status: He is alert and oriented to person, place, and time.   Psychiatric:         Behavior: Behavior normal.         Thought Content: Thought content normal.         Judgment: Judgment normal.         CT Renal Stone Study ABD Pelvis WO  Order: [id number]   Status: Final result     Visible to patient: Yes (seen)     Next appt: Today at 02:15 PM in Urology (SHIN Bo MD)     Dx: Calculus of urinary tract in diseases...     0 Result Notes    Details    Reading Physician Reading Date Result Priority   Jerad Sorensen MD  [phone]  [phone] 7/12/2022  Routine     Narrative & Impression  EXAMINATION:  CT RENAL STONE STUDY ABD PELVIS WO     CLINICAL HISTORY:  Nephrolithiasis, symptomatic/complicated; Calculus of urinary tract in diseases classified elsewhere     TECHNIQUE:  Low dose axial images, sagittal and coronal reformations were obtained from the lung bases to the pubic symphysis.  Contrast was not administered.     COMPARISON:  CT renal stone study dated 05/09/2022     FINDINGS:  Limited images through the lower chest are unremarkable.     Abdomen and pelvis:     The gallbladder is absent.  The liver, pancreas, and adrenal glands demonstrate unremarkable noncontrast appearance.  Few punctate granulomas in the spleen.     Renal cortical lobulation with areas of cortical thinning, possible scarring.  Intermixed hypodense renal cortical foci, too small to characterize.  Scattered bilateral renal vascular calcification and/or nonobstructing stones.  Previous right-sided hydronephrosis has essentially resolved without ureteral calculus identified.  No left hydronephrosis.  Prior cystectomy with right lower quadrant ileal conduit.     The GI tract is normal in caliber with scattered colonic diverticulosis.  No significant free fluid or adenopathy.  Multifocal aortoiliac atherosclerosis.  Similar appearing fusiform aneurysm of the right common iliac artery measuring 3.2 cm.  Operative changes of right lower quadrant parastomal and bilateral inguinal hernia repair.     No aggressive osseous lesion.     Impression:     Remote operative change of cystectomy with right lower quadrant ileal conduit.     Since prior CT, previous right-sided hydronephrosis has essentially resolved without ureteral calculus identified.     Scattered bilateral renal vascular calcification and/or nonobstructing stones.     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     Prior cholecystectomy and additional findings as above.        Electronically signed by:  Jerad Sorensen  Date:                                            07/12/2022  Time:                                           17:33         Assessment:       1. Calculus of urinary tract in diseases classified elsewhere    2. H/O primary malignant neoplasm of urinary bladder          Plan:       1. Calculus of urinary tract in diseases classified elsewhere  Doing well  - US Retroperitoneal Complete (Kidney and; Future    2. H/O primary malignant neoplasm of urinary bladder  Doing well             No follow-ups on file.

## 2022-08-17 ENCOUNTER — OFFICE VISIT (OUTPATIENT)
Dept: PULMONOLOGY | Facility: CLINIC | Age: 62
End: 2022-08-17
Payer: MEDICARE

## 2022-08-17 VITALS
HEART RATE: 72 BPM | BODY MASS INDEX: 21.83 KG/M2 | SYSTOLIC BLOOD PRESSURE: 118 MMHG | DIASTOLIC BLOOD PRESSURE: 66 MMHG | OXYGEN SATURATION: 94 % | WEIGHT: 161.19 LBS | HEIGHT: 72 IN

## 2022-08-17 DIAGNOSIS — Z93.6 PRESENCE OF UROSTOMY: ICD-10-CM

## 2022-08-17 DIAGNOSIS — R91.1 NODULE OF UPPER LOBE OF RIGHT LUNG: Primary | ICD-10-CM

## 2022-08-17 DIAGNOSIS — R91.1 NODULE OF RIGHT LUNG: ICD-10-CM

## 2022-08-17 DIAGNOSIS — R91.1 LUNG NODULE: ICD-10-CM

## 2022-08-17 DIAGNOSIS — J44.89 OBSTRUCTIVE CHRONIC BRONCHITIS WITHOUT EXACERBATION: ICD-10-CM

## 2022-08-17 PROCEDURE — 99214 OFFICE O/P EST MOD 30 MIN: CPT | Mod: S$PBB,,, | Performed by: INTERNAL MEDICINE

## 2022-08-17 PROCEDURE — 99215 OFFICE O/P EST HI 40 MIN: CPT | Mod: PBBFAC | Performed by: INTERNAL MEDICINE

## 2022-08-17 PROCEDURE — 99999 PR PBB SHADOW E&M-EST. PATIENT-LVL V: ICD-10-PCS | Mod: PBBFAC,,, | Performed by: INTERNAL MEDICINE

## 2022-08-17 PROCEDURE — 99214 PR OFFICE/OUTPT VISIT, EST, LEVL IV, 30-39 MIN: ICD-10-PCS | Mod: S$PBB,,, | Performed by: INTERNAL MEDICINE

## 2022-08-17 PROCEDURE — 99999 PR PBB SHADOW E&M-EST. PATIENT-LVL V: CPT | Mod: PBBFAC,,, | Performed by: INTERNAL MEDICINE

## 2022-08-17 NOTE — PROGRESS NOTES
Subjective:       Patient ID: Blake Guillory is a 62 y.o. male.    Chief Complaint: Abnormal Ct Scan    62 year old former smoker who is previously seen by Tiki Pardo, he is new to me.  He has a history of urinary bladder cancer with a history of urostomy.  Seen by Mr Moellernorma for emphysema, found to have pulmonary nodules that were stable but previous PET with mild hypermetabolic activity.  Continued surveillance was recommended.  He is here for follow up.  Patient with mild SHAFFER which is stable.  He denies fevers, chills or unexpected weight loss.      Review of Systems    Objective:       Vitals:    08/17/22 1555   BP: 118/66   BP Location: Right arm   Patient Position: Sitting   Pulse: 72   SpO2: (!) 94%   Weight: 73.1 kg (161 lb 2.5 oz)   Height: 6' (1.829 m)     Physical Exam   Constitutional: He is oriented to person, place, and time. He appears well-developed and well-nourished.   Cardiovascular: Regular rhythm.   Pulmonary/Chest: Normal expansion. He has no wheezes. He has no rales.   Musculoskeletal:         General: Normal range of motion.   Lymphadenopathy: No supraclavicular adenopathy is present.     He has no cervical adenopathy.   Neurological: He is alert and oriented to person, place, and time.   Skin: Skin is warm and dry.   Psychiatric: He has a normal mood and affect.          Personal Diagnostic Review, personally reviewed with patients.     CT of chest performed on 7/29/2022 without contrast revealed There is upper lobe predominant panlobular emphysema.  There is an irregular soft tissue opacity in the right upper lobe that has increased in size dating back to CT of 12/16/2020 now measuring 2.2 x 3.6 cm in maximum dimensions.  Largest solid component is new when compared to CT of 02/09/2022 and measures 2.6 x 1.4 cm (axial series 4, image 86).  Posteromedial nodular solid component has slightly increased in size when compared to CT of 12/16/2020 now measuring 1.2 cm (series 4, image 75,  previously 0.8 cm).  Additional posterolateral nodular soft tissue component appears stable in size measuring 1.2 cm (series 4, image 88, previously 1.3 cm).  There is an enlarging 0.8 cm solid pulmonary nodule in the left upper lobe (series 4, image 130).  Remaining bilateral calcified and noncalcified solid pulmonary nodules appear unchanged.  No bronchiectasis.  No pneumothorax or pleural effusion.   .  No flowsheet data found.      Assessment:       1. Nodule of upper lobe of right lung    2. Nodule of right lung    3. Presence of urostomy    4. Lung nodule    5. Obstructive chronic bronchitis without exacerbation        Outpatient Encounter Medications as of 8/17/2022   Medication Sig Dispense Refill    albuterol (PROAIR HFA) 90 mcg/actuation inhaler Inhale 2 puffs into the lungs every 6 (six) hours as needed for Wheezing. Rescue 18 g 0    allopurinoL (ZYLOPRIM) 100 MG tablet Take 1 tablet (100 mg total) by mouth once daily. 90 tablet 0    amLODIPine (NORVASC) 10 MG tablet Take 1 tablet (10 mg total) by mouth once daily. 90 tablet 0    aspirin (ECOTRIN) 81 MG EC tablet Take 81 mg by mouth once daily.      atenoloL (TENORMIN) 50 MG tablet TAKE 1 TABLET(50 MG) BY MOUTH EVERY DAY 90 tablet 0    EScitalopram oxalate (LEXAPRO) 20 MG tablet Take 1 tablet (20 mg total) by mouth once daily. 90 tablet 0    fenofibrate (TRICOR) 145 MG tablet Take 1 tablet (145 mg total) by mouth once daily. 90 tablet 0    fluticasone-umeclidin-vilanter (TRELEGY ELLIPTA) 100-62.5-25 mcg DsDv Inhale 1 puff into the lungs once daily. Rinse mouth after use. 60 each 11    gabapentin (NEURONTIN) 400 MG capsule Take 1 capsule (400 mg total) by mouth 2 (two) times daily. 180 capsule 0    HYDROcodone-acetaminophen (NORCO)  mg per tablet Take 1 tablet by mouth every 6 (six) hours as needed for Pain. 28 tablet 0    levothyroxine (SYNTHROID) 88 MCG tablet Take 1 tablet (88 mcg total) by mouth once daily. 90 tablet 0    multivitamin  (THERAGRAN) per tablet Take 1 tablet by mouth once daily.      pantoprazole (PROTONIX) 40 MG tablet Take 1 tablet (40 mg total) by mouth once daily. 90 tablet 0    sodium bicarbonate 325 MG tablet Take 325 mg by mouth 2 (two) times daily.      traZODone (DESYREL) 150 MG tablet Take 1 tablet (150 mg total) by mouth every evening. 90 tablet 0    albuterol-ipratropium (DUO-NEB) 2.5 mg-0.5 mg/3 mL nebulizer solution Take 3 mLs by nebulization every 6 (six) hours as needed for Wheezing. Rescue 50 vial 2     No facility-administered encounter medications on file as of 8/17/2022.     Orders Placed This Encounter   Procedures    Full code     Standing Status:   Standing     Number of Occurrences:   1    Pulse Oximetry Q4H     Standing Status:   Standing     Number of Occurrences:   19    Case Request Endoscopy: ROBOTIC BRONCHOSCOPY with fluoroscopy     Standing Status:   Standing     Number of Occurrences:   1     Order Specific Question:   CPT Code:     Answer:   IN BRONCH W/ EBUS, DIAG OR THERA INTERVENTION PERIPHERAL LESION(S), INCL GUID, ADD ON CODE [21907]     Order Specific Question:   CPT Code:     Answer:   IN BRONCH W/ EBUS, SAMPLING 3 OR MORE NODES, INCL GUIDE [04701]     Order Specific Question:   CPT Code:     Answer:   IN ROBOTIC SURGICAL SYSTEM []     Order Specific Question:   CPT Code:     Answer:   IN BRONCH W/ EBUS, DIAG OR THERA INTERVENTION PERIPHERAL LESION(S), INCL GUID, ADD ON CODE [94240]     Order Specific Question:   CPT Code:     Answer:   IN BRONCH W/ EBUS, SAMPLING 3 OR MORE NODES, INCL GUIDE [38114]     Order Specific Question:   Medical Necessity:     Answer:   Medically Urgent [101]     Order Specific Question:   Is an on-site pathologist required for this procedure?     Answer:   [place]     Plan:     Problem List Items Addressed This Visit     Obstructive chronic bronchitis without exacerbation    Overview     Stable. Patient with significant smoking history.  48 years at  2 packs a day quit in 2017.  Presents with mmRC 3 symptoms of dyspnea.  Reports 0 hospitalizations or exacerbations in relation to breathing issues.  He denies any increase in cough or sputum production.  Has not been able to afford Trelegy.     Plan:  -continue Trelegy inhalerfor control  -continue albuterol as needed  -congratulated on smoking cessation since 2017  -advise to wean off marijuana cigarettes             Lung nodule    Overview     Patient with significant smoking history, Hx of bladder CA and occupational exposure.    Robotic bronchoscopy scheduled for 9/2/2022    I have explained the risks, benefits and alternatives of the procedure in detail.  The patient voices understanding and all questions have been answered.  The patient agrees to proceed as planned.               Presence of urostomy    Overview     From h/o bladder cancer.   No complications.              Other Visit Diagnoses     Nodule of upper lobe of right lung    -  Primary    Relevant Orders    Pulse Oximetry Q4H    Case Request Endoscopy: ROBOTIC BRONCHOSCOPY with fluoroscopy (Completed)    Full code    Nodule of right lung

## 2022-08-17 NOTE — H&P (VIEW-ONLY)
Subjective:       Patient ID: Blake Guillory is a 62 y.o. male.    Chief Complaint: Abnormal Ct Scan    62 year old former smoker who is previously seen by Tiki Pardo, he is new to me.  He has a history of urinary bladder cancer with a history of urostomy.  Seen by Mr Moellernorma for emphysema, found to have pulmonary nodules that were stable but previous PET with mild hypermetabolic activity.  Continued surveillance was recommended.  He is here for follow up.  Patient with mild SHAFFER which is stable.  He denies fevers, chills or unexpected weight loss.      Review of Systems    Objective:       Vitals:    08/17/22 1555   BP: 118/66   BP Location: Right arm   Patient Position: Sitting   Pulse: 72   SpO2: (!) 94%   Weight: 73.1 kg (161 lb 2.5 oz)   Height: 6' (1.829 m)     Physical Exam   Constitutional: He is oriented to person, place, and time. He appears well-developed and well-nourished.   Cardiovascular: Regular rhythm.   Pulmonary/Chest: Normal expansion. He has no wheezes. He has no rales.   Musculoskeletal:         General: Normal range of motion.   Lymphadenopathy: No supraclavicular adenopathy is present.     He has no cervical adenopathy.   Neurological: He is alert and oriented to person, place, and time.   Skin: Skin is warm and dry.   Psychiatric: He has a normal mood and affect.          Personal Diagnostic Review, personally reviewed with patients.     CT of chest performed on 7/29/2022 without contrast revealed There is upper lobe predominant panlobular emphysema.  There is an irregular soft tissue opacity in the right upper lobe that has increased in size dating back to CT of 12/16/2020 now measuring 2.2 x 3.6 cm in maximum dimensions.  Largest solid component is new when compared to CT of 02/09/2022 and measures 2.6 x 1.4 cm (axial series 4, image 86).  Posteromedial nodular solid component has slightly increased in size when compared to CT of 12/16/2020 now measuring 1.2 cm (series 4, image 75,  previously 0.8 cm).  Additional posterolateral nodular soft tissue component appears stable in size measuring 1.2 cm (series 4, image 88, previously 1.3 cm).  There is an enlarging 0.8 cm solid pulmonary nodule in the left upper lobe (series 4, image 130).  Remaining bilateral calcified and noncalcified solid pulmonary nodules appear unchanged.  No bronchiectasis.  No pneumothorax or pleural effusion.   .  No flowsheet data found.      Assessment:       1. Nodule of upper lobe of right lung    2. Nodule of right lung    3. Presence of urostomy    4. Lung nodule    5. Obstructive chronic bronchitis without exacerbation        Outpatient Encounter Medications as of 8/17/2022   Medication Sig Dispense Refill    albuterol (PROAIR HFA) 90 mcg/actuation inhaler Inhale 2 puffs into the lungs every 6 (six) hours as needed for Wheezing. Rescue 18 g 0    allopurinoL (ZYLOPRIM) 100 MG tablet Take 1 tablet (100 mg total) by mouth once daily. 90 tablet 0    amLODIPine (NORVASC) 10 MG tablet Take 1 tablet (10 mg total) by mouth once daily. 90 tablet 0    aspirin (ECOTRIN) 81 MG EC tablet Take 81 mg by mouth once daily.      atenoloL (TENORMIN) 50 MG tablet TAKE 1 TABLET(50 MG) BY MOUTH EVERY DAY 90 tablet 0    EScitalopram oxalate (LEXAPRO) 20 MG tablet Take 1 tablet (20 mg total) by mouth once daily. 90 tablet 0    fenofibrate (TRICOR) 145 MG tablet Take 1 tablet (145 mg total) by mouth once daily. 90 tablet 0    fluticasone-umeclidin-vilanter (TRELEGY ELLIPTA) 100-62.5-25 mcg DsDv Inhale 1 puff into the lungs once daily. Rinse mouth after use. 60 each 11    gabapentin (NEURONTIN) 400 MG capsule Take 1 capsule (400 mg total) by mouth 2 (two) times daily. 180 capsule 0    HYDROcodone-acetaminophen (NORCO)  mg per tablet Take 1 tablet by mouth every 6 (six) hours as needed for Pain. 28 tablet 0    levothyroxine (SYNTHROID) 88 MCG tablet Take 1 tablet (88 mcg total) by mouth once daily. 90 tablet 0    multivitamin  (THERAGRAN) per tablet Take 1 tablet by mouth once daily.      pantoprazole (PROTONIX) 40 MG tablet Take 1 tablet (40 mg total) by mouth once daily. 90 tablet 0    sodium bicarbonate 325 MG tablet Take 325 mg by mouth 2 (two) times daily.      traZODone (DESYREL) 150 MG tablet Take 1 tablet (150 mg total) by mouth every evening. 90 tablet 0    albuterol-ipratropium (DUO-NEB) 2.5 mg-0.5 mg/3 mL nebulizer solution Take 3 mLs by nebulization every 6 (six) hours as needed for Wheezing. Rescue 50 vial 2     No facility-administered encounter medications on file as of 8/17/2022.     Orders Placed This Encounter   Procedures    Full code     Standing Status:   Standing     Number of Occurrences:   1    Pulse Oximetry Q4H     Standing Status:   Standing     Number of Occurrences:   19    Case Request Endoscopy: ROBOTIC BRONCHOSCOPY with fluoroscopy     Standing Status:   Standing     Number of Occurrences:   1     Order Specific Question:   CPT Code:     Answer:   ME BRONCH W/ EBUS, DIAG OR THERA INTERVENTION PERIPHERAL LESION(S), INCL GUID, ADD ON CODE [89845]     Order Specific Question:   CPT Code:     Answer:   ME BRONCH W/ EBUS, SAMPLING 3 OR MORE NODES, INCL GUIDE [23070]     Order Specific Question:   CPT Code:     Answer:   ME ROBOTIC SURGICAL SYSTEM []     Order Specific Question:   CPT Code:     Answer:   ME BRONCH W/ EBUS, DIAG OR THERA INTERVENTION PERIPHERAL LESION(S), INCL GUID, ADD ON CODE [90868]     Order Specific Question:   CPT Code:     Answer:   ME BRONCH W/ EBUS, SAMPLING 3 OR MORE NODES, INCL GUIDE [70148]     Order Specific Question:   Medical Necessity:     Answer:   Medically Urgent [101]     Order Specific Question:   Is an on-site pathologist required for this procedure?     Answer:   [place]     Plan:     Problem List Items Addressed This Visit     Obstructive chronic bronchitis without exacerbation    Overview     Stable. Patient with significant smoking history.  48 years at  2 packs a day quit in 2017.  Presents with mmRC 3 symptoms of dyspnea.  Reports 0 hospitalizations or exacerbations in relation to breathing issues.  He denies any increase in cough or sputum production.  Has not been able to afford Trelegy.     Plan:  -continue Trelegy inhalerfor control  -continue albuterol as needed  -congratulated on smoking cessation since 2017  -advise to wean off marijuana cigarettes             Lung nodule    Overview     Patient with significant smoking history, Hx of bladder CA and occupational exposure.    Robotic bronchoscopy scheduled for 9/2/2022    I have explained the risks, benefits and alternatives of the procedure in detail.  The patient voices understanding and all questions have been answered.  The patient agrees to proceed as planned.               Presence of urostomy    Overview     From h/o bladder cancer.   No complications.              Other Visit Diagnoses     Nodule of upper lobe of right lung    -  Primary    Relevant Orders    Pulse Oximetry Q4H    Case Request Endoscopy: ROBOTIC BRONCHOSCOPY with fluoroscopy (Completed)    Full code    Nodule of right lung

## 2022-09-01 ENCOUNTER — TELEPHONE (OUTPATIENT)
Dept: PULMONOLOGY | Facility: CLINIC | Age: 62
End: 2022-09-01
Payer: MEDICARE

## 2022-09-01 NOTE — TELEPHONE ENCOUNTER
Attempted to contact patient in regards to his message. In the middle of me leaving message on patient voicemail advising him that [facility], someone picked up patient telephone and hung it up.

## 2022-09-01 NOTE — TELEPHONE ENCOUNTER
----- Message from Merced Strickland sent at 9/1/2022  1:40 PM CDT -----  Regarding: Procedure  Contact: pt @ [phone]  Pt is calling to see what time to arrive for procedure tomorrow? Asking for a call back

## 2022-09-02 ENCOUNTER — HOSPITAL ENCOUNTER (OUTPATIENT)
Facility: HOSPITAL | Age: 62
Discharge: HOME | End: 2022-09-02
Attending: INTERNAL MEDICINE | Admitting: INTERNAL MEDICINE
Payer: MEDICARE

## 2022-09-02 VITALS
DIASTOLIC BLOOD PRESSURE: 78 MMHG | HEIGHT: 72 IN | OXYGEN SATURATION: 96 % | SYSTOLIC BLOOD PRESSURE: 165 MMHG | RESPIRATION RATE: 16 BRPM | BODY MASS INDEX: 21.83 KG/M2 | HEART RATE: 61 BPM | TEMPERATURE: 98 F | WEIGHT: 161.19 LBS

## 2022-09-02 DIAGNOSIS — R91.1 NODULE OF RIGHT LUNG: ICD-10-CM

## 2022-09-02 DIAGNOSIS — M54.9 BACK PAIN, UNSPECIFIED BACK LOCATION, UNSPECIFIED BACK PAIN LATERALITY, UNSPECIFIED CHRONICITY: ICD-10-CM

## 2022-09-02 DIAGNOSIS — R91.1 NODULE OF UPPER LOBE OF RIGHT LUNG: Primary | ICD-10-CM

## 2022-09-02 PROCEDURE — 25000003 PHARM REV CODE 250: Performed by: STUDENT IN AN ORGANIZED HEALTH CARE EDUCATION/TRAINING PROGRAM

## 2022-09-02 PROCEDURE — 25000242 PHARM REV CODE 250 ALT 637 W/ HCPCS: Performed by: INTERNAL MEDICINE

## 2022-09-02 PROCEDURE — 94640 AIRWAY INHALATION TREATMENT: CPT

## 2022-09-02 RX ORDER — ACETAMINOPHEN 500 MG
1000 TABLET ORAL
Status: COMPLETED | OUTPATIENT
Start: 2022-09-02 | End: 2022-09-02

## 2022-09-02 RX ORDER — SODIUM CHLORIDE 9 MG/ML
INJECTION, SOLUTION INTRAVENOUS CONTINUOUS
Status: DISCONTINUED | OUTPATIENT
Start: 2022-09-02 | End: 2022-09-02 | Stop reason: HOSPADM

## 2022-09-02 RX ORDER — ALBUTEROL SULFATE 2.5 MG/.5ML
2.5 SOLUTION RESPIRATORY (INHALATION) EVERY 4 HOURS
Status: DISCONTINUED | OUTPATIENT
Start: 2022-09-02 | End: 2022-09-02 | Stop reason: HOSPADM

## 2022-09-02 RX ORDER — LIDOCAINE HYDROCHLORIDE 10 MG/ML
1 INJECTION, SOLUTION EPIDURAL; INFILTRATION; INTRACAUDAL; PERINEURAL ONCE
Status: DISCONTINUED | OUTPATIENT
Start: 2022-09-02 | End: 2022-09-02 | Stop reason: HOSPADM

## 2022-09-02 RX ADMIN — ACETAMINOPHEN 1000 MG: 500 TABLET ORAL at 10:09

## 2022-09-02 RX ADMIN — ALBUTEROL SULFATE 2.5 MG: 2.5 SOLUTION RESPIRATORY (INHALATION) at 10:09

## 2022-09-02 NOTE — PROGRESS NOTES
On admit, patient c/o 10-12/10 left neck pain with numbness of the skin on neck. Onset about one -two weeks ago, getting worse.Strong  in hands and strong plantar/dorsiflexion.  Dr Rodriguez informed and came to bedside. Case cancelled. Patient and wife wanted to go to the emergency department for evaluation of pain. Patient discharged with all belongings and taken to wheelchair to ED.

## 2022-09-02 NOTE — INTERVAL H&P NOTE
The patient has been examined and the H&P has been reviewed:    I concur with the findings and no changes have occurred since H&P was written.    Anesthesia/Surgery risks, benefits and alternative options discussed and understood by patient/family.          Right upper lobe nodule for robotic bronchoscopy and staging eBUS.  I have explained the risks, benefits and alternatives of the procedure in detail.  The patient voices understanding and all questions have been answered.  The patient agrees to proceed as planned.

## 2022-09-02 NOTE — DISCHARGE SUMMARY
[facility] - [facility] (2nd Fl)  Discharge Note  Short Stay    Procedure(s) (LRB):  ROBOTIC BRONCHOSCOPY with fluoroscopy (N/A)    OUTCOME:  Procedure was cancelled due to excruciating back pain     DISPOSITION: Patient states he is going to the ED    FINAL DIAGNOSIS:  right upper lobe mass and cervical spine pain     FOLLOWUP:  Patient may call to reschedule bronchoscopy    DISCHARGE INSTRUCTIONS:  No discharge procedures on file.     TIME SPENT ON DISCHARGE: 5 minutes

## 2022-09-02 NOTE — INTERVAL H&P NOTE
The patient has been examined and the H&P has been reviewed:    Still last seen, patient states that he has progressively worse neck pain that is 12/10 and planned to go to ED after the procedure.  Has been present for two weeks.  Upon neuro exam, has 5/5 UMS and LMS, some point tenderness of cervical spine.  ROM appears normal except when turns head to left.  Patient reports paresthesia over shoulder.    Due to concern for cervical spine problem, procedure will be defered.

## 2022-09-09 ENCOUNTER — HOSPITAL ENCOUNTER (EMERGENCY)
Facility: OTHER | Age: 62
Discharge: HOME | End: 2022-09-09
Attending: EMERGENCY MEDICINE
Payer: MEDICARE

## 2022-09-09 VITALS
SYSTOLIC BLOOD PRESSURE: 165 MMHG | HEART RATE: 79 BPM | HEIGHT: 72 IN | RESPIRATION RATE: 18 BRPM | WEIGHT: 170 LBS | TEMPERATURE: 99 F | BODY MASS INDEX: 23.03 KG/M2 | OXYGEN SATURATION: 100 % | DIASTOLIC BLOOD PRESSURE: 80 MMHG

## 2022-09-09 DIAGNOSIS — M54.12 CERVICAL RADICULOPATHY: Primary | ICD-10-CM

## 2022-09-09 PROCEDURE — 96372 THER/PROPH/DIAG INJ SC/IM: CPT | Performed by: NURSE PRACTITIONER

## 2022-09-09 PROCEDURE — 63600175 PHARM REV CODE 636 W HCPCS: Performed by: NURSE PRACTITIONER

## 2022-09-09 PROCEDURE — 25000003 PHARM REV CODE 250: Performed by: NURSE PRACTITIONER

## 2022-09-09 PROCEDURE — 99284 EMERGENCY DEPT VISIT MOD MDM: CPT | Mod: 25

## 2022-09-09 RX ORDER — LIDOCAINE 50 MG/G
1 PATCH TOPICAL
Status: DISCONTINUED | OUTPATIENT
Start: 2022-09-09 | End: 2022-09-09 | Stop reason: HOSPADM

## 2022-09-09 RX ORDER — KETOROLAC TROMETHAMINE 30 MG/ML
30 INJECTION, SOLUTION INTRAMUSCULAR; INTRAVENOUS
Status: COMPLETED | OUTPATIENT
Start: 2022-09-09 | End: 2022-09-09

## 2022-09-09 RX ORDER — ORPHENADRINE CITRATE 100 MG/1
100 TABLET, EXTENDED RELEASE ORAL
Status: COMPLETED | OUTPATIENT
Start: 2022-09-09 | End: 2022-09-09

## 2022-09-09 RX ORDER — METHOCARBAMOL 500 MG/1
1000 TABLET, FILM COATED ORAL 3 TIMES DAILY
Qty: 30 TABLET | Refills: 0 | Status: SHIPPED | OUTPATIENT
Start: 2022-09-09 | End: 2022-09-14

## 2022-09-09 RX ORDER — LIDOCAINE 50 MG/G
1 PATCH TOPICAL DAILY
Qty: 15 PATCH | Refills: 0 | Status: SHIPPED | OUTPATIENT
Start: 2022-09-09 | End: 2023-08-30

## 2022-09-09 RX ORDER — KETOROLAC TROMETHAMINE 10 MG/1
10 TABLET, FILM COATED ORAL EVERY 6 HOURS
Qty: 20 TABLET | Refills: 0 | Status: SHIPPED | OUTPATIENT
Start: 2022-09-09 | End: 2022-09-14

## 2022-09-09 RX ORDER — METHYLPREDNISOLONE 4 MG/1
TABLET ORAL
Qty: 21 EACH | Refills: 0 | Status: SHIPPED | OUTPATIENT
Start: 2022-09-09 | End: 2023-06-01

## 2022-09-09 RX ADMIN — LIDOCAINE 1 PATCH: 50 PATCH CUTANEOUS at 02:09

## 2022-09-09 RX ADMIN — ORPHENADRINE CITRATE 100 MG: 100 TABLET, EXTENDED RELEASE ORAL at 02:09

## 2022-09-09 RX ADMIN — KETOROLAC TROMETHAMINE 30 MG: 30 INJECTION, SOLUTION INTRAMUSCULAR; INTRAVENOUS at 02:09

## 2022-09-09 NOTE — ED TRIAGE NOTES
Chief Complaint   Patient presents with    Shoulder Pain     Non-traumatic left shoulder pain. Just finished course of steroids and muscle relaxers but state pain has returned.      Pt presents to ED w/ left shoulder pain. Pt finished course of steroids/muscle relaxants. Pain returned 10/10, AAOX4 and in no acute distress.

## 2022-09-09 NOTE — ED PROVIDER NOTES
Source of History:  Patient     Chief complaint:  Shoulder Pain (Non-traumatic left shoulder pain. Just finished course of steroids and muscle relaxers but state pain has returned. )      HPI:  Blake Guillory is a 62 y.o. male presenting to the emergency department with complaint of left shoulder, neck pain.  Patient has severe cervical spondylosis.  He was seen in the emergency department on the 2nd of this month and had an MRI done.  Has an appointment with back and spine next Tuesday.  Reports ran out of his steroids and muscle relaxers and pain returned.      This is the extent to the patients complaints today here in the emergency department.    PMH:  As per HPI and below:  Past Medical History:   Diagnosis Date    Anemia of chronic disease     Aneurysm of right common iliac artery     Arthritis     Blood transfusion     CAD (coronary artery disease)     Chest pain of uncertain etiology     CKD (chronic kidney disease), stage IV     COPD (chronic obstructive pulmonary disease)     Coronary artery calcification 8/2/2021    DDD (degenerative disc disease), lumbar     Frequency of urination     General anesthetics causing adverse effect in therapeutic use     pt states he wakes up very violently from anesthesia    GERD (gastroesophageal reflux disease)     Gout     History of bladder cancer     History of urostomy     Hydronephrosis     Hyperparathyroidism     Hypertension     Hypertriglyceridemia     Hypothyroidism (acquired)     Insomnia     Kidney stone     Limited joint range of motion right hip and leg    Lumbar facet arthropathy     Lumbar spondylosis     Mixed anxiety and depressive disorder     Neuropathy     Orchalgia     SHARMIN (obstructive sleep apnea)     Personal history of bladder cancer     PVD (peripheral vascular disease)     Sacroiliitis     Stricture or kinking of ureter     Ureteral stent displacement     Urinary retention     Vitamin D deficiency     Wears glasses      Past Surgical History:    Procedure Laterality Date    APPENDECTOMY  12/30/2015    Procedure: APPENDECTOMY;  Surgeon: CHAD Bo MD;  Location: [facility] OR;  Service: Urology;;    BALLOON DILATION OF URETER  01/22/2018    BOWEL RESECTION      COLONOSCOPY      CREATION OF URETEROILEAL CONDUIT Left 07/05/2013    CYSTOSCOPY      >1  episodes for bilat ureteral stent exchange    CYSTOSCOPY W/ URETERAL STENT PLACEMENT Right 01/22/2018    and 6/9/2015    CYSTOSCOPY W/ URETERAL STENT PLACEMENT Bilateral 10/07/2015    ESOPHAGOGASTRODUODENOSCOPY  04/12/2022    W/ BIOPSY    ESOPHAGOGASTRODUODENOSCOPY N/A 4/12/2022    Procedure: EGD (ESOPHAGOGASTRODUODENOSCOPY);  Surgeon: Tito Fan MD;  Location: [facility];  Service: Endoscopy;  Laterality: N/A;    EXPLORATORY LAPAROTOMY  07/05/2013    EXPLORATORY LAPAROTOMY W/ BOWEL RESECTION  12/30/2015    EXTRACORPOREAL SHOCK WAVE LITHOTRIPSY Right 06/07/2021    Procedure: LITHOTRIPSY, ESWL;  Surgeon: CHAD Bo MD;  Location: [facility] OR;  Service: Urology;  Laterality: Right;  RN PREOP 5/31/2021   VACCINATED    EXTRACORPOREAL SHOCK WAVE LITHOTRIPSY  06/07/2021    FRACTURE SURGERY      HAND SURGERY      HEMICOLECTOMY  12/30/2015    HIP SURGERY  2012    Rt hip septic    LAPAROSCOPIC CHOLECYSTECTOMY N/A 08/11/2021    Procedure: CHOLECYSTECTOMY, LAPAROSCOPIC;  Surgeon: Remy Xiong MD;  Location: [facility] OR;  Service: General;  Laterality: N/A;  gianni video confirmed 8/5/dme    LYMPH NODE DISSECTION  11/08/2012    pelvic    LYSIS OF ADHESIONS  12/30/2015    MOUTH SURGERY  2000    all teeth extracted    PERCUTANEOUS NEPHROLITHOTOMY Left 07/22/2020    Procedure: NEPHROLITHOTOMY, PERCUTANEOUS;  Surgeon: CHAD Bo MD;  Location: [facility] OR;  Service: Urology;  Laterality: Left;  OR 9 ONLY  RN PREOP 7/20/2020----COVID NEGATIVE ON 7/20    PERCUTANEOUS NEPHROLITHOTOMY Bilateral 6/8/2022    Procedure: BILATERAL PERCUTANEOUS NEPHROSCOPY, BILATERAL ANTEGRADE PYELOGRAM, BILATERAL NEPHROSTOMY TUBE  PLACEMENT;  Surgeon: CHAD Bo MD;  Location: [facility] OR;  Service: Urology;  Laterality: Bilateral;  room 9 only  RN PREOP 2022----T/S IN AM    PERCUTANEOUS NEPHROSTOMY Left 2018    PERCUTANEOUS REPLACEMENT OF NEPHROSTOMY TUBE Bilateral 5/10/2022    Procedure: REPLACEMENT, NEPHROSTOMY TUBE, PERCUTANEOUS;  Surgeon: Chino Vo MD;  Location: [facility] CATH LAB;  Service: Radiology;  Laterality: Bilateral;    RADICAL CYSTECTOMY  2012    RADIOFREQUENCY THERMOCOAGULATION  2014    median branch lumbar    RADIOFREQUENCY THERMOCOAGULATION  2014    median branch lumbar    REPAIR, HERNIA, PARASTOMAL, LAPAROSCOPIC  2017    SMALL INTESTINE SURGERY  2017    urosotomy bag  2011    R abdomen       Social History     Tobacco Use    Smoking status: Former     Packs/day: 0.30     Years: 42.00     Pack years: 12.60     Types: Cigarettes     Quit date: 2017     Years since quittin.6    Smokeless tobacco: Never    Tobacco comments:     in cessation program   Substance Use Topics    Alcohol use: Yes     Alcohol/week: 14.0 - 21.0 standard drinks     Types: 14 - 21 Cans of beer per week     Comment: occassional    Drug use: Yes     Types: Marijuana     Comment: occass     Review of patient's allergies indicates:  No Known Allergies    ROS: As per HPI and below:  General: No fever.  No chills.  Eyes: No visual changes.   ENT: No sore throat. No ear pain.  Urinary: No abnormal urination.  MSK:  Neck, shoulder pain  Integument: No rashes or lesions.      Physical Exam:    BP (!) 165/80 (BP Location: Right arm, Patient Position: Sitting)   Pulse 79   Temp 98.6 °F (37 °C) (Oral)   Resp 18   Ht 6' (1.829 m)   Wt 77.1 kg (170 lb)   SpO2 100%   BMI 23.06 kg/m²   Vitals:    22 1347 22 1559   BP: (!) 172/78 (!) 165/80   Pulse: 83 79   Resp: 18 18   Temp: 98.8 °F (37.1 °C) 98.6 °F (37 °C)   TempSrc: Oral Oral   SpO2: 100% 100%   Weight: 77.1 kg (170 lb)    Height: 6' (1.829 m)         Nursing note and vital signs reviewed.  Appearance: No acute distress.  Eyes: No conjunctival injection.  Extraocular muscles are intact.  ENT: Normal phonation.  Cardio:  Radial pulse +2 bilaterally  Musculoskeletal:  Decreased range of motion of the neck is noted, there is spasm and paraspinal muscular tenderness.  Decreased range of motion of the shoulders present due to pain.   strength is equal bilaterally.  Skin: No rashes seen.  Good turgor.  No abrasions.  No ecchymoses.  Neuro:  No sensory deficits, strength 5/5 upper extremities.  Mental Status:  Alert and oriented x 3.  Appropriate, conversant.    Labs Reviewed - No data to display    No orders to display         Initial Impression/ Differential Dx:  Differential Diagnosis includes, but is not limited to:  Cervical spine strain, cervical spine fracture, ligamentous injury, vascular injury, esophageal injury, pneumothorax, pharyngitis, peritonsillar abscess    MDM:    62 y.o. male with severe cervical spondylosis and chronic neck pain, ran out of this muscle relaxers and steroids, has an appointment with Back and Spine in next Tuesday.  Patient was treated with Toradol, lidocaine patches and Norflex emergency department reported a significant improvement in symptoms.  Will discharge home with NSAIDs, lidocaine patches and Robaxin and discussed needs follow-up with Back and Spine as appointment next week.  He was agreeable with this plan.         Diagnostic Impression:    1. Cervical radiculopathy         ED Disposition Condition    Discharge Stable            ED Prescriptions       Medication Sig Dispense Start Date End Date Auth. Provider    ketorolac (TORADOL) 10 mg tablet Take 1 tablet (10 mg total) by mouth every 6 (six) hours. for 5 days 20 tablet 9/9/2022 9/14/2022 EMA Bello    LIDOcaine (LIDODERM) 5 % Place 1 patch onto the skin once daily. Remove & Discard patch within 12 hours or as directed by MD 15 patch 9/9/2022 -- Freda ROBERTSON  EMA Samuels    methocarbamoL (ROBAXIN) 500 MG Tab Take 2 tablets (1,000 mg total) by mouth 3 (three) times daily. for 5 days 30 tablet 9/9/2022 9/14/2022 EMA Bello    methylPREDNISolone (MEDROL DOSEPACK) 4 mg tablet use as directed 21 each 9/9/2022 -- EMA Bello          Follow-up Information       Follow up With Specialties Details Why Contact Info    Varun Zeng MD Internal Medicine, Physical Medicine and Rehabilitation Schedule an appointment as soon as possible for a visit in 3 days  [address]  [facility] [address]  [phone]      [facility] [place], operated by [facility] Emergency Dept Emergency Medicine Go to  If symptoms worsen [address] [facility] [address] [phone]            ED Prescriptions       Medication Sig Dispense Start Date End Date Auth. Provider    ketorolac (TORADOL) 10 mg tablet Take 1 tablet (10 mg total) by mouth every 6 (six) hours. for 5 days 20 tablet 9/9/2022 9/14/2022 EMA Bello    LIDOcaine (LIDODERM) 5 % Place 1 patch onto the skin once daily. Remove & Discard patch within 12 hours or as directed by MD 15 patch 9/9/2022 -- EMA Bello    methocarbamoL (ROBAXIN) 500 MG Tab Take 2 tablets (1,000 mg total) by mouth 3 (three) times daily. for 5 days 30 tablet 9/9/2022 9/14/2022 EMA Bello    methylPREDNISolone (MEDROL DOSEPACK) 4 mg tablet use as directed 21 each 9/9/2022 -- EMA Bello FNP  09/09/22 2008

## 2022-09-12 ENCOUNTER — TELEPHONE (OUTPATIENT)
Dept: SPINE | Facility: CLINIC | Age: 62
End: 2022-09-12
Payer: MEDICARE

## 2022-09-12 NOTE — TELEPHONE ENCOUNTER
This message is for patient in regards to his or hers appointment 09/13/22 at 2:20 pm with Candie Brito Np.    On the 4th floor [address] in the back and spine center. We do ask that patients arrive 15 minutes before appointment time.  Patient may contact [phone] if there is any questions or concerns. Thank you for entrusting in [facility] with your care.     Staff left voicemail

## 2022-09-13 ENCOUNTER — OFFICE VISIT (OUTPATIENT)
Dept: SPINE | Facility: CLINIC | Age: 62
End: 2022-09-13
Payer: MEDICARE

## 2022-09-13 VITALS
BODY MASS INDEX: 21.5 KG/M2 | TEMPERATURE: 99 F | WEIGHT: 158.75 LBS | DIASTOLIC BLOOD PRESSURE: 59 MMHG | HEART RATE: 70 BPM | HEIGHT: 72 IN | SYSTOLIC BLOOD PRESSURE: 111 MMHG

## 2022-09-13 DIAGNOSIS — M54.2 CERVICALGIA: ICD-10-CM

## 2022-09-13 DIAGNOSIS — M47.812 CERVICAL SPONDYLOSIS: Primary | ICD-10-CM

## 2022-09-13 DIAGNOSIS — M50.30 DDD (DEGENERATIVE DISC DISEASE), CERVICAL: ICD-10-CM

## 2022-09-13 PROCEDURE — 99999 PR PBB SHADOW E&M-EST. PATIENT-LVL V: CPT | Mod: PBBFAC,,, | Performed by: NURSE PRACTITIONER

## 2022-09-13 PROCEDURE — 99214 PR OFFICE/OUTPT VISIT, EST, LEVL IV, 30-39 MIN: ICD-10-PCS | Mod: S$PBB,,, | Performed by: NURSE PRACTITIONER

## 2022-09-13 PROCEDURE — 99999 PR PBB SHADOW E&M-EST. PATIENT-LVL V: ICD-10-PCS | Mod: PBBFAC,,, | Performed by: NURSE PRACTITIONER

## 2022-09-13 PROCEDURE — 99215 OFFICE O/P EST HI 40 MIN: CPT | Mod: PBBFAC | Performed by: NURSE PRACTITIONER

## 2022-09-13 PROCEDURE — 99214 OFFICE O/P EST MOD 30 MIN: CPT | Mod: S$PBB,,, | Performed by: NURSE PRACTITIONER

## 2022-09-13 NOTE — PROGRESS NOTES
Subjective:      Patient ID: Blake Guillory is a 62 y.o. male.    Chief Complaint: Shoulder Pain (left) and Neck Pain (left)    HPI Mr. Tena is a 62 year old male here for evaluation of neck pain, referred by Padma Elder PA-C for consultation.     C/o left neck and shoulder pain which started about one month ago without CEDRICK  No prior Hx neck problems   Denies arm pain, notes numbness/tingling to fingers  Went to ED x 2, received medrol dose pack, toradol, muscle relaxers  Cannot take NSAIDS due to CKD  Takes Norco and gabapentin  No PT or injections for neck    Cervical MRI 2022    FINDINGS:  C1-C2: Dens is intact.  Pre dens space is maintained.     Alignment: Straightening of the cervical lordosis.  No spondylolisthesis.     Vertebrae: No acute fracture or abnormal infiltrative process.     Discs: Normal height and signal.     Cord: Normal.  No epidural collections.     Skull base and craniocervical junction: Normal.     Degenerative findings:     C2-C3: No spinal canal stenosis or neural foraminal narrowing.     C3-C4: Posterior disc osteophyte complex with facet joint arthropathy result in moderate left and mild right neural foraminal narrowing.     C4-C5: Posterior disc osteophyte complex with facet joint arthropathy resulting in mild spinal canal stenosis with moderate left and severe right neural foraminal narrowing.     C5-C6: Posterior disc osteophyte complex with facet joint arthropathy greater on the left resulting in mild spinal canal stenosis with severe left and mild right neural foraminal narrowing.     C6-C7: Posterior disc osteophyte complex with facet joint arthropathy resulting in mild spinal canal stenosis with mild left and severe right neural foraminal narrowing.     C7-T1: No spinal canal stenosis or neural foraminal narrowing.     Paraspinal muscles & soft tissues: Unremarkable.     Impression:     Cervical spondylosis most significant from C4-C5 through C6-C7 with mild spinal canal  stenosis and up to severe neural foraminal narrowing.    Past Medical History:   Diagnosis Date    Anemia of chronic disease     Aneurysm of right common iliac artery     Arthritis     Blood transfusion     CAD (coronary artery disease)     Chest pain of uncertain etiology     CKD (chronic kidney disease), stage IV     COPD (chronic obstructive pulmonary disease)     Coronary artery calcification 8/2/2021    DDD (degenerative disc disease), lumbar     Frequency of urination     General anesthetics causing adverse effect in therapeutic use     pt states he wakes up very violently from anesthesia    GERD (gastroesophageal reflux disease)     Gout     History of bladder cancer     History of urostomy     Hydronephrosis     Hyperparathyroidism     Hypertension     Hypertriglyceridemia     Hypothyroidism (acquired)     Insomnia     Kidney stone     Limited joint range of motion right hip and leg    Lumbar facet arthropathy     Lumbar spondylosis     Mixed anxiety and depressive disorder     Neuropathy     Orchalgia     SHARMIN (obstructive sleep apnea)     Personal history of bladder cancer     PVD (peripheral vascular disease)     Sacroiliitis     Stricture or kinking of ureter     Ureteral stent displacement     Urinary retention     Vitamin D deficiency     Wears glasses        Past Surgical History:   Procedure Laterality Date    APPENDECTOMY  12/30/2015    Procedure: APPENDECTOMY;  Surgeon: CHAD Bo MD;  Location: [facility];  Service: Urology;;    BALLOON DILATION OF URETER  01/22/2018    BOWEL RESECTION      COLONOSCOPY      CREATION OF URETEROILEAL CONDUIT Left 07/05/2013    CYSTOSCOPY      >1  episodes for bilat ureteral stent exchange    CYSTOSCOPY W/ URETERAL STENT PLACEMENT Right 01/22/2018    and 6/9/2015    CYSTOSCOPY W/ URETERAL STENT PLACEMENT Bilateral 10/07/2015    ESOPHAGOGASTRODUODENOSCOPY  04/12/2022    W/ BIOPSY    ESOPHAGOGASTRODUODENOSCOPY N/A 4/12/2022    Procedure: EGD  (ESOPHAGOGASTRODUODENOSCOPY);  Surgeon: Tito Fan MD;  Location: [facility] ENDO;  Service: Endoscopy;  Laterality: N/A;    EXPLORATORY LAPAROTOMY  07/05/2013    EXPLORATORY LAPAROTOMY W/ BOWEL RESECTION  12/30/2015    EXTRACORPOREAL SHOCK WAVE LITHOTRIPSY Right 06/07/2021    Procedure: LITHOTRIPSY, ESWL;  Surgeon: CHAD Bo MD;  Location: [facility] OR;  Service: Urology;  Laterality: Right;  RN PREOP 5/31/2021   VACCINATED    EXTRACORPOREAL SHOCK WAVE LITHOTRIPSY  06/07/2021    FRACTURE SURGERY      HAND SURGERY      HEMICOLECTOMY  12/30/2015    HIP SURGERY  2012    Rt hip septic    LAPAROSCOPIC CHOLECYSTECTOMY N/A 08/11/2021    Procedure: CHOLECYSTECTOMY, LAPAROSCOPIC;  Surgeon: Remy Xiong MD;  Location: [facility] OR;  Service: General;  Laterality: N/A;  gianni video confirmed 8/5/dme    LYMPH NODE DISSECTION  11/08/2012    pelvic    LYSIS OF ADHESIONS  12/30/2015    MOUTH SURGERY  2000    all teeth extracted    PERCUTANEOUS NEPHROLITHOTOMY Left 07/22/2020    Procedure: NEPHROLITHOTOMY, PERCUTANEOUS;  Surgeon: CHAD Bo MD;  Location: [facility] OR;  Service: Urology;  Laterality: Left;  OR 9 ONLY  RN PREOP 7/20/2020----COVID NEGATIVE ON 7/20    PERCUTANEOUS NEPHROLITHOTOMY Bilateral 6/8/2022    Procedure: BILATERAL PERCUTANEOUS NEPHROSCOPY, BILATERAL ANTEGRADE PYELOGRAM, BILATERAL NEPHROSTOMY TUBE PLACEMENT;  Surgeon: CHAD Bo MD;  Location: [facility] OR;  Service: Urology;  Laterality: Bilateral;  room 9 only  RN PREOP 6/1/2022----T/S IN AM    PERCUTANEOUS NEPHROSTOMY Left 01/02/2018    PERCUTANEOUS REPLACEMENT OF NEPHROSTOMY TUBE Bilateral 5/10/2022    Procedure: REPLACEMENT, NEPHROSTOMY TUBE, PERCUTANEOUS;  Surgeon: Chino Vo MD;  Location: [facility] CATH LAB;  Service: Radiology;  Laterality: Bilateral;    RADICAL CYSTECTOMY  11/08/2012    RADIOFREQUENCY THERMOCOAGULATION  12/12/2014    median branch lumbar    RADIOFREQUENCY THERMOCOAGULATION  12/01/2014    median branch lumbar    REPAIR,  HERNIA, PARASTOMAL, LAPAROSCOPIC  2017    SMALL INTESTINE SURGERY  2017    urosotomy bag  2011    R abdomen       Family History   Adopted: Yes   Family history unknown: Yes       Social History     Socioeconomic History    Marital status:    Occupational History    Occupation: [employer]     Employer: [employer]   Tobacco Use    Smoking status: Former     Packs/day: 0.30     Years: 42.00     Pack years: 12.60     Types: Cigarettes     Quit date: 2017     Years since quittin.6    Smokeless tobacco: Never    Tobacco comments:     in cessation program   Substance and Sexual Activity    Alcohol use: Yes     Alcohol/week: 14.0 - 21.0 standard drinks     Types: 14 - 21 Cans of beer per week     Comment: occassional    Drug use: Yes     Types: Marijuana     Comment: occass    Sexual activity: Yes     Partners: Female     Birth control/protection: None       Current Outpatient Medications   Medication Sig Dispense Refill    albuterol (PROAIR HFA) 90 mcg/actuation inhaler Inhale 2 puffs into the lungs every 6 (six) hours as needed for Wheezing. Rescue 18 g 0    albuterol-ipratropium (DUO-NEB) 2.5 mg-0.5 mg/3 mL nebulizer solution Take 3 mLs by nebulization every 6 (six) hours as needed for Wheezing. Rescue 50 vial 2    allopurinoL (ZYLOPRIM) 100 MG tablet Take 1 tablet (100 mg total) by mouth once daily. 90 tablet 0    amLODIPine (NORVASC) 10 MG tablet Take 1 tablet (10 mg total) by mouth once daily. 90 tablet 0    aspirin (ECOTRIN) 81 MG EC tablet Take 81 mg by mouth once daily.      atenoloL (TENORMIN) 50 MG tablet TAKE 1 TABLET(50 MG) BY MOUTH EVERY DAY 90 tablet 0    EScitalopram oxalate (LEXAPRO) 20 MG tablet Take 1 tablet (20 mg total) by mouth once daily. 90 tablet 0    fenofibrate (TRICOR) 145 MG tablet Take 1 tablet (145 mg total) by mouth once daily. 90 tablet 0    fluticasone-umeclidin-vilanter (TRELEGY ELLIPTA) 100-62.5-25 mcg DsDv Inhale 1 puff into the lungs once  daily. Rinse mouth after use. 60 each 11    gabapentin (NEURONTIN) 400 MG capsule Take 1 capsule (400 mg total) by mouth 2 (two) times daily. 180 capsule 0    HYDROcodone-acetaminophen (NORCO)  mg per tablet Take 1 tablet by mouth every 6 (six) hours as needed for Pain. 28 tablet 0    ketorolac (TORADOL) 10 mg tablet Take 1 tablet (10 mg total) by mouth every 6 (six) hours. for 5 days 20 tablet 0    levothyroxine (SYNTHROID) 88 MCG tablet Take 1 tablet (88 mcg total) by mouth once daily. 90 tablet 0    LIDOcaine (LIDODERM) 5 % Place 1 patch onto the skin once daily. Remove & Discard patch within 12 hours or as directed by MD 15 patch 0    methocarbamoL (ROBAXIN) 500 MG Tab Take 2 tablets (1,000 mg total) by mouth 3 (three) times daily. for 5 days 30 tablet 0    methylPREDNISolone (MEDROL DOSEPACK) 4 mg tablet  1 each 0    methylPREDNISolone (MEDROL DOSEPACK) 4 mg tablet use as directed 21 each 0    multivitamin (THERAGRAN) per tablet Take 1 tablet by mouth once daily.      pantoprazole (PROTONIX) 40 MG tablet Take 1 tablet (40 mg total) by mouth once daily. 90 tablet 0    sodium bicarbonate 325 MG tablet Take 325 mg by mouth 2 (two) times daily.      traZODone (DESYREL) 150 MG tablet Take 1 tablet (150 mg total) by mouth every evening. 90 tablet 0     No current facility-administered medications for this visit.       Review of patient's allergies indicates:  No Known Allergies      Review of Systems   Constitutional: Positive for weight loss. Negative for fever.   Cardiovascular:  Negative for chest pain.   Respiratory:  Positive for shortness of breath.    Musculoskeletal:  Positive for neck pain.   Gastrointestinal:  Negative for bowel incontinence.   Genitourinary:  Negative for bladder incontinence.   Neurological:  Positive for numbness (hands) and paresthesias (hands). Negative for sensory change.       Objective:        General: Blake is well-developed, well-nourished, appears stated age, in no acute  distress, alert and oriented to time, place and person.     General    Vitals reviewed.  Constitutional: He is oriented to person, place, and time. He appears well-developed and well-nourished.   HENT:   Head: Atraumatic.   Nose: Nose normal.   Eyes: Conjunctivae are normal.   Cardiovascular:  Normal rate.            Pulmonary/Chest: Effort normal.   Abdominal: He exhibits no distension.   Neurological: He is alert and oriented to person, place, and time.   Psychiatric: He has a normal mood and affect. His behavior is normal. Judgment and thought content normal.     General Musculoskeletal Exam   Gait: normal     Back (L-Spine & T-Spine) / Neck (C-Spine) Exam     Tenderness Left paramedian tenderness of the Upper C-Spine and Lower C-Spine.     Neck (C-Spine) Range of Motion   Flexion:      Normal  Extension:  Normal  Right Lateral Bend: abnormal  Left Lateral Bend: abnormal  Right Rotation: abnormal  Left Rotation: abnormal    Spinal Sensation   Right Side Sensation  C-Spine Level: normal   Left Side Sensation  C-Spine Level: normal    Other   He has no scoliosis .  Spinal Kyphosis:  Absent      Muscle Strength   Right Upper Extremity   Biceps: 5/5   Deltoid:  5/5  Triceps:  5/5  : 5/5   Finger Extensors:  5/5  Left Upper Extremity  Biceps: 5/5   Deltoid:  5/5  Triceps:  5/5  :  5/5   Finger Extensors:  5/5  Right Lower Extremity   Hip Flexion: 5/5   Quadriceps:  5/5   Anterior tibial:  5/5   EHL:  5/5  Left Lower Extremity   Hip Flexion: 5/5   Quadriceps:  5/5   Anterior tibial:  5/5   EHL:  5/5    Reflexes     Left Side  Biceps:  2+  Brachioradialis:  2+  Left Trevizo's Sign:  Absent  Babinski Sign:  absent    Right Side   Biceps:  2+  Brachioradialis:  2+  Right Trevizo's Sign:  absent  Babinski Sign:  absent    Vascular Exam     Right Pulses        Carotid:                  2+    Left Pulses        Carotid:                  2+            Assessment:       1. Cervical spondylosis    2. DDD (degenerative  disc disease), cervical    3. Cervicalgia           Plan:          Prior imaging and records reviewed today  We discussed neck pain and the nature of neck pain.  We discussed that it is not one thing that causes the pain but an accumulation of multiple things that we do.    Referral to PT for neck pain and good HEP  Consider referral to pain clinic pending relief with PT  We discussed posture sitting and the importance of trying to sit better.    We discussed the benefits of therapy and exercise and continuing to move.   Cannot take NSAIDs due to CKD  RTC for followup in 8 weeks    More than 50% of the total time  of 45 minutes was spent face to face in counseling on diagnosis and treatment options. I also counseled patient  on common and most usual side effect of prescribed medications.  I reviewed Primary care , and other specialty's notes to better coordinate patient's care. All questions were answered, and patient voiced understanding.      Follow-up: Follow up in about 8 weeks (around 11/8/2022). If there are any questions prior to this, the patient was instructed to contact the office.

## 2022-09-20 ENCOUNTER — PATIENT MESSAGE (OUTPATIENT)
Dept: SPINE | Facility: CLINIC | Age: 62
End: 2022-09-20
Payer: MEDICARE

## 2022-10-12 ENCOUNTER — TELEPHONE (OUTPATIENT)
Dept: PULMONOLOGY | Facility: CLINIC | Age: 62
End: 2022-10-12
Payer: MEDICARE

## 2022-10-12 NOTE — TELEPHONE ENCOUNTER
Spoke with patient and rescheduled appointment          ----- Message from Nayeli Santiago sent at 10/12/2022  4:16 PM CDT -----  Contact: pt wife  Pt requesting call back RE: Pt is unsure which test should be scheduled, following canceled appt on 10/3 and 10/7 please call to advise        Confirmed contact below:  Contact Name:Blake Guillory  Phone Number: [phone]

## 2022-11-14 PROBLEM — N17.9 ACUTE KIDNEY INJURY SUPERIMPOSED ON CKD: Status: RESOLVED | Noted: 2018-03-22 | Resolved: 2022-11-14

## 2022-11-14 PROBLEM — N18.9 ACUTE KIDNEY INJURY SUPERIMPOSED ON CKD: Status: RESOLVED | Noted: 2018-03-22 | Resolved: 2022-11-14

## 2022-11-30 ENCOUNTER — OFFICE VISIT (OUTPATIENT)
Dept: PULMONOLOGY | Facility: CLINIC | Age: 62
End: 2022-11-30
Payer: MEDICARE

## 2022-11-30 VITALS
DIASTOLIC BLOOD PRESSURE: 73 MMHG | BODY MASS INDEX: 21.38 KG/M2 | SYSTOLIC BLOOD PRESSURE: 131 MMHG | HEART RATE: 74 BPM | OXYGEN SATURATION: 92 % | HEIGHT: 72 IN | WEIGHT: 157.88 LBS

## 2022-11-30 DIAGNOSIS — R91.1 LUNG NODULE: ICD-10-CM

## 2022-11-30 DIAGNOSIS — R91.1 PULMONARY NODULE: Primary | ICD-10-CM

## 2022-11-30 DIAGNOSIS — J43.2 CENTRILOBULAR EMPHYSEMA: ICD-10-CM

## 2022-11-30 PROCEDURE — 99215 PR OFFICE/OUTPT VISIT, EST, LEVL V, 40-54 MIN: ICD-10-PCS | Mod: S$PBB,,, | Performed by: INTERNAL MEDICINE

## 2022-11-30 PROCEDURE — 99999 PR PBB SHADOW E&M-EST. PATIENT-LVL V: ICD-10-PCS | Mod: PBBFAC,,, | Performed by: INTERNAL MEDICINE

## 2022-11-30 PROCEDURE — 99215 OFFICE O/P EST HI 40 MIN: CPT | Mod: PBBFAC | Performed by: INTERNAL MEDICINE

## 2022-11-30 PROCEDURE — 99215 OFFICE O/P EST HI 40 MIN: CPT | Mod: S$PBB,,, | Performed by: INTERNAL MEDICINE

## 2022-11-30 PROCEDURE — 99999 PR PBB SHADOW E&M-EST. PATIENT-LVL V: CPT | Mod: PBBFAC,,, | Performed by: INTERNAL MEDICINE

## 2022-11-30 NOTE — PROGRESS NOTES
Blake Guillory  was seen as a new patient to me for the evaluation of  pulmonary nodule.    CHIEF COMPLAINT:  Neck Pain      HISTORY OF PRESENT ILLNESS: Blake Guillory is a 62 y.o. male  has a past medical history of Anemia of chronic disease, Aneurysm of right common iliac artery, Arthritis, Blood transfusion, CAD (coronary artery disease), Chest pain of uncertain etiology, CKD (chronic kidney disease), stage IV, COPD (chronic obstructive pulmonary disease), Coronary artery calcification (8/2/2021), DDD (degenerative disc disease), lumbar, Frequency of urination, General anesthetics causing adverse effect in therapeutic use, GERD (gastroesophageal reflux disease), Gout, History of bladder cancer, History of urostomy, Hydronephrosis, Hyperparathyroidism, Hypertension, Hypertriglyceridemia, Hypothyroidism (acquired), Insomnia, Kidney stone, Limited joint range of motion (right hip and leg), Lumbar facet arthropathy, Lumbar spondylosis, Mixed anxiety and depressive disorder, Neuropathy, Orchalgia, SHARMIN (obstructive sleep apnea), Personal history of bladder cancer, PVD (peripheral vascular disease), Sacroiliitis, Stricture or kinking of ureter, Ureteral stent displacement, Urinary retention, Vitamin D deficiency, and Wears glasses.  Patient was followed by pulm at [facility] [place], Dr. Rodriguez and Tiki Francis for copd and pulmonary nodules.  Per Dr. Rodriguez's last note on 8/17/22, rul nodule has enlarged on 7/29/22.  The plan was for robotic bronchoscopy.  Per chart reviewed, patient was schedule for 9/2/22.  However, patient no showed and was lost to follow up.  Patient is here for pulmonary follow up.    Patient quit smoking in 2018.  Used to smoke cigars, pipe, cigarrette for 50 years.  Chronic ramos x 1/4 block.  +chronic cough and wheezing.  Currently on trelegy, nebulizer and albuterol hfa.  No fever/chill.  +weight loss 40 lbs since 2010.  No hemoptysis.      PAST MEDICAL HISTORY:    Active Ambulatory Problems     Diagnosis Date  Noted    Insomnia 04/15/2014    Lumbar facet arthropathy 08/29/2014    Sacroiliitis 08/29/2014    Spondylosis without myelopathy 08/29/2014    DDD (degenerative disc disease) 08/29/2014    History of bladder cancer 03/06/2015    Hydronephrosis, bilateral 06/19/2015    H/O primary malignant neoplasm of urinary bladder 10/07/2015    Essential hypertension 01/02/2016    Anemia of chronic disease 01/02/2016    Moderate protein malnutrition 01/02/2016    Chronic gout 01/02/2016    Renal insufficiency 06/27/2016    Body mass index (BMI) 20.0-20.9, adult 07/07/2016    Personal history of bladder cancer     Acquired hypothyroidism 10/21/2016    Hypoxemia 04/18/2017    Severe malnutrition 04/21/2017    Hydronephrosis 01/02/2018    History of primary malignant neoplasm of urinary bladder 01/02/2018    Hydronephrosis with urinary obstruction due to ureteral calculus 01/22/2018    Chest pain of uncertain etiology 03/22/2018    Centrilobular emphysema 03/22/2018    Epigastric abdominal tenderness without rebound tenderness 03/22/2018    S/P colonoscopy 05/28/2018    Iliac artery aneurysm 11/16/2018    Kidney stones 11/16/2018    Hypertriglyceridemia 05/20/2020    Nephrolithiasis 06/17/2020    Acute respiratory failure with hypoxia 06/18/2020    Kidney stone on left side 07/22/2020    Hyperparathyroidism 08/24/2020    Encounter for screening for malignant neoplasm of respiratory organs 11/09/2020    Personal history of nicotine dependence 11/09/2020    Lung nodule 01/12/2021    Coronary artery calcification 08/02/2021    Symptomatic cholelithiasis 08/11/2021    Chronic respiratory failure with hypoxia 05/10/2022    Presence of urostomy 08/17/2022     Resolved Ambulatory Problems     Diagnosis Date Noted    Bladder cancer 11/08/2012    Cerumen impaction 04/15/2014    Ureteral stricture 08/08/2014    Stricture or kinking of ureter 10/07/2015    Ureteric fistula to colon 12/22/2015    Ureteric fistula to small intestine 12/30/2015     Parastomal hernia without obstruction or gangrene 04/12/2017    Small bowel obstruction 04/17/2017    Increased PTH level 04/18/2017    Hypocalcemia 04/18/2017    Hyperphosphatemia 04/18/2017    Acute renal failure 04/18/2017    Hypokalemia 04/22/2017    Hypernatremia 04/22/2017    Acute kidney injury superimposed on CKD 03/22/2018     Past Medical History:   Diagnosis Date    Aneurysm of right common iliac artery     Arthritis     Blood transfusion     CAD (coronary artery disease)     CKD (chronic kidney disease), stage IV     COPD (chronic obstructive pulmonary disease)     DDD (degenerative disc disease), lumbar     Frequency of urination     General anesthetics causing adverse effect in therapeutic use     GERD (gastroesophageal reflux disease)     Gout     History of urostomy     Hypertension     Hypothyroidism (acquired)     Kidney stone     Limited joint range of motion right hip and leg    Lumbar spondylosis     Mixed anxiety and depressive disorder     Neuropathy     Orchalgia     SHARMIN (obstructive sleep apnea)     PVD (peripheral vascular disease)     Ureteral stent displacement     Urinary retention     Vitamin D deficiency     Wears glasses                 PAST SURGICAL HISTORY:    Past Surgical History:   Procedure Laterality Date    APPENDECTOMY  12/30/2015    Procedure: APPENDECTOMY;  Surgeon: CHAD Bo MD;  Location: [facility] OR;  Service: Urology;;    BALLOON DILATION OF URETER  01/22/2018    BOWEL RESECTION      COLONOSCOPY      CREATION OF URETEROILEAL CONDUIT Left 07/05/2013    CYSTOSCOPY      >1  episodes for bilat ureteral stent exchange    CYSTOSCOPY W/ URETERAL STENT PLACEMENT Right 01/22/2018    and 6/9/2015    CYSTOSCOPY W/ URETERAL STENT PLACEMENT Bilateral 10/07/2015    ESOPHAGOGASTRODUODENOSCOPY  04/12/2022    W/ BIOPSY    ESOPHAGOGASTRODUODENOSCOPY N/A 4/12/2022    Procedure: EGD (ESOPHAGOGASTRODUODENOSCOPY);  Surgeon: Tito Fan MD;  Location: [facility] ENDO;  Service:  Endoscopy;  Laterality: N/A;    EXPLORATORY LAPAROTOMY  07/05/2013    EXPLORATORY LAPAROTOMY W/ BOWEL RESECTION  12/30/2015    EXTRACORPOREAL SHOCK WAVE LITHOTRIPSY Right 06/07/2021    Procedure: LITHOTRIPSY, ESWL;  Surgeon: CHAD Bo MD;  Location: [facility] OR;  Service: Urology;  Laterality: Right;  RN PREOP 5/31/2021   VACCINATED    EXTRACORPOREAL SHOCK WAVE LITHOTRIPSY  06/07/2021    FRACTURE SURGERY      HAND SURGERY      HEMICOLECTOMY  12/30/2015    HIP SURGERY  2012    Rt hip septic    LAPAROSCOPIC CHOLECYSTECTOMY N/A 08/11/2021    Procedure: CHOLECYSTECTOMY, LAPAROSCOPIC;  Surgeon: Remy Xiong MD;  Location: [facility] OR;  Service: General;  Laterality: N/A;  gianni video confirmed 8/5/dme    LYMPH NODE DISSECTION  11/08/2012    pelvic    LYSIS OF ADHESIONS  12/30/2015    MOUTH SURGERY  2000    all teeth extracted    PERCUTANEOUS NEPHROLITHOTOMY Left 07/22/2020    Procedure: NEPHROLITHOTOMY, PERCUTANEOUS;  Surgeon: CHAD Bo MD;  Location: [facility] OR;  Service: Urology;  Laterality: Left;  OR 9 ONLY  RN PREOP 7/20/2020----COVID NEGATIVE ON 7/20    PERCUTANEOUS NEPHROLITHOTOMY Bilateral 6/8/2022    Procedure: BILATERAL PERCUTANEOUS NEPHROSCOPY, BILATERAL ANTEGRADE PYELOGRAM, BILATERAL NEPHROSTOMY TUBE PLACEMENT;  Surgeon: CHAD Bo MD;  Location: [facility] OR;  Service: Urology;  Laterality: Bilateral;  room 9 only  RN PREOP 6/1/2022----T/S IN AM    PERCUTANEOUS NEPHROSTOMY Left 01/02/2018    PERCUTANEOUS REPLACEMENT OF NEPHROSTOMY TUBE Bilateral 5/10/2022    Procedure: REPLACEMENT, NEPHROSTOMY TUBE, PERCUTANEOUS;  Surgeon: Chino Vo MD;  Location: [facility] CATH LAB;  Service: Radiology;  Laterality: Bilateral;    RADICAL CYSTECTOMY  11/08/2012    RADIOFREQUENCY THERMOCOAGULATION  12/12/2014    median branch lumbar    RADIOFREQUENCY THERMOCOAGULATION  12/01/2014    median branch lumbar    REPAIR, HERNIA, PARASTOMAL, LAPAROSCOPIC  04/12/2017    SMALL INTESTINE SURGERY  04/17/2017     urosotomy bag  2011    R abdomen         FAMILY HISTORY:                Family History   Adopted: Yes   Family history unknown: Yes       SOCIAL HISTORY:          Tobacco:   Social History     Tobacco Use   Smoking Status Former    Packs/day: 0.30    Years: 42.00    Pack years: 12.60    Types: Cigarettes    Quit date: 2017    Years since quittin.8   Smokeless Tobacco Never   Tobacco Comments    in cessation program     alcohol use:    Social History     Substance and Sexual Activity   Alcohol Use Yes    Alcohol/week: 14.0 - 21.0 standard drinks    Types: 14 - 21 Cans of beer per week    Comment: occassional               Occupation:  construction.      ALLERGIES:  Review of patient's allergies indicates:  No Known Allergies    CURRENT MEDICATIONS:    Current Outpatient Medications   Medication Sig Dispense Refill    albuterol (PROAIR HFA) 90 mcg/actuation inhaler Inhale 2 puffs into the lungs every 6 (six) hours as needed for Wheezing. Rescue 18 g 0    allopurinoL (ZYLOPRIM) 100 MG tablet Take 1 tablet (100 mg total) by mouth once daily. 90 tablet 0    aspirin (ECOTRIN) 81 MG EC tablet Take 81 mg by mouth once daily.      atenoloL (TENORMIN) 50 MG tablet TAKE 1 TABLET(50 MG) BY MOUTH EVERY DAY 90 tablet 0    EScitalopram oxalate (LEXAPRO) 20 MG tablet Take 1 tablet (20 mg total) by mouth once daily. 90 tablet 0    fenofibrate (TRICOR) 145 MG tablet Take 1 tablet (145 mg total) by mouth once daily. 90 tablet 0    fluticasone-umeclidin-vilanter (TRELEGY ELLIPTA) 100-62.5-25 mcg DsDv Inhale 1 puff into the lungs once daily. Rinse mouth after use. 60 each 2    gabapentin (NEURONTIN) 400 MG capsule Take 1 capsule (400 mg total) by mouth 2 (two) times daily. 180 capsule 0    HYDROcodone-acetaminophen (NORCO)  mg per tablet Take 1 tablet by mouth every 6 (six) hours as needed for Pain. 28 tablet 0    levothyroxine (SYNTHROID) 88 MCG tablet Take 1 tablet (88 mcg total) by mouth once daily. 90 tablet 0     LIDOcaine (LIDODERM) 5 % Place 1 patch onto the skin once daily. Remove & Discard patch within 12 hours or as directed by MD 15 patch 0    meclizine (ANTIVERT) 25 mg tablet Take 1 tablet (25 mg total) by mouth 3 (three) times daily as needed for Dizziness. 30 tablet 0    methylPREDNISolone (MEDROL DOSEPACK) 4 mg tablet use as directed 21 each 0    multivitamin (THERAGRAN) per tablet Take 1 tablet by mouth once daily.      pantoprazole (PROTONIX) 40 MG tablet Take 1 tablet (40 mg total) by mouth once daily. 90 tablet 0    sodium bicarbonate 325 MG tablet Take 325 mg by mouth 2 (two) times daily.      tiZANidine (ZANAFLEX) 4 MG tablet Take 1 tablet (4 mg total) by mouth every 8 (eight) hours. 30 tablet 0    traZODone (DESYREL) 150 MG tablet Take 1 tablet (150 mg total) by mouth every evening. 90 tablet 0    albuterol-ipratropium (DUO-NEB) 2.5 mg-0.5 mg/3 mL nebulizer solution Take 3 mLs by nebulization every 6 (six) hours as needed for Wheezing. Rescue 50 vial 2     No current facility-administered medications for this visit.                  REVIEW OF SYSTEMS:     Pulmonary related symptoms as per HPI.  Gen:  + weight loss, no fever, no night sweat  HEENT:  no visual changes, no sore throat, no hearing loss  CV:  No chest pain, no orthopnea, no PND  GI:  no melena, no hematochezia, no diarhea, no constipation.  :  no dysuria, no hematuria, no hesistancy, no dribbling  Neuro:  no syncope, no vertigo, no tinitus  Psych:  No homocide or suicide ideation; no depression.  Endocrine:  No heat or cold intolerance.  Sleep:  No snoring; no witnessed apnea.  Otherwise, a balance of systems reviewed is negative.          PHYSICAL EXAM:  Vitals:    11/30/22 0855   BP: 131/73   Pulse: 74   SpO2: (!) 92%   Weight: 71.6 kg (157 lb 13.6 oz)   Height: 6' (1.829 m)   PainSc:   6     Body mass index is 21.41 kg/m².     GENERAL:  well develop; no apparent distress  HEENT:  no nasal congestion; no discharge noted; class 2 modified  mallampatti.   NECK:  supple; no palpable masses.  CARDIO: regular rate and rhythm  PULM:  clear to auscultation bilaterally; no intercostals retractions; no accessory muscle usage   ABDOMEN:  soft nontender/nondistended.  +bowel sound.  Bladder Stoma.    EXTREMITIES no cce  NEURO:  CN II-XII intact.  5/5 motor in all extremities.  sensation grossly intact   to light touch.  PSYCH:  normal affect.  Alert and oriented x 4    LABS  Pulmonary Functions Testing Results(personally reviewed):    PFT 7/12/22 Ratio of 50%; FVC 2.29 L (58%); FEV1 1.4 L (38%); TLC 5.64 L (75%); dlco 13.1 (44%)   ABG (personally reviewed):  none    CT CHEST(personally reviewed):  7/29/22 multiple nodules with larges is in rul at 2.2x3.6 cm (used to be 2.6 x 1.4 cm).  Upper lung emphysematous changes.      Echo 7/2/20  There is mild left ventricular concentric hypertrophy.  The left ventricle is normal in size with normal systolic function. The estimated ejection fraction is 60%.  Grade I diastolic dysfunction.  Normal right ventricular systolic function.  Normal central venous pressure (3 mmHg).    ASSESSMENT/PLAN  Problem List Items Addressed This Visit       Centrilobular emphysema    Overview     - fev1 of 38%  -Stable. Patient with significant smoking history.  48 years at 2 packs a day quit in 2017.  Presents with mmRC 3 symptoms of dyspnea.  Reports 0 hospitalizations or exacerbations in relation to breathing issues.  He denies any increase in cough or sputum production.  Has not been able to afford Trelegy.     Plan:  -continue Trelegy inhalerfor control  -continue albuterol as needed  -congratulated on smoking cessation since 2017  -advise to wean off marijuana cigarettes           Lung nodule    Overview     - enlarging rul per ct 7/22.  -tobacco abuse with bladder cancer and enlarging rul  -seen by Dr. Rodriguez.  Robotic bronch scheduled for 9/2/22 but no showed  -will repeat ct chest for update imaging.  -proper contact information of  patient and wife verified.                Other Visit Diagnoses       Pulmonary nodule    -  Primary    Relevant Orders    CT Chest Without Contrast              Patient will No follow-ups on file. with md/np.    40 minutes of total time spent on the encounter, which includes face to face time and non-face to face time preparing to see the patient (eg, review of tests), Obtaining and/or reviewing separately obtained history, documenting clinical information in the electronic or other health record, independently interpreting results (not separately reported) and communicating results to the patient/family/caregiver, or Care coordination (not separately reported).

## 2022-12-07 ENCOUNTER — TELEPHONE (OUTPATIENT)
Dept: PULMONOLOGY | Facility: CLINIC | Age: 62
End: 2022-12-07
Payer: MEDICARE

## 2022-12-07 DIAGNOSIS — J41.0 SIMPLE CHRONIC BRONCHITIS: ICD-10-CM

## 2022-12-07 DIAGNOSIS — J44.89 OBSTRUCTIVE CHRONIC BRONCHITIS WITHOUT EXACERBATION: ICD-10-CM

## 2022-12-07 NOTE — TELEPHONE ENCOUNTER
Message sent to Dr. Vang.    BENITO Mathis  Pulm/Sleep [facility] [place]  [phone]                       ----- Message from Carin Ford sent at 12/7/2022  9:04 AM CST -----  Regarding: patient call back  Type: Patient Call Back    Who called: Daya with [facility]     What is the request in detail: He needs a new Oxygen order, 's visit and a pulmonary pump    Can the clinic reply by [facility]? No     Would the patient rather a call back or a response via [facility]? Call     Best call back number: [phone]

## 2022-12-08 ENCOUNTER — TELEPHONE (OUTPATIENT)
Dept: PULMONOLOGY | Facility: CLINIC | Age: 62
End: 2022-12-08
Payer: MEDICARE

## 2022-12-08 RX ORDER — ALBUTEROL SULFATE 90 UG/1
2 AEROSOL, METERED RESPIRATORY (INHALATION) EVERY 6 HOURS PRN
Qty: 18 G | Refills: 0 | Status: SHIPPED | OUTPATIENT
Start: 2022-12-08 | End: 2023-06-27 | Stop reason: SDUPTHER

## 2022-12-08 NOTE — TELEPHONE ENCOUNTER
----- Message from Carin Ford sent at 12/8/2022  1:44 PM CST -----  Regarding: Pharmacy Call Back  Type:  Pharmacy Calling to Clarify an RX    Name of Caller: Nesha Wheat     Pharmacy Name: [facility]    Prescription Name: albuterol (PROAIR HFA) 90 mcg/actuation inhaler    What do they need to clarify? Its not covered by insurance and would like to see if it can be changed to Ventolin.     Can you be contacted via [facility]? No     Best Call Back Number: [phone]

## 2022-12-08 NOTE — TELEPHONE ENCOUNTER
Please advise patient that I've approved prescription for trelegy and albuterol.  Will need to do 6 minutes walk prior to renewing oxygen.

## 2022-12-12 ENCOUNTER — TELEPHONE (OUTPATIENT)
Dept: PULMONOLOGY | Facility: CLINIC | Age: 62
End: 2022-12-12
Payer: MEDICARE

## 2022-12-12 NOTE — TELEPHONE ENCOUNTER
Left message to call office back.    BENITO Mathis  Pulm/Sleep [facility]  [phone]                       ----- Message from Delfina Hua MA sent at 12/8/2022  1:53 PM CST -----  Please advise patient that I've approved prescription for trelegy and albuterol.  Will need to do 6 minutes walk prior to renewing oxygen.

## 2022-12-12 NOTE — TELEPHONE ENCOUNTER
Spoke with Irina at pharm told her that was fine to change to Proair.                  ----- Message from Delfina Hua MA sent at 12/12/2022 11:07 AM CST -----  Consuelo Quintero V Staff  Caller: Unspecified (Today, 10:59 AM)  Type:  Pharmacy Calling to Clarify an RX     Name of Caller:Nh   Pharmacy Name:[facility]   Prescription Name:albuterol (PROAIR HFA) 90 mcg/actuation inhaler   What do they need to clarify?:need one insurance will cover   Best Call Back Number:[phone]   Additional Information: need a new RX insurance will cover

## 2022-12-12 NOTE — TELEPHONE ENCOUNTER
----- Message from Delfina Hua MA sent at 12/12/2022 11:07 AM CST -----  Consuelo Quintero V Staff  Caller: Unspecified (Today, 10:59 AM)  Type:  Pharmacy Calling to Clarify an RX     Name of Caller:Nh   Pharmacy Name:[facility]   Prescription Name:albuterol (PROAIR HFA) 90 mcg/actuation inhaler   What do they need to clarify?:need one insurance will cover   Best Call Back Number:[phone]   Additional Information: need a new RX insurance will cover

## 2022-12-21 ENCOUNTER — HOSPITAL ENCOUNTER (OUTPATIENT)
Dept: PULMONOLOGY | Facility: CLINIC | Age: 62
Discharge: HOME | End: 2022-12-21
Payer: MEDICARE

## 2022-12-21 ENCOUNTER — OFFICE VISIT (OUTPATIENT)
Dept: PULMONOLOGY | Facility: CLINIC | Age: 62
End: 2022-12-21
Payer: MEDICARE

## 2022-12-21 VITALS
HEART RATE: 70 BPM | DIASTOLIC BLOOD PRESSURE: 74 MMHG | SYSTOLIC BLOOD PRESSURE: 146 MMHG | HEIGHT: 73 IN | BODY MASS INDEX: 20.33 KG/M2 | OXYGEN SATURATION: 94 %

## 2022-12-21 VITALS — BODY MASS INDEX: 20.43 KG/M2 | WEIGHT: 154.13 LBS | HEIGHT: 73 IN

## 2022-12-21 DIAGNOSIS — J96.11 CHRONIC RESPIRATORY FAILURE WITH HYPOXIA: ICD-10-CM

## 2022-12-21 DIAGNOSIS — J43.2 CENTRILOBULAR EMPHYSEMA: ICD-10-CM

## 2022-12-21 DIAGNOSIS — J44.89 OBSTRUCTIVE CHRONIC BRONCHITIS WITHOUT EXACERBATION: ICD-10-CM

## 2022-12-21 DIAGNOSIS — R91.1 LUNG NODULE: ICD-10-CM

## 2022-12-21 DIAGNOSIS — J44.89 OBSTRUCTIVE CHRONIC BRONCHITIS WITHOUT EXACERBATION: Primary | ICD-10-CM

## 2022-12-21 PROCEDURE — 94618 PULMONARY STRESS TESTING: CPT | Mod: PBBFAC | Performed by: INTERNAL MEDICINE

## 2022-12-21 PROCEDURE — 99999 PR PBB SHADOW E&M-EST. PATIENT-LVL IV: ICD-10-PCS | Mod: PBBFAC,,, | Performed by: INTERNAL MEDICINE

## 2022-12-21 PROCEDURE — 99215 OFFICE O/P EST HI 40 MIN: CPT | Mod: S$PBB,25,, | Performed by: INTERNAL MEDICINE

## 2022-12-21 PROCEDURE — 94618 PULMONARY STRESS TESTING: ICD-10-PCS | Mod: 26,S$PBB,, | Performed by: INTERNAL MEDICINE

## 2022-12-21 PROCEDURE — 99999 PR PBB SHADOW E&M-EST. PATIENT-LVL IV: CPT | Mod: PBBFAC,,, | Performed by: INTERNAL MEDICINE

## 2022-12-21 PROCEDURE — 99215 PR OFFICE/OUTPT VISIT, EST, LEVL V, 40-54 MIN: ICD-10-PCS | Mod: S$PBB,25,, | Performed by: INTERNAL MEDICINE

## 2022-12-21 PROCEDURE — 99214 OFFICE O/P EST MOD 30 MIN: CPT | Mod: 25,PBBFAC | Performed by: INTERNAL MEDICINE

## 2022-12-21 PROCEDURE — 94618 PULMONARY STRESS TESTING: CPT | Mod: 26,S$PBB,, | Performed by: INTERNAL MEDICINE

## 2022-12-21 RX ORDER — IPRATROPIUM BROMIDE AND ALBUTEROL SULFATE 2.5; .5 MG/3ML; MG/3ML
3 SOLUTION RESPIRATORY (INHALATION) EVERY 6 HOURS PRN
Qty: 150 ML | Refills: 6 | Status: SHIPPED | OUTPATIENT
Start: 2022-12-21 | End: 2023-08-01 | Stop reason: SDUPTHER

## 2022-12-21 RX ORDER — CALCITRIOL 0.25 UG/1
CAPSULE ORAL
COMMUNITY
Start: 2022-12-12 | End: 2023-03-22 | Stop reason: SDUPTHER

## 2022-12-21 RX ORDER — SUCRALFATE 1 G/1
TABLET ORAL
COMMUNITY
Start: 2022-10-30 | End: 2022-12-22

## 2022-12-21 NOTE — H&P (VIEW-ONLY)
Subjective:   Patient ID: Blake Guillory is a 62 y.o. male    Chief Complaint:   Chief Complaint   Patient presents with    COPD       HPI  Blake Guillory is a 62 y.o. male who presents for follow-up. He is new to me. He presents with his wife, Mrs. Luke Guillory. He presents today, for follow up visit, scheduled by the billing department to evaluate for necessity of oxygen therapy that he was prescribed several years ago. He had walk test today that shows no desaturation.    History of COPD. He has seen several pulmonary providers, most recently Dr. Vang on 11/30/22 at the [facility].  Prior to that, he saw Dr. Rodriguez for suspicious lung nodule. He was planned for robotic brochoscopy but it had to be cancelled due to neck pain and stiffness which was later found to be related to Spondylosis, which has improved with treatment.    He is former smoker. Quit 2018. Smoked cigarettes for 50 years. Has chronic shortness of breath and cough. He is using Trelegy 1 puff daily.   Does have dyspnea with exertion. Does not get short of breath with usual activity around the house, doing wood work, but does so when helping his special needs son. He does get short of breath walking to the backyard, which is about 60 feet in length. He does not do much activity and does not get out of the house much.    Cough is chronic. Dark/green , chronic intermittently with specks of blood. Has not had any steroids in several years for breathing.    Continues to lose weight unintentionally, 40+ lbs in 10 years.    Currently using Trelegy daily. He uses nebulizer Duoneb. He uses it 2-3 times daily. He had Albuterol puffer but stopped using it mainly due to feeling vertigo, but realized it was not related.    CT chest from 12/6/22 reveals increasing spiculated right upper lobe opacity, with enlarging soft tissue component and new 11 mm MIRIAM nodule, not present on CT chest from 7/29/22. There is advanced emphysema.    Prior PET/CT from Nov 2021  with right upper lobe mildly metabolic activity.    Walk test: 94% -> 93% -> 94%      Objective:   Vitals reviewed   Physical Exam  Constitutional:       Appearance: Normal appearance.   HENT:      Nose: Nose normal.      Mouth/Throat:      Mouth: Mucous membranes are moist.   Neck:      Comments: No palpable cervical or supraclavicular adenopathy  Cardiovascular:      Rate and Rhythm: Normal rate and regular rhythm.   Pulmonary:      Effort: Pulmonary effort is normal.      Comments: Diminished breath sounds b/l, no wheezing  Musculoskeletal:         General: Normal range of motion.   Neurological:      Mental Status: He is alert.       Assessment/Plan:     Problem List Items Addressed This Visit          Pulmonary    Centrilobular emphysema    Overview     Severe disease  COPD B  - fev1 of 38%    -Stable. Patient with significant smoking history.  48 years at 2 packs a day quit in 2017.Reports 0 hospitalizations or exacerbations in relation to breathing issues.  He denies any increase in cough or sputum production.                Current Assessment & Plan     -continue Trelegy inhalerfor control  -ordered nebulizer supplies  - Duoneb neb solution QID as needed ordered  - Combivent inh as needed ordered  - offered pulmonary rehab, but deferred for now due to complexity of getting to rehab facility  - recommended graded exercise, can get a portable peddler, goal 30 min per day 5 days a week         Lung nodule    Overview     - enlarging on serial CT's  - tobacco abuse with bladder cancer and enlarging rul               Current Assessment & Plan     - prior attempt at robotic ebus met with neck pain, found to be due to Spondylosis. Better now  - suspicion high for malignancy given clinic context  - will refer to Dr. Rodriguez for possible EBUS.         Chronic respiratory failure with hypoxia    Overview     Previously on o2, walk test shows no desaturation         Current Assessment & Plan     O2 cancellation order  placed         Obstructive chronic bronchitis without exacerbation - Primary    Relevant Orders    NEBULIZER KIT (SUPPLIES) FOR HOME USE           60 minutes of total time spent on the encounter, which includes face to face time and non-face to face time preparing to see the patient (eg, review of tests), Obtaining and/or reviewing separately obtained history, Documenting clinical information in the electronic or other health record, Independently interpreting results (not separately reported) and communicating results to the patient/family/caregiver, or Care coordination (not separately reported).

## 2022-12-21 NOTE — ASSESSMENT & PLAN NOTE
- prior attempt at robotic ebus met with neck pain, found to be due to Spondylosis. Better now  - suspicion high for malignancy given clinic context  - will refer to Dr. Rodriguez for possible EBUS.

## 2022-12-21 NOTE — ASSESSMENT & PLAN NOTE
-continue Trelegy inhalerfor control  -ordered nebulizer supplies  - Duoneb neb solution QID as needed ordered  - Combivent inh as needed ordered  - offered pulmonary rehab, but deferred for now due to complexity of getting to rehab facility  - recommended graded exercise, can get a portable peddler, goal 30 min per day 5 days a week

## 2022-12-21 NOTE — LETTER
December 21, 2022          No Recipients             [facility] [address]  [address]  [facility] [address]  Phone: [phone]   Patient: Blake Guillory   MR Number: [id number]   YOB: 1960   Date of Visit: 12/21/2022       Dear Dr. Tracey Recipients:    Thank you for referring Blake Guillory to me for evaluation. Below are the relevant portions of my assessment and plan of care.            If you have questions, please do not hesitate to call me. I look forward to following Blake along with you.    Sincerely,      Gurvinder Mix MD           CC    No Recipients

## 2022-12-24 NOTE — PROCEDURES
Blake Guillory is a 62 y.o.  male patient, who presents for a 6 minute walk test ordered by MD Ciera.  The diagnosis is Qualify for Oxygen; COPD/Emphysema.  The patient's BMI is 20.3 kg/m2.  Predicted distance (lower limit of normal) is 428.96 meters.      Test Results:    The test was completed without stopping.  The total time walked was 360 seconds.  During walking, the patient reported:  Dyspnea, Lightheadedness.  The patient used no assistive devices during testing.     12/21/2022---------Distance: 259.08 meters (850 feet)     O2 Sat % Supplemental Oxygen Heart Rate Blood Pressure Jackie Scale   Pre-exercise  (Resting) 94 % Room Air 71 bpm 146/85 mmHg 3   During Exercise 93 % Room Air 71 bpm 146/74 mmHg 5-6   Post-exercise  (Recovery) 94 % Room Air  70 bpm       Recovery Time: 120 seconds    Performing nurse/tech: YOHAN Smart      PREVIOUS STUDY from [facility] on 07/12/2022:  257 meters (843 feet).      CLINICAL INTERPRETATION:  Six minute walk distance is 259.08 meters (850 feet) with heavy dyspnea.  During exercise, there was no significant desaturation while breathing room air.  Both blood pressure and heart rate remained stable with walking.  The patient reported non-pulmonary symptoms during exercise.  Significant exercise impairment is likely due to subjective symptoms.  The patient did complete the study, walking 360 seconds of the 360 second test.  Since the previous study in July 2022, exercise capacity is unchanged.  Based upon age and body mass index, exercise capacity is less than predicted.

## 2022-12-28 NOTE — ASSESSMENT & PLAN NOTE
- Noted on CT vascular atherosclerosis with aneurysmal dilation of the right common iliac artery with maximum diameter 3.4 cm.  Surveillance recommended.     No indicators present

## 2023-01-03 ENCOUNTER — TELEPHONE (OUTPATIENT)
Dept: PULMONOLOGY | Facility: CLINIC | Age: 63
End: 2023-01-03
Payer: MEDICARE

## 2023-01-03 DIAGNOSIS — R91.1 SOLITARY PULMONARY NODULE: Primary | ICD-10-CM

## 2023-01-03 NOTE — TELEPHONE ENCOUNTER
----- Message from Saida Camejo MA sent at 1/3/2023 10:33 AM CST -----  Regarding: FW: Appt  Contact: Luke Wife [phone]  Dr rock: Following up on previous message sent about patient's procedure being rescheduled. Thank you, Saida   ----- Message -----  From: Berenice Malagon  Sent: 1/3/2023  10:25 AM CST  To: Michael MAGANA Staff  Subject: Appt                                             Patient wife Luke called to schedule appt on patient behalf no available dates please call to discuss further

## 2023-01-04 NOTE — TELEPHONE ENCOUNTER
I spoke with patient's wife and they would like to schedule robotic bronch on 1/13/23 with Dr Rodriguez. I let  Guillory know I would send a message to Dr Rodriguez for review. Mrs Guillory verbalized understanding.

## 2023-01-04 NOTE — TELEPHONE ENCOUNTER
----- Message from Dyan Silva sent at 1/4/2023  9:51 AM CST -----  Contact: Luke @[phone]  Caller wife is calling in from a missed call, please call to discuss further.

## 2023-01-12 ENCOUNTER — TELEPHONE (OUTPATIENT)
Dept: PULMONOLOGY | Facility: CLINIC | Age: 63
End: 2023-01-12
Payer: MEDICARE

## 2023-01-12 ENCOUNTER — ANESTHESIA EVENT (OUTPATIENT)
Dept: SURGERY | Facility: HOSPITAL | Age: 63
End: 2023-01-12
Payer: MEDICARE

## 2023-01-12 RX ORDER — ACETAMINOPHEN 500 MG
1000 TABLET ORAL
Status: CANCELLED | OUTPATIENT
Start: 2023-01-13 | End: 2023-01-13

## 2023-01-12 NOTE — ANESTHESIA PREPROCEDURE EVALUATION
[facility]  Anesthesia Pre-Operative Evaluation         Patient Name: Blake Guillory  YOB: 1960  MRN: [id number]    SUBJECTIVE:     Pre-operative evaluation for Procedure(s) (LRB):  ROBOTIC BRONCHOSCOPY (N/A)     01/12/2023    Blake Guillory is a 62 y.o. male with a significant medical history of HTN, COPD with emphesyma and bronchitis, anemia, Hyperparathyroidism, Iliac artery aneurysm,  hypothyroidism, who presents for the above procedure. Per pulmonology notes, he does get short of breath walking to the backyard, which is about 60 feet in length. He does not do much activity and does not get out of the house much. He is former smoker.    Results for orders placed during the hospital encounter of 10/19/20    Echo Color Flow Doppler? Yes    Interpretation Summary  · There is mild left ventricular concentric hypertrophy.  · The left ventricle is normal in size with normal systolic function. The estimated ejection fraction is 60%.  · Grade I diastolic dysfunction.  · Normal right ventricular systolic function.  · Normal central venous pressure (3 mmHg).      LDA:        Nephrostomy 06/08/22 0910 Left 12 Fr. (Active)   Number of days: 218            Nephrostomy 06/08/22 0846 Right 12 Fr. (Active)   Number of days: 218            Urostomy RLQ (Active)   Number of days:             Urostomy 06/17/20 1055 (Active)   Number of days: 939       Prev airway:    Induction:  Intravenous    Mask Ventilation:  Easy with oral airway (beard)    Attempts:  1    Attempted By:  CRNA    Method of Intubation:  Video laryngoscopy    Blade:  Tyson 3    Laryngeal View Grade: Grade I - full view of cords      Difficult Airway Encountered?: No      Airway Device:  Oral endotracheal tube    Airway Device Size:  7.5    Patient Active Problem List   Diagnosis    Insomnia    Lumbar facet arthropathy    Sacroiliitis    Spondylosis without myelopathy    DDD (degenerative disc disease)    History of  bladder cancer    Hydronephrosis, bilateral    H/O primary malignant neoplasm of urinary bladder    Essential hypertension    Anemia of chronic disease    Moderate protein malnutrition    Chronic gout    Renal insufficiency    Body mass index (BMI) 20.0-20.9, adult    Personal history of bladder cancer    Acquired hypothyroidism    Hypoxemia    Severe malnutrition    Hydronephrosis    History of primary malignant neoplasm of urinary bladder    Hydronephrosis with urinary obstruction due to ureteral calculus    Chest pain of uncertain etiology    Centrilobular emphysema    Epigastric abdominal tenderness without rebound tenderness    S/P colonoscopy    Iliac artery aneurysm    Kidney stones    Hypertriglyceridemia    Nephrolithiasis    Acute respiratory failure with hypoxia    Kidney stone on left side    Hyperparathyroidism    Encounter for screening for malignant neoplasm of respiratory organs    Personal history of nicotine dependence    Lung nodule    Coronary artery calcification    Symptomatic cholelithiasis    Chronic respiratory failure with hypoxia    Presence of urostomy    Obstructive chronic bronchitis without exacerbation       Review of patient's allergies indicates:  No Known Allergies    Current Inpatient Medications:      No current facility-administered medications on file prior to encounter.     Current Outpatient Medications on File Prior to Encounter   Medication Sig Dispense Refill    albuterol (PROAIR HFA) 90 mcg/actuation inhaler Inhale 2 puffs into the lungs every 6 (six) hours as needed for Wheezing. Rescue 18 g 0    albuterol-ipratropium (DUO-NEB) 2.5 mg-0.5 mg/3 mL nebulizer solution Take 3 mLs by nebulization every 6 (six) hours as needed for Wheezing. Rescue 150 mL 6    allopurinoL (ZYLOPRIM) 100 MG tablet Take 1 tablet (100 mg total) by mouth once daily. 90 tablet 0    aspirin (ECOTRIN) 81 MG EC tablet Take 81 mg by mouth once daily.      atenoloL  (TENORMIN) 50 MG tablet TAKE 1 TABLET(50 MG) BY MOUTH EVERY DAY 90 tablet 0    calcitRIOL (ROCALTROL) 0.25 MCG Cap       EScitalopram oxalate (LEXAPRO) 20 MG tablet Take 1 tablet (20 mg total) by mouth once daily. 90 tablet 0    fenofibrate (TRICOR) 145 MG tablet Take 1 tablet (145 mg total) by mouth once daily. 90 tablet 0    fluticasone-umeclidin-vilanter (TRELEGY ELLIPTA) 100-62.5-25 mcg DsDv Inhale 1 puff into the lungs once daily. Rinse mouth after use. 60 each 2    gabapentin (NEURONTIN) 400 MG capsule Take 1 capsule (400 mg total) by mouth 2 (two) times daily. 180 capsule 0    HYDROcodone-acetaminophen (NORCO)  mg per tablet Take 1 tablet by mouth every 6 (six) hours as needed for Pain. 28 tablet 0    ipratropium-albuteroL (COMBIVENT)  mcg/actuation inhaler Inhale 1 puff into the lungs 4 (four) times daily. Rescue 4 g 6    levothyroxine (SYNTHROID) 88 MCG tablet Take 1 tablet (88 mcg total) by mouth once daily. 90 tablet 0    LIDOcaine (LIDODERM) 5 % Place 1 patch onto the skin once daily. Remove & Discard patch within 12 hours or as directed by MD 15 patch 0    meclizine (ANTIVERT) 25 mg tablet Take 1 tablet (25 mg total) by mouth 3 (three) times daily as needed for Dizziness. 30 tablet 0    methylPREDNISolone (MEDROL DOSEPACK) 4 mg tablet use as directed 21 each 0    multivitamin (THERAGRAN) per tablet Take 1 tablet by mouth once daily.      pantoprazole (PROTONIX) 40 MG tablet Take 1 tablet (40 mg total) by mouth once daily. 90 tablet 0    sodium bicarbonate 325 MG tablet Take 325 mg by mouth 2 (two) times daily.      tiZANidine (ZANAFLEX) 4 MG tablet Take 1 tablet (4 mg total) by mouth once daily. 30 tablet 0    traZODone (DESYREL) 150 MG tablet Take 1 tablet (150 mg total) by mouth every evening. 90 tablet 0       Past Surgical History:   Procedure Laterality Date    APPENDECTOMY  12/30/2015    Procedure: APPENDECTOMY;  Surgeon: CHAD Bo MD;  Location: [facility] OR;   Service: Urology;;    BALLOON DILATION OF URETER  01/22/2018    BOWEL RESECTION      COLONOSCOPY      CREATION OF URETEROILEAL CONDUIT Left 07/05/2013    CYSTOSCOPY      >1  episodes for bilat ureteral stent exchange    CYSTOSCOPY W/ URETERAL STENT PLACEMENT Right 01/22/2018    and 6/9/2015    CYSTOSCOPY W/ URETERAL STENT PLACEMENT Bilateral 10/07/2015    ESOPHAGOGASTRODUODENOSCOPY  04/12/2022    W/ BIOPSY    ESOPHAGOGASTRODUODENOSCOPY N/A 4/12/2022    Procedure: EGD (ESOPHAGOGASTRODUODENOSCOPY);  Surgeon: Tito Fan MD;  Location: [facility] ENDO;  Service: Endoscopy;  Laterality: N/A;    EXPLORATORY LAPAROTOMY  07/05/2013    EXPLORATORY LAPAROTOMY W/ BOWEL RESECTION  12/30/2015    EXTRACORPOREAL SHOCK WAVE LITHOTRIPSY Right 06/07/2021    Procedure: LITHOTRIPSY, ESWL;  Surgeon: CHAD Bo MD;  Location: [facility] OR;  Service: Urology;  Laterality: Right;  RN PREOP 5/31/2021   VACCINATED    EXTRACORPOREAL SHOCK WAVE LITHOTRIPSY  06/07/2021    FRACTURE SURGERY      HAND SURGERY      HEMICOLECTOMY  12/30/2015    HIP SURGERY  2012    Rt hip septic    LAPAROSCOPIC CHOLECYSTECTOMY N/A 08/11/2021    Procedure: CHOLECYSTECTOMY, LAPAROSCOPIC;  Surgeon: Remy Xiong MD;  Location: [facility] OR;  Service: General;  Laterality: N/A;  gianni video confirmed 8/5/dme    LYMPH NODE DISSECTION  11/08/2012    pelvic    LYSIS OF ADHESIONS  12/30/2015    MOUTH SURGERY  2000    all teeth extracted    PERCUTANEOUS NEPHROLITHOTOMY Left 07/22/2020    Procedure: NEPHROLITHOTOMY, PERCUTANEOUS;  Surgeon: CHAD Bo MD;  Location: [facility] OR;  Service: Urology;  Laterality: Left;  OR 9 ONLY  RN PREOP 7/20/2020----COVID NEGATIVE ON 7/20    PERCUTANEOUS NEPHROLITHOTOMY Bilateral 6/8/2022    Procedure: BILATERAL PERCUTANEOUS NEPHROSCOPY, BILATERAL ANTEGRADE PYELOGRAM, BILATERAL NEPHROSTOMY TUBE PLACEMENT;  Surgeon: CHAD Bo MD;  Location: [facility] OR;  Service: Urology;  Laterality: Bilateral;  room 9  only  RN PREOP 2022----T/S IN AM    PERCUTANEOUS NEPHROSTOMY Left 2018    PERCUTANEOUS REPLACEMENT OF NEPHROSTOMY TUBE Bilateral 5/10/2022    Procedure: REPLACEMENT, NEPHROSTOMY TUBE, PERCUTANEOUS;  Surgeon: Chino Vo MD;  Location: [facility] CATH LAB;  Service: Radiology;  Laterality: Bilateral;    RADICAL CYSTECTOMY  2012    RADIOFREQUENCY THERMOCOAGULATION  2014    median branch lumbar    RADIOFREQUENCY THERMOCOAGULATION  2014    median branch lumbar    REPAIR, HERNIA, PARASTOMAL, LAPAROSCOPIC  2017    SMALL INTESTINE SURGERY  2017    urosotomy bag  2011    R abdomen       Social History     Socioeconomic History    Marital status:    Occupational History    Occupation: [employer]     Employer: [employer]   Tobacco Use    Smoking status: Former     Packs/day: 0.30     Years: 42.00     Pack years: 12.60     Types: Cigarettes     Quit date: 2017     Years since quittin.9    Smokeless tobacco: Never    Tobacco comments:     in cessation program   Substance and Sexual Activity    Alcohol use: Yes     Alcohol/week: 14.0 - 21.0 standard drinks     Types: 14 - 21 Cans of beer per week     Comment: occassional    Drug use: Yes     Types: Marijuana     Comment: occass    Sexual activity: Yes     Partners: Female     Birth control/protection: None       OBJECTIVE:     Vital Signs Range:  Vitals - 1 value per visit 2022   SYSTOLIC - 146 105   DIASTOLIC - 74 56   Pulse - 70 69   Temp - - 98   Resp - - 18   SPO2 - 94 95   Weight (lb) 154.1 - 147   Weight (kg) 69.9 - 66.679   Height 73 73 73   BMI (Calculated) 20.3 - 19.4   VISIT REPORT - - -   Pain Score  - - -   Some recent data might be hidden         CBC:   Lab Results   Component Value Date    WBC 5.15 2022    HGB 14.1 2022    HCT 43.5 2022    MCV 99 (H) 2022     2022         CMP:   Sodium   Date Value Ref Range  Status   11/14/2022 139 136 - 145 mmol/L Final     Potassium   Date Value Ref Range Status   11/14/2022 5.1 3.5 - 5.1 mmol/L Final     Chloride   Date Value Ref Range Status   11/14/2022 104 95 - 110 mmol/L Final     CO2   Date Value Ref Range Status   11/14/2022 26 23 - 29 mmol/L Final     Glucose   Date Value Ref Range Status   11/14/2022 71 70 - 110 mg/dL Final     BUN   Date Value Ref Range Status   11/14/2022 38 (H) 8 - 23 mg/dL Final     Creatinine   Date Value Ref Range Status   11/14/2022 2.6 (H) 0.5 - 1.4 mg/dL Final     Calcium   Date Value Ref Range Status   11/14/2022 9.8 8.7 - 10.5 mg/dL Final     Total Protein   Date Value Ref Range Status   11/14/2022 7.2 6.0 - 8.4 g/dL Final     Albumin   Date Value Ref Range Status   11/14/2022 4.0 3.5 - 5.2 g/dL Final     Total Bilirubin   Date Value Ref Range Status   11/14/2022 0.3 0.1 - 1.0 mg/dL Final     Comment:     For infants and newborns, interpretation of results should be based  on gestational age, weight and in agreement with clinical  observations.    Premature Infant recommended reference ranges:  Up to 24 hours.............<8.0 mg/dL  Up to 48 hours............<12.0 mg/dL  3-5 days..................<15.0 mg/dL  6-29 days.................<15.0 mg/dL       Alkaline Phosphatase   Date Value Ref Range Status   11/14/2022 48 (L) 55 - 135 U/L Final     AST   Date Value Ref Range Status   11/14/2022 25 10 - 40 U/L Final     ALT   Date Value Ref Range Status   11/14/2022 26 10 - 44 U/L Final     Anion Gap   Date Value Ref Range Status   11/14/2022 9 8 - 16 mmol/L Final     eGFR if    Date Value Ref Range Status   07/25/2022 27.5 (A) >60 mL/min/1.73 m^2 Final     eGFR if non    Date Value Ref Range Status   07/25/2022 23.8 (A) >60 mL/min/1.73 m^2 Final     Comment:     Calculation used to obtain the estimated glomerular filtration  rate (eGFR) is the CKD-EPI equation.          INR:  Lab Results   Component Value Date    INR 0.9  12/28/2017    INR 0.9 08/01/2014    INR 1.0 06/24/2014       EKG:   Results for orders placed or performed during the hospital encounter of 11/17/22   EKG 12-lead    Collection Time: 11/17/22  1:40 PM    Narrative    Test Reason : SC    Vent. Rate : 069 BPM     Atrial Rate : 069 BPM     P-R Int : 156 ms          QRS Dur : 094 ms      QT Int : 364 ms       P-R-T Axes : 076 082 078 degrees     QTc Int : 390 ms    Normal sinus rhythm  Normal ECG  When compared with ECG of 01-JUN-2022 12:25,  No significant change was found  Confirmed by John Paul Ayers MD ([id number]) on 11/17/2022 8:27:58 PM    Referred By: MEL GARCIA           Confirmed By:John Paul Ayers MD        2D ECHO:   Results for orders placed during the hospital encounter of 10/19/20    Echo Color Flow Doppler? Yes    Interpretation Summary  · There is mild left ventricular concentric hypertrophy.  · The left ventricle is normal in size with normal systolic function. The estimated ejection fraction is 60%.  · Grade I diastolic dysfunction.  · Normal right ventricular systolic function.  · Normal central venous pressure (3 mmHg).         ASSESSMENT/PLAN:       Pre-op Assessment    I have reviewed the Patient Summary Reports.     I have reviewed the Nursing Notes. I have reviewed the NPO Status.   I have reviewed the Medications.     Review of Systems  Anesthesia Hx:  History of prior surgery of interest to airway management or planning:  Denies Personal Hx of Anesthesia complications.   Social:  Former Smoker    Cardiovascular:   Exercise tolerance: good Hypertension CAD      Pulmonary:   COPD Sleep Apnea    Renal/:   Chronic Renal Disease, CRI    Hepatic/GI:   GERD    Neurological:  Neurology Normal    Endocrine:   Hypothyroidism    Psych:   anxiety depression          Physical Exam  General: Well nourished, Cooperative, Alert and Oriented  Massive beard  Airway:  Mallampati: II   Mouth Opening: Normal  TM Distance: Normal  Tongue: Normal  Neck ROM: Normal  ROM    Dental:  Edentulous    Chest/Lungs:  Normal Respiratory Rate        Anesthesia Plan  Type of Anesthesia, risks & benefits discussed:    Anesthesia Type: Gen ETT  Intra-op Monitoring Plan: Standard ASA Monitors  Post Op Pain Control Plan: multimodal analgesia and IV/PO Opioids PRN  Induction:  IV  Airway Plan: Direct and Video, Post-Induction  Informed Consent: Informed consent signed with the Patient and all parties understand the risks and agree with anesthesia plan.  All questions answered.   ASA Score: 3  Day of Surgery Review of History & Physical: H&P Update referred to the surgeon/provider.    Ready For Surgery From Anesthesia Perspective.     .

## 2023-01-12 NOTE — TELEPHONE ENCOUNTER
I spoke with patient's wife to let her know [facility] tomorrow with Dr Rodriguez. Mrs Guillory verbalized understanding.

## 2023-01-12 NOTE — TELEPHONE ENCOUNTER
----- Message from Candie Davis sent at 1/12/2023  1:34 PM CST -----  Contact: Pt Wife  Pt wife  is calling back from a missed call from Saida.   Pt. Would like a call back.  Pt have arrival tomorrow      Confirmed contact below:   Contact Name:Blake Guillory  Phone Number: [phone]

## 2023-01-12 NOTE — TELEPHONE ENCOUNTER
LVM for patient and/or wife to return my call. I was calling with patient's arrival time to [facility] tomorrow for 5:30am with Dr Rodriguez. Patient to return my call to discuss.

## 2023-01-13 ENCOUNTER — HOSPITAL ENCOUNTER (OUTPATIENT)
Facility: HOSPITAL | Age: 63
Discharge: HOME | End: 2023-01-13
Attending: INTERNAL MEDICINE | Admitting: INTERNAL MEDICINE
Payer: MEDICARE

## 2023-01-13 ENCOUNTER — ANESTHESIA (OUTPATIENT)
Dept: SURGERY | Facility: HOSPITAL | Age: 63
End: 2023-01-13
Payer: MEDICARE

## 2023-01-13 VITALS
HEART RATE: 66 BPM | HEIGHT: 71 IN | DIASTOLIC BLOOD PRESSURE: 59 MMHG | BODY MASS INDEX: 21.7 KG/M2 | TEMPERATURE: 98 F | SYSTOLIC BLOOD PRESSURE: 123 MMHG | RESPIRATION RATE: 16 BRPM | OXYGEN SATURATION: 93 % | WEIGHT: 155 LBS

## 2023-01-13 DIAGNOSIS — R91.1 NODULE OF RIGHT LUNG: Primary | ICD-10-CM

## 2023-01-13 LAB
GRAM STN SPEC: NORMAL
GRAM STN SPEC: NORMAL

## 2023-01-13 PROCEDURE — 31624 DX BRONCHOSCOPE/LAVAGE: CPT | Mod: 59,RT,, | Performed by: INTERNAL MEDICINE

## 2023-01-13 PROCEDURE — 31624 PR BRONCHOSCOPY,DIAG2STIC W LAVAGE: ICD-10-PCS | Mod: 59,RT,, | Performed by: INTERNAL MEDICINE

## 2023-01-13 PROCEDURE — 88112 PR  CYTOPATH, CELL ENHANCE TECH: ICD-10-PCS | Mod: 26,59,, | Performed by: PATHOLOGY

## 2023-01-13 PROCEDURE — 31627 NAVIGATIONAL BRONCHOSCOPY: CPT | Mod: ,,, | Performed by: INTERNAL MEDICINE

## 2023-01-13 PROCEDURE — 87077 CULTURE AEROBIC IDENTIFY: CPT | Performed by: INTERNAL MEDICINE

## 2023-01-13 PROCEDURE — 87102 FUNGUS ISOLATION CULTURE: CPT | Performed by: INTERNAL MEDICINE

## 2023-01-13 PROCEDURE — D9220A PRA ANESTHESIA: Mod: ,,, | Performed by: ANESTHESIOLOGY

## 2023-01-13 PROCEDURE — 88305 TISSUE EXAM BY PATHOLOGIST: ICD-10-PCS | Mod: 26,,, | Performed by: PATHOLOGY

## 2023-01-13 PROCEDURE — 88112 CYTOPATH CELL ENHANCE TECH: CPT | Mod: 26,59,, | Performed by: PATHOLOGY

## 2023-01-13 PROCEDURE — 63600175 PHARM REV CODE 636 W HCPCS: Performed by: STUDENT IN AN ORGANIZED HEALTH CARE EDUCATION/TRAINING PROGRAM

## 2023-01-13 PROCEDURE — 31629 BRONCHOSCOPY/NEEDLE BX EACH: CPT | Mod: 59,LT,, | Performed by: INTERNAL MEDICINE

## 2023-01-13 PROCEDURE — 88172 CYTP DX EVAL FNA 1ST EA SITE: CPT | Performed by: PATHOLOGY

## 2023-01-13 PROCEDURE — 27201423 OPTIME MED/SURG SUP & DEVICES STERILE SUPPLY: Performed by: INTERNAL MEDICINE

## 2023-01-13 PROCEDURE — 88305 TISSUE EXAM BY PATHOLOGIST: CPT | Mod: 26,,, | Performed by: PATHOLOGY

## 2023-01-13 PROCEDURE — C1726 CATH, BAL DIL, NON-VASCULAR: HCPCS | Performed by: INTERNAL MEDICINE

## 2023-01-13 PROCEDURE — 25000003 PHARM REV CODE 250: Performed by: STUDENT IN AN ORGANIZED HEALTH CARE EDUCATION/TRAINING PROGRAM

## 2023-01-13 PROCEDURE — 87205 SMEAR GRAM STAIN: CPT | Performed by: INTERNAL MEDICINE

## 2023-01-13 PROCEDURE — 31628 PR BRONCHOSCOPY,TRANSBRONCH BIOPSY: ICD-10-PCS | Mod: 51,LT,, | Performed by: INTERNAL MEDICINE

## 2023-01-13 PROCEDURE — 88112 CYTOPATH CELL ENHANCE TECH: CPT | Mod: 59 | Performed by: PATHOLOGY

## 2023-01-13 PROCEDURE — 88312 PR  SPECIAL STAINS,GROUP I: ICD-10-PCS | Mod: 26,,, | Performed by: PATHOLOGY

## 2023-01-13 PROCEDURE — 87206 SMEAR FLUORESCENT/ACID STAI: CPT | Performed by: INTERNAL MEDICINE

## 2023-01-13 PROCEDURE — 31627 PR BRONCHOSCOPY,COMPUTER ASSIST/IMAGE-GUIDED NAVIGATION: ICD-10-PCS | Mod: ,,, | Performed by: INTERNAL MEDICINE

## 2023-01-13 PROCEDURE — 88312 SPECIAL STAINS GROUP 1: CPT | Mod: 59 | Performed by: PATHOLOGY

## 2023-01-13 PROCEDURE — 87116 MYCOBACTERIA CULTURE: CPT | Performed by: INTERNAL MEDICINE

## 2023-01-13 PROCEDURE — 31652 PR BRONCH W/ EBUS, SAMPLING 1 OR 2 NODES, INCL GUIDE: ICD-10-PCS | Mod: ,,, | Performed by: INTERNAL MEDICINE

## 2023-01-13 PROCEDURE — 88173 PR  INTERPRETATION OF FNA SMEAR: ICD-10-PCS | Mod: 26,,, | Performed by: PATHOLOGY

## 2023-01-13 PROCEDURE — 36000708 HC OR TIME LEV III 1ST 15 MIN: Performed by: INTERNAL MEDICINE

## 2023-01-13 PROCEDURE — 31645 BRNCHSC W/THER ASPIR 1ST: CPT | Mod: ,,, | Performed by: INTERNAL MEDICINE

## 2023-01-13 PROCEDURE — 87070 CULTURE OTHR SPECIMN AEROBIC: CPT | Performed by: INTERNAL MEDICINE

## 2023-01-13 PROCEDURE — 88305 TISSUE EXAM BY PATHOLOGIST: CPT | Performed by: PATHOLOGY

## 2023-01-13 PROCEDURE — 31645 PR BRONCHOSCOPY,RX ASPIR PULM TREE: ICD-10-PCS | Mod: ,,, | Performed by: INTERNAL MEDICINE

## 2023-01-13 PROCEDURE — 31654 BRONCH EBUS IVNTJ PERPH LES: CPT | Mod: ,,, | Performed by: INTERNAL MEDICINE

## 2023-01-13 PROCEDURE — 88177 CYTP FNA EVAL EA ADDL: CPT | Mod: 59 | Performed by: PATHOLOGY

## 2023-01-13 PROCEDURE — 37000008 HC ANESTHESIA 1ST 15 MINUTES: Performed by: INTERNAL MEDICINE

## 2023-01-13 PROCEDURE — 31629 PR BRONCHOSCOPY,TRANSBRON ASPIR BX: ICD-10-PCS | Mod: 59,LT,, | Performed by: INTERNAL MEDICINE

## 2023-01-13 PROCEDURE — 31632 PR BRONCHOSCOPY/LUNG BX, ADD'L: ICD-10-PCS | Mod: ,,, | Performed by: INTERNAL MEDICINE

## 2023-01-13 PROCEDURE — 87075 CULTR BACTERIA EXCEPT BLOOD: CPT | Performed by: INTERNAL MEDICINE

## 2023-01-13 PROCEDURE — 31654 PR BRONCH W/ EBUS, DIAG OR THERA INTERVENTION PERIPHERAL LESION(S), INCL GUID, ADD ON CODE: ICD-10-PCS | Mod: ,,, | Performed by: INTERNAL MEDICINE

## 2023-01-13 PROCEDURE — 88173 CYTOPATH EVAL FNA REPORT: CPT | Performed by: PATHOLOGY

## 2023-01-13 PROCEDURE — 31632 BRONCHOSCOPY/LUNG BX ADDL: CPT | Mod: ,,, | Performed by: INTERNAL MEDICINE

## 2023-01-13 PROCEDURE — 71000044 HC DOSC ROUTINE RECOVERY FIRST HOUR: Performed by: INTERNAL MEDICINE

## 2023-01-13 PROCEDURE — 88173 CYTOPATH EVAL FNA REPORT: CPT | Mod: 26,,, | Performed by: PATHOLOGY

## 2023-01-13 PROCEDURE — 37000009 HC ANESTHESIA EA ADD 15 MINS: Performed by: INTERNAL MEDICINE

## 2023-01-13 PROCEDURE — 31633 BRONCHOSCOPY/NEEDLE BX ADDL: CPT | Mod: ,,, | Performed by: INTERNAL MEDICINE

## 2023-01-13 PROCEDURE — 36000709 HC OR TIME LEV III EA ADD 15 MIN: Performed by: INTERNAL MEDICINE

## 2023-01-13 PROCEDURE — D9220A PRA ANESTHESIA: ICD-10-PCS | Mod: ,,, | Performed by: ANESTHESIOLOGY

## 2023-01-13 PROCEDURE — 71000015 HC POSTOP RECOV 1ST HR: Performed by: INTERNAL MEDICINE

## 2023-01-13 PROCEDURE — 31633 PR BRONCHOSCOPY/NEEDLE BX ADD'L: ICD-10-PCS | Mod: ,,, | Performed by: INTERNAL MEDICINE

## 2023-01-13 PROCEDURE — 71000045 HC DOSC ROUTINE RECOVERY EA ADD'L HR: Performed by: INTERNAL MEDICINE

## 2023-01-13 PROCEDURE — 31652 BRONCH EBUS SAMPLNG 1/2 NODE: CPT | Mod: ,,, | Performed by: INTERNAL MEDICINE

## 2023-01-13 PROCEDURE — 88312 SPECIAL STAINS GROUP 1: CPT | Mod: 26,,, | Performed by: PATHOLOGY

## 2023-01-13 PROCEDURE — 31628 BRONCHOSCOPY/LUNG BX EACH: CPT | Mod: 51,LT,, | Performed by: INTERNAL MEDICINE

## 2023-01-13 RX ORDER — EPHEDRINE SULFATE 50 MG/ML
INJECTION, SOLUTION INTRAVENOUS
Status: DISCONTINUED | OUTPATIENT
Start: 2023-01-13 | End: 2023-01-13

## 2023-01-13 RX ORDER — HALOPERIDOL 5 MG/ML
0.5 INJECTION INTRAMUSCULAR EVERY 10 MIN PRN
Status: DISCONTINUED | OUTPATIENT
Start: 2023-01-13 | End: 2023-01-13 | Stop reason: HOSPADM

## 2023-01-13 RX ORDER — DEXAMETHASONE SODIUM PHOSPHATE 4 MG/ML
INJECTION, SOLUTION INTRA-ARTICULAR; INTRALESIONAL; INTRAMUSCULAR; INTRAVENOUS; SOFT TISSUE
Status: DISCONTINUED | OUTPATIENT
Start: 2023-01-13 | End: 2023-01-13

## 2023-01-13 RX ORDER — ONDANSETRON 2 MG/ML
INJECTION INTRAMUSCULAR; INTRAVENOUS
Status: DISCONTINUED | OUTPATIENT
Start: 2023-01-13 | End: 2023-01-13

## 2023-01-13 RX ORDER — SODIUM CHLORIDE 0.9 % (FLUSH) 0.9 %
10 SYRINGE (ML) INJECTION
Status: DISCONTINUED | OUTPATIENT
Start: 2023-01-13 | End: 2023-01-13 | Stop reason: HOSPADM

## 2023-01-13 RX ORDER — FENTANYL CITRATE 50 UG/ML
INJECTION, SOLUTION INTRAMUSCULAR; INTRAVENOUS
Status: DISCONTINUED | OUTPATIENT
Start: 2023-01-13 | End: 2023-01-13

## 2023-01-13 RX ORDER — ROCURONIUM BROMIDE 10 MG/ML
INJECTION, SOLUTION INTRAVENOUS
Status: DISCONTINUED | OUTPATIENT
Start: 2023-01-13 | End: 2023-01-13

## 2023-01-13 RX ORDER — PHENYLEPHRINE HYDROCHLORIDE 10 MG/ML
INJECTION INTRAVENOUS
Status: DISCONTINUED | OUTPATIENT
Start: 2023-01-13 | End: 2023-01-13

## 2023-01-13 RX ORDER — PROPOFOL 10 MG/ML
VIAL (ML) INTRAVENOUS
Status: DISCONTINUED | OUTPATIENT
Start: 2023-01-13 | End: 2023-01-13

## 2023-01-13 RX ORDER — PROPOFOL 10 MG/ML
VIAL (ML) INTRAVENOUS CONTINUOUS PRN
Status: DISCONTINUED | OUTPATIENT
Start: 2023-01-13 | End: 2023-01-13

## 2023-01-13 RX ORDER — HYDROMORPHONE HYDROCHLORIDE 1 MG/ML
0.2 INJECTION, SOLUTION INTRAMUSCULAR; INTRAVENOUS; SUBCUTANEOUS EVERY 5 MIN PRN
Status: DISCONTINUED | OUTPATIENT
Start: 2023-01-13 | End: 2023-01-13 | Stop reason: HOSPADM

## 2023-01-13 RX ORDER — LIDOCAINE HYDROCHLORIDE 20 MG/ML
INJECTION, SOLUTION EPIDURAL; INFILTRATION; INTRACAUDAL; PERINEURAL
Status: DISCONTINUED | OUTPATIENT
Start: 2023-01-13 | End: 2023-01-13

## 2023-01-13 RX ORDER — MIDAZOLAM HYDROCHLORIDE 5 MG/ML
INJECTION INTRAMUSCULAR; INTRAVENOUS
Status: DISCONTINUED | OUTPATIENT
Start: 2023-01-13 | End: 2023-01-13

## 2023-01-13 RX ADMIN — PROPOFOL 130 MCG/KG/MIN: 10 INJECTION, EMULSION INTRAVENOUS at 07:01

## 2023-01-13 RX ADMIN — PROPOFOL 150 MCG/KG/MIN: 10 INJECTION, EMULSION INTRAVENOUS at 07:01

## 2023-01-13 RX ADMIN — PHENYLEPHRINE HYDROCHLORIDE 100 MCG: 10 INJECTION INTRAVENOUS at 07:01

## 2023-01-13 RX ADMIN — EPHEDRINE SULFATE 5 MG: 50 INJECTION INTRAVENOUS at 07:01

## 2023-01-13 RX ADMIN — Medication 20 MG: at 07:01

## 2023-01-13 RX ADMIN — Medication 170 MG: at 07:01

## 2023-01-13 RX ADMIN — PHENYLEPHRINE HYDROCHLORIDE 100 MCG: 10 INJECTION INTRAVENOUS at 08:01

## 2023-01-13 RX ADMIN — SODIUM CHLORIDE: 9 INJECTION, SOLUTION INTRAVENOUS at 06:01

## 2023-01-13 RX ADMIN — PHENYLEPHRINE HYDROCHLORIDE 200 MCG: 10 INJECTION INTRAVENOUS at 08:01

## 2023-01-13 RX ADMIN — ROCURONIUM BROMIDE 10 MG: 10 INJECTION INTRAVENOUS at 07:01

## 2023-01-13 RX ADMIN — ROCURONIUM BROMIDE 60 MG: 10 INJECTION INTRAVENOUS at 07:01

## 2023-01-13 RX ADMIN — PHENYLEPHRINE HYDROCHLORIDE 200 MCG: 10 INJECTION INTRAVENOUS at 07:01

## 2023-01-13 RX ADMIN — MIDAZOLAM HYDROCHLORIDE 2 MG: 5 INJECTION, SOLUTION INTRAMUSCULAR; INTRAVENOUS at 06:01

## 2023-01-13 RX ADMIN — FENTANYL CITRATE 100 MCG: 50 INJECTION, SOLUTION INTRAMUSCULAR; INTRAVENOUS at 07:01

## 2023-01-13 RX ADMIN — LIDOCAINE HYDROCHLORIDE 50 MG: 20 INJECTION, SOLUTION EPIDURAL; INFILTRATION; INTRACAUDAL; PERINEURAL at 08:01

## 2023-01-13 RX ADMIN — DEXAMETHASONE SODIUM PHOSPHATE 4 MG: 4 INJECTION, SOLUTION INTRAMUSCULAR; INTRAVENOUS at 07:01

## 2023-01-13 RX ADMIN — EPHEDRINE SULFATE 10 MG: 50 INJECTION INTRAVENOUS at 07:01

## 2023-01-13 RX ADMIN — ONDANSETRON 4 MG: 2 INJECTION INTRAMUSCULAR; INTRAVENOUS at 07:01

## 2023-01-13 RX ADMIN — LIDOCAINE HYDROCHLORIDE 50 MG: 20 INJECTION, SOLUTION EPIDURAL; INFILTRATION; INTRACAUDAL; PERINEURAL at 07:01

## 2023-01-13 RX ADMIN — Medication 20 MG: at 08:01

## 2023-01-13 RX ADMIN — Medication 40 MG: at 08:01

## 2023-01-13 RX ADMIN — SODIUM CHLORIDE, SODIUM GLUCONATE, SODIUM ACETATE, POTASSIUM CHLORIDE, MAGNESIUM CHLORIDE, SODIUM PHOSPHATE, DIBASIC, AND POTASSIUM PHOSPHATE: .53; .5; .37; .037; .03; .012; .00082 INJECTION, SOLUTION INTRAVENOUS at 07:01

## 2023-01-13 RX ADMIN — SUGAMMADEX 200 MG: 100 INJECTION, SOLUTION INTRAVENOUS at 08:01

## 2023-01-13 RX ADMIN — ROCURONIUM BROMIDE 30 MG: 10 INJECTION INTRAVENOUS at 08:01

## 2023-01-13 NOTE — PROGRESS NOTES
Discharge instructions reviewed w/ pt, verbalized understanding. Pt in NADN.No complaints at this time. Tolerated liquids w/ no issues. To be d/c'd home w/ famlily.

## 2023-01-13 NOTE — DISCHARGE SUMMARY
[facility] - Surgery (2nd Fl)  Discharge Note  Short Stay    Procedure(s) (LRB):  ROBOTIC BRONCHOSCOPY (N/A)      OUTCOME: Patient tolerated treatment/procedure well without complication and is now ready for discharge.    DISPOSITION: Home or Self Care    FINAL DIAGNOSIS:  bilateral pulmonary nodules    FOLLOWUP: In clinic    DISCHARGE INSTRUCTIONS:    Discharge Procedure Orders   Diet general         Clinical Reference Documents Added to Patient Instructions         Document    BRONCHOSCOPY, DIAGNOSTIC (ENGLISH)            TIME SPENT ON DISCHARGE: 5 minutes

## 2023-01-13 NOTE — PROGRESS NOTES
Pt O2 sats from 88%-93%, no complaints at this time. Dr. Michael ghotra  for pt to go home at this time.

## 2023-01-13 NOTE — INTERVAL H&P NOTE
The patient has been examined and the H&P has been reviewed:    I concur with the findings and no changes have occurred since H&P was written.    Anesthesia/Surgery risks, benefits and alternative options discussed and understood by patient/family.          I have explained the risks, benefits and alternatives of the procedure in detail.  The patient voices understanding and all questions have been answered.  The patient agrees to proceed as planned.

## 2023-01-13 NOTE — ANESTHESIA PROCEDURE NOTES
Intubation    Date/Time: 1/13/2023 7:10 AM  Performed by: Derek Lozano MD  Authorized by: Vinny Ramirez MD     Intubation:     Induction:  Intravenous    Intubated:  Postinduction    Mask Ventilation:  Easy with oral airway    Attempts:  1    Attempted By:  Resident anesthesiologist    Method of Intubation:  Video laryngoscopy    Blade:  Tyson 3    Laryngeal View Grade: Grade I - full view of cords      Difficult Airway Encountered?: No      Complications:  None    Airway Device:  Oral endotracheal tube    Airway Device Size:  9.0    Style/Cuff Inflation:  Cuffed (inflated to minimal occlusive pressure)    Tube secured:  24    Secured at:  The lips    Placement Verified By:  Capnometry    Findings Post-Intubation:  BS equal bilateral and atraumatic/condition of teeth unchanged

## 2023-01-13 NOTE — ANESTHESIA POSTPROCEDURE EVALUATION
Anesthesia Post Evaluation    Patient: Blake Guillory    Procedure(s) Performed: Procedure(s) (LRB):  ROBOTIC BRONCHOSCOPY (N/A)    Final Anesthesia Type: general      Patient location during evaluation: PACU  Patient participation: Yes- Able to Participate  Level of consciousness: awake and alert and oriented  Post-procedure vital signs: reviewed and stable  Pain management: adequate  Airway patency: patent    PONV status at discharge: No PONV  Anesthetic complications: no      Cardiovascular status: hemodynamically stable  Respiratory status: unassisted  Hydration status: euvolemic  Follow-up not needed.          Vitals Value Taken Time   /59 01/13/23 1100   Temp 36.4 °C (97.5 °F) 01/13/23 1100   Pulse 66 01/13/23 1100   Resp 16 01/13/23 1100   SpO2 93 % 01/13/23 1100         No case tracking events are documented in the log.      Pain/Gissel Score: Gissel Score: 9 (1/13/2023 10:14 AM)

## 2023-01-16 LAB — BACTERIA SPEC AEROBE CULT: ABNORMAL

## 2023-01-17 LAB — BACTERIA SPEC ANAEROBE CULT: NORMAL

## 2023-01-19 ENCOUNTER — TELEPHONE (OUTPATIENT)
Dept: PULMONOLOGY | Facility: CLINIC | Age: 63
End: 2023-01-19
Payer: MEDICARE

## 2023-01-19 LAB
ADEQUACY: ABNORMAL
FINAL PATHOLOGIC DIAGNOSIS: ABNORMAL
Lab: ABNORMAL
SUPPLEMENTAL DIAGNOSIS: ABNORMAL

## 2023-01-19 RX ORDER — SULFAMETHOXAZOLE AND TRIMETHOPRIM 800; 160 MG/1; MG/1
1 TABLET ORAL 2 TIMES DAILY
Qty: 20 TABLET | Refills: 0 | Status: SHIPPED | OUTPATIENT
Start: 2023-01-19 | End: 2023-01-29

## 2023-01-19 NOTE — TELEPHONE ENCOUNTER
Patient with non caseating granuloma with culture being positive for moraxella catarella.  Will tx with Bactrim

## 2023-02-14 LAB — FUNGUS SPEC CULT: NORMAL

## 2023-03-03 LAB
ACID FAST MOD KINY STN SPEC: NORMAL
MYCOBACTERIUM SPEC QL CULT: NORMAL

## 2023-03-27 PROBLEM — J96.11 CHRONIC RESPIRATORY FAILURE WITH HYPOXIA: Status: RESOLVED | Noted: 2022-05-10 | Resolved: 2023-03-27

## 2023-04-21 ENCOUNTER — HOSPITAL ENCOUNTER (EMERGENCY)
Facility: OTHER | Age: 63
Discharge: HOME | End: 2023-04-21
Attending: EMERGENCY MEDICINE
Payer: MEDICARE

## 2023-04-21 VITALS
RESPIRATION RATE: 18 BRPM | TEMPERATURE: 98 F | DIASTOLIC BLOOD PRESSURE: 70 MMHG | HEART RATE: 73 BPM | HEIGHT: 72 IN | OXYGEN SATURATION: 95 % | WEIGHT: 165 LBS | BODY MASS INDEX: 22.35 KG/M2 | SYSTOLIC BLOOD PRESSURE: 131 MMHG

## 2023-04-21 DIAGNOSIS — N20.1 URETERAL STONE: ICD-10-CM

## 2023-04-21 DIAGNOSIS — R10.9 RIGHT FLANK PAIN: Primary | ICD-10-CM

## 2023-04-21 DIAGNOSIS — N13.2 HYDRONEPHROSIS WITH URINARY OBSTRUCTION DUE TO URETERAL CALCULUS: ICD-10-CM

## 2023-04-21 LAB
ALBUMIN SERPL BCP-MCNC: 3.7 G/DL (ref 3.5–5.2)
ALP SERPL-CCNC: 42 U/L (ref 55–135)
ALT SERPL W/O P-5'-P-CCNC: 97 U/L (ref 10–44)
ANION GAP SERPL CALC-SCNC: 13 MMOL/L (ref 8–16)
AST SERPL-CCNC: 161 U/L (ref 10–40)
BACTERIA #/AREA URNS HPF: ABNORMAL /HPF
BASOPHILS # BLD AUTO: 0.04 K/UL (ref 0–0.2)
BASOPHILS NFR BLD: 1 % (ref 0–1.9)
BILIRUB SERPL-MCNC: 0.3 MG/DL (ref 0.1–1)
BILIRUB UR QL STRIP: NEGATIVE
BUN SERPL-MCNC: 40 MG/DL (ref 8–23)
CALCIUM SERPL-MCNC: 9.6 MG/DL (ref 8.7–10.5)
CHLORIDE SERPL-SCNC: 100 MMOL/L (ref 95–110)
CLARITY UR: CLEAR
CO2 SERPL-SCNC: 20 MMOL/L (ref 23–29)
COLOR UR: YELLOW
CREAT SERPL-MCNC: 2.8 MG/DL (ref 0.5–1.4)
DIFFERENTIAL METHOD: ABNORMAL
EOSINOPHIL # BLD AUTO: 0 K/UL (ref 0–0.5)
EOSINOPHIL NFR BLD: 0 % (ref 0–8)
ERYTHROCYTE [DISTWIDTH] IN BLOOD BY AUTOMATED COUNT: 13.2 % (ref 11.5–14.5)
EST. GFR  (NO RACE VARIABLE): 24 ML/MIN/1.73 M^2
GLUCOSE SERPL-MCNC: 91 MG/DL (ref 70–110)
GLUCOSE UR QL STRIP: NEGATIVE
HCT VFR BLD AUTO: 44.6 % (ref 40–54)
HGB BLD-MCNC: 14.4 G/DL (ref 14–18)
HGB UR QL STRIP: ABNORMAL
HYALINE CASTS #/AREA URNS LPF: 0 /LPF
IMM GRANULOCYTES # BLD AUTO: 0.01 K/UL (ref 0–0.04)
IMM GRANULOCYTES NFR BLD AUTO: 0.3 % (ref 0–0.5)
KETONES UR QL STRIP: NEGATIVE
LACTATE SERPL-SCNC: 0.9 MMOL/L (ref 0.5–2.2)
LEUKOCYTE ESTERASE UR QL STRIP: ABNORMAL
LIPASE SERPL-CCNC: 64 U/L (ref 4–60)
LYMPHOCYTES # BLD AUTO: 0.6 K/UL (ref 1–4.8)
LYMPHOCYTES NFR BLD: 15.8 % (ref 18–48)
MCH RBC QN AUTO: 30.8 PG (ref 27–31)
MCHC RBC AUTO-ENTMCNC: 32.3 G/DL (ref 32–36)
MCV RBC AUTO: 95 FL (ref 82–98)
MICROSCOPIC COMMENT: ABNORMAL
MONOCYTES # BLD AUTO: 0.4 K/UL (ref 0.3–1)
MONOCYTES NFR BLD: 11.4 % (ref 4–15)
NEUTROPHILS # BLD AUTO: 2.8 K/UL (ref 1.8–7.7)
NEUTROPHILS NFR BLD: 71.5 % (ref 38–73)
NITRITE UR QL STRIP: NEGATIVE
NRBC BLD-RTO: 0 /100 WBC
PH UR STRIP: 7 [PH] (ref 5–8)
PLATELET # BLD AUTO: 173 K/UL (ref 150–450)
PMV BLD AUTO: 10.5 FL (ref 9.2–12.9)
POTASSIUM SERPL-SCNC: 5.4 MMOL/L (ref 3.5–5.1)
PROT SERPL-MCNC: 7.7 G/DL (ref 6–8.4)
PROT UR QL STRIP: ABNORMAL
RBC # BLD AUTO: 4.68 M/UL (ref 4.6–6.2)
RBC #/AREA URNS HPF: 12 /HPF (ref 0–4)
SODIUM SERPL-SCNC: 133 MMOL/L (ref 136–145)
SP GR UR STRIP: 1.01 (ref 1–1.03)
SQUAMOUS #/AREA URNS HPF: 3 /HPF
URN SPEC COLLECT METH UR: ABNORMAL
UROBILINOGEN UR STRIP-ACNC: NEGATIVE EU/DL
WBC # BLD AUTO: 3.85 K/UL (ref 3.9–12.7)
WBC #/AREA URNS HPF: 49 /HPF (ref 0–5)
YEAST URNS QL MICRO: ABNORMAL

## 2023-04-21 PROCEDURE — 99285 EMERGENCY DEPT VISIT HI MDM: CPT | Mod: 25

## 2023-04-21 PROCEDURE — 87086 URINE CULTURE/COLONY COUNT: CPT | Performed by: EMERGENCY MEDICINE

## 2023-04-21 PROCEDURE — 96376 TX/PRO/DX INJ SAME DRUG ADON: CPT

## 2023-04-21 PROCEDURE — 87088 URINE BACTERIA CULTURE: CPT | Performed by: EMERGENCY MEDICINE

## 2023-04-21 PROCEDURE — 80053 COMPREHEN METABOLIC PANEL: CPT | Performed by: EMERGENCY MEDICINE

## 2023-04-21 PROCEDURE — 85025 COMPLETE CBC W/AUTO DIFF WBC: CPT | Performed by: EMERGENCY MEDICINE

## 2023-04-21 PROCEDURE — 83690 ASSAY OF LIPASE: CPT | Performed by: EMERGENCY MEDICINE

## 2023-04-21 PROCEDURE — 81000 URINALYSIS NONAUTO W/SCOPE: CPT | Performed by: EMERGENCY MEDICINE

## 2023-04-21 PROCEDURE — 83605 ASSAY OF LACTIC ACID: CPT | Performed by: EMERGENCY MEDICINE

## 2023-04-21 PROCEDURE — 96375 TX/PRO/DX INJ NEW DRUG ADDON: CPT

## 2023-04-21 PROCEDURE — 96374 THER/PROPH/DIAG INJ IV PUSH: CPT

## 2023-04-21 PROCEDURE — 87186 SC STD MICRODIL/AGAR DIL: CPT | Performed by: EMERGENCY MEDICINE

## 2023-04-21 PROCEDURE — 25000003 PHARM REV CODE 250: Performed by: EMERGENCY MEDICINE

## 2023-04-21 PROCEDURE — 87077 CULTURE AEROBIC IDENTIFY: CPT | Performed by: EMERGENCY MEDICINE

## 2023-04-21 PROCEDURE — 63600175 PHARM REV CODE 636 W HCPCS: Performed by: EMERGENCY MEDICINE

## 2023-04-21 PROCEDURE — 96361 HYDRATE IV INFUSION ADD-ON: CPT

## 2023-04-21 RX ORDER — ONDANSETRON 2 MG/ML
4 INJECTION INTRAMUSCULAR; INTRAVENOUS
Status: COMPLETED | OUTPATIENT
Start: 2023-04-21 | End: 2023-04-21

## 2023-04-21 RX ORDER — HYDROCODONE BITARTRATE AND ACETAMINOPHEN 10; 325 MG/1; MG/1
1 TABLET ORAL EVERY 4 HOURS PRN
Qty: 16 TABLET | Refills: 0 | Status: SHIPPED | OUTPATIENT
Start: 2023-04-21 | End: 2023-04-21 | Stop reason: SDUPTHER

## 2023-04-21 RX ORDER — MORPHINE SULFATE 4 MG/ML
4 INJECTION, SOLUTION INTRAMUSCULAR; INTRAVENOUS
Status: COMPLETED | OUTPATIENT
Start: 2023-04-21 | End: 2023-04-21

## 2023-04-21 RX ORDER — HYDROCODONE BITARTRATE AND ACETAMINOPHEN 10; 325 MG/1; MG/1
1 TABLET ORAL EVERY 6 HOURS PRN
Qty: 16 TABLET | Refills: 0 | Status: ON HOLD | OUTPATIENT
Start: 2023-04-21 | End: 2023-07-23 | Stop reason: SDUPTHER

## 2023-04-21 RX ORDER — ONDANSETRON 4 MG/1
4 TABLET, ORALLY DISINTEGRATING ORAL EVERY 8 HOURS PRN
Qty: 20 TABLET | Refills: 0 | Status: SHIPPED | OUTPATIENT
Start: 2023-04-21 | End: 2023-06-01

## 2023-04-21 RX ADMIN — ONDANSETRON 4 MG: 2 INJECTION INTRAMUSCULAR; INTRAVENOUS at 01:04

## 2023-04-21 RX ADMIN — MORPHINE SULFATE 4 MG: 4 INJECTION, SOLUTION INTRAMUSCULAR; INTRAVENOUS at 03:04

## 2023-04-21 RX ADMIN — MORPHINE SULFATE 4 MG: 4 INJECTION, SOLUTION INTRAMUSCULAR; INTRAVENOUS at 01:04

## 2023-04-21 RX ADMIN — SODIUM CHLORIDE 1000 ML: 0.9 INJECTION, SOLUTION INTRAVENOUS at 01:04

## 2023-04-21 NOTE — ED PROVIDER NOTES
Encounter Date: 4/21/2023    SCRIBE #1 NOTE: I, Cathy Meredith, am scribing for, and in the presence of,  Barrett Hassan MD.     History     Chief Complaint   Patient presents with    Flank Pain     Right flank pain x2 days with hematuria noted. Pt reports PMHx of kidney stones.      Blake Guillory is a 63 y.o. male, with a PMHx of bladder cancer, CKD, and kidney stones, who presents to the ED with constant right flank pain for the last week. He notes blood in urine that began two days ago but notes improvement since then, and reports decreased urine. He has a PSHx of radical cystectomy 11 years ago.    This is the extent of the patient's complaints at this time.    The history is provided by the patient.   Review of patient's allergies indicates:  No Known Allergies  Past Medical History:   Diagnosis Date    Anemia of chronic disease     Aneurysm of right common iliac artery     Arthritis     Blood transfusion     CAD (coronary artery disease)     Chest pain of uncertain etiology     CKD (chronic kidney disease), stage IV     COPD (chronic obstructive pulmonary disease)     Coronary artery calcification 8/2/2021    DDD (degenerative disc disease), lumbar     Frequency of urination     General anesthetics causing adverse effect in therapeutic use     pt states he wakes up very violently from anesthesia    GERD (gastroesophageal reflux disease)     Gout     History of bladder cancer     History of urostomy     Hydronephrosis     Hyperparathyroidism     Hypertension     Hypertriglyceridemia     Hypothyroidism (acquired)     Insomnia     Kidney stone     Limited joint range of motion right hip and leg    Lumbar facet arthropathy     Lumbar spondylosis     Mixed anxiety and depressive disorder     Neuropathy     Orchalgia     SHARMIN (obstructive sleep apnea)     Personal history of bladder cancer     PVD (peripheral vascular disease)     Sacroiliitis     Stricture or kinking of ureter     Ureteral stent displacement      Urinary retention     Vitamin D deficiency     Wears glasses      Past Surgical History:   Procedure Laterality Date    APPENDECTOMY  12/30/2015    Procedure: APPENDECTOMY;  Surgeon: CHAD Bo MD;  Location: [facility] OR;  Service: Urology;;    BALLOON DILATION OF URETER  01/22/2018    BOWEL RESECTION      COLONOSCOPY      CREATION OF URETEROILEAL CONDUIT Left 07/05/2013    CYSTOSCOPY      >1  episodes for bilat ureteral stent exchange    CYSTOSCOPY W/ URETERAL STENT PLACEMENT Right 01/22/2018    and 6/9/2015    CYSTOSCOPY W/ URETERAL STENT PLACEMENT Bilateral 10/07/2015    ESOPHAGOGASTRODUODENOSCOPY  04/12/2022    W/ BIOPSY    ESOPHAGOGASTRODUODENOSCOPY N/A 4/12/2022    Procedure: EGD (ESOPHAGOGASTRODUODENOSCOPY);  Surgeon: Tito Fan MD;  Location: [facility];  Service: Endoscopy;  Laterality: N/A;    EXPLORATORY LAPAROTOMY  07/05/2013    EXPLORATORY LAPAROTOMY W/ BOWEL RESECTION  12/30/2015    EXTRACORPOREAL SHOCK WAVE LITHOTRIPSY Right 06/07/2021    Procedure: LITHOTRIPSY, ESWL;  Surgeon: CHAD Bo MD;  Location: [facility] OR;  Service: Urology;  Laterality: Right;  RN PREOP 5/31/2021   VACCINATED    EXTRACORPOREAL SHOCK WAVE LITHOTRIPSY  06/07/2021    FRACTURE SURGERY      HAND SURGERY      HEMICOLECTOMY  12/30/2015    HIP SURGERY  2012    Rt hip septic    LAPAROSCOPIC CHOLECYSTECTOMY N/A 08/11/2021    Procedure: CHOLECYSTECTOMY, LAPAROSCOPIC;  Surgeon: Remy Xiong MD;  Location: [facility] OR;  Service: General;  Laterality: N/A;  gianni video confirmed 8/5/dme    LYMPH NODE DISSECTION  11/08/2012    pelvic    LYSIS OF ADHESIONS  12/30/2015    MOUTH SURGERY  2000    all teeth extracted    PERCUTANEOUS NEPHROLITHOTOMY Left 07/22/2020    Procedure: NEPHROLITHOTOMY, PERCUTANEOUS;  Surgeon: CHAD Bo MD;  Location: [facility] OR;  Service: Urology;  Laterality: Left;  OR 9 ONLY  RN PREOP 7/20/2020----COVID NEGATIVE ON 7/20    PERCUTANEOUS NEPHROLITHOTOMY Bilateral 6/8/2022    Procedure:  BILATERAL PERCUTANEOUS NEPHROSCOPY, BILATERAL ANTEGRADE PYELOGRAM, BILATERAL NEPHROSTOMY TUBE PLACEMENT;  Surgeon: CHAD Bo MD;  Location: [facility] OR;  Service: Urology;  Laterality: Bilateral;  room 9 only  RN PREOP 2022----T/S IN AM    PERCUTANEOUS NEPHROSTOMY Left 2018    PERCUTANEOUS REPLACEMENT OF NEPHROSTOMY TUBE Bilateral 5/10/2022    Procedure: REPLACEMENT, NEPHROSTOMY TUBE, PERCUTANEOUS;  Surgeon: Chino Vo MD;  Location: [facility] CATH LAB;  Service: Radiology;  Laterality: Bilateral;    RADICAL CYSTECTOMY  2012    RADIOFREQUENCY THERMOCOAGULATION  2014    median branch lumbar    RADIOFREQUENCY THERMOCOAGULATION  2014    median branch lumbar    REPAIR, HERNIA, PARASTOMAL, LAPAROSCOPIC  2017    ROBOTIC BRONCHOSCOPY N/A 2023    Procedure: ROBOTIC BRONCHOSCOPY;  Surgeon: Camila Rodriguez MD;  Location: [address];  Service: Anesthesiology;  Laterality: N/A;    SMALL INTESTINE SURGERY  2017    urosotomy bag  2011    R abdomen     Family History   Adopted: Yes   Family history unknown: Yes     Social History     Tobacco Use    Smoking status: Former     Packs/day: 0.30     Years: 42.00     Pack years: 12.60     Types: Cigarettes     Quit date: 2017     Years since quittin.2    Smokeless tobacco: Never    Tobacco comments:     in cessation program   Substance Use Topics    Alcohol use: Yes     Alcohol/week: 14.0 - 21.0 standard drinks     Types: 14 - 21 Cans of beer per week     Comment: occassional    Drug use: Yes     Types: Marijuana     Comment: occass     Review of Systems   Constitutional: Negative.    HENT: Negative.     Eyes: Negative.    Respiratory: Negative.     Cardiovascular: Negative.    Gastrointestinal: Negative.    Endocrine: Negative.    Genitourinary:  Positive for decreased urine volume, flank pain and hematuria.   Skin: Negative.    Allergic/Immunologic: Negative.    Neurological: Negative.    Hematological: Negative.     Psychiatric/Behavioral: Negative.       Physical Exam     Initial Vitals [04/21/23 1229]   BP Pulse Resp Temp SpO2   138/76 79 19 97.5 °F (36.4 °C) 98 %      MAP       --         Physical Exam    Nursing note and vitals reviewed.  Constitutional: He appears well-developed and well-nourished. He is not diaphoretic. No distress.   HENT:   Head: Normocephalic and atraumatic.   Nose: Nose normal.   Mouth/Throat: No oropharyngeal exudate.   Eyes: EOM are normal. Pupils are equal, round, and reactive to light.   Neck: Neck supple. No tracheal deviation present. No JVD present.   Normal range of motion.  Cardiovascular:  Normal rate, regular rhythm, normal heart sounds and intact distal pulses.           Pulmonary/Chest: Breath sounds normal. No respiratory distress. He has no wheezes. He has no rhonchi. He has no rales.   Abdominal: Abdomen is soft. Bowel sounds are normal. He exhibits no distension. There is abdominal tenderness (Mild right CVA tenderness.).   Urostomy over right lower quadrant with clear urine in bag. There is no rebound and no guarding.   Musculoskeletal:         General: No tenderness or edema. Normal range of motion.      Cervical back: Normal range of motion and neck supple.     Neurological: He is alert and oriented to person, place, and time. He has normal strength.   Skin: Skin is warm and dry. Capillary refill takes less than 2 seconds. No rash noted. No erythema.       ED Course   Procedures  Labs Reviewed   CBC W/ AUTO DIFFERENTIAL - Abnormal; Notable for the following components:       Result Value    WBC 3.85 (*)     Lymph # 0.6 (*)     Lymph % 15.8 (*)     All other components within normal limits   COMPREHENSIVE METABOLIC PANEL - Abnormal; Notable for the following components:    Sodium 133 (*)     Potassium 5.4 (*)     CO2 20 (*)     BUN 40 (*)     Creatinine 2.8 (*)     Alkaline Phosphatase 42 (*)      (*)     ALT 97 (*)     eGFR 24 (*)     All other components within normal  limits   URINALYSIS, REFLEX TO URINE CULTURE - Abnormal; Notable for the following components:    Protein, UA 1+ (*)     Occult Blood UA 2+ (*)     Leukocytes, UA 3+ (*)     All other components within normal limits    Narrative:     Specimen Source->Urine   LIPASE - Abnormal; Notable for the following components:    Lipase 64 (*)     All other components within normal limits   URINALYSIS MICROSCOPIC - Abnormal; Notable for the following components:    RBC, UA 12 (*)     WBC, UA 49 (*)     Bacteria Moderate (*)     All other components within normal limits    Narrative:     Specimen Source->Urine   CULTURE, URINE   LACTIC ACID, PLASMA          Imaging Results              CT Renal Stone Study ABD Pelvis WO (Final result)  Result time 04/21/23 14:34:10      Final result by Lake Hickey MD (04/21/23 14:34:10)                   Impression:      1. Suspected bilateral nonobstructing nephrolithiasis.  Otherwise no acute process seen within the abdomen or pelvis on this noncontrast study.  2. Remote operative change of cystectomy with right lower quadrant ileal conduit.  Resolution of previous right-sided hydronephrosis.  3. Prior cholecystectomy.  4. Diverticulosis coli.  5. 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  6. Grossly stable additional findings as above.      Electronically signed by: Lake Hickey MD  Date:    04/21/2023  Time:    14:34               Narrative:    EXAMINATION:  CT RENAL STONE STUDY ABD PELVIS WO    CLINICAL HISTORY:  Flank pain, kidney stone suspected;right side;    TECHNIQUE:  Low dose axial images, sagittal and coronal reformations were obtained from the lung bases to the pubic symphysis.  Contrast was not administered.    COMPARISON:  CT renal stone study 07/12/2022, renal ultrasound 07/08/2021    FINDINGS:  Imaged lung bases are clear.  Base of the heart is normal in size without significant pericardial fluid  noting multi-vessel coronary arterial and aortic annular calcifications.    Remote cholecystectomy.  No biliary ductal dilatation.  Liver is normal in size with homogeneous parenchymal attenuation.  Spleen is normal in size noting several scattered punctate parenchymal calcifications likely sequela of prior granulomatous disease.  Noncontrast appearance of the pancreas, stomach, duodenum and bilateral adrenal glands are within normal limits.  Few small accessory splenules noted.    Bilateral kidneys are stable in overall size, shape and location with grossly similar areas of cortical thinning and scarring and renal cortical lobulation with overall mild atrophy.  Advanced bilateral renal atherosclerotic vascular calcifications noted.  There are additional several scattered subcentimeter suspected nephroliths at each kidney.  No hydronephrosis.  There is relatively symmetric bilateral nonspecific perinephric stranding similar to prior.  Few scattered subcentimeter hypoattenuating cortical foci at each kidney which are too small to characterize.    Remote operative change of cystectomy with right lower quadrant ileal conduit.  The bilateral ureters are not significantly dilated.    No pelvic mass seen.  Few scattered pelvic phleboliths noted.    Appendix is not identified; however, no right lower quadrant inflammatory change.  Several scattered colonic diverticula without diverticulitis.  No evidence of bowel obstruction or acute inflammation.  No pneumatosis or portal venous gas.    No ascites, free air or lymphadenopathy by CT criteria.    Scattered advanced/severe calcific atherosclerosis of the abdominal aorta extending into its mesenteric and iliac branches.  Abdominal aorta is ectatic but nonaneurysmal.  Grossly stable fusiform aneurysmal dilatation of the right common iliac artery measuring up to 3.4 cm.    Operative changes of right lower quadrant parastomal and bilateral inguinal hernia repair similar to  prior.    Osseous structures appear stable without acute process seen.                                       Medications   sodium chloride 0.9% bolus 1,000 mL 1,000 mL (0 mLs Intravenous Stopped 4/21/23 1425)   morphine injection 4 mg (4 mg Intravenous Given 4/21/23 1325)   ondansetron injection 4 mg (4 mg Intravenous Given 4/21/23 1325)   morphine injection 4 mg (4 mg Intravenous Given 4/21/23 1528)     Medical Decision Making:   History:   Old Medical Records: I decided to obtain old medical records.  Clinical Tests:   Lab Tests: Ordered and Reviewed  Radiological Study: Ordered and Reviewed       MDM:    63-year-old male with past medical history as noted above presenting with right flank pain.  Physical exam as noted above.  ED workup notable for 3+ leukocytes, moderate bacteriuria, 3 squamous epithelial cells, urine culture pending.  CBC with hemoglobin 14.4, white blood cell count 3.85, CMP with BUN 40, creatinine 2.8, lipase 64, lactate 0.9, CT renal study showing no acute process remote operative change of cystectomy with right lower quadrant ileal conduit and resolution of previous right-sided hydronephrosis.  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  Patient presentation consistent with right flank pain, suspect acute on chronic process, bacteriuria will not be treated at this time, pending urine culture without significant signs of sepsis or further infectious etiology.  Patient's pain significantly improved after treatment here, IV fluids given, patient's urine appears clear without hematuria present.  No evidences time for ureterolithiasis, obstruction, acute renal failure, baseline renal function at this time. Discussed diagnosis and further treatment with patient, including f/u.  Return precautions given and all questions answered.  Patient in understanding of plan.  Pt discharged to home improved and stable.       Scribe Attestation:   Scribe #1: I performed the  above scribed service and the documentation accurately describes the services I performed. I attest to the accuracy of the note.            Physician Attestation for Scribe: I, Barrett Hassan M.D., reviewed documentation as scribed in my presence, which is both accurate and complete.       Clinical Impression:   Final diagnoses:  [R10.9] Right flank pain (Primary)        ED Disposition Condition    Discharge Stable          ED Prescriptions       Medication Sig Dispense Start Date End Date Auth. Provider    HYDROcodone-acetaminophen (NORCO)  mg per tablet  (Status: Discontinued) Take 1 tablet by mouth every 4 (four) hours as needed for Pain. 16 tablet 4/21/2023 4/21/2023 Barrett Hassan MD    ondansetron (ZOFRAN-ODT) 4 MG TbDL Take 1 tablet (4 mg total) by mouth every 8 (eight) hours as needed (nausea). 20 tablet 4/21/2023 -- Barrett Hassan MD    HYDROcodone-acetaminophen (NORCO)  mg per tablet Take 1 tablet by mouth every 6 (six) hours as needed for Pain. 16 tablet 4/21/2023 -- Barrett Hassan MD          Follow-up Information       Follow up With Specialties Details Why Contact Info    [facility] - Emergency Dept Emergency Medicine Go to  If symptoms worsen [address]  [facility] [address] [phone]    Varun Zeng MD Internal Medicine, Physical Medicine and Rehabilitation Go in 1 week As needed [address] [facility] [id number] [phone]               Barrett Hassan MD  04/22/23 [id number]

## 2023-04-21 NOTE — ED TRIAGE NOTES
"Presents to ED c/o R flank pain with hematuria x2 days. Pt states urine has had large amounts of blood but has been a smaller amount this morning. Hx of bladder cancer with cystectomy  with urostomy and hx kidney stones. Denies any fever, N/V. Pt also reports "blood clot in arm" and taking eliquis. Hx COPD  "

## 2023-04-23 LAB — BACTERIA UR CULT: ABNORMAL

## 2023-04-24 RX ORDER — SULFAMETHOXAZOLE AND TRIMETHOPRIM 800; 160 MG/1; MG/1
1 TABLET ORAL 2 TIMES DAILY
Qty: 28 TABLET | Refills: 0 | Status: SHIPPED | OUTPATIENT
Start: 2023-04-24 | End: 2023-05-08

## 2023-05-09 ENCOUNTER — OFFICE VISIT (OUTPATIENT)
Dept: CARDIOLOGY | Facility: CLINIC | Age: 63
End: 2023-05-09
Payer: MEDICARE

## 2023-05-09 VITALS
WEIGHT: 151.81 LBS | HEIGHT: 72 IN | BODY MASS INDEX: 20.56 KG/M2 | HEART RATE: 68 BPM | OXYGEN SATURATION: 95 % | DIASTOLIC BLOOD PRESSURE: 71 MMHG | SYSTOLIC BLOOD PRESSURE: 142 MMHG

## 2023-05-09 DIAGNOSIS — I25.84 CORONARY ARTERY CALCIFICATION: ICD-10-CM

## 2023-05-09 DIAGNOSIS — I73.9 PVD (PERIPHERAL VASCULAR DISEASE): ICD-10-CM

## 2023-05-09 DIAGNOSIS — I82.A11 ACUTE DEEP VEIN THROMBOSIS (DVT) OF AXILLARY VEIN OF RIGHT UPPER EXTREMITY: ICD-10-CM

## 2023-05-09 DIAGNOSIS — C67.9 MALIGNANT NEOPLASM OF URINARY BLADDER, UNSPECIFIED SITE: ICD-10-CM

## 2023-05-09 DIAGNOSIS — I25.10 CORONARY ARTERY CALCIFICATION: ICD-10-CM

## 2023-05-09 DIAGNOSIS — I10 ESSENTIAL HYPERTENSION: ICD-10-CM

## 2023-05-09 DIAGNOSIS — I72.3 ILIAC ARTERY ANEURYSM: Primary | Chronic | ICD-10-CM

## 2023-05-09 DIAGNOSIS — E78.1 HYPERTRIGLYCERIDEMIA: Chronic | ICD-10-CM

## 2023-05-09 PROCEDURE — 3288F PR FALLS RISK ASSESSMENT DOCUMENTED: ICD-10-PCS | Mod: CPTII,S$GLB,, | Performed by: INTERNAL MEDICINE

## 2023-05-09 PROCEDURE — 1159F PR MEDICATION LIST DOCUMENTED IN MEDICAL RECORD: ICD-10-PCS | Mod: CPTII,S$GLB,, | Performed by: INTERNAL MEDICINE

## 2023-05-09 PROCEDURE — 1101F PR PT FALLS ASSESS DOC 0-1 FALLS W/OUT INJ PAST YR: ICD-10-PCS | Mod: CPTII,S$GLB,, | Performed by: INTERNAL MEDICINE

## 2023-05-09 PROCEDURE — 1160F RVW MEDS BY RX/DR IN RCRD: CPT | Mod: CPTII,S$GLB,, | Performed by: INTERNAL MEDICINE

## 2023-05-09 PROCEDURE — 1160F PR REVIEW ALL MEDS BY PRESCRIBER/CLIN PHARMACIST DOCUMENTED: ICD-10-PCS | Mod: CPTII,S$GLB,, | Performed by: INTERNAL MEDICINE

## 2023-05-09 PROCEDURE — 3008F PR BODY MASS INDEX (BMI) DOCUMENTED: ICD-10-PCS | Mod: CPTII,S$GLB,, | Performed by: INTERNAL MEDICINE

## 2023-05-09 PROCEDURE — 3077F PR MOST RECENT SYSTOLIC BLOOD PRESSURE >= 140 MM HG: ICD-10-PCS | Mod: CPTII,S$GLB,, | Performed by: INTERNAL MEDICINE

## 2023-05-09 PROCEDURE — 99999 PR PBB SHADOW E&M-EST. PATIENT-LVL V: CPT | Mod: PBBFAC,,, | Performed by: INTERNAL MEDICINE

## 2023-05-09 PROCEDURE — 99204 OFFICE O/P NEW MOD 45 MIN: CPT | Mod: S$GLB,,, | Performed by: INTERNAL MEDICINE

## 2023-05-09 PROCEDURE — 3077F SYST BP >= 140 MM HG: CPT | Mod: CPTII,S$GLB,, | Performed by: INTERNAL MEDICINE

## 2023-05-09 PROCEDURE — 1159F MED LIST DOCD IN RCRD: CPT | Mod: CPTII,S$GLB,, | Performed by: INTERNAL MEDICINE

## 2023-05-09 PROCEDURE — 3078F DIAST BP <80 MM HG: CPT | Mod: CPTII,S$GLB,, | Performed by: INTERNAL MEDICINE

## 2023-05-09 PROCEDURE — 1101F PT FALLS ASSESS-DOCD LE1/YR: CPT | Mod: CPTII,S$GLB,, | Performed by: INTERNAL MEDICINE

## 2023-05-09 PROCEDURE — 3008F BODY MASS INDEX DOCD: CPT | Mod: CPTII,S$GLB,, | Performed by: INTERNAL MEDICINE

## 2023-05-09 PROCEDURE — 93000 EKG 12-LEAD: ICD-10-PCS | Mod: S$GLB,,, | Performed by: INTERNAL MEDICINE

## 2023-05-09 PROCEDURE — 99999 PR PBB SHADOW E&M-EST. PATIENT-LVL V: ICD-10-PCS | Mod: PBBFAC,,, | Performed by: INTERNAL MEDICINE

## 2023-05-09 PROCEDURE — 99204 PR OFFICE/OUTPT VISIT, NEW, LEVL IV, 45-59 MIN: ICD-10-PCS | Mod: S$GLB,,, | Performed by: INTERNAL MEDICINE

## 2023-05-09 PROCEDURE — 3288F FALL RISK ASSESSMENT DOCD: CPT | Mod: CPTII,S$GLB,, | Performed by: INTERNAL MEDICINE

## 2023-05-09 PROCEDURE — 93000 ELECTROCARDIOGRAM COMPLETE: CPT | Mod: S$GLB,,, | Performed by: INTERNAL MEDICINE

## 2023-05-09 PROCEDURE — 3078F PR MOST RECENT DIASTOLIC BLOOD PRESSURE < 80 MM HG: ICD-10-PCS | Mod: CPTII,S$GLB,, | Performed by: INTERNAL MEDICINE

## 2023-05-09 RX ORDER — CARVEDILOL 6.25 MG/1
6.25 TABLET ORAL 2 TIMES DAILY WITH MEALS
Qty: 180 TABLET | Refills: 1 | Status: SHIPPED | OUTPATIENT
Start: 2023-05-09 | End: 2023-06-01 | Stop reason: SDUPTHER

## 2023-05-09 NOTE — PROGRESS NOTES
[facility] - Cardiology [address]  Cardiology Clinic Note      Chief Complaint  Chief Complaint   Patient presents with    Follow-up     U/S right upper extremity blood clot       HPI:      63-year-old male with a past medical history of sacroiliitis, hypothyroidism, hyperparathyroidism, status post colostomy and urostomy, bladder cancer, anemia of chronic disease, urinary obstruction, tobacco use, COPD, SHARMIN, pulmonary nodule, CKD 4 hypertension, dyslipidemia, right common iliac artery aneurysm 3.4 cm, no hemodynamically significant carotid stenosis ultrasound 2022 peripheral vascular disease?, right axillary vein DVT 3/2023, exercise treadmill 2021 did not achieve target heart rate prior exercise stress echo no ischemia 2018 submaximal rate, coronary artery calcification, echo 2020 normal EF mild LVH grade 1 diastolic dysfunction normal RV normal CVP    Previously seen by [facility] but this is my 1st time seeing the patient    LFTs elevated last visit in addition to mild hyperkalemia    Repeat labs pending    The patient would like to discuss duration of anticoagulation for his right upper extremity DVT  He states that he leads a sedentary lifestyle  No other provocation could be identified    No symptoms      Medications  Current Outpatient Medications   Medication Sig Dispense Refill    albuterol (PROAIR HFA) 90 mcg/actuation inhaler Inhale 2 puffs into the lungs every 6 (six) hours as needed for Wheezing. Rescue 18 g 0    albuterol-ipratropium (DUO-NEB) 2.5 mg-0.5 mg/3 mL nebulizer solution Take 3 mLs by nebulization every 6 (six) hours as needed for Wheezing. Rescue 150 mL 6    allopurinoL (ZYLOPRIM) 100 MG tablet Take 1 tablet (100 mg total) by mouth once daily. 90 tablet 0    apixaban (ELIQUIS) 5 mg Tab Take 2 tablets (10 mg total) by mouth 2 (two) times daily. 60 tablet 1    aspirin (ECOTRIN) 81 MG EC tablet Take 81 mg by mouth once daily.      calcitRIOL (ROCALTROL) 0.25 MCG Cap Take 1 capsule (0.25  mcg total) by mouth once daily. 90 capsule 0    EScitalopram oxalate (LEXAPRO) 20 MG tablet Take 1 tablet (20 mg total) by mouth once daily. 90 tablet 0    fenofibrate (TRICOR) 145 MG tablet Take 1 tablet (145 mg total) by mouth once daily. 90 tablet 0    fluticasone-umeclidin-vilanter (TRELEGY ELLIPTA) 100-62.5-25 mcg DsDv Inhale 1 puff into the lungs once daily. Rinse mouth after use. 60 each 2    gabapentin (NEURONTIN) 400 MG capsule Take 1 capsule (400 mg total) by mouth 2 (two) times daily. 180 capsule 0    HYDROcodone-acetaminophen (NORCO)  mg per tablet Take 1 tablet by mouth every 6 (six) hours as needed for Pain. 16 tablet 0    ipratropium-albuteroL (COMBIVENT)  mcg/actuation inhaler Inhale 1 puff into the lungs 4 (four) times daily. Rescue 4 g 6    levothyroxine (SYNTHROID) 88 MCG tablet Take 1 tablet (88 mcg total) by mouth once daily. 90 tablet 0    pantoprazole (PROTONIX) 40 MG tablet Take 1 tablet (40 mg total) by mouth once daily. 90 tablet 0    sodium bicarbonate 325 MG tablet Take 325 mg by mouth 2 (two) times daily.      tiZANidine (ZANAFLEX) 4 MG tablet Take 1 tablet (4 mg total) by mouth once daily. 30 tablet 1    traZODone (DESYREL) 150 MG tablet Take 1 tablet (150 mg total) by mouth every evening. 90 tablet 0    carvediloL (COREG) 6.25 MG tablet Take 1 tablet (6.25 mg total) by mouth 2 (two) times daily with meals. 180 tablet 1    clobetasoL (TEMOVATE) 0.05 % cream Apply topically 2 (two) times daily. 30 g 1    doxycycline (VIBRAMYCIN) 100 MG Cap Take 1 capsule (100 mg total) by mouth every 12 (twelve) hours. 20 capsule 0    LIDOcaine (LIDODERM) 5 % Place 1 patch onto the skin once daily. Remove & Discard patch within 12 hours or as directed by MD 15 patch 0    meclizine (ANTIVERT) 25 mg tablet Take 1 tablet (25 mg total) by mouth 3 (three) times daily as needed for Dizziness. (Patient not taking: Reported on 5/9/2023) 30 tablet 0    methylPREDNISolone (MEDROL DOSEPACK) 4 mg tablet  use as directed 21 each 0    multivitamin (THERAGRAN) per tablet Take 1 tablet by mouth once daily.      ondansetron (ZOFRAN-ODT) 4 MG TbDL Take 1 tablet (4 mg total) by mouth every 8 (eight) hours as needed (nausea). 20 tablet 0     No current facility-administered medications for this visit.        History  Past Medical History:   Diagnosis Date    Anemia of chronic disease     Aneurysm of right common iliac artery     Arthritis     Blood transfusion     CAD (coronary artery disease)     Chest pain of uncertain etiology     CKD (chronic kidney disease), stage IV     COPD (chronic obstructive pulmonary disease)     Coronary artery calcification 8/2/2021    DDD (degenerative disc disease), lumbar     Frequency of urination     General anesthetics causing adverse effect in therapeutic use     pt states he wakes up very violently from anesthesia    GERD (gastroesophageal reflux disease)     Gout     History of bladder cancer     History of urostomy     Hydronephrosis     Hyperparathyroidism     Hypertension     Hypertriglyceridemia     Hypothyroidism (acquired)     Insomnia     Kidney stone     Limited joint range of motion right hip and leg    Lumbar facet arthropathy     Lumbar spondylosis     Mixed anxiety and depressive disorder     Neuropathy     Orchalgia     SHARMIN (obstructive sleep apnea)     Personal history of bladder cancer     PVD (peripheral vascular disease)     Sacroiliitis     Stricture or kinking of ureter     Ureteral stent displacement     Urinary retention     Vitamin D deficiency     Wears glasses      Past Surgical History:   Procedure Laterality Date    APPENDECTOMY  12/30/2015    Procedure: APPENDECTOMY;  Surgeon: CHAD Bo MD;  Location: [facility];  Service: Urology;;    BALLOON DILATION OF URETER  01/22/2018    BOWEL RESECTION      COLONOSCOPY      CREATION OF URETEROILEAL CONDUIT Left 07/05/2013    CYSTOSCOPY      >1  episodes for bilat ureteral stent exchange    CYSTOSCOPY W/ URETERAL  STENT PLACEMENT Right 01/22/2018    and 6/9/2015    CYSTOSCOPY W/ URETERAL STENT PLACEMENT Bilateral 10/07/2015    ESOPHAGOGASTRODUODENOSCOPY  04/12/2022    W/ BIOPSY    ESOPHAGOGASTRODUODENOSCOPY N/A 4/12/2022    Procedure: EGD (ESOPHAGOGASTRODUODENOSCOPY);  Surgeon: Tito Fan MD;  Location: [facility] ENDO;  Service: Endoscopy;  Laterality: N/A;    EXPLORATORY LAPAROTOMY  07/05/2013    EXPLORATORY LAPAROTOMY W/ BOWEL RESECTION  12/30/2015    EXTRACORPOREAL SHOCK WAVE LITHOTRIPSY Right 06/07/2021    Procedure: LITHOTRIPSY, ESWL;  Surgeon: CHAD Bo MD;  Location: [facility] OR;  Service: Urology;  Laterality: Right;  RN PREOP 5/31/2021   VACCINATED    EXTRACORPOREAL SHOCK WAVE LITHOTRIPSY  06/07/2021    FRACTURE SURGERY      HAND SURGERY      HEMICOLECTOMY  12/30/2015    HIP SURGERY  2012    Rt hip septic    LAPAROSCOPIC CHOLECYSTECTOMY N/A 08/11/2021    Procedure: CHOLECYSTECTOMY, LAPAROSCOPIC;  Surgeon: Remy Xiong MD;  Location: [facility] OR;  Service: General;  Laterality: N/A;  gianni video confirmed 8/5/dme    LYMPH NODE DISSECTION  11/08/2012    pelvic    LYSIS OF ADHESIONS  12/30/2015    MOUTH SURGERY  2000    all teeth extracted    PERCUTANEOUS NEPHROLITHOTOMY Left 07/22/2020    Procedure: NEPHROLITHOTOMY, PERCUTANEOUS;  Surgeon: CHAD Bo MD;  Location: [facility] OR;  Service: Urology;  Laterality: Left;  OR 9 ONLY  RN PREOP 7/20/2020----COVID NEGATIVE ON 7/20    PERCUTANEOUS NEPHROLITHOTOMY Bilateral 6/8/2022    Procedure: BILATERAL PERCUTANEOUS NEPHROSCOPY, BILATERAL ANTEGRADE PYELOGRAM, BILATERAL NEPHROSTOMY TUBE PLACEMENT;  Surgeon: CHAD Bo MD;  Location: [facility] OR;  Service: Urology;  Laterality: Bilateral;  room 9 only  RN PREOP 6/1/2022----T/S IN AM    PERCUTANEOUS NEPHROSTOMY Left 01/02/2018    PERCUTANEOUS REPLACEMENT OF NEPHROSTOMY TUBE Bilateral 5/10/2022    Procedure: REPLACEMENT, NEPHROSTOMY TUBE, PERCUTANEOUS;  Surgeon: Chino Vo MD;  Location: [facility] CATH LAB;   Service: Radiology;  Laterality: Bilateral;    RADICAL CYSTECTOMY  2012    RADIOFREQUENCY THERMOCOAGULATION  2014    median branch lumbar    RADIOFREQUENCY THERMOCOAGULATION  2014    median branch lumbar    REPAIR, HERNIA, PARASTOMAL, LAPAROSCOPIC  2017    ROBOTIC BRONCHOSCOPY N/A 2023    Procedure: ROBOTIC BRONCHOSCOPY;  Surgeon: Camila Rodriguez MD;  Location: [facility] OR [address] [place] [address];  Service: Anesthesiology;  Laterality: N/A;    SMALL INTESTINE SURGERY  2017    urosotomy bag  2011    R abdomen     Social History     Socioeconomic History    Marital status:    Occupational History    Occupation: [employer]     Employer: [employer]   Tobacco Use    Smoking status: Former     Packs/day: 0.30     Years: 42.00     Pack years: 12.60     Types: Cigarettes     Quit date: 2017     Years since quittin.2    Smokeless tobacco: Never    Tobacco comments:     in cessation program   Substance and Sexual Activity    Alcohol use: Yes     Alcohol/week: 14.0 - 21.0 standard drinks     Types: 14 - 21 Cans of beer per week     Comment: occassional    Drug use: Yes     Types: Marijuana     Comment: occass    Sexual activity: Yes     Partners: Female     Birth control/protection: None     Family History   Adopted: Yes   Family history unknown: Yes        Allergies  Review of patient's allergies indicates:  No Known Allergies    Review of Systems   Review of Systems   Constitutional: Negative for fever.   HENT:  Negative for nosebleeds.    Eyes:  Negative for visual disturbance.   Cardiovascular:  Negative for chest pain, claudication, dyspnea on exertion, palpitations and syncope.   Respiratory:  Negative for cough, hemoptysis and wheezing.    Endocrine: Negative for cold intolerance, heat intolerance, polyphagia and polyuria.   Hematologic/Lymphatic: Negative for bleeding problem.   Skin:  Negative for rash.   Musculoskeletal:  Negative for myalgias.   Gastrointestinal:   Negative for hematemesis, hematochezia, nausea and vomiting.   Genitourinary:  Negative for dysuria.   Neurological:  Negative for focal weakness and sensory change.   Psychiatric/Behavioral:  Negative for altered mental status.      Physical Exam  Vitals:    05/09/23 1445   BP: (!) 142/71   Pulse: 68     Wt Readings from Last 1 Encounters:   05/09/23 68.9 kg (151 lb 12.6 oz)     Physical Exam  HENT:      Head: Normocephalic and atraumatic.      Mouth/Throat:      Mouth: Mucous membranes are moist.   Eyes:      Extraocular Movements: Extraocular movements intact.      Pupils: Pupils are equal, round, and reactive to light.   Neck:      Vascular: No carotid bruit or JVD.   Cardiovascular:      Rate and Rhythm: Normal rate and regular rhythm.      Pulses: Normal pulses and intact distal pulses.      Heart sounds: Normal heart sounds. No murmur heard.    No friction rub. No gallop.   Pulmonary:      Effort: Pulmonary effort is normal.      Breath sounds: Normal breath sounds.   Abdominal:      Tenderness: There is no abdominal tenderness. There is no guarding or rebound.   Musculoskeletal:      Right lower leg: No edema.      Left lower leg: No edema.   Skin:     General: Skin is warm and dry.      Capillary Refill: Capillary refill takes less than 2 seconds.   Neurological:      General: No focal deficit present.      Mental Status: He is alert and oriented to person, place, and time.   Psychiatric:         Mood and Affect: Mood normal.       Labs  Admission on 04/21/2023, Discharged on 04/21/2023   Component Date Value Ref Range Status    WBC 04/21/2023 3.85 (L)  3.90 - 12.70 K/uL Final    RBC 04/21/2023 4.68  4.60 - 6.20 M/uL Final    Hemoglobin 04/21/2023 14.4  14.0 - 18.0 g/dL Final    Hematocrit 04/21/2023 44.6  40.0 - 54.0 % Final    MCV 04/21/2023 95  82 - 98 fL Final    MCH 04/21/2023 30.8  27.0 - 31.0 pg Final    MCHC 04/21/2023 32.3  32.0 - 36.0 g/dL Final    RDW 04/21/2023 13.2  11.5 - 14.5 % Final     Platelets 04/21/2023 173  150 - 450 K/uL Final    MPV 04/21/2023 10.5  9.2 - 12.9 fL Final    Immature Granulocytes 04/21/2023 0.3  0.0 - 0.5 % Final    Gran # (ANC) 04/21/2023 2.8  1.8 - 7.7 K/uL Final    Immature Grans (Abs) 04/21/2023 0.01  0.00 - 0.04 K/uL Final    Comment: Mild elevation in immature granulocytes is non specific and   can be seen in a variety of conditions including stress response,   acute inflammation, trauma and pregnancy. Correlation with other   laboratory and clinical findings is essential.      Lymph # 04/21/2023 0.6 (L)  1.0 - 4.8 K/uL Final    Mono # 04/21/2023 0.4  0.3 - 1.0 K/uL Final    Eos # 04/21/2023 0.0  0.0 - 0.5 K/uL Final    Baso # 04/21/2023 0.04  0.00 - 0.20 K/uL Final    nRBC 04/21/2023 0  0 /100 WBC Final    Gran % 04/21/2023 71.5  38.0 - 73.0 % Final    Lymph % 04/21/2023 15.8 (L)  18.0 - 48.0 % Final    Mono % 04/21/2023 11.4  4.0 - 15.0 % Final    Eosinophil % 04/21/2023 0.0  0.0 - 8.0 % Final    Basophil % 04/21/2023 1.0  0.0 - 1.9 % Final    Differential Method 04/21/2023 Automated   Final    Sodium 04/21/2023 133 (L)  136 - 145 mmol/L Final    Potassium 04/21/2023 5.4 (H)  3.5 - 5.1 mmol/L Final    Chloride 04/21/2023 100  95 - 110 mmol/L Final    CO2 04/21/2023 20 (L)  23 - 29 mmol/L Final    Glucose 04/21/2023 91  70 - 110 mg/dL Final    BUN 04/21/2023 40 (H)  8 - 23 mg/dL Final    Creatinine 04/21/2023 2.8 (H)  0.5 - 1.4 mg/dL Final    Calcium 04/21/2023 9.6  8.7 - 10.5 mg/dL Final    Total Protein 04/21/2023 7.7  6.0 - 8.4 g/dL Final    Albumin 04/21/2023 3.7  3.5 - 5.2 g/dL Final    Total Bilirubin 04/21/2023 0.3  0.1 - 1.0 mg/dL Final    Comment: For infants and newborns, interpretation of results should be based  on gestational age, weight and in agreement with clinical  observations.    Premature Infant recommended reference ranges:  Up to 24 hours.............<8.0 mg/dL  Up to 48 hours............<12.0 mg/dL  3-5 days..................<15.0 mg/dL  6-29  days.................<15.0 mg/dL      Alkaline Phosphatase 04/21/2023 42 (L)  55 - 135 U/L Final    AST 04/21/2023 161 (H)  10 - 40 U/L Final    ALT 04/21/2023 97 (H)  10 - 44 U/L Final    Anion Gap 04/21/2023 13  8 - 16 mmol/L Final    eGFR 04/21/2023 24 (A)  >60 mL/min/1.73 m^2 Final    Specimen UA 04/21/2023 Urine, Unspecified   Final    urostomy    Color, UA 04/21/2023 Yellow  Yellow, Straw, Bri Final    Appearance, UA 04/21/2023 Clear  Clear Final    pH, UA 04/21/2023 7.0  5.0 - 8.0 Final    Specific Gravity, UA 04/21/2023 1.010  1.005 - 1.030 Final    Protein, UA 04/21/2023 1+ (A)  Negative Final    Comment: Recommend a 24 hour urine protein or a urine   protein/creatinine ratio if globulin induced proteinuria is  clinically suspected.      Glucose, UA 04/21/2023 Negative  Negative Final    Ketones, UA 04/21/2023 Negative  Negative Final    Bilirubin (UA) 04/21/2023 Negative  Negative Final    Occult Blood UA 04/21/2023 2+ (A)  Negative Final    Nitrite, UA 04/21/2023 Negative  Negative Final    Urobilinogen, UA 04/21/2023 Negative  <2.0 EU/dL Final    Leukocytes, UA 04/21/2023 3+ (A)  Negative Final    Lipase 04/21/2023 64 (H)  4 - 60 U/L Final    Lactate (Lactic Acid) 04/21/2023 0.9  0.5 - 2.2 mmol/L Final    Comment: Falsely low lactic acid results can be found in samples   containing >=13.0 mg/dL total bilirubin and/or >=3.5 mg/dL   direct bilirubin.      RBC, UA 04/21/2023 12 (H)  0 - 4 /hpf Final    WBC, UA 04/21/2023 49 (H)  0 - 5 /hpf Final    Bacteria 04/21/2023 Moderate (A)  None-Occ /hpf Final    Yeast, UA 04/21/2023 None  None Final    Squam Epithel, UA 04/21/2023 3  /hpf Final    Hyaline Casts, UA 04/21/2023 0  0-1/lpf /lpf Final    Microscopic Comment 04/21/2023 SEE COMMENT   Final    Comment: Other formed elements not mentioned in the report are not   present in the microscopic examination.       Urine Culture, Routine 04/21/2023  (A)   Final                    Value:PROVIDENCIA  RETTGERI  >100,000 cfu/ml     Lab Visit on 03/20/2023   Component Date Value Ref Range Status    Sodium 03/20/2023 135 (L)  136 - 145 mmol/L Final    Potassium 03/20/2023 4.9  3.5 - 5.1 mmol/L Final    Chloride 03/20/2023 99  95 - 110 mmol/L Final    CO2 03/20/2023 26  23 - 29 mmol/L Final    Glucose 03/20/2023 95  70 - 110 mg/dL Final    BUN 03/20/2023 48 (H)  8 - 23 mg/dL Final    Creatinine 03/20/2023 2.9 (H)  0.5 - 1.4 mg/dL Final    Calcium 03/20/2023 10.1  8.7 - 10.5 mg/dL Final    Total Protein 03/20/2023 7.2  6.0 - 8.4 g/dL Final    Albumin 03/20/2023 4.4  3.5 - 5.2 g/dL Final    Total Bilirubin 03/20/2023 0.6  0.1 - 1.0 mg/dL Final    Comment: For infants and newborns, interpretation of results should be based  on gestational age, weight and in agreement with clinical  observations.    Premature Infant recommended reference ranges:  Up to 24 hours.............<8.0 mg/dL  Up to 48 hours............<12.0 mg/dL  3-5 days..................<15.0 mg/dL  6-29 days.................<15.0 mg/dL      Alkaline Phosphatase 03/20/2023 40 (L)  55 - 135 U/L Final    AST 03/20/2023 42 (H)  10 - 40 U/L Final    ALT 03/20/2023 38  10 - 44 U/L Final    Anion Gap 03/20/2023 10  8 - 16 mmol/L Final    eGFR 03/20/2023 23.3 (A)  >60 mL/min/1.73 m^2 Final    WBC 03/20/2023 5.79  3.90 - 12.70 K/uL Final    RBC 03/20/2023 4.39 (L)  4.60 - 6.20 M/uL Final    Hemoglobin 03/20/2023 14.0  14.0 - 18.0 g/dL Final    Hematocrit 03/20/2023 42.7  40.0 - 54.0 % Final    MCV 03/20/2023 97  82 - 98 fL Final    MCH 03/20/2023 31.9 (H)  27.0 - 31.0 pg Final    MCHC 03/20/2023 32.8  32.0 - 36.0 g/dL Final    RDW 03/20/2023 12.9  11.5 - 14.5 % Final    Platelets 03/20/2023 256  150 - 450 K/uL Final    MPV 03/20/2023 10.0  9.2 - 12.9 fL Final    Immature Granulocytes 03/20/2023 0.3  0.0 - 0.5 % Final    Gran # (ANC) 03/20/2023 4.0  1.8 - 7.7 K/uL Final    Immature Grans (Abs) 03/20/2023 0.02  0.00 - 0.04 K/uL Final    Comment: Mild elevation in  immature granulocytes is non specific and   can be seen in a variety of conditions including stress response,   acute inflammation, trauma and pregnancy. Correlation with other   laboratory and clinical findings is essential.      Lymph # 03/20/2023 1.1  1.0 - 4.8 K/uL Final    Mono # 03/20/2023 0.5  0.3 - 1.0 K/uL Final    Eos # 03/20/2023 0.2  0.0 - 0.5 K/uL Final    Baso # 03/20/2023 0.05  0.00 - 0.20 K/uL Final    nRBC 03/20/2023 0  0 /100 WBC Final    Gran % 03/20/2023 68.2  38.0 - 73.0 % Final    Lymph % 03/20/2023 18.1  18.0 - 48.0 % Final    Mono % 03/20/2023 9.0  4.0 - 15.0 % Final    Eosinophil % 03/20/2023 3.5  0.0 - 8.0 % Final    Basophil % 03/20/2023 0.9  0.0 - 1.9 % Final    Differential Method 03/20/2023 Automated   Final   Admission on 03/15/2023, Discharged on 03/15/2023   Component Date Value Ref Range Status    WBC 03/15/2023 5.11  3.90 - 12.70 K/uL Final    RBC 03/15/2023 4.29 (L)  4.60 - 6.20 M/uL Final    Hemoglobin 03/15/2023 13.5 (L)  14.0 - 18.0 g/dL Final    Hematocrit 03/15/2023 41.8  40.0 - 54.0 % Final    MCV 03/15/2023 97  82 - 98 fL Final    MCH 03/15/2023 31.5 (H)  27.0 - 31.0 pg Final    MCHC 03/15/2023 32.3  32.0 - 36.0 g/dL Final    RDW 03/15/2023 13.1  11.5 - 14.5 % Final    Platelets 03/15/2023 238  150 - 450 K/uL Final    MPV 03/15/2023 10.5  9.2 - 12.9 fL Final    Immature Granulocytes 03/15/2023 0.2  0.0 - 0.5 % Final    Gran # (ANC) 03/15/2023 3.8  1.8 - 7.7 K/uL Final    Immature Grans (Abs) 03/15/2023 0.01  0.00 - 0.04 K/uL Final    Comment: Mild elevation in immature granulocytes is non specific and   can be seen in a variety of conditions including stress response,   acute inflammation, trauma and pregnancy. Correlation with other   laboratory and clinical findings is essential.      Lymph # 03/15/2023 0.8 (L)  1.0 - 4.8 K/uL Final    Mono # 03/15/2023 0.4  0.3 - 1.0 K/uL Final    Eos # 03/15/2023 0.1  0.0 - 0.5 K/uL Final    Baso # 03/15/2023 0.03  0.00 - 0.20 K/uL  Final    nRBC 03/15/2023 0  0 /100 WBC Final    Gran % 03/15/2023 74.1 (H)  38.0 - 73.0 % Final    Lymph % 03/15/2023 14.7 (L)  18.0 - 48.0 % Final    Mono % 03/15/2023 8.6  4.0 - 15.0 % Final    Eosinophil % 03/15/2023 1.8  0.0 - 8.0 % Final    Basophil % 03/15/2023 0.6  0.0 - 1.9 % Final    Differential Method 03/15/2023 Automated   Final    Sodium 03/15/2023 134 (L)  136 - 145 mmol/L Final    Potassium 03/15/2023 4.8  3.5 - 5.1 mmol/L Final    Chloride 03/15/2023 101  95 - 110 mmol/L Final    CO2 03/15/2023 22 (L)  23 - 29 mmol/L Final    Glucose 03/15/2023 82  70 - 110 mg/dL Final    BUN 03/15/2023 50 (H)  8 - 23 mg/dL Final    Creatinine 03/15/2023 2.6 (H)  0.5 - 1.4 mg/dL Final    Calcium 03/15/2023 9.8  8.7 - 10.5 mg/dL Final    Total Protein 03/15/2023 7.3  6.0 - 8.4 g/dL Final    Albumin 03/15/2023 4.3  3.5 - 5.2 g/dL Final    Total Bilirubin 03/15/2023 0.4  0.1 - 1.0 mg/dL Final    Comment: For infants and newborns, interpretation of results should be based  on gestational age, weight and in agreement with clinical  observations.    Premature Infant recommended reference ranges:  Up to 24 hours.............<8.0 mg/dL  Up to 48 hours............<12.0 mg/dL  3-5 days..................<15.0 mg/dL  6-29 days.................<15.0 mg/dL      Alkaline Phosphatase 03/15/2023 38 (L)  55 - 135 U/L Final    AST 03/15/2023 34  10 - 40 U/L Final    ALT 03/15/2023 31  10 - 44 U/L Final    Anion Gap 03/15/2023 11  8 - 16 mmol/L Final    eGFR 03/15/2023 26.5 (A)  >60 mL/min/1.73 m^2 Final    Prothrombin Time 03/15/2023 10.7  9.0 - 12.5 sec Final    INR 03/15/2023 1.0  0.8 - 1.2 Final    Comment: Coumadin Therapy:  2.0 - 3.0 for INR for all indicators except mechanical heart valves  and antiphospholipid syndromes which should use 2.5 - 3.5.  LOT^040^PT [facility]^[id number]      aPTT 03/15/2023 26.0  21.0 - 32.0 sec Final    Comment: aPTT therapeutic range = 39-69 seconds  LOT^050^APTT [facility]^[id number]     Lab Visit on 01/20/2023    Component Date Value Ref Range Status    Sodium 01/20/2023 137  136 - 145 mmol/L Final    Potassium 01/20/2023 6.0 (H)  3.5 - 5.1 mmol/L Final    Chloride 01/20/2023 103  95 - 110 mmol/L Final    CO2 01/20/2023 25  23 - 29 mmol/L Final    Glucose 01/20/2023 96  70 - 110 mg/dL Final    BUN 01/20/2023 41 (H)  8 - 23 mg/dL Final    Creatinine 01/20/2023 2.7 (H)  0.5 - 1.4 mg/dL Final    Calcium 01/20/2023 10.2  8.7 - 10.5 mg/dL Final    Total Protein 01/20/2023 7.3  6.0 - 8.4 g/dL Final    Albumin 01/20/2023 4.2  3.5 - 5.2 g/dL Final    Total Bilirubin 01/20/2023 0.3  0.1 - 1.0 mg/dL Final    Comment: For infants and newborns, interpretation of results should be based  on gestational age, weight and in agreement with clinical  observations.    Premature Infant recommended reference ranges:  Up to 24 hours.............<8.0 mg/dL  Up to 48 hours............<12.0 mg/dL  3-5 days..................<15.0 mg/dL  6-29 days.................<15.0 mg/dL      Alkaline Phosphatase 01/20/2023 47 (L)  55 - 135 U/L Final    AST 01/20/2023 30  10 - 40 U/L Final    ALT 01/20/2023 28  10 - 44 U/L Final    Anion Gap 01/20/2023 9  8 - 16 mmol/L Final    eGFR 01/20/2023 25.5 (A)  >60 mL/min/1.73 m^2 Final    WBC 01/20/2023 5.53  3.90 - 12.70 K/uL Final    RBC 01/20/2023 4.56 (L)  4.60 - 6.20 M/uL Final    Hemoglobin 01/20/2023 14.6  14.0 - 18.0 g/dL Final    Hematocrit 01/20/2023 45.0  40.0 - 54.0 % Final    MCV 01/20/2023 99 (H)  82 - 98 fL Final    MCH 01/20/2023 32.0 (H)  27.0 - 31.0 pg Final    MCHC 01/20/2023 32.4  32.0 - 36.0 g/dL Final    RDW 01/20/2023 12.4  11.5 - 14.5 % Final    Platelets 01/20/2023 234  150 - 450 K/uL Final    MPV 01/20/2023 10.3  9.2 - 12.9 fL Final    Immature Granulocytes 01/20/2023 0.2  0.0 - 0.5 % Final    Gran # (ANC) 01/20/2023 3.8  1.8 - 7.7 K/uL Final    Immature Grans (Abs) 01/20/2023 0.01  0.00 - 0.04 K/uL Final    Comment: Mild elevation in immature granulocytes is non specific and   can be seen in  a variety of conditions including stress response,   acute inflammation, trauma and pregnancy. Correlation with other   laboratory and clinical findings is essential.      Lymph # 01/20/2023 1.1  1.0 - 4.8 K/uL Final    Mono # 01/20/2023 0.5  0.3 - 1.0 K/uL Final    Eos # 01/20/2023 0.1  0.0 - 0.5 K/uL Final    Baso # 01/20/2023 0.03  0.00 - 0.20 K/uL Final    nRBC 01/20/2023 0  0 /100 WBC Final    Gran % 01/20/2023 68.6  38.0 - 73.0 % Final    Lymph % 01/20/2023 19.3  18.0 - 48.0 % Final    Mono % 01/20/2023 8.9  4.0 - 15.0 % Final    Eosinophil % 01/20/2023 2.5  0.0 - 8.0 % Final    Basophil % 01/20/2023 0.5  0.0 - 1.9 % Final    Differential Method 01/20/2023 Automated   Final    Phosphorus 01/20/2023 4.0  2.7 - 4.5 mg/dL Final    Vit D, 25-Hydroxy 01/20/2023 40  30 - 96 ng/mL Final    Comment: Vitamin D deficiency.........<10 ng/mL                              Vitamin D insufficiency......10-29 ng/mL       Vitamin D sufficiency........> or equal to 30 ng/mL  Vitamin D toxicity............>100 ng/mL     Admission on 01/13/2023, Discharged on 01/13/2023   Component Date Value Ref Range Status    Final Pathologic Diagnosis 01/13/2023  (A)   Corrected                    Value:1. LUNG, LEFT UPPER LOBE EBUS-FNA WITH CELL BLOCK:   Negative for malignant cells.  Benign bronchial cells.  2. LUNG, LEFT UPPER LOBE, TRANSBRONCHIAL BIOPSY WITH TOUCH PREP:  Negative for granuloma, neoplasm or malignancy  Minimal interstitial inflammation  (deeper levels have also been evaluated)  3. LUNG, RIGHT UPPER LOBE EBUS-FNA WITH CELL BLOCK:  Specimen was processed and examined but unsatisfactory due to  Blood present.  4.  LUNG, RIGHT UPPER LOBE, TRANSBRONCHIAL BIOPSY:  Negative for neoplasm/malignancy  Non-caseating granulomatous inflammation, see comment  5. LYMPH NODE, 11R, EBUS-FNA WITH CELL BLOCK:   Negative for malignant cells.  Benign bronchial cells.  No lymphocytes identified  Comment  Part 1.  Very hypocellular smears and  cell block with rare groups of benign bronchial  epithelial cells. There is no evidence of inflammation, granuloma or neoplasm  in this specimen.  Part 2.  The biopsy and deeper levels show benign alveolar parenchymal tissue with  minimal interstiti                          al inflammation with few pigmented macrophages. There is no  evidence of active inflammation, granuloma or neoplasm in this specimen.  Deeper levels have also been evaluated.  Part 3.  The smears and cell block are virtually acellular with blood only  Part 4.  The biopsy from right upper lobe lung shows non-caseating granulomatous  inflammation with mild interstitial inflammation and fibrosis. Special stains  AFB and GMS for microorganisms have been requested and will be supplemented.  Differential diagnosis includes drugs, infection, sarcoidosis, idiopathic,  hypersensitivity pneumonitis etc. Please correlate clinically  Part 5.  The smears ad cell block predominantly show benign bronchial epithelial cells  with minimal to no lymphocytes in the background.      Interp By Chel Turner M.D., Signed on 01/19/2023 at 09:49    Supplemental Diagnosis 01/13/2023  (A)   Corrected                    Value:PART 4.  Special stains AFB AND GMS are negative for microorganisms.      Adequacy 01/13/2023 NO ADEQUACY (A)   Corrected    Disclaimer 01/13/2023  (A)   Corrected                    Value:Screening was performed at [facility], [address] [place] [address].      Anaerobic Culture 01/13/2023 No anaerobes isolated   Final    Aerobic Bacterial Culture 01/13/2023  (A)   Final                    Value:MORAXELLA (BRANHAMELLA) CATARRHALIS  Moderate  Beta Lactamase positive  Normal respiratory sandra also present      AFB Culture & Smear 01/13/2023 No growth after 6 weeks.   Final    AFB CULTURE STAIN 01/13/2023 No acid fast bacilli seen.   Final    Fungus (Mycology) Culture 01/13/2023 No significant  fungus isolated   Final    Gram Stain Result 01/13/2023 Rare WBC's   Final    Gram Stain Result 01/13/2023 No organisms seen   Final   Lab Visit on 11/14/2022   Component Date Value Ref Range Status    PTH, Intact 11/14/2022 123.5 (H)  9.0 - 77.0 pg/mL Final    TSH 11/14/2022 4.020 (H)  0.400 - 4.000 uIU/mL Final    PSA, Screen 11/14/2022 <0.01  0.00 - 4.00 ng/mL Final    Comment: The testing method is a chemiluminescent microparticle immunoassay   manufactured by [employer] and performed on the    or   [employer] system. Values obtained with different assay manufacturers   for   methods may be different and cannot be used interchangeably.  PSA Expected levels:  Hormonal Therapy: <0.05 ng/ml  Prostatectomy: <0.01 ng/ml  Radiation Therapy: <1.00 ng/ml      Cholesterol 11/14/2022 166  120 - 199 mg/dL Final    Comment: The National Cholesterol Education Program (NCEP) has set the  following guidelines (reference ranges) for Cholesterol:  Optimal.....................<200 mg/dL  Borderline High.............200-239 mg/dL  High........................> or = 240 mg/dL      Triglycerides 11/14/2022 110  30 - 150 mg/dL Final    Comment: The National Cholesterol Education Program (NCEP) has set the  following guidelines (reference values) for triglycerides:  Normal......................<150 mg/dL  Borderline High.............150-199 mg/dL  High........................200-499 mg/dL      HDL 11/14/2022 49  40 - 75 mg/dL Final    Comment: The National Cholesterol Education Program (NCEP) has set the  following guidelines (reference values) for HDL Cholesterol:  Low...............<40 mg/dL  Optimal...........>60 mg/dL      LDL Cholesterol 11/14/2022 95.0  63.0 - 159.0 mg/dL Final    Comment: The National Cholesterol Education Program (NCEP) has set the  following guidelines (reference values) for LDL Cholesterol:  Optimal.......................<130 mg/dL  Borderline High...............130-159  mg/dL  High..........................160-189 mg/dL  Very High.....................>190 mg/dL      HDL/Cholesterol Ratio 11/14/2022 29.5  20.0 - 50.0 % Final    Total Cholesterol/HDL Ratio 11/14/2022 3.4  2.0 - 5.0 Final    Non-HDL Cholesterol 11/14/2022 117  mg/dL Final    Comment: Risk category and Non-HDL cholesterol goals:  Coronary heart disease (CHD)or equivalent (10-year risk of CHD >20%):  Non-HDL cholesterol goal     <130 mg/dL  Two or more CHD risk factors and 10-year risk of CHD <= 20%:  Non-HDL cholesterol goal     <160 mg/dL  0 to 1 CHD risk factor:  Non-HDL cholesterol goal     <190 mg/dL      WBC 11/14/2022 5.15  3.90 - 12.70 K/uL Final    RBC 11/14/2022 4.38 (L)  4.60 - 6.20 M/uL Final    Hemoglobin 11/14/2022 14.1  14.0 - 18.0 g/dL Final    Hematocrit 11/14/2022 43.5  40.0 - 54.0 % Final    MCV 11/14/2022 99 (H)  82 - 98 fL Final    MCH 11/14/2022 32.2 (H)  27.0 - 31.0 pg Final    MCHC 11/14/2022 32.4  32.0 - 36.0 g/dL Final    RDW 11/14/2022 13.1  11.5 - 14.5 % Final    Platelets 11/14/2022 220  150 - 450 K/uL Final    MPV 11/14/2022 10.9  9.2 - 12.9 fL Final    Immature Granulocytes 11/14/2022 0.2  0.0 - 0.5 % Final    Gran # (ANC) 11/14/2022 3.7  1.8 - 7.7 K/uL Final    Immature Grans (Abs) 11/14/2022 0.01  0.00 - 0.04 K/uL Final    Comment: Mild elevation in immature granulocytes is non specific and   can be seen in a variety of conditions including stress response,   acute inflammation, trauma and pregnancy. Correlation with other   laboratory and clinical findings is essential.      Lymph # 11/14/2022 0.9 (L)  1.0 - 4.8 K/uL Final    Mono # 11/14/2022 0.5  0.3 - 1.0 K/uL Final    Eos # 11/14/2022 0.1  0.0 - 0.5 K/uL Final    Baso # 11/14/2022 0.03  0.00 - 0.20 K/uL Final    nRBC 11/14/2022 0  0 /100 WBC Final    Gran % 11/14/2022 71.1  38.0 - 73.0 % Final    Lymph % 11/14/2022 17.3 (L)  18.0 - 48.0 % Final    Mono % 11/14/2022 8.9  4.0 - 15.0 % Final    Eosinophil % 11/14/2022 1.9  0.0 - 8.0 %  Final    Basophil % 11/14/2022 0.6  0.0 - 1.9 % Final    Differential Method 11/14/2022 Automated   Final    Sodium 11/14/2022 139  136 - 145 mmol/L Final    Potassium 11/14/2022 5.1  3.5 - 5.1 mmol/L Final    Chloride 11/14/2022 104  95 - 110 mmol/L Final    CO2 11/14/2022 26  23 - 29 mmol/L Final    Glucose 11/14/2022 71  70 - 110 mg/dL Final    BUN 11/14/2022 38 (H)  8 - 23 mg/dL Final    Creatinine 11/14/2022 2.6 (H)  0.5 - 1.4 mg/dL Final    Calcium 11/14/2022 9.8  8.7 - 10.5 mg/dL Final    Total Protein 11/14/2022 7.2  6.0 - 8.4 g/dL Final    Albumin 11/14/2022 4.0  3.5 - 5.2 g/dL Final    Total Bilirubin 11/14/2022 0.3  0.1 - 1.0 mg/dL Final    Comment: For infants and newborns, interpretation of results should be based  on gestational age, weight and in agreement with clinical  observations.    Premature Infant recommended reference ranges:  Up to 24 hours.............<8.0 mg/dL  Up to 48 hours............<12.0 mg/dL  3-5 days..................<15.0 mg/dL  6-29 days.................<15.0 mg/dL      Alkaline Phosphatase 11/14/2022 48 (L)  55 - 135 U/L Final    AST 11/14/2022 25  10 - 40 U/L Final    ALT 11/14/2022 26  10 - 44 U/L Final    Anion Gap 11/14/2022 9  8 - 16 mmol/L Final    eGFR 11/14/2022 26.7 (A)  >60 mL/min/1.73 m^2 Final    Free T4 11/14/2022 0.98  0.71 - 1.51 ng/dL Final       EKG  EKG 05/09/2023 normal sinus rhythm normal rate early repolarization nonspecific ST-T changes    Echo   Results for orders placed or performed during the hospital encounter of 10/19/20   Echo Color Flow Doppler? Yes   Result Value Ref Range    AORTIC VALVE CUSP SEPERATION 1.91 cm    LVIDd 5.06 3.5 - 6.0 cm    IVS 1.20 0.6 - 1.1 cm    Posterior Wall 1.10 0.6 - 1.1 cm    Ao root annulus 4.02 cm    LVIDs 4.05 (A) 2.1 - 4.0 cm    FS 20 28 - 44 %    LV mass 224.52 g    LA size 4.00 cm    RVDD 2.57 cm    Left Ventricle Relative Wall Thickness 0.43 cm    MV valve area p 1/2 method 2.71 cm2    E/A ratio 0.49     E wave  deceleration time 243.58 msec    IVRT 106.11 msec    LVOT diameter 2.02 cm    LVOT area 3.2 cm2    MV Peak E Abraham 0.36 m/s    MV stenosis pressure 1/2 time 81.29 ms    MV Peak A Abraham 0.74 m/s    LV Systolic Volume 72.07 mL    LV Diastolic Volume 121.30 mL    Right Atrial Pressure (from IVC) 3 mmHg    Narrative    · There is mild left ventricular concentric hypertrophy.  · The left ventricle is normal in size with normal systolic function. The   estimated ejection fraction is 60%.  · Grade I diastolic dysfunction.  · Normal right ventricular systolic function.  · Normal central venous pressure (3 mmHg).          Imaging  CT Renal Stone Study ABD Pelvis WO    Result Date: 4/21/2023  EXAMINATION: CT RENAL STONE STUDY ABD PELVIS WO CLINICAL HISTORY: Flank pain, kidney stone suspected;right side; TECHNIQUE: Low dose axial images, sagittal and coronal reformations were obtained from the lung bases to the pubic symphysis.  Contrast was not administered. COMPARISON: CT renal stone study 07/12/2022, renal ultrasound 07/08/2021 FINDINGS: Imaged lung bases are clear.  Base of the heart is normal in size without significant pericardial fluid noting multi-vessel coronary arterial and aortic annular calcifications. Remote cholecystectomy.  No biliary ductal dilatation.  Liver is normal in size with homogeneous parenchymal attenuation.  Spleen is normal in size noting several scattered punctate parenchymal calcifications likely sequela of prior granulomatous disease.  Noncontrast appearance of the pancreas, stomach, duodenum and bilateral adrenal glands are within normal limits.  Few small accessory splenules noted. Bilateral kidneys are stable in overall size, shape and location with grossly similar areas of cortical thinning and scarring and renal cortical lobulation with overall mild atrophy.  Advanced bilateral renal atherosclerotic vascular calcifications noted.  There are additional several scattered subcentimeter suspected  nephroliths at each kidney.  No hydronephrosis.  There is relatively symmetric bilateral nonspecific perinephric stranding similar to prior.  Few scattered subcentimeter hypoattenuating cortical foci at each kidney which are too small to characterize. Remote operative change of cystectomy with right lower quadrant ileal conduit.  The bilateral ureters are not significantly dilated. No pelvic mass seen.  Few scattered pelvic phleboliths noted. Appendix is not identified; however, no right lower quadrant inflammatory change.  Several scattered colonic diverticula without diverticulitis.  No evidence of bowel obstruction or acute inflammation.  No pneumatosis or portal venous gas. No ascites, free air or lymphadenopathy by CT criteria. Scattered advanced/severe calcific atherosclerosis of the abdominal aorta extending into its mesenteric and iliac branches.  Abdominal aorta is ectatic but nonaneurysmal.  Grossly stable fusiform aneurysmal dilatation of the right common iliac artery measuring up to 3.4 cm. Operative changes of right lower quadrant parastomal and bilateral inguinal hernia repair similar to prior. Osseous structures appear stable without acute process seen.     1. Suspected bilateral nonobstructing nephrolithiasis.  Otherwise no acute process seen within the abdomen or pelvis on this noncontrast study. 2. Remote operative change of cystectomy with right lower quadrant ileal conduit.  Resolution of previous right-sided hydronephrosis. 3. Prior cholecystectomy. 4. Diverticulosis coli. 5. 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 6. Grossly stable additional findings as above. Electronically signed by: Lake Hickey MD Date:    04/21/2023 Time:    14:34      Prior coronary angiogram / intervention:  No known    Assessment and Plan  1. Iliac artery aneurysm  Sent to vascular surgery  Control risk factors  - Ambulatory referral/consult  to Vascular Surgery; Future    2. Hypertriglyceridemia  Check lipid panel fasting  The patient is on fenofibrate  - HEPATIC FUNCTION PANEL; Future    3. Essential hypertension  Change atenolol to carvedilol since atenolol is renally cleared and the patient has advanced CKD  Blood pressure log  - IN OFFICE EKG 12-LEAD (to Muse)  - carvediloL (COREG) 6.25 MG tablet; Take 1 tablet (6.25 mg total) by mouth 2 (two) times daily with meals.  Dispense: 180 tablet; Refill: 1    4. Coronary artery calcification  ASA 81  Would ideally be on statin but had elevated LFTs relatively recently  Will recheck labs and consider statin    5. Acute deep vein thrombosis (DVT) of axillary vein of right upper extremity  Anticoagulation for a minimum of 3 months  Could potentially consider provoked due to inactivity  However in light of prior malignancy will refer to Hematology/Oncology to comment on duration of treatment  - Ambulatory referral/consult to Hematology / Oncology; Future    6. Malignant neoplasm of urinary bladder, unspecified site  Refer to hematology oncology  - Ambulatory referral/consult to Hematology / Oncology; Future    7. PVD (peripheral vascular disease)  No records consider lower extremity ultrasound next visit  Referred to vascular surgery for iliac aneurysm    Follow Up  Follow up in about 3 months (around 8/9/2023).      John Paul Ayers MD, FACC, RPVI  Interventional Cardiology

## 2023-05-16 ENCOUNTER — TELEPHONE (OUTPATIENT)
Dept: HEMATOLOGY/ONCOLOGY | Facility: CLINIC | Age: 63
End: 2023-05-16
Payer: MEDICARE

## 2023-05-16 NOTE — TELEPHONE ENCOUNTER
----- Message from Senajoaquín Steven sent at 5/15/2023 12:03 PM CDT -----  Regarding: Callback  Patient is requesting a callback regarding conflict with appointments and would like to reschedule his appointment. Please give the patient a callback at [phone].

## 2023-05-18 ENCOUNTER — TELEPHONE (OUTPATIENT)
Dept: HEMATOLOGY/ONCOLOGY | Facility: CLINIC | Age: 63
End: 2023-05-18
Payer: MEDICARE

## 2023-05-18 NOTE — TELEPHONE ENCOUNTER
Attempted to contact patient for referral scheduling. No answer. Detailed voicemail with Oncology Nurse Navigator's direct contact number provided for return call.

## 2023-05-18 NOTE — TELEPHONE ENCOUNTER
Incoming call received at [phone] to further discuss patient's referral needs.  History of bladder cancer and is followed by Dr. Bo. Management of DVT referral to hematology from cardiology. Explained to patient need for appointment. Verbalized understanding after a long discussion and explanation of what physicians care for what disease processes.Agreed to upcoming visit on 5/26.

## 2023-05-23 DIAGNOSIS — I72.3 ANEURYSM OF RIGHT ILIAC ARTERY: Primary | ICD-10-CM

## 2023-05-31 ENCOUNTER — OFFICE VISIT (OUTPATIENT)
Dept: VASCULAR SURGERY | Facility: CLINIC | Age: 63
End: 2023-05-31
Payer: MEDICARE

## 2023-05-31 VITALS
SYSTOLIC BLOOD PRESSURE: 137 MMHG | WEIGHT: 153.13 LBS | DIASTOLIC BLOOD PRESSURE: 70 MMHG | HEART RATE: 80 BPM | BODY MASS INDEX: 20.77 KG/M2

## 2023-05-31 DIAGNOSIS — M25.561 ARTHRALGIA OF BOTH LOWER LEGS: ICD-10-CM

## 2023-05-31 DIAGNOSIS — M25.562 ARTHRALGIA OF BOTH LOWER LEGS: ICD-10-CM

## 2023-05-31 DIAGNOSIS — I71.9 AORTIC ANEURYSM WITHOUT RUPTURE, UNSPECIFIED PORTION OF AORTA: Primary | ICD-10-CM

## 2023-05-31 PROCEDURE — 3008F BODY MASS INDEX DOCD: CPT | Mod: CPTII,S$GLB,, | Performed by: NURSE PRACTITIONER

## 2023-05-31 PROCEDURE — 3288F PR FALLS RISK ASSESSMENT DOCUMENTED: ICD-10-PCS | Mod: CPTII,S$GLB,, | Performed by: NURSE PRACTITIONER

## 2023-05-31 PROCEDURE — 99999 PR PBB SHADOW E&M-EST. PATIENT-LVL III: CPT | Mod: PBBFAC,,, | Performed by: NURSE PRACTITIONER

## 2023-05-31 PROCEDURE — 3078F DIAST BP <80 MM HG: CPT | Mod: CPTII,S$GLB,, | Performed by: NURSE PRACTITIONER

## 2023-05-31 PROCEDURE — 3008F PR BODY MASS INDEX (BMI) DOCUMENTED: ICD-10-PCS | Mod: CPTII,S$GLB,, | Performed by: NURSE PRACTITIONER

## 2023-05-31 PROCEDURE — 3075F SYST BP GE 130 - 139MM HG: CPT | Mod: CPTII,S$GLB,, | Performed by: NURSE PRACTITIONER

## 2023-05-31 PROCEDURE — 99215 OFFICE O/P EST HI 40 MIN: CPT | Mod: S$GLB,,, | Performed by: NURSE PRACTITIONER

## 2023-05-31 PROCEDURE — 1101F PT FALLS ASSESS-DOCD LE1/YR: CPT | Mod: CPTII,S$GLB,, | Performed by: NURSE PRACTITIONER

## 2023-05-31 PROCEDURE — 1101F PR PT FALLS ASSESS DOC 0-1 FALLS W/OUT INJ PAST YR: ICD-10-PCS | Mod: CPTII,S$GLB,, | Performed by: NURSE PRACTITIONER

## 2023-05-31 PROCEDURE — 3078F PR MOST RECENT DIASTOLIC BLOOD PRESSURE < 80 MM HG: ICD-10-PCS | Mod: CPTII,S$GLB,, | Performed by: NURSE PRACTITIONER

## 2023-05-31 PROCEDURE — 99999 PR PBB SHADOW E&M-EST. PATIENT-LVL III: ICD-10-PCS | Mod: PBBFAC,,, | Performed by: NURSE PRACTITIONER

## 2023-05-31 PROCEDURE — 99215 PR OFFICE/OUTPT VISIT, EST, LEVL V, 40-54 MIN: ICD-10-PCS | Mod: S$GLB,,, | Performed by: NURSE PRACTITIONER

## 2023-05-31 PROCEDURE — 3075F PR MOST RECENT SYSTOLIC BLOOD PRESS GE 130-139MM HG: ICD-10-PCS | Mod: CPTII,S$GLB,, | Performed by: NURSE PRACTITIONER

## 2023-05-31 PROCEDURE — 3288F FALL RISK ASSESSMENT DOCD: CPT | Mod: CPTII,S$GLB,, | Performed by: NURSE PRACTITIONER

## 2023-06-05 NOTE — PROGRESS NOTES
[facility]                         06/05/2023    HPI:  Blake Guillory is a 63 y.o. male with   Patient Active Problem List   Diagnosis    Insomnia    Lumbar facet arthropathy    Sacroiliitis    Spondylosis without myelopathy    DDD (degenerative disc disease)    History of bladder cancer    Hydronephrosis, bilateral    H/O primary malignant neoplasm of urinary bladder    Essential hypertension    Anemia of chronic disease    Moderate protein malnutrition    Chronic gout    Renal insufficiency    Body mass index (BMI) 20.0-20.9, adult    Personal history of bladder cancer    Acquired hypothyroidism    Hypoxemia    Severe malnutrition    Hydronephrosis    History of primary malignant neoplasm of urinary bladder    Hydronephrosis with urinary obstruction due to ureteral calculus    Chest pain of uncertain etiology    Centrilobular emphysema    Epigastric abdominal tenderness without rebound tenderness    S/P colonoscopy    Iliac artery aneurysm    Kidney stones    Hypertriglyceridemia    Nephrolithiasis    Acute respiratory failure with hypoxia    Kidney stone on left side    Hyperparathyroidism    Encounter for screening for malignant neoplasm of respiratory organs    Personal history of nicotine dependence    Lung nodule    Coronary artery calcification    Symptomatic cholelithiasis    Presence of urostomy    Obstructive chronic bronchitis without exacerbation    Acute deep vein thrombosis (DVT) of axillary vein of right upper extremity    PVD (peripheral vascular disease)    being managed by PCP and specialists who is here today for evaluation of R ANTHONY aneurysm.  Pt has a history of bladder cancer who status post cystectomy with ileal conduit.  He had a revision of his ileal ureteral anastomoses several months after his cystectomy.  He developed recurrent ureteral strictures and he was managed with stents.  His last ureteral stent change through the conduit was in October  2015.  Within days of the last ureteral stent change, he developed profuse diarrhea.  He saw Gastroenterology who performed a colonoscopy, who discovered that he had an ureteral stent in the colon.  CT scan showing that there was the stent indeed coming from the conduit into the colon. and is s/p exploratory laparotomy, lysis of adhesions, excision of ileal conduit to distal ileum fistula, small bowel resection, appendectomy, small bowel to small bowel anastomosis, replacement of ileal conduit 2015.  Pt is without complaints today.  No abd pain or pelvic pain.  Prev 2.9 cm on 4/2017 non contrasted CT A/P.  Pt states he has severe SHAFFER and is unable to climb one flight of stairs.    no MI  no Stroke  Tobacco use: quit 1/2018 2019:  Denies abdominal, pelvic and back pain.  C/o BLE heaviness at 1 block for a few months.  +tingling BLE.  No BLE edema.    2/2022:  No new complaints.    06/05/23: Pt without any complaints. Remains asymptomatic. Remain free from smoking. BP controlled, complaint on medications.     Past Medical History:   Diagnosis Date    Anemia of chronic disease     Aneurysm of right common iliac artery     Arthritis     Blood transfusion     CAD (coronary artery disease)     Chest pain of uncertain etiology     CKD (chronic kidney disease), stage IV     COPD (chronic obstructive pulmonary disease)     Coronary artery calcification 8/2/2021    DDD (degenerative disc disease), lumbar     Frequency of urination     General anesthetics causing adverse effect in therapeutic use     pt states he wakes up very violently from anesthesia    GERD (gastroesophageal reflux disease)     Gout     History of bladder cancer     History of urostomy     Hydronephrosis     Hyperparathyroidism     Hypertension     Hypertriglyceridemia     Hypothyroidism (acquired)     Insomnia     Kidney stone     Limited joint range of motion right hip and leg    Lumbar facet arthropathy     Lumbar spondylosis     Mixed anxiety and  depressive disorder     Neuropathy     Orchalgia     SHARMIN (obstructive sleep apnea)     Personal history of bladder cancer     PVD (peripheral vascular disease)     Sacroiliitis     Stricture or kinking of ureter     Ureteral stent displacement     Urinary retention     Vitamin D deficiency     Wears glasses      Past Surgical History:   Procedure Laterality Date    APPENDECTOMY  12/30/2015    Procedure: APPENDECTOMY;  Surgeon: CHAD Bo MD;  Location: [facility] OR;  Service: Urology;;    BALLOON DILATION OF URETER  01/22/2018    BOWEL RESECTION      COLONOSCOPY      CREATION OF URETEROILEAL CONDUIT Left 07/05/2013    CYSTOSCOPY      >1  episodes for bilat ureteral stent exchange    CYSTOSCOPY W/ URETERAL STENT PLACEMENT Right 01/22/2018    and 6/9/2015    CYSTOSCOPY W/ URETERAL STENT PLACEMENT Bilateral 10/07/2015    ESOPHAGOGASTRODUODENOSCOPY  04/12/2022    W/ BIOPSY    ESOPHAGOGASTRODUODENOSCOPY N/A 4/12/2022    Procedure: EGD (ESOPHAGOGASTRODUODENOSCOPY);  Surgeon: Tito Fan MD;  Location: [facility];  Service: Endoscopy;  Laterality: N/A;    EXPLORATORY LAPAROTOMY  07/05/2013    EXPLORATORY LAPAROTOMY W/ BOWEL RESECTION  12/30/2015    EXTRACORPOREAL SHOCK WAVE LITHOTRIPSY Right 06/07/2021    Procedure: LITHOTRIPSY, ESWL;  Surgeon: CHAD Bo MD;  Location: [facility] OR;  Service: Urology;  Laterality: Right;  RN PREOP 5/31/2021   VACCINATED    EXTRACORPOREAL SHOCK WAVE LITHOTRIPSY  06/07/2021    FRACTURE SURGERY      HAND SURGERY      HEMICOLECTOMY  12/30/2015    HIP SURGERY  2012    Rt hip septic    LAPAROSCOPIC CHOLECYSTECTOMY N/A 08/11/2021    Procedure: CHOLECYSTECTOMY, LAPAROSCOPIC;  Surgeon: Remy Xiong MD;  Location: [facility] OR;  Service: General;  Laterality: N/A;  gianni video confirmed 8/5/dme    LYMPH NODE DISSECTION  11/08/2012    pelvic    LYSIS OF ADHESIONS  12/30/2015    MOUTH SURGERY  2000    all teeth extracted    PERCUTANEOUS NEPHROLITHOTOMY Left 07/22/2020    Procedure:  NEPHROLITHOTOMY, PERCUTANEOUS;  Surgeon: CHAD Bo MD;  Location: [facility] OR;  Service: Urology;  Laterality: Left;  OR 9 ONLY  RN PREOP 2020----COVID NEGATIVE ON     PERCUTANEOUS NEPHROLITHOTOMY Bilateral 2022    Procedure: BILATERAL PERCUTANEOUS NEPHROSCOPY, BILATERAL ANTEGRADE PYELOGRAM, BILATERAL NEPHROSTOMY TUBE PLACEMENT;  Surgeon: CHAD Bo MD;  Location: [facility] OR;  Service: Urology;  Laterality: Bilateral;  room 9 only  RN PREOP 2022----T/S IN AM    PERCUTANEOUS NEPHROSTOMY Left 2018    PERCUTANEOUS REPLACEMENT OF NEPHROSTOMY TUBE Bilateral 5/10/2022    Procedure: REPLACEMENT, NEPHROSTOMY TUBE, PERCUTANEOUS;  Surgeon: Chino Vo MD;  Location: [facility] [place] CATH LAB;  Service: Radiology;  Laterality: Bilateral;    RADICAL CYSTECTOMY  2012    RADIOFREQUENCY THERMOCOAGULATION  2014    median branch lumbar    RADIOFREQUENCY THERMOCOAGULATION  2014    median branch lumbar    REPAIR, HERNIA, PARASTOMAL, LAPAROSCOPIC  2017    ROBOTIC BRONCHOSCOPY N/A 2023    Procedure: ROBOTIC BRONCHOSCOPY;  Surgeon: Camila Rodriguez MD;  Location: [address];  Service: Anesthesiology;  Laterality: N/A;    SMALL INTESTINE SURGERY  2017    urosotomy bag  2011    R abdomen     Family History   Adopted: Yes   Family history unknown: Yes     Social History     Socioeconomic History    Marital status:    Occupational History    Occupation: [employer]     Employer: [employer]   Tobacco Use    Smoking status: Former     Packs/day: 0.30     Years: 42.00     Pack years: 12.60     Types: Cigarettes     Quit date: 2017     Years since quittin.3    Smokeless tobacco: Never    Tobacco comments:     in cessation program   Substance and Sexual Activity    Alcohol use: Yes     Alcohol/week: 14.0 - 21.0 standard drinks     Types: 14 - 21 Cans of beer per week     Comment: occassional    Drug use: Yes     Types: Marijuana     Comment: occass     Sexual activity: Yes     Partners: Female     Birth control/protection: None       Current Outpatient Medications:     albuterol (PROAIR HFA) 90 mcg/actuation inhaler, Inhale 2 puffs into the lungs every 6 (six) hours as needed for Wheezing. Rescue (Patient not taking: Reported on 6/1/2023), Disp: 18 g, Rfl: 0    albuterol-ipratropium (DUO-NEB) 2.5 mg-0.5 mg/3 mL nebulizer solution, Take 3 mLs by nebulization every 6 (six) hours as needed for Wheezing. Rescue, Disp: 150 mL, Rfl: 6    allopurinoL (ZYLOPRIM) 100 MG tablet, Take 1 tablet (100 mg total) by mouth once daily., Disp: 90 tablet, Rfl: 0    apixaban (ELIQUIS) 5 mg Tab, Take 1 tablet (5 mg total) by mouth 2 (two) times daily., Disp: 60 tablet, Rfl: 0    aspirin (ECOTRIN) 81 MG EC tablet, Take 81 mg by mouth once daily., Disp: , Rfl:     calcitRIOL (ROCALTROL) 0.25 MCG Cap, Take 1 capsule (0.25 mcg total) by mouth once daily., Disp: 90 capsule, Rfl: 0    carvediloL (COREG) 6.25 MG tablet, Take 1 tablet (6.25 mg total) by mouth 2 (two) times daily with meals., Disp: 180 tablet, Rfl: 1    clobetasoL (TEMOVATE) 0.05 % cream, Apply topically 2 (two) times daily., Disp: 30 g, Rfl: 1    EScitalopram oxalate (LEXAPRO) 20 MG tablet, Take 1 tablet (20 mg total) by mouth once daily., Disp: 90 tablet, Rfl: 0    fenofibrate (TRICOR) 145 MG tablet, Take 1 tablet (145 mg total) by mouth once daily., Disp: 90 tablet, Rfl: 0    fluticasone-umeclidin-vilanter (TRELEGY ELLIPTA) 100-62.5-25 mcg DsDv, Inhale 1 puff into the lungs once daily. Rinse mouth after use., Disp: 60 each, Rfl: 2    gabapentin (NEURONTIN) 400 MG capsule, Take 1 capsule (400 mg total) by mouth 2 (two) times daily., Disp: 180 capsule, Rfl: 0    HYDROcodone-acetaminophen (NORCO)  mg per tablet, Take 1 tablet by mouth every 6 (six) hours as needed for Pain., Disp: 16 tablet, Rfl: 0    levothyroxine (SYNTHROID) 88 MCG tablet, Take 1 tablet (88 mcg total) by mouth once daily., Disp: 90 tablet, Rfl: 0     LIDOcaine (LIDODERM) 5 %, Place 1 patch onto the skin once daily. Remove & Discard patch within 12 hours or as directed by MD, Disp: 15 patch, Rfl: 0    multivitamin (THERAGRAN) per tablet, Take 1 tablet by mouth once daily., Disp: , Rfl:     pantoprazole (PROTONIX) 40 MG tablet, Take 1 tablet (40 mg total) by mouth once daily., Disp: 90 tablet, Rfl: 0    sodium bicarbonate 325 MG tablet, Take 325 mg by mouth 2 (two) times daily., Disp: , Rfl:     tiZANidine (ZANAFLEX) 4 MG tablet, Take 1 tablet (4 mg total) by mouth once daily., Disp: 30 tablet, Rfl: 1    traZODone (DESYREL) 150 MG tablet, Take 1 tablet (150 mg total) by mouth every evening., Disp: 90 tablet, Rfl: 0    REVIEW OF SYSTEMS:  General: No fevers or chills; ENT: No sore throat; Allergy and Immunology: no persistent infections; Hematological and Lymphatic: No history of bleeding or easy bruising; Endocrine: negative; Respiratory: no cough, shortness of breath, or wheezing; Cardiovascular: no chest pain or dyspnea on exertion; Gastrointestinal: no abdominal pain/back, change in bowel habits, or bloody stools; Genito-Urinary: no dysuria, trouble voiding, or hematuria; Musculoskeletal: negative; Neurological: no TIA or stroke symptoms; Psychiatric: no nervousness, anxiety or depression.    PHYSICAL EXAM:      Pulse: 80         General appearance:  Alert, well-appearing, and in no distress.  Oriented to person, place, and time                    Neurological: Normal speech, no focal findings noted; CN II - XII grossly intact. RLE with sensation to light touch, LLE with sensation to light touch.            Musculoskeletal: Digits/nail without cyanosis/clubbing.  Strength 5/5 BLE.                    Neck: Supple, no significant adenopathy, no carotid bruit can be auscultated                  Chest:  Clear to auscultation, no wheezes, rales or rhonchi, symmetric air entry. No use of accessory muscles               Cardiac: Normal rate and regular rhythm, S1 and  S2 normal            Abdomen: Soft, nontender, nondistended, no masses or organomegaly, no hernia     No rebound tenderness noted; bowel sounds normal     No groin adenopathy      Extremities:   2+ R femoral pulse, 2+ L femoral pulse     2+ R popliteal pulse, 2+ L popliteal pulse     2+ R PT pulse, 1+ L PT pulse     2+ R DP pulse, 1+ L DP pulse     no RLE edema, no LLE edema    Skin: RLE without tissue loss; LLE without tissue loss    LAB RESULTS:  No results found for: CBC  Lab Results   Component Value Date    LABPROT 10.7 03/15/2023    INR 1.0 03/15/2023     Lab Results   Component Value Date     05/19/2023    K 5.0 05/19/2023     05/19/2023    CO2 24 05/19/2023    GLU 97 05/19/2023    BUN 31 (H) 05/19/2023    CREATININE 2.4 (H) 05/19/2023    CALCIUM 10.2 05/19/2023    ANIONGAP 12 05/19/2023    EGFRNONAA 23.8 (A) 07/25/2022     Lab Results   Component Value Date    WBC 6.23 05/19/2023    RBC 4.40 (L) 05/19/2023    HGB 13.2 (L) 05/19/2023    HCT 41.7 05/19/2023    MCV 95 05/19/2023    MCH 30.0 05/19/2023    MCHC 31.7 (L) 05/19/2023    RDW 13.2 05/19/2023     05/19/2023    MPV 9.8 05/19/2023    GRAN 4.3 05/19/2023    GRAN 68.6 05/19/2023    LYMPH 0.9 (L) 05/19/2023    LYMPH 13.6 (L) 05/19/2023    MONO 0.7 05/19/2023    MONO 11.1 05/19/2023    EOS 0.3 05/19/2023    BASO 0.06 05/19/2023    EOSINOPHIL 5.5 05/19/2023    BASOPHIL 1.0 05/19/2023    DIFFMETHOD Automated 05/19/2023     .  Lab Results   Component Value Date    HGBA1C 5.4 05/06/2020       IMAGING:  All pertinent imaging has been reviewed and interpreted independently.    CT A/P without contrast 11/2018, 4/2017, 7/2019 reviewed:  3 cm R ANTHNOY aneurysm, stable in size.    CT non con 4/2021 - 3.2 cm R ANTHONY aneurysm    CT Renal stone ABD Pel WO 04/21/23:  FINDINGS:  Imaged lung bases are clear.  Base of the heart is normal in size without significant pericardial fluid noting multi-vessel coronary arterial and aortic annular calcifications.      Remote cholecystectomy.  No biliary ductal dilatation.  Liver is normal in size with homogeneous parenchymal attenuation.  Spleen is normal in size noting several scattered punctate parenchymal calcifications likely sequela of prior granulomatous disease.  Noncontrast appearance of the pancreas, stomach, duodenum and bilateral adrenal glands are within normal limits.  Few small accessory splenules noted.     Bilateral kidneys are stable in overall size, shape and location with grossly similar areas of cortical thinning and scarring and renal cortical lobulation with overall mild atrophy.  Advanced bilateral renal atherosclerotic vascular calcifications noted.  There are additional several scattered subcentimeter suspected nephroliths at each kidney.  No hydronephrosis.  There is relatively symmetric bilateral nonspecific perinephric stranding similar to prior.  Few scattered subcentimeter hypoattenuating cortical foci at each kidney which are too small to characterize.     Remote operative change of cystectomy with right lower quadrant ileal conduit.  The bilateral ureters are not significantly dilated.     No pelvic mass seen.  Few scattered pelvic phleboliths noted.     Appendix is not identified; however, no right lower quadrant inflammatory change.  Several scattered colonic diverticula without diverticulitis.  No evidence of bowel obstruction or acute inflammation.  No pneumatosis or portal venous gas.     No ascites, free air or lymphadenopathy by CT criteria.     Scattered advanced/severe calcific atherosclerosis of the abdominal aorta extending into its mesenteric and iliac branches.  Abdominal aorta is ectatic but nonaneurysmal.  Grossly stable fusiform aneurysmal dilatation of the right common iliac artery measuring up to 3.4 cm.     Operative changes of right lower quadrant parastomal and bilateral inguinal hernia repair similar to prior.     Osseous structures appear stable without acute process  seen.     Impression:     1. Suspected bilateral nonobstructing nephrolithiasis.  Otherwise no acute process seen within the abdomen or pelvis on this noncontrast study.  2. Remote operative change of cystectomy with right lower quadrant ileal conduit.  Resolution of previous right-sided hydronephrosis.  3. Prior cholecystectomy.  4. Diverticulosis coli.  5. 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  6. Grossly stable additional findings as above.       IMP/PLAN:  63 y.o. male with   Patient Active Problem List   Diagnosis    Insomnia    Lumbar facet arthropathy    Sacroiliitis    Spondylosis without myelopathy    DDD (degenerative disc disease)    History of bladder cancer    Hydronephrosis, bilateral    H/O primary malignant neoplasm of urinary bladder    Essential hypertension    Anemia of chronic disease    Moderate protein malnutrition    Chronic gout    Renal insufficiency    Body mass index (BMI) 20.0-20.9, adult    Personal history of bladder cancer    Acquired hypothyroidism    Hypoxemia    Severe malnutrition    Hydronephrosis    History of primary malignant neoplasm of urinary bladder    Hydronephrosis with urinary obstruction due to ureteral calculus    Chest pain of uncertain etiology    Centrilobular emphysema    Epigastric abdominal tenderness without rebound tenderness    S/P colonoscopy    Iliac artery aneurysm    Kidney stones    Hypertriglyceridemia    Nephrolithiasis    Acute respiratory failure with hypoxia    Kidney stone on left side    Hyperparathyroidism    Encounter for screening for malignant neoplasm of respiratory organs    Personal history of nicotine dependence    Lung nodule    Coronary artery calcification    Symptomatic cholelithiasis    Presence of urostomy    Obstructive chronic bronchitis without exacerbation    Acute deep vein thrombosis (DVT) of axillary vein of right upper extremity    PVD (peripheral  vascular disease)    being managed by PCP and specialists who is here today for evaluation of R ANTHONY aneurysm.    -Stable 3.3 cm R ANTHONY aneurysm (2.9 cm 4/2017), asymptomatic; CKD stage 3, GFR 29 with extensive intraabdominal open surgical history with complications from ileal conduit and stent erosion into colon and severe SHAFFER - risks outweigh benefits of obtaining contrasted imaging at this time, if aneurysm increases in size >3.5cm he would benefit from a CTA for possible endovascular vs open repair  -Will perform routine surveillance with repeat CT A/P non contrast in 6 months   -Rec daily ASA, BP control and cont smoking cessation  -RTC after CT in 6 months for further evaluation    ** Plan discussed with Dr. Adame.    I spent 30 minutes evaluating this patient and greater than 50% of the time was spent counseling, coordinator care and discussing the plan of care.  All questions were answered and patient stated understanding with agreement with the above treatment plan.    Montse Rubi NP  Vascular and Endovascular Surgery

## 2023-07-21 ENCOUNTER — HOSPITAL ENCOUNTER (OUTPATIENT)
Facility: OTHER | Age: 63
Discharge: HOME | End: 2023-07-23
Attending: EMERGENCY MEDICINE | Admitting: INTERNAL MEDICINE
Payer: MEDICARE

## 2023-07-21 DIAGNOSIS — N20.0 KIDNEY STONE: ICD-10-CM

## 2023-07-21 DIAGNOSIS — R31.9 HEMATURIA, UNSPECIFIED TYPE: Primary | ICD-10-CM

## 2023-07-21 DIAGNOSIS — N20.1 URETERAL STONE: ICD-10-CM

## 2023-07-21 DIAGNOSIS — N13.2 HYDRONEPHROSIS WITH URINARY OBSTRUCTION DUE TO URETERAL CALCULUS: ICD-10-CM

## 2023-07-21 DIAGNOSIS — N13.30 HYDRONEPHROSIS, UNSPECIFIED HYDRONEPHROSIS TYPE: ICD-10-CM

## 2023-07-21 DIAGNOSIS — R10.9 FLANK PAIN: ICD-10-CM

## 2023-07-21 LAB
ALBUMIN SERPL BCP-MCNC: 4.1 G/DL (ref 3.5–5.2)
ALP SERPL-CCNC: 48 U/L (ref 55–135)
ALT SERPL W/O P-5'-P-CCNC: 39 U/L (ref 10–44)
ANION GAP SERPL CALC-SCNC: 14 MMOL/L (ref 8–16)
APTT PPP: 31.1 SEC (ref 21–32)
AST SERPL-CCNC: 47 U/L (ref 10–40)
BACTERIA #/AREA URNS HPF: ABNORMAL /HPF
BASOPHILS # BLD AUTO: 0.01 K/UL (ref 0–0.2)
BASOPHILS NFR BLD: 0.3 % (ref 0–1.9)
BILIRUB SERPL-MCNC: 0.4 MG/DL (ref 0.1–1)
BILIRUB UR QL STRIP: NEGATIVE
BUN SERPL-MCNC: 39 MG/DL (ref 8–23)
CALCIUM SERPL-MCNC: 9.9 MG/DL (ref 8.7–10.5)
CHLORIDE SERPL-SCNC: 102 MMOL/L (ref 95–110)
CLARITY UR: ABNORMAL
CO2 SERPL-SCNC: 22 MMOL/L (ref 23–29)
COLOR UR: ABNORMAL
CREAT SERPL-MCNC: 2.7 MG/DL (ref 0.5–1.4)
DIFFERENTIAL METHOD: ABNORMAL
EOSINOPHIL # BLD AUTO: 0.1 K/UL (ref 0–0.5)
EOSINOPHIL NFR BLD: 1.6 % (ref 0–8)
ERYTHROCYTE [DISTWIDTH] IN BLOOD BY AUTOMATED COUNT: 14.4 % (ref 11.5–14.5)
EST. GFR  (NO RACE VARIABLE): 25 ML/MIN/1.73 M^2
GLUCOSE SERPL-MCNC: 93 MG/DL (ref 70–110)
GLUCOSE UR QL STRIP: NEGATIVE
HCT VFR BLD AUTO: 45 % (ref 40–54)
HGB BLD-MCNC: 14.4 G/DL (ref 14–18)
HGB UR QL STRIP: ABNORMAL
HYALINE CASTS #/AREA URNS LPF: 0 /LPF
IMM GRANULOCYTES # BLD AUTO: 0.01 K/UL (ref 0–0.04)
IMM GRANULOCYTES NFR BLD AUTO: 0.3 % (ref 0–0.5)
INR PPP: 1 (ref 0.8–1.2)
KETONES UR QL STRIP: NEGATIVE
LEUKOCYTE ESTERASE UR QL STRIP: ABNORMAL
LYMPHOCYTES # BLD AUTO: 0.8 K/UL (ref 1–4.8)
LYMPHOCYTES NFR BLD: 20.3 % (ref 18–48)
MCH RBC QN AUTO: 30.1 PG (ref 27–31)
MCHC RBC AUTO-ENTMCNC: 32 G/DL (ref 32–36)
MCV RBC AUTO: 94 FL (ref 82–98)
MICROSCOPIC COMMENT: ABNORMAL
MONOCYTES # BLD AUTO: 0.4 K/UL (ref 0.3–1)
MONOCYTES NFR BLD: 11.2 % (ref 4–15)
NEUTROPHILS # BLD AUTO: 2.6 K/UL (ref 1.8–7.7)
NEUTROPHILS NFR BLD: 66.3 % (ref 38–73)
NITRITE UR QL STRIP: NEGATIVE
NRBC BLD-RTO: 0 /100 WBC
PH UR STRIP: 7 [PH] (ref 5–8)
PLATELET # BLD AUTO: 185 K/UL (ref 150–450)
PMV BLD AUTO: 11.1 FL (ref 9.2–12.9)
POTASSIUM SERPL-SCNC: 5 MMOL/L (ref 3.5–5.1)
PROT SERPL-MCNC: 7.6 G/DL (ref 6–8.4)
PROT UR QL STRIP: ABNORMAL
PROTHROMBIN TIME: 11.1 SEC (ref 9–12.5)
RBC # BLD AUTO: 4.79 M/UL (ref 4.6–6.2)
RBC #/AREA URNS HPF: >100 /HPF (ref 0–4)
SODIUM SERPL-SCNC: 138 MMOL/L (ref 136–145)
SP GR UR STRIP: 1.02 (ref 1–1.03)
URN SPEC COLLECT METH UR: ABNORMAL
UROBILINOGEN UR STRIP-ACNC: NEGATIVE EU/DL
WBC # BLD AUTO: 3.85 K/UL (ref 3.9–12.7)
WBC #/AREA URNS HPF: 3 /HPF (ref 0–5)

## 2023-07-21 PROCEDURE — 63600175 PHARM REV CODE 636 W HCPCS: Performed by: EMERGENCY MEDICINE

## 2023-07-21 PROCEDURE — G0378 HOSPITAL OBSERVATION PER HR: HCPCS

## 2023-07-21 PROCEDURE — 80053 COMPREHEN METABOLIC PANEL: CPT | Performed by: PHYSICIAN ASSISTANT

## 2023-07-21 PROCEDURE — 99285 EMERGENCY DEPT VISIT HI MDM: CPT | Mod: 25

## 2023-07-21 PROCEDURE — 96375 TX/PRO/DX INJ NEW DRUG ADDON: CPT

## 2023-07-21 PROCEDURE — 96365 THER/PROPH/DIAG IV INF INIT: CPT

## 2023-07-21 PROCEDURE — 85730 THROMBOPLASTIN TIME PARTIAL: CPT | Performed by: EMERGENCY MEDICINE

## 2023-07-21 PROCEDURE — 96361 HYDRATE IV INFUSION ADD-ON: CPT

## 2023-07-21 PROCEDURE — 25000003 PHARM REV CODE 250: Performed by: EMERGENCY MEDICINE

## 2023-07-21 PROCEDURE — 85610 PROTHROMBIN TIME: CPT | Performed by: EMERGENCY MEDICINE

## 2023-07-21 PROCEDURE — 85025 COMPLETE CBC W/AUTO DIFF WBC: CPT | Performed by: PHYSICIAN ASSISTANT

## 2023-07-21 PROCEDURE — 81000 URINALYSIS NONAUTO W/SCOPE: CPT | Performed by: PHYSICIAN ASSISTANT

## 2023-07-21 PROCEDURE — 96376 TX/PRO/DX INJ SAME DRUG ADON: CPT

## 2023-07-21 RX ORDER — DIPHENHYDRAMINE HCL 25 MG
25 CAPSULE ORAL EVERY 6 HOURS PRN
Status: DISCONTINUED | OUTPATIENT
Start: 2023-07-22 | End: 2023-07-23

## 2023-07-21 RX ORDER — SODIUM CHLORIDE 0.9 % (FLUSH) 0.9 %
10 SYRINGE (ML) INJECTION EVERY 8 HOURS
Status: DISCONTINUED | OUTPATIENT
Start: 2023-07-22 | End: 2023-07-22

## 2023-07-21 RX ORDER — AMOXICILLIN 250 MG
1 CAPSULE ORAL 2 TIMES DAILY PRN
Status: DISCONTINUED | OUTPATIENT
Start: 2023-07-22 | End: 2023-07-23 | Stop reason: HOSPADM

## 2023-07-21 RX ORDER — ACETAMINOPHEN 325 MG/1
650 TABLET ORAL EVERY 6 HOURS PRN
Status: DISCONTINUED | OUTPATIENT
Start: 2023-07-22 | End: 2023-07-23 | Stop reason: HOSPADM

## 2023-07-21 RX ORDER — ONDANSETRON 2 MG/ML
4 INJECTION INTRAMUSCULAR; INTRAVENOUS
Status: COMPLETED | OUTPATIENT
Start: 2023-07-21 | End: 2023-07-21

## 2023-07-21 RX ORDER — TAMSULOSIN HYDROCHLORIDE 0.4 MG/1
0.4 CAPSULE ORAL DAILY
Status: DISCONTINUED | OUTPATIENT
Start: 2023-07-22 | End: 2023-07-23 | Stop reason: HOSPADM

## 2023-07-21 RX ORDER — SODIUM CHLORIDE 9 MG/ML
INJECTION, SOLUTION INTRAVENOUS CONTINUOUS
Status: DISCONTINUED | OUTPATIENT
Start: 2023-07-22 | End: 2023-07-22

## 2023-07-21 RX ORDER — HYDROMORPHONE HYDROCHLORIDE 1 MG/ML
1 INJECTION, SOLUTION INTRAMUSCULAR; INTRAVENOUS; SUBCUTANEOUS
Status: COMPLETED | OUTPATIENT
Start: 2023-07-21 | End: 2023-07-21

## 2023-07-21 RX ORDER — NALOXONE HCL 0.4 MG/ML
0.02 VIAL (ML) INJECTION
Status: DISCONTINUED | OUTPATIENT
Start: 2023-07-22 | End: 2023-07-23 | Stop reason: HOSPADM

## 2023-07-21 RX ORDER — HEPARIN SODIUM 5000 [USP'U]/ML
5000 INJECTION, SOLUTION INTRAVENOUS; SUBCUTANEOUS EVERY 8 HOURS
Status: DISCONTINUED | OUTPATIENT
Start: 2023-07-22 | End: 2023-07-22

## 2023-07-21 RX ADMIN — SODIUM CHLORIDE 500 ML: 9 INJECTION, SOLUTION INTRAVENOUS at 06:07

## 2023-07-21 RX ADMIN — ONDANSETRON 4 MG: 2 INJECTION INTRAMUSCULAR; INTRAVENOUS at 06:07

## 2023-07-21 RX ADMIN — HYDROMORPHONE HYDROCHLORIDE 1 MG: 1 INJECTION, SOLUTION INTRAMUSCULAR; INTRAVENOUS; SUBCUTANEOUS at 06:07

## 2023-07-21 RX ADMIN — PROMETHAZINE HYDROCHLORIDE 12.5 MG: 25 INJECTION INTRAMUSCULAR; INTRAVENOUS at 09:07

## 2023-07-21 RX ADMIN — ONDANSETRON 4 MG: 2 INJECTION INTRAMUSCULAR; INTRAVENOUS at 09:07

## 2023-07-21 RX ADMIN — HYDROMORPHONE HYDROCHLORIDE 1 MG: 1 INJECTION, SOLUTION INTRAMUSCULAR; INTRAVENOUS; SUBCUTANEOUS at 07:07

## 2023-07-21 RX ADMIN — HYDROMORPHONE HYDROCHLORIDE 1 MG: 1 INJECTION, SOLUTION INTRAMUSCULAR; INTRAVENOUS; SUBCUTANEOUS at 09:07

## 2023-07-21 NOTE — FIRST PROVIDER EVALUATION
" Emergency Department TeleTriage Encounter Note      CHIEF COMPLAINT    Chief Complaint   Patient presents with    Hematuria     C/o hematuria that began this am. Urostomy bag noted to RLQ with red urine. Extensive PMH. VSS       VITAL SIGNS   Initial Vitals [07/21/23 1724]   BP Pulse Resp Temp SpO2   (!) 149/84 99 18 99.6 °F (37.6 °C) (!) 94 %      MAP       --            ALLERGIES    Review of patient's allergies indicates:  No Known Allergies    PROVIDER TRIAGE NOTE  Patient presents with complaint of "my kidney's are bleeding". He states associated flank pain. Denied fever. Denied N/V.      Phy:   Constitutional: well nourished, well developed, appearing stated age, NAD   HEENT: NCAT, symmetrical lids, No obvious facial deformity.  Normal phonation. Normal Conjunctiva   Neck: NAROM   Respiratory: Normal effort.  No obvious use of accessory muscles   Musculoskeletal: Moved upper extremities well   Neuro: Alert, answers questions appropriately    Psych: appropriate mood and affect      Initial orders will be placed and care will be transferred to an alternate provider when patient is roomed for a full evaluation. Any additional orders and the final disposition will be determined by that provider.        ORDERS  Labs Reviewed - No data to display    ED Orders (720h ago, onward)      None              Virtual Visit Note: The provider triage portion of this emergency department evaluation and documentation was performed via [employer], a HIPAA-compliant telemedicine application, in concert with a tele-presenter in the room. A face to face patient evaluation with one of my colleagues will occur once the patient is placed in an emergency department room.      DISCLAIMER: This note was prepared with M*Modal voice recognition transcription software. Garbled syntax, mangled pronouns, and other bizarre constructions may be attributed to that software system.    "

## 2023-07-21 NOTE — ED PROVIDER NOTES
Encounter Date: 7/21/2023       History     Chief Complaint   Patient presents with    Hematuria     C/o hematuria that began this am. Urostomy bag noted to RLQ with red urine. Extensive PMH. VSS     63-year-old man with a profoundly complicated past medical history including bladder cancer post resection with the urostomy, recurrent kidney stones in the past, hypertension as well as known peripheral vascular disease with an aneurysm presenting for hematuria through his urostomy in intense pain along both sides more prominent along the left which is similar in character to previous kidney stones he was suffered in the past.  He denies any fevers or chills, notes intense nausea however associated with the pain.  Notes that this is likely a kidney stone.    Review of patient's allergies indicates:  No Known Allergies  Past Medical History:   Diagnosis Date    Anemia of chronic disease     Aneurysm of right common iliac artery     Arthritis     Blood transfusion     CAD (coronary artery disease)     Chest pain of uncertain etiology     CKD (chronic kidney disease), stage IV     COPD (chronic obstructive pulmonary disease)     Coronary artery calcification 8/2/2021    DDD (degenerative disc disease), lumbar     Frequency of urination     General anesthetics causing adverse effect in therapeutic use     pt states he wakes up very violently from anesthesia    GERD (gastroesophageal reflux disease)     Gout     History of bladder cancer     History of urostomy     Hydronephrosis     Hyperparathyroidism     Hypertension     Hypertriglyceridemia     Hypothyroidism (acquired)     Insomnia     Kidney stone     Limited joint range of motion right hip and leg    Lumbar facet arthropathy     Lumbar spondylosis     Mixed anxiety and depressive disorder     Neuropathy     Orchalgia     SHARMIN (obstructive sleep apnea)     Personal history of bladder cancer     PVD (peripheral vascular disease)     Sacroiliitis     Stricture or kinking  of ureter     Ureteral stent displacement     Urinary retention     Vitamin D deficiency     Wears glasses      Past Surgical History:   Procedure Laterality Date    APPENDECTOMY  12/30/2015    Procedure: APPENDECTOMY;  Surgeon: CHAD Bo MD;  Location: [facility] OR;  Service: Urology;;    BALLOON DILATION OF URETER  01/22/2018    BOWEL RESECTION      COLONOSCOPY      CREATION OF URETEROILEAL CONDUIT Left 07/05/2013    CYSTOSCOPY      >1  episodes for bilat ureteral stent exchange    CYSTOSCOPY W/ URETERAL STENT PLACEMENT Right 01/22/2018    and 6/9/2015    CYSTOSCOPY W/ URETERAL STENT PLACEMENT Bilateral 10/07/2015    ESOPHAGOGASTRODUODENOSCOPY  04/12/2022    W/ BIOPSY    ESOPHAGOGASTRODUODENOSCOPY N/A 4/12/2022    Procedure: EGD (ESOPHAGOGASTRODUODENOSCOPY);  Surgeon: Tito Fan MD;  Location: [facility];  Service: Endoscopy;  Laterality: N/A;    EXPLORATORY LAPAROTOMY  07/05/2013    EXPLORATORY LAPAROTOMY W/ BOWEL RESECTION  12/30/2015    EXTRACORPOREAL SHOCK WAVE LITHOTRIPSY Right 06/07/2021    Procedure: LITHOTRIPSY, ESWL;  Surgeon: CHAD Bo MD;  Location: [facility] OR;  Service: Urology;  Laterality: Right;  RN PREOP 5/31/2021   VACCINATED    EXTRACORPOREAL SHOCK WAVE LITHOTRIPSY  06/07/2021    FRACTURE SURGERY      HAND SURGERY      HEMICOLECTOMY  12/30/2015    HIP SURGERY  2012    Rt hip septic    LAPAROSCOPIC CHOLECYSTECTOMY N/A 08/11/2021    Procedure: CHOLECYSTECTOMY, LAPAROSCOPIC;  Surgeon: Remy Xiong MD;  Location: [facility] OR;  Service: General;  Laterality: N/A;  gianni video confirmed 8/5/dme    LYMPH NODE DISSECTION  11/08/2012    pelvic    LYSIS OF ADHESIONS  12/30/2015    MOUTH SURGERY  2000    all teeth extracted    PERCUTANEOUS NEPHROLITHOTOMY Left 07/22/2020    Procedure: NEPHROLITHOTOMY, PERCUTANEOUS;  Surgeon: CHAD Bo MD;  Location: [facility] OR;  Service: Urology;  Laterality: Left;  OR 9 ONLY  RN PREOP 7/20/2020----COVID NEGATIVE ON 7/20    PERCUTANEOUS  NEPHROLITHOTOMY Bilateral 2022    Procedure: BILATERAL PERCUTANEOUS NEPHROSCOPY, BILATERAL ANTEGRADE PYELOGRAM, BILATERAL NEPHROSTOMY TUBE PLACEMENT;  Surgeon: CHAD Bo MD;  Location: [facility] OR;  Service: Urology;  Laterality: Bilateral;  room 9 only  RN PREOP 2022----T/S IN AM    PERCUTANEOUS NEPHROSTOMY Left 2018    PERCUTANEOUS REPLACEMENT OF NEPHROSTOMY TUBE Bilateral 5/10/2022    Procedure: REPLACEMENT, NEPHROSTOMY TUBE, PERCUTANEOUS;  Surgeon: Chino Vo MD;  Location: [facility] C[facility];  Service: Radiology;  Laterality: Bilateral;    RADICAL CYSTECTOMY  2012    RADIOFREQUENCY THERMOCOAGULATION  2014    median branch lumbar    RADIOFREQUENCY THERMOCOAGULATION  2014    median branch lumbar    REPAIR, HERNIA, PARASTOMAL, LAPAROSCOPIC  2017    ROBOTIC BRONCHOSCOPY N/A 2023    Procedure: ROBOTIC BRONCHOSCOPY;  Surgeon: Camila Rodriguez MD;  Location: [address];  Service: Anesthesiology;  Laterality: N/A;    SMALL INTESTINE SURGERY  2017    urosotomy bag      R abdomen     Family History   Adopted: Yes   Family history unknown: Yes     Social History     Tobacco Use    Smoking status: Former     Packs/day: 0.30     Years: 42.00     Pack years: 12.60     Types: Cigarettes     Quit date: 2017     Years since quittin.4    Smokeless tobacco: Never    Tobacco comments:     in cessation program   Substance Use Topics    Alcohol use: Yes     Alcohol/week: 14.0 - 21.0 standard drinks     Types: 14 - 21 Cans of beer per week     Comment: occassional    Drug use: Yes     Types: Marijuana     Comment: occass     Review of Systems  Constitutional-no fever  HEENT-no congestion  Eyes-no redness  Respiratory-no shortness of breath  Cardio-no chest pain  GI-no abdominal pain positive nausea  Endocrine-no cold intolerance  -positive hematuria  MSK-positive back pain  Skin-no rashes  Allergy-no environmental allergy  Neurologic-, no headache  Hematology-no  swollen nodes  Behavioral-no confusion  Physical Exam     Initial Vitals [07/21/23 1724]   BP Pulse Resp Temp SpO2   (!) 149/84 99 18 99.6 °F (37.6 °C) (!) 94 %      MAP       --         Physical Exam  Constitutional:  Chronically ill-appearing frail 63-year-old man in moderate distress  Eyes: Conjunctivae normal.  ENT       Head: Normocephalic, atraumatic.       Nose: Normal external appearance        Mouth/Throat: no strigulous respirations   Hematological/Lymphatic/Immunilogical: no visible lymphadenopathy   Cardiovascular: Normal rate,   Respiratory: Normal respiratory effort.   Gastrointestinal:  Urostomy bag in the right lower quadrant with overt bloody urine, red wine colored  Marked tenderness to percussion in the bilateral flanks  Musculoskeletal: Normal range of motion in all extremities. No obvious deformities or swelling.  Neurologic: Alert, oriented. Normal speech and language. No gross focal neurologic deficits are appreciated.  Skin: Skin is warm, dry. No rash noted.  Psychiatric: Mood and affect are normal.   ED Course   Procedures  Labs Reviewed   CBC W/ AUTO DIFFERENTIAL - Abnormal; Notable for the following components:       Result Value    WBC 3.85 (*)     Lymph # 0.8 (*)     All other components within normal limits   COMPREHENSIVE METABOLIC PANEL - Abnormal; Notable for the following components:    CO2 22 (*)     BUN 39 (*)     Creatinine 2.7 (*)     Alkaline Phosphatase 48 (*)     AST 47 (*)     eGFR 25 (*)     All other components within normal limits   URINALYSIS, REFLEX TO URINE CULTURE - Abnormal; Notable for the following components:    Color, UA Red (*)     Appearance, UA Cloudy (*)     Protein, UA 3+ (*)     Occult Blood UA 3+ (*)     Leukocytes, UA 2+ (*)     All other components within normal limits    Narrative:     Specimen Source->Urine   URINALYSIS MICROSCOPIC - Abnormal; Notable for the following components:    RBC, UA >100 (*)     All other components within normal limits     Narrative:     Specimen Source->Urine   PROTIME-INR   APTT          Imaging Results              CT Renal Stone Study ABD Pelvis WO (Final result)  Result time 07/21/23 20:13:29      Final result by Mirlande Padilla MD (07/21/23 20:13:29)                   Impression:      Interval development of moderate left hydroureteronephrosis.  Bilateral nephrolithiasis.  Right lower quadrant ileal conduit with remote cystectomy.  Unchanged punctate density or calculus near the tapered ileum.    Aneurysmal dilatation of the common iliac arteries.  Advanced atherosclerotic disease.    Colonic diverticulosis.      Electronically signed by: Miralnde Padilla  Date:    07/21/2023  Time:    20:13               Narrative:    EXAMINATION:  CT RENAL STONE STUDY ABDOMEN PELVIS WITHOUT    CLINICAL HISTORY:  Hematuria that began this a.m.  Urostomy bag noted to the right lower quadrant with reduced in.    TECHNIQUE:  5 mm unenhanced axial images from the lung bases through the greater trochanters were performed.  Coronal and sagittal reformatted images were provided.    COMPARISON:  04/21/2023    FINDINGS:  Within the limits of a noncontrast examination, the liver, spleen, pancreas, and adrenal glands are unremarkable.  The gallbladder is surgically absent.  Calcified splenic granuloma present.    There has been interval development of moderate left hydroureteronephrosis.  Moderate bilateral renal cortical scarring is present.  There is bilateral nephrolithiasis.  There is a punctate nonobstructing hyperdensity along the conduit distally, which is un changed (axial 115 scratch coronal image 62 and is compared to coronal image 64 on examination from 04/21/2023).    There is no gross abdominal adenopathy or ascites.    There is advanced peripheral vascular disease.  There is aneurysmal dilatation of the common iliac arteries.  The right common iliac arterial aneurysm measures 3.5 cm.  The left common iliac arterial aneurysm measures 1.7  cm.    There is ventral abdominopelvic mesh.    There is colonic diverticulosis.  There are no pelvic masses or adenopathy.  There is no free pelvic fluid.  The appendix is surgically absent.    At the lung bases, there is mild bibasilar atelectatic change.                                       Medications   senna-docusate 8.6-50 mg per tablet 1 tablet (has no administration in time range)   acetaminophen tablet 650 mg (has no administration in time range)   naloxone 0.4 mg/mL injection 0.02 mg (has no administration in time range)   diphenhydrAMINE capsule 25 mg (25 mg Oral Not Given 7/22/23 0037)   tamsulosin 24 hr capsule 0.4 mg (0.4 mg Oral Not Given 7/22/23 0900)   allopurinoL tablet 100 mg (100 mg Oral Given 7/22/23 0922)   apixaban tablet 5 mg (5 mg Oral Given 7/22/23 0923)   aspirin EC tablet 81 mg (81 mg Oral Given 7/22/23 0922)   calcitRIOL capsule 0.25 mcg (0.25 mcg Oral Given 7/22/23 0922)   carvediloL tablet 6.25 mg (6.25 mg Oral Given 7/22/23 0922)   EScitalopram oxalate tablet 20 mg (20 mg Oral Given 7/22/23 0922)   gabapentin capsule 400 mg (400 mg Oral Given 7/22/23 0922)   levothyroxine tablet 88 mcg (88 mcg Oral Given 7/22/23 0536)   traZODone tablet 150 mg (150 mg Oral Given 7/22/23 0117)   HYDROmorphone injection 0.5 mg (0.5 mg Intravenous Given 7/22/23 1014)   sodium chloride 0.9% flush 10 mL (has no administration in time range)   oxyCODONE-acetaminophen 7.5-325 mg per tablet 1 tablet (1 tablet Oral Given 7/22/23 1227)   sodium bicarbonate tablet 325 mg (325 mg Oral Given 7/22/23 1013)   cefTRIAXone (ROCEPHIN) 1 g in dextrose 5 % in water (D5W) 5 % 100 mL IVPB (MB+) (0 g Intravenous Stopped 7/22/23 1509)   sodium chloride 0.9% bolus 500 mL 500 mL (0 mLs Intravenous Stopped 7/21/23 1915)   HYDROmorphone injection 1 mg (1 mg Intravenous Given 7/21/23 2134)   ondansetron injection 4 mg (4 mg Intravenous Given 7/21/23 1850)   ondansetron injection 4 mg (4 mg Intravenous Given 7/21/23 2113)    promethazine (PHENERGAN) 12.5 mg in dextrose 5 % (D5W) 50 mL IVPB (0 mg Intravenous Stopped 7/21/23 2200)     Medical Decision Making:   History:   Old Medical Records: I decided to obtain old medical records.  Old Records Summarized: records from clinic visits and records from previous admission(s).       <> Summary of Records: Hematuria in the past as well as kidney stones, chronic renal insufficiency  Differential Diagnosis:   Kidney stone, pyelonephritis, aortoenteric fistula,   Independently Interpreted Test(s):   I have ordered and independently interpreted X-rays - see summary below.       <> Summary of X-Ray Reading(s): Imaging consistent with kidney stone, hydronephrosis   Clinical Tests:   Lab Tests: Ordered and Reviewed  Radiological Study: Ordered and Reviewed  ED Management:  63-year-old man with chronic medical conditions, blood in urostomy tubing.  Labs are baseline, imaging is consistent with hydronephrosis resultant from a likely stone.    Low suspicion for aortic enteric fistula at this time, no obvious sign of sepsis.    Discussed with on-call Hospital Medicine physician with plan for admission, symptomatic management and care with monitoring and Urology consultation.  Discussed with patient the potential need for urostomy tube.  Other:   I discussed test(s) with the performing physician.       <> Summary of the Findings: Discussed with Dr. Bo on-call urologist, notes that if gentleman develops fever, worsening renal stone likely will require diverting nephrostomy tubes through Interventional Radiology                        Clinical Impression:   Final diagnoses:  [N20.0] Kidney stone  [R31.9] Hematuria, unspecified type (Primary)  [R10.9] Flank pain  [N13.30] Hydronephrosis, unspecified hydronephrosis type        ED Disposition Condition    Observation Stable                Remy Rivera MD  07/22/23 3417

## 2023-07-21 NOTE — ED TRIAGE NOTES
Pt arrived with complaint of hematuria since last night.  Pt reports blood clots in urine.  Pt denies any fever.  Pt reports bilateral flank pain.  Pt states he take Eliquis.  Pt has extensive PMH.  Pt answering questions appropriately, speaking in complete sentences, respirations even and unlabored.  Aao x 4.

## 2023-07-21 NOTE — Clinical Note
Diagnosis: Kidney stone [[id number]]   Future Attending Provider: SUZY SANTA [[id number]]   Admitting Provider:: SUZY SANTA [[id number]]

## 2023-07-22 PROBLEM — R31.0 GROSS HEMATURIA: Status: ACTIVE | Noted: 2023-07-22

## 2023-07-22 LAB
ANION GAP SERPL CALC-SCNC: 10 MMOL/L (ref 8–16)
BASOPHILS # BLD AUTO: 0.01 K/UL (ref 0–0.2)
BASOPHILS NFR BLD: 0.3 % (ref 0–1.9)
BUN SERPL-MCNC: 37 MG/DL (ref 8–23)
CALCIUM SERPL-MCNC: 9.5 MG/DL (ref 8.7–10.5)
CHLORIDE SERPL-SCNC: 106 MMOL/L (ref 95–110)
CO2 SERPL-SCNC: 24 MMOL/L (ref 23–29)
CREAT SERPL-MCNC: 2.4 MG/DL (ref 0.5–1.4)
DIFFERENTIAL METHOD: ABNORMAL
EOSINOPHIL # BLD AUTO: 0 K/UL (ref 0–0.5)
EOSINOPHIL NFR BLD: 0.5 % (ref 0–8)
ERYTHROCYTE [DISTWIDTH] IN BLOOD BY AUTOMATED COUNT: 14.2 % (ref 11.5–14.5)
EST. GFR  (NO RACE VARIABLE): 29 ML/MIN/1.73 M^2
GLUCOSE SERPL-MCNC: 84 MG/DL (ref 70–110)
HCT VFR BLD AUTO: 44.9 % (ref 40–54)
HGB BLD-MCNC: 14.1 G/DL (ref 14–18)
IMM GRANULOCYTES # BLD AUTO: 0.01 K/UL (ref 0–0.04)
IMM GRANULOCYTES NFR BLD AUTO: 0.3 % (ref 0–0.5)
LYMPHOCYTES # BLD AUTO: 0.5 K/UL (ref 1–4.8)
LYMPHOCYTES NFR BLD: 13.6 % (ref 18–48)
MAGNESIUM SERPL-MCNC: 1.8 MG/DL (ref 1.6–2.6)
MCH RBC QN AUTO: 30.4 PG (ref 27–31)
MCHC RBC AUTO-ENTMCNC: 31.4 G/DL (ref 32–36)
MCV RBC AUTO: 97 FL (ref 82–98)
MONOCYTES # BLD AUTO: 0.3 K/UL (ref 0.3–1)
MONOCYTES NFR BLD: 8.1 % (ref 4–15)
NEUTROPHILS # BLD AUTO: 3.1 K/UL (ref 1.8–7.7)
NEUTROPHILS NFR BLD: 77.2 % (ref 38–73)
NRBC BLD-RTO: 0 /100 WBC
PLATELET # BLD AUTO: 171 K/UL (ref 150–450)
PMV BLD AUTO: 10.9 FL (ref 9.2–12.9)
POTASSIUM SERPL-SCNC: 4.8 MMOL/L (ref 3.5–5.1)
RBC # BLD AUTO: 4.64 M/UL (ref 4.6–6.2)
SODIUM SERPL-SCNC: 140 MMOL/L (ref 136–145)
WBC # BLD AUTO: 3.96 K/UL (ref 3.9–12.7)

## 2023-07-22 PROCEDURE — A4216 STERILE WATER/SALINE, 10 ML: HCPCS | Performed by: PHYSICIAN ASSISTANT

## 2023-07-22 PROCEDURE — 99499 NO LOS: ICD-10-PCS | Mod: ,,, | Performed by: INTERNAL MEDICINE

## 2023-07-22 PROCEDURE — 99214 PR OFFICE/OUTPT VISIT, EST, LEVL IV, 30-39 MIN: ICD-10-PCS | Mod: ,,, | Performed by: UROLOGY

## 2023-07-22 PROCEDURE — 99214 OFFICE O/P EST MOD 30 MIN: CPT | Mod: ,,, | Performed by: UROLOGY

## 2023-07-22 PROCEDURE — 96361 HYDRATE IV INFUSION ADD-ON: CPT

## 2023-07-22 PROCEDURE — 25000003 PHARM REV CODE 250: Performed by: PHYSICIAN ASSISTANT

## 2023-07-22 PROCEDURE — 99223 PR INITIAL HOSPITAL CARE,LEVL III: ICD-10-PCS | Mod: ,,, | Performed by: PHYSICIAN ASSISTANT

## 2023-07-22 PROCEDURE — 96376 TX/PRO/DX INJ SAME DRUG ADON: CPT

## 2023-07-22 PROCEDURE — 96367 TX/PROPH/DG ADDL SEQ IV INF: CPT

## 2023-07-22 PROCEDURE — 94761 N-INVAS EAR/PLS OXIMETRY MLT: CPT

## 2023-07-22 PROCEDURE — 36415 COLL VENOUS BLD VENIPUNCTURE: CPT | Performed by: PHYSICIAN ASSISTANT

## 2023-07-22 PROCEDURE — 85025 COMPLETE CBC W/AUTO DIFF WBC: CPT | Performed by: PHYSICIAN ASSISTANT

## 2023-07-22 PROCEDURE — 25000003 PHARM REV CODE 250: Performed by: INTERNAL MEDICINE

## 2023-07-22 PROCEDURE — G0378 HOSPITAL OBSERVATION PER HR: HCPCS

## 2023-07-22 PROCEDURE — 80048 BASIC METABOLIC PNL TOTAL CA: CPT | Performed by: PHYSICIAN ASSISTANT

## 2023-07-22 PROCEDURE — 63600175 PHARM REV CODE 636 W HCPCS: Performed by: UROLOGY

## 2023-07-22 PROCEDURE — 83735 ASSAY OF MAGNESIUM: CPT | Performed by: PHYSICIAN ASSISTANT

## 2023-07-22 PROCEDURE — 25000003 PHARM REV CODE 250: Performed by: UROLOGY

## 2023-07-22 PROCEDURE — 63600175 PHARM REV CODE 636 W HCPCS: Performed by: PHYSICIAN ASSISTANT

## 2023-07-22 PROCEDURE — 99223 1ST HOSP IP/OBS HIGH 75: CPT | Mod: ,,, | Performed by: PHYSICIAN ASSISTANT

## 2023-07-22 PROCEDURE — 99499 UNLISTED E&M SERVICE: CPT | Mod: ,,, | Performed by: INTERNAL MEDICINE

## 2023-07-22 RX ORDER — OXYCODONE HYDROCHLORIDE 5 MG/1
5 TABLET ORAL EVERY 6 HOURS PRN
Status: DISCONTINUED | OUTPATIENT
Start: 2023-07-22 | End: 2023-07-22

## 2023-07-22 RX ORDER — SODIUM CHLORIDE 0.9 % (FLUSH) 0.9 %
10 SYRINGE (ML) INJECTION
Status: DISCONTINUED | OUTPATIENT
Start: 2023-07-22 | End: 2023-07-23 | Stop reason: HOSPADM

## 2023-07-22 RX ORDER — CALCITRIOL 0.25 UG/1
0.25 CAPSULE ORAL DAILY
Status: DISCONTINUED | OUTPATIENT
Start: 2023-07-22 | End: 2023-07-23 | Stop reason: HOSPADM

## 2023-07-22 RX ORDER — CARVEDILOL 6.25 MG/1
6.25 TABLET ORAL 2 TIMES DAILY WITH MEALS
Status: DISCONTINUED | OUTPATIENT
Start: 2023-07-22 | End: 2023-07-23 | Stop reason: HOSPADM

## 2023-07-22 RX ORDER — OXYCODONE AND ACETAMINOPHEN 7.5; 325 MG/1; MG/1
1 TABLET ORAL EVERY 6 HOURS PRN
Status: DISCONTINUED | OUTPATIENT
Start: 2023-07-22 | End: 2023-07-23 | Stop reason: HOSPADM

## 2023-07-22 RX ORDER — ALLOPURINOL 100 MG/1
100 TABLET ORAL DAILY
Status: DISCONTINUED | OUTPATIENT
Start: 2023-07-22 | End: 2023-07-23 | Stop reason: HOSPADM

## 2023-07-22 RX ORDER — ASPIRIN 81 MG/1
81 TABLET ORAL DAILY
Status: DISCONTINUED | OUTPATIENT
Start: 2023-07-22 | End: 2023-07-23 | Stop reason: HOSPADM

## 2023-07-22 RX ORDER — ESCITALOPRAM OXALATE 10 MG/1
20 TABLET ORAL DAILY
Status: DISCONTINUED | OUTPATIENT
Start: 2023-07-22 | End: 2023-07-23 | Stop reason: HOSPADM

## 2023-07-22 RX ORDER — SODIUM BICARBONATE 325 MG/1
325 TABLET ORAL 2 TIMES DAILY
Status: DISCONTINUED | OUTPATIENT
Start: 2023-07-22 | End: 2023-07-23 | Stop reason: HOSPADM

## 2023-07-22 RX ORDER — GABAPENTIN 400 MG/1
400 CAPSULE ORAL 2 TIMES DAILY
Status: DISCONTINUED | OUTPATIENT
Start: 2023-07-22 | End: 2023-07-23 | Stop reason: HOSPADM

## 2023-07-22 RX ORDER — HYDROMORPHONE HYDROCHLORIDE 1 MG/ML
0.5 INJECTION, SOLUTION INTRAMUSCULAR; INTRAVENOUS; SUBCUTANEOUS EVERY 6 HOURS PRN
Status: DISCONTINUED | OUTPATIENT
Start: 2023-07-22 | End: 2023-07-23 | Stop reason: HOSPADM

## 2023-07-22 RX ORDER — LEVOTHYROXINE SODIUM 88 UG/1
88 TABLET ORAL
Status: DISCONTINUED | OUTPATIENT
Start: 2023-07-22 | End: 2023-07-23 | Stop reason: HOSPADM

## 2023-07-22 RX ADMIN — CEFTRIAXONE SODIUM 1 G: 1 INJECTION, POWDER, FOR SOLUTION INTRAMUSCULAR; INTRAVENOUS at 02:07

## 2023-07-22 RX ADMIN — GABAPENTIN 400 MG: 400 CAPSULE ORAL at 09:07

## 2023-07-22 RX ADMIN — SODIUM BICARBONATE 325 MG: 325 TABLET ORAL at 09:07

## 2023-07-22 RX ADMIN — ASPIRIN 81 MG: 81 TABLET, COATED ORAL at 09:07

## 2023-07-22 RX ADMIN — OXYCODONE HYDROCHLORIDE 5 MG: 5 TABLET ORAL at 04:07

## 2023-07-22 RX ADMIN — CARVEDILOL 6.25 MG: 6.25 TABLET, FILM COATED ORAL at 09:07

## 2023-07-22 RX ADMIN — SODIUM CHLORIDE 10 ML: 9 INJECTION, SOLUTION INTRAMUSCULAR; INTRAVENOUS; SUBCUTANEOUS at 06:07

## 2023-07-22 RX ADMIN — ESCITALOPRAM OXALATE 20 MG: 10 TABLET ORAL at 09:07

## 2023-07-22 RX ADMIN — HYDROMORPHONE HYDROCHLORIDE 0.5 MG: 0.5 INJECTION, SOLUTION INTRAMUSCULAR; INTRAVENOUS; SUBCUTANEOUS at 03:07

## 2023-07-22 RX ADMIN — HYDROMORPHONE HYDROCHLORIDE 0.5 MG: 0.5 INJECTION, SOLUTION INTRAMUSCULAR; INTRAVENOUS; SUBCUTANEOUS at 04:07

## 2023-07-22 RX ADMIN — TRAZODONE HYDROCHLORIDE 150 MG: 50 TABLET ORAL at 09:07

## 2023-07-22 RX ADMIN — CARVEDILOL 6.25 MG: 6.25 TABLET, FILM COATED ORAL at 04:07

## 2023-07-22 RX ADMIN — LEVOTHYROXINE SODIUM 88 MCG: 88 TABLET ORAL at 05:07

## 2023-07-22 RX ADMIN — APIXABAN 5 MG: 2.5 TABLET, FILM COATED ORAL at 09:07

## 2023-07-22 RX ADMIN — OXYCODONE AND ACETAMINOPHEN 1 TABLET: 7.5; 325 TABLET ORAL at 06:07

## 2023-07-22 RX ADMIN — SODIUM CHLORIDE: 9 INJECTION, SOLUTION INTRAVENOUS at 12:07

## 2023-07-22 RX ADMIN — SODIUM BICARBONATE 325 MG: 325 TABLET ORAL at 10:07

## 2023-07-22 RX ADMIN — HYDROMORPHONE HYDROCHLORIDE 0.5 MG: 0.5 INJECTION, SOLUTION INTRAMUSCULAR; INTRAVENOUS; SUBCUTANEOUS at 10:07

## 2023-07-22 RX ADMIN — TRAZODONE HYDROCHLORIDE 150 MG: 50 TABLET ORAL at 01:07

## 2023-07-22 RX ADMIN — OXYCODONE AND ACETAMINOPHEN 1 TABLET: 7.5; 325 TABLET ORAL at 12:07

## 2023-07-22 RX ADMIN — CALCITRIOL CAPSULES 0.25 MCG 0.25 MCG: 0.25 CAPSULE ORAL at 09:07

## 2023-07-22 RX ADMIN — ALLOPURINOL 100 MG: 100 TABLET ORAL at 09:07

## 2023-07-22 RX ADMIN — HYDROMORPHONE HYDROCHLORIDE 0.5 MG: 0.5 INJECTION, SOLUTION INTRAMUSCULAR; INTRAVENOUS; SUBCUTANEOUS at 09:07

## 2023-07-22 NOTE — CONSULTS
[facility] ([address])  Urology  Consult Note    Patient Name: Blake Guillory  MRN: [id number]  Admission Date: 7/21/2023  Hospital Length of Stay: 0   Code Status: Full Code   Attending Provider: SUZY Downing MD   Consulting Provider: Joo Bo MD  Primary Care Physician: Varun Zeng MD  Principal Problem:Hydronephrosis    Consults    Subjective:     HPI:  History of Present Illness  Kidney Stones  He presented to the hospital with abdominal and flank pain for the past week.  He noted brown colored urine a few days ago which turned yellow but last night turned a cranberry color.  He denies any fever.  His pain currently is slightly higher than baseline.  He has pain on both sides left more so than right currently.  He denies vomiting.           History of Bladder Cancer  He has a history of bladder cancer. He is s/p cystectomy with ileal conduit in November 2012. From an oncologic standpoint, he is doing well. He has had issues with bilateral ureteral stricture. He underwent a bilateral ureteral reimplantation into his conduit several months later. His strictures recurred shortly afterwards.  He was managed with ureteral stent changes until one of the stent eroded through his conduit and into his bowels.    He had an Exploratory Laparotomy with replacement of ileal conduit and small bowel anastomosis on 12/30/2015.     He had a parastomal hernia repair in April 2017 by Dr. Mack.  He is healing nicely from that and has been discharged from his office.  He had a small bowel obstruction. that resolved.        Past Medical History:   Diagnosis Date    Anemia of chronic disease     Aneurysm of right common iliac artery     Arthritis     Blood transfusion     CAD (coronary artery disease)     Chest pain of uncertain etiology     CKD (chronic kidney disease), stage IV     COPD (chronic obstructive pulmonary disease)     Coronary artery calcification 8/2/2021    DDD  (degenerative disc disease), lumbar     Frequency of urination     General anesthetics causing adverse effect in therapeutic use     pt states he wakes up very violently from anesthesia    GERD (gastroesophageal reflux disease)     Gout     History of bladder cancer     History of urostomy     Hydronephrosis     Hyperparathyroidism     Hypertension     Hypertriglyceridemia     Hypothyroidism (acquired)     Insomnia     Kidney stone     Limited joint range of motion right hip and leg    Lumbar facet arthropathy     Lumbar spondylosis     Mixed anxiety and depressive disorder     Neuropathy     Orchalgia     SHARMIN (obstructive sleep apnea)     Personal history of bladder cancer     PVD (peripheral vascular disease)     Sacroiliitis     Stricture or kinking of ureter     Ureteral stent displacement     Urinary retention     Vitamin D deficiency     Wears glasses        Past Surgical History:   Procedure Laterality Date    APPENDECTOMY  12/30/2015    Procedure: APPENDECTOMY;  Surgeon: CHAD Bo MD;  Location: [facility] OR;  Service: Urology;;    BALLOON DILATION OF URETER  01/22/2018    BOWEL RESECTION      COLONOSCOPY      CREATION OF URETEROILEAL CONDUIT Left 07/05/2013    CYSTOSCOPY      >1  episodes for bilat ureteral stent exchange    CYSTOSCOPY W/ URETERAL STENT PLACEMENT Right 01/22/2018    and 6/9/2015    CYSTOSCOPY W/ URETERAL STENT PLACEMENT Bilateral 10/07/2015    ESOPHAGOGASTRODUODENOSCOPY  04/12/2022    W/ BIOPSY    ESOPHAGOGASTRODUODENOSCOPY N/A 4/12/2022    Procedure: EGD (ESOPHAGOGASTRODUODENOSCOPY);  Surgeon: Tito Fan MD;  Location: [facility];  Service: Endoscopy;  Laterality: N/A;    EXPLORATORY LAPAROTOMY  07/05/2013    EXPLORATORY LAPAROTOMY W/ BOWEL RESECTION  12/30/2015    EXTRACORPOREAL SHOCK WAVE LITHOTRIPSY Right 06/07/2021    Procedure: LITHOTRIPSY, ESWL;  Surgeon: CHAD Bo MD;  Location: [facility] OR;  Service: Urology;  Laterality: Right;   RN PREOP 5/31/2021   VACCINATED    EXTRACORPOREAL SHOCK WAVE LITHOTRIPSY  06/07/2021    FRACTURE SURGERY      HAND SURGERY      HEMICOLECTOMY  12/30/2015    HIP SURGERY  2012    Rt hip septic    LAPAROSCOPIC CHOLECYSTECTOMY N/A 08/11/2021    Procedure: CHOLECYSTECTOMY, LAPAROSCOPIC;  Surgeon: Remy Xiong MD;  Location: [facility] OR;  Service: General;  Laterality: N/A;  gianni video confirmed 8/5/dme    LYMPH NODE DISSECTION  11/08/2012    pelvic    LYSIS OF ADHESIONS  12/30/2015    MOUTH SURGERY  2000    all teeth extracted    PERCUTANEOUS NEPHROLITHOTOMY Left 07/22/2020    Procedure: NEPHROLITHOTOMY, PERCUTANEOUS;  Surgeon: CHAD Bo MD;  Location: [facility] OR;  Service: Urology;  Laterality: Left;  OR 9 ONLY  RN PREOP 7/20/2020----COVID NEGATIVE ON 7/20    PERCUTANEOUS NEPHROLITHOTOMY Bilateral 6/8/2022    Procedure: BILATERAL PERCUTANEOUS NEPHROSCOPY, BILATERAL ANTEGRADE PYELOGRAM, BILATERAL NEPHROSTOMY TUBE PLACEMENT;  Surgeon: CHAD Bo MD;  Location: [facility] OR;  Service: Urology;  Laterality: Bilateral;  room 9 only  RN PREOP 6/1/2022----T/S IN AM    PERCUTANEOUS NEPHROSTOMY Left 01/02/2018    PERCUTANEOUS REPLACEMENT OF NEPHROSTOMY TUBE Bilateral 5/10/2022    Procedure: REPLACEMENT, NEPHROSTOMY TUBE, PERCUTANEOUS;  Surgeon: Chino Vo MD;  Location: [facility] CATH LAB;  Service: Radiology;  Laterality: Bilateral;    RADICAL CYSTECTOMY  11/08/2012    RADIOFREQUENCY THERMOCOAGULATION  12/12/2014    median branch lumbar    RADIOFREQUENCY THERMOCOAGULATION  12/01/2014    median branch lumbar    REPAIR, HERNIA, PARASTOMAL, LAPAROSCOPIC  04/12/2017    ROBOTIC BRONCHOSCOPY N/A 1/13/2023    Procedure: ROBOTIC BRONCHOSCOPY;  Surgeon: Camila Rodriguez MD;  Location: [facility] OR [facility];  Service: Anesthesiology;  Laterality: N/A;    SMALL INTESTINE SURGERY  04/17/2017    urosotomy bag  2011    R abdomen       Review of patient's allergies indicates:  No Known Allergies    Family  History       Family history is unknown by patient.            Tobacco Use    Smoking status: Former     Packs/day: 0.30     Years: 42.00     Pack years: 12.60     Types: Cigarettes     Quit date: 2017     Years since quittin.4    Smokeless tobacco: Never    Tobacco comments:     in cessation program   Substance and Sexual Activity    Alcohol use: Yes     Alcohol/week: 14.0 - 21.0 standard drinks     Types: 14 - 21 Cans of beer per week     Comment: occassional    Drug use: Yes     Types: Marijuana     Comment: occass    Sexual activity: Yes     Partners: Female     Birth control/protection: None       Review of Systems   Constitutional:  Negative for fever.   Respiratory:  Negative for chest tightness and wheezing.    Cardiovascular:  Negative for chest pain and palpitations.   Gastrointestinal:  Negative for abdominal pain, diarrhea, nausea and vomiting.   Genitourinary:  Positive for flank pain and hematuria. Negative for difficulty urinating and dysuria.   Musculoskeletal:  Negative for arthralgias.   Neurological:  Negative for dizziness.   Psychiatric/Behavioral:  Negative for confusion.      Objective:     Temp:  [97.7 °F (36.5 °C)-99.6 °F (37.6 °C)] 98 °F (36.7 °C)  Pulse:  [69-99] 94  Resp:  [17-20] 18  SpO2:  [92 %-96 %] 93 %  BP: (118-156)/() 118/67  Weight: 68 kg (150 lb)  Body mass index is 20.34 kg/m².           Drains       Drain  Duration                  Urostomy RLQ -- days         Urostomy 20 1055 1130 days         Nephrostomy 22 0846 Right 12 Fr. 409 days         Nephrostomy 22 0910 Left 12 Fr. 409 days                     Physical Exam  Vitals and nursing note reviewed.   Constitutional:       Appearance: He is well-developed.   HENT:      Head: Normocephalic.   Eyes:      Conjunctiva/sclera: Conjunctivae normal.   Neck:      Thyroid: No thyromegaly.      Trachea: No tracheal deviation.   Cardiovascular:      Rate and Rhythm: Normal rate.      Heart sounds:  Normal heart sounds.   Pulmonary:      Effort: Pulmonary effort is normal. No respiratory distress.      Breath sounds: Normal breath sounds. No wheezing.   Abdominal:      General: Bowel sounds are normal.      Palpations: Abdomen is soft.      Tenderness: There is no abdominal tenderness. There is no rebound.      Hernia: No hernia is present.   Genitourinary:     Comments: Urostomy in place, stoma pink  Urine cranberry colored, thin  Musculoskeletal:         General: No tenderness. Normal range of motion.      Cervical back: Normal range of motion and neck supple.   Lymphadenopathy:      Cervical: No cervical adenopathy.   Skin:     General: Skin is warm and dry.      Findings: No erythema or rash.   Neurological:      Mental Status: He is alert and oriented to person, place, and time.   Psychiatric:         Behavior: Behavior normal.         Thought Content: Thought content normal.         Judgment: Judgment normal.        Significant Labs:    BMP:  Recent Labs   Lab 07/21/23  1806 07/22/23  0423    140   K 5.0 4.8    106   CO2 22* 24   BUN 39* 37*   CREATININE 2.7* 2.4*   CALCIUM 9.9 9.5       CBC:  Recent Labs   Lab 07/21/23  1806 07/22/23  0423   WBC 3.85* 3.96   HGB 14.4 14.1   HCT 45.0 44.9    171       Blood Culture: No results for input(s): LABBLOO in the last 168 hours.  Urine Culture: No results for input(s): LABURIN in the last 168 hours.    Significant Imaging:  CT: I have reviewed all results within the past 24 hours and my personal findings are:  Bilateral nonobstructing kidney stones left hydronephrosis secondary to a small stone in the distal ureter just proximal to the conduit.                      Assessment and Plan:     * Hydronephrosis  Chronic however increased hydronephrosis secondary to a left ureteral stone.  Discussed nephrostomy tube he wants to avoid this at the present time.      He is afebrile pain is under control renal function baseline.  Okay to avoid  nephrostomy tube at present time he may require a nephrostomy tube in the near future.    Keep pain regimen as currently ordered    Gross hematuria  Urine culture   Will start ceftriaxone          VTE Risk Mitigation (From admission, onward)         Ordered     apixaban tablet 5 mg  2 times daily         07/22/23 0035     IP VTE HIGH RISK PATIENT  Once         07/21/23 2348     Place sequential compression device  Until discontinued         07/21/23 2348                Thank you for your consult. I will follow-up with patient. Please contact us if you have any additional questions.    Joo Bo MD  Urology  [facility] ([address])

## 2023-07-22 NOTE — HPI
History of Present Illness  Kidney Stones  He presented to the hospital with abdominal and flank pain for the past week.  He noted brown colored urine a few days ago which turned yellow but last night turned a cranberry color.  He denies any fever.  His pain currently is slightly higher than baseline.  He has pain on both sides left more so than right currently.  He denies vomiting.           History of Bladder Cancer  He has a history of bladder cancer. He is s/p cystectomy with ileal conduit in November 2012. From an oncologic standpoint, he is doing well. He has had issues with bilateral ureteral stricture. He underwent a bilateral ureteral reimplantation into his conduit several months later. His strictures recurred shortly afterwards.  He was managed with ureteral stent changes until one of the stent eroded through his conduit and into his bowels.    He had an Exploratory Laparotomy with replacement of ileal conduit and small bowel anastomosis on 12/30/2015.     He had a parastomal hernia repair in April 2017 by Dr. Mack.  He is healing nicely from that and has been discharged from his office.  He had a small bowel obstruction. that resolved.

## 2023-07-22 NOTE — SUBJECTIVE & OBJECTIVE
Past Medical History:   Diagnosis Date    Anemia of chronic disease     Aneurysm of right common iliac artery     Arthritis     Blood transfusion     CAD (coronary artery disease)     Chest pain of uncertain etiology     CKD (chronic kidney disease), stage IV     COPD (chronic obstructive pulmonary disease)     Coronary artery calcification 8/2/2021    DDD (degenerative disc disease), lumbar     Frequency of urination     General anesthetics causing adverse effect in therapeutic use     pt states he wakes up very violently from anesthesia    GERD (gastroesophageal reflux disease)     Gout     History of bladder cancer     History of urostomy     Hydronephrosis     Hyperparathyroidism     Hypertension     Hypertriglyceridemia     Hypothyroidism (acquired)     Insomnia     Kidney stone     Limited joint range of motion right hip and leg    Lumbar facet arthropathy     Lumbar spondylosis     Mixed anxiety and depressive disorder     Neuropathy     Orchalgia     SHARMIN (obstructive sleep apnea)     Personal history of bladder cancer     PVD (peripheral vascular disease)     Sacroiliitis     Stricture or kinking of ureter     Ureteral stent displacement     Urinary retention     Vitamin D deficiency     Wears glasses        Past Surgical History:   Procedure Laterality Date    APPENDECTOMY  12/30/2015    Procedure: APPENDECTOMY;  Surgeon: CHAD Bo MD;  Location: [facility];  Service: Urology;;    BALLOON DILATION OF URETER  01/22/2018    BOWEL RESECTION      COLONOSCOPY      CREATION OF URETEROILEAL CONDUIT Left 07/05/2013    CYSTOSCOPY      >1  episodes for bilat ureteral stent exchange    CYSTOSCOPY W/ URETERAL STENT PLACEMENT Right 01/22/2018    and 6/9/2015    CYSTOSCOPY W/ URETERAL STENT PLACEMENT Bilateral 10/07/2015    ESOPHAGOGASTRODUODENOSCOPY  04/12/2022    W/ BIOPSY    ESOPHAGOGASTRODUODENOSCOPY N/A 4/12/2022    Procedure: EGD (ESOPHAGOGASTRODUODENOSCOPY);  Surgeon: Tito Fan MD;  Location: [facility];   Service: Endoscopy;  Laterality: N/A;    EXPLORATORY LAPAROTOMY  07/05/2013    EXPLORATORY LAPAROTOMY W/ BOWEL RESECTION  12/30/2015    EXTRACORPOREAL SHOCK WAVE LITHOTRIPSY Right 06/07/2021    Procedure: LITHOTRIPSY, ESWL;  Surgeon: CHAD Bo MD;  Location: [facility] OR;  Service: Urology;  Laterality: Right;  RN PREOP 5/31/2021   VACCINATED    EXTRACORPOREAL SHOCK WAVE LITHOTRIPSY  06/07/2021    FRACTURE SURGERY      HAND SURGERY      HEMICOLECTOMY  12/30/2015    HIP SURGERY  2012    Rt hip septic    LAPAROSCOPIC CHOLECYSTECTOMY N/A 08/11/2021    Procedure: CHOLECYSTECTOMY, LAPAROSCOPIC;  Surgeon: Remy Xiong MD;  Location: [facility] OR;  Service: General;  Laterality: N/A;  gianni video confirmed 8/5/dme    LYMPH NODE DISSECTION  11/08/2012    pelvic    LYSIS OF ADHESIONS  12/30/2015    MOUTH SURGERY  2000    all teeth extracted    PERCUTANEOUS NEPHROLITHOTOMY Left 07/22/2020    Procedure: NEPHROLITHOTOMY, PERCUTANEOUS;  Surgeon: CHAD Bo MD;  Location: [facility] OR;  Service: Urology;  Laterality: Left;  OR 9 ONLY  RN PREOP 7/20/2020----COVID NEGATIVE ON 7/20    PERCUTANEOUS NEPHROLITHOTOMY Bilateral 6/8/2022    Procedure: BILATERAL PERCUTANEOUS NEPHROSCOPY, BILATERAL ANTEGRADE PYELOGRAM, BILATERAL NEPHROSTOMY TUBE PLACEMENT;  Surgeon: CHAD Bo MD;  Location: [facility] OR;  Service: Urology;  Laterality: Bilateral;  room 9 only  RN PREOP 6/1/2022----T/S IN AM    PERCUTANEOUS NEPHROSTOMY Left 01/02/2018    PERCUTANEOUS REPLACEMENT OF NEPHROSTOMY TUBE Bilateral 5/10/2022    Procedure: REPLACEMENT, NEPHROSTOMY TUBE, PERCUTANEOUS;  Surgeon: Chino Vo MD;  Location: [facility] CATH LAB;  Service: Radiology;  Laterality: Bilateral;    RADICAL CYSTECTOMY  11/08/2012    RADIOFREQUENCY THERMOCOAGULATION  12/12/2014    median branch lumbar    RADIOFREQUENCY THERMOCOAGULATION  12/01/2014    median branch lumbar    REPAIR, HERNIA, PARASTOMAL, LAPAROSCOPIC  04/12/2017    ROBOTIC BRONCHOSCOPY N/A  2023    Procedure: ROBOTIC BRONCHOSCOPY;  Surgeon: Camila Rodriguez MD;  Location: [facility] OR [address] [place] [address];  Service: Anesthesiology;  Laterality: N/A;    SMALL INTESTINE SURGERY  2017    urosotomy bag  2011    R abdomen       Review of patient's allergies indicates:  No Known Allergies    Family History       Family history is unknown by patient.            Tobacco Use    Smoking status: Former     Packs/day: 0.30     Years: 42.00     Pack years: 12.60     Types: Cigarettes     Quit date: 2017     Years since quittin.4    Smokeless tobacco: Never    Tobacco comments:     in cessation program   Substance and Sexual Activity    Alcohol use: Yes     Alcohol/week: 14.0 - 21.0 standard drinks     Types: 14 - 21 Cans of beer per week     Comment: occassional    Drug use: Yes     Types: Marijuana     Comment: occass    Sexual activity: Yes     Partners: Female     Birth control/protection: None       Review of Systems   Constitutional:  Negative for fever.   Respiratory:  Negative for chest tightness and wheezing.    Cardiovascular:  Negative for chest pain and palpitations.   Gastrointestinal:  Negative for abdominal pain, diarrhea, nausea and vomiting.   Genitourinary:  Positive for flank pain and hematuria. Negative for difficulty urinating and dysuria.   Musculoskeletal:  Negative for arthralgias.   Neurological:  Negative for dizziness.   Psychiatric/Behavioral:  Negative for confusion.      Objective:     Temp:  [97.7 °F (36.5 °C)-99.6 °F (37.6 °C)] 98 °F (36.7 °C)  Pulse:  [69-99] 94  Resp:  [17-20] 18  SpO2:  [92 %-96 %] 93 %  BP: (118-156)/() 118/67  Weight: 68 kg (150 lb)  Body mass index is 20.34 kg/m².           Drains       Drain  Duration                  Urostomy RLQ -- days         Urostomy 20 1055 1130 days         Nephrostomy 22 0846 Right 12 Fr. 409 days         Nephrostomy 22 0910 Left 12 Fr. 409 days                     Physical Exam  Vitals and nursing note reviewed.    Constitutional:       Appearance: He is well-developed.   HENT:      Head: Normocephalic.   Eyes:      Conjunctiva/sclera: Conjunctivae normal.   Neck:      Thyroid: No thyromegaly.      Trachea: No tracheal deviation.   Cardiovascular:      Rate and Rhythm: Normal rate.      Heart sounds: Normal heart sounds.   Pulmonary:      Effort: Pulmonary effort is normal. No respiratory distress.      Breath sounds: Normal breath sounds. No wheezing.   Abdominal:      General: Bowel sounds are normal.      Palpations: Abdomen is soft.      Tenderness: There is no abdominal tenderness. There is no rebound.      Hernia: No hernia is present.   Genitourinary:     Comments: Urostomy in place, stoma pink  Urine cranberry colored, thin  Musculoskeletal:         General: No tenderness. Normal range of motion.      Cervical back: Normal range of motion and neck supple.   Lymphadenopathy:      Cervical: No cervical adenopathy.   Skin:     General: Skin is warm and dry.      Findings: No erythema or rash.   Neurological:      Mental Status: He is alert and oriented to person, place, and time.   Psychiatric:         Behavior: Behavior normal.         Thought Content: Thought content normal.         Judgment: Judgment normal.        Significant Labs:    BMP:  Recent Labs   Lab 07/21/23  1806 07/22/23  0423    140   K 5.0 4.8    106   CO2 22* 24   BUN 39* 37*   CREATININE 2.7* 2.4*   CALCIUM 9.9 9.5       CBC:  Recent Labs   Lab 07/21/23  1806 07/22/23  0423   WBC 3.85* 3.96   HGB 14.4 14.1   HCT 45.0 44.9    171       Blood Culture: No results for input(s): LABBLOO in the last 168 hours.  Urine Culture: No results for input(s): LABURIN in the last 168 hours.    Significant Imaging:  CT: I have reviewed all results within the past 24 hours and my personal findings are:  Bilateral nonobstructing kidney stones left hydronephrosis secondary to a small stone in the distal ureter just proximal to the  conduit.

## 2023-07-22 NOTE — PLAN OF CARE
07/22/23 0834   BLAIR Message   Medicare Outpatient and Observation Notification regarding financial responsibility Given to patient/caregiver;Explained to patient/caregiver;Signed/date by patient/caregiver   Date BLAIR was signed 07/22/23   Time BLAIR was signed 0810

## 2023-07-22 NOTE — PLAN OF CARE
Problem: Adult Inpatient Plan of Care  Goal: Plan of Care Review  7/22/2023 1028 by Marifer Barros RN  Outcome: Ongoing, Progressing  7/22/2023 1027 by Marifer Barros RN  Outcome: Ongoing, Progressing  Goal: Optimal Comfort and Wellbeing  7/22/2023 1028 by Marifer Barros RN  Outcome: Ongoing, Progressing  7/22/2023 1027 by Marifer Barros RN  Outcome: Ongoing, Progressing     Problem: Fall Injury Risk  Goal: Absence of Fall and Fall-Related Injury  Outcome: Ongoing, Progressing     Problem: Infection  Goal: Absence of Infection Signs and Symptoms  Outcome: Ongoing, Progressing     Problem: Pain Acute  Goal: Acceptable Pain Control and Functional Ability  Outcome: Ongoing, Progressing

## 2023-07-22 NOTE — ASSESSMENT & PLAN NOTE
Chronic however increased hydronephrosis secondary to a left ureteral stone.  Discussed nephrostomy tube he wants to avoid this at the present time.      He is afebrile pain is under control renal function baseline.  Okay to avoid nephrostomy tube at present time he may require a nephrostomy tube in the near future.

## 2023-07-22 NOTE — SUBJECTIVE & OBJECTIVE
Past Medical History:   Diagnosis Date    Anemia of chronic disease     Aneurysm of right common iliac artery     Arthritis     Blood transfusion     CAD (coronary artery disease)     Chest pain of uncertain etiology     CKD (chronic kidney disease), stage IV     COPD (chronic obstructive pulmonary disease)     Coronary artery calcification 8/2/2021    DDD (degenerative disc disease), lumbar     Frequency of urination     General anesthetics causing adverse effect in therapeutic use     pt states he wakes up very violently from anesthesia    GERD (gastroesophageal reflux disease)     Gout     History of bladder cancer     History of urostomy     Hydronephrosis     Hyperparathyroidism     Hypertension     Hypertriglyceridemia     Hypothyroidism (acquired)     Insomnia     Kidney stone     Limited joint range of motion right hip and leg    Lumbar facet arthropathy     Lumbar spondylosis     Mixed anxiety and depressive disorder     Neuropathy     Orchalgia     SHARMIN (obstructive sleep apnea)     Personal history of bladder cancer     PVD (peripheral vascular disease)     Sacroiliitis     Stricture or kinking of ureter     Ureteral stent displacement     Urinary retention     Vitamin D deficiency     Wears glasses        Past Surgical History:   Procedure Laterality Date    APPENDECTOMY  12/30/2015    Procedure: APPENDECTOMY;  Surgeon: CHAD Bo MD;  Location: [facility];  Service: Urology;;    BALLOON DILATION OF URETER  01/22/2018    BOWEL RESECTION      COLONOSCOPY      CREATION OF URETEROILEAL CONDUIT Left 07/05/2013    CYSTOSCOPY      >1  episodes for bilat ureteral stent exchange    CYSTOSCOPY W/ URETERAL STENT PLACEMENT Right 01/22/2018    and 6/9/2015    CYSTOSCOPY W/ URETERAL STENT PLACEMENT Bilateral 10/07/2015    ESOPHAGOGASTRODUODENOSCOPY  04/12/2022    W/ BIOPSY    ESOPHAGOGASTRODUODENOSCOPY N/A 4/12/2022    Procedure: EGD (ESOPHAGOGASTRODUODENOSCOPY);  Surgeon: Tito Fan MD;  Location: [facility];   Service: Endoscopy;  Laterality: N/A;    EXPLORATORY LAPAROTOMY  07/05/2013    EXPLORATORY LAPAROTOMY W/ BOWEL RESECTION  12/30/2015    EXTRACORPOREAL SHOCK WAVE LITHOTRIPSY Right 06/07/2021    Procedure: LITHOTRIPSY, ESWL;  Surgeon: CHAD Bo MD;  Location: [facility] OR;  Service: Urology;  Laterality: Right;  RN PREOP 5/31/2021   VACCINATED    EXTRACORPOREAL SHOCK WAVE LITHOTRIPSY  06/07/2021    FRACTURE SURGERY      HAND SURGERY      HEMICOLECTOMY  12/30/2015    HIP SURGERY  2012    Rt hip septic    LAPAROSCOPIC CHOLECYSTECTOMY N/A 08/11/2021    Procedure: CHOLECYSTECTOMY, LAPAROSCOPIC;  Surgeon: Remy Xiong MD;  Location: [facility] OR;  Service: General;  Laterality: N/A;  gianni video confirmed 8/5/dme    LYMPH NODE DISSECTION  11/08/2012    pelvic    LYSIS OF ADHESIONS  12/30/2015    MOUTH SURGERY  2000    all teeth extracted    PERCUTANEOUS NEPHROLITHOTOMY Left 07/22/2020    Procedure: NEPHROLITHOTOMY, PERCUTANEOUS;  Surgeon: CHAD Bo MD;  Location: [facility] OR;  Service: Urology;  Laterality: Left;  OR 9 ONLY  RN PREOP 7/20/2020----COVID NEGATIVE ON 7/20    PERCUTANEOUS NEPHROLITHOTOMY Bilateral 6/8/2022    Procedure: BILATERAL PERCUTANEOUS NEPHROSCOPY, BILATERAL ANTEGRADE PYELOGRAM, BILATERAL NEPHROSTOMY TUBE PLACEMENT;  Surgeon: CHAD Bo MD;  Location: [facility] OR;  Service: Urology;  Laterality: Bilateral;  room 9 only  RN PREOP 6/1/2022----T/S IN AM    PERCUTANEOUS NEPHROSTOMY Left 01/02/2018    PERCUTANEOUS REPLACEMENT OF NEPHROSTOMY TUBE Bilateral 5/10/2022    Procedure: REPLACEMENT, NEPHROSTOMY TUBE, PERCUTANEOUS;  Surgeon: Chino Vo MD;  Location: [facility] CATH LAB;  Service: Radiology;  Laterality: Bilateral;    RADICAL CYSTECTOMY  11/08/2012    RADIOFREQUENCY THERMOCOAGULATION  12/12/2014    median branch lumbar    RADIOFREQUENCY THERMOCOAGULATION  12/01/2014    median branch lumbar    REPAIR, HERNIA, PARASTOMAL, LAPAROSCOPIC  04/12/2017    ROBOTIC BRONCHOSCOPY N/A  1/13/2023    Procedure: ROBOTIC BRONCHOSCOPY;  Surgeon: Camila Rodriguez MD;  Location: [facility] OR [address] [place] [address];  Service: Anesthesiology;  Laterality: N/A;    SMALL INTESTINE SURGERY  04/17/2017    urosotomy bag  2011    R abdomen       Review of patient's allergies indicates:  No Known Allergies    No current facility-administered medications on file prior to encounter.     Current Outpatient Medications on File Prior to Encounter   Medication Sig    albuterol (PROAIR HFA) 90 mcg/actuation inhaler Inhale 2 puffs into the lungs every 6 (six) hours as needed for Wheezing. Rescue    albuterol-ipratropium (DUO-NEB) 2.5 mg-0.5 mg/3 mL nebulizer solution Take 3 mLs by nebulization every 6 (six) hours as needed for Wheezing. Rescue    allopurinoL (ZYLOPRIM) 100 MG tablet Take 1 tablet (100 mg total) by mouth once daily.    apixaban (ELIQUIS) 5 mg Tab Take 1 tablet (5 mg total) by mouth 2 (two) times daily.    aspirin (ECOTRIN) 81 MG EC tablet Take 81 mg by mouth once daily.    calcitRIOL (ROCALTROL) 0.25 MCG Cap Take 1 capsule (0.25 mcg total) by mouth once daily.    carvediloL (COREG) 6.25 MG tablet Take 1 tablet (6.25 mg total) by mouth 2 (two) times daily with meals.    clobetasoL (TEMOVATE) 0.05 % cream Apply topically 2 (two) times daily.    EScitalopram oxalate (LEXAPRO) 20 MG tablet Take 1 tablet (20 mg total) by mouth once daily.    fenofibrate (TRICOR) 145 MG tablet Take 1 tablet (145 mg total) by mouth once daily.    fluticasone-umeclidin-vilanter (TRELEGY ELLIPTA) 100-62.5-25 mcg DsDv Inhale 1 puff into the lungs once daily. Rinse mouth after use.    gabapentin (NEURONTIN) 400 MG capsule Take 1 capsule (400 mg total) by mouth 2 (two) times daily.    HYDROcodone-acetaminophen (NORCO)  mg per tablet Take 1 tablet by mouth every 6 (six) hours as needed for Pain.    levothyroxine (SYNTHROID) 88 MCG tablet Take 1 tablet (88 mcg total) by mouth once daily.    LIDOcaine (LIDODERM) 5 % Place 1 patch onto the skin once  daily. Remove & Discard patch within 12 hours or as directed by MD    multivitamin (THERAGRAN) per tablet Take 1 tablet by mouth once daily.    pantoprazole (PROTONIX) 40 MG tablet Take 1 tablet (40 mg total) by mouth once daily.    sodium bicarbonate 325 MG tablet Take 325 mg by mouth 2 (two) times daily.    tiZANidine (ZANAFLEX) 4 MG tablet Take 1 tablet (4 mg total) by mouth once daily.    traZODone (DESYREL) 150 MG tablet Take 1 tablet (150 mg total) by mouth every evening.     Family History       Family history is unknown by patient.          Tobacco Use    Smoking status: Former     Packs/day: 0.30     Years: 42.00     Pack years: 12.60     Types: Cigarettes     Quit date: 2017     Years since quittin.4    Smokeless tobacco: Never    Tobacco comments:     in cessation program   Substance and Sexual Activity    Alcohol use: Yes     Alcohol/week: 14.0 - 21.0 standard drinks     Types: 14 - 21 Cans of beer per week     Comment: occassional    Drug use: Yes     Types: Marijuana     Comment: occass    Sexual activity: Yes     Partners: Female     Birth control/protection: None     Review of Systems   Constitutional:  Negative for activity change, appetite change, chills, diaphoresis and fever.   Respiratory:  Negative for cough, shortness of breath and wheezing.    Cardiovascular:  Negative for chest pain and palpitations.   Gastrointestinal:  Negative for abdominal pain, constipation, diarrhea, nausea and vomiting.   Genitourinary:  Positive for flank pain and hematuria. Negative for decreased urine volume, difficulty urinating, dysuria, frequency and urgency.   Musculoskeletal:  Negative for back pain, myalgias, neck pain and neck stiffness.   Skin:  Negative for color change and wound.   Neurological:  Negative for dizziness, syncope, weakness, numbness and headaches.   Hematological:  Does not bruise/bleed easily.   Psychiatric/Behavioral:  Negative for agitation and confusion.    Objective:     Vital  Signs (Most Recent):  Temp: 97.7 °F (36.5 °C) (07/21/23 2257)  Pulse: 83 (07/22/23 0200)  Resp: 18 (07/22/23 0323)  BP: (!) 144/68 (07/21/23 2257)  SpO2: (!) 94 % (07/21/23 2257) Vital Signs (24h Range):  Temp:  [97.7 °F (36.5 °C)-99.6 °F (37.6 °C)] 97.7 °F (36.5 °C)  Pulse:  [69-99] 83  Resp:  [17-20] 18  SpO2:  [92 %-96 %] 94 %  BP: (129-156)/() 144/68     Weight: 68 kg (150 lb)  Body mass index is 20.34 kg/m².     Physical Exam  Vitals and nursing note reviewed.   Constitutional:       General: He is not in acute distress.     Appearance: Normal appearance. He is well-developed and normal weight. He is not ill-appearing, toxic-appearing or diaphoretic.   HENT:      Head: Normocephalic and atraumatic.      Right Ear: External ear normal.      Left Ear: External ear normal.   Eyes:      General: No scleral icterus.        Right eye: No discharge.         Left eye: No discharge.      Conjunctiva/sclera: Conjunctivae normal.   Neck:      Vascular: No JVD.      Trachea: No tracheal deviation.   Cardiovascular:      Rate and Rhythm: Normal rate and regular rhythm.      Heart sounds: Normal heart sounds. No murmur heard.    No gallop.   Pulmonary:      Effort: Pulmonary effort is normal. No respiratory distress.      Breath sounds: Normal breath sounds. No stridor. No wheezing or rales.   Abdominal:      General: Bowel sounds are normal. There is no distension.      Palpations: Abdomen is soft. There is no mass.      Tenderness: There is no abdominal tenderness. There is no guarding.   Musculoskeletal:         General: No deformity. Normal range of motion.      Cervical back: Normal range of motion and neck supple.   Skin:     General: Skin is warm and dry.      Comments: Linear excoriations on right flank.    Neurological:      General: No focal deficit present.      Mental Status: He is alert and oriented to person, place, and time.      Cranial Nerves: No cranial nerve deficit.      Motor: No abnormal muscle  tone.      Coordination: Coordination normal.   Psychiatric:         Mood and Affect: Mood normal.         Behavior: Behavior normal.         Thought Content: Thought content normal.         Judgment: Judgment normal.              Significant Labs: All pertinent labs within the past 24 hours have been reviewed.  BMP:   Recent Labs   Lab 07/21/23  1806   GLU 93      K 5.0      CO2 22*   BUN 39*   CREATININE 2.7*   CALCIUM 9.9     CBC:   Recent Labs   Lab 07/21/23  1806   WBC 3.85*   HGB 14.4   HCT 45.0        CMP:   Recent Labs   Lab 07/21/23  1806      K 5.0      CO2 22*   GLU 93   BUN 39*   CREATININE 2.7*   CALCIUM 9.9   PROT 7.6   ALBUMIN 4.1   BILITOT 0.4   ALKPHOS 48*   AST 47*   ALT 39   ANIONGAP 14     Urine Culture: No results for input(s): LABURIN in the last 48 hours.  Urine Studies:   Recent Labs   Lab 07/21/23  1810   COLORU Red*   APPEARANCEUA Cloudy*   PHUR 7.0   SPECGRAV 1.020   PROTEINUA 3+*   GLUCUA Negative   KETONESU Negative   BILIRUBINUA Negative   OCCULTUA 3+*   NITRITE Negative   UROBILINOGEN Negative   LEUKOCYTESUR 2+*   RBCUA >100*   WBCUA 3   BACTERIA Rare   HYALINECASTS 0       Significant Imaging: I have reviewed all pertinent imaging results/findings within the past 24 hours.  Imaging Results              CT Renal Stone Study ABD Pelvis WO (Final result)  Result time 07/21/23 20:13:29      Final result by Mirlande Padilla MD (07/21/23 20:13:29)                   Impression:      Interval development of moderate left hydroureteronephrosis.  Bilateral nephrolithiasis.  Right lower quadrant ileal conduit with remote cystectomy.  Unchanged punctate density or calculus near the tapered ileum.    Aneurysmal dilatation of the common iliac arteries.  Advanced atherosclerotic disease.    Colonic diverticulosis.      Electronically signed by: Mirlande Padilla  Date:    07/21/2023  Time:    20:13               Narrative:    EXAMINATION:  CT RENAL STONE STUDY ABDOMEN  PELVIS WITHOUT    CLINICAL HISTORY:  Hematuria that began this a.m.  Urostomy bag noted to the right lower quadrant with reduced in.    TECHNIQUE:  5 mm unenhanced axial images from the lung bases through the greater trochanters were performed.  Coronal and sagittal reformatted images were provided.    COMPARISON:  04/21/2023    FINDINGS:  Within the limits of a noncontrast examination, the liver, spleen, pancreas, and adrenal glands are unremarkable.  The gallbladder is surgically absent.  Calcified splenic granuloma present.    There has been interval development of moderate left hydroureteronephrosis.  Moderate bilateral renal cortical scarring is present.  There is bilateral nephrolithiasis.  There is a punctate nonobstructing hyperdensity along the conduit distally, which is un changed (axial 115 scratch coronal image 62 and is compared to coronal image 64 on examination from 04/21/2023).    There is no gross abdominal adenopathy or ascites.    There is advanced peripheral vascular disease.  There is aneurysmal dilatation of the common iliac arteries.  The right common iliac arterial aneurysm measures 3.5 cm.  The left common iliac arterial aneurysm measures 1.7 cm.    There is ventral abdominopelvic mesh.    There is colonic diverticulosis.  There are no pelvic masses or adenopathy.  There is no free pelvic fluid.  The appendix is surgically absent.    At the lung bases, there is mild bibasilar atelectatic change.

## 2023-07-22 NOTE — HPI
Mr. Blake Guillory is a 63 y.o. male, with PMH of renal stones, Bladder cancer, hypothyroidism, HTN, anemia of chronic disease, malnutrition, COPD, who presented to [facility] Ed on 7/21/23 due to hematuria beginning this morning. He states there was red urine in his urostomy bag. He endorses right sided flank pain. He denied fever, nausea, vomiting. He was evaluated in the ED with labs showing no leukocytosis or left shift. A metabolic panel showed INDIO with BUN of 39, and Cr of 2.7. A UA has 2+ leukocytes with 3 WBCs. A CT renal stone study showed left hydroureteronephrosis. With bilateral nephrolithiasis. He was treated in the ED with 500 cc NS, zofran, and promethazine. He was placed on observation.

## 2023-07-22 NOTE — H&P
[facility] Surg [address] Medicine  History & Physical    Patient Name: Blake Guillory  MRN: [id number]  Patient Class: OP- Observation  Admission Date: 7/21/2023  Attending Physician: SUZY Downing MD   Primary Care Provider: Varun Zeng MD         Patient information was obtained from patient, past medical records and ER records.     Subjective:     Principal Problem:Hydronephrosis    Chief Complaint:   Chief Complaint   Patient presents with    Hematuria     C/o hematuria that began this am. Urostomy bag noted to RLQ with red urine. Extensive PMH. VSS        HPI: Mr. Blake Guillory is a 63 y.o. male, with PMH of renal stones, Bladder cancer, hypothyroidism, HTN, anemia of chronic disease, malnutrition, COPD, who [place] [facility] [place]23 due to hematuria beginning this morning. He states there was red urine in his urostomy bag. He endorses right sided flank pain. He denied fever, nausea, vomiting. He was evaluated in the ED with labs showing no leukocytosis or left shift. A metabolic panel showed INDIO with BUN of 39, and Cr of 2.7. A UA has 2+ leukocytes with 3 WBCs. A CT renal stone study showed left hydroureteronephrosis. With bilateral nephrolithiasis. He was treated in the ED with 500 cc NS, zofran, and promethazine. He was placed on observation.       Past Medical History:   Diagnosis Date    Anemia of chronic disease     Aneurysm of right common iliac artery     Arthritis     Blood transfusion     CAD (coronary artery disease)     Chest pain of uncertain etiology     CKD (chronic kidney disease), stage IV     COPD (chronic obstructive pulmonary disease)     Coronary artery calcification 8/2/2021    DDD (degenerative disc disease), lumbar     Frequency of urination     General anesthetics causing adverse effect in therapeutic use     pt states he wakes up very violently from anesthesia    GERD (gastroesophageal reflux disease)     Gout     History of  bladder cancer     History of urostomy     Hydronephrosis     Hyperparathyroidism     Hypertension     Hypertriglyceridemia     Hypothyroidism (acquired)     Insomnia     Kidney stone     Limited joint range of motion right hip and leg    Lumbar facet arthropathy     Lumbar spondylosis     Mixed anxiety and depressive disorder     Neuropathy     Orchalgia     SHARMIN (obstructive sleep apnea)     Personal history of bladder cancer     PVD (peripheral vascular disease)     Sacroiliitis     Stricture or kinking of ureter     Ureteral stent displacement     Urinary retention     Vitamin D deficiency     Wears glasses        Past Surgical History:   Procedure Laterality Date    APPENDECTOMY  12/30/2015    Procedure: APPENDECTOMY;  Surgeon: CHAD Bo MD;  Location[facility];  Service: Urology;;    BALLOON DILATION OF URETER  01/22/2018    BOWEL RESECTION      COLONOSCOPY      CREATION OF URETEROILEAL CONDUIT Left 07/05/2013    CYSTOSCOPY      >1  episodes for bilat ureteral stent exchange    CYSTOSCOPY W/ URETERAL STENT PLACEMENT Right 01/22/2018    and 6/9/2015    CYSTOSCOPY W/ URETERAL STENT PLACEMENT Bilateral 10/07/2015    ESOPHAGOGASTRODUODENOSCOPY  04/12/2022    W/ BIOPSY    ESOPHAGOGASTRODUODENOSCOPY N/A 4/12/2022    Procedure: EGD (ESOPHAGOGASTRODUODENOSCOPY);  Surgeon: Tito Fan MD;  Location: [facility];  Service: Endoscopy;  Laterality: N/A;    EXPLORATORY LAPAROTOMY  07/05/2013    EXPLORATORY LAPAROTOMY W/ BOWEL RESECTION  12/30/2015    EXTRACORPOREAL SHOCK WAVE LITHOTRIPSY Right 06/07/2021    Procedure: LITHOTRIPSY, ESWL;  Surgeon: CHAD Bo MD;  Location: [facility];  Service: Urology;  Laterality: Right;  RN PREOP 5/31/2021   VACCINATED    EXTRACORPOREAL SHOCK WAVE LITHOTRIPSY  06/07/2021    FRACTURE SURGERY      HAND SURGERY      HEMICOLECTOMY  12/30/2015    HIP SURGERY  2012    Rt hip septic    LAPAROSCOPIC CHOLECYSTECTOMY N/A 08/11/2021     Procedure: CHOLECYSTECTOMY, LAPAROSCOPIC;  Surgeon: Remy Xiong MD;  Location: [facility] OR;  Service: General;  Laterality: N/A;  gianni video confirmed 8/5/dme    LYMPH NODE DISSECTION  11/08/2012    pelvic    LYSIS OF ADHESIONS  12/30/2015    MOUTH SURGERY  2000    all teeth extracted    PERCUTANEOUS NEPHROLITHOTOMY Left 07/22/2020    Procedure: NEPHROLITHOTOMY, PERCUTANEOUS;  Surgeon: CHAD Bo MD;  Location: [facility] OR;  Service: Urology;  Laterality: Left;  OR 9 ONLY  RN PREOP 7/20/2020----COVID NEGATIVE ON 7/20    PERCUTANEOUS NEPHROLITHOTOMY Bilateral 6/8/2022    Procedure: BILATERAL PERCUTANEOUS NEPHROSCOPY, BILATERAL ANTEGRADE PYELOGRAM, BILATERAL NEPHROSTOMY TUBE PLACEMENT;  Surgeon: CHAD Bo MD;  Location: [facility] OR;  Service: Urology;  Laterality: Bilateral;  room 9 only  RN PREOP 6/1/2022----T/S IN AM    PERCUTANEOUS NEPHROSTOMY Left 01/02/2018    PERCUTANEOUS REPLACEMENT OF NEPHROSTOMY TUBE Bilateral 5/10/2022    Procedure: REPLACEMENT, NEPHROSTOMY TUBE, PERCUTANEOUS;  Surgeon: Chino Vo MD;  Location: [facility] CATH LAB;  Service: Radiology;  Laterality: Bilateral;    RADICAL CYSTECTOMY  11/08/2012    RADIOFREQUENCY THERMOCOAGULATION  12/12/2014    median branch lumbar    RADIOFREQUENCY THERMOCOAGULATION  12/01/2014    median branch lumbar    REPAIR, HERNIA, PARASTOMAL, LAPAROSCOPIC  04/12/2017    ROBOTIC BRONCHOSCOPY N/A 1/13/2023    Procedure: ROBOTIC BRONCHOSCOPY;  Surgeon: Camila Rodriguez MD;  Location: [address];  Service: Anesthesiology;  Laterality: N/A;    SMALL INTESTINE SURGERY  04/17/2017    urosotomy bag  2011    R abdomen       Review of patient's allergies indicates:  No Known Allergies    No current facility-administered medications on file prior to encounter.     Current Outpatient Medications on File Prior to Encounter   Medication Sig    albuterol (PROAIR HFA) 90 mcg/actuation inhaler Inhale 2 puffs into the lungs every 6 (six) hours as  needed for Wheezing. Rescue    albuterol-ipratropium (DUO-NEB) 2.5 mg-0.5 mg/3 mL nebulizer solution Take 3 mLs by nebulization every 6 (six) hours as needed for Wheezing. Rescue    allopurinoL (ZYLOPRIM) 100 MG tablet Take 1 tablet (100 mg total) by mouth once daily.    apixaban (ELIQUIS) 5 mg Tab Take 1 tablet (5 mg total) by mouth 2 (two) times daily.    aspirin (ECOTRIN) 81 MG EC tablet Take 81 mg by mouth once daily.    calcitRIOL (ROCALTROL) 0.25 MCG Cap Take 1 capsule (0.25 mcg total) by mouth once daily.    carvediloL (COREG) 6.25 MG tablet Take 1 tablet (6.25 mg total) by mouth 2 (two) times daily with meals.    clobetasoL (TEMOVATE) 0.05 % cream Apply topically 2 (two) times daily.    EScitalopram oxalate (LEXAPRO) 20 MG tablet Take 1 tablet (20 mg total) by mouth once daily.    fenofibrate (TRICOR) 145 MG tablet Take 1 tablet (145 mg total) by mouth once daily.    fluticasone-umeclidin-vilanter (TRELEGY ELLIPTA) 100-62.5-25 mcg DsDv Inhale 1 puff into the lungs once daily. Rinse mouth after use.    gabapentin (NEURONTIN) 400 MG capsule Take 1 capsule (400 mg total) by mouth 2 (two) times daily.    HYDROcodone-acetaminophen (NORCO)  mg per tablet Take 1 tablet by mouth every 6 (six) hours as needed for Pain.    levothyroxine (SYNTHROID) 88 MCG tablet Take 1 tablet (88 mcg total) by mouth once daily.    LIDOcaine (LIDODERM) 5 % Place 1 patch onto the skin once daily. Remove & Discard patch within 12 hours or as directed by MD    multivitamin (THERAGRAN) per tablet Take 1 tablet by mouth once daily.    pantoprazole (PROTONIX) 40 MG tablet Take 1 tablet (40 mg total) by mouth once daily.    sodium bicarbonate 325 MG tablet Take 325 mg by mouth 2 (two) times daily.    tiZANidine (ZANAFLEX) 4 MG tablet Take 1 tablet (4 mg total) by mouth once daily.    traZODone (DESYREL) 150 MG tablet Take 1 tablet (150 mg total) by mouth every evening.     Family History       Family history is  unknown by patient.          Tobacco Use    Smoking status: Former     Packs/day: 0.30     Years: 42.00     Pack years: 12.60     Types: Cigarettes     Quit date: 2017     Years since quittin.4    Smokeless tobacco: Never    Tobacco comments:     in cessation program   Substance and Sexual Activity    Alcohol use: Yes     Alcohol/week: 14.0 - 21.0 standard drinks     Types: 14 - 21 Cans of beer per week     Comment: occassional    Drug use: Yes     Types: Marijuana     Comment: occass    Sexual activity: Yes     Partners: Female     Birth control/protection: None     Review of Systems   Constitutional:  Negative for activity change, appetite change, chills, diaphoresis and fever.   Respiratory:  Negative for cough, shortness of breath and wheezing.    Cardiovascular:  Negative for chest pain and palpitations.   Gastrointestinal:  Negative for abdominal pain, constipation, diarrhea, nausea and vomiting.   Genitourinary:  Positive for flank pain and hematuria. Negative for decreased urine volume, difficulty urinating, dysuria, frequency and urgency.   Musculoskeletal:  Negative for back pain, myalgias, neck pain and neck stiffness.   Skin:  Negative for color change and wound.   Neurological:  Negative for dizziness, syncope, weakness, numbness and headaches.   Hematological:  Does not bruise/bleed easily.   Psychiatric/Behavioral:  Negative for agitation and confusion.    Objective:     Vital Signs (Most Recent):  Temp: 97.7 °F (36.5 °C) (23)  Pulse: 83 (23 0200)  Resp: 18 (23 0323)  BP: (!) 144/68 (23)  SpO2: (!) 94 % (23) Vital Signs (24h Range):  Temp:  [97.7 °F (36.5 °C)-99.6 °F (37.6 °C)] 97.7 °F (36.5 °C)  Pulse:  [69-99] 83  Resp:  [17-20] 18  SpO2:  [92 %-96 %] 94 %  BP: (129-156)/() 144/68     Weight: 68 kg (150 lb)  Body mass index is 20.34 kg/m².     Physical Exam  Vitals and nursing note reviewed.   Constitutional:       General: He is not  in acute distress.     Appearance: Normal appearance. He is well-developed and normal weight. He is not ill-appearing, toxic-appearing or diaphoretic.   HENT:      Head: Normocephalic and atraumatic.      Right Ear: External ear normal.      Left Ear: External ear normal.   Eyes:      General: No scleral icterus.        Right eye: No discharge.         Left eye: No discharge.      Conjunctiva/sclera: Conjunctivae normal.   Neck:      Vascular: No JVD.      Trachea: No tracheal deviation.   Cardiovascular:      Rate and Rhythm: Normal rate and regular rhythm.      Heart sounds: Normal heart sounds. No murmur heard.    No gallop.   Pulmonary:      Effort: Pulmonary effort is normal. No respiratory distress.      Breath sounds: Normal breath sounds. No stridor. No wheezing or rales.   Abdominal:      General: Bowel sounds are normal. There is no distension.      Palpations: Abdomen is soft. There is no mass.      Tenderness: There is no abdominal tenderness. There is no guarding.   Musculoskeletal:         General: No deformity. Normal range of motion.      Cervical back: Normal range of motion and neck supple.   Skin:     General: Skin is warm and dry.      Comments: Linear excoriations on right flank.    Neurological:      General: No focal deficit present.      Mental Status: He is alert and oriented to person, place, and time.      Cranial Nerves: No cranial nerve deficit.      Motor: No abnormal muscle tone.      Coordination: Coordination normal.   Psychiatric:         Mood and Affect: Mood normal.         Behavior: Behavior normal.         Thought Content: Thought content normal.         Judgment: Judgment normal.              Significant Labs: All pertinent labs within the past 24 hours have been reviewed.  BMP:   Recent Labs   Lab 07/21/23  1806   GLU 93      K 5.0      CO2 22*   BUN 39*   CREATININE 2.7*   CALCIUM 9.9     CBC:   Recent Labs   Lab 07/21/23  1806   WBC 3.85*   HGB 14.4   HCT 45.0         CMP:   Recent Labs   Lab 07/21/23  1806      K 5.0      CO2 22*   GLU 93   BUN 39*   CREATININE 2.7*   CALCIUM 9.9   PROT 7.6   ALBUMIN 4.1   BILITOT 0.4   ALKPHOS 48*   AST 47*   ALT 39   ANIONGAP 14     Urine Culture: No results for input(s): LABURIN in the last 48 hours.  Urine Studies:   Recent Labs   Lab 07/21/23  1810   COLORU Red*   APPEARANCEUA Cloudy*   PHUR 7.0   SPECGRAV 1.020   PROTEINUA 3+*   GLUCUA Negative   KETONESU Negative   BILIRUBINUA Negative   OCCULTUA 3+*   NITRITE Negative   UROBILINOGEN Negative   LEUKOCYTESUR 2+*   RBCUA >100*   WBCUA 3   BACTERIA Rare   HYALINECASTS 0       Significant Imaging: I have reviewed all pertinent imaging results/findings within the past 24 hours.  Imaging Results              CT Renal Stone Study ABD Pelvis WO (Final result)  Result time 07/21/23 20:13:29      Final result by Mirlande Padilla MD (07/21/23 20:13:29)                   Impression:      Interval development of moderate left hydroureteronephrosis.  Bilateral nephrolithiasis.  Right lower quadrant ileal conduit with remote cystectomy.  Unchanged punctate density or calculus near the tapered ileum.    Aneurysmal dilatation of the common iliac arteries.  Advanced atherosclerotic disease.    Colonic diverticulosis.      Electronically signed by: Mirlande Padilla  Date:    07/21/2023  Time:    20:13               Narrative:    EXAMINATION:  CT RENAL STONE STUDY ABDOMEN PELVIS WITHOUT    CLINICAL HISTORY:  Hematuria that began this a.m.  Urostomy bag noted to the right lower quadrant with reduced in.    TECHNIQUE:  5 mm unenhanced axial images from the lung bases through the greater trochanters were performed.  Coronal and sagittal reformatted images were provided.    COMPARISON:  04/21/2023    FINDINGS:  Within the limits of a noncontrast examination, the liver, spleen, pancreas, and adrenal glands are unremarkable.  The gallbladder is surgically absent.  Calcified splenic  granuloma present.    There has been interval development of moderate left hydroureteronephrosis.  Moderate bilateral renal cortical scarring is present.  There is bilateral nephrolithiasis.  There is a punctate nonobstructing hyperdensity along the conduit distally, which is un changed (axial 115 scratch coronal image 62 and is compared to coronal image 64 on examination from 04/21/2023).    There is no gross abdominal adenopathy or ascites.    There is advanced peripheral vascular disease.  There is aneurysmal dilatation of the common iliac arteries.  The right common iliac arterial aneurysm measures 3.5 cm.  The left common iliac arterial aneurysm measures 1.7 cm.    There is ventral abdominopelvic mesh.    There is colonic diverticulosis.  There are no pelvic masses or adenopathy.  There is no free pelvic fluid.  The appendix is surgically absent.    At the lung bases, there is mild bibasilar atelectatic change.                                       Assessment/Plan:     * Hydronephrosis  - Mr. Blake Guillory presents with left flank pain    - CT shows left sided hydroureteronephrosis and bilateral nephrolithiasis with a non-obstructing hyperdensity in the distal conduit (unchanged from prior imaging on 4/21/23)   - UA without UTI   - starting flomax   - IV fluids for hydration   - Urology consulted 2/2 extensive Urologic history   - monitor        Presence of urostomy  - history noted   - also has h/o bladder cancer       Centrilobular emphysema  - no symptoms concerning for exacerbation   - PRN DuoNebs     Acquired hypothyroidism  - continue synthroid 88 mcg QD       Anemia of chronic disease  - H&H are stable   - monitor       Essential hypertension  - chronic   - hypertensive at present   - home meds: carvedilol 6.25 mg BID  - monitor       Insomnia  - PRN benadryl ordered       VTE Risk Mitigation (From admission, onward)         Ordered     apixaban tablet 5 mg  2 times daily         07/22/23 0035     IP  VTE HIGH RISK PATIENT  Once         07/21/23 2348     Place sequential compression device  Until discontinued         07/21/23 2348                     On 07/22/2023, patient should be placed in hospital observation services under the care of Dr. SUZY Downing MD.      Cecilia Hong PA-C  Department of Hospital Medicine  [facility] ([address])

## 2023-07-22 NOTE — PLAN OF CARE
SW called patient over the phone to complete assessment.    Patient is alert and oriented with no communication barriers.    Patient verified address and PCP is correct on face sheet.    Patient denies HH or DME use.    Patient family/friend will provide a ride home at discharge.    CM team to continue to follow.   07/22/23 6842   Discharge Assessment   Assessment Type Discharge Planning Assessment   Confirmed/corrected address, phone number and insurance Yes   Confirmed Demographics Correct on Facesheet   Source of Information patient   Communicated FERNY with patient/caregiver Date not available/Unable to determine   People in Home child(amber), adult   Do you expect to return to your current living situation? Yes   Prior to hospitilization cognitive status: Alert/Oriented   Current cognitive status: Alert/Oriented   Equipment Currently Used at Home none   Readmission within 30 days? No   Patient currently being followed by outpatient case management? No   Do you currently have service(s) that help you manage your care at home? No   Do you take prescription medications? Yes   Do you have any problems affording any of your prescribed medications? No   Is the patient taking medications as prescribed? yes   How do you get to doctors appointments? car, drives self   Are you on dialysis? No   Do you take coumadin? No   Discharge Plan A Home with family   DME Needed Upon Discharge  none   Discharge Plan discussed with: Patient   Transition of Care Barriers None   Physical Activity   On average, how many days per week do you engage in moderate to strenuous exercise (like a brisk walk)? 0 days   On average, how many minutes do you engage in exercise at this level? 0 min   Financial Resource Strain   How hard is it for you to pay for the very basics like food, housing, medical care, and heating? Not hard   Housing Stability   In the last 12 months, was there a time when you were not able to pay the mortgage or rent on time? N    In the last 12 months, how many places have you lived? 1   In the last 12 months, was there a time when you did not have a steady place to sleep or slept in a shelter (including now)? N   Transportation Needs   In the past 12 months, has lack of transportation kept you from medical appointments or from getting medications? no   In the past 12 months, has lack of transportation kept you from meetings, work, or from getting things needed for daily living? No   Food Insecurity   Within the past 12 months, you worried that your food would run out before you got the money to buy more. Never true   Within the past 12 months, the food you bought just didn't last and you didn't have money to get more. Never true   Stress   Do you feel stress - tense, restless, nervous, or anxious, or unable to sleep at night because your mind is troubled all the time - these days? Very much   Social Connections   In a typical week, how many times do you talk on the phone with family, friends, or neighbors? Patient refu   How often do you get together with friends or relatives? Patient refu   How often do you attend Congregation or Adventist services? Never   Do you belong to any clubs or organizations such as Congregation groups, unions, fraternal or athletic groups, or school groups? No   How often do you attend meetings of the clubs or organizations you belong to? Never   Are you , , , , never , or living with a partner?    Alcohol Use   Q1: How often do you have a drink containing alcohol? 4 or more ti   Q2: How many drinks containing alcohol do you have on a typical day when you are drinking? 1 or 2   Q3: How often do you have six or more drinks on one occasion? Never     [facility] ([address])  Initial Discharge Assessment       Primary Care Provider: Varun Zeng MD    Admission Diagnosis: Kidney stone [N20.0]  Flank pain [R10.9]  Hydronephrosis, unspecified hydronephrosis type  [N13.30]  Hematuria, unspecified type [R31.9]    Admission Date: 7/21/2023  Expected Discharge Date:     Transition of Care Barriers: (P) None    Payor: HU[employer] MANAGED MEDICARE / Plan: HUMANA SNP HMO PPO SPECIAL NEEDS / Product Type: Medicare Advantage /     Extended Emergency Contact Information  Primary Emergency Contact: Luke Guillory  Address: [address] [place] [address] [facility]  Home Phone: [phone]  Mobile Phone: [phone]  Relation: Spouse  Secondary Emergency Contact: Portia Huber   [facility]  Home Phone: [phone]  Mobile Phone: [phone]  Relation: Daughter    Discharge Plan A: (P) Home with family         [id number] - [place] - [address] AT [facility] [address]  [address] [address]  Phone: [phone] Fax: [phone]    [facility] - [place] - [address]  [address]  [facility] [address]  Phone: [phone] Fax: [phone]    [facility]  [address]  [address]   [facility] [address]  Phone: [phone] Fax: [phone]      Initial Assessment (most recent)       Adult Discharge Assessment - 07/22/23 1242          Discharge Assessment    Assessment Type Discharge Planning Assessment (P)      Confirmed/corrected address, phone number and insurance Yes (P)      Confirmed Demographics Correct on Facesheet (P)      Source of Information patient (P)      Communicated FERNY with patient/caregiver Date not available/Unable to determine (P)      People in Home child(amber), adult (P)      Do you expect to return to your current living situation? Yes (P)      Prior to hospitilization cognitive status: Alert/Oriented (P)      Current cognitive status: Alert/Oriented (P)      Equipment Currently Used at Home none (P)      Readmission within 30 days? No (P)      Patient currently being followed by outpatient  case management? No (P)      Do you currently have service(s) that help you manage your care at home? No (P)      Do you take prescription medications? Yes (P)      Do you have any problems affording any of your prescribed medications? No (P)      Is the patient taking medications as prescribed? yes (P)      How do you get to doctors appointments? car, drives self (P)      Are you on dialysis? No (P)      Do you take coumadin? No (P)      Discharge Plan A Home with family (P)      DME Needed Upon Discharge  none (P)      Discharge Plan discussed with: Patient (P)      Transition of Care Barriers None (P)         Physical Activity    On average, how many days per week do you engage in moderate to strenuous exercise (like a brisk walk)? 0 days (P)      On average, how many minutes do you engage in exercise at this level? 0 min (P)         Financial Resource Strain    How hard is it for you to pay for the very basics like food, housing, medical care, and heating? Not hard at all (P)         Housing Stability    In the last 12 months, was there a time when you were not able to pay the mortgage or rent on time? No (P)      In the last 12 months, how many places have you lived? 1 (P)      In the last 12 months, was there a time when you did not have a steady place to sleep or slept in a shelter (including now)? No (P)         Transportation Needs    In the past 12 months, has lack of transportation kept you from medical appointments or from getting medications? No (P)      In the past 12 months, has lack of transportation kept you from meetings, work, or from getting things needed for daily living? No (P)         Food Insecurity    Within the past 12 months, you worried that your food would run out before you got the money to buy more. Never true (P)      Within the past 12 months, the food you bought just didn't last and you didn't have money to get more. Never true (P)         Stress    Do you feel stress - tense,  restless, nervous, or anxious, or unable to sleep at night because your mind is troubled all the time - these days? Very much (P)         Social Connections    In a typical week, how many times do you talk on the phone with family, friends, or neighbors? Patient refused (P)      How often do you get together with friends or relatives? Patient refused (P)      How often do you attend Temple or Rastafarian services? Never (P)      Do you belong to any clubs or organizations such as Temple groups, unions, fraternal or athletic groups, or school groups? No (P)      How often do you attend meetings of the clubs or organizations you belong to? Never (P)      Are you , , , , never , or living with a partner?  (P)         Alcohol Use    Q1: How often do you have a drink containing alcohol? 4 or more times a week (P)      Q2: How many drinks containing alcohol do you have on a typical day when you are drinking? 1 or 2 (P)      Q3: How often do you have six or more drinks on one occasion? Never (P)

## 2023-07-22 NOTE — ASSESSMENT & PLAN NOTE
- MrMarissa Guillory presents with left flank pain    - CT shows left sided hydroureteronephrosis and bilateral nephrolithiasis with a non-obstructing hyperdensity in the distal conduit (unchanged from prior imaging on 4/21/23)   - UA without UTI   - starting flomax   - IV fluids for hydration   - Urology consulted 2/2 extensive Urologic history   - monitor

## 2023-07-23 VITALS
HEART RATE: 89 BPM | TEMPERATURE: 99 F | DIASTOLIC BLOOD PRESSURE: 81 MMHG | OXYGEN SATURATION: 92 % | HEIGHT: 72 IN | SYSTOLIC BLOOD PRESSURE: 150 MMHG | BODY MASS INDEX: 20.32 KG/M2 | WEIGHT: 150 LBS | RESPIRATION RATE: 18 BRPM

## 2023-07-23 PROBLEM — F41.9 ANXIETY: Chronic | Status: ACTIVE | Noted: 2023-07-23

## 2023-07-23 LAB
ANION GAP SERPL CALC-SCNC: 11 MMOL/L (ref 8–16)
BASOPHILS # BLD AUTO: 0.01 K/UL (ref 0–0.2)
BASOPHILS NFR BLD: 0.2 % (ref 0–1.9)
BUN SERPL-MCNC: 28 MG/DL (ref 8–23)
CALCIUM SERPL-MCNC: 9.2 MG/DL (ref 8.7–10.5)
CHLORIDE SERPL-SCNC: 108 MMOL/L (ref 95–110)
CO2 SERPL-SCNC: 21 MMOL/L (ref 23–29)
CREAT SERPL-MCNC: 2 MG/DL (ref 0.5–1.4)
DIFFERENTIAL METHOD: ABNORMAL
EOSINOPHIL # BLD AUTO: 0 K/UL (ref 0–0.5)
EOSINOPHIL NFR BLD: 0.9 % (ref 0–8)
ERYTHROCYTE [DISTWIDTH] IN BLOOD BY AUTOMATED COUNT: 13.9 % (ref 11.5–14.5)
EST. GFR  (NO RACE VARIABLE): 36 ML/MIN/1.73 M^2
GLUCOSE SERPL-MCNC: 78 MG/DL (ref 70–110)
HCT VFR BLD AUTO: 40.5 % (ref 40–54)
HGB BLD-MCNC: 13.1 G/DL (ref 14–18)
IMM GRANULOCYTES # BLD AUTO: 0.01 K/UL (ref 0–0.04)
IMM GRANULOCYTES NFR BLD AUTO: 0.2 % (ref 0–0.5)
LYMPHOCYTES # BLD AUTO: 0.6 K/UL (ref 1–4.8)
LYMPHOCYTES NFR BLD: 13.2 % (ref 18–48)
MAGNESIUM SERPL-MCNC: 1.6 MG/DL (ref 1.6–2.6)
MCH RBC QN AUTO: 30.9 PG (ref 27–31)
MCHC RBC AUTO-ENTMCNC: 32.3 G/DL (ref 32–36)
MCV RBC AUTO: 96 FL (ref 82–98)
MONOCYTES # BLD AUTO: 0.4 K/UL (ref 0.3–1)
MONOCYTES NFR BLD: 8.4 % (ref 4–15)
NEUTROPHILS # BLD AUTO: 3.4 K/UL (ref 1.8–7.7)
NEUTROPHILS NFR BLD: 77.1 % (ref 38–73)
NRBC BLD-RTO: 0 /100 WBC
PLATELET # BLD AUTO: 146 K/UL (ref 150–450)
PMV BLD AUTO: 10.7 FL (ref 9.2–12.9)
POTASSIUM SERPL-SCNC: 4.2 MMOL/L (ref 3.5–5.1)
RBC # BLD AUTO: 4.24 M/UL (ref 4.6–6.2)
SODIUM SERPL-SCNC: 140 MMOL/L (ref 136–145)
WBC # BLD AUTO: 4.41 K/UL (ref 3.9–12.7)

## 2023-07-23 PROCEDURE — 80048 BASIC METABOLIC PNL TOTAL CA: CPT | Performed by: PHYSICIAN ASSISTANT

## 2023-07-23 PROCEDURE — 96376 TX/PRO/DX INJ SAME DRUG ADON: CPT

## 2023-07-23 PROCEDURE — 87088 URINE BACTERIA CULTURE: CPT | Performed by: INTERNAL MEDICINE

## 2023-07-23 PROCEDURE — 36415 COLL VENOUS BLD VENIPUNCTURE: CPT | Performed by: PHYSICIAN ASSISTANT

## 2023-07-23 PROCEDURE — 99239 HOSP IP/OBS DSCHRG MGMT >30: CPT | Mod: ,,, | Performed by: INTERNAL MEDICINE

## 2023-07-23 PROCEDURE — 85025 COMPLETE CBC W/AUTO DIFF WBC: CPT | Performed by: PHYSICIAN ASSISTANT

## 2023-07-23 PROCEDURE — 87077 CULTURE AEROBIC IDENTIFY: CPT | Mod: 59 | Performed by: INTERNAL MEDICINE

## 2023-07-23 PROCEDURE — 25000003 PHARM REV CODE 250: Performed by: INTERNAL MEDICINE

## 2023-07-23 PROCEDURE — 99214 PR OFFICE/OUTPT VISIT, EST, LEVL IV, 30-39 MIN: ICD-10-PCS | Mod: ,,, | Performed by: UROLOGY

## 2023-07-23 PROCEDURE — 87086 URINE CULTURE/COLONY COUNT: CPT | Performed by: INTERNAL MEDICINE

## 2023-07-23 PROCEDURE — G0378 HOSPITAL OBSERVATION PER HR: HCPCS

## 2023-07-23 PROCEDURE — 99214 OFFICE O/P EST MOD 30 MIN: CPT | Mod: ,,, | Performed by: UROLOGY

## 2023-07-23 PROCEDURE — 63600175 PHARM REV CODE 636 W HCPCS: Performed by: PHYSICIAN ASSISTANT

## 2023-07-23 PROCEDURE — 99239 PR HOSPITAL DISCHARGE DAY,>30 MIN: ICD-10-PCS | Mod: ,,, | Performed by: INTERNAL MEDICINE

## 2023-07-23 PROCEDURE — 96374 THER/PROPH/DIAG INJ IV PUSH: CPT | Mod: 59

## 2023-07-23 PROCEDURE — 83735 ASSAY OF MAGNESIUM: CPT | Performed by: PHYSICIAN ASSISTANT

## 2023-07-23 PROCEDURE — 25000003 PHARM REV CODE 250: Performed by: PHYSICIAN ASSISTANT

## 2023-07-23 PROCEDURE — 87186 SC STD MICRODIL/AGAR DIL: CPT | Mod: 59 | Performed by: INTERNAL MEDICINE

## 2023-07-23 RX ORDER — ALBUTEROL SULFATE 90 UG/1
2 AEROSOL, METERED RESPIRATORY (INHALATION) EVERY 6 HOURS PRN
Status: DISCONTINUED | OUTPATIENT
Start: 2023-07-23 | End: 2023-07-23 | Stop reason: HOSPADM

## 2023-07-23 RX ORDER — CEPHALEXIN 500 MG/1
500 CAPSULE ORAL 2 TIMES DAILY
Qty: 12 CAPSULE | Refills: 0 | Status: SHIPPED | OUTPATIENT
Start: 2023-07-23 | End: 2023-07-29

## 2023-07-23 RX ORDER — HYDROCODONE BITARTRATE AND ACETAMINOPHEN 10; 325 MG/1; MG/1
1 TABLET ORAL EVERY 6 HOURS PRN
Qty: 12 TABLET | Refills: 0 | Status: SHIPPED | OUTPATIENT
Start: 2023-07-23

## 2023-07-23 RX ORDER — TAMSULOSIN HYDROCHLORIDE 0.4 MG/1
0.4 CAPSULE ORAL DAILY
Qty: 30 CAPSULE | Refills: 0 | Status: SHIPPED | OUTPATIENT
Start: 2023-07-24 | End: 2023-08-14 | Stop reason: SDUPTHER

## 2023-07-23 RX ADMIN — LEVOTHYROXINE SODIUM 88 MCG: 88 TABLET ORAL at 06:07

## 2023-07-23 RX ADMIN — CALCITRIOL CAPSULES 0.25 MCG 0.25 MCG: 0.25 CAPSULE ORAL at 09:07

## 2023-07-23 RX ADMIN — HYDROMORPHONE HYDROCHLORIDE 0.5 MG: 0.5 INJECTION, SOLUTION INTRAMUSCULAR; INTRAVENOUS; SUBCUTANEOUS at 04:07

## 2023-07-23 RX ADMIN — CARVEDILOL 6.25 MG: 6.25 TABLET, FILM COATED ORAL at 09:07

## 2023-07-23 RX ADMIN — OXYCODONE AND ACETAMINOPHEN 1 TABLET: 7.5; 325 TABLET ORAL at 06:07

## 2023-07-23 RX ADMIN — HYDROMORPHONE HYDROCHLORIDE 0.5 MG: 0.5 INJECTION, SOLUTION INTRAMUSCULAR; INTRAVENOUS; SUBCUTANEOUS at 10:07

## 2023-07-23 RX ADMIN — ASPIRIN 81 MG: 81 TABLET, COATED ORAL at 09:07

## 2023-07-23 RX ADMIN — SODIUM BICARBONATE 325 MG: 325 TABLET ORAL at 09:07

## 2023-07-23 RX ADMIN — APIXABAN 5 MG: 2.5 TABLET, FILM COATED ORAL at 09:07

## 2023-07-23 RX ADMIN — ALLOPURINOL 100 MG: 100 TABLET ORAL at 09:07

## 2023-07-23 RX ADMIN — OXYCODONE AND ACETAMINOPHEN 1 TABLET: 7.5; 325 TABLET ORAL at 12:07

## 2023-07-23 NOTE — DISCHARGE SUMMARY
[facility] [address] Medicine  Discharge Summary      Patient Name: Blake Guillory  MRN: [id number]  SHELLY: [id number]  Patient Class: OP- Observation  Admission Date: 7/21/2023  Hospital Length of Stay: 0 days  Discharge Date and Time: 7/23/2023  2:02 PM  Attending Physician: No att. providers found   Discharging Provider: NATIVIDAD Downing MD  Primary Care Provider: Varun Zeng MD    Primary Care Team: Networked reference to record PCT     HPI:   Mr. Blake Guillory is a 63 y.o. male, with PMH of renal stones, Bladder cancer, hypothyroidism, HTN, anemia of chronic disease, malnutrition, COPD, who presented to [facility] on 7/21/23 due to hematuria beginning this morning. He states there was red urine in his urostomy bag. He endorses right sided flank pain. He denied fever, nausea, vomiting. He was evaluated in the ED with labs showing no leukocytosis or left shift. A metabolic panel showed INDIO with BUN of 39, and Cr of 2.7. A UA has 2+ leukocytes with 3 WBCs. A CT renal stone study showed left hydroureteronephrosis. With bilateral nephrolithiasis. He was treated in the ED with 500 cc NS, zofran, and promethazine. He was placed on observation.       * No surgery found *      Hospital Course:   Admitted with hydronephrosis and gross hematuria. Started IVFs. Urology consulted; started ceftriaxone and urine culture sent. Symptoms improved and renal function stable. Urology discussed potential nephrostomy but patient declined. Converted to cephalexin with planned 7 day total course. With clinical improvement and vital stability he was prepared for discharge home with outpatient Urology follow-up.       Goals of Care Treatment Preferences:  Code Status: Full Code      Consults: Urology    Final Active Diagnoses:    Diagnosis Date Noted POA    PRINCIPAL PROBLEM:  Hydronephrosis [N13.30] 01/02/2018 Yes    Anxiety [F41.9] 07/23/2023 Yes     Chronic    Gross hematuria [R31.0] 07/22/2023 Yes     Acute deep vein thrombosis (DVT) of axillary vein of right upper extremity [I82.A11] 05/09/2023 Yes    Presence of urostomy [Z93.6] 08/17/2022 Not Applicable    Centrilobular emphysema [J43.2] 03/22/2018 Yes    Acquired hypothyroidism [E03.9] 10/21/2016 Yes     Chronic    Anemia of chronic disease [D63.8] 01/02/2016 Yes    Essential hypertension [I10] 01/02/2016 Yes    Lumbar facet arthropathy [M47.816] 08/29/2014 Yes     Chronic    Sacroiliitis [M46.1] 08/29/2014 Yes     Chronic    Spondylosis without myelopathy [M47.819] 08/29/2014 Yes     Chronic    Insomnia [G47.00] 04/15/2014 Yes     Chronic      Problems Resolved During this Admission:       Discharged Condition: fair    Disposition: Home or Self Care    Follow Up:    Patient Instructions:      Diet Adult Regular     Notify your health care provider if you experience any of the following:  temperature >100.4     Notify your health care provider if you experience any of the following:  persistent nausea and vomiting or diarrhea     Notify your health care provider if you experience any of the following:  severe uncontrolled pain     Notify your health care provider if you experience any of the following:  increased confusion or weakness     Notify your health care provider if you experience any of the following:  persistent dizziness, light-headedness, or visual disturbances     Activity as tolerated       Significant Diagnostic Studies:   CBC:  Recent Labs   Lab 07/21/23 1806 07/22/23  0423 07/23/23  0508   WBC 3.85* 3.96 4.41   HGB 14.4 14.1 13.1*   HCT 45.0 44.9 40.5    171 146*   GRAN 66.3  2.6 77.2*  3.1 77.1*  3.4   LYMPH 20.3  0.8* 13.6*  0.5* 13.2*  0.6*   MONO 11.2  0.4 8.1  0.3 8.4  0.4   EOS 0.1 0.0 0.0   BASO 0.01 0.01 0.01     CMP:  Recent Labs   Lab 07/21/23  1806 07/22/23  0423 07/23/23  0508    140 140   K 5.0 4.8 4.2    106 108   CO2 22* 24 21*   BUN 39* 37* 28*   CREATININE 2.7* 2.4* 2.0*   GLU 93 84 78    CALCIUM 9.9 9.5 9.2   MG  --  1.8 1.6   ALKPHOS 48*  --   --    AST 47*  --   --    ALT 39  --   --    BILITOT 0.4  --   --    PROT 7.6  --   --    ALBUMIN 4.1  --   --    ANIONGAP 14 10 11       Pending Diagnostic Studies:     None         Medications:  Reconciled Home Medications:      Medication List      START taking these medications    cephALEXin 500 MG capsule  Commonly known as: KEFLEX  Take 1 capsule (500 mg total) by mouth 2 (two) times a day. for 6 days     tamsulosin 0.4 mg Cap  Commonly known as: FLOMAX  Take 1 capsule (0.4 mg total) by mouth once daily.  Start taking on: July 24, 2023        CONTINUE taking these medications    albuterol 90 mcg/actuation inhaler  Commonly known as: PROAIR HFA  Inhale 2 puffs into the lungs every 6 (six) hours as needed for Wheezing. Rescue     albuterol-ipratropium 2.5 mg-0.5 mg/3 mL nebulizer solution  Commonly known as: DUO-NEB  Take 3 mLs by nebulization every 6 (six) hours as needed for Wheezing. Rescue     allopurinoL 100 MG tablet  Commonly known as: ZYLOPRIM  Take 1 tablet (100 mg total) by mouth once daily.     apixaban 5 mg Tab  Commonly known as: ELIQUIS  Take 1 tablet (5 mg total) by mouth 2 (two) times daily.     aspirin 81 MG EC tablet  Commonly known as: ECOTRIN  Take 81 mg by mouth once daily.     calcitRIOL 0.25 MCG Cap  Commonly known as: ROCALTROL  Take 1 capsule (0.25 mcg total) by mouth once daily.     carvediloL 6.25 MG tablet  Commonly known as: COREG  Take 1 tablet (6.25 mg total) by mouth 2 (two) times daily with meals.     clobetasoL 0.05 % cream  Commonly known as: TEMOVATE  Apply topically 2 (two) times daily.     EScitalopram oxalate 20 MG tablet  Commonly known as: LEXAPRO  Take 1 tablet (20 mg total) by mouth once daily.     fenofibrate 145 MG tablet  Commonly known as: TRICOR  Take 1 tablet (145 mg total) by mouth once daily.     fluticasone-umeclidin-vilanter 100-62.5-25 mcg Dsdv  Commonly known as: TRELEGY ELLIPTA  Inhale 1 puff into  the lungs once daily. Rinse mouth after use.     gabapentin 400 MG capsule  Commonly known as: NEURONTIN  Take 1 capsule (400 mg total) by mouth 2 (two) times daily.     HYDROcodone-acetaminophen  mg per tablet  Commonly known as: NORCO  Take 1 tablet by mouth every 6 (six) hours as needed for Pain.     levothyroxine 88 MCG tablet  Commonly known as: SYNTHROID  Take 1 tablet (88 mcg total) by mouth once daily.     LIDOcaine 5 %  Commonly known as: LIDODERM  Place 1 patch onto the skin once daily. Remove & Discard patch within 12 hours or as directed by MD     multivitamin per tablet  Commonly known as: THERAGRAN  Take 1 tablet by mouth once daily.     pantoprazole 40 MG tablet  Commonly known as: PROTONIX  Take 1 tablet (40 mg total) by mouth once daily.     sodium bicarbonate 325 MG tablet  Take 325 mg by mouth 2 (two) times daily.     tiZANidine 4 MG tablet  Commonly known as: ZANAFLEX  Take 1 tablet (4 mg total) by mouth once daily.     traZODone 150 MG tablet  Commonly known as: DESYREL  Take 1 tablet (150 mg total) by mouth every evening.            Indwelling Lines/Drains at time of discharge:   Lines/Drains/Airways     Drain  Duration                Urostomy RLQ -- days         Urostomy 06/17/20 1055 1131 days         Nephrostomy 06/08/22 0846 Right 12 Fr. 410 days         Nephrostomy 06/08/22 0910 Left 12 Fr. 410 days                Time spent on the discharge of patient: 35 minutes         NATIVIDAD Downing MD  Department of Hospital Medicine  [facility] ([address])

## 2023-07-23 NOTE — ASSESSMENT & PLAN NOTE
Chronic however increased hydronephrosis secondary to a left ureteral stone.  Discussed nephrostomy tube he wants to avoid this at the present time.      He is stable to d/c home from a  standpoint  Follow up with me as scheduled  Home on Keflex x 7 days

## 2023-07-23 NOTE — NURSING
Patient discharged home. Family at bedside to transport patient home. IV (x1) and telemetry removed. Patient tolerated well. Discharge instructions and education provided to patient and family. Patient and family verbalized understanding. All patient belongings packed in bags to be taken home with patient. Patient and family aware that prescriptions must be picked up from preferred pharmacy. Urine culture collected and sent to lab. Patient aware MD Betzy will f/u in clinic. Patient seated at edge of bed awaiting wheelchair escort off unit to car for DC.

## 2023-07-23 NOTE — PROGRESS NOTES
[facility] ([address])  Urology  Progress Note    Patient Name: Blake Guillory  MRN: [id number]  Admission Date: 7/21/2023  Hospital Length of Stay: 0 days  Code Status: Full Code   Attending Provider: SUZY Downing MD   Primary Care Physician: Varun Zeng MD    Subjective:     HPI:  History of Present Illness  Kidney Stones  He presented to the hospital with abdominal and flank pain for the past week.  He noted brown colored urine a few days ago which turned yellow but last night turned a cranberry color.  He denies any fever.  His pain currently is slightly higher than baseline.  He has pain on both sides left more so than right currently.  He denies vomiting.           History of Bladder Cancer  He has a history of bladder cancer. He is s/p cystectomy with ileal conduit in November 2012. From an oncologic standpoint, he is doing well. He has had issues with bilateral ureteral stricture. He underwent a bilateral ureteral reimplantation into his conduit several months later. His strictures recurred shortly afterwards.  He was managed with ureteral stent changes until one of the stent eroded through his conduit and into his bowels.    He had an Exploratory Laparotomy with replacement of ileal conduit and small bowel anastomosis on 12/30/2015.     He had a parastomal hernia repair in April 2017 by Dr. Mack.  He is healing nicely from that and has been discharged from his office.  He had a small bowel obstruction. that resolved.        Interval History: He feels okay.  His pain is improved.  Back close to baseline.  He denies fever or chills    Review of Systems   Constitutional:  Negative for fever.   Respiratory:  Negative for chest tightness and wheezing.    Cardiovascular:  Negative for chest pain and palpitations.   Gastrointestinal:  Negative for abdominal pain, diarrhea, nausea and vomiting.   Genitourinary:  Positive for flank pain. Negative for difficulty urinating, dysuria and hematuria.    Musculoskeletal:  Negative for arthralgias.   Neurological:  Negative for dizziness.   Psychiatric/Behavioral:  Negative for confusion.    Objective:     Temp:  [98 °F (36.7 °C)-99 °F (37.2 °C)] 98.3 °F (36.8 °C)  Pulse:  [77-94] 90  Resp:  [17-20] 18  SpO2:  [91 %-96 %] 95 %  BP: (117-146)/(62-76) 126/74     Body mass index is 20.34 kg/m².           Drains       Drain  Duration                  Urostomy RLQ -- days         Urostomy 06/17/20 1055 1130 days         Nephrostomy 06/08/22 0846 Right 12 Fr. 410 days         Nephrostomy 06/08/22 0910 Left 12 Fr. 410 days                     Physical Exam  Vitals and nursing note reviewed.   Constitutional:       Appearance: He is well-developed.   HENT:      Head: Normocephalic.   Eyes:      Conjunctiva/sclera: Conjunctivae normal.   Neck:      Thyroid: No thyromegaly.      Trachea: No tracheal deviation.   Cardiovascular:      Rate and Rhythm: Normal rate.      Heart sounds: Normal heart sounds.   Pulmonary:      Effort: Pulmonary effort is normal. No respiratory distress.      Breath sounds: Normal breath sounds. No wheezing.   Abdominal:      General: Bowel sounds are normal.      Palpations: Abdomen is soft.      Tenderness: There is no abdominal tenderness. There is no rebound.      Hernia: No hernia is present.   Genitourinary:     Comments: Stoma pink urine cranberry, slightly lighter than yesterday  Musculoskeletal:         General: No tenderness. Normal range of motion.      Cervical back: Normal range of motion and neck supple.   Lymphadenopathy:      Cervical: No cervical adenopathy.   Skin:     General: Skin is warm and dry.      Findings: No erythema or rash.   Neurological:      Mental Status: He is alert and oriented to person, place, and time.   Psychiatric:         Behavior: Behavior normal.         Thought Content: Thought content normal.         Judgment: Judgment normal.         Significant Labs:    BMP:  Recent Labs   Lab 07/21/23  2689  07/22/23  0423 07/23/23  0508    140 140   K 5.0 4.8 4.2    106 108   CO2 22* 24 21*   BUN 39* 37* 28*   CREATININE 2.7* 2.4* 2.0*   CALCIUM 9.9 9.5 9.2       CBC:   Recent Labs   Lab 07/21/23  1806 07/22/23  0423 07/23/23  0508   WBC 3.85* 3.96 4.41   HGB 14.4 14.1 13.1*   HCT 45.0 44.9 40.5    171 146*       Blood Culture: No results for input(s): LABBLOO in the last 168 hours.  Urine Culture: No results for input(s): LABURIN in the last 168 hours.    Significant Imaging:                      Assessment/Plan:     * Hydronephrosis  Chronic however increased hydronephrosis secondary to a left ureteral stone.  Discussed nephrostomy tube he wants to avoid this at the present time.      He is stable to d/c home from a  standpoint  Follow up with me as scheduled  Home on Keflex x 7 days    Gross hematuria  Follow culture  Home on Keflex is fine  Improving, drink plenty of water          VTE Risk Mitigation (From admission, onward)         Ordered     apixaban tablet 5 mg  2 times daily         07/22/23 0035     IP VTE HIGH RISK PATIENT  Once         07/21/23 2348     Place sequential compression device  Until discontinued         07/21/23 2348                Joo Bo MD  Urology  [facility] ([address])

## 2023-07-23 NOTE — PLAN OF CARE
Adequate for Discharge      Problem: Adult Inpatient Plan of Care  Goal: Plan of Care Review  Outcome: Met  Goal: Patient-Specific Goal (Individualized)  Outcome: Met  Goal: Absence of Hospital-Acquired Illness or Injury  Outcome: Met  Goal: Optimal Comfort and Wellbeing  Outcome: Met  Goal: Readiness for Transition of Care  Outcome: Met     Problem: Fall Injury Risk  Goal: Absence of Fall and Fall-Related Injury  Outcome: Met     Problem: Infection  Goal: Absence of Infection Signs and Symptoms  Outcome: Met     Problem: Pain Acute  Goal: Acceptable Pain Control and Functional Ability  Outcome: Met

## 2023-07-23 NOTE — ASSESSMENT & PLAN NOTE
Gross hematuria, h/o bladder cancer, s/p urostomy  - CT demonstrating L-sided hydroureteronephrosis/bilateral nephrolithiasis. Also with nonobstructing hyperdensity in distal conduit, unchanged from prior.  - Urology consulted; appreciate assistance. Defers nephrostomy at this time. Renal function adequate.  - Continue ceftriaxone 1g IV q24hr. Monitor urine culture.  - Continue tamsulosin 0.4mg PO daily.

## 2023-07-23 NOTE — HOSPITAL COURSE
Admitted with hydronephrosis and gross hematuria. Started IVFs. Urology consulted; started ceftriaxone and urine culture sent. Symptoms improved and renal function stable. Urology discussed potential nephrostomy but patient declined. Converted to cephalexin with planned 7 day total course. With clinical improvement and vital stability he was prepared for discharge home with outpatient Urology follow-up.

## 2023-07-23 NOTE — PLAN OF CARE
Spoke with patient to discuss discharge - follow up appts on AVS - Dr Vikash Bo (urology) 8/9 - Dr Varun Zeng (PCP) 8/14 - new meds escribed to preferred [facility] - son in law will provide transportation home     [facility] ([address])  Discharge Final Note    Primary Care Provider: Varun Zeng MD    Expected Discharge Date: 7/23/2023    Final Discharge Note (most recent)       Final Note - 07/23/23 1044          Final Note    Assessment Type Final Discharge Note     Anticipated Discharge Disposition Home or Self Care     What phone number can be called within the next 1-3 days to see how you are doing after discharge? [phone]     Hospital Resources/Appts/Education Provided Provided patient/caregiver with written discharge plan information;Appointments scheduled and added to AVS        Post-Acute Status    Other Status No Post-Acute Service Needs     Discharge Delays None known at this time

## 2023-07-23 NOTE — SUBJECTIVE & OBJECTIVE
Interval History: He feels okay.  His pain is improved.  Back close to baseline.  He denies fever or chills    Review of Systems   Constitutional:  Negative for fever.   Respiratory:  Negative for chest tightness and wheezing.    Cardiovascular:  Negative for chest pain and palpitations.   Gastrointestinal:  Negative for abdominal pain, diarrhea, nausea and vomiting.   Genitourinary:  Positive for flank pain. Negative for difficulty urinating, dysuria and hematuria.   Musculoskeletal:  Negative for arthralgias.   Neurological:  Negative for dizziness.   Psychiatric/Behavioral:  Negative for confusion.    Objective:     Temp:  [98 °F (36.7 °C)-99 °F (37.2 °C)] 98.3 °F (36.8 °C)  Pulse:  [77-94] 90  Resp:  [17-20] 18  SpO2:  [91 %-96 %] 95 %  BP: (117-146)/(62-76) 126/74     Body mass index is 20.34 kg/m².           Drains       Drain  Duration                  Urostomy RLQ -- days         Urostomy 06/17/20 1055 1130 days         Nephrostomy 06/08/22 0846 Right 12 Fr. 410 days         Nephrostomy 06/08/22 0910 Left 12 Fr. 410 days                     Physical Exam  Vitals and nursing note reviewed.   Constitutional:       Appearance: He is well-developed.   HENT:      Head: Normocephalic.   Eyes:      Conjunctiva/sclera: Conjunctivae normal.   Neck:      Thyroid: No thyromegaly.      Trachea: No tracheal deviation.   Cardiovascular:      Rate and Rhythm: Normal rate.      Heart sounds: Normal heart sounds.   Pulmonary:      Effort: Pulmonary effort is normal. No respiratory distress.      Breath sounds: Normal breath sounds. No wheezing.   Abdominal:      General: Bowel sounds are normal.      Palpations: Abdomen is soft.      Tenderness: There is no abdominal tenderness. There is no rebound.      Hernia: No hernia is present.   Genitourinary:     Comments: Stoma pink urine cranberry, slightly lighter than yesterday  Musculoskeletal:         General: No tenderness. Normal range of motion.      Cervical back: Normal  range of motion and neck supple.   Lymphadenopathy:      Cervical: No cervical adenopathy.   Skin:     General: Skin is warm and dry.      Findings: No erythema or rash.   Neurological:      Mental Status: He is alert and oriented to person, place, and time.   Psychiatric:         Behavior: Behavior normal.         Thought Content: Thought content normal.         Judgment: Judgment normal.         Significant Labs:    BMP:  Recent Labs   Lab 07/21/23  1806 07/22/23  0423 07/23/23  0508    140 140   K 5.0 4.8 4.2    106 108   CO2 22* 24 21*   BUN 39* 37* 28*   CREATININE 2.7* 2.4* 2.0*   CALCIUM 9.9 9.5 9.2       CBC:   Recent Labs   Lab 07/21/23 1806 07/22/23  0423 07/23/23  0508   WBC 3.85* 3.96 4.41   HGB 14.4 14.1 13.1*   HCT 45.0 44.9 40.5    171 146*       Blood Culture: No results for input(s): LABBLOO in the last 168 hours.  Urine Culture: No results for input(s): LABURIN in the last 168 hours.    Significant Imaging:

## 2023-07-27 LAB
BACTERIA UR CULT: ABNORMAL
BACTERIA UR CULT: ABNORMAL

## 2023-08-09 ENCOUNTER — OFFICE VISIT (OUTPATIENT)
Dept: UROLOGY | Facility: CLINIC | Age: 63
End: 2023-08-09
Payer: MEDICARE

## 2023-08-09 VITALS — WEIGHT: 150.69 LBS | BODY MASS INDEX: 20.44 KG/M2

## 2023-08-09 DIAGNOSIS — N22 CALCULUS OF URINARY TRACT IN DISEASES CLASSIFIED ELSEWHERE: Primary | ICD-10-CM

## 2023-08-09 DIAGNOSIS — N20.1 URETERAL STONE: ICD-10-CM

## 2023-08-09 DIAGNOSIS — Z85.51 H/O PRIMARY MALIGNANT NEOPLASM OF URINARY BLADDER: ICD-10-CM

## 2023-08-09 PROCEDURE — 99999 PR PBB SHADOW E&M-EST. PATIENT-LVL IV: CPT | Mod: PBBFAC,,, | Performed by: UROLOGY

## 2023-08-09 PROCEDURE — 1159F PR MEDICATION LIST DOCUMENTED IN MEDICAL RECORD: ICD-10-PCS | Mod: CPTII,S$GLB,, | Performed by: UROLOGY

## 2023-08-09 PROCEDURE — 1160F RVW MEDS BY RX/DR IN RCRD: CPT | Mod: CPTII,S$GLB,, | Performed by: UROLOGY

## 2023-08-09 PROCEDURE — 3008F PR BODY MASS INDEX (BMI) DOCUMENTED: ICD-10-PCS | Mod: CPTII,S$GLB,, | Performed by: UROLOGY

## 2023-08-09 PROCEDURE — 1160F PR REVIEW ALL MEDS BY PRESCRIBER/CLIN PHARMACIST DOCUMENTED: ICD-10-PCS | Mod: CPTII,S$GLB,, | Performed by: UROLOGY

## 2023-08-09 PROCEDURE — 3008F BODY MASS INDEX DOCD: CPT | Mod: CPTII,S$GLB,, | Performed by: UROLOGY

## 2023-08-09 PROCEDURE — 99213 OFFICE O/P EST LOW 20 MIN: CPT | Mod: S$GLB,,, | Performed by: UROLOGY

## 2023-08-09 PROCEDURE — 1159F MED LIST DOCD IN RCRD: CPT | Mod: CPTII,S$GLB,, | Performed by: UROLOGY

## 2023-08-09 PROCEDURE — 99213 PR OFFICE/OUTPT VISIT, EST, LEVL III, 20-29 MIN: ICD-10-PCS | Mod: S$GLB,,, | Performed by: UROLOGY

## 2023-08-09 PROCEDURE — 99999 PR PBB SHADOW E&M-EST. PATIENT-LVL IV: ICD-10-PCS | Mod: PBBFAC,,, | Performed by: UROLOGY

## 2023-08-09 PROCEDURE — 1101F PT FALLS ASSESS-DOCD LE1/YR: CPT | Mod: CPTII,S$GLB,, | Performed by: UROLOGY

## 2023-08-09 PROCEDURE — 1101F PR PT FALLS ASSESS DOC 0-1 FALLS W/OUT INJ PAST YR: ICD-10-PCS | Mod: CPTII,S$GLB,, | Performed by: UROLOGY

## 2023-08-09 PROCEDURE — 3288F PR FALLS RISK ASSESSMENT DOCUMENTED: ICD-10-PCS | Mod: CPTII,S$GLB,, | Performed by: UROLOGY

## 2023-08-09 PROCEDURE — 3288F FALL RISK ASSESSMENT DOCD: CPT | Mod: CPTII,S$GLB,, | Performed by: UROLOGY

## 2023-08-09 NOTE — PROGRESS NOTES
Subjective:       Patient ID: Blake Guillory is a 63 y.o. male who was referred by No ref. provider found    Chief Complaint:   Chief Complaint   Patient presents with    Follow-up       History of Present Illness  Kidney Stones  He has had PCNL and ESWL in the past.  His most recent procedure was on 6/7/2021.  Left ESWL.    9/13/2021  He has had some abdominal and chest pain.  He had gall bladder surgery recently    05/16/2022  He went to the hospital recently due to flank pain R > L.  He was found to have bilateral ureteral stones in his distal ureters, just proximal to his conduit.  He had bilateral nephrostomy tubes placed.  He is here for evaluation and to set up PCNL.  He is having pain from the tubes.     6/28/2022  He had bilateral percutaneous ureteroscopy on 6/8/2022.  The ureteral stones had passed on both sides.  He is still having some pain on the right.      08/16/2022  He is back with a CT.  He continues to have right sided pain, but it is stable.  No fever.     08/09/2023  He was admitted at [facility] in July due to pain from a distal left kidney stone.  This was managed conservatively.  He had significant hematuria at the time.  He feels better now.  The hematuria resolved after going home.    History of Bladder Cancer  He has a history of bladder cancer. He is s/p cystectomy with ileal conduit in November 2012. From an oncologic standpoint, he is doing well. He has had issues with bilateral ureteral stricture. He underwent a bilateral ureteral reimplantation into his conduit several months later. His strictures recurred shortly afterwards.  He was managed with ureteral stent changes until one of the stent eroded through his conduit and into his bowels.    He had an Exploratory Laparotomy with replacement of ileal conduit and small bowel anastomosis on 12/30/2015.     He had a parastomal hernia repair in April by Dr. Mack.  He is healing nicely from that and has been discharged from his  office.  He had a small bowel obstruction. that resolved.        He continues to have back pain in the morning and in the evening.  His stoma is not bothering him and he tells me that he has good urine output during the day and less at night.  He has worsening renal function and his right sided pain is becoming worse.      ACTIVE MEDICAL ISSUES:  Patient Active Problem List   Diagnosis    Insomnia    Lumbar facet arthropathy    Sacroiliitis    Spondylosis without myelopathy    DDD (degenerative disc disease)    History of bladder cancer    Hydronephrosis, bilateral    H/O primary malignant neoplasm of urinary bladder    Essential hypertension    Anemia of chronic disease    Moderate protein malnutrition    Chronic gout    Renal insufficiency    Body mass index (BMI) 20.0-20.9, adult    Personal history of bladder cancer    Acquired hypothyroidism    Hypoxemia    Severe malnutrition    Hydronephrosis    History of primary malignant neoplasm of urinary bladder    Hydronephrosis with urinary obstruction due to ureteral calculus    Chest pain of uncertain etiology    Centrilobular emphysema    Epigastric abdominal tenderness without rebound tenderness    S/P colonoscopy    Iliac artery aneurysm    Kidney stones    Hypertriglyceridemia    Nephrolithiasis    Acute respiratory failure with hypoxia    Kidney stone on left side    Hyperparathyroidism    Encounter for screening for malignant neoplasm of respiratory organs    Personal history of nicotine dependence    Lung nodule    Coronary artery calcification    Symptomatic cholelithiasis    Presence of urostomy    Obstructive chronic bronchitis without exacerbation    Acute deep vein thrombosis (DVT) of axillary vein of right upper extremity    PVD (peripheral vascular disease)    Gross hematuria    Anxiety       PAST MEDICAL HISTORY  Past Medical History:   Diagnosis Date    Anemia of chronic disease     Aneurysm of right common iliac artery     Arthritis     Blood  transfusion     CAD (coronary artery disease)     Chest pain of uncertain etiology     CKD (chronic kidney disease), stage IV     COPD (chronic obstructive pulmonary disease)     Coronary artery calcification 8/2/2021    DDD (degenerative disc disease), lumbar     Frequency of urination     General anesthetics causing adverse effect in therapeutic use     pt states he wakes up very violently from anesthesia    GERD (gastroesophageal reflux disease)     Gout     History of bladder cancer     History of urostomy     Hydronephrosis     Hyperparathyroidism     Hypertension     Hypertriglyceridemia     Hypothyroidism (acquired)     Insomnia     Kidney stone     Limited joint range of motion right hip and leg    Lumbar facet arthropathy     Lumbar spondylosis     Mixed anxiety and depressive disorder     Neuropathy     Orchalgia     SHARMIN (obstructive sleep apnea)     Personal history of bladder cancer     PVD (peripheral vascular disease)     Sacroiliitis     Stricture or kinking of ureter     Ureteral stent displacement     Urinary retention     Vitamin D deficiency     Wears glasses        PAST SURGICAL HISTORY:  Past Surgical History:   Procedure Laterality Date    APPENDECTOMY  12/30/2015    Procedure: APPENDECTOMY;  Surgeon: CHAD Bo MD;  Location: [facility];  Service: Urology;;    BALLOON DILATION OF URETER  01/22/2018    BOWEL RESECTION      COLONOSCOPY      CREATION OF URETEROILEAL CONDUIT Left 07/05/2013    CYSTOSCOPY      >1  episodes for bilat ureteral stent exchange    CYSTOSCOPY W/ URETERAL STENT PLACEMENT Right 01/22/2018    and 6/9/2015    CYSTOSCOPY W/ URETERAL STENT PLACEMENT Bilateral 10/07/2015    ESOPHAGOGASTRODUODENOSCOPY  04/12/2022    W/ BIOPSY    ESOPHAGOGASTRODUODENOSCOPY N/A 4/12/2022    Procedure: EGD (ESOPHAGOGASTRODUODENOSCOPY);  Surgeon: Tito Fan MD;  Location: [facility];  Service: Endoscopy;  Laterality: N/A;    EXPLORATORY LAPAROTOMY  07/05/2013    EXPLORATORY LAPAROTOMY W/  BOWEL RESECTION  12/30/2015    EXTRACORPOREAL SHOCK WAVE LITHOTRIPSY Right 06/07/2021    Procedure: LITHOTRIPSY, ESWL;  Surgeon: CHAD Bo MD;  Location: [facility] OR;  Service: Urology;  Laterality: Right;  RN PREOP 5/31/2021   VACCINATED    EXTRACORPOREAL SHOCK WAVE LITHOTRIPSY  06/07/2021    FRACTURE SURGERY      HAND SURGERY      HEMICOLECTOMY  12/30/2015    HIP SURGERY  2012    Rt hip septic    LAPAROSCOPIC CHOLECYSTECTOMY N/A 08/11/2021    Procedure: CHOLECYSTECTOMY, LAPAROSCOPIC;  Surgeon: Remy Xiong MD;  Location: [facility] OR;  Service: General;  Laterality: N/A;  gianni video confirmed 8/5/dme    LYMPH NODE DISSECTION  11/08/2012    pelvic    LYSIS OF ADHESIONS  12/30/2015    MOUTH SURGERY  2000    all teeth extracted    PERCUTANEOUS NEPHROLITHOTOMY Left 07/22/2020    Procedure: NEPHROLITHOTOMY, PERCUTANEOUS;  Surgeon: CHAD Bo MD;  Location: [facility] OR;  Service: Urology;  Laterality: Left;  OR 9 ONLY  RN PREOP 7/20/2020----COVID NEGATIVE ON 7/20    PERCUTANEOUS NEPHROLITHOTOMY Bilateral 6/8/2022    Procedure: BILATERAL PERCUTANEOUS NEPHROSCOPY, BILATERAL ANTEGRADE PYELOGRAM, BILATERAL NEPHROSTOMY TUBE PLACEMENT;  Surgeon: CHAD Bo MD;  Location: [facility] OR;  Service: Urology;  Laterality: Bilateral;  room 9 only  RN PREOP 6/1/2022----T/S IN AM    PERCUTANEOUS NEPHROSTOMY Left 01/02/2018    PERCUTANEOUS REPLACEMENT OF NEPHROSTOMY TUBE Bilateral 5/10/2022    Procedure: REPLACEMENT, NEPHROSTOMY TUBE, PERCUTANEOUS;  Surgeon: Chino Vo MD;  Location: [facility] CATH LAB;  Service: Radiology;  Laterality: Bilateral;    RADICAL CYSTECTOMY  11/08/2012    RADIOFREQUENCY THERMOCOAGULATION  12/12/2014    median branch lumbar    RADIOFREQUENCY THERMOCOAGULATION  12/01/2014    median branch lumbar    REPAIR, HERNIA, PARASTOMAL, LAPAROSCOPIC  04/12/2017    ROBOTIC BRONCHOSCOPY N/A 1/13/2023    Procedure: ROBOTIC BRONCHOSCOPY;  Surgeon: Camila Rodriguez MD;  Location: [facility] OR [address] [place] [address];  Service:  Anesthesiology;  Laterality: N/A;    SMALL INTESTINE SURGERY  2017    urosotomy bag  2011    R abdomen       SOCIAL HISTORY:  Social History     Tobacco Use    Smoking status: Former     Current packs/day: 0.00     Average packs/day: 0.3 packs/day for 42.0 years (12.6 ttl pk-yrs)     Types: Cigarettes     Start date: 1975     Quit date: 2017     Years since quittin.5    Smokeless tobacco: Never    Tobacco comments:     in cessation program   Substance Use Topics    Alcohol use: Yes     Alcohol/week: 14.0 - 21.0 standard drinks of alcohol     Types: 14 - 21 Cans of beer per week     Comment: occassional    Drug use: Yes     Types: Marijuana     Comment: occass       FAMILY HISTORY:  Family History   Adopted: Yes   Family history unknown: Yes       ALLERGIES AND MEDICATIONS: updated and reviewed.  Review of patient's allergies indicates:  No Known Allergies  Current Outpatient Medications   Medication Sig    albuterol (PROVENTIL/VENTOLIN HFA) 90 mcg/actuation inhaler INHALE 2 PUFFS BY MOUTH INTO THE LUNGS EVERY 6 HOURS AS NEEDED FOR WHEEZING. RESCUE.    albuterol-ipratropium (DUO-NEB) 2.5 mg-0.5 mg/3 mL nebulizer solution Take 3 mLs by nebulization every 6 (six) hours as needed for Wheezing. Rescue    allopurinoL (ZYLOPRIM) 100 MG tablet Take 1 tablet (100 mg total) by mouth once daily.    apixaban (ELIQUIS) 5 mg Tab Take 1 tablet (5 mg total) by mouth 2 (two) times daily.    aspirin (ECOTRIN) 81 MG EC tablet Take 81 mg by mouth once daily.    calcitRIOL (ROCALTROL) 0.25 MCG Cap Take 1 capsule (0.25 mcg total) by mouth once daily.    carvediloL (COREG) 6.25 MG tablet Take 1 tablet (6.25 mg total) by mouth 2 (two) times daily with meals.    clobetasoL (TEMOVATE) 0.05 % cream Apply topically 2 (two) times daily.    EScitalopram oxalate (LEXAPRO) 20 MG tablet Take 1 tablet (20 mg total) by mouth once daily.    fenofibrate (TRICOR) 145 MG tablet Take 1 tablet (145 mg total) by mouth once daily.     fluticasone-umeclidin-vilanter (TRELEGY ELLIPTA) 100-62.5-25 mcg DsDv Inhale 1 puff into the lungs once daily. Rinse mouth after use.    gabapentin (NEURONTIN) 400 MG capsule Take 1 capsule (400 mg total) by mouth 2 (two) times daily.    HYDROcodone-acetaminophen (NORCO)  mg per tablet Take 1 tablet by mouth every 6 (six) hours as needed for Pain.    levothyroxine (SYNTHROID) 88 MCG tablet Take 1 tablet (88 mcg total) by mouth once daily.    LIDOcaine (LIDODERM) 5 % Place 1 patch onto the skin once daily. Remove & Discard patch within 12 hours or as directed by MD    multivitamin (THERAGRAN) per tablet Take 1 tablet by mouth once daily.    pantoprazole (PROTONIX) 40 MG tablet Take 1 tablet (40 mg total) by mouth once daily.    sodium bicarbonate 325 MG tablet Take 325 mg by mouth 2 (two) times daily.    tamsulosin (FLOMAX) 0.4 mg Cap Take 1 capsule (0.4 mg total) by mouth once daily.    tiZANidine (ZANAFLEX) 4 MG tablet Take 1 tablet (4 mg total) by mouth once daily.    traZODone (DESYREL) 150 MG tablet Take 1 tablet (150 mg total) by mouth every evening.     No current facility-administered medications for this visit.       Review of Systems   Constitutional:  Negative for chills and fever.   HENT:  Negative for congestion.    Respiratory:  Negative for chest tightness and shortness of breath.    Cardiovascular:  Negative for chest pain and palpitations.   Gastrointestinal:  Negative for abdominal pain, constipation, diarrhea, nausea and vomiting.   Genitourinary:  Negative for difficulty urinating, dysuria, flank pain, hematuria and urgency.   Musculoskeletal:  Negative for arthralgias.   Neurological:  Negative for dizziness.   Psychiatric/Behavioral:  Negative for confusion.        Objective:      Vitals:    08/09/23 1540   Weight: 68.3 kg (150 lb 11 oz)     Physical Exam  Vitals and nursing note reviewed.   Constitutional:       Appearance: He is well-developed.   HENT:      Head: Normocephalic.   Eyes:       Conjunctiva/sclera: Conjunctivae normal.   Neck:      Thyroid: No thyromegaly.      Trachea: No tracheal deviation.   Cardiovascular:      Rate and Rhythm: Normal rate.      Heart sounds: Normal heart sounds.   Pulmonary:      Effort: Pulmonary effort is normal. No respiratory distress.      Breath sounds: Normal breath sounds. No wheezing.   Abdominal:      General: Bowel sounds are normal.      Palpations: Abdomen is soft.      Tenderness: There is no abdominal tenderness. There is no rebound.      Hernia: No hernia is present.   Musculoskeletal:         General: No tenderness. Normal range of motion.      Cervical back: Normal range of motion and neck supple.   Lymphadenopathy:      Cervical: No cervical adenopathy.   Skin:     General: Skin is warm and dry.      Findings: No erythema or rash.   Neurological:      Mental Status: He is alert and oriented to person, place, and time.   Psychiatric:         Behavior: Behavior normal.         Thought Content: Thought content normal.         Judgment: Judgment normal.         Urine dipstick shows not done.  Micro exam: not done.    CMP  Sodium   Date Value Ref Range Status   08/08/2023 140 136 - 145 mmol/L Final     Potassium   Date Value Ref Range Status   08/08/2023 5.2 (H) 3.5 - 5.1 mmol/L Final     Chloride   Date Value Ref Range Status   08/08/2023 106 95 - 110 mmol/L Final     CO2   Date Value Ref Range Status   08/08/2023 25 23 - 29 mmol/L Final     Glucose   Date Value Ref Range Status   08/08/2023 92 70 - 110 mg/dL Final     BUN   Date Value Ref Range Status   08/08/2023 43 (H) 8 - 23 mg/dL Final     Creatinine   Date Value Ref Range Status   08/08/2023 2.5 (H) 0.5 - 1.4 mg/dL Final     Calcium   Date Value Ref Range Status   08/08/2023 9.8 8.7 - 10.5 mg/dL Final     Total Protein   Date Value Ref Range Status   08/08/2023 7.0 6.0 - 8.4 g/dL Final     Albumin   Date Value Ref Range Status   08/08/2023 3.9 3.5 - 5.2 g/dL Final     Total Bilirubin   Date  Value Ref Range Status   08/08/2023 0.3 0.1 - 1.0 mg/dL Final     Comment:     For infants and newborns, interpretation of results should be based  on gestational age, weight and in agreement with clinical  observations.    Premature Infant recommended reference ranges:  Up to 24 hours.............<8.0 mg/dL  Up to 48 hours............<12.0 mg/dL  3-5 days..................<15.0 mg/dL  6-29 days.................<15.0 mg/dL       Alkaline Phosphatase   Date Value Ref Range Status   08/08/2023 45 (L) 55 - 135 U/L Final     AST   Date Value Ref Range Status   08/08/2023 33 10 - 40 U/L Final     ALT   Date Value Ref Range Status   08/08/2023 29 10 - 44 U/L Final     Anion Gap   Date Value Ref Range Status   08/08/2023 9 8 - 16 mmol/L Final     eGFR   Date Value Ref Range Status   08/08/2023 27.8 (A) >60 mL/min/1.73 m^2 Final        CT Renal Stone Study ABD Pelvis WO  Order: [id number]  Status: Final result     Visible to patient: Yes (not seen)     Next appt: Today at 03:45 PM in Urology (Joo Bo MD)     0 Result Notes  Details    Reading Physician Reading Date Result Priority   Mirlande Padilla MD  [phone] 7/21/2023 STAT     Narrative & Impression  EXAMINATION:  CT RENAL STONE STUDY ABDOMEN PELVIS WITHOUT     CLINICAL HISTORY:  Hematuria that began this a.m.  Urostomy bag noted to the right lower quadrant with reduced in.     TECHNIQUE:  5 mm unenhanced axial images from the lung bases through the greater trochanters were performed.  Coronal and sagittal reformatted images were provided.     COMPARISON:  04/21/2023     FINDINGS:  Within the limits of a noncontrast examination, the liver, spleen, pancreas, and adrenal glands are unremarkable.  The gallbladder is surgically absent.  Calcified splenic granuloma present.     There has been interval development of moderate left hydroureteronephrosis.  Moderate bilateral renal cortical scarring is present.  There is bilateral nephrolithiasis.  There is  a punctate nonobstructing hyperdensity along the conduit distally, which is un changed (axial 115 scratch coronal image 62 and is compared to coronal image 64 on examination from 04/21/2023).     There is no gross abdominal adenopathy or ascites.     There is advanced peripheral vascular disease.  There is aneurysmal dilatation of the common iliac arteries.  The right common iliac arterial aneurysm measures 3.5 cm.  The left common iliac arterial aneurysm measures 1.7 cm.     There is ventral abdominopelvic mesh.     There is colonic diverticulosis.  There are no pelvic masses or adenopathy.  There is no free pelvic fluid.  The appendix is surgically absent.     At the lung bases, there is mild bibasilar atelectatic change.     Impression:     Interval development of moderate left hydroureteronephrosis.  Bilateral nephrolithiasis.  Right lower quadrant ileal conduit with remote cystectomy.  Unchanged punctate density or calculus near the tapered ileum.     Aneurysmal dilatation of the common iliac arteries.  Advanced atherosclerotic disease.     Colonic diverticulosis.        Electronically signed by: Mirlande Padilla  Date:                                            07/21/2023  Time:                                           20:13       Assessment:       1. Calculus of urinary tract in diseases classified elsewhere    2. H/O primary malignant neoplasm of urinary bladder    3. Ureteral stone          Plan:       1. Calculus of urinary tract in diseases classified elsewhere  He will need a PCN if he develops another stone    - CT Renal Stone Study ABD Pelvis WO; Future    2. H/O primary malignant neoplasm of urinary bladder  No sign of recurrence on recent CT    3. Ureteral stone  resolved            Follow up in about 1 year (around 8/9/2024) for Follow up Established, Review X-ray.

## 2023-08-15 NOTE — OR NURSING
Spoke with dr prado about dilaudid order - rn will put in   • LA 2.4->2. 6     Plan  • Continue to trend

## 2023-08-22 ENCOUNTER — HOSPITAL ENCOUNTER (OUTPATIENT)
Facility: OTHER | Age: 63
Discharge: LEFT BEFORE DISCHARGE | End: 2023-08-23
Attending: EMERGENCY MEDICINE | Admitting: HOSPITALIST
Payer: MEDICARE

## 2023-08-22 DIAGNOSIS — R09.02 HYPOXIA: ICD-10-CM

## 2023-08-22 DIAGNOSIS — R07.89 CHEST TIGHTNESS: ICD-10-CM

## 2023-08-22 DIAGNOSIS — R06.02 SHORTNESS OF BREATH: ICD-10-CM

## 2023-08-22 DIAGNOSIS — I21.4 NSTEMI (NON-ST ELEVATED MYOCARDIAL INFARCTION): Primary | ICD-10-CM

## 2023-08-22 LAB
ALBUMIN SERPL BCP-MCNC: 4.1 G/DL (ref 3.5–5.2)
ALP SERPL-CCNC: 42 U/L (ref 55–135)
ALT SERPL W/O P-5'-P-CCNC: 27 U/L (ref 10–44)
ANION GAP SERPL CALC-SCNC: 10 MMOL/L (ref 8–16)
AST SERPL-CCNC: 34 U/L (ref 10–40)
BASOPHILS # BLD AUTO: 0.02 K/UL (ref 0–0.2)
BASOPHILS NFR BLD: 0.3 % (ref 0–1.9)
BILIRUB SERPL-MCNC: 0.5 MG/DL (ref 0.1–1)
BUN SERPL-MCNC: 52 MG/DL (ref 8–23)
CALCIUM SERPL-MCNC: 10.2 MG/DL (ref 8.7–10.5)
CHLORIDE SERPL-SCNC: 102 MMOL/L (ref 95–110)
CO2 SERPL-SCNC: 26 MMOL/L (ref 23–29)
CREAT SERPL-MCNC: 2.9 MG/DL (ref 0.5–1.4)
DIFFERENTIAL METHOD: ABNORMAL
EOSINOPHIL # BLD AUTO: 0.2 K/UL (ref 0–0.5)
EOSINOPHIL NFR BLD: 3.2 % (ref 0–8)
ERYTHROCYTE [DISTWIDTH] IN BLOOD BY AUTOMATED COUNT: 14.9 % (ref 11.5–14.5)
EST. GFR  (NO RACE VARIABLE): 23 ML/MIN/1.73 M^2
GLUCOSE SERPL-MCNC: 84 MG/DL (ref 70–110)
HCT VFR BLD AUTO: 43.4 % (ref 40–54)
HGB BLD-MCNC: 13.9 G/DL (ref 14–18)
IMM GRANULOCYTES # BLD AUTO: 0.01 K/UL (ref 0–0.04)
IMM GRANULOCYTES NFR BLD AUTO: 0.2 % (ref 0–0.5)
LYMPHOCYTES # BLD AUTO: 0.7 K/UL (ref 1–4.8)
LYMPHOCYTES NFR BLD: 12 % (ref 18–48)
MCH RBC QN AUTO: 30.7 PG (ref 27–31)
MCHC RBC AUTO-ENTMCNC: 32 G/DL (ref 32–36)
MCV RBC AUTO: 96 FL (ref 82–98)
MONOCYTES # BLD AUTO: 0.4 K/UL (ref 0.3–1)
MONOCYTES NFR BLD: 7 % (ref 4–15)
NEUTROPHILS # BLD AUTO: 4.6 K/UL (ref 1.8–7.7)
NEUTROPHILS NFR BLD: 77.3 % (ref 38–73)
NRBC BLD-RTO: 0 /100 WBC
PLATELET # BLD AUTO: 189 K/UL (ref 150–450)
PMV BLD AUTO: 11 FL (ref 9.2–12.9)
POTASSIUM SERPL-SCNC: 5 MMOL/L (ref 3.5–5.1)
PROT SERPL-MCNC: 7.3 G/DL (ref 6–8.4)
RBC # BLD AUTO: 4.53 M/UL (ref 4.6–6.2)
SODIUM SERPL-SCNC: 138 MMOL/L (ref 136–145)
TROPONIN I SERPL DL<=0.01 NG/ML-MCNC: 0.1 NG/ML (ref 0–0.03)
WBC # BLD AUTO: 5.99 K/UL (ref 3.9–12.7)

## 2023-08-22 PROCEDURE — 80053 COMPREHEN METABOLIC PANEL: CPT | Performed by: PHYSICIAN ASSISTANT

## 2023-08-22 PROCEDURE — 36415 COLL VENOUS BLD VENIPUNCTURE: CPT | Performed by: PHYSICIAN ASSISTANT

## 2023-08-22 PROCEDURE — 99223 PR INITIAL HOSPITAL CARE,LEVL III: ICD-10-PCS | Mod: ,,, | Performed by: NURSE PRACTITIONER

## 2023-08-22 PROCEDURE — 84484 ASSAY OF TROPONIN QUANT: CPT | Performed by: PHYSICIAN ASSISTANT

## 2023-08-22 PROCEDURE — G0378 HOSPITAL OBSERVATION PER HR: HCPCS

## 2023-08-22 PROCEDURE — 99223 1ST HOSP IP/OBS HIGH 75: CPT | Mod: ,,, | Performed by: NURSE PRACTITIONER

## 2023-08-22 PROCEDURE — 25000003 PHARM REV CODE 250: Performed by: PHYSICIAN ASSISTANT

## 2023-08-22 PROCEDURE — 93005 ELECTROCARDIOGRAM TRACING: CPT

## 2023-08-22 PROCEDURE — 25000003 PHARM REV CODE 250: Performed by: NURSE PRACTITIONER

## 2023-08-22 PROCEDURE — 99285 EMERGENCY DEPT VISIT HI MDM: CPT | Mod: 25

## 2023-08-22 PROCEDURE — 25000242 PHARM REV CODE 250 ALT 637 W/ HCPCS: Performed by: PHYSICIAN ASSISTANT

## 2023-08-22 PROCEDURE — 85025 COMPLETE CBC W/AUTO DIFF WBC: CPT | Performed by: PHYSICIAN ASSISTANT

## 2023-08-22 PROCEDURE — 93010 ELECTROCARDIOGRAM REPORT: CPT | Mod: ,,, | Performed by: INTERNAL MEDICINE

## 2023-08-22 PROCEDURE — 93010 EKG 12-LEAD: ICD-10-PCS | Mod: ,,, | Performed by: INTERNAL MEDICINE

## 2023-08-22 RX ORDER — ACETAMINOPHEN 325 MG/1
650 TABLET ORAL EVERY 4 HOURS PRN
Status: DISCONTINUED | OUTPATIENT
Start: 2023-08-22 | End: 2023-08-23 | Stop reason: HOSPADM

## 2023-08-22 RX ORDER — SODIUM CHLORIDE 0.9 % (FLUSH) 0.9 %
10 SYRINGE (ML) INJECTION EVERY 8 HOURS PRN
Status: DISCONTINUED | OUTPATIENT
Start: 2023-08-22 | End: 2023-08-23 | Stop reason: HOSPADM

## 2023-08-22 RX ORDER — FENOFIBRATE 145 MG/1
145 TABLET, FILM COATED ORAL DAILY
Status: DISCONTINUED | OUTPATIENT
Start: 2023-08-23 | End: 2023-08-23 | Stop reason: HOSPADM

## 2023-08-22 RX ORDER — TAMSULOSIN HYDROCHLORIDE 0.4 MG/1
0.4 CAPSULE ORAL DAILY
Status: DISCONTINUED | OUTPATIENT
Start: 2023-08-23 | End: 2023-08-23 | Stop reason: HOSPADM

## 2023-08-22 RX ORDER — ASPIRIN 325 MG
325 TABLET ORAL
Status: COMPLETED | OUTPATIENT
Start: 2023-08-22 | End: 2023-08-22

## 2023-08-22 RX ORDER — ALLOPURINOL 100 MG/1
100 TABLET ORAL DAILY
Status: DISCONTINUED | OUTPATIENT
Start: 2023-08-23 | End: 2023-08-23 | Stop reason: HOSPADM

## 2023-08-22 RX ORDER — NITROGLYCERIN 0.4 MG/1
0.4 TABLET SUBLINGUAL
Status: COMPLETED | OUTPATIENT
Start: 2023-08-22 | End: 2023-08-22

## 2023-08-22 RX ORDER — HYDROXYZINE PAMOATE 25 MG/1
25 CAPSULE ORAL
Status: COMPLETED | OUTPATIENT
Start: 2023-08-22 | End: 2023-08-22

## 2023-08-22 RX ORDER — FLUTICASONE FUROATE AND VILANTEROL 200; 25 UG/1; UG/1
1 POWDER RESPIRATORY (INHALATION) DAILY
Status: DISCONTINUED | OUTPATIENT
Start: 2023-08-23 | End: 2023-08-23 | Stop reason: HOSPADM

## 2023-08-22 RX ORDER — CALCITRIOL 0.25 UG/1
0.25 CAPSULE ORAL DAILY
Status: DISCONTINUED | OUTPATIENT
Start: 2023-08-23 | End: 2023-08-23 | Stop reason: HOSPADM

## 2023-08-22 RX ORDER — PANTOPRAZOLE SODIUM 40 MG/1
40 TABLET, DELAYED RELEASE ORAL DAILY
Status: DISCONTINUED | OUTPATIENT
Start: 2023-08-23 | End: 2023-08-23 | Stop reason: HOSPADM

## 2023-08-22 RX ORDER — LORAZEPAM 1 MG/1
1 TABLET ORAL
Status: COMPLETED | OUTPATIENT
Start: 2023-08-22 | End: 2023-08-22

## 2023-08-22 RX ORDER — CARVEDILOL 6.25 MG/1
6.25 TABLET ORAL 2 TIMES DAILY WITH MEALS
Status: DISCONTINUED | OUTPATIENT
Start: 2023-08-23 | End: 2023-08-23 | Stop reason: HOSPADM

## 2023-08-22 RX ORDER — NALOXONE HCL 0.4 MG/ML
0.02 VIAL (ML) INJECTION
Status: DISCONTINUED | OUTPATIENT
Start: 2023-08-22 | End: 2023-08-23 | Stop reason: HOSPADM

## 2023-08-22 RX ORDER — ONDANSETRON 2 MG/ML
4 INJECTION INTRAMUSCULAR; INTRAVENOUS EVERY 8 HOURS PRN
Status: DISCONTINUED | OUTPATIENT
Start: 2023-08-22 | End: 2023-08-23 | Stop reason: HOSPADM

## 2023-08-22 RX ORDER — SODIUM BICARBONATE 325 MG/1
325 TABLET ORAL 2 TIMES DAILY
Status: DISCONTINUED | OUTPATIENT
Start: 2023-08-22 | End: 2023-08-23 | Stop reason: HOSPADM

## 2023-08-22 RX ORDER — ESCITALOPRAM OXALATE 10 MG/1
20 TABLET ORAL DAILY
Status: DISCONTINUED | OUTPATIENT
Start: 2023-08-23 | End: 2023-08-23 | Stop reason: HOSPADM

## 2023-08-22 RX ORDER — ASPIRIN 81 MG/1
81 TABLET ORAL DAILY
Status: DISCONTINUED | OUTPATIENT
Start: 2023-08-23 | End: 2023-08-23 | Stop reason: HOSPADM

## 2023-08-22 RX ORDER — TRAZODONE HYDROCHLORIDE 50 MG/1
150 TABLET ORAL NIGHTLY
Status: DISCONTINUED | OUTPATIENT
Start: 2023-08-22 | End: 2023-08-23 | Stop reason: HOSPADM

## 2023-08-22 RX ORDER — GABAPENTIN 400 MG/1
400 CAPSULE ORAL 2 TIMES DAILY
Status: DISCONTINUED | OUTPATIENT
Start: 2023-08-22 | End: 2023-08-23 | Stop reason: HOSPADM

## 2023-08-22 RX ADMIN — SODIUM BICARBONATE 325 MG: 325 TABLET ORAL at 09:08

## 2023-08-22 RX ADMIN — ASPIRIN 325 MG ORAL TABLET 325 MG: 325 PILL ORAL at 07:08

## 2023-08-22 RX ADMIN — GABAPENTIN 400 MG: 400 CAPSULE ORAL at 09:08

## 2023-08-22 RX ADMIN — NITROGLYCERIN 0.4 MG: 0.4 TABLET, ORALLY DISINTEGRATING SUBLINGUAL at 07:08

## 2023-08-22 RX ADMIN — APIXABAN 5 MG: 2.5 TABLET, FILM COATED ORAL at 09:08

## 2023-08-22 RX ADMIN — HYDROXYZINE PAMOATE 25 MG: 25 CAPSULE ORAL at 06:08

## 2023-08-22 RX ADMIN — LORAZEPAM 1 MG: 1 TABLET ORAL at 05:08

## 2023-08-22 NOTE — ED PROVIDER NOTES
"     Source of History:  Patient     Chief complaint:  Panic Attack (Pt states that he has been having more panic attacks recently. Pt states that he has been more stressed out recently. Pt states that he has COPD and his oxygen is usually around 92%. Pt states  he has a appt with his pulmonologist tomorrow to discuss home O2)      HPI:    Blake Guillory is a 63 y.o. male with hypertension, GERD, hypothyroidism, COPD, anxiety, depression, gout and bladder cancer post resection  who is presenting to the emergency department with "anxiety attacks".  Patient states this is a chronic issue but has been worsening increasing over the past couple weeks.  He reports this triggers from being told by his wife who he recently  from that he is soon from his chronic comorbidities.  He states it makes him anxious and that he is not want to leave behind his disabled son.  He states his panic attacks typically consist of hyperventilating, shortness of breath and left sided chest tightness.  He had recent admission at [facility] for COPD exacerbation that required BiPAP.  Does not have home oxygen, has appointment tomorrow pulmonologist.  He states his O2 sat today was 84%.  He followed up with his primary care provider yesterday and was told to take previous prescription for BuSpar.  He took 2 tablets yesterday without significant improvement of anxiety, states the prescription was .  He currently denies HI/SI or hallucinations.    ROS: As per HPI     Review of patient's allergies indicates:  No Known Allergies    PMH:  As per HPI and below:  Past Medical History:   Diagnosis Date    Anemia of chronic disease     Aneurysm of right common iliac artery     Arthritis     Blood transfusion     CAD (coronary artery disease)     Chest pain of uncertain etiology     CKD (chronic kidney disease), stage IV     COPD (chronic obstructive pulmonary disease)     Coronary artery calcification 2021    DDD (degenerative " disc disease), lumbar     Frequency of urination     General anesthetics causing adverse effect in therapeutic use     pt states he wakes up very violently from anesthesia    GERD (gastroesophageal reflux disease)     Gout     History of bladder cancer     History of urostomy     Hydronephrosis     Hyperparathyroidism     Hypertension     Hypertriglyceridemia     Hypothyroidism (acquired)     Insomnia     Kidney stone     Limited joint range of motion right hip and leg    Lumbar facet arthropathy     Lumbar spondylosis     Mixed anxiety and depressive disorder     Neuropathy     Orchalgia     SHARMIN (obstructive sleep apnea)     Personal history of bladder cancer     PVD (peripheral vascular disease)     Sacroiliitis     Stricture or kinking of ureter     Ureteral stent displacement     Urinary retention     Vitamin D deficiency     Wears glasses      Past Surgical History:   Procedure Laterality Date    APPENDECTOMY  12/30/2015    Procedure: APPENDECTOMY;  Surgeon: CHAD Bo MD;  Location: [facility];  Service: Urology;;    BALLOON DILATION OF URETER  01/22/2018    BOWEL RESECTION      COLONOSCOPY      CREATION OF URETEROILEAL CONDUIT Left 07/05/2013    CYSTOSCOPY      >1  episodes for bilat ureteral stent exchange    CYSTOSCOPY W/ URETERAL STENT PLACEMENT Right 01/22/2018    and 6/9/2015    CYSTOSCOPY W/ URETERAL STENT PLACEMENT Bilateral 10/07/2015    ESOPHAGOGASTRODUODENOSCOPY  04/12/2022    W/ BIOPSY    ESOPHAGOGASTRODUODENOSCOPY N/A 4/12/2022    Procedure: EGD (ESOPHAGOGASTRODUODENOSCOPY);  Surgeon: Tito Fan MD;  Location: [facility];  Service: Endoscopy;  Laterality: N/A;    EXPLORATORY LAPAROTOMY  07/05/2013    EXPLORATORY LAPAROTOMY W/ BOWEL RESECTION  12/30/2015    EXTRACORPOREAL SHOCK WAVE LITHOTRIPSY Right 06/07/2021    Procedure: LITHOTRIPSY, ESWL;  Surgeon: CHAD Bo MD;  Location: [facility];  Service: Urology;  Laterality: Right;  RN PREOP 5/31/2021   VACCINATED    EXTRACORPOREAL SHOCK  WAVE LITHOTRIPSY  06/07/2021    FRACTURE SURGERY      HAND SURGERY      HEMICOLECTOMY  12/30/2015    HIP SURGERY  2012    Rt hip septic    LAPAROSCOPIC CHOLECYSTECTOMY N/A 08/11/2021    Procedure: CHOLECYSTECTOMY, LAPAROSCOPIC;  Surgeon: Remy Xiong MD;  Location: [facility] OR;  Service: General;  Laterality: N/A;  gianni video confirmed 8/5/dme    LYMPH NODE DISSECTION  11/08/2012    pelvic    LYSIS OF ADHESIONS  12/30/2015    MOUTH SURGERY  2000    all teeth extracted    PERCUTANEOUS NEPHROLITHOTOMY Left 07/22/2020    Procedure: NEPHROLITHOTOMY, PERCUTANEOUS;  Surgeon: CHAD Bo MD;  Location: [facility] OR;  Service: Urology;  Laterality: Left;  OR 9 ONLY  RN PREOP 7/20/2020----COVID NEGATIVE ON 7/20    PERCUTANEOUS NEPHROLITHOTOMY Bilateral 6/8/2022    Procedure: BILATERAL PERCUTANEOUS NEPHROSCOPY, BILATERAL ANTEGRADE PYELOGRAM, BILATERAL NEPHROSTOMY TUBE PLACEMENT;  Surgeon: CHAD Bo MD;  Location: [facility] OR;  Service: Urology;  Laterality: Bilateral;  room 9 only  RN PREOP 6/1/2022----T/S IN AM    PERCUTANEOUS NEPHROSTOMY Left 01/02/2018    PERCUTANEOUS REPLACEMENT OF NEPHROSTOMY TUBE Bilateral 5/10/2022    Procedure: REPLACEMENT, NEPHROSTOMY TUBE, PERCUTANEOUS;  Surgeon: Chino Vo MD;  Location: [facility] [place] CATH LAB;  Service: Radiology;  Laterality: Bilateral;    RADICAL CYSTECTOMY  11/08/2012    RADIOFREQUENCY THERMOCOAGULATION  12/12/2014    median branch lumbar    RADIOFREQUENCY THERMOCOAGULATION  12/01/2014    median branch lumbar    REPAIR, HERNIA, PARASTOMAL, LAPAROSCOPIC  04/12/2017    ROBOTIC BRONCHOSCOPY N/A 1/13/2023    Procedure: ROBOTIC BRONCHOSCOPY;  Surgeon: Camila Rodriguez MD;  [facility];  Service: Anesthesiology;  Laterality: N/A;    SMALL INTESTINE SURGERY  04/17/2017    urosotomy bag  2011    R abdomen       Social History     Tobacco Use    Smoking status: Former     Current packs/day: 0.00     Average packs/day: 0.3 packs/day for 42.0 years (12.6 ttl  pk-yrs)     Types: Cigarettes     Start date: 1975     Quit date: 2017     Years since quittin.5    Smokeless tobacco: Never    Tobacco comments:     in cessation program   Substance Use Topics    Alcohol use: Yes     Alcohol/week: 14.0 - 21.0 standard drinks of alcohol     Types: 14 - 21 Cans of beer per week     Comment: occassional    Drug use: Yes     Types: Marijuana     Comment: occass       Physical Exam:    /69   Pulse 84   Temp 98.3 °F (36.8 °C)   Resp 20   SpO2 (!) 92%   Nursing note and vital signs reviewed.  Appearance: No acute distress.  Eyes: No conjunctival injection.  ENT: Oropharynx clear.    Chest/ Respiratory: Clear to auscultation bilaterally.  Decreased air movement throughout.  No wheezes.  No rhonchi. No rales. No accessory muscle use.  Cardiovascular: Regular rate and rhythm.  No murmurs. No gallops. No rubs.  Abdomen: Soft.  Not distended.  Nontender.  No guarding.  No rebound. Non-peritoneal.  Musculoskeletal: Good range of motion all joints.  No deformities.  Neck supple.  No meningismus.  Skin: No rashes seen.  Good turgor.  No abrasions.  No ecchymoses.  Neurologic: Motor intact.  Sensation intact.   Mental Status:  Alert and oriented x 3.  Appropriate, conversant.  Good insight.  Not responding to internal stimuli.  Labs that have been ordered have been independently reviewed and interpreted by myself.    EKG:  Normal sinus rhythm rate of 70 beats per minute.  No STEMI.  No ectopy.    I decided to obtain the patient's medical records.  Summary of Medical Records:  Reviewed PCP visit yesterday and admission from  where patient left AMA after being placed in the ICU for respiratory distress on BiPAP.    MDM/ Differential Dx:    63 y.o. male with anxiety, COPD, CKD, and hypothyroidism who is presenting to the emergency department with concern for recurrent panic attacks.  States he is episodes caused him to hyperventilate and feels short of breath.  Was  concerned with hypoxic reading at home today of 84% on room air, is not on home O2.  He is not gravely disabled, suicidal or a danger to himself or others.    Differential diagnosis includes anxiety attack, pneumonia, COPD exacerbation ACS, COVID.    ED Course as of 08/22/23 1938 Tue Aug 22, 2023   1827 Blood work with stable anemia.  Chemistry with creatinine of 2.9 and GFR 23, baseline creatinine 2.0-2.9. [AG]   1855 Reports still feeling anxious despite receiving Ativan [AG]   1931 Pt resting comfortably.  Appears to be in no distress.  Troponin is elevated, 0.104 (0.037 3 days ago and 0.022 4 days ago).   I am concerned that related to NSTEMI.  Will give aspirin and nitroglycerin here in the ED.      Case discussed with Ben newell, JOHNIE- will place under observation to hospital medicine team. [AG]      ED Course User Index  [AG] Ishmael Haas PA-C           Diagnostic Impression:    1. NSTEMI (non-ST elevated myocardial infarction)    2. Shortness of breath    3. Chest tightness    4. Hypoxia                   Ishmael Haas PA-C  08/22/23 1940       Ishmeal Haas PA-C  08/22/23 1940

## 2023-08-22 NOTE — ED TRIAGE NOTES
"Patient states that he feels he is having more frequent anxiety attacks. States that he is currently living with his disabled son and often worries about who will care for him when he is no longer able to. Denies SI/HI. Patient is retired, recently  ("but that is not what has me anxious"). Presents in no distress.   "

## 2023-08-22 NOTE — Clinical Note
Diagnosis: NSTEMI (non-ST elevated myocardial infarction) [198231]   Future Attending Provider: HARDIK MILTON [[id number]]   Is the patient being sent to ED Observation?: No   Admitting Provider:: HARDIK MILTON [[id number]]   Special Needs:: No Special Needs [1]

## 2023-08-22 NOTE — FIRST PROVIDER EVALUATION
Emergency Department TeleTriage Encounter Note      CHIEF COMPLAINT    Chief Complaint   Patient presents with    Panic Attack     Pt states that he has been having more panic attacks recently. Pt states that he has been more stressed out recently. Pt states that he has COPD and his oxygen is usually around 92%. Pt states  he has a appt with his pulmonologist tomorrow to discuss home O2       VITAL SIGNS   Initial Vitals [08/22/23 1619]   BP Pulse Resp Temp SpO2   120/69 84 20 98.3 °F (36.8 °C) (!) 92 %      MAP       --            ALLERGIES    Review of patient's allergies indicates:  No Known Allergies    PROVIDER TRIAGE NOTE  Patient presents with complaint of worsening shortness of breath. Also reports cough and congestion. No fever. No CP. No LE swelling. Worsening anxiety recently. Recent hospitalization.       Phy:   Constitutional: well nourished, well developed, appearing stated age, NAD   HEENT: NCAT, symmetrical lids, No obvious facial deformity.  Normal phonation. Normal Conjunctiva   Neck: NAROM   Respiratory: Normal effort.  No obvious use of accessory muscles   Musculoskeletal: Moved upper extremities well   Neuro: Alert, answers questions appropriately    Psych: appropriate mood and affect      Initial orders will be placed and care will be transferred to an alternate provider when patient is roomed for a full evaluation. Any additional orders and the final disposition will be determined by that provider.        ORDERS  Labs Reviewed   CBC W/ AUTO DIFFERENTIAL   COMPREHENSIVE METABOLIC PANEL       ED Orders (720h ago, onward)      Start Ordered     Status Ordering Provider    08/22/23 1646 08/22/23 1645  CBC Auto Differential  STAT         Ordered BEATRIZ PHILIP    08/22/23 1646 08/22/23 1645  Comprehensive Metabolic Panel  STAT         Ordered BEATRIZ PHILIP    08/22/23 1646 08/22/23 1645  Pulse Oximetry Continuous  Continuous         Ordered BEATRIZ PHILIP     08/22/23 1646 08/22/23 1645  Cardiac Monitoring - Adult  Continuous         Ordered BEATRIZ PHILIP    08/22/23 1646 08/22/23 1645  EKG 12-lead  Once         Ordered BEATRIZ PHILIP    08/22/23 1646 08/22/23 1645  X-Ray Chest 1 View  1 time imaging         Ordered BEATRIZ PHILIP              Virtual Visit Note: The provider triage portion of this emergency department evaluation and documentation was performed via [employer], a HIPAA-compliant telemedicine application, in concert with a tele-presenter in the room. A face to face patient evaluation with one of my colleagues will occur once the patient is placed in an emergency department room.      DISCLAIMER: This note was prepared with [employer] voice recognition transcription software. Garbled syntax, mangled pronouns, and other bizarre constructions may be attributed to that software system.

## 2023-08-23 ENCOUNTER — OFFICE VISIT (OUTPATIENT)
Dept: PULMONOLOGY | Facility: CLINIC | Age: 63
End: 2023-08-23
Payer: MEDICARE

## 2023-08-23 VITALS
BODY MASS INDEX: 18.3 KG/M2 | TEMPERATURE: 98 F | DIASTOLIC BLOOD PRESSURE: 66 MMHG | OXYGEN SATURATION: 96 % | HEART RATE: 88 BPM | HEIGHT: 72 IN | WEIGHT: 135.13 LBS | SYSTOLIC BLOOD PRESSURE: 159 MMHG | RESPIRATION RATE: 26 BRPM

## 2023-08-23 VITALS
DIASTOLIC BLOOD PRESSURE: 91 MMHG | HEART RATE: 99 BPM | HEIGHT: 72 IN | OXYGEN SATURATION: 92 % | SYSTOLIC BLOOD PRESSURE: 138 MMHG | BODY MASS INDEX: 19.68 KG/M2 | WEIGHT: 145.31 LBS

## 2023-08-23 DIAGNOSIS — R91.1 LUNG NODULE: ICD-10-CM

## 2023-08-23 DIAGNOSIS — J43.2 CENTRILOBULAR EMPHYSEMA: ICD-10-CM

## 2023-08-23 PROBLEM — I82.A21 CHRONIC DEEP VEIN THROMBOSIS (DVT) OF AXILLARY VEIN OF RIGHT UPPER EXTREMITY: Status: ACTIVE | Noted: 2023-05-09

## 2023-08-23 LAB — TROPONIN I SERPL DL<=0.01 NG/ML-MCNC: 0.09 NG/ML (ref 0–0.03)

## 2023-08-23 PROCEDURE — 3080F PR MOST RECENT DIASTOLIC BLOOD PRESSURE >= 90 MM HG: ICD-10-PCS | Mod: CPTII,S$GLB,, | Performed by: INTERNAL MEDICINE

## 2023-08-23 PROCEDURE — 36415 COLL VENOUS BLD VENIPUNCTURE: CPT | Performed by: NURSE PRACTITIONER

## 2023-08-23 PROCEDURE — 3008F BODY MASS INDEX DOCD: CPT | Mod: CPTII,S$GLB,, | Performed by: INTERNAL MEDICINE

## 2023-08-23 PROCEDURE — 1101F PR PT FALLS ASSESS DOC 0-1 FALLS W/OUT INJ PAST YR: ICD-10-PCS | Mod: CPTII,S$GLB,, | Performed by: INTERNAL MEDICINE

## 2023-08-23 PROCEDURE — 1111F PR DISCHARGE MEDS RECONCILED W/ CURRENT OUTPATIENT MED LIST: ICD-10-PCS | Mod: CPTII,S$GLB,, | Performed by: INTERNAL MEDICINE

## 2023-08-23 PROCEDURE — 3075F PR MOST RECENT SYSTOLIC BLOOD PRESS GE 130-139MM HG: ICD-10-PCS | Mod: CPTII,S$GLB,, | Performed by: INTERNAL MEDICINE

## 2023-08-23 PROCEDURE — 3080F DIAST BP >= 90 MM HG: CPT | Mod: CPTII,S$GLB,, | Performed by: INTERNAL MEDICINE

## 2023-08-23 PROCEDURE — 99999 PR PBB SHADOW E&M-EST. PATIENT-LVL V: CPT | Mod: PBBFAC,,, | Performed by: INTERNAL MEDICINE

## 2023-08-23 PROCEDURE — 99214 OFFICE O/P EST MOD 30 MIN: CPT | Mod: S$GLB,,, | Performed by: INTERNAL MEDICINE

## 2023-08-23 PROCEDURE — 3288F FALL RISK ASSESSMENT DOCD: CPT | Mod: CPTII,S$GLB,, | Performed by: INTERNAL MEDICINE

## 2023-08-23 PROCEDURE — 3008F PR BODY MASS INDEX (BMI) DOCUMENTED: ICD-10-PCS | Mod: CPTII,S$GLB,, | Performed by: INTERNAL MEDICINE

## 2023-08-23 PROCEDURE — 99999 PR PBB SHADOW E&M-EST. PATIENT-LVL V: ICD-10-PCS | Mod: PBBFAC,,, | Performed by: INTERNAL MEDICINE

## 2023-08-23 PROCEDURE — 3075F SYST BP GE 130 - 139MM HG: CPT | Mod: CPTII,S$GLB,, | Performed by: INTERNAL MEDICINE

## 2023-08-23 PROCEDURE — G0378 HOSPITAL OBSERVATION PER HR: HCPCS

## 2023-08-23 PROCEDURE — 1111F DSCHRG MED/CURRENT MED MERGE: CPT | Mod: CPTII,S$GLB,, | Performed by: INTERNAL MEDICINE

## 2023-08-23 PROCEDURE — 1101F PT FALLS ASSESS-DOCD LE1/YR: CPT | Mod: CPTII,S$GLB,, | Performed by: INTERNAL MEDICINE

## 2023-08-23 PROCEDURE — 1159F PR MEDICATION LIST DOCUMENTED IN MEDICAL RECORD: ICD-10-PCS | Mod: CPTII,S$GLB,, | Performed by: INTERNAL MEDICINE

## 2023-08-23 PROCEDURE — 84484 ASSAY OF TROPONIN QUANT: CPT | Performed by: NURSE PRACTITIONER

## 2023-08-23 PROCEDURE — 1159F MED LIST DOCD IN RCRD: CPT | Mod: CPTII,S$GLB,, | Performed by: INTERNAL MEDICINE

## 2023-08-23 PROCEDURE — 3288F PR FALLS RISK ASSESSMENT DOCUMENTED: ICD-10-PCS | Mod: CPTII,S$GLB,, | Performed by: INTERNAL MEDICINE

## 2023-08-23 PROCEDURE — 99214 PR OFFICE/OUTPT VISIT, EST, LEVL IV, 30-39 MIN: ICD-10-PCS | Mod: S$GLB,,, | Performed by: INTERNAL MEDICINE

## 2023-08-23 RX ORDER — PREDNISONE 20 MG/1
40 TABLET ORAL DAILY
Qty: 20 TABLET | Refills: 0 | Status: SHIPPED | OUTPATIENT
Start: 2023-08-23 | End: 2023-08-30

## 2023-08-23 RX ORDER — PREDNISONE 20 MG/1
40 TABLET ORAL DAILY
Qty: 10 TABLET | Refills: 0 | Status: SHIPPED | OUTPATIENT
Start: 2023-08-23 | End: 2023-08-23

## 2023-08-23 NOTE — PROGRESS NOTES
Blake Guillory  was seen as a new patient to me for the evaluation of  pulmonary nodule.    CHIEF COMPLAINT:  Pulmonary Nodules and Shortness of Breath        HISTORY OF PRESENT ILLNESS: Blake Guillory is a 63 y.o. male  has a past medical history of Anemia of chronic disease, Aneurysm of right common iliac artery, Arthritis, Blood transfusion, CAD (coronary artery disease), Chest pain of uncertain etiology, CKD (chronic kidney disease), stage IV, COPD (chronic obstructive pulmonary disease), Coronary artery calcification (8/2/2021), DDD (degenerative disc disease), lumbar, Frequency of urination, General anesthetics causing adverse effect in therapeutic use, GERD (gastroesophageal reflux disease), Gout, History of bladder cancer, History of urostomy, Hydronephrosis, Hyperparathyroidism, Hypertension, Hypertriglyceridemia, Hypothyroidism (acquired), Insomnia, Kidney stone, Limited joint range of motion (right hip and leg), Lumbar facet arthropathy, Lumbar spondylosis, Mixed anxiety and depressive disorder, Neuropathy, Orchalgia, SHARMIN (obstructive sleep apnea), Personal history of bladder cancer, PVD (peripheral vascular disease), Sacroiliitis, Stricture or kinking of ureter, Ureteral stent displacement, Urinary retention, Vitamin D deficiency, and Wears glasses.  Patient was followed by pulm at [facility] [place], Dr. Rodriguez and Tiki Francis for copd and pulmonary nodules.  Per Dr. Rodriguez's last note on 8/17/22, rul nodule has enlarged on 7/29/22.  The plan was for robotic bronchoscopy.  Per chart reviewed, patient was schedule for 9/2/22.  However, patient no showed and was lost to follow up.  Patient is here for pulmonary follow up.  Our first encounter was 11/30/22.       S/p ebus with Dr. Rodriguez on Chronic ramos x 1/4 block.  Cytology with non-caseating granuloma.  +moraxella.  S/p bactrim.   +chronic cough and wheezing.  Currently on trelegy, nebulizer and albuterol hfa.  No fever/chill.  +weight loss 40 lbs since 2010.  No  hemoptysis.  Desat when sleeping.      Patient presented to [facility] for anxiety, sob.  Mildly elevated troponin but patient left ama prior cardiology evaluation.  Per patient, he was concerned about his disable son.  Patient denied chest pain.  No ramos with walking 6 blocks after hospital discharge.      Additional Pulmonary History:   Occupational/Environmental Exposures:  retire construction  Exposure to Animals/Pets:  1 dog   Foreign Travel History: used to live [facility] as a child  History of exposures to TB:  none   Family History of Lung Cancer:  none   Tobacco:  Patient quit smoking in 2018.  Used to smoke cigars, pipe, cigarrette for 50 years.   Childhood history of Lung Disease:  none  Chest surgery or trauma:  none      PAST MEDICAL HISTORY:    Active Ambulatory Problems     Diagnosis Date Noted    Insomnia 04/15/2014    Lumbar facet arthropathy 08/29/2014    Sacroiliitis 08/29/2014    Spondylosis without myelopathy 08/29/2014    DDD (degenerative disc disease) 08/29/2014    History of bladder cancer 03/06/2015    Hydronephrosis, bilateral 06/19/2015    H/O primary malignant neoplasm of urinary bladder 10/07/2015    Essential hypertension 01/02/2016    Anemia of chronic disease 01/02/2016    Moderate protein malnutrition 01/02/2016    Chronic gout 01/02/2016    Renal insufficiency 06/27/2016    Body mass index (BMI) 20.0-20.9, adult 07/07/2016    Personal history of bladder cancer     Acquired hypothyroidism 10/21/2016    Hypoxemia 04/18/2017    Severe malnutrition 04/21/2017    Hydronephrosis 01/02/2018    History of primary malignant neoplasm of urinary bladder 01/02/2018    Hydronephrosis with urinary obstruction due to ureteral calculus 01/22/2018    Chest pain of uncertain etiology 03/22/2018    Centrilobular emphysema 03/22/2018    Epigastric abdominal tenderness without rebound tenderness 03/22/2018    S/P colonoscopy 05/28/2018    Iliac artery aneurysm 11/16/2018    Kidney  stones 11/16/2018    Hypertriglyceridemia 05/20/2020    Nephrolithiasis 06/17/2020    Acute respiratory failure with hypoxia 06/18/2020    Kidney stone on left side 07/22/2020    Hyperparathyroidism 08/24/2020    Encounter for screening for malignant neoplasm of respiratory organs 11/09/2020    Personal history of nicotine dependence 11/09/2020    Lung nodule 01/12/2021    Coronary artery calcification 08/02/2021    Symptomatic cholelithiasis 08/11/2021    Presence of urostomy 08/17/2022    Obstructive chronic bronchitis without exacerbation 12/21/2022    Chronic deep vein thrombosis (DVT) of axillary vein of right upper extremity 05/09/2023    PVD (peripheral vascular disease) 05/09/2023    Gross hematuria 07/22/2023    Anxiety 07/23/2023    NSTEMI (non-ST elevated myocardial infarction) 08/22/2023     Resolved Ambulatory Problems     Diagnosis Date Noted    Bladder cancer 11/08/2012    Cerumen impaction 04/15/2014    Ureteral stricture 08/08/2014    Stricture or kinking of ureter 10/07/2015    Ureteric fistula to colon 12/22/2015    Ureteric fistula to small intestine 12/30/2015    Parastomal hernia without obstruction or gangrene 04/12/2017    Small bowel obstruction 04/17/2017    Increased PTH level 04/18/2017    Hypocalcemia 04/18/2017    Hyperphosphatemia 04/18/2017    Acute renal failure 04/18/2017    Hypokalemia 04/22/2017    Hypernatremia 04/22/2017    Acute kidney injury superimposed on CKD 03/22/2018    Chronic respiratory failure with hypoxia 05/10/2022     Past Medical History:   Diagnosis Date    Aneurysm of right common iliac artery     Arthritis     Blood transfusion     CAD (coronary artery disease)     CKD (chronic kidney disease), stage IV     COPD (chronic obstructive pulmonary disease)     DDD (degenerative disc disease), lumbar     Frequency of urination     General anesthetics causing adverse effect in therapeutic use     GERD (gastroesophageal reflux disease)     Gout     History of urostomy      Hypertension     Hypothyroidism (acquired)     Kidney stone     Limited joint range of motion right hip and leg    Lumbar spondylosis     Mixed anxiety and depressive disorder     Neuropathy     Orchalgia     SHARMIN (obstructive sleep apnea)     Ureteral stent displacement     Urinary retention     Vitamin D deficiency     Wears glasses                 PAST SURGICAL HISTORY:    Past Surgical History:   Procedure Laterality Date    APPENDECTOMY  12/30/2015    Procedure: APPENDECTOMY;  Surgeon: CHAD Bo MD;  Location: [facility];  Service: Urology;;    BALLOON DILATION OF URETER  01/22/2018    BOWEL RESECTION      COLONOSCOPY      CREATION OF URETEROILEAL CONDUIT Left 07/05/2013    CYSTOSCOPY      >1  episodes for bilat ureteral stent exchange    CYSTOSCOPY W/ URETERAL STENT PLACEMENT Right 01/22/2018    and 6/9/2015    CYSTOSCOPY W/ URETERAL STENT PLACEMENT Bilateral 10/07/2015    ESOPHAGOGASTRODUODENOSCOPY  04/12/2022    W/ BIOPSY    ESOPHAGOGASTRODUODENOSCOPY N/A 4/12/2022    Procedure: EGD (ESOPHAGOGASTRODUODENOSCOPY);  Surgeon: Tito Fan MD;  Location: [facility];  Service: Endoscopy;  Laterality: N/A;    EXPLORATORY LAPAROTOMY  07/05/2013    EXPLORATORY LAPAROTOMY W/ BOWEL RESECTION  12/30/2015    EXTRACORPOREAL SHOCK WAVE LITHOTRIPSY Right 06/07/2021    Procedure: LITHOTRIPSY, ESWL;  Surgeon: CHAD Bo MD;  Location: [facility] OR;  Service: Urology;  Laterality: Right;  RN PREOP 5/31/2021   VACCINATED    EXTRACORPOREAL SHOCK WAVE LITHOTRIPSY  06/07/2021    FRACTURE SURGERY      HAND SURGERY      HEMICOLECTOMY  12/30/2015    HIP SURGERY  2012    Rt hip septic    LAPAROSCOPIC CHOLECYSTECTOMY N/A 08/11/2021    Procedure: CHOLECYSTECTOMY, LAPAROSCOPIC;  Surgeon: Remy Xiong MD;  Location: [facility] OR;  Service: General;  Laterality: N/A;  gianni video confirmed 8/5/dme    LYMPH NODE DISSECTION  11/08/2012    pelvic    LYSIS OF ADHESIONS  12/30/2015    MOUTH SURGERY  2000    all teeth  extracted    PERCUTANEOUS NEPHROLITHOTOMY Left 2020    Procedure: NEPHROLITHOTOMY, PERCUTANEOUS;  Surgeon: CHAD Bo MD;  Location: [facility] OR;  Service: Urology;  Laterality: Left;  OR 9 ONLY  RN PREOP 2020----COVID NEGATIVE ON     PERCUTANEOUS NEPHROLITHOTOMY Bilateral 2022    Procedure: BILATERAL PERCUTANEOUS NEPHROSCOPY, BILATERAL ANTEGRADE PYELOGRAM, BILATERAL NEPHROSTOMY TUBE PLACEMENT;  Surgeon: CHAD Bo MD;  Location: [facility] OR;  Service: Urology;  Laterality: Bilateral;  room 9 only  RN PREOP 2022----T/S IN AM    PERCUTANEOUS NEPHROSTOMY Left 2018    PERCUTANEOUS REPLACEMENT OF NEPHROSTOMY TUBE Bilateral 5/10/2022    Procedure: REPLACEMENT, NEPHROSTOMY TUBE, PERCUTANEOUS;  Surgeon: Chino Vo MD;  Location: [facility] CATH LAB;  Service: Radiology;  Laterality: Bilateral;    RADICAL CYSTECTOMY  2012    RADIOFREQUENCY THERMOCOAGULATION  2014    median branch lumbar    RADIOFREQUENCY THERMOCOAGULATION  2014    median branch lumbar    REPAIR, HERNIA, PARASTOMAL, LAPAROSCOPIC  2017    ROBOTIC BRONCHOSCOPY N/A 2023    Procedure: ROBOTIC BRONCHOSCOPY;  Surgeon: Camila Rodriguez MD;  Location: [address];  Service: Anesthesiology;  Laterality: N/A;    SMALL INTESTINE SURGERY  2017    urosotomy bag  2011    R abdomen         FAMILY HISTORY:                Family History   Adopted: Yes   Family history unknown: Yes       SOCIAL HISTORY:          Tobacco:   Social History     Tobacco Use   Smoking Status Former    Current packs/day: 0.00    Average packs/day: 0.3 packs/day for 42.0 years (12.6 ttl pk-yrs)    Types: Cigarettes    Start date: 1975    Quit date: 2017    Years since quittin.5   Smokeless Tobacco Never   Tobacco Comments    in cessation program     alcohol use:    Social History     Substance and Sexual Activity   Alcohol Use Yes    Alcohol/week: 14.0 - 21.0 standard drinks of alcohol    Types: 14 - 21 Cans of beer  per week    Comment: occassional               Occupation:  construction.      ALLERGIES:  Review of patient's allergies indicates:  No Known Allergies    CURRENT MEDICATIONS:    Current Outpatient Medications   Medication Sig Dispense Refill    albuterol (PROVENTIL/VENTOLIN HFA) 90 mcg/actuation inhaler Inhale 2 puffs into the lungs every 6 (six) hours as needed for Wheezing. Rescue 18 g 0    albuterol-ipratropium (DUO-NEB) 2.5 mg-0.5 mg/3 mL nebulizer solution Take 3 mLs by nebulization every 6 (six) hours as needed for Wheezing. Rescue 150 mL 6    allopurinoL (ZYLOPRIM) 100 MG tablet Take 1 tablet (100 mg total) by mouth once daily. 90 tablet 0    apixaban (ELIQUIS) 5 mg Tab Take 1 tablet (5 mg total) by mouth 2 (two) times daily. 60 tablet 0    aspirin (ECOTRIN) 81 MG EC tablet Take 81 mg by mouth once daily.      calcitRIOL (ROCALTROL) 0.25 MCG Cap Take 1 capsule (0.25 mcg total) by mouth once daily. 90 capsule 0    carvediloL (COREG) 6.25 MG tablet Take 1 tablet (6.25 mg total) by mouth 2 (two) times daily with meals. 180 tablet 1    clobetasoL (TEMOVATE) 0.05 % cream Apply topically 2 (two) times daily. 30 g 1    EScitalopram oxalate (LEXAPRO) 20 MG tablet Take 1 tablet (20 mg total) by mouth once daily. 90 tablet 0    fenofibrate (TRICOR) 145 MG tablet Take 1 tablet (145 mg total) by mouth once daily. 90 tablet 0    fluticasone-umeclidin-vilanter (TRELEGY ELLIPTA) 100-62.5-25 mcg DsDv Inhale 1 puff into the lungs once daily. Rinse mouth after use. 60 each 2    gabapentin (NEURONTIN) 400 MG capsule Take 1 capsule (400 mg total) by mouth 2 (two) times daily. 180 capsule 0    HYDROcodone-acetaminophen (NORCO)  mg per tablet Take 1 tablet by mouth every 6 (six) hours as needed for Pain. 12 tablet 0    levothyroxine (SYNTHROID) 125 MCG tablet Take 1 tablet (125 mcg total) by mouth once daily. 90 tablet 0    LIDOcaine (LIDODERM) 5 % Place 1 patch onto the skin once daily. Remove & Discard patch within 12  hours or as directed by MD 15 patch 0    multivitamin (THERAGRAN) per tablet Take 1 tablet by mouth once daily.      pantoprazole (PROTONIX) 40 MG tablet Take 1 tablet (40 mg total) by mouth once daily. 90 tablet 0    sodium bicarbonate 325 MG tablet Take 325 mg by mouth 2 (two) times daily.      sucralfate (CARAFATE) 1 gram tablet Take 1 tablet (1 g total) by mouth 2 (two) times daily. 120 tablet 1    tamsulosin (FLOMAX) 0.4 mg Cap Take 1 capsule (0.4 mg total) by mouth once daily. 90 capsule 0    tiZANidine (ZANAFLEX) 4 MG tablet Take 1 tablet (4 mg total) by mouth once daily. 30 tablet 1    traZODone (DESYREL) 150 MG tablet Take 1 tablet (150 mg total) by mouth every evening. 90 tablet 0    predniSONE (DELTASONE) 20 MG tablet Take 2 tablets (40 mg total) by mouth once daily. for 10 days 20 tablet 0     No current facility-administered medications for this visit.                  REVIEW OF SYSTEMS:     Pulmonary related symptoms as per HPI.  Gen:  + weight loss, no fever, no night sweat  HEENT:  no visual changes, no sore throat, no hearing loss  CV:  No chest pain, no orthopnea, no PND  GI:  no melena, no hematochezia, no diarhea, no constipation.  :  no dysuria, no hematuria, no hesistancy, no dribbling  Neuro:  no syncope, no vertigo, no tinitus  Psych:  No homocide or suicide ideation; no depression.  Endocrine:  No heat or cold intolerance.  Sleep:  No snoring; no witnessed apnea.  Otherwise, a balance of systems reviewed is negative.          PHYSICAL EXAM:  Vitals:    08/23/23 1059   BP: (!) 138/91   Pulse: 99   SpO2: (!) 92%   Weight: 65.9 kg (145 lb 4.5 oz)   Height: 6' (1.829 m)   PainSc:   4   PainLoc: Generalized       Body mass index is 19.7 kg/m².     GENERAL:  well develop; no apparent distress  HEENT:  no nasal congestion; no discharge noted; class 2 modified mallampatti.   NECK:  supple; no palpable masses.  CARDIO: regular rate and rhythm  PULM:  clear to auscultation bilaterally; no  intercostals retractions; no accessory muscle usage   ABDOMEN:  soft nontender/nondistended.  +bowel sound.  Bladder Stoma.    EXTREMITIES no cce  NEURO:  CN II-XII intact.  5/5 motor in all extremities.  sensation grossly intact   to light touch.  PSYCH:  normal affect.  Alert and oriented x 4    LABS  Pulmonary Functions Testing Results(personally reviewed):    PFT 7/31/20 Ratio of 58%; FVC 2.67 L (55%); FEV1 1.37 L (37%); TLC 9.29 L (123%); dlco 12.64 (42%)   PFT 7/12/22 Ratio of 50%; FVC 2.29 L (58%); FEV1 1.4 L (38%); TLC 5.64 L (75%); dlco 13.1 (44%)   ABG (personally reviewed):  none    CT CHEST(personally reviewed):    7/29/22 multiple nodules with larges is in rul at 2.2x3.6 cm (used to be 2.6 x 1.4 cm).  Upper lung emphysematous changes.    1/6/23 no acute changes.  Perssistent rul nodule    Echo 7/2/20  There is mild left ventricular concentric hypertrophy.  The left ventricle is normal in size with normal systolic function. The estimated ejection fraction is 60%.  Grade I diastolic dysfunction.  Normal right ventricular systolic function.  Normal central venous pressure (3 mmHg).      EBUS 1/13/23  1. LUNG, LEFT UPPER LOBE EBUS-FNA WITH CELL BLOCK:    Negative for malignant cells.  Benign bronchial cells.   2. LUNG, LEFT UPPER LOBE, TRANSBRONCHIAL BIOPSY WITH TOUCH PREP:   Negative for granuloma, neoplasm or malignancy   Minimal interstitial inflammation   (deeper levels have also been evaluated)   3. LUNG, RIGHT UPPER LOBE EBUS-FNA WITH CELL BLOCK:   Specimen was processed and examined but unsatisfactory due to   Blood present.   4.  LUNG, RIGHT UPPER LOBE, TRANSBRONCHIAL BIOPSY:   Negative for neoplasm/malignancy   Non-caseating granulomatous inflammation, see comment   5. LYMPH NODE, 11R, EBUS-FNA WITH CELL BLOCK:    Negative for malignant cells.  Benign bronchial cells.   No lymphocytes identified   Comment   Part 1.   Very hypocellular smears and cell block with rare groups of benign bronchial    epithelial cells. There is no evidence of inflammation, granuloma or neoplasm   in this specimen.   Part 2.   The biopsy and deeper levels show benign alveolar parenchymal tissue with   minimal interstiti al inflammation with few pigmented macrophages. There is no   evidence of active inflammation, granuloma or neoplasm in this specimen.   Deeper levels have also been evaluated.   Part 3.   The smears and cell block are virtually acellular with blood only   Part 4.   The biopsy from right upper lobe lung shows non-caseating granulomatous   inflammation with mild interstitial inflammation and fibrosis. Special stains   AFB and GMS for microorganisms have been requested and will be supplemented.   Differential diagnosis includes drugs, infection, sarcoidosis, idiopathic,   hypersensitivity pneumonitis etc. Please correlate clinically   Part 5.   The smears ad cell block predominantly show benign bronchial epithelial cells   with minimal to no lymphocytes in the background.    ASSESSMENT/PLAN  Problem List Items Addressed This Visit       Centrilobular emphysema    Overview     Severe disease  COPD B  - fev1 of 38%  -Stable. Patient with significant smoking history.  48 years at 2 packs a day quit in 2017.Reports 0 hospitalizations or exacerbations in relation to breathing issues.  He denies any increase in cough or sputum production.   Trelegy.  Will send for 6 min walk to see if patient would qualify for oxygen.                 Relevant Medications    predniSONE (DELTASONE) 20 MG tablet    Other Relevant Orders    Stress test, pulmonary    Complete PFT with bronchodilator    Lung nodule    Overview     enlarging on serial CT's  tobacco abuse with bladder cancer and enlarging rul   S/p EBUS 1/6/23 with noncaseating granulomas.  Culture only grew morexella s/p bactrim  Will repeat ct in 4 months for 1 year follow up             Relevant Orders    CT Chest Without Contrast           Patient will No follow-ups on file.  with md/np.    35 minutes of total time spent on the encounter, which includes face to face time and non-face to face time preparing to see the patient (eg, review of tests), Obtaining and/or reviewing separately obtained history, documenting clinical information in the electronic or other health record, independently interpreting results (not separately reported) and communicating results to the patient/family/caregiver, or Care coordination (not separately reported).

## 2023-08-23 NOTE — DISCHARGE SUMMARY
"[facility] Intensive Care ([facility] Medicine  Discharge Summary      Patient Name: Blake Guillory  MRN: [id number]  SHELLY: [id number]  Patient Class: OP- Observation  Admission Date: 8/22/2023  Hospital Length of Stay: 0 days  Discharge Date and Time:  08/23/2023 7:08 AM  Attending Physician: Tasha Centeno MD   Discharging Provider: Tasha Centeno MD  Primary Care Provider: Varun Zeng MD    Primary Care Team: Networked reference to record PCT     HPI:   From H&P by Josh Palm NP:  "The patient is a 63 y.o. male with a past medical history of hypertension, hypothyroidism, COPD, anxiety, depression, gout and bladder cancer post resection who is presenting with "anxiety attacks".  Patient states this is a chronic issue but has been worsening increasing over the past couple weeks.  He states it makes him anxious and that he is not want to leave behind his disabled son.  He states his panic attacks typically consist of hyperventilating, shortness of breath and left sided chest tightness.  He had recent admission at [facility] for COPD exacerbation that required BiPAP.  Does not have home oxygen, but has an appointment tomorrow with Pulmonology to qualify for home O2.  On initial workup, the patient's troponin is noted to be elevated to .104 with previously noted left sided chest pain.  He will be admitted for further management of his NSTEMI and Cardiology consult."            Hospital Course:   Patient was admitted to the hospital for cardiac workup given the symptomatic NSTEMI with elevated troponin.  2D echo and cardiology consult were ordered.  In the morning he stated he wanted to leave as he had an appointment with his pulmonologist.  Staff explained to him that his possible home oxygen requirement could be addressed in the hospital and he needed to stay due to a possible cardiac event, but he insisted on leaving before 7 AM before I could evaluate him.  His pulmonology appointment " is at 11:00 AM with Dr. Vang.  He signed out against medical advice.       Goals of Care Treatment Preferences:  Code Status: Full Code      Consults:   Consults (From admission, onward)        Status Ordering Provider     Inpatient consult to Cardiology  Once        Provider:  (Not yet assigned)    Acknowledged SULMA LEHMAN            Final Active Diagnoses:    Diagnosis Date Noted POA    PRINCIPAL PROBLEM:  NSTEMI (non-ST elevated myocardial infarction) [I21.4] 08/22/2023 Yes    Centrilobular emphysema [J43.2] 03/22/2018 Yes    Chronic deep vein thrombosis (DVT) of axillary vein of right upper extremity [I82.A21] 05/09/2023 Yes    Acquired hypothyroidism [E03.9] 10/21/2016 Yes     Chronic    Essential hypertension [I10] 01/02/2016 Yes      Problems Resolved During this Admission:       Discharged Condition: against medical advice        Pending Diagnostic Studies:     Procedure Component Value Units Date/Time    CBC with Automated Differential [[id number]]     Order Status: Sent Lab Status: No result     Specimen: Blood     Comprehensive Metabolic Panel (CMP) [[id number]]     Order Status: Sent Lab Status: No result     Specimen: Blood     EKG 12-lead [[id number]]     Order Status: Sent Lab Status: No result     Echo [[id number]]     Order Status: Sent Lab Status: No result     Magnesium [[id number]]     Order Status: Sent Lab Status: No result     Specimen: Blood     Phosphorus [[id number]]     Order Status: Sent Lab Status: No result     Specimen: Blood     Troponin I [[id number]]     Order Status: Sent Lab Status: No result     Specimen: Blood            Indwelling Lines/Drains at time of discharge:   Lines/Drains/Airways     Drain  Duration                Urostomy RLQ -- days         Urostomy 06/17/20 1055 1161 days         Nephrostomy 06/08/22 0846 Right 12 Fr. 440 days         Nephrostomy 06/08/22 0910 Left 12 Fr. 440 days                       Tasha Brantley MD  Department of Hospital  Medicine  [facility] - Intensive Care ([place])

## 2023-08-23 NOTE — SUBJECTIVE & OBJECTIVE
Past Medical History:   Diagnosis Date    Anemia of chronic disease     Aneurysm of right common iliac artery     Arthritis     Blood transfusion     CAD (coronary artery disease)     Chest pain of uncertain etiology     CKD (chronic kidney disease), stage IV     COPD (chronic obstructive pulmonary disease)     Coronary artery calcification 8/2/2021    DDD (degenerative disc disease), lumbar     Frequency of urination     General anesthetics causing adverse effect in therapeutic use     pt states he wakes up very violently from anesthesia    GERD (gastroesophageal reflux disease)     Gout     History of bladder cancer     History of urostomy     Hydronephrosis     Hyperparathyroidism     Hypertension     Hypertriglyceridemia     Hypothyroidism (acquired)     Insomnia     Kidney stone     Limited joint range of motion right hip and leg    Lumbar facet arthropathy     Lumbar spondylosis     Mixed anxiety and depressive disorder     Neuropathy     Orchalgia     SHARMIN (obstructive sleep apnea)     Personal history of bladder cancer     PVD (peripheral vascular disease)     Sacroiliitis     Stricture or kinking of ureter     Ureteral stent displacement     Urinary retention     Vitamin D deficiency     Wears glasses        Past Surgical History:   Procedure Laterality Date    APPENDECTOMY  12/30/2015    Procedure: APPENDECTOMY;  Surgeon: CHAD Bo MD;  Location: [facility];  Service: Urology;;    BALLOON DILATION OF URETER  01/22/2018    BOWEL RESECTION      COLONOSCOPY      CREATION OF URETEROILEAL CONDUIT Left 07/05/2013    CYSTOSCOPY      >1  episodes for bilat ureteral stent exchange    CYSTOSCOPY W/ URETERAL STENT PLACEMENT Right 01/22/2018    and 6/9/2015    CYSTOSCOPY W/ URETERAL STENT PLACEMENT Bilateral 10/07/2015    ESOPHAGOGASTRODUODENOSCOPY  04/12/2022    W/ BIOPSY    ESOPHAGOGASTRODUODENOSCOPY N/A 4/12/2022    Procedure: EGD (ESOPHAGOGASTRODUODENOSCOPY);  Surgeon: Tito Fan MD;  Location: [facility];   Service: Endoscopy;  Laterality: N/A;    EXPLORATORY LAPAROTOMY  07/05/2013    EXPLORATORY LAPAROTOMY W/ BOWEL RESECTION  12/30/2015    EXTRACORPOREAL SHOCK WAVE LITHOTRIPSY Right 06/07/2021    Procedure: LITHOTRIPSY, ESWL;  Surgeon: CHAD Bo MD;  Location: [facility] OR;  Service: Urology;  Laterality: Right;  RN PREOP 5/31/2021   VACCINATED    EXTRACORPOREAL SHOCK WAVE LITHOTRIPSY  06/07/2021    FRACTURE SURGERY      HAND SURGERY      HEMICOLECTOMY  12/30/2015    HIP SURGERY  2012    Rt hip septic    LAPAROSCOPIC CHOLECYSTECTOMY N/A 08/11/2021    Procedure: CHOLECYSTECTOMY, LAPAROSCOPIC;  Surgeon: Remy Xiong MD;  Location: [facility] OR;  Service: General;  Laterality: N/A;  gianni video confirmed 8/5/dme    LYMPH NODE DISSECTION  11/08/2012    pelvic    LYSIS OF ADHESIONS  12/30/2015    MOUTH SURGERY  2000    all teeth extracted    PERCUTANEOUS NEPHROLITHOTOMY Left 07/22/2020    Procedure: NEPHROLITHOTOMY, PERCUTANEOUS;  Surgeon: CHAD Bo MD;  Location: [facility] OR;  Service: Urology;  Laterality: Left;  OR 9 ONLY  RN PREOP 7/20/2020----COVID NEGATIVE ON 7/20    PERCUTANEOUS NEPHROLITHOTOMY Bilateral 6/8/2022    Procedure: BILATERAL PERCUTANEOUS NEPHROSCOPY, BILATERAL ANTEGRADE PYELOGRAM, BILATERAL NEPHROSTOMY TUBE PLACEMENT;  Surgeon: CHAD Bo MD;  Location: [facility] OR;  Service: Urology;  Laterality: Bilateral;  room 9 only  RN PREOP 6/1/2022----T/S IN AM    PERCUTANEOUS NEPHROSTOMY Left 01/02/2018    PERCUTANEOUS REPLACEMENT OF NEPHROSTOMY TUBE Bilateral 5/10/2022    Procedure: REPLACEMENT, NEPHROSTOMY TUBE, PERCUTANEOUS;  Surgeon: Chino Vo MD;  Location: [facility] CATH LAB;  Service: Radiology;  Laterality: Bilateral;    RADICAL CYSTECTOMY  11/08/2012    RADIOFREQUENCY THERMOCOAGULATION  12/12/2014    median branch lumbar    RADIOFREQUENCY THERMOCOAGULATION  12/01/2014    median branch lumbar    REPAIR, HERNIA, PARASTOMAL, LAPAROSCOPIC  04/12/2017    ROBOTIC BRONCHOSCOPY N/A  1/13/2023    Procedure: ROBOTIC BRONCHOSCOPY;  Surgeon: Camila Rodriguez MD;  Location: [facility] OR [address] [place] [address];  Service: Anesthesiology;  Laterality: N/A;    SMALL INTESTINE SURGERY  04/17/2017    urosotomy bag  2011    R abdomen       Review of patient's allergies indicates:  No Known Allergies    No current facility-administered medications on file prior to encounter.     Current Outpatient Medications on File Prior to Encounter   Medication Sig    albuterol (PROVENTIL/VENTOLIN HFA) 90 mcg/actuation inhaler Inhale 2 puffs into the lungs every 6 (six) hours as needed for Wheezing. Rescue    albuterol-ipratropium (DUO-NEB) 2.5 mg-0.5 mg/3 mL nebulizer solution Take 3 mLs by nebulization every 6 (six) hours as needed for Wheezing. Rescue    allopurinoL (ZYLOPRIM) 100 MG tablet Take 1 tablet (100 mg total) by mouth once daily.    apixaban (ELIQUIS) 5 mg Tab Take 1 tablet (5 mg total) by mouth 2 (two) times daily.    aspirin (ECOTRIN) 81 MG EC tablet Take 81 mg by mouth once daily.    calcitRIOL (ROCALTROL) 0.25 MCG Cap Take 1 capsule (0.25 mcg total) by mouth once daily.    carvediloL (COREG) 6.25 MG tablet Take 1 tablet (6.25 mg total) by mouth 2 (two) times daily with meals.    clobetasoL (TEMOVATE) 0.05 % cream Apply topically 2 (two) times daily.    EScitalopram oxalate (LEXAPRO) 20 MG tablet Take 1 tablet (20 mg total) by mouth once daily.    fenofibrate (TRICOR) 145 MG tablet Take 1 tablet (145 mg total) by mouth once daily.    fluticasone-umeclidin-vilanter (TRELEGY ELLIPTA) 100-62.5-25 mcg DsDv Inhale 1 puff into the lungs once daily. Rinse mouth after use.    gabapentin (NEURONTIN) 400 MG capsule Take 1 capsule (400 mg total) by mouth 2 (two) times daily.    HYDROcodone-acetaminophen (NORCO)  mg per tablet Take 1 tablet by mouth every 6 (six) hours as needed for Pain.    levothyroxine (SYNTHROID) 125 MCG tablet Take 1 tablet (125 mcg total) by mouth once daily.    LIDOcaine (LIDODERM) 5 % Place 1 patch onto the  skin once daily. Remove & Discard patch within 12 hours or as directed by MD    multivitamin (THERAGRAN) per tablet Take 1 tablet by mouth once daily.    pantoprazole (PROTONIX) 40 MG tablet Take 1 tablet (40 mg total) by mouth once daily.    sodium bicarbonate 325 MG tablet Take 325 mg by mouth 2 (two) times daily.    sucralfate (CARAFATE) 1 gram tablet Take 1 tablet (1 g total) by mouth 2 (two) times daily.    tamsulosin (FLOMAX) 0.4 mg Cap Take 1 capsule (0.4 mg total) by mouth once daily.    tiZANidine (ZANAFLEX) 4 MG tablet Take 1 tablet (4 mg total) by mouth once daily.    traZODone (DESYREL) 150 MG tablet Take 1 tablet (150 mg total) by mouth every evening.     Family History    Family history is unknown by patient.       Tobacco Use    Smoking status: Former     Current packs/day: 0.00     Average packs/day: 0.3 packs/day for 42.0 years (12.6 ttl pk-yrs)     Types: Cigarettes     Start date: 1975     Quit date: 2017     Years since quittin.5    Smokeless tobacco: Never    Tobacco comments:     in cessation program   Substance and Sexual Activity    Alcohol use: Yes     Alcohol/week: 14.0 - 21.0 standard drinks of alcohol     Types: 14 - 21 Cans of beer per week     Comment: occassional    Drug use: Yes     Types: Marijuana     Comment: occass    Sexual activity: Yes     Partners: Female     Birth control/protection: None     Review of Systems   Constitutional:  Negative for activity change, appetite change, fatigue and fever.   HENT:  Negative for congestion, ear pain and postnasal drip.    Eyes:  Negative for discharge.   Respiratory:  Positive for chest tightness and shortness of breath. Negative for apnea and wheezing.    Cardiovascular:  Positive for chest pain. Negative for leg swelling.   Gastrointestinal:  Negative for abdominal distention, abdominal pain, nausea and vomiting.   Endocrine: Negative for polydipsia, polyphagia and polyuria.   Genitourinary:  Negative for difficulty  urinating, flank pain, frequency, hematuria and urgency.   Musculoskeletal:  Negative for arthralgias and joint swelling.   Skin:  Negative for pallor and rash.   Allergic/Immunologic: Negative for environmental allergies and food allergies.   Neurological:  Negative for dizziness, speech difficulty, weakness, light-headedness and headaches.   Hematological:  Does not bruise/bleed easily.   Psychiatric/Behavioral:  Negative for agitation. The patient is nervous/anxious.      Objective:     Vital Signs (Most Recent):  Temp: 98 °F (36.7 °C) (08/22/23 2333)  Pulse: 77 (08/22/23 2141)  Resp: 18 (08/22/23 2141)  BP: (!) 147/80 (08/22/23 2141)  SpO2: (!) 94 % (08/22/23 2141) Vital Signs (24h Range):  Temp:  [98 °F (36.7 °C)-98.3 °F (36.8 °C)] 98 °F (36.7 °C)  Pulse:  [77-84] 77  Resp:  [16-20] 18  SpO2:  [92 %-94 %] 94 %  BP: (112-147)/(66-80) 147/80     Weight: 61.3 kg (135 lb 2.3 oz)  Body mass index is 18.33 kg/m².     Physical Exam  Constitutional:       Appearance: Normal appearance. He is well-developed.   HENT:      Head: Normocephalic.   Eyes:      Conjunctiva/sclera: Conjunctivae normal.   Cardiovascular:      Rate and Rhythm: Normal rate and regular rhythm.      Pulses:           Radial pulses are 2+ on the right side and 2+ on the left side.      Heart sounds: Normal heart sounds.   Pulmonary:      Effort: Pulmonary effort is normal.      Breath sounds: Examination of the right-upper field reveals decreased breath sounds. Examination of the left-upper field reveals decreased breath sounds. Decreased breath sounds present.   Abdominal:      General: Bowel sounds are increased. There is no distension.      Palpations: Abdomen is soft.      Tenderness: There is no abdominal tenderness.   Musculoskeletal:         General: Normal range of motion.      Cervical back: Normal range of motion and neck supple.   Skin:     General: Skin is warm and dry.      Coloration: Skin is pale.   Neurological:      Mental Status: He  is alert and oriented to person, place, and time.      GCS: GCS eye subscore is 4. GCS verbal subscore is 5. GCS motor subscore is 6.      Motor: Motor function is intact.   Psychiatric:         Mood and Affect: Mood normal.         Speech: Speech normal.         Behavior: Behavior normal.                Significant Labs: All pertinent labs within the past 24 hours have been reviewed.  CBC:   Recent Labs   Lab 08/22/23  1718   WBC 5.99   HGB 13.9*   HCT 43.4        CMP:   Recent Labs   Lab 08/22/23  1718      K 5.0      CO2 26   GLU 84   BUN 52*   CREATININE 2.9*   CALCIUM 10.2   PROT 7.3   ALBUMIN 4.1   BILITOT 0.5   ALKPHOS 42*   AST 34   ALT 27   ANIONGAP 10       Significant Imaging: I have reviewed all pertinent imaging results/findings within the past 24 hours.

## 2023-08-23 NOTE — ED NOTES
Pt placed on continuous cardiac and pulse ox monitoring with blood pressure to cycle every 60 minutes. VS stable; NSR noted.  Bed locked in lowest position; side rails up and locked x 2; personal belongings within reach. Pt instructed to alert nurse for assistance before attempting to get out of bed; verbalizes understanding.  Pt denies needs or complaints at this time; will continue to monitor pt.

## 2023-08-23 NOTE — ASSESSMENT & PLAN NOTE
Noted.  Recent admission requiring Bipap.  Appears at baseline today.    Breo/Spiriva as interchange  Monitor for acute decompensation

## 2023-08-23 NOTE — NURSING
"06:50 am: Patient stated, " I need to go out. I got an appointment to my lung doctor". Nurse educate the patient about the risk of leaving against medical advice. Dr. Centeno notified. Patient became agitated and argumentative stating " It doesn't make sense". Nurse attempted to re-educate patient. Patient signed AMA.  Nurse called a security. Security escorted the patient outside.                      "

## 2023-08-23 NOTE — ASSESSMENT & PLAN NOTE
"Troponin- .104, increased from .037 on the 19th.  Likely due partially from kidney dysfunction, however does report chest pain from "anxiety"    Consult Cardiology  Echo  EKG in AM  apixaban  Trend troponin    "

## 2023-08-23 NOTE — H&P
"[facility] Intensive Care [facility] Medicine  History & Physical    Patient Name: Blake Guillory  MRN: [id number]  Patient Class: OP- Observation  Admission Date: 8/22/2023  Attending Physician: Tasha Centeno MD   Primary Care Provider: Varun Zeng MD         Patient information was obtained from patient, past medical records and ER records.     Subjective:     Principal Problem:NSTEMI (non-ST elevated myocardial infarction)    Chief Complaint:   Chief Complaint   Patient presents with    Panic Attack     Pt states that he has been having more panic attacks recently. Pt states that he has been more stressed out recently. Pt states that he has COPD and his oxygen is usually around 92%. Pt states  he has a appt with his pulmonologist tomorrow to discuss home O2        HPI: The patient is a 63 y.o. male with a past medical history of hypertension, hypothyroidism, COPD, anxiety, depression, gout and bladder cancer post resection who is presenting with "anxiety attacks".  Patient states this is a chronic issue but has been worsening increasing over the past couple weeks.  He states it makes him anxious and that he is not want to leave behind his disabled son.  He states his panic attacks typically consist of hyperventilating, shortness of breath and left sided chest tightness.  He had recent admission at [facility] for COPD exacerbation that required BiPAP.  Does not have home oxygen, but has an appointment tomorrow with Pulmonology to qualify for home O2.  On initial workup, the patient's troponin is noted to be elevated to .104 with previously noted left sided chest pain.  He will be admitted for further management of his NSTEMI and Cardiology consult.      Past Medical History:   Diagnosis Date    Anemia of chronic disease     Aneurysm of right common iliac artery     Arthritis     Blood transfusion     CAD (coronary artery disease)     Chest pain of uncertain etiology     CKD " (chronic kidney disease), stage IV     COPD (chronic obstructive pulmonary disease)     Coronary artery calcification 8/2/2021    DDD (degenerative disc disease), lumbar     Frequency of urination     General anesthetics causing adverse effect in therapeutic use     pt states he wakes up very violently from anesthesia    GERD (gastroesophageal reflux disease)     Gout     History of bladder cancer     History of urostomy     Hydronephrosis     Hyperparathyroidism     Hypertension     Hypertriglyceridemia     Hypothyroidism (acquired)     Insomnia     Kidney stone     Limited joint range of motion right hip and leg    Lumbar facet arthropathy     Lumbar spondylosis     Mixed anxiety and depressive disorder     Neuropathy     Orchalgia     SHARMIN (obstructive sleep apnea)     Personal history of bladder cancer     PVD (peripheral vascular disease)     Sacroiliitis     Stricture or kinking of ureter     Ureteral stent displacement     Urinary retention     Vitamin D deficiency     Wears glasses        Past Surgical History:   Procedure Laterality Date    APPENDECTOMY  12/30/2015    Procedure: APPENDECTOMY;  Surgeon: CHAD Bo MD;  Location: [facility];  Service: Urology;;    BALLOON DILATION OF URETER  01/22/2018    BOWEL RESECTION      COLONOSCOPY      CREATION OF URETEROILEAL CONDUIT Left 07/05/2013    CYSTOSCOPY      >1  episodes for bilat ureteral stent exchange    CYSTOSCOPY W/ URETERAL STENT PLACEMENT Right 01/22/2018    and 6/9/2015    CYSTOSCOPY W/ URETERAL STENT PLACEMENT Bilateral 10/07/2015    ESOPHAGOGASTRODUODENOSCOPY  04/12/2022    W/ BIOPSY    ESOPHAGOGASTRODUODENOSCOPY N/A 4/12/2022    Procedure: EGD (ESOPHAGOGASTRODUODENOSCOPY);  Surgeon: Tito Fan MD;  Location: [facility];  Service: Endoscopy;  Laterality: N/A;    EXPLORATORY LAPAROTOMY  07/05/2013    EXPLORATORY LAPAROTOMY W/ BOWEL RESECTION  12/30/2015    EXTRACORPOREAL SHOCK WAVE LITHOTRIPSY Right  06/07/2021    Procedure: LITHOTRIPSY, ESWL;  Surgeon: CHAD Bo MD;  Location: [facility] OR;  Service: Urology;  Laterality: Right;  RN PREOP 5/31/2021   VACCINATED    EXTRACORPOREAL SHOCK WAVE LITHOTRIPSY  06/07/2021    FRACTURE SURGERY      HAND SURGERY      HEMICOLECTOMY  12/30/2015    HIP SURGERY  2012    Rt hip septic    LAPAROSCOPIC CHOLECYSTECTOMY N/A 08/11/2021    Procedure: CHOLECYSTECTOMY, LAPAROSCOPIC;  Surgeon: Remy Xiong MD;  Location: [facility] OR;  Service: General;  Laterality: N/A;  gianni video confirmed 8/5/dme    LYMPH NODE DISSECTION  11/08/2012    pelvic    LYSIS OF ADHESIONS  12/30/2015    MOUTH SURGERY  2000    all teeth extracted    PERCUTANEOUS NEPHROLITHOTOMY Left 07/22/2020    Procedure: NEPHROLITHOTOMY, PERCUTANEOUS;  Surgeon: CHAD Bo MD;  Location: [facility] OR;  Service: Urology;  Laterality: Left;  OR 9 ONLY  RN PREOP 7/20/2020----COVID NEGATIVE ON 7/20    PERCUTANEOUS NEPHROLITHOTOMY Bilateral 6/8/2022    Procedure: BILATERAL PERCUTANEOUS NEPHROSCOPY, BILATERAL ANTEGRADE PYELOGRAM, BILATERAL NEPHROSTOMY TUBE PLACEMENT;  Surgeon: CHAD Bo MD;  Location: [facility] OR;  Service: Urology;  Laterality: Bilateral;  room 9 only  RN PREOP 6/1/2022----T/S IN AM    PERCUTANEOUS NEPHROSTOMY Left 01/02/2018    PERCUTANEOUS REPLACEMENT OF NEPHROSTOMY TUBE Bilateral 5/10/2022    Procedure: REPLACEMENT, NEPHROSTOMY TUBE, PERCUTANEOUS;  Surgeon: Chino Vo MD;  Location: [facility] CATH LAB;  Service: Radiology;  Laterality: Bilateral;    RADICAL CYSTECTOMY  11/08/2012    RADIOFREQUENCY THERMOCOAGULATION  12/12/2014    median branch lumbar    RADIOFREQUENCY THERMOCOAGULATION  12/01/2014    median branch lumbar    REPAIR, HERNIA, PARASTOMAL, LAPAROSCOPIC  04/12/2017    ROBOTIC BRONCHOSCOPY N/A 1/13/2023    Procedure: ROBOTIC BRONCHOSCOPY;  Surgeon: Camila Rodriguez MD;  Location: [facility] OR [address] [place] [address];  Service: Anesthesiology;  Laterality: N/A;    SMALL INTESTINE  SURGERY  04/17/2017    urosotomy bag  2011    R abdomen       Review of patient's allergies indicates:  No Known Allergies    No current facility-administered medications on file prior to encounter.     Current Outpatient Medications on File Prior to Encounter   Medication Sig    albuterol (PROVENTIL/VENTOLIN HFA) 90 mcg/actuation inhaler Inhale 2 puffs into the lungs every 6 (six) hours as needed for Wheezing. Rescue    albuterol-ipratropium (DUO-NEB) 2.5 mg-0.5 mg/3 mL nebulizer solution Take 3 mLs by nebulization every 6 (six) hours as needed for Wheezing. Rescue    allopurinoL (ZYLOPRIM) 100 MG tablet Take 1 tablet (100 mg total) by mouth once daily.    apixaban (ELIQUIS) 5 mg Tab Take 1 tablet (5 mg total) by mouth 2 (two) times daily.    aspirin (ECOTRIN) 81 MG EC tablet Take 81 mg by mouth once daily.    calcitRIOL (ROCALTROL) 0.25 MCG Cap Take 1 capsule (0.25 mcg total) by mouth once daily.    carvediloL (COREG) 6.25 MG tablet Take 1 tablet (6.25 mg total) by mouth 2 (two) times daily with meals.    clobetasoL (TEMOVATE) 0.05 % cream Apply topically 2 (two) times daily.    EScitalopram oxalate (LEXAPRO) 20 MG tablet Take 1 tablet (20 mg total) by mouth once daily.    fenofibrate (TRICOR) 145 MG tablet Take 1 tablet (145 mg total) by mouth once daily.    fluticasone-umeclidin-vilanter (TRELEGY ELLIPTA) 100-62.5-25 mcg DsDv Inhale 1 puff into the lungs once daily. Rinse mouth after use.    gabapentin (NEURONTIN) 400 MG capsule Take 1 capsule (400 mg total) by mouth 2 (two) times daily.    HYDROcodone-acetaminophen (NORCO)  mg per tablet Take 1 tablet by mouth every 6 (six) hours as needed for Pain.    levothyroxine (SYNTHROID) 125 MCG tablet Take 1 tablet (125 mcg total) by mouth once daily.    LIDOcaine (LIDODERM) 5 % Place 1 patch onto the skin once daily. Remove & Discard patch within 12 hours or as directed by MD    multivitamin (THERAGRAN) per tablet Take 1 tablet by mouth once  daily.    pantoprazole (PROTONIX) 40 MG tablet Take 1 tablet (40 mg total) by mouth once daily.    sodium bicarbonate 325 MG tablet Take 325 mg by mouth 2 (two) times daily.    sucralfate (CARAFATE) 1 gram tablet Take 1 tablet (1 g total) by mouth 2 (two) times daily.    tamsulosin (FLOMAX) 0.4 mg Cap Take 1 capsule (0.4 mg total) by mouth once daily.    tiZANidine (ZANAFLEX) 4 MG tablet Take 1 tablet (4 mg total) by mouth once daily.    traZODone (DESYREL) 150 MG tablet Take 1 tablet (150 mg total) by mouth every evening.     Family History    Family history is unknown by patient.       Tobacco Use    Smoking status: Former     Current packs/day: 0.00     Average packs/day: 0.3 packs/day for 42.0 years (12.6 ttl pk-yrs)     Types: Cigarettes     Start date: 1975     Quit date: 2017     Years since quittin.5    Smokeless tobacco: Never    Tobacco comments:     in cessation program   Substance and Sexual Activity    Alcohol use: Yes     Alcohol/week: 14.0 - 21.0 standard drinks of alcohol     Types: 14 - 21 Cans of beer per week     Comment: occassional    Drug use: Yes     Types: Marijuana     Comment: occass    Sexual activity: Yes     Partners: Female     Birth control/protection: None     Review of Systems   Constitutional:  Negative for activity change, appetite change, fatigue and fever.   HENT:  Negative for congestion, ear pain and postnasal drip.    Eyes:  Negative for discharge.   Respiratory:  Positive for chest tightness and shortness of breath. Negative for apnea and wheezing.    Cardiovascular:  Positive for chest pain. Negative for leg swelling.   Gastrointestinal:  Negative for abdominal distention, abdominal pain, nausea and vomiting.   Endocrine: Negative for polydipsia, polyphagia and polyuria.   Genitourinary:  Negative for difficulty urinating, flank pain, frequency, hematuria and urgency.   Musculoskeletal:  Negative for arthralgias and joint swelling.   Skin:  Negative  for pallor and rash.   Allergic/Immunologic: Negative for environmental allergies and food allergies.   Neurological:  Negative for dizziness, speech difficulty, weakness, light-headedness and headaches.   Hematological:  Does not bruise/bleed easily.   Psychiatric/Behavioral:  Negative for agitation. The patient is nervous/anxious.      Objective:     Vital Signs (Most Recent):  Temp: 98 °F (36.7 °C) (08/22/23 2333)  Pulse: 77 (08/22/23 2141)  Resp: 18 (08/22/23 2141)  BP: (!) 147/80 (08/22/23 2141)  SpO2: (!) 94 % (08/22/23 2141) Vital Signs (24h Range):  Temp:  [98 °F (36.7 °C)-98.3 °F (36.8 °C)] 98 °F (36.7 °C)  Pulse:  [77-84] 77  Resp:  [16-20] 18  SpO2:  [92 %-94 %] 94 %  BP: (112-147)/(66-80) 147/80     Weight: 61.3 kg (135 lb 2.3 oz)  Body mass index is 18.33 kg/m².     Physical Exam  Constitutional:       Appearance: Normal appearance. He is well-developed.   HENT:      Head: Normocephalic.   Eyes:      Conjunctiva/sclera: Conjunctivae normal.   Cardiovascular:      Rate and Rhythm: Normal rate and regular rhythm.      Pulses:           Radial pulses are 2+ on the right side and 2+ on the left side.      Heart sounds: Normal heart sounds.   Pulmonary:      Effort: Pulmonary effort is normal.      Breath sounds: Examination of the right-upper field reveals decreased breath sounds. Examination of the left-upper field reveals decreased breath sounds. Decreased breath sounds present.   Abdominal:      General: Bowel sounds are increased. There is no distension.      Palpations: Abdomen is soft.      Tenderness: There is no abdominal tenderness.   Musculoskeletal:         General: Normal range of motion.      Cervical back: Normal range of motion and neck supple.   Skin:     General: Skin is warm and dry.      Coloration: Skin is pale.   Neurological:      Mental Status: He is alert and oriented to person, place, and time.      GCS: GCS eye subscore is 4. GCS verbal subscore is 5. GCS motor subscore is 6.       "Motor: Motor function is intact.   Psychiatric:         Mood and Affect: Mood normal.         Speech: Speech normal.         Behavior: Behavior normal.                Significant Labs: All pertinent labs within the past 24 hours have been reviewed.  CBC:   Recent Labs   Lab 08/22/23  1718   WBC 5.99   HGB 13.9*   HCT 43.4        CMP:   Recent Labs   Lab 08/22/23  1718      K 5.0      CO2 26   GLU 84   BUN 52*   CREATININE 2.9*   CALCIUM 10.2   PROT 7.3   ALBUMIN 4.1   BILITOT 0.5   ALKPHOS 42*   AST 34   ALT 27   ANIONGAP 10       Significant Imaging: I have reviewed all pertinent imaging results/findings within the past 24 hours.    Assessment/Plan:     * NSTEMI (non-ST elevated myocardial infarction)  Troponin- .104, increased from .037 on the 19th.  Likely due partially from kidney dysfunction, however does report chest pain from "anxiety"    Consult Cardiology  Echo  EKG in AM  apixaban  Trend troponin      Acute deep vein thrombosis (DVT) of axillary vein of right upper extremity  Continue apixaban       Centrilobular emphysema  Noted.  Recent admission requiring Bipap.  Appears at baseline today.    Breo/Spiriva as interchange  Monitor for acute decompensation      Acquired hypothyroidism  Continue levothyroxine      Essential hypertension  Normotensive currently    Continue coreg        VTE Risk Mitigation (From admission, onward)         Ordered     apixaban tablet 5 mg  2 times daily         08/22/23 2106     IP VTE HIGH RISK PATIENT  Once         08/22/23 2100     Place sequential compression device  Until discontinued         08/22/23 2100     Reason for No Pharmacological VTE Prophylaxis  Once        Question:  Reasons:  Answer:  Already adequately anticoagulated on oral Anticoagulants    08/22/23 2100                     Josh Palm NP  Department of Hospital Medicine  [facility] Intensive [facility]  "

## 2023-08-23 NOTE — HPI
"From H&P by Josh Palm NP:  "The patient is a 63 y.o. male with a past medical history of hypertension, hypothyroidism, COPD, anxiety, depression, gout and bladder cancer post resection who is presenting with "anxiety attacks".  Patient states this is a chronic issue but has been worsening increasing over the past couple weeks.  He states it makes him anxious and that he is not want to leave behind his disabled son.  He states his panic attacks typically consist of hyperventilating, shortness of breath and left sided chest tightness.  He had recent admission at [facility] for COPD exacerbation that required BiPAP.  Does not have home oxygen, but has an appointment tomorrow with Pulmonology to qualify for home O2.  On initial workup, the patient's troponin is noted to be elevated to .104 with previously noted left sided chest pain.  He will be admitted for further management of his NSTEMI and Cardiology consult."  "

## 2023-08-23 NOTE — HOSPITAL COURSE
Patient was admitted to the hospital for cardiac workup given the symptomatic NSTEMI with elevated troponin.  2D echo and cardiology consult were ordered.  In the morning he stated he wanted to leave as he had an appointment with his pulmonologist.  Staff explained to him that his possible home oxygen requirement could be addressed in the hospital and he needed to stay due to a possible cardiac event, but he insisted on leaving before 7 AM before I could evaluate him.  His pulmonology appointment is at 11:00 AM with Dr. Vang.  He signed out against medical advice.

## 2023-08-25 ENCOUNTER — HOSPITAL ENCOUNTER (OUTPATIENT)
Facility: HOSPITAL | Age: 63
Discharge: HOME | End: 2023-08-26
Attending: EMERGENCY MEDICINE | Admitting: STUDENT IN AN ORGANIZED HEALTH CARE EDUCATION/TRAINING PROGRAM
Payer: MEDICARE

## 2023-08-25 DIAGNOSIS — R07.9 CHEST PAIN: Primary | ICD-10-CM

## 2023-08-25 PROBLEM — J96.01 ACUTE RESPIRATORY FAILURE WITH HYPOXIA: Status: RESOLVED | Noted: 2020-06-18 | Resolved: 2023-08-25

## 2023-08-25 PROBLEM — J43.2 CENTRILOBULAR EMPHYSEMA: Chronic | Status: ACTIVE | Noted: 2018-03-22

## 2023-08-25 LAB
ALBUMIN SERPL BCP-MCNC: 4.1 G/DL (ref 3.5–5.2)
ALP SERPL-CCNC: 46 U/L (ref 55–135)
ALT SERPL W/O P-5'-P-CCNC: 25 U/L (ref 10–44)
ANION GAP SERPL CALC-SCNC: 10 MMOL/L (ref 8–16)
AST SERPL-CCNC: 29 U/L (ref 10–40)
BASOPHILS # BLD AUTO: 0.01 K/UL (ref 0–0.2)
BASOPHILS NFR BLD: 0.1 % (ref 0–1.9)
BILIRUB SERPL-MCNC: 0.3 MG/DL (ref 0.1–1)
BNP SERPL-MCNC: 73 PG/ML (ref 0–99)
BUN SERPL-MCNC: 45 MG/DL (ref 8–23)
CALCIUM SERPL-MCNC: 9.8 MG/DL (ref 8.7–10.5)
CHLORIDE SERPL-SCNC: 102 MMOL/L (ref 95–110)
CO2 SERPL-SCNC: 28 MMOL/L (ref 23–29)
CREAT SERPL-MCNC: 2.6 MG/DL (ref 0.5–1.4)
DIFFERENTIAL METHOD: ABNORMAL
EOSINOPHIL # BLD AUTO: 0 K/UL (ref 0–0.5)
EOSINOPHIL NFR BLD: 0.3 % (ref 0–8)
ERYTHROCYTE [DISTWIDTH] IN BLOOD BY AUTOMATED COUNT: 14.4 % (ref 11.5–14.5)
EST. GFR  (NO RACE VARIABLE): 27 ML/MIN/1.73 M^2
GLUCOSE SERPL-MCNC: 109 MG/DL (ref 70–110)
HCT VFR BLD AUTO: 40.9 % (ref 40–54)
HGB BLD-MCNC: 13.2 G/DL (ref 14–18)
IMM GRANULOCYTES # BLD AUTO: 0.02 K/UL (ref 0–0.04)
IMM GRANULOCYTES NFR BLD AUTO: 0.2 % (ref 0–0.5)
LYMPHOCYTES # BLD AUTO: 0.9 K/UL (ref 1–4.8)
LYMPHOCYTES NFR BLD: 10.5 % (ref 18–48)
MCH RBC QN AUTO: 30.7 PG (ref 27–31)
MCHC RBC AUTO-ENTMCNC: 32.3 G/DL (ref 32–36)
MCV RBC AUTO: 95 FL (ref 82–98)
MONOCYTES # BLD AUTO: 0.6 K/UL (ref 0.3–1)
MONOCYTES NFR BLD: 6.2 % (ref 4–15)
NEUTROPHILS # BLD AUTO: 7.3 K/UL (ref 1.8–7.7)
NEUTROPHILS NFR BLD: 82.7 % (ref 38–73)
NRBC BLD-RTO: 0 /100 WBC
PLATELET # BLD AUTO: 169 K/UL (ref 150–450)
PMV BLD AUTO: 11.2 FL (ref 9.2–12.9)
POTASSIUM SERPL-SCNC: 4.5 MMOL/L (ref 3.5–5.1)
PROT SERPL-MCNC: 7.3 G/DL (ref 6–8.4)
RBC # BLD AUTO: 4.3 M/UL (ref 4.6–6.2)
SODIUM SERPL-SCNC: 140 MMOL/L (ref 136–145)
TROPONIN I SERPL DL<=0.01 NG/ML-MCNC: 0.03 NG/ML (ref 0–0.03)
TROPONIN I SERPL DL<=0.01 NG/ML-MCNC: 0.04 NG/ML (ref 0–0.03)
WBC # BLD AUTO: 8.88 K/UL (ref 3.9–12.7)

## 2023-08-25 PROCEDURE — 83880 ASSAY OF NATRIURETIC PEPTIDE: CPT

## 2023-08-25 PROCEDURE — 93010 ELECTROCARDIOGRAM REPORT: CPT | Mod: ,,, | Performed by: INTERNAL MEDICINE

## 2023-08-25 PROCEDURE — 93005 ELECTROCARDIOGRAM TRACING: CPT

## 2023-08-25 PROCEDURE — 25000003 PHARM REV CODE 250: Performed by: HOSPITALIST

## 2023-08-25 PROCEDURE — 99285 EMERGENCY DEPT VISIT HI MDM: CPT | Mod: 25

## 2023-08-25 PROCEDURE — 85025 COMPLETE CBC W/AUTO DIFF WBC: CPT

## 2023-08-25 PROCEDURE — 84484 ASSAY OF TROPONIN QUANT: CPT

## 2023-08-25 PROCEDURE — 36415 COLL VENOUS BLD VENIPUNCTURE: CPT | Performed by: HOSPITALIST

## 2023-08-25 PROCEDURE — 80053 COMPREHEN METABOLIC PANEL: CPT

## 2023-08-25 PROCEDURE — 25000003 PHARM REV CODE 250: Performed by: EMERGENCY MEDICINE

## 2023-08-25 PROCEDURE — 25000003 PHARM REV CODE 250: Performed by: STUDENT IN AN ORGANIZED HEALTH CARE EDUCATION/TRAINING PROGRAM

## 2023-08-25 PROCEDURE — G0378 HOSPITAL OBSERVATION PER HR: HCPCS

## 2023-08-25 PROCEDURE — 84484 ASSAY OF TROPONIN QUANT: CPT | Mod: 91 | Performed by: HOSPITALIST

## 2023-08-25 PROCEDURE — 93010 EKG 12-LEAD: ICD-10-PCS | Mod: ,,, | Performed by: INTERNAL MEDICINE

## 2023-08-25 RX ORDER — HYDROCODONE BITARTRATE AND ACETAMINOPHEN 10; 325 MG/1; MG/1
1 TABLET ORAL EVERY 6 HOURS PRN
Status: DISCONTINUED | OUTPATIENT
Start: 2023-08-25 | End: 2023-08-26 | Stop reason: HOSPADM

## 2023-08-25 RX ORDER — ONDANSETRON 2 MG/ML
4 INJECTION INTRAMUSCULAR; INTRAVENOUS EVERY 8 HOURS PRN
Status: DISCONTINUED | OUTPATIENT
Start: 2023-08-25 | End: 2023-08-26 | Stop reason: HOSPADM

## 2023-08-25 RX ORDER — IBUPROFEN 200 MG
24 TABLET ORAL
Status: DISCONTINUED | OUTPATIENT
Start: 2023-08-25 | End: 2023-08-26 | Stop reason: HOSPADM

## 2023-08-25 RX ORDER — GLUCAGON 1 MG
1 KIT INJECTION
Status: DISCONTINUED | OUTPATIENT
Start: 2023-08-25 | End: 2023-08-26 | Stop reason: HOSPADM

## 2023-08-25 RX ORDER — POLYETHYLENE GLYCOL 3350 17 G/17G
17 POWDER, FOR SOLUTION ORAL DAILY
Status: DISCONTINUED | OUTPATIENT
Start: 2023-08-26 | End: 2023-08-26 | Stop reason: HOSPADM

## 2023-08-25 RX ORDER — ASPIRIN 325 MG
325 TABLET ORAL
Status: COMPLETED | OUTPATIENT
Start: 2023-08-25 | End: 2023-08-25

## 2023-08-25 RX ORDER — PROCHLORPERAZINE EDISYLATE 5 MG/ML
5 INJECTION INTRAMUSCULAR; INTRAVENOUS EVERY 6 HOURS PRN
Status: DISCONTINUED | OUTPATIENT
Start: 2023-08-25 | End: 2023-08-26 | Stop reason: HOSPADM

## 2023-08-25 RX ORDER — ACETAMINOPHEN 325 MG/1
650 TABLET ORAL EVERY 6 HOURS PRN
Status: DISCONTINUED | OUTPATIENT
Start: 2023-08-25 | End: 2023-08-26 | Stop reason: HOSPADM

## 2023-08-25 RX ORDER — NALOXONE HCL 0.4 MG/ML
0.02 VIAL (ML) INJECTION
Status: DISCONTINUED | OUTPATIENT
Start: 2023-08-25 | End: 2023-08-26 | Stop reason: HOSPADM

## 2023-08-25 RX ORDER — IBUPROFEN 200 MG
16 TABLET ORAL
Status: DISCONTINUED | OUTPATIENT
Start: 2023-08-25 | End: 2023-08-26 | Stop reason: HOSPADM

## 2023-08-25 RX ORDER — PREDNISONE 20 MG/1
40 TABLET ORAL DAILY
Status: DISCONTINUED | OUTPATIENT
Start: 2023-08-26 | End: 2023-08-26 | Stop reason: HOSPADM

## 2023-08-25 RX ORDER — ALLOPURINOL 100 MG/1
100 TABLET ORAL DAILY
Status: DISCONTINUED | OUTPATIENT
Start: 2023-08-26 | End: 2023-08-26 | Stop reason: HOSPADM

## 2023-08-25 RX ORDER — SIMETHICONE 80 MG
1 TABLET,CHEWABLE ORAL 4 TIMES DAILY PRN
Status: DISCONTINUED | OUTPATIENT
Start: 2023-08-25 | End: 2023-08-26 | Stop reason: HOSPADM

## 2023-08-25 RX ORDER — MAG HYDROX/ALUMINUM HYD/SIMETH 200-200-20
30 SUSPENSION, ORAL (FINAL DOSE FORM) ORAL 4 TIMES DAILY PRN
Status: DISCONTINUED | OUTPATIENT
Start: 2023-08-25 | End: 2023-08-26 | Stop reason: HOSPADM

## 2023-08-25 RX ORDER — SODIUM CHLORIDE 0.9 % (FLUSH) 0.9 %
10 SYRINGE (ML) INJECTION EVERY 8 HOURS PRN
Status: DISCONTINUED | OUTPATIENT
Start: 2023-08-25 | End: 2023-08-26 | Stop reason: HOSPADM

## 2023-08-25 RX ORDER — TAMSULOSIN HYDROCHLORIDE 0.4 MG/1
0.4 CAPSULE ORAL DAILY
Status: DISCONTINUED | OUTPATIENT
Start: 2023-08-26 | End: 2023-08-26 | Stop reason: HOSPADM

## 2023-08-25 RX ORDER — TALC
6 POWDER (GRAM) TOPICAL NIGHTLY PRN
Status: DISCONTINUED | OUTPATIENT
Start: 2023-08-25 | End: 2023-08-26 | Stop reason: HOSPADM

## 2023-08-25 RX ORDER — LEVOTHYROXINE SODIUM 125 UG/1
125 TABLET ORAL
Status: DISCONTINUED | OUTPATIENT
Start: 2023-08-26 | End: 2023-08-26 | Stop reason: HOSPADM

## 2023-08-25 RX ORDER — GABAPENTIN 400 MG/1
400 CAPSULE ORAL 2 TIMES DAILY
Status: DISCONTINUED | OUTPATIENT
Start: 2023-08-25 | End: 2023-08-26 | Stop reason: HOSPADM

## 2023-08-25 RX ORDER — TRAZODONE HYDROCHLORIDE 50 MG/1
150 TABLET ORAL NIGHTLY
Status: DISCONTINUED | OUTPATIENT
Start: 2023-08-25 | End: 2023-08-26 | Stop reason: HOSPADM

## 2023-08-25 RX ORDER — ASPIRIN 81 MG/1
81 TABLET ORAL DAILY
Status: DISCONTINUED | OUTPATIENT
Start: 2023-08-26 | End: 2023-08-26 | Stop reason: HOSPADM

## 2023-08-25 RX ORDER — ESCITALOPRAM OXALATE 10 MG/1
20 TABLET ORAL NIGHTLY
Status: DISCONTINUED | OUTPATIENT
Start: 2023-08-25 | End: 2023-08-26 | Stop reason: HOSPADM

## 2023-08-25 RX ORDER — ESCITALOPRAM OXALATE 10 MG/1
20 TABLET ORAL DAILY
Status: DISCONTINUED | OUTPATIENT
Start: 2023-08-26 | End: 2023-08-25

## 2023-08-25 RX ORDER — PANTOPRAZOLE SODIUM 40 MG/1
40 TABLET, DELAYED RELEASE ORAL DAILY
Status: DISCONTINUED | OUTPATIENT
Start: 2023-08-26 | End: 2023-08-26 | Stop reason: HOSPADM

## 2023-08-25 RX ORDER — CARVEDILOL 6.25 MG/1
6.25 TABLET ORAL 2 TIMES DAILY WITH MEALS
Status: DISCONTINUED | OUTPATIENT
Start: 2023-08-25 | End: 2023-08-26 | Stop reason: HOSPADM

## 2023-08-25 RX ADMIN — TRAZODONE HYDROCHLORIDE 150 MG: 50 TABLET ORAL at 08:08

## 2023-08-25 RX ADMIN — ESCITALOPRAM OXALATE 20 MG: 10 TABLET ORAL at 08:08

## 2023-08-25 RX ADMIN — GABAPENTIN 400 MG: 400 CAPSULE ORAL at 08:08

## 2023-08-25 RX ADMIN — HYDROCODONE BITARTRATE AND ACETAMINOPHEN 1 TABLET: 10; 325 TABLET ORAL at 08:08

## 2023-08-25 RX ADMIN — ASPIRIN 325 MG ORAL TABLET 325 MG: 325 PILL ORAL at 05:08

## 2023-08-25 RX ADMIN — APIXABAN 5 MG: 5 TABLET, FILM COATED ORAL at 08:08

## 2023-08-25 RX ADMIN — CARVEDILOL 6.25 MG: 6.25 TABLET, FILM COATED ORAL at 08:08

## 2023-08-25 NOTE — ED PROVIDER NOTES
"Encounter Date: 8/25/2023       History     Chief Complaint   Patient presents with    Chest Pain     Patient reports CP that has been having intermittent CP, seen several hospitals this week with anxiety and low oxygen, states started to have more frequent today while out in the heart for a short of period of time, states radiates to right shoulder.       Blake Guillory is a 63 y.o. male with hypertension, GERD, hypothyroidism, COPD, anxiety, depression, CKD IV, bladder cancer post resection, RUE DVT (Eliquis) and others  who is presenting to the emergency department c/o acute intermittent, left-sided chest pain that began yesterday. He states CP occurs with exertion such as that which is required to care for his disabled son (transferring son to and from wheelchair, loading wheelchair into and out of vehicle, wood working, etc). Reports intermittently feeling "flushed" when bending over.  Reports chronic cough x 6+ weeks. Reports being seen by Pulm yesterday and currently taking Prednisone and awaiting follow up appointment for 6 min walk test. Reports intermittent home O2 sats in 80% that improves after neb treatments. Not currently on home O2. States he quit smoking 6 years ago. Admits to smoking marijuana within the last two weeks. Denies fever, nausea, vomiting, melena, BRBPR, focal weakness, syncope.    The history is provided by the patient.     Review of patient's allergies indicates:  No Known Allergies  Past Medical History:   Diagnosis Date    Anemia of chronic disease     Aneurysm of right common iliac artery     Arthritis     Blood transfusion     CAD (coronary artery disease)     Chest pain of uncertain etiology     CKD (chronic kidney disease), stage IV     COPD (chronic obstructive pulmonary disease)     Coronary artery calcification 8/2/2021    DDD (degenerative disc disease), lumbar     Frequency of urination     General anesthetics causing adverse effect in therapeutic use     pt states he " wakes up very violently from anesthesia    GERD (gastroesophageal reflux disease)     Gout     History of bladder cancer     History of urostomy     Hydronephrosis     Hyperparathyroidism     Hypertension     Hypertriglyceridemia     Hypothyroidism (acquired)     Insomnia     Kidney stone     Limited joint range of motion right hip and leg    Lumbar facet arthropathy     Lumbar spondylosis     Mixed anxiety and depressive disorder     Neuropathy     Orchalgia     SHARMIN (obstructive sleep apnea)     Personal history of bladder cancer     PVD (peripheral vascular disease)     Sacroiliitis     Stricture or kinking of ureter     Ureteral stent displacement     Urinary retention     Vitamin D deficiency     Wears glasses      Past Surgical History:   Procedure Laterality Date    APPENDECTOMY  12/30/2015    Procedure: APPENDECTOMY;  Surgeon: CHAD Bo MD;  Location: [facility];  Service: Urology;;    BALLOON DILATION OF URETER  01/22/2018    BOWEL RESECTION      COLONOSCOPY      CREATION OF URETEROILEAL CONDUIT Left 07/05/2013    CYSTOSCOPY      >1  episodes for bilat ureteral stent exchange    CYSTOSCOPY W/ URETERAL STENT PLACEMENT Right 01/22/2018    and 6/9/2015    CYSTOSCOPY W/ URETERAL STENT PLACEMENT Bilateral 10/07/2015    ESOPHAGOGASTRODUODENOSCOPY  04/12/2022    W/ BIOPSY    ESOPHAGOGASTRODUODENOSCOPY N/A 4/12/2022    Procedure: EGD (ESOPHAGOGASTRODUODENOSCOPY);  Surgeon: Tito Fan MD;  Location: [facility];  Service: Endoscopy;  Laterality: N/A;    EXPLORATORY LAPAROTOMY  07/05/2013    EXPLORATORY LAPAROTOMY W/ BOWEL RESECTION  12/30/2015    EXTRACORPOREAL SHOCK WAVE LITHOTRIPSY Right 06/07/2021    Procedure: LITHOTRIPSY, ESWL;  Surgeon: CHAD Bo MD;  Location: [facility] OR;  Service: Urology;  Laterality: Right;  RN PREOP 5/31/2021   VACCINATED    EXTRACORPOREAL SHOCK WAVE LITHOTRIPSY  06/07/2021    FRACTURE SURGERY      HAND SURGERY      HEMICOLECTOMY  12/30/2015    HIP SURGERY  2012    Rt hip  septic    LAPAROSCOPIC CHOLECYSTECTOMY N/A 08/11/2021    Procedure: CHOLECYSTECTOMY, LAPAROSCOPIC;  Surgeon: Remy Xiong MD;  Location: [facility] OR;  Service: General;  Laterality: N/A;  gianni video confirmed 8/5/dme    LYMPH NODE DISSECTION  11/08/2012    pelvic    LYSIS OF ADHESIONS  12/30/2015    MOUTH SURGERY  2000    all teeth extracted    PERCUTANEOUS NEPHROLITHOTOMY Left 07/22/2020    Procedure: NEPHROLITHOTOMY, PERCUTANEOUS;  Surgeon: CHAD Bo MD;  Location: [facility] OR;  Service: Urology;  Laterality: Left;  OR 9 ONLY  RN PREOP 7/20/2020----COVID NEGATIVE ON 7/20    PERCUTANEOUS NEPHROLITHOTOMY Bilateral 6/8/2022    Procedure: BILATERAL PERCUTANEOUS NEPHROSCOPY, BILATERAL ANTEGRADE PYELOGRAM, BILATERAL NEPHROSTOMY TUBE PLACEMENT;  Surgeon: CHAD Bo MD;  Location: [facility] OR;  Service: Urology;  Laterality: Bilateral;  room 9 only  RN PREOP 6/1/2022----T/S IN AM    PERCUTANEOUS NEPHROSTOMY Left 01/02/2018    PERCUTANEOUS REPLACEMENT OF NEPHROSTOMY TUBE Bilateral 5/10/2022    Procedure: REPLACEMENT, NEPHROSTOMY TUBE, PERCUTANEOUS;  Surgeon: Chino Vo MD;  Location: [facility] CATH LAB;  Service: Radiology;  Laterality: Bilateral;    RADICAL CYSTECTOMY  11/08/2012    RADIOFREQUENCY THERMOCOAGULATION  12/12/2014    median branch lumbar    RADIOFREQUENCY THERMOCOAGULATION  12/01/2014    median branch lumbar    REPAIR, HERNIA, PARASTOMAL, LAPAROSCOPIC  04/12/2017    ROBOTIC BRONCHOSCOPY N/A 1/13/2023    Procedure: ROBOTIC BRONCHOSCOPY;  Surgeon: Camila Rodriguez MD;  Location: [address];  Service: Anesthesiology;  Laterality: N/A;    SMALL INTESTINE SURGERY  04/17/2017    urosotomy bag  2011    R abdomen     Family History   Adopted: Yes   Family history unknown: Yes     Social History     Tobacco Use    Smoking status: Former     Current packs/day: 0.00     Average packs/day: 0.3 packs/day for 42.0 years (12.6 ttl pk-yrs)     Types: Cigarettes     Start date: 2/1/1975     Quit date:  2017     Years since quittin.5    Smokeless tobacco: Never    Tobacco comments:     in cessation program   Substance Use Topics    Alcohol use: Yes     Alcohol/week: 14.0 - 21.0 standard drinks of alcohol     Types: 14 - 21 Cans of beer per week     Comment: occassional    Drug use: Yes     Types: Marijuana     Comment: occass     Review of Systems   Constitutional:  Negative for activity change and fever.   Respiratory:  Positive for cough (chronic) and shortness of breath.    Cardiovascular:  Positive for chest pain. Negative for leg swelling.   Gastrointestinal:  Negative for abdominal pain, blood in stool, constipation, diarrhea, nausea and vomiting.   Genitourinary:         Urostomy     Musculoskeletal:  Negative for gait problem.   All other systems reviewed and are negative.      Physical Exam     Initial Vitals [23 1657]   BP Pulse Resp Temp SpO2   130/64 83 18 98.9 °F (37.2 °C) (!) 93 %      MAP       --         Physical Exam    Nursing note and vitals reviewed.  Constitutional: He appears well-developed and well-nourished. He is not diaphoretic. No distress.   HENT:   Head: Normocephalic and atraumatic.   Mouth/Throat: Oropharynx is clear and moist.   Eyes: Conjunctivae are normal.   Neck: Phonation normal. No stridor present.   Normal range of motion.  Cardiovascular:  Regular rhythm and intact distal pulses.           Pulmonary/Chest: Effort normal. No accessory muscle usage or stridor. No tachypnea. No respiratory distress. He has no wheezes. He has no rhonchi. He has no rales.   Abdominal: Abdomen is soft. He exhibits no distension. Ostomy site is clean. There is no abdominal tenderness. There is no rebound and no guarding.   Musculoskeletal:         General: No tenderness. Normal range of motion.      Cervical back: Normal range of motion.     Neurological: He is alert and oriented to person, place, and time. He has normal strength. Gait normal. GCS eye subscore is 4. GCS verbal subscore  is 5. GCS motor subscore is 6.   Skin: Skin is warm and intact.   Psychiatric: He has a normal mood and affect.         ED Course   Procedures  Labs Reviewed   CBC W/ AUTO DIFFERENTIAL - Abnormal; Notable for the following components:       Result Value    RBC 4.30 (*)     Hemoglobin 13.2 (*)     Lymph # 0.9 (*)     Gran % 82.7 (*)     Lymph % 10.5 (*)     All other components within normal limits   COMPREHENSIVE METABOLIC PANEL - Abnormal; Notable for the following components:    BUN 45 (*)     Creatinine 2.6 (*)     Alkaline Phosphatase 46 (*)     eGFR 27 (*)     All other components within normal limits   TROPONIN I - Abnormal; Notable for the following components:    Troponin I 0.029 (*)     All other components within normal limits   B-TYPE NATRIURETIC PEPTIDE     EKG Readings: (Independently Interpreted)   Initial Reading: No STEMI. Rhythm: Normal Sinus Rhythm. Heart Rate: 83. Ectopy: No Ectopy. Conduction: Normal. ST Segments: Normal ST Segments. T Waves: Normal. Axis: Normal. Clinical Impression: Normal Sinus Rhythm       Imaging Results              X-Ray Chest AP Portable (Final result)  Result time 08/25/23 19:07:13      Final result by Hilaria Marie MD (08/25/23 19:07:13)                   Impression:      Mild chronic lung changes with no acute cardiopulmonary process identified.  No significant change.      Electronically signed by: Hilaria Marie MD  Date:    08/25/2023  Time:    19:07               Narrative:    EXAMINATION:  XR CHEST AP PORTABLE    CLINICAL HISTORY:  Chest pain, unspecified    TECHNIQUE:  Single frontal view of the chest was performed.    COMPARISON:  08/22/2023. CT chest from 01/06/2023.    FINDINGS:  Cardiac silhouette is normal in size.  Lungs are symmetrically expanded.  Mild emphysematous changes are seen with chronic opacity or scarring seen in the right lung apex.  Unchanged small left upper lobe visualized.  No evidence of new focal consolidative process, pneumothorax,  or significant pleural effusion.  No acute osseous abnormality identified.                                    X-Rays:   Independently Interpreted Readings:   Chest X-Ray: No acute abnormalities.  Normal heart size.     Medications   aspirin tablet 325 mg (325 mg Oral Given 8/25/23 4634)     Medical Decision Making  63 y.o.a male with acute intermittent, left-sided chest pain x 2 days provoked by exertion.     Amount and/or Complexity of Data Reviewed  External Data Reviewed: labs, ECG and notes.     Details: Last hospitalization two days ago - left AMA while in observation with elevated troponin  Last clinic visit with pulmonology - prescribed prednisone and awaiting walk test next week. Will possibly need home O2.  Labs: ordered.     Details: BUN and creatinine elevated 45/2.6, GFR 27 chronic - previous from 8/22/23  52/2.9 GFR 23  Troponin elevated - 0.029 Previous from 8/23/23 (0.091)  Radiology: ordered and independent interpretation performed.  ECG/medicine tests: ordered and independent interpretation performed.  Discussion of management or test interpretation with external provider(s): Patient will be placed in observation for exertional chest pain with elevated troponin and heart score 6. Full dose aspirin for now. Will consult cardiology. Accepted by Kin to  service at [facility] staffed by Dr Han Chauhan.     Risk  OTC drugs.  Prescription drug management.  Decision regarding hospitalization.      Additional MDM:   Heart Score:    History:          Highly suspicious.  ECG:             Normal  Age:               45-65 years  Risk factors: >= 3 risk factors or history of atherosclerotic disease  Troponin:       1-2x normal limit  Heart Score = 6                              Labs Reviewed  Admission on 08/25/2023   Component Date Value Ref Range Status    WBC 08/25/2023 8.88  3.90 - 12.70 K/uL Final    RBC 08/25/2023 4.30 (L)  4.60 - 6.20 M/uL Final    Hemoglobin 08/25/2023 13.2 (L)  14.0 - 18.0 g/dL Final     Hematocrit 08/25/2023 40.9  40.0 - 54.0 % Final    MCV 08/25/2023 95  82 - 98 fL Final    MCH 08/25/2023 30.7  27.0 - 31.0 pg Final    MCHC 08/25/2023 32.3  32.0 - 36.0 g/dL Final    RDW 08/25/2023 14.4  11.5 - 14.5 % Final    Platelets 08/25/2023 169  150 - 450 K/uL Final    MPV 08/25/2023 11.2  9.2 - 12.9 fL Final    Immature Granulocytes 08/25/2023 0.2  0.0 - 0.5 % Final    Gran # (ANC) 08/25/2023 7.3  1.8 - 7.7 K/uL Final    Immature Grans (Abs) 08/25/2023 0.02  0.00 - 0.04 K/uL Final    Comment: Mild elevation in immature granulocytes is non specific and   can be seen in a variety of conditions including stress response,   acute inflammation, trauma and pregnancy. Correlation with other   laboratory and clinical findings is essential.      Lymph # 08/25/2023 0.9 (L)  1.0 - 4.8 K/uL Final    Mono # 08/25/2023 0.6  0.3 - 1.0 K/uL Final    Eos # 08/25/2023 0.0  0.0 - 0.5 K/uL Final    Baso # 08/25/2023 0.01  0.00 - 0.20 K/uL Final    nRBC 08/25/2023 0  0 /100 WBC Final    Gran % 08/25/2023 82.7 (H)  38.0 - 73.0 % Final    Lymph % 08/25/2023 10.5 (L)  18.0 - 48.0 % Final    Mono % 08/25/2023 6.2  4.0 - 15.0 % Final    Eosinophil % 08/25/2023 0.3  0.0 - 8.0 % Final    Basophil % 08/25/2023 0.1  0.0 - 1.9 % Final    Differential Method 08/25/2023 Automated   Final    Sodium 08/25/2023 140  136 - 145 mmol/L Final    Potassium 08/25/2023 4.5  3.5 - 5.1 mmol/L Final    Chloride 08/25/2023 102  95 - 110 mmol/L Final    CO2 08/25/2023 28  23 - 29 mmol/L Final    Glucose 08/25/2023 109  70 - 110 mg/dL Final    BUN 08/25/2023 45 (H)  8 - 23 mg/dL Final    Creatinine 08/25/2023 2.6 (H)  0.5 - 1.4 mg/dL Final    Calcium 08/25/2023 9.8  8.7 - 10.5 mg/dL Final    Total Protein 08/25/2023 7.3  6.0 - 8.4 g/dL Final    Albumin 08/25/2023 4.1  3.5 - 5.2 g/dL Final    Total Bilirubin 08/25/2023 0.3  0.1 - 1.0 mg/dL Final    Comment: For infants and newborns, interpretation of results should be based  on gestational age, weight  and in agreement with clinical  observations.    Premature Infant recommended reference ranges:  Up to 24 hours.............<8.0 mg/dL  Up to 48 hours............<12.0 mg/dL  3-5 days..................<15.0 mg/dL  6-29 days.................<15.0 mg/dL      Alkaline Phosphatase 08/25/2023 46 (L)  55 - 135 U/L Final    AST 08/25/2023 29  10 - 40 U/L Final    ALT 08/25/2023 25  10 - 44 U/L Final    eGFR 08/25/2023 27 (A)  >60 mL/min/1.73 m^2 Final    Anion Gap 08/25/2023 10  8 - 16 mmol/L Final    Troponin I 08/25/2023 0.029 (H)  0.000 - 0.026 ng/mL Final    Comment: The reference interval for Troponin I represents the 99th percentile   cutoff   for our facility and is consistent with 3rd generation assay   performance.      BNP 08/25/2023 73  0 - 99 pg/mL Final    Values of less than 100 pg/ml are consistent with non-CHF populations.        Imaging Reviewed    Imaging Results              X-Ray Chest AP Portable (Final result)  Result time 08/25/23 19:07:13      Final result by Hilaria Marie MD (08/25/23 19:07:13)                   Impression:      Mild chronic lung changes with no acute cardiopulmonary process identified.  No significant change.      Electronically signed by: Hilaria Marie MD  Date:    08/25/2023  Time:    19:07               Narrative:    EXAMINATION:  XR CHEST AP PORTABLE    CLINICAL HISTORY:  Chest pain, unspecified    TECHNIQUE:  Single frontal view of the chest was performed.    COMPARISON:  08/22/2023. CT chest from 01/06/2023.    FINDINGS:  Cardiac silhouette is normal in size.  Lungs are symmetrically expanded.  Mild emphysematous changes are seen with chronic opacity or scarring seen in the right lung apex.  Unchanged small left upper lobe visualized.  No evidence of new focal consolidative process, pneumothorax, or significant pleural effusion.  No acute osseous abnormality identified.                                      Medications given in ED    Medications   aspirin tablet 325 mg  (325 mg Oral Given 8/25/23 1444)       Note was created using voice recognition software. Note may have occasional typographical errors that may not have been identified and edited despite good charmaine initial review prior to signing.    Clinical Impression:   Final diagnoses:  [R07.9] Chest pain (Primary)        ED Disposition Condition    Observation Stable                Jonny Julio MD  09/27/23 [id number]

## 2023-08-26 VITALS
DIASTOLIC BLOOD PRESSURE: 72 MMHG | TEMPERATURE: 98 F | OXYGEN SATURATION: 92 % | RESPIRATION RATE: 14 BRPM | WEIGHT: 146 LBS | HEIGHT: 72 IN | SYSTOLIC BLOOD PRESSURE: 153 MMHG | BODY MASS INDEX: 19.77 KG/M2 | HEART RATE: 81 BPM

## 2023-08-26 LAB
BSA FOR ECHO PROCEDURE: 1.83 M2
CHOLEST SERPL-MCNC: 120 MG/DL (ref 120–199)
CHOLEST/HDLC SERPL: 2.2 {RATIO} (ref 2–5)
CV ECHO LV RWT: 0.55 CM
DOP CALC LVOT AREA: 4.1 CM2
DOP CALC LVOT DIAMETER: 2.29 CM
DOP CALC MV VTI: 22.3 CM
E WAVE DECELERATION TIME: 231.42 MSEC
E/A RATIO: 0.68
E/E' RATIO: 8.33 M/S
ECHO LV POSTERIOR WALL: 1.33 CM (ref 0.6–1.1)
FRACTIONAL SHORTENING: 16 % (ref 28–44)
HDLC SERPL-MCNC: 55 MG/DL (ref 40–75)
HDLC SERPL: 45.8 % (ref 20–50)
INTERVENTRICULAR SEPTUM: 1.33 CM (ref 0.6–1.1)
IVC DIAMETER: 1.72 CM
LA MAJOR: 3.82 CM
LA MINOR: 4.28 CM
LA WIDTH: 3.8 CM
LDLC SERPL CALC-MCNC: 53.8 MG/DL (ref 63–159)
LEFT ATRIUM SIZE: 3.08 CM
LEFT ATRIUM VOLUME INDEX: 21.6 ML/M2
LEFT ATRIUM VOLUME: 40.16 CM3
LEFT INTERNAL DIMENSION IN SYSTOLE: 4.03 CM (ref 2.1–4)
LEFT VENTRICLE DIASTOLIC VOLUME INDEX: 58.38 ML/M2
LEFT VENTRICLE DIASTOLIC VOLUME: 108.58 ML
LEFT VENTRICLE MASS INDEX: 137 G/M2
LEFT VENTRICLE SYSTOLIC VOLUME INDEX: 38.4 ML/M2
LEFT VENTRICLE SYSTOLIC VOLUME: 71.46 ML
LEFT VENTRICULAR INTERNAL DIMENSION IN DIASTOLE: 4.82 CM (ref 3.5–6)
LEFT VENTRICULAR MASS: 255.63 G
LV LATERAL E/E' RATIO: 8.33 M/S
LV SEPTAL E/E' RATIO: 8.33 M/S
MV MEAN GRADIENT: 1 MMHG
MV PEAK A VEL: 0.74 M/S
MV PEAK E VEL: 0.5 M/S
MV PEAK GRADIENT: 2 MMHG
MV STENOSIS PRESSURE HALF TIME: 67.11 MS
MV VALVE AREA P 1/2 METHOD: 3.28 CM2
NONHDLC SERPL-MCNC: 65 MG/DL
PISA TR MAX VEL: 2.61 M/S
PV PEAK GRADIENT: 2 MMHG
PV PEAK VELOCITY: 0.67 M/S
RA MAJOR: 4.46 CM
RA PRESSURE ESTIMATED: 8 MMHG
RA WIDTH: 3.2 CM
RIGHT VENTRICULAR END-DIASTOLIC DIMENSION: 3.06 CM
RV TB RVSP: 11 MMHG
RV TISSUE DOPPLER FREE WALL SYSTOLIC VELOCITY 1 (APICAL 4 CHAMBER VIEW): 10.75 CM/S
SINUS: 3.53 CM
STJ: 2.54 CM
TDI LATERAL: 0.06 M/S
TDI SEPTAL: 0.06 M/S
TDI: 0.06 M/S
TR MAX PG: 27 MMHG
TRICUSPID ANNULAR PLANE SYSTOLIC EXCURSION: 1.75 CM
TRIGL SERPL-MCNC: 56 MG/DL (ref 30–150)
TROPONIN I SERPL DL<=0.01 NG/ML-MCNC: 0.03 NG/ML (ref 0–0.03)
TV REST PULMONARY ARTERY PRESSURE: 35 MMHG
Z-SCORE OF LEFT VENTRICULAR DIMENSION IN END DIASTOLE: -0.71
Z-SCORE OF LEFT VENTRICULAR DIMENSION IN END SYSTOLE: 1.84

## 2023-08-26 PROCEDURE — G0378 HOSPITAL OBSERVATION PER HR: HCPCS

## 2023-08-26 PROCEDURE — 25000003 PHARM REV CODE 250: Performed by: HOSPITALIST

## 2023-08-26 PROCEDURE — 63600175 PHARM REV CODE 636 W HCPCS: Performed by: HOSPITALIST

## 2023-08-26 PROCEDURE — 36415 COLL VENOUS BLD VENIPUNCTURE: CPT

## 2023-08-26 PROCEDURE — 84484 ASSAY OF TROPONIN QUANT: CPT | Performed by: HOSPITALIST

## 2023-08-26 PROCEDURE — 80061 LIPID PANEL: CPT | Performed by: HOSPITALIST

## 2023-08-26 PROCEDURE — 84484 ASSAY OF TROPONIN QUANT: CPT | Mod: 91

## 2023-08-26 PROCEDURE — 36415 COLL VENOUS BLD VENIPUNCTURE: CPT | Performed by: HOSPITALIST

## 2023-08-26 RX ORDER — NITROGLYCERIN 0.4 MG/1
0.4 TABLET SUBLINGUAL EVERY 5 MIN PRN
Qty: 30 TABLET | Refills: 3 | Status: SHIPPED | OUTPATIENT
Start: 2023-08-26 | End: 2024-08-25

## 2023-08-26 RX ADMIN — ASPIRIN 81 MG: 81 TABLET, COATED ORAL at 08:08

## 2023-08-26 RX ADMIN — TAMSULOSIN HYDROCHLORIDE 0.4 MG: 0.4 CAPSULE ORAL at 08:08

## 2023-08-26 RX ADMIN — HYDROCODONE BITARTRATE AND ACETAMINOPHEN 1 TABLET: 10; 325 TABLET ORAL at 07:08

## 2023-08-26 RX ADMIN — GABAPENTIN 400 MG: 400 CAPSULE ORAL at 08:08

## 2023-08-26 RX ADMIN — ALLOPURINOL 100 MG: 100 TABLET ORAL at 08:08

## 2023-08-26 RX ADMIN — APIXABAN 5 MG: 5 TABLET, FILM COATED ORAL at 08:08

## 2023-08-26 RX ADMIN — LEVOTHYROXINE SODIUM 125 MCG: 0.12 TABLET ORAL at 05:08

## 2023-08-26 RX ADMIN — PANTOPRAZOLE SODIUM 40 MG: 40 TABLET, DELAYED RELEASE ORAL at 08:08

## 2023-08-26 RX ADMIN — CARVEDILOL 6.25 MG: 6.25 TABLET, FILM COATED ORAL at 08:08

## 2023-08-26 RX ADMIN — PREDNISONE 40 MG: 20 TABLET ORAL at 08:08

## 2023-08-26 NOTE — PLAN OF CARE
Case Management Assessment     PCP: Saadia Bond  Pharmacy: [facility] on [address] in [place]    Patient Arrived From: home  Existing Help at Home: spouse    Barriers to Discharge: none    Discharge Plan:    A. Home with family    B. Home with family    SW completed brief assessment and discussed discharge planning with patient at his bedside. Patient stated that he lives with his wife and his disabled son they are his support system. Patient's wife will provide transportation for him to get home when discharge from the hospital.     08/25/23 1925   Discharge Planning   Assessment Type Discharge Planning Brief Assessment   Resource/Environmental Concerns none   Support Systems Spouse/significant other   Equipment Currently Used at Home none   Current Living Arrangements home   Patient/Family Anticipates Transition to home with family   Patient/Family Anticipated Services at Transition none   DME Needed Upon Discharge  none   Discharge Plan A Home with family   Discharge Plan B Home with family

## 2023-08-26 NOTE — DISCHARGE SUMMARY
[facility] Medicine  Discharge Summary      Patient Name: Blake Guillory  MRN: [id number]  SHELLY: [id number]  Patient Class: OP- Observation  Admission Date: 8/25/2023  Hospital Length of Stay: 0 days  Discharge Date and Time:  08/26/2023 1:42 PM  Attending Physician: Han Chauhan MD   Discharging Provider: Teo Cardona PA-C  Primary Care Provider: Varun Zeng MD    Primary Care Team: TEO CARDONA    HPI:   63 y.o. male with HTN, emphysema, CAD, chronic DVT presents with a complaint of chest pain.  Acute onset, duration several days, was hospitalized a few days ago at [facility] for NSTEMI but left AMA to go to a Pulmonology clinic appointment on 8/23/23. [place] [facility]  Pain located left shoulder and left pectoral muscle, described as aching, has been doing heavy lifting and working outside in the heat.  Denies fever, chills, palpitations, dizziness, syncope, n/v/d, or abdominal pain.  In the ED, EKG without evidence of acute ischemia, initial troponin only 0.029.  Otherwise unremarkable for acute abnormality.  Placed in observation for ACS rule out.      * No surgery found *      Hospital Course:   Blake Guillory was placed under obseravtion for ACS rule out.    Throughout his observation stay, Mr. Guillory, continued to state that he was chest pain free and without any issues or complaints. Troponin trended as follows: 0.029, 0.037, 0.028, 0.027, 0.027. Echocardiogram was ordered which noted normal LV size and wall thickness with mild global hypokinesis and reduced systolic with EF 45-50% and grade 1 DD. I educated Mr. Guillory on the importance of adhering to his home medication regimen, as well as a low/no sodium diet and fluid restriction. Mr. Guillory was requesting for discharge and stated he would follow up with his own cardiologist. He continued to complain of shortness of breath and requested for home oxygen, and home oxygen qualifying test was conducted which  noted O2 saturation at rest of 96% and 92% with activity. Social Work was consulted to assist with home oxygen supply for patient and educated the patient on additional resources. Vitals prior to discharge are as follows: Afebrile at 98.3 °F, HR: 81 bpm, RR: 14, BP: 153/72 with oxygen saturation of 92%.     All findings and plan were explained to the patient. All questions and concerns were answered. Patient verbalized understanding. Patient is in stable condition to d/c home and has been informed to follow up with his PCP within the next 7-10 days to discuss his observation stay and to follow-up with Cardiology. Patient has been educated to return to the ED if he experiences any further chest pain, shortness of breath, lightheadness, weakness, or discomfort.       Goals of Care Treatment Preferences:  Code Status: Full Code      Consults:     No new Assessment & Plan notes have been filed under this hospital service since the last note was generated.  Service: Hospital Medicine    Final Active Diagnoses:    Diagnosis Date Noted POA    PRINCIPAL PROBLEM:  Chest pain [R07.9] 03/22/2018 Yes    Centrilobular emphysema [J43.2] 03/22/2018 Yes     Chronic    Essential hypertension [I10] 01/02/2016 Yes     Chronic      Problems Resolved During this Admission:       Discharged Condition: stable    Disposition: Home or Self Care    Follow Up:   Follow-up Information     Dme, [facility].    Specialty: TEJINDER Provider  Why: DME: Please contact [facility] to discuss possibly renting Home O2 tanks.  Contact information:  [address] [facility] [facility] [address]  [phone]             John Paul Ayers MD. Go on 9/7/2023.    Specialties: Interventional Cardiology, Cardiology  Why: Hospital f/u  Contact information:  [address]  [facility] [address] [phone]                       Patient Instructions:      Diet Cardiac     Activity as tolerated       Significant  Diagnostic Studies: Labs:   CMP   Recent Labs   Lab 08/25/23  1708      K 4.5      CO2 28      BUN 45*   CREATININE 2.6*   CALCIUM 9.8   PROT 7.3   ALBUMIN 4.1   BILITOT 0.3   ALKPHOS 46*   AST 29   ALT 25   ANIONGAP 10     CBC   Recent Labs   Lab 08/25/23  1708   WBC 8.88   HGB 13.2*   HCT 40.9        Troponin   Recent Labs   Lab 08/26/23  0230 08/26/23  0631 08/26/23  1032   TROPONINI 0.028* 0.027* 0.027*     Cardiac Graphics: Echocardiogram:   Transthoracic echo (TTE) complete (Cupid Only):   Results for orders placed or performed during the hospital encounter of 08/25/23   Echo   Result Value Ref Range    BSA 1.83 m2    LVIDd 4.82 3.5 - 6.0 cm    LV Systolic Volume 71.46 mL    LV Systolic Volume Index 38.4 mL/m2    LVIDs 4.03 (A) 2.1 - 4.0 cm    LV Diastolic Volume 108.58 mL    LV Diastolic Volume Index 58.38 mL/m2    IVS 1.33 (A) 0.6 - 1.1 cm    LVOT diameter 2.29 cm    LVOT area 4.1 cm2    FS 16 (A) 28 - 44 %    Left Ventricle Relative Wall Thickness 0.55 cm    Posterior Wall 1.33 (A) 0.6 - 1.1 cm    LV mass 255.63 g    LV Mass Index 137 g/m2    MV Peak E Abraham 0.50 m/s    TDI LATERAL 0.06 m/s    TDI SEPTAL 0.06 m/s    E/E' ratio 8.33 m/s    MV Peak A Abraham 0.74 m/s    TR Max Abraham 2.61 m/s    E/A ratio 0.68     E wave deceleration time 231.42 msec    LV SEPTAL E/E' RATIO 8.33 m/s    LV LATERAL E/E' RATIO 8.33 m/s    LA size 3.08 cm    Left Atrium Major Axis 3.82 cm    Left Atrium Minor Axis 4.28 cm    RVDD 3.06 cm    RV S' 10.75 cm/s    TAPSE 1.75 cm    RA Major Axis 4.46 cm    MV mean gradient 1 mmHg    MV peak gradient 2 mmHg    MV stenosis pressure 1/2 time 67.11 ms    MV valve area p 1/2 method 3.28 cm2    MV VTI 22.3 cm    Triscuspid Valve Regurgitation Peak Gradient 27 mmHg    PV PEAK VELOCITY 0.67 m/s    PV peak gradient 2 mmHg    Sinus 3.53 cm    STJ 2.54 cm    IVC diameter 1.72 cm    Mean e' 0.06 m/s    ZLVIDS 1.84     ZLVIDD -0.71     LA Volume Index 21.6 mL/m2    LA volume 40.16  cm3    LA WIDTH 3.8 cm    RA Width 3.2 cm    TV resting pulmonary artery pressure 35 mmHg    RV TB RVSP 11 mmHg    Est. RA pres 8 mmHg    Narrative      Left Ventricle: The left ventricle is normal in size. Mildly increased   wall thickness. There is mild concentric hypertrophy. Mild global   hypokinesis present. There is mildly reduced systolic function with a   visually estimated ejection fraction of 45 - 50%. Grade I diastolic   dysfunction.    Right Ventricle: Normal right ventricular cavity size. Wall thickness   is normal. Right ventricle wall motion  is normal. Systolic function is   normal.    Tricuspid Valve: There is mild regurgitation.    Pulmonary Artery: The estimated pulmonary artery systolic pressure is   35 mmHg.         Pending Diagnostic Studies:     None         Medications:  Reconciled Home Medications:      Medication List      START taking these medications    nitroGLYCERIN 0.4 MG SL tablet  Commonly known as: NITROSTAT  Place 1 tablet (0.4 mg total) under the tongue every 5 (five) minutes as needed for Chest pain.        CONTINUE taking these medications    albuterol 90 mcg/actuation inhaler  Commonly known as: PROVENTIL/VENTOLIN HFA  Inhale 2 puffs into the lungs every 6 (six) hours as needed for Wheezing. Rescue     albuterol-ipratropium 2.5 mg-0.5 mg/3 mL nebulizer solution  Commonly known as: DUO-NEB  Take 3 mLs by nebulization every 6 (six) hours as needed for Wheezing. Rescue     allopurinoL 100 MG tablet  Commonly known as: ZYLOPRIM  Take 1 tablet (100 mg total) by mouth once daily.     apixaban 5 mg Tab  Commonly known as: ELIQUIS  Take 1 tablet (5 mg total) by mouth 2 (two) times daily.     aspirin 81 MG EC tablet  Commonly known as: ECOTRIN  Take 81 mg by mouth once daily.     calcitRIOL 0.25 MCG Cap  Commonly known as: ROCALTROL  Take 1 capsule (0.25 mcg total) by mouth once daily.     carvediloL 6.25 MG tablet  Commonly known as: COREG  Take 1 tablet (6.25 mg total) by mouth 2  (two) times daily with meals.     clobetasoL 0.05 % cream  Commonly known as: TEMOVATE  Apply topically 2 (two) times daily.     EScitalopram oxalate 20 MG tablet  Commonly known as: LEXAPRO  Take 1 tablet (20 mg total) by mouth once daily.     fenofibrate 145 MG tablet  Commonly known as: TRICOR  Take 1 tablet (145 mg total) by mouth once daily.     fluticasone-umeclidin-vilanter 100-62.5-25 mcg Dsdv  Commonly known as: TRELEGY ELLIPTA  Inhale 1 puff into the lungs once daily. Rinse mouth after use.     gabapentin 400 MG capsule  Commonly known as: NEURONTIN  Take 1 capsule (400 mg total) by mouth 2 (two) times daily.     HYDROcodone-acetaminophen  mg per tablet  Commonly known as: NORCO  Take 1 tablet by mouth every 6 (six) hours as needed for Pain.     levothyroxine 125 MCG tablet  Commonly known as: SYNTHROID  Take 1 tablet (125 mcg total) by mouth once daily.     LIDOcaine 5 %  Commonly known as: LIDODERM  Place 1 patch onto the skin once daily. Remove & Discard patch within 12 hours or as directed by MD     multivitamin per tablet  Commonly known as: THERAGRAN  Take 1 tablet by mouth once daily.     pantoprazole 40 MG tablet  Commonly known as: PROTONIX  Take 1 tablet (40 mg total) by mouth once daily.     predniSONE 20 MG tablet  Commonly known as: DELTASONE  Take 2 tablets (40 mg total) by mouth once daily. for 10 days     sodium bicarbonate 325 MG tablet  Take 325 mg by mouth 2 (two) times daily.     sucralfate 1 gram tablet  Commonly known as: CARAFATE  Take 1 tablet (1 g total) by mouth 2 (two) times daily.     tamsulosin 0.4 mg Cap  Commonly known as: FLOMAX  Take 1 capsule (0.4 mg total) by mouth once daily.     tiZANidine 4 MG tablet  Commonly known as: ZANAFLEX  Take 1 tablet (4 mg total) by mouth once daily.     traZODone 150 MG tablet  Commonly known as: DESYREL  Take 1 tablet (150 mg total) by mouth every evening.            Indwelling Lines/Drains at time of discharge:   Lines/Drains/Airways      Drain  Duration                Urostomy RLQ -- days         Urostomy 06/17/20 1055 1165 days         Nephrostomy 06/08/22 0846 Right 12 Fr. 444 days         Nephrostomy 06/08/22 0910 Left 12 Fr. 444 days                Time spent on the discharge of patient: 60 minutes      Teo Cardona PA-C  Department of Hospital Medicine  [facility]  08/26/2023

## 2023-08-26 NOTE — ASSESSMENT & PLAN NOTE
Atypical pain for several days, troponin minimal, down trending from 8/22 and 8/23, currently pain free.  EKG without evidence of acute ischemia.  Monitor on tele, obtain serial troponin, check echo, NPO p MN.

## 2023-08-26 NOTE — PLAN OF CARE
Patient reported that he is interested in obtaining Home O2 portables for home use, especially in the mornings when his SATs  sometimes drop to 84% but usually recover soon after. Here at the hospital and on previous walk tests, his SATs usually are around 96%.     Patient was provided with contact information for [facility] as Kim with [facility] suggested. [facility] contact information was added to the AVS and provided to patient.

## 2023-08-26 NOTE — HOSPITAL COURSE
Blake Guillory was placed under obseravtion for ACS rule out.    Throughout his observation stay, Mr. Guillory, continued to state that he was chest pain free and without any issues or complaints. Troponin trended as follows: 0.029, 0.037, 0.028, 0.027, 0.027. Echocardiogram was ordered which noted normal LV size and wall thickness with mild global hypokinesis and reduced systolic with EF 45-50% and grade 1 DD. I educated Mr. Guillory on the importance of adhering to his home medication regimen, as well as a low/no sodium diet and fluid restriction. Mr. Guillory was requesting for discharge and stated he would follow up with his own cardiologist. He continued to complain of shortness of breath and requested for home oxygen, and home oxygen qualifying test was conducted which noted O2 saturation at rest of 96% and 92% with activity. Social Work was consulted to assist with home oxygen supply for patient and educated the patient on additional resources. Vitals prior to discharge are as follows: Afebrile at 98.3 °F, HR: 81 bpm, RR: 14, BP: 153/72 with oxygen saturation of 92%.     All findings and plan were explained to the patient. All questions and concerns were answered. Patient verbalized understanding. Patient is in stable condition to d/c home and has been informed to follow up with his PCP within the next 7-10 days to discuss his observation stay and to follow-up with Cardiology. Patient has been educated to return to the ED if he experiences any further chest pain, shortness of breath, lightheadness, weakness, or discomfort.

## 2023-08-26 NOTE — NURSING
[facility]  Nurses Note -- 4 Eyes      8/25/2023       Skin assessed on: Admit      [x] No Pressure Injuries Present    [x]Prevention Measures Documented    [] Yes LDA  for Pressure Injury Previously documented     [] Yes New Pressure Injury Discovered   [] LDA for New Pressure Injury Added      Attending RN:  Megan King RN     Second RN:  PATRICIA Hurt

## 2023-08-26 NOTE — SUBJECTIVE & OBJECTIVE
Past Medical History:   Diagnosis Date    Anemia of chronic disease     Aneurysm of right common iliac artery     Arthritis     Blood transfusion     CAD (coronary artery disease)     Chest pain of uncertain etiology     CKD (chronic kidney disease), stage IV     COPD (chronic obstructive pulmonary disease)     Coronary artery calcification 8/2/2021    DDD (degenerative disc disease), lumbar     Frequency of urination     General anesthetics causing adverse effect in therapeutic use     pt states he wakes up very violently from anesthesia    GERD (gastroesophageal reflux disease)     Gout     History of bladder cancer     History of urostomy     Hydronephrosis     Hyperparathyroidism     Hypertension     Hypertriglyceridemia     Hypothyroidism (acquired)     Insomnia     Kidney stone     Limited joint range of motion right hip and leg    Lumbar facet arthropathy     Lumbar spondylosis     Mixed anxiety and depressive disorder     Neuropathy     Orchalgia     SHARMIN (obstructive sleep apnea)     Personal history of bladder cancer     PVD (peripheral vascular disease)     Sacroiliitis     Stricture or kinking of ureter     Ureteral stent displacement     Urinary retention     Vitamin D deficiency     Wears glasses        Past Surgical History:   Procedure Laterality Date    APPENDECTOMY  12/30/2015    Procedure: APPENDECTOMY;  Surgeon: CHAD Bo MD;  Location: [facility];  Service: Urology;;    BALLOON DILATION OF URETER  01/22/2018    BOWEL RESECTION      COLONOSCOPY      CREATION OF URETEROILEAL CONDUIT Left 07/05/2013    CYSTOSCOPY      >1  episodes for bilat ureteral stent exchange    CYSTOSCOPY W/ URETERAL STENT PLACEMENT Right 01/22/2018    and 6/9/2015    CYSTOSCOPY W/ URETERAL STENT PLACEMENT Bilateral 10/07/2015    ESOPHAGOGASTRODUODENOSCOPY  04/12/2022    W/ BIOPSY    ESOPHAGOGASTRODUODENOSCOPY N/A 4/12/2022    Procedure: EGD (ESOPHAGOGASTRODUODENOSCOPY);  Surgeon: Tito Fan MD;  Location: [facility];   Service: Endoscopy;  Laterality: N/A;    EXPLORATORY LAPAROTOMY  07/05/2013    EXPLORATORY LAPAROTOMY W/ BOWEL RESECTION  12/30/2015    EXTRACORPOREAL SHOCK WAVE LITHOTRIPSY Right 06/07/2021    Procedure: LITHOTRIPSY, ESWL;  Surgeon: CHAD Bo MD;  Location: [facility] OR;  Service: Urology;  Laterality: Right;  RN PREOP 5/31/2021   VACCINATED    EXTRACORPOREAL SHOCK WAVE LITHOTRIPSY  06/07/2021    FRACTURE SURGERY      HAND SURGERY      HEMICOLECTOMY  12/30/2015    HIP SURGERY  2012    Rt hip septic    LAPAROSCOPIC CHOLECYSTECTOMY N/A 08/11/2021    Procedure: CHOLECYSTECTOMY, LAPAROSCOPIC;  Surgeon: Remy Xiong MD;  Location: [facility] OR;  Service: General;  Laterality: N/A;  gianni video confirmed 8/5/dme    LYMPH NODE DISSECTION  11/08/2012    pelvic    LYSIS OF ADHESIONS  12/30/2015    MOUTH SURGERY  2000    all teeth extracted    PERCUTANEOUS NEPHROLITHOTOMY Left 07/22/2020    Procedure: NEPHROLITHOTOMY, PERCUTANEOUS;  Surgeon: CHAD Bo MD;  Location: [facility] OR;  Service: Urology;  Laterality: Left;  OR 9 ONLY  RN PREOP 7/20/2020----COVID NEGATIVE ON 7/20    PERCUTANEOUS NEPHROLITHOTOMY Bilateral 6/8/2022    Procedure: BILATERAL PERCUTANEOUS NEPHROSCOPY, BILATERAL ANTEGRADE PYELOGRAM, BILATERAL NEPHROSTOMY TUBE PLACEMENT;  Surgeon: CHAD Bo MD;  Location: [facility] OR;  Service: Urology;  Laterality: Bilateral;  room 9 only  RN PREOP 6/1/2022----T/S IN AM    PERCUTANEOUS NEPHROSTOMY Left 01/02/2018    PERCUTANEOUS REPLACEMENT OF NEPHROSTOMY TUBE Bilateral 5/10/2022    Procedure: REPLACEMENT, NEPHROSTOMY TUBE, PERCUTANEOUS;  Surgeon: Chino Vo MD;  Location: [facility] CATH LAB;  Service: Radiology;  Laterality: Bilateral;    RADICAL CYSTECTOMY  11/08/2012    RADIOFREQUENCY THERMOCOAGULATION  12/12/2014    median branch lumbar    RADIOFREQUENCY THERMOCOAGULATION  12/01/2014    median branch lumbar    REPAIR, HERNIA, PARASTOMAL, LAPAROSCOPIC  04/12/2017    ROBOTIC BRONCHOSCOPY N/A  1/13/2023    Procedure: ROBOTIC BRONCHOSCOPY;  Surgeon: Camila Rodriguez MD;  Location: [facility] OR [address] [place] [address];  Service: Anesthesiology;  Laterality: N/A;    SMALL INTESTINE SURGERY  04/17/2017    urosotomy bag  2011    R abdomen       Review of patient's allergies indicates:  No Known Allergies    No current facility-administered medications on file prior to encounter.     Current Outpatient Medications on File Prior to Encounter   Medication Sig    albuterol (PROVENTIL/VENTOLIN HFA) 90 mcg/actuation inhaler Inhale 2 puffs into the lungs every 6 (six) hours as needed for Wheezing. Rescue    albuterol-ipratropium (DUO-NEB) 2.5 mg-0.5 mg/3 mL nebulizer solution Take 3 mLs by nebulization every 6 (six) hours as needed for Wheezing. Rescue    allopurinoL (ZYLOPRIM) 100 MG tablet Take 1 tablet (100 mg total) by mouth once daily.    apixaban (ELIQUIS) 5 mg Tab Take 1 tablet (5 mg total) by mouth 2 (two) times daily.    aspirin (ECOTRIN) 81 MG EC tablet Take 81 mg by mouth once daily.    calcitRIOL (ROCALTROL) 0.25 MCG Cap Take 1 capsule (0.25 mcg total) by mouth once daily.    carvediloL (COREG) 6.25 MG tablet Take 1 tablet (6.25 mg total) by mouth 2 (two) times daily with meals.    clobetasoL (TEMOVATE) 0.05 % cream Apply topically 2 (two) times daily.    EScitalopram oxalate (LEXAPRO) 20 MG tablet Take 1 tablet (20 mg total) by mouth once daily.    fenofibrate (TRICOR) 145 MG tablet Take 1 tablet (145 mg total) by mouth once daily.    fluticasone-umeclidin-vilanter (TRELEGY ELLIPTA) 100-62.5-25 mcg DsDv Inhale 1 puff into the lungs once daily. Rinse mouth after use.    gabapentin (NEURONTIN) 400 MG capsule Take 1 capsule (400 mg total) by mouth 2 (two) times daily.    HYDROcodone-acetaminophen (NORCO)  mg per tablet Take 1 tablet by mouth every 6 (six) hours as needed for Pain.    levothyroxine (SYNTHROID) 125 MCG tablet Take 1 tablet (125 mcg total) by mouth once daily.    LIDOcaine (LIDODERM) 5 % Place 1 patch onto the  skin once daily. Remove & Discard patch within 12 hours or as directed by MD    multivitamin (THERAGRAN) per tablet Take 1 tablet by mouth once daily.    pantoprazole (PROTONIX) 40 MG tablet Take 1 tablet (40 mg total) by mouth once daily.    predniSONE (DELTASONE) 20 MG tablet Take 2 tablets (40 mg total) by mouth once daily. for 10 days    sodium bicarbonate 325 MG tablet Take 325 mg by mouth 2 (two) times daily.    sucralfate (CARAFATE) 1 gram tablet Take 1 tablet (1 g total) by mouth 2 (two) times daily.    tamsulosin (FLOMAX) 0.4 mg Cap Take 1 capsule (0.4 mg total) by mouth once daily.    tiZANidine (ZANAFLEX) 4 MG tablet Take 1 tablet (4 mg total) by mouth once daily.    traZODone (DESYREL) 150 MG tablet Take 1 tablet (150 mg total) by mouth every evening.     Family History    Family history is unknown by patient.       Tobacco Use    Smoking status: Former     Current packs/day: 0.00     Average packs/day: 0.3 packs/day for 42.0 years (12.6 ttl pk-yrs)     Types: Cigarettes     Start date: 1975     Quit date: 2017     Years since quittin.5    Smokeless tobacco: Never    Tobacco comments:     in cessation program   Substance and Sexual Activity    Alcohol use: Yes     Alcohol/week: 14.0 - 21.0 standard drinks of alcohol     Types: 14 - 21 Cans of beer per week     Comment: occassional    Drug use: Yes     Types: Marijuana     Comment: occass    Sexual activity: Yes     Partners: Female     Birth control/protection: None     Review of Systems   Constitutional:  Negative for chills and fever.   HENT:  Negative for congestion and rhinorrhea.    Eyes:  Negative for photophobia and visual disturbance.   Respiratory:  Positive for cough. Negative for shortness of breath.    Cardiovascular:  Positive for chest pain. Negative for palpitations and leg swelling.   Gastrointestinal:  Negative for abdominal pain, diarrhea, nausea and vomiting.   Genitourinary:  Negative for frequency, hematuria and urgency.    Musculoskeletal:  Positive for arthralgias.   Skin:  Negative for pallor, rash and wound.   Neurological:  Negative for light-headedness and headaches.   Psychiatric/Behavioral:  Negative for confusion and decreased concentration.      Objective:     Vital Signs (Most Recent):  Temp: 98.1 °F (36.7 °C) (08/25/23 2009)  Pulse: 72 (08/25/23 2009)  Resp: 16 (08/25/23 2032)  BP: (!) 161/78 (08/25/23 2009)  SpO2: (!) 93 % (08/25/23 2009) Vital Signs (24h Range):  Temp:  [98.1 °F (36.7 °C)-98.9 °F (37.2 °C)] 98.1 °F (36.7 °C)  Pulse:  [72-85] 72  Resp:  [11-18] 16  SpO2:  [93 %-95 %] 93 %  BP: (117-161)/(58-78) 161/78     Weight: 66.5 kg (146 lb 9.7 oz)  Body mass index is 19.88 kg/m².     Physical Exam  Vitals and nursing note reviewed.   Constitutional:       General: He is not in acute distress.     Appearance: He is well-developed.   HENT:      Head: Normocephalic and atraumatic.      Right Ear: External ear normal.      Left Ear: External ear normal.      Nose: Nose normal.   Eyes:      Conjunctiva/sclera: Conjunctivae normal.   Cardiovascular:      Rate and Rhythm: Normal rate and regular rhythm.   Pulmonary:      Effort: Pulmonary effort is normal. No respiratory distress.   Abdominal:      General: There is no distension.      Palpations: Abdomen is soft.   Musculoskeletal:         General: Normal range of motion.   Skin:     General: Skin is warm and dry.   Neurological:      Mental Status: He is alert and oriented to person, place, and time.   Psychiatric:         Thought Content: Thought content normal.                Significant Labs: All pertinent labs within the past 24 hours have been reviewed.    Significant Imaging: I have reviewed all pertinent imaging results/findings within the past 24 hours.

## 2023-08-26 NOTE — NURSING
Home O2 Evaluation:  Oxygen saturation@96% on room air at rest.  Oxygen saturation@92% on room air with activity (walking 10 feet in hallway).   Oxygen saturation@96% on return to rest on room air (recovery).   Denies symptoms during eval.

## 2023-08-26 NOTE — NURSING
Discharge Preparation:  Note that patient awake, alert and fully oriented with family at bedside.  Patient reports that he is ready for discharge.   Case management coordination complete.     IV and telemetry monitor removed. Right FA dressing had to changed x 1 secondary to bleeding post IV removal.     Reviewed discharge plan with family and that voiced understanding of same.     Will contact Hospital Transport for w/c to parking area.     Will continue to f/u.

## 2023-08-26 NOTE — NURSING
[facility] [place], [place]  Nurses Note -- 4 Eyes      8/26/2023       Skin assessed on: Q Shift      [x] No Pressure Injuries Present    [x]Prevention Measures Documented    [] Yes LDA  for Pressure Injury Previously documented     [] Yes New Pressure Injury Discovered   [] LDA for New Pressure Injury Added      Attending RN:  Cristiane Blood RN     Second RN:  PATRICIA Baez

## 2023-08-26 NOTE — H&P
[facility] Medicine  History & Physical    Patient Name: Blake Guillory  MRN: [id number]  Patient Class: OP- Observation  Admission Date: 8/25/2023  Attending Physician: Han Chauhan MD   Primary Care Provider: Varun Zeng MD         Patient information was obtained from patient, past medical records and ER records.     Subjective:     Principal Problem:Chest pain    Chief Complaint:   Chief Complaint   Patient presents with    Chest Pain     Patient reports CP that has been having intermittent CP, seen several hospitals this week with anxiety and low oxygen, states started to have more frequent today while out in the heart for a short of period of time, states radiates to right shoulder.         HPI: 63 y.o. male with HTN, emphysema, CAD, chronic DVT presents with a complaint of chest pain.  Acute onset, duration several days, was hospitalized a few days ago at [facility] for NSTEMI but left AMA to go to a Pulmonology clinic appointment on 8/23/23. Now returns here at [facility].  Pain located left shoulder and left pectoral muscle, described as aching, has been doing heavy lifting and working outside in the heat.  Denies fever, chills, palpitations, dizziness, syncope, n/v/d, or abdominal pain.  In the ED, EKG without evidence of acute ischemia, initial troponin only 0.029.  Otherwise unremarkable for acute abnormality.  Placed in observation for ACS rule out.      Past Medical History:   Diagnosis Date    Anemia of chronic disease     Aneurysm of right common iliac artery     Arthritis     Blood transfusion     CAD (coronary artery disease)     Chest pain of uncertain etiology     CKD (chronic kidney disease), stage IV     COPD (chronic obstructive pulmonary disease)     Coronary artery calcification 8/2/2021    DDD (degenerative disc disease), lumbar     Frequency of urination     General anesthetics causing adverse effect in therapeutic use     pt states he wakes up very  violently from anesthesia    GERD (gastroesophageal reflux disease)     Gout     History of bladder cancer     History of urostomy     Hydronephrosis     Hyperparathyroidism     Hypertension     Hypertriglyceridemia     Hypothyroidism (acquired)     Insomnia     Kidney stone     Limited joint range of motion right hip and leg    Lumbar facet arthropathy     Lumbar spondylosis     Mixed anxiety and depressive disorder     Neuropathy     Orchalgia     SHARMIN (obstructive sleep apnea)     Personal history of bladder cancer     PVD (peripheral vascular disease)     Sacroiliitis     Stricture or kinking of ureter     Ureteral stent displacement     Urinary retention     Vitamin D deficiency     Wears glasses        Past Surgical History:   Procedure Laterality Date    APPENDECTOMY  12/30/2015    Procedure: APPENDECTOMY;  Surgeon: CHAD Bo MD;  Location: [facility] [address];  Service: Urology;;    BALLOON DILATION OF URETER  01/22/2018    BOWEL RESECTION      COLONOSCOPY      CREATION OF URETEROILEAL CONDUIT Left 07/05/2013    CYSTOSCOPY      >1  episodes for bilat ureteral stent exchange    CYSTOSCOPY W/ URETERAL STENT PLACEMENT Right 01/22/2018    and 6/9/2015    CYSTOSCOPY W/ URETERAL STENT PLACEMENT Bilateral 10/07/2015    ESOPHAGOGASTRODUODENOSCOPY  04/12/2022    W/ BIOPSY    ESOPHAGOGASTRODUODENOSCOPY N/A 4/12/2022    Procedure: EGD (ESOPHAGOGASTRODUODENOSCOPY);  Surgeon: Tito Fan MD;  Location: [facility];  Service: Endoscopy;  Laterality: N/A;    EXPLORATORY LAPAROTOMY  07/05/2013    EXPLORATORY LAPAROTOMY W/ BOWEL RESECTION  12/30/2015    EXTRACORPOREAL SHOCK WAVE LITHOTRIPSY Right 06/07/2021    Procedure: LITHOTRIPSY, ESWL;  Surgeon: CHAD Bo MD;  Location: [facility] [address];  Service: Urology;  Laterality: Right;  RN PREOP 5/31/2021   VACCINATED    EXTRACORPOREAL SHOCK WAVE LITHOTRIPSY  06/07/2021    FRACTURE SURGERY      HAND SURGERY      HEMICOLECTOMY  12/30/2015     HIP SURGERY  2012    Rt hip septic    LAPAROSCOPIC CHOLECYSTECTOMY N/A 08/11/2021    Procedure: CHOLECYSTECTOMY, LAPAROSCOPIC;  Surgeon: Remy Xiong MD;  Location: [facility] OR;  Service: General;  Laterality: N/A;  gianni video confirmed 8/5/dme    LYMPH NODE DISSECTION  11/08/2012    pelvic    LYSIS OF ADHESIONS  12/30/2015    MOUTH SURGERY  2000    all teeth extracted    PERCUTANEOUS NEPHROLITHOTOMY Left 07/22/2020    Procedure: NEPHROLITHOTOMY, PERCUTANEOUS;  Surgeon: CHAD Bo MD;  Location: [facility] OR;  Service: Urology;  Laterality: Left;  OR 9 ONLY  RN PREOP 7/20/2020----COVID NEGATIVE ON 7/20    PERCUTANEOUS NEPHROLITHOTOMY Bilateral 6/8/2022    Procedure: BILATERAL PERCUTANEOUS NEPHROSCOPY, BILATERAL ANTEGRADE PYELOGRAM, BILATERAL NEPHROSTOMY TUBE PLACEMENT;  Surgeon: CHAD Bo MD;  Location: [facility] OR;  Service: Urology;  Laterality: Bilateral;  room 9 only  RN PREOP 6/1/2022----T/S IN AM    PERCUTANEOUS NEPHROSTOMY Left 01/02/2018    PERCUTANEOUS REPLACEMENT OF NEPHROSTOMY TUBE Bilateral 5/10/2022    Procedure: REPLACEMENT, NEPHROSTOMY TUBE, PERCUTANEOUS;  Surgeon: Chino Vo MD;  Location: [facility] CATH LAB;  Service: Radiology;  Laterality: Bilateral;    RADICAL CYSTECTOMY  11/08/2012    RADIOFREQUENCY THERMOCOAGULATION  12/12/2014    median branch lumbar    RADIOFREQUENCY THERMOCOAGULATION  12/01/2014    median branch lumbar    REPAIR, HERNIA, PARASTOMAL, LAPAROSCOPIC  04/12/2017    ROBOTIC BRONCHOSCOPY N/A 1/13/2023    Procedure: ROBOTIC BRONCHOSCOPY;  Surgeon: Camila Rodriguez MD;  Location: [facility] OR [address] [place] [address];  Service: Anesthesiology;  Laterality: N/A;    SMALL INTESTINE SURGERY  04/17/2017    urosotomy bag  2011    R abdomen       Review of patient's allergies indicates:  No Known Allergies    No current facility-administered medications on file prior to encounter.     Current Outpatient Medications on File Prior to Encounter   Medication Sig    albuterol  (PROVENTIL/VENTOLIN HFA) 90 mcg/actuation inhaler Inhale 2 puffs into the lungs every 6 (six) hours as needed for Wheezing. Rescue    albuterol-ipratropium (DUO-NEB) 2.5 mg-0.5 mg/3 mL nebulizer solution Take 3 mLs by nebulization every 6 (six) hours as needed for Wheezing. Rescue    allopurinoL (ZYLOPRIM) 100 MG tablet Take 1 tablet (100 mg total) by mouth once daily.    apixaban (ELIQUIS) 5 mg Tab Take 1 tablet (5 mg total) by mouth 2 (two) times daily.    aspirin (ECOTRIN) 81 MG EC tablet Take 81 mg by mouth once daily.    calcitRIOL (ROCALTROL) 0.25 MCG Cap Take 1 capsule (0.25 mcg total) by mouth once daily.    carvediloL (COREG) 6.25 MG tablet Take 1 tablet (6.25 mg total) by mouth 2 (two) times daily with meals.    clobetasoL (TEMOVATE) 0.05 % cream Apply topically 2 (two) times daily.    EScitalopram oxalate (LEXAPRO) 20 MG tablet Take 1 tablet (20 mg total) by mouth once daily.    fenofibrate (TRICOR) 145 MG tablet Take 1 tablet (145 mg total) by mouth once daily.    fluticasone-umeclidin-vilanter (TRELEGY ELLIPTA) 100-62.5-25 mcg DsDv Inhale 1 puff into the lungs once daily. Rinse mouth after use.    gabapentin (NEURONTIN) 400 MG capsule Take 1 capsule (400 mg total) by mouth 2 (two) times daily.    HYDROcodone-acetaminophen (NORCO)  mg per tablet Take 1 tablet by mouth every 6 (six) hours as needed for Pain.    levothyroxine (SYNTHROID) 125 MCG tablet Take 1 tablet (125 mcg total) by mouth once daily.    LIDOcaine (LIDODERM) 5 % Place 1 patch onto the skin once daily. Remove & Discard patch within 12 hours or as directed by MD    multivitamin (THERAGRAN) per tablet Take 1 tablet by mouth once daily.    pantoprazole (PROTONIX) 40 MG tablet Take 1 tablet (40 mg total) by mouth once daily.    predniSONE (DELTASONE) 20 MG tablet Take 2 tablets (40 mg total) by mouth once daily. for 10 days    sodium bicarbonate 325 MG tablet Take 325 mg by mouth 2 (two) times daily.    sucralfate  (CARAFATE) 1 gram tablet Take 1 tablet (1 g total) by mouth 2 (two) times daily.    tamsulosin (FLOMAX) 0.4 mg Cap Take 1 capsule (0.4 mg total) by mouth once daily.    tiZANidine (ZANAFLEX) 4 MG tablet Take 1 tablet (4 mg total) by mouth once daily.    traZODone (DESYREL) 150 MG tablet Take 1 tablet (150 mg total) by mouth every evening.     Family History    Family history is unknown by patient.       Tobacco Use    Smoking status: Former     Current packs/day: 0.00     Average packs/day: 0.3 packs/day for 42.0 years (12.6 ttl pk-yrs)     Types: Cigarettes     Start date: 1975     Quit date: 2017     Years since quittin.5    Smokeless tobacco: Never    Tobacco comments:     in cessation program   Substance and Sexual Activity    Alcohol use: Yes     Alcohol/week: 14.0 - 21.0 standard drinks of alcohol     Types: 14 - 21 Cans of beer per week     Comment: occassional    Drug use: Yes     Types: Marijuana     Comment: occass    Sexual activity: Yes     Partners: Female     Birth control/protection: None     Review of Systems   Constitutional:  Negative for chills and fever.   HENT:  Negative for congestion and rhinorrhea.    Eyes:  Negative for photophobia and visual disturbance.   Respiratory:  Positive for cough. Negative for shortness of breath.    Cardiovascular:  Positive for chest pain. Negative for palpitations and leg swelling.   Gastrointestinal:  Negative for abdominal pain, diarrhea, nausea and vomiting.   Genitourinary:  Negative for frequency, hematuria and urgency.   Musculoskeletal:  Positive for arthralgias.   Skin:  Negative for pallor, rash and wound.   Neurological:  Negative for light-headedness and headaches.   Psychiatric/Behavioral:  Negative for confusion and decreased concentration.      Objective:     Vital Signs (Most Recent):  Temp: 98.1 °F (36.7 °C) (23)  Pulse: 72 (23)  Resp: 16 (23)  BP: (!) 161/78 (23)  SpO2: (!) 93 %  (08/25/23 2009) Vital Signs (24h Range):  Temp:  [98.1 °F (36.7 °C)-98.9 °F (37.2 °C)] 98.1 °F (36.7 °C)  Pulse:  [72-85] 72  Resp:  [11-18] 16  SpO2:  [93 %-95 %] 93 %  BP: (117-161)/(58-78) 161/78     Weight: 66.5 kg (146 lb 9.7 oz)  Body mass index is 19.88 kg/m².     Physical Exam  Vitals and nursing note reviewed.   Constitutional:       General: He is not in acute distress.     Appearance: He is well-developed.   HENT:      Head: Normocephalic and atraumatic.      Right Ear: External ear normal.      Left Ear: External ear normal.      Nose: Nose normal.   Eyes:      Conjunctiva/sclera: Conjunctivae normal.   Cardiovascular:      Rate and Rhythm: Normal rate and regular rhythm.   Pulmonary:      Effort: Pulmonary effort is normal. No respiratory distress.   Abdominal:      General: There is no distension.      Palpations: Abdomen is soft.   Musculoskeletal:         General: Normal range of motion.   Skin:     General: Skin is warm and dry.   Neurological:      Mental Status: He is alert and oriented to person, place, and time.   Psychiatric:         Thought Content: Thought content normal.                Significant Labs: All pertinent labs within the past 24 hours have been reviewed.    Significant Imaging: I have reviewed all pertinent imaging results/findings within the past 24 hours.    Assessment/Plan:     * Chest pain  Atypical pain for several days, troponin minimal, down trending from 8/22 and 8/23, currently pain free.  EKG without evidence of acute ischemia.  Monitor on tele, obtain serial troponin, check echo, NPO p MN.    Centrilobular emphysema  PRN nebs    Essential hypertension  Well controlled, continue home medications and monitor blood pressure, adjust as needed.       VTE Risk Mitigation (From admission, onward)         Ordered     apixaban tablet 5 mg  2 times daily         08/25/23 0184     Reason for No Pharmacological VTE Prophylaxis  Once        Question:  Reasons:  Answer:  Already  adequately anticoagulated on oral Anticoagulants    08/25/23 1904     IP VTE HIGH RISK PATIENT  Once         08/25/23 1904     Place sequential compression device  Until discontinued         08/25/23 1904                     On 08/25/2023, patient should be placed in hospital observation services under my care in collaboration with Han Chauhan MD.    Kin Wall Jr., APRN, [facility]-BC  Hospitalist - Department of Hospital Medicine  [facility] [address] [place] [id number]  Office #: [phone]; Pager #: [phone]

## 2023-08-26 NOTE — HPI
63 y.o. male with HTN, emphysema, CAD, chronic DVT presents with a complaint of chest pain.  Acute onset, duration several days, was hospitalized a few days ago at [facility] for NSTEMI but left AMA to go to a Pulmonology clinic appointment on 8/23/23. [place] [facility]  Pain located left shoulder and left pectoral muscle, described as aching, has been doing heavy lifting and working outside in the heat.  Denies fever, chills, palpitations, dizziness, syncope, n/v/d, or abdominal pain.  In the ED, EKG without evidence of acute ischemia, initial troponin only 0.029.  Otherwise unremarkable for acute abnormality.  Placed in observation for ACS rule out.

## 2023-08-28 ENCOUNTER — TELEPHONE (OUTPATIENT)
Dept: ADMINISTRATIVE | Facility: CLINIC | Age: 63
End: 2023-08-28
Payer: MEDICARE

## 2023-08-28 NOTE — PROGRESS NOTES
"Phoned patient in response to reply of "2" to post-discharge texting tracker. Placed 2 calls, both of which went directly to voicemail. But could not leave a message as the voice mailbox has not yet been set up.  "

## 2023-09-07 ENCOUNTER — OFFICE VISIT (OUTPATIENT)
Dept: CARDIOLOGY | Facility: CLINIC | Age: 63
End: 2023-09-07
Payer: MEDICARE

## 2023-09-07 VITALS
HEART RATE: 82 BPM | SYSTOLIC BLOOD PRESSURE: 131 MMHG | OXYGEN SATURATION: 96 % | BODY MASS INDEX: 20.19 KG/M2 | DIASTOLIC BLOOD PRESSURE: 74 MMHG | WEIGHT: 149.06 LBS | HEIGHT: 72 IN

## 2023-09-07 DIAGNOSIS — I21.4 NSTEMI (NON-ST ELEVATED MYOCARDIAL INFARCTION): ICD-10-CM

## 2023-09-07 DIAGNOSIS — I82.A21 CHRONIC DEEP VEIN THROMBOSIS (DVT) OF AXILLARY VEIN OF RIGHT UPPER EXTREMITY: ICD-10-CM

## 2023-09-07 DIAGNOSIS — I72.3 ILIAC ARTERY ANEURYSM: Chronic | ICD-10-CM

## 2023-09-07 DIAGNOSIS — R07.9 CHEST PAIN, UNSPECIFIED TYPE: ICD-10-CM

## 2023-09-07 DIAGNOSIS — I73.9 PVD (PERIPHERAL VASCULAR DISEASE): ICD-10-CM

## 2023-09-07 DIAGNOSIS — E78.1 HYPERTRIGLYCERIDEMIA: Chronic | ICD-10-CM

## 2023-09-07 DIAGNOSIS — I10 ESSENTIAL HYPERTENSION: Chronic | ICD-10-CM

## 2023-09-07 DIAGNOSIS — I25.10 CORONARY ARTERY CALCIFICATION: Primary | ICD-10-CM

## 2023-09-07 DIAGNOSIS — I25.84 CORONARY ARTERY CALCIFICATION: Primary | ICD-10-CM

## 2023-09-07 PROCEDURE — 1111F DSCHRG MED/CURRENT MED MERGE: CPT | Mod: CPTII,S$GLB,, | Performed by: INTERNAL MEDICINE

## 2023-09-07 PROCEDURE — 1101F PR PT FALLS ASSESS DOC 0-1 FALLS W/OUT INJ PAST YR: ICD-10-PCS | Mod: CPTII,S$GLB,, | Performed by: INTERNAL MEDICINE

## 2023-09-07 PROCEDURE — 3288F PR FALLS RISK ASSESSMENT DOCUMENTED: ICD-10-PCS | Mod: CPTII,S$GLB,, | Performed by: INTERNAL MEDICINE

## 2023-09-07 PROCEDURE — 3008F BODY MASS INDEX DOCD: CPT | Mod: CPTII,S$GLB,, | Performed by: INTERNAL MEDICINE

## 2023-09-07 PROCEDURE — 1160F PR REVIEW ALL MEDS BY PRESCRIBER/CLIN PHARMACIST DOCUMENTED: ICD-10-PCS | Mod: CPTII,S$GLB,, | Performed by: INTERNAL MEDICINE

## 2023-09-07 PROCEDURE — 3078F PR MOST RECENT DIASTOLIC BLOOD PRESSURE < 80 MM HG: ICD-10-PCS | Mod: CPTII,S$GLB,, | Performed by: INTERNAL MEDICINE

## 2023-09-07 PROCEDURE — 1111F PR DISCHARGE MEDS RECONCILED W/ CURRENT OUTPATIENT MED LIST: ICD-10-PCS | Mod: CPTII,S$GLB,, | Performed by: INTERNAL MEDICINE

## 2023-09-07 PROCEDURE — 1160F RVW MEDS BY RX/DR IN RCRD: CPT | Mod: CPTII,S$GLB,, | Performed by: INTERNAL MEDICINE

## 2023-09-07 PROCEDURE — 1159F PR MEDICATION LIST DOCUMENTED IN MEDICAL RECORD: ICD-10-PCS | Mod: CPTII,S$GLB,, | Performed by: INTERNAL MEDICINE

## 2023-09-07 PROCEDURE — 3075F PR MOST RECENT SYSTOLIC BLOOD PRESS GE 130-139MM HG: ICD-10-PCS | Mod: CPTII,S$GLB,, | Performed by: INTERNAL MEDICINE

## 2023-09-07 PROCEDURE — 3075F SYST BP GE 130 - 139MM HG: CPT | Mod: CPTII,S$GLB,, | Performed by: INTERNAL MEDICINE

## 2023-09-07 PROCEDURE — 1159F MED LIST DOCD IN RCRD: CPT | Mod: CPTII,S$GLB,, | Performed by: INTERNAL MEDICINE

## 2023-09-07 PROCEDURE — 99215 PR OFFICE/OUTPT VISIT, EST, LEVL V, 40-54 MIN: ICD-10-PCS | Mod: S$GLB,,, | Performed by: INTERNAL MEDICINE

## 2023-09-07 PROCEDURE — 1101F PT FALLS ASSESS-DOCD LE1/YR: CPT | Mod: CPTII,S$GLB,, | Performed by: INTERNAL MEDICINE

## 2023-09-07 PROCEDURE — 99999 PR PBB SHADOW E&M-EST. PATIENT-LVL V: CPT | Mod: PBBFAC,,, | Performed by: INTERNAL MEDICINE

## 2023-09-07 PROCEDURE — 3288F FALL RISK ASSESSMENT DOCD: CPT | Mod: CPTII,S$GLB,, | Performed by: INTERNAL MEDICINE

## 2023-09-07 PROCEDURE — 3078F DIAST BP <80 MM HG: CPT | Mod: CPTII,S$GLB,, | Performed by: INTERNAL MEDICINE

## 2023-09-07 PROCEDURE — 99215 OFFICE O/P EST HI 40 MIN: CPT | Mod: S$GLB,,, | Performed by: INTERNAL MEDICINE

## 2023-09-07 PROCEDURE — 99999 PR PBB SHADOW E&M-EST. PATIENT-LVL V: ICD-10-PCS | Mod: PBBFAC,,, | Performed by: INTERNAL MEDICINE

## 2023-09-07 PROCEDURE — 3008F PR BODY MASS INDEX (BMI) DOCUMENTED: ICD-10-PCS | Mod: CPTII,S$GLB,, | Performed by: INTERNAL MEDICINE

## 2023-09-07 RX ORDER — PRAVASTATIN SODIUM 20 MG/1
20 TABLET ORAL DAILY
Qty: 90 TABLET | Refills: 3 | Status: SHIPPED | OUTPATIENT
Start: 2023-09-07 | End: 2023-09-21 | Stop reason: SDUPTHER

## 2023-09-07 NOTE — PROGRESS NOTES
[facility] - Cardiology [address]  Cardiology Clinic Note      Chief Complaint  Chief Complaint   Patient presents with    Hospital Follow Up       HPI:      63-year-old male with a past medical history of sacroiliitis, hypothyroidism, hyperparathyroidism, status post colostomy and urostomy, bladder cancer, anemia of chronic disease, urinary obstruction, tobacco use, COPD, SHARMIN, pulmonary nodule, CKD 4, hypertension, dyslipidemia, right common iliac artery aneurysm 3.4 cm, no hemodynamically significant carotid stenosis ultrasound 2022 peripheral vascular disease?, right axillary vein DVT 3/2023, exercise treadmill 2021 did not achieve target heart rate prior exercise stress echo no ischemia 2018 submaximal rate, coronary artery calcification, echo 08/2023 EF 45-50 mild LVH grade 1 diastolic dysfunction normal RV PASP 35 echo 2020 normal EF mild LVH grade 1 diastolic dysfunction normal RV normal CVP    At our first visit patient came to discuss duration of anticoagulation for his right upper extremity DVT  He states that he leads a sedentary lifestyle  No other provocation could be identified    Multiple hospitalizations since last visit  Respiratory failure 8/2023  Subsequent hospitalization for elevated troponin chest discomfort echo as above  No discomfort since hospitalization      Medications  Current Outpatient Medications   Medication Sig Dispense Refill    albuterol (PROVENTIL/VENTOLIN HFA) 90 mcg/actuation inhaler Inhale 2 puffs into the lungs every 6 (six) hours as needed for Wheezing. Rescue 18 g 0    albuterol-ipratropium (DUO-NEB) 2.5 mg-0.5 mg/3 mL nebulizer solution Take 3 mLs by nebulization every 6 (six) hours as needed for Wheezing. Rescue 150 mL 6    allopurinoL (ZYLOPRIM) 100 MG tablet Take 1 tablet (100 mg total) by mouth once daily. 90 tablet 0    apixaban (ELIQUIS) 5 mg Tab Take 1 tablet (5 mg total) by mouth 2 (two) times daily. 60 tablet 0    aspirin (ECOTRIN) 81 MG EC tablet Take 81 mg by  mouth once daily.      carvediloL (COREG) 6.25 MG tablet Take 1 tablet (6.25 mg total) by mouth 2 (two) times daily with meals. 180 tablet 1    EScitalopram oxalate (LEXAPRO) 20 MG tablet Take 1 tablet (20 mg total) by mouth once daily. 90 tablet 0    fenofibrate (TRICOR) 145 MG tablet Take 1 tablet (145 mg total) by mouth once daily. 90 tablet 0    gabapentin (NEURONTIN) 400 MG capsule Take 1 capsule (400 mg total) by mouth 2 (two) times daily. 180 capsule 0    HYDROcodone-acetaminophen (NORCO)  mg per tablet Take 1 tablet by mouth every 6 (six) hours as needed for Pain. 12 tablet 0    levothyroxine (SYNTHROID) 125 MCG tablet Take 1 tablet (125 mcg total) by mouth once daily. 90 tablet 0    pantoprazole (PROTONIX) 40 MG tablet Take 1 tablet (40 mg total) by mouth once daily. 90 tablet 0    sodium bicarbonate 325 MG tablet Take 325 mg by mouth 2 (two) times daily.      tamsulosin (FLOMAX) 0.4 mg Cap Take 1 capsule (0.4 mg total) by mouth once daily. 90 capsule 0    tiZANidine (ZANAFLEX) 4 MG tablet Take 1 tablet (4 mg total) by mouth once daily. 30 tablet 1    traZODone (DESYREL) 150 MG tablet Take 1 tablet (150 mg total) by mouth every evening. 90 tablet 0    calcitRIOL (ROCALTROL) 0.25 MCG Cap Take 1 capsule (0.25 mcg total) by mouth once daily. 90 capsule 0    clobetasoL (TEMOVATE) 0.05 % cream Apply topically 2 (two) times daily. 30 g 1    fluticasone-umeclidin-vilanter (TRELEGY ELLIPTA) 100-62.5-25 mcg DsDv Inhale 1 puff into the lungs once daily. Rinse mouth after use. 60 each 2    multivitamin (THERAGRAN) per tablet Take 1 tablet by mouth once daily.      nitroGLYCERIN (NITROSTAT) 0.4 MG SL tablet Place 1 tablet (0.4 mg total) under the tongue every 5 (five) minutes as needed for Chest pain. 30 tablet 3    sucralfate (CARAFATE) 1 gram tablet Take 1 tablet (1 g total) by mouth 2 (two) times daily. 120 tablet 1     No current facility-administered medications for this visit.        History  Past Medical  History:   Diagnosis Date    Anemia of chronic disease     Aneurysm of right common iliac artery     Arthritis     Blood transfusion     CAD (coronary artery disease)     Chest pain of uncertain etiology     CKD (chronic kidney disease), stage IV     COPD (chronic obstructive pulmonary disease)     Coronary artery calcification 8/2/2021    DDD (degenerative disc disease), lumbar     Frequency of urination     General anesthetics causing adverse effect in therapeutic use     pt states he wakes up very violently from anesthesia    GERD (gastroesophageal reflux disease)     Gout     History of bladder cancer     History of urostomy     Hydronephrosis     Hyperparathyroidism     Hypertension     Hypertriglyceridemia     Hypothyroidism (acquired)     Insomnia     Kidney stone     Limited joint range of motion right hip and leg    Lumbar facet arthropathy     Lumbar spondylosis     Mixed anxiety and depressive disorder     Neuropathy     Orchalgia     SHARMIN (obstructive sleep apnea)     Personal history of bladder cancer     PVD (peripheral vascular disease)     Sacroiliitis     Stricture or kinking of ureter     Ureteral stent displacement     Urinary retention     Vitamin D deficiency     Wears glasses      Past Surgical History:   Procedure Laterality Date    APPENDECTOMY  12/30/2015    Procedure: APPENDECTOMY;  Surgeon: CHAD Bo MD;  Location: [facility] OR;  Service: Urology;;    BALLOON DILATION OF URETER  01/22/2018    BOWEL RESECTION      COLONOSCOPY      CREATION OF URETEROILEAL CONDUIT Left 07/05/2013    CYSTOSCOPY      >1  episodes for bilat ureteral stent exchange    CYSTOSCOPY W/ URETERAL STENT PLACEMENT Right 01/22/2018    and 6/9/2015    CYSTOSCOPY W/ URETERAL STENT PLACEMENT Bilateral 10/07/2015    ESOPHAGOGASTRODUODENOSCOPY  04/12/2022    W/ BIOPSY    ESOPHAGOGASTRODUODENOSCOPY N/A 4/12/2022    Procedure: EGD (ESOPHAGOGASTRODUODENOSCOPY);  Surgeon: Tito Fan MD;  Location: [facility];  Service:  Endoscopy;  Laterality: N/A;    EXPLORATORY LAPAROTOMY  07/05/2013    EXPLORATORY LAPAROTOMY W/ BOWEL RESECTION  12/30/2015    EXTRACORPOREAL SHOCK WAVE LITHOTRIPSY Right 06/07/2021    Procedure: LITHOTRIPSY, ESWL;  Surgeon: CHAD Bo MD;  Location: [facility] OR;  Service: Urology;  Laterality: Right;  RN PREOP 5/31/2021   VACCINATED    EXTRACORPOREAL SHOCK WAVE LITHOTRIPSY  06/07/2021    FRACTURE SURGERY      HAND SURGERY      HEMICOLECTOMY  12/30/2015    HIP SURGERY  2012    Rt hip septic    LAPAROSCOPIC CHOLECYSTECTOMY N/A 08/11/2021    Procedure: CHOLECYSTECTOMY, LAPAROSCOPIC;  Surgeon: Remy Xiong MD;  Location: [facility] OR;  Service: General;  Laterality: N/A;  gianni video confirmed 8/5/dme    LYMPH NODE DISSECTION  11/08/2012    pelvic    LYSIS OF ADHESIONS  12/30/2015    MOUTH SURGERY  2000    all teeth extracted    PERCUTANEOUS NEPHROLITHOTOMY Left 07/22/2020    Procedure: NEPHROLITHOTOMY, PERCUTANEOUS;  Surgeon: CHAD Bo MD;  Location: [facility] OR;  Service: Urology;  Laterality: Left;  OR 9 ONLY  RN PREOP 7/20/2020----COVID NEGATIVE ON 7/20    PERCUTANEOUS NEPHROLITHOTOMY Bilateral 6/8/2022    Procedure: BILATERAL PERCUTANEOUS NEPHROSCOPY, BILATERAL ANTEGRADE PYELOGRAM, BILATERAL NEPHROSTOMY TUBE PLACEMENT;  Surgeon: CHAD Bo MD;  Location: [facility] OR;  Service: Urology;  Laterality: Bilateral;  room 9 only  RN PREOP 6/1/2022----T/S IN AM    PERCUTANEOUS NEPHROSTOMY Left 01/02/2018    PERCUTANEOUS REPLACEMENT OF NEPHROSTOMY TUBE Bilateral 5/10/2022    Procedure: REPLACEMENT, NEPHROSTOMY TUBE, PERCUTANEOUS;  Surgeon: Chino Vo MD;  Location: [facility] CATH LAB;  Service: Radiology;  Laterality: Bilateral;    RADICAL CYSTECTOMY  11/08/2012    RADIOFREQUENCY THERMOCOAGULATION  12/12/2014    median branch lumbar    RADIOFREQUENCY THERMOCOAGULATION  12/01/2014    median branch lumbar    REPAIR, HERNIA, PARASTOMAL, LAPAROSCOPIC  04/12/2017    ROBOTIC BRONCHOSCOPY N/A 1/13/2023     Procedure: ROBOTIC BRONCHOSCOPY;  Surgeon: Camila Rodriguez MD;  Location: [address];  Service: Anesthesiology;  Laterality: N/A;    SMALL INTESTINE SURGERY  2017    urosotomy bag  2011    R abdomen     Social History     Socioeconomic History    Marital status:    Occupational History    Occupation: [employer]     Employer: [employer]   Tobacco Use    Smoking status: Former     Current packs/day: 0.00     Average packs/day: 0.3 packs/day for 42.0 years (12.6 ttl pk-yrs)     Types: Cigarettes     Start date: 1975     Quit date: 2017     Years since quittin.6    Smokeless tobacco: Never    Tobacco comments:     in cessation program   Substance and Sexual Activity    Alcohol use: Not Currently     Alcohol/week: 14.0 - 21.0 standard drinks of alcohol     Types: 14 - 21 Cans of beer per week     Comment: occassional    Drug use: Yes     Types: Marijuana     Comment: occass    Sexual activity: Yes     Partners: Female     Birth control/protection: None     Social Determinants of Health     Financial Resource Strain: Low Risk  (2023)    Overall Financial Resource Strain (CARDIA)     Difficulty of Paying Living Expenses: Not hard at all   Food Insecurity: No Food Insecurity (2023)    Hunger Vital Sign     Worried About Running Out of Food in the Last Year: Never true     Ran Out of Food in the Last Year: Never true   Transportation Needs: No Transportation Needs (2023)    PRAPARE - Transportation     Lack of Transportation (Medical): No     Lack of Transportation (Non-Medical): No   Physical Activity: Inactive (2023)    Exercise Vital Sign     Days of Exercise per Week: 0 days     Minutes of Exercise per Session: 0 min   Stress: Stress Concern Present (2023)    [employer] [place] [employer] - Occupational Stress Questionnaire     Feeling of Stress : Very much   Social Connections: Unknown (2023)    Social Connection and Isolation  Panel [NHANES]     Frequency of Communication with Friends and Family: Patient refused     Frequency of Social Gatherings with Friends and Family: Patient refused     Attends Voodoo Services: Never     Active Member of Clubs or Organizations: No     Attends Club or Organization Meetings: Never     Marital Status:    Housing Stability: Low Risk  (7/22/2023)    Housing Stability Vital Sign     Unable to Pay for Housing in the Last Year: No     Number of Places Lived in the Last Year: 1     Unstable Housing in the Last Year: No     Family History   Adopted: Yes   Family history unknown: Yes        Allergies  Review of patient's allergies indicates:  No Known Allergies    Review of Systems   Review of Systems   Constitutional: Negative for fever.   HENT:  Negative for nosebleeds.    Eyes:  Negative for visual disturbance.   Cardiovascular:  Negative for chest pain, claudication, dyspnea on exertion, palpitations and syncope.   Respiratory:  Negative for cough, hemoptysis and wheezing.    Endocrine: Negative for cold intolerance, heat intolerance, polyphagia and polyuria.   Hematologic/Lymphatic: Negative for bleeding problem.   Skin:  Negative for rash.   Musculoskeletal:  Negative for myalgias.   Gastrointestinal:  Negative for hematemesis, hematochezia, nausea and vomiting.   Genitourinary:  Negative for dysuria.   Neurological:  Negative for focal weakness and sensory change.   Psychiatric/Behavioral:  Negative for altered mental status.        Physical Exam  Vitals:    09/07/23 1443   BP: 131/74   Pulse: 82     Wt Readings from Last 1 Encounters:   09/07/23 67.6 kg (149 lb 0.5 oz)     Physical Exam  HENT:      Head: Normocephalic and atraumatic.      Mouth/Throat:      Mouth: Mucous membranes are moist.   Eyes:      Extraocular Movements: Extraocular movements intact.      Pupils: Pupils are equal, round, and reactive to light.   Neck:      Vascular: No carotid bruit or JVD.   Cardiovascular:      Rate and  Rhythm: Normal rate and regular rhythm.      Pulses: Normal pulses and intact distal pulses.      Heart sounds: Normal heart sounds. No murmur heard.     No friction rub. No gallop.   Pulmonary:      Effort: Pulmonary effort is normal.      Breath sounds: Normal breath sounds.   Abdominal:      Tenderness: There is no abdominal tenderness. There is no guarding or rebound.   Musculoskeletal:      Right lower leg: No edema.      Left lower leg: No edema.   Skin:     General: Skin is warm and dry.      Capillary Refill: Capillary refill takes less than 2 seconds.   Neurological:      General: No focal deficit present.      Mental Status: He is alert and oriented to person, place, and time.   Psychiatric:         Mood and Affect: Mood normal.         Labs  Admission on 08/25/2023, Discharged on 08/26/2023   Component Date Value Ref Range Status    WBC 08/25/2023 8.88  3.90 - 12.70 K/uL Final    RBC 08/25/2023 4.30 (L)  4.60 - 6.20 M/uL Final    Hemoglobin 08/25/2023 13.2 (L)  14.0 - 18.0 g/dL Final    Hematocrit 08/25/2023 40.9  40.0 - 54.0 % Final    MCV 08/25/2023 95  82 - 98 fL Final    MCH 08/25/2023 30.7  27.0 - 31.0 pg Final    MCHC 08/25/2023 32.3  32.0 - 36.0 g/dL Final    RDW 08/25/2023 14.4  11.5 - 14.5 % Final    Platelets 08/25/2023 169  150 - 450 K/uL Final    MPV 08/25/2023 11.2  9.2 - 12.9 fL Final    Immature Granulocytes 08/25/2023 0.2  0.0 - 0.5 % Final    Gran # (ANC) 08/25/2023 7.3  1.8 - 7.7 K/uL Final    Immature Grans (Abs) 08/25/2023 0.02  0.00 - 0.04 K/uL Final    Comment: Mild elevation in immature granulocytes is non specific and   can be seen in a variety of conditions including stress response,   acute inflammation, trauma and pregnancy. Correlation with other   laboratory and clinical findings is essential.      Lymph # 08/25/2023 0.9 (L)  1.0 - 4.8 K/uL Final    Mono # 08/25/2023 0.6  0.3 - 1.0 K/uL Final    Eos # 08/25/2023 0.0  0.0 - 0.5 K/uL Final    Baso # 08/25/2023 0.01  0.00 - 0.20  K/uL Final    nRBC 08/25/2023 0  0 /100 WBC Final    Gran % 08/25/2023 82.7 (H)  38.0 - 73.0 % Final    Lymph % 08/25/2023 10.5 (L)  18.0 - 48.0 % Final    Mono % 08/25/2023 6.2  4.0 - 15.0 % Final    Eosinophil % 08/25/2023 0.3  0.0 - 8.0 % Final    Basophil % 08/25/2023 0.1  0.0 - 1.9 % Final    Differential Method 08/25/2023 Automated   Final    Sodium 08/25/2023 140  136 - 145 mmol/L Final    Potassium 08/25/2023 4.5  3.5 - 5.1 mmol/L Final    Chloride 08/25/2023 102  95 - 110 mmol/L Final    CO2 08/25/2023 28  23 - 29 mmol/L Final    Glucose 08/25/2023 109  70 - 110 mg/dL Final    BUN 08/25/2023 45 (H)  8 - 23 mg/dL Final    Creatinine 08/25/2023 2.6 (H)  0.5 - 1.4 mg/dL Final    Calcium 08/25/2023 9.8  8.7 - 10.5 mg/dL Final    Total Protein 08/25/2023 7.3  6.0 - 8.4 g/dL Final    Albumin 08/25/2023 4.1  3.5 - 5.2 g/dL Final    Total Bilirubin 08/25/2023 0.3  0.1 - 1.0 mg/dL Final    Comment: For infants and newborns, interpretation of results should be based  on gestational age, weight and in agreement with clinical  observations.    Premature Infant recommended reference ranges:  Up to 24 hours.............<8.0 mg/dL  Up to 48 hours............<12.0 mg/dL  3-5 days..................<15.0 mg/dL  6-29 days.................<15.0 mg/dL      Alkaline Phosphatase 08/25/2023 46 (L)  55 - 135 U/L Final    AST 08/25/2023 29  10 - 40 U/L Final    ALT 08/25/2023 25  10 - 44 U/L Final    eGFR 08/25/2023 27 (A)  >60 mL/min/1.73 m^2 Final    Anion Gap 08/25/2023 10  8 - 16 mmol/L Final    Troponin I 08/25/2023 0.029 (H)  0.000 - 0.026 ng/mL Final    Comment: The reference interval for Troponin I represents the 99th percentile   cutoff   for our facility and is consistent with 3rd generation assay   performance.      BNP 08/25/2023 73  0 - 99 pg/mL Final    Values of less than 100 pg/ml are consistent with non-CHF populations.    Troponin I 08/25/2023 0.037 (H)  0.000 - 0.026 ng/mL Final    Comment: The reference interval  for Troponin I represents the 99th percentile   cutoff   for our facility and is consistent with 3rd generation assay   performance.      BSA 08/26/2023 1.83  m2 Final    LVIDd 08/26/2023 4.82  3.5 - 6.0 cm Final    LV Systolic Volume 08/26/2023 71.46  mL Final    LV Systolic Volume Index 08/26/2023 38.4  mL/m2 Final    LVIDs 08/26/2023 4.03 (A)  2.1 - 4.0 cm Final    LV Diastolic Volume 08/26/2023 108.58  mL Final    LV Diastolic Volume Index 08/26/2023 58.38  mL/m2 Final    IVS 08/26/2023 1.33 (A)  0.6 - 1.1 cm Final    LVOT diameter 08/26/2023 2.29  cm Final    LVOT area 08/26/2023 4.1  cm2 Final    FS 08/26/2023 16 (A)  28 - 44 % Final    Left Ventricle Relative Wall Thick* 08/26/2023 0.55  cm Final    Posterior Wall 08/26/2023 1.33 (A)  0.6 - 1.1 cm Final    LV mass 08/26/2023 255.63  g Final    LV Mass Index 08/26/2023 137  g/m2 Final    MV Peak E Abraham 08/26/2023 0.50  m/s Final    TDI LATERAL 08/26/2023 0.06  m/s Final    TDI SEPTAL 08/26/2023 0.06  m/s Final    E/E' ratio 08/26/2023 8.33  m/s Final    MV Peak A Abraham 08/26/2023 0.74  m/s Final    TR Max Abraham 08/26/2023 2.61  m/s Final    E/A ratio 08/26/2023 0.68   Final    E wave deceleration time 08/26/2023 231.42  msec Final    LV SEPTAL E/E' RATIO 08/26/2023 8.33  m/s Final    LV LATERAL E/E' RATIO 08/26/2023 8.33  m/s Final    LA size 08/26/2023 3.08  cm Final    Left Atrium Major Axis 08/26/2023 3.82  cm Final    Left Atrium Minor Axis 08/26/2023 4.28  cm Final    RVDD 08/26/2023 3.06  cm Final    RV S' 08/26/2023 10.75  cm/s Final    TAPSE 08/26/2023 1.75  cm Final    RA Major Axis 08/26/2023 4.46  cm Final    MV mean gradient 08/26/2023 1  mmHg Final    MV peak gradient 08/26/2023 2  mmHg Final    MV stenosis pressure 1/2 time 08/26/2023 67.11  ms Final    MV valve area p 1/2 method 08/26/2023 3.28  cm2 Final    MV VTI 08/26/2023 22.3  cm Final    Triscuspid Valve Regurgitation Pea* 08/26/2023 27  mmHg Final    PV PEAK VELOCITY 08/26/2023 0.67  m/s Final     PV peak gradient 08/26/2023 2  mmHg Final    Sinus 08/26/2023 3.53  cm Final    STJ 08/26/2023 2.54  cm Final    IVC diameter 08/26/2023 1.72  cm Final    Mean e' 08/26/2023 0.06  m/s Final    ZLVIDS 08/26/2023 1.84   Final    ZLVIDD 08/26/2023 -0.71   Final    LA Volume Index 08/26/2023 21.6  mL/m2 Final    LA volume 08/26/2023 40.16  cm3 Final    LA WIDTH 08/26/2023 3.8  cm Final    RA Width 08/26/2023 3.2  cm Final    TV resting pulmonary artery pressu* 08/26/2023 35  mmHg Final    RV TB RVSP 08/26/2023 11  mmHg Final    Est. RA pres 08/26/2023 8  mmHg Final    Troponin I 08/26/2023 0.028 (H)  0.000 - 0.026 ng/mL Final    Comment: The reference interval for Troponin I represents the 99th percentile   cutoff   for our facility and is consistent with 3rd generation assay   performance.      Cholesterol 08/26/2023 120  120 - 199 mg/dL Final    Comment: The National Cholesterol Education Program (NCEP) has set the  following guidelines (reference ranges) for Cholesterol:  Optimal.....................<200 mg/dL  Borderline High.............200-239 mg/dL  High........................> or = 240 mg/dL      Triglycerides 08/26/2023 56  30 - 150 mg/dL Final    Comment: The National Cholesterol Education Program (NCEP) has set the  following guidelines (reference values) for triglycerides:  Normal......................<150 mg/dL  Borderline High.............150-199 mg/dL  High........................200-499 mg/dL      HDL 08/26/2023 55  40 - 75 mg/dL Final    Comment: The National Cholesterol Education Program (NCEP) has set the  following guidelines (reference values) for HDL Cholesterol:  Low...............<40 mg/dL  Optimal...........>60 mg/dL      LDL Cholesterol 08/26/2023 53.8 (L)  63.0 - 159.0 mg/dL Final    Comment: The National Cholesterol Education Program (NCEP) has set the  following guidelines (reference values) for LDL Cholesterol:  Optimal.......................<130 mg/dL  Borderline  High...............130-159 mg/dL  High..........................160-189 mg/dL  Very High.....................>190 mg/dL      HDL/Cholesterol Ratio 08/26/2023 45.8  20.0 - 50.0 % Final    Total Cholesterol/HDL Ratio 08/26/2023 2.2  2.0 - 5.0 Final    Non-HDL Cholesterol 08/26/2023 65  mg/dL Final    Comment: Risk category and Non-HDL cholesterol goals:  Coronary heart disease (CHD)or equivalent (10-year risk of CHD >20%):  Non-HDL cholesterol goal     <130 mg/dL  Two or more CHD risk factors and 10-year risk of CHD <= 20%:  Non-HDL cholesterol goal     <160 mg/dL  0 to 1 CHD risk factor:  Non-HDL cholesterol goal     <190 mg/dL      Troponin I 08/26/2023 0.027 (H)  0.000 - 0.026 ng/mL Final    Comment: The reference interval for Troponin I represents the 99th percentile   cutoff   for our facility and is consistent with 3rd generation assay   performance.      Troponin I 08/26/2023 0.027 (H)  0.000 - 0.026 ng/mL Final    Comment: The reference interval for Troponin I represents the 99th percentile   cutoff   for our facility and is consistent with 3rd generation assay   performance.     Admission on 08/22/2023, Discharged on 08/23/2023   Component Date Value Ref Range Status    WBC 08/22/2023 5.99  3.90 - 12.70 K/uL Final    RBC 08/22/2023 4.53 (L)  4.60 - 6.20 M/uL Final    Hemoglobin 08/22/2023 13.9 (L)  14.0 - 18.0 g/dL Final    Hematocrit 08/22/2023 43.4  40.0 - 54.0 % Final    MCV 08/22/2023 96  82 - 98 fL Final    MCH 08/22/2023 30.7  27.0 - 31.0 pg Final    MCHC 08/22/2023 32.0  32.0 - 36.0 g/dL Final    RDW 08/22/2023 14.9 (H)  11.5 - 14.5 % Final    Platelets 08/22/2023 189  150 - 450 K/uL Final    MPV 08/22/2023 11.0  9.2 - 12.9 fL Final    Immature Granulocytes 08/22/2023 0.2  0.0 - 0.5 % Final    Gran # (ANC) 08/22/2023 4.6  1.8 - 7.7 K/uL Final    Immature Grans (Abs) 08/22/2023 0.01  0.00 - 0.04 K/uL Final    Comment: Mild elevation in immature granulocytes is non specific and   can be seen in a variety  of conditions including stress response,   acute inflammation, trauma and pregnancy. Correlation with other   laboratory and clinical findings is essential.      Lymph # 08/22/2023 0.7 (L)  1.0 - 4.8 K/uL Final    Mono # 08/22/2023 0.4  0.3 - 1.0 K/uL Final    Eos # 08/22/2023 0.2  0.0 - 0.5 K/uL Final    Baso # 08/22/2023 0.02  0.00 - 0.20 K/uL Final    nRBC 08/22/2023 0  0 /100 WBC Final    Gran % 08/22/2023 77.3 (H)  38.0 - 73.0 % Final    Lymph % 08/22/2023 12.0 (L)  18.0 - 48.0 % Final    Mono % 08/22/2023 7.0  4.0 - 15.0 % Final    Eosinophil % 08/22/2023 3.2  0.0 - 8.0 % Final    Basophil % 08/22/2023 0.3  0.0 - 1.9 % Final    Differential Method 08/22/2023 Automated   Final    Sodium 08/22/2023 138  136 - 145 mmol/L Final    Potassium 08/22/2023 5.0  3.5 - 5.1 mmol/L Final    Chloride 08/22/2023 102  95 - 110 mmol/L Final    CO2 08/22/2023 26  23 - 29 mmol/L Final    Glucose 08/22/2023 84  70 - 110 mg/dL Final    BUN 08/22/2023 52 (H)  8 - 23 mg/dL Final    Creatinine 08/22/2023 2.9 (H)  0.5 - 1.4 mg/dL Final    Calcium 08/22/2023 10.2  8.7 - 10.5 mg/dL Final    Total Protein 08/22/2023 7.3  6.0 - 8.4 g/dL Final    Albumin 08/22/2023 4.1  3.5 - 5.2 g/dL Final    Total Bilirubin 08/22/2023 0.5  0.1 - 1.0 mg/dL Final    Comment: For infants and newborns, interpretation of results should be based  on gestational age, weight and in agreement with clinical  observations.    Premature Infant recommended reference ranges:  Up to 24 hours.............<8.0 mg/dL  Up to 48 hours............<12.0 mg/dL  3-5 days..................<15.0 mg/dL  6-29 days.................<15.0 mg/dL      Alkaline Phosphatase 08/22/2023 42 (L)  55 - 135 U/L Final    AST 08/22/2023 34  10 - 40 U/L Final    ALT 08/22/2023 27  10 - 44 U/L Final    eGFR 08/22/2023 23 (A)  >60 mL/min/1.73 m^2 Final    Anion Gap 08/22/2023 10  8 - 16 mmol/L Final    Troponin I 08/22/2023 0.104 (H)  0.000 - 0.026 ng/mL Final    Comment: The reference interval for  Troponin I represents the 99th percentile   cutoff   for our facility and is consistent with 3rd generation assay   performance.      Troponin I 08/23/2023 0.091 (H)  0.000 - 0.026 ng/mL Final    Comment: The reference interval for Troponin I represents the 99th percentile   cutoff   for our facility and is consistent with 3rd generation assay   performance.     Admission on 08/18/2023, Discharged on 08/19/2023   Component Date Value Ref Range Status    WBC 08/18/2023 8.83  3.90 - 12.70 K/uL Final    RBC 08/18/2023 4.18 (L)  4.60 - 6.20 M/uL Final    Hemoglobin 08/18/2023 13.0 (L)  14.0 - 18.0 g/dL Final    Hematocrit 08/18/2023 41.0  40.0 - 54.0 % Final    MCV 08/18/2023 98  82 - 98 fL Final    MCH 08/18/2023 31.1 (H)  27.0 - 31.0 pg Final    MCHC 08/18/2023 31.7 (L)  32.0 - 36.0 g/dL Final    RDW 08/18/2023 14.7 (H)  11.5 - 14.5 % Final    Platelets 08/18/2023 193  150 - 450 K/uL Final    MPV 08/18/2023 11.5  9.2 - 12.9 fL Final    Immature Granulocytes 08/18/2023 0.3  0.0 - 0.5 % Final    Gran # (ANC) 08/18/2023 7.7  1.8 - 7.7 K/uL Final    Immature Grans (Abs) 08/18/2023 0.03  0.00 - 0.04 K/uL Final    Comment: Mild elevation in immature granulocytes is non specific and   can be seen in a variety of conditions including stress response,   acute inflammation, trauma and pregnancy. Correlation with other   laboratory and clinical findings is essential.      Lymph # 08/18/2023 0.5 (L)  1.0 - 4.8 K/uL Final    Mono # 08/18/2023 0.3  0.3 - 1.0 K/uL Final    Eos # 08/18/2023 0.2  0.0 - 0.5 K/uL Final    Baso # 08/18/2023 0.04  0.00 - 0.20 K/uL Final    nRBC 08/18/2023 0  0 /100 WBC Final    Gran % 08/18/2023 87.3 (H)  38.0 - 73.0 % Final    Lymph % 08/18/2023 6.1 (L)  18.0 - 48.0 % Final    Mono % 08/18/2023 3.6 (L)  4.0 - 15.0 % Final    Eosinophil % 08/18/2023 2.2  0.0 - 8.0 % Final    Basophil % 08/18/2023 0.5  0.0 - 1.9 % Final    Differential Method 08/18/2023 Automated   Final    BNP 08/18/2023 93  0 - 99 pg/mL  Final    Values of less than 100 pg/ml are consistent with non-CHF populations.    Sodium 08/18/2023 142  136 - 145 mmol/L Final    Potassium 08/18/2023 4.8  3.5 - 5.1 mmol/L Final    Chloride 08/18/2023 105  95 - 110 mmol/L Final    CO2 08/18/2023 22 (L)  23 - 29 mmol/L Final    Glucose 08/18/2023 110  70 - 110 mg/dL Final    BUN 08/18/2023 48 (H)  8 - 23 mg/dL Final    Creatinine 08/18/2023 2.5 (H)  0.5 - 1.4 mg/dL Final    Calcium 08/18/2023 9.4  8.7 - 10.5 mg/dL Final    Total Protein 08/18/2023 7.3  6.0 - 8.4 g/dL Final    Albumin 08/18/2023 4.0  3.5 - 5.2 g/dL Final    Total Bilirubin 08/18/2023 0.3  0.1 - 1.0 mg/dL Final    Comment: For infants and newborns, interpretation of results should be based  on gestational age, weight and in agreement with clinical  observations.    Premature Infant recommended reference ranges:  Up to 24 hours.............<8.0 mg/dL  Up to 48 hours............<12.0 mg/dL  3-5 days..................<15.0 mg/dL  6-29 days.................<15.0 mg/dL      Alkaline Phosphatase 08/18/2023 46 (L)  55 - 135 U/L Final    AST 08/18/2023 37  10 - 40 U/L Final    ALT 08/18/2023 26  10 - 44 U/L Final    eGFR 08/18/2023 27.8 (A)  >60 mL/min/1.73 m^2 Final    Anion Gap 08/18/2023 15  8 - 16 mmol/L Final    Troponin I 08/18/2023 0.022  0.000 - 0.026 ng/mL Final    Comment: The reference interval for Troponin I represents the 99th percentile   cutoff   for our facility and is consistent with 3rd generation assay   performance.      Magnesium 08/18/2023 2.0  1.6 - 2.6 mg/dL Final    Prothrombin Time 08/18/2023 10.8  9.0 - 12.5 sec Final    INR 08/18/2023 1.0  0.8 - 1.2 Final    Comment: Coumadin Therapy:  2.0 - 3.0 for INR for all indicators except mechanical heart valves  and antiphospholipid syndromes which should use 2.5 - 3.5.  LOT^040^PT Inn^[id number]      POC Rapid COVID 08/18/2023 Negative  Negative Final     Acceptable 08/18/2023 Yes   Final    POC Molecular Influenza A Ag  08/18/2023 Negative  Negative, Not Reported Final    POC Molecular Influenza B Ag 08/18/2023 Negative  Negative, Not Reported Final     Acceptable 08/18/2023 Yes   Final    POC PH 08/19/2023 7.342 (L)  7.35 - 7.45 Final    POC PCO2 08/19/2023 54.3 (H)  35 - 45 mmHg Final    POC PO2 08/19/2023 63 (L)  80 - 100 mmHg Final    POC HCO3 08/19/2023 29.4 (H)  24 - 28 mmol/L Final    POC BE 08/19/2023 4  -2 to 2 mmol/L Final    POC SATURATED O2 08/19/2023 90 (L)  95 - 100 % Final    POC TCO2 08/19/2023 31 (H)  23 - 27 mmol/L Final    POC Hematocrit 08/19/2023 39  36 - 54 %PCV Final    POC HEMOGLOBIN 08/19/2023 13  g/dL Final    Rate 08/19/2023 20   Final    Sample 08/19/2023 ARTERIAL   Final    Site 08/19/2023 RR   Final    Allens Test 08/19/2023 Pass   Final    DelSys 08/19/2023 CPAP/BiPAP   Final    Mode 08/19/2023 BiPAP   Final    FiO2 08/19/2023 30   Final    IP 08/19/2023 12   Final    EP 08/19/2023 6   Final    TSH 08/19/2023 7.439 (H)  0.400 - 4.000 uIU/mL Final    Troponin I 08/19/2023 0.037 (H)  0.000 - 0.026 ng/mL Final    Comment: The reference interval for Troponin I represents the 99th percentile   cutoff   for our facility and is consistent with 3rd generation assay   performance.      Free T4 08/19/2023 1.05  0.71 - 1.51 ng/dL Final   Lab Visit on 08/08/2023   Component Date Value Ref Range Status    Sodium 08/08/2023 140  136 - 145 mmol/L Final    Potassium 08/08/2023 5.2 (H)  3.5 - 5.1 mmol/L Final    Chloride 08/08/2023 106  95 - 110 mmol/L Final    CO2 08/08/2023 25  23 - 29 mmol/L Final    Glucose 08/08/2023 92  70 - 110 mg/dL Final    BUN 08/08/2023 43 (H)  8 - 23 mg/dL Final    Creatinine 08/08/2023 2.5 (H)  0.5 - 1.4 mg/dL Final    Calcium 08/08/2023 9.8  8.7 - 10.5 mg/dL Final    Total Protein 08/08/2023 7.0  6.0 - 8.4 g/dL Final    Albumin 08/08/2023 3.9  3.5 - 5.2 g/dL Final    Total Bilirubin 08/08/2023 0.3  0.1 - 1.0 mg/dL Final    Comment: For infants and newborns, interpretation  of results should be based  on gestational age, weight and in agreement with clinical  observations.    Premature Infant recommended reference ranges:  Up to 24 hours.............<8.0 mg/dL  Up to 48 hours............<12.0 mg/dL  3-5 days..................<15.0 mg/dL  6-29 days.................<15.0 mg/dL      Alkaline Phosphatase 08/08/2023 45 (L)  55 - 135 U/L Final    AST 08/08/2023 33  10 - 40 U/L Final    ALT 08/08/2023 29  10 - 44 U/L Final    eGFR 08/08/2023 27.8 (A)  >60 mL/min/1.73 m^2 Final    Anion Gap 08/08/2023 9  8 - 16 mmol/L Final    WBC 08/08/2023 6.23  3.90 - 12.70 K/uL Final    RBC 08/08/2023 4.18 (L)  4.60 - 6.20 M/uL Final    Hemoglobin 08/08/2023 12.7 (L)  14.0 - 18.0 g/dL Final    Hematocrit 08/08/2023 40.4  40.0 - 54.0 % Final    MCV 08/08/2023 97  82 - 98 fL Final    MCH 08/08/2023 30.4  27.0 - 31.0 pg Final    MCHC 08/08/2023 31.4 (L)  32.0 - 36.0 g/dL Final    RDW 08/08/2023 14.4  11.5 - 14.5 % Final    Platelets 08/08/2023 327  150 - 450 K/uL Final    MPV 08/08/2023 10.8  9.2 - 12.9 fL Final    Immature Granulocytes 08/08/2023 0.3  0.0 - 0.5 % Final    Gran # (ANC) 08/08/2023 4.6  1.8 - 7.7 K/uL Final    Immature Grans (Abs) 08/08/2023 0.02  0.00 - 0.04 K/uL Final    Comment: Mild elevation in immature granulocytes is non specific and   can be seen in a variety of conditions including stress response,   acute inflammation, trauma and pregnancy. Correlation with other   laboratory and clinical findings is essential.      Lymph # 08/08/2023 1.0  1.0 - 4.8 K/uL Final    Mono # 08/08/2023 0.5  0.3 - 1.0 K/uL Final    Eos # 08/08/2023 0.2  0.0 - 0.5 K/uL Final    Baso # 08/08/2023 0.05  0.00 - 0.20 K/uL Final    nRBC 08/08/2023 0  0 /100 WBC Final    Gran % 08/08/2023 73.2 (H)  38.0 - 73.0 % Final    Lymph % 08/08/2023 15.2 (L)  18.0 - 48.0 % Final    Mono % 08/08/2023 7.9  4.0 - 15.0 % Final    Eosinophil % 08/08/2023 2.6  0.0 - 8.0 % Final    Basophil % 08/08/2023 0.8  0.0 - 1.9 % Final     Differential Method 08/08/2023 Automated   Final   Admission on 07/21/2023, Discharged on 07/23/2023   Component Date Value Ref Range Status    WBC 07/21/2023 3.85 (L)  3.90 - 12.70 K/uL Final    RBC 07/21/2023 4.79  4.60 - 6.20 M/uL Final    Hemoglobin 07/21/2023 14.4  14.0 - 18.0 g/dL Final    Hematocrit 07/21/2023 45.0  40.0 - 54.0 % Final    MCV 07/21/2023 94  82 - 98 fL Final    MCH 07/21/2023 30.1  27.0 - 31.0 pg Final    MCHC 07/21/2023 32.0  32.0 - 36.0 g/dL Final    RDW 07/21/2023 14.4  11.5 - 14.5 % Final    Platelets 07/21/2023 185  150 - 450 K/uL Final    MPV 07/21/2023 11.1  9.2 - 12.9 fL Final    Immature Granulocytes 07/21/2023 0.3  0.0 - 0.5 % Final    Gran # (ANC) 07/21/2023 2.6  1.8 - 7.7 K/uL Final    Immature Grans (Abs) 07/21/2023 0.01  0.00 - 0.04 K/uL Final    Comment: Mild elevation in immature granulocytes is non specific and   can be seen in a variety of conditions including stress response,   acute inflammation, trauma and pregnancy. Correlation with other   laboratory and clinical findings is essential.      Lymph # 07/21/2023 0.8 (L)  1.0 - 4.8 K/uL Final    Mono # 07/21/2023 0.4  0.3 - 1.0 K/uL Final    Eos # 07/21/2023 0.1  0.0 - 0.5 K/uL Final    Baso # 07/21/2023 0.01  0.00 - 0.20 K/uL Final    nRBC 07/21/2023 0  0 /100 WBC Final    Gran % 07/21/2023 66.3  38.0 - 73.0 % Final    Lymph % 07/21/2023 20.3  18.0 - 48.0 % Final    Mono % 07/21/2023 11.2  4.0 - 15.0 % Final    Eosinophil % 07/21/2023 1.6  0.0 - 8.0 % Final    Basophil % 07/21/2023 0.3  0.0 - 1.9 % Final    Differential Method 07/21/2023 Automated   Final    Sodium 07/21/2023 138  136 - 145 mmol/L Final    Potassium 07/21/2023 5.0  3.5 - 5.1 mmol/L Final    Specimen slightly hemolyzed    Chloride 07/21/2023 102  95 - 110 mmol/L Final    CO2 07/21/2023 22 (L)  23 - 29 mmol/L Final    Glucose 07/21/2023 93  70 - 110 mg/dL Final    BUN 07/21/2023 39 (H)  8 - 23 mg/dL Final    Creatinine 07/21/2023 2.7 (H)  0.5 - 1.4 mg/dL  Final    Calcium 07/21/2023 9.9  8.7 - 10.5 mg/dL Final    Total Protein 07/21/2023 7.6  6.0 - 8.4 g/dL Final    Albumin 07/21/2023 4.1  3.5 - 5.2 g/dL Final    Total Bilirubin 07/21/2023 0.4  0.1 - 1.0 mg/dL Final    Comment: For infants and newborns, interpretation of results should be based  on gestational age, weight and in agreement with clinical  observations.    Premature Infant recommended reference ranges:  Up to 24 hours.............<8.0 mg/dL  Up to 48 hours............<12.0 mg/dL  3-5 days..................<15.0 mg/dL  6-29 days.................<15.0 mg/dL      Alkaline Phosphatase 07/21/2023 48 (L)  55 - 135 U/L Final    AST 07/21/2023 47 (H)  10 - 40 U/L Final    ALT 07/21/2023 39  10 - 44 U/L Final    eGFR 07/21/2023 25 (A)  >60 mL/min/1.73 m^2 Final    Anion Gap 07/21/2023 14  8 - 16 mmol/L Final    Specimen UA 07/21/2023 Urine, Clean Catch   Final    Color, UA 07/21/2023 Red (A)  Yellow, Straw, Bri Final    Appearance, UA 07/21/2023 Cloudy (A)  Clear Final    pH, UA 07/21/2023 7.0  5.0 - 8.0 Final    Specific Gravity, UA 07/21/2023 1.020  1.005 - 1.030 Final    Protein, UA 07/21/2023 3+ (A)  Negative Final    Comment: Recommend a 24 hour urine protein or a urine   protein/creatinine ratio if globulin induced proteinuria is  clinically suspected.      Glucose, UA 07/21/2023 Negative  Negative Final    Ketones, UA 07/21/2023 Negative  Negative Final    Bilirubin (UA) 07/21/2023 Negative  Negative Final    Occult Blood UA 07/21/2023 3+ (A)  Negative Final    Nitrite, UA 07/21/2023 Negative  Negative Final    Urobilinogen, UA 07/21/2023 Negative  <2.0 EU/dL Final    Leukocytes, UA 07/21/2023 2+ (A)  Negative Final    Prothrombin Time 07/21/2023 11.1  9.0 - 12.5 sec Final    INR 07/21/2023 1.0  0.8 - 1.2 Final    Comment: Coumadin Therapy:  2.0 - 3.0 for INR for all indicators except mechanical heart valves  and antiphospholipid syndromes which should use 2.5 - 3.5.  LOT^040^PT Inn^[id number]      aPTT  07/21/2023 31.1  21.0 - 32.0 sec Final    Comment: Refer to local heparin nomogram for intensity/dose specific   therapeutic   range.  LOT^050^APTT FSL^[id number]      RBC, UA 07/21/2023 >100 (H)  0 - 4 /hpf Final    WBC, UA 07/21/2023 3  0 - 5 /hpf Final    Bacteria 07/21/2023 Rare  None-Occ /hpf Final    Hyaline Casts, UA 07/21/2023 0  0-1/lpf /lpf Final    Microscopic Comment 07/21/2023 SEE COMMENT   Final    Comment: Other formed elements not mentioned in the report are not   present in the microscopic examination.       Sodium 07/22/2023 140  136 - 145 mmol/L Final    Potassium 07/22/2023 4.8  3.5 - 5.1 mmol/L Final    Chloride 07/22/2023 106  95 - 110 mmol/L Final    CO2 07/22/2023 24  23 - 29 mmol/L Final    Glucose 07/22/2023 84  70 - 110 mg/dL Final    BUN 07/22/2023 37 (H)  8 - 23 mg/dL Final    Creatinine 07/22/2023 2.4 (H)  0.5 - 1.4 mg/dL Final    Calcium 07/22/2023 9.5  8.7 - 10.5 mg/dL Final    Anion Gap 07/22/2023 10  8 - 16 mmol/L Final    eGFR 07/22/2023 29 (A)  >60 mL/min/1.73 m^2 Final    Magnesium 07/22/2023 1.8  1.6 - 2.6 mg/dL Final    WBC 07/22/2023 3.96  3.90 - 12.70 K/uL Final    RBC 07/22/2023 4.64  4.60 - 6.20 M/uL Final    Hemoglobin 07/22/2023 14.1  14.0 - 18.0 g/dL Final    Hematocrit 07/22/2023 44.9  40.0 - 54.0 % Final    MCV 07/22/2023 97  82 - 98 fL Final    MCH 07/22/2023 30.4  27.0 - 31.0 pg Final    MCHC 07/22/2023 31.4 (L)  32.0 - 36.0 g/dL Final    RDW 07/22/2023 14.2  11.5 - 14.5 % Final    Platelets 07/22/2023 171  150 - 450 K/uL Final    MPV 07/22/2023 10.9  9.2 - 12.9 fL Final    Immature Granulocytes 07/22/2023 0.3  0.0 - 0.5 % Final    Gran # (ANC) 07/22/2023 3.1  1.8 - 7.7 K/uL Final    Immature Grans (Abs) 07/22/2023 0.01  0.00 - 0.04 K/uL Final    Comment: Mild elevation in immature granulocytes is non specific and   can be seen in a variety of conditions including stress response,   acute inflammation, trauma and pregnancy. Correlation with other   laboratory and  clinical findings is essential.      Lymph # 07/22/2023 0.5 (L)  1.0 - 4.8 K/uL Final    Mono # 07/22/2023 0.3  0.3 - 1.0 K/uL Final    Eos # 07/22/2023 0.0  0.0 - 0.5 K/uL Final    Baso # 07/22/2023 0.01  0.00 - 0.20 K/uL Final    nRBC 07/22/2023 0  0 /100 WBC Final    Gran % 07/22/2023 77.2 (H)  38.0 - 73.0 % Final    Lymph % 07/22/2023 13.6 (L)  18.0 - 48.0 % Final    Mono % 07/22/2023 8.1  4.0 - 15.0 % Final    Eosinophil % 07/22/2023 0.5  0.0 - 8.0 % Final    Basophil % 07/22/2023 0.3  0.0 - 1.9 % Final    Differential Method 07/22/2023 Automated   Final    Sodium 07/23/2023 140  136 - 145 mmol/L Final    Potassium 07/23/2023 4.2  3.5 - 5.1 mmol/L Final    Chloride 07/23/2023 108  95 - 110 mmol/L Final    CO2 07/23/2023 21 (L)  23 - 29 mmol/L Final    Glucose 07/23/2023 78  70 - 110 mg/dL Final    BUN 07/23/2023 28 (H)  8 - 23 mg/dL Final    Creatinine 07/23/2023 2.0 (H)  0.5 - 1.4 mg/dL Final    Calcium 07/23/2023 9.2  8.7 - 10.5 mg/dL Final    Anion Gap 07/23/2023 11  8 - 16 mmol/L Final    eGFR 07/23/2023 36 (A)  >60 mL/min/1.73 m^2 Final    Magnesium 07/23/2023 1.6  1.6 - 2.6 mg/dL Final    WBC 07/23/2023 4.41  3.90 - 12.70 K/uL Final    RBC 07/23/2023 4.24 (L)  4.60 - 6.20 M/uL Final    Hemoglobin 07/23/2023 13.1 (L)  14.0 - 18.0 g/dL Final    Hematocrit 07/23/2023 40.5  40.0 - 54.0 % Final    MCV 07/23/2023 96  82 - 98 fL Final    MCH 07/23/2023 30.9  27.0 - 31.0 pg Final    MCHC 07/23/2023 32.3  32.0 - 36.0 g/dL Final    RDW 07/23/2023 13.9  11.5 - 14.5 % Final    Platelets 07/23/2023 146 (L)  150 - 450 K/uL Final    MPV 07/23/2023 10.7  9.2 - 12.9 fL Final    Immature Granulocytes 07/23/2023 0.2  0.0 - 0.5 % Final    Gran # (ANC) 07/23/2023 3.4  1.8 - 7.7 K/uL Final    Immature Grans (Abs) 07/23/2023 0.01  0.00 - 0.04 K/uL Final    Comment: Mild elevation in immature granulocytes is non specific and   can be seen in a variety of conditions including stress response,   acute inflammation, trauma and  pregnancy. Correlation with other   laboratory and clinical findings is essential.      Lymph # 07/23/2023 0.6 (L)  1.0 - 4.8 K/uL Final    Mono # 07/23/2023 0.4  0.3 - 1.0 K/uL Final    Eos # 07/23/2023 0.0  0.0 - 0.5 K/uL Final    Baso # 07/23/2023 0.01  0.00 - 0.20 K/uL Final    nRBC 07/23/2023 0  0 /100 WBC Final    Gran % 07/23/2023 77.1 (H)  38.0 - 73.0 % Final    Lymph % 07/23/2023 13.2 (L)  18.0 - 48.0 % Final    Mono % 07/23/2023 8.4  4.0 - 15.0 % Final    Eosinophil % 07/23/2023 0.9  0.0 - 8.0 % Final    Basophil % 07/23/2023 0.2  0.0 - 1.9 % Final    Differential Method 07/23/2023 Automated   Final    Urine Culture, Routine 07/23/2023  (A)   Final                    Value:MYROIDES SPECIES  10,000 - 49,999 cfu/ml      Urine Culture, Routine 07/23/2023  (A)   Final                    Value:ENTEROCOCCUS FAECALIS  > 100,000 cfu/ml     Lab Visit on 05/19/2023   Component Date Value Ref Range Status    Sodium 05/19/2023 137  136 - 145 mmol/L Final    Potassium 05/19/2023 5.0  3.5 - 5.1 mmol/L Final    Chloride 05/19/2023 101  95 - 110 mmol/L Final    CO2 05/19/2023 24  23 - 29 mmol/L Final    Glucose 05/19/2023 97  70 - 110 mg/dL Final    BUN 05/19/2023 31 (H)  8 - 23 mg/dL Final    Creatinine 05/19/2023 2.4 (H)  0.5 - 1.4 mg/dL Final    Calcium 05/19/2023 10.2  8.7 - 10.5 mg/dL Final    Total Protein 05/19/2023 7.5  6.0 - 8.4 g/dL Final    Albumin 05/19/2023 4.0  3.5 - 5.2 g/dL Final    Total Bilirubin 05/19/2023 0.3  0.1 - 1.0 mg/dL Final    Comment: For infants and newborns, interpretation of results should be based  on gestational age, weight and in agreement with clinical  observations.    Premature Infant recommended reference ranges:  Up to 24 hours.............<8.0 mg/dL  Up to 48 hours............<12.0 mg/dL  3-5 days..................<15.0 mg/dL  6-29 days.................<15.0 mg/dL      Alkaline Phosphatase 05/19/2023 49 (L)  55 - 135 U/L Final    AST 05/19/2023 45 (H)  10 - 40 U/L Final    ALT  05/19/2023 41  10 - 44 U/L Final    Anion Gap 05/19/2023 12  8 - 16 mmol/L Final    eGFR 05/19/2023 29.2 (A)  >60 mL/min/1.73 m^2 Final    WBC 05/19/2023 6.23  3.90 - 12.70 K/uL Final    RBC 05/19/2023 4.40 (L)  4.60 - 6.20 M/uL Final    Hemoglobin 05/19/2023 13.2 (L)  14.0 - 18.0 g/dL Final    Hematocrit 05/19/2023 41.7  40.0 - 54.0 % Final    MCV 05/19/2023 95  82 - 98 fL Final    MCH 05/19/2023 30.0  27.0 - 31.0 pg Final    MCHC 05/19/2023 31.7 (L)  32.0 - 36.0 g/dL Final    RDW 05/19/2023 13.2  11.5 - 14.5 % Final    Platelets 05/19/2023 272  150 - 450 K/uL Final    MPV 05/19/2023 9.8  9.2 - 12.9 fL Final    Immature Granulocytes 05/19/2023 0.2  0.0 - 0.5 % Final    Gran # (ANC) 05/19/2023 4.3  1.8 - 7.7 K/uL Final    Immature Grans (Abs) 05/19/2023 0.01  0.00 - 0.04 K/uL Final    Comment: Mild elevation in immature granulocytes is non specific and   can be seen in a variety of conditions including stress response,   acute inflammation, trauma and pregnancy. Correlation with other   laboratory and clinical findings is essential.      Lymph # 05/19/2023 0.9 (L)  1.0 - 4.8 K/uL Final    Mono # 05/19/2023 0.7  0.3 - 1.0 K/uL Final    Eos # 05/19/2023 0.3  0.0 - 0.5 K/uL Final    Baso # 05/19/2023 0.06  0.00 - 0.20 K/uL Final    nRBC 05/19/2023 0  0 /100 WBC Final    Gran % 05/19/2023 68.6  38.0 - 73.0 % Final    Lymph % 05/19/2023 13.6 (L)  18.0 - 48.0 % Final    Mono % 05/19/2023 11.1  4.0 - 15.0 % Final    Eosinophil % 05/19/2023 5.5  0.0 - 8.0 % Final    Basophil % 05/19/2023 1.0  0.0 - 1.9 % Final    Differential Method 05/19/2023 Automated   Final   Lab Visit on 05/19/2023   Component Date Value Ref Range Status    Cholesterol 05/19/2023 158  120 - 199 mg/dL Final    Comment: The National Cholesterol Education Program (NCEP) has set the  following guidelines (reference ranges) for Cholesterol:  Optimal.....................<200 mg/dL  Borderline High.............200-239 mg/dL  High........................> or =  240 mg/dL      Triglycerides 05/19/2023 69  30 - 150 mg/dL Final    Comment: The National Cholesterol Education Program (NCEP) has set the  following guidelines (reference values) for triglycerides:  Normal......................<150 mg/dL  Borderline High.............150-199 mg/dL  High........................200-499 mg/dL      HDL 05/19/2023 68  40 - 75 mg/dL Final    Comment: The National Cholesterol Education Program (NCEP) has set the  following guidelines (reference values) for HDL Cholesterol:  Low...............<40 mg/dL  Optimal...........>60 mg/dL      LDL Cholesterol 05/19/2023 76.2  63.0 - 159.0 mg/dL Final    Comment: The National Cholesterol Education Program (NCEP) has set the  following guidelines (reference values) for LDL Cholesterol:  Optimal.......................<130 mg/dL  Borderline High...............130-159 mg/dL  High..........................160-189 mg/dL  Very High.....................>190 mg/dL      HDL/Cholesterol Ratio 05/19/2023 43.0  20.0 - 50.0 % Final    Total Cholesterol/HDL Ratio 05/19/2023 2.3  2.0 - 5.0 Final    Non-HDL Cholesterol 05/19/2023 90  mg/dL Final    Comment: Risk category and Non-HDL cholesterol goals:  Coronary heart disease (CHD)or equivalent (10-year risk of CHD >20%):  Non-HDL cholesterol goal     <130 mg/dL  Two or more CHD risk factors and 10-year risk of CHD <= 20%:  Non-HDL cholesterol goal     <160 mg/dL  0 to 1 CHD risk factor:  Non-HDL cholesterol goal     <190 mg/dL      PSA, Screen 05/19/2023 <0.01  0.00 - 4.00 ng/mL Final    Comment: The testing method is a chemiluminescent microparticle immunoassay   manufactured by [employer] and performed on the [employer]   or   [employer] system. Values obtained with different assay manufacturers   for   methods may be different and cannot be used interchangeably.  PSA Expected levels:  Hormonal Therapy: <0.05 ng/ml  Prostatectomy: <0.01 ng/ml  Radiation Therapy: <1.00 ng/ml      TSH 05/19/2023 4.374 (H)   0.400 - 4.000 uIU/mL Final    PTH, Intact 05/19/2023 77.4 (H)  9.0 - 77.0 pg/mL Final    PSA, Screen 05/19/2023 <0.01  0.00 - 4.00 ng/mL Final    Comment: The testing method is a chemiluminescent microparticle immunoassay   manufactured by [employer] and performed on the    or   [employer] system. Values obtained with different assay manufacturers   for   methods may be different and cannot be used interchangeably.  PSA Expected levels:  Hormonal Therapy: <0.05 ng/ml  Prostatectomy: <0.01 ng/ml  Radiation Therapy: <1.00 ng/ml      PTH, Intact 05/19/2023 77.4 (H)  9.0 - 77.0 pg/mL Final    TSH 05/19/2023 4.374 (H)  0.400 - 4.000 uIU/mL Final    Cholesterol 05/19/2023 158  120 - 199 mg/dL Final    Comment: The National Cholesterol Education Program (NCEP) has set the  following guidelines (reference ranges) for Cholesterol:  Optimal.....................<200 mg/dL  Borderline High.............200-239 mg/dL  High........................> or = 240 mg/dL      Triglycerides 05/19/2023 69  30 - 150 mg/dL Final    Comment: The National Cholesterol Education Program (NCEP) has set the  following guidelines (reference values) for triglycerides:  Normal......................<150 mg/dL  Borderline High.............150-199 mg/dL  High........................200-499 mg/dL      HDL 05/19/2023 68  40 - 75 mg/dL Final    Comment: The National Cholesterol Education Program (NCEP) has set the  following guidelines (reference values) for HDL Cholesterol:  Low...............<40 mg/dL  Optimal...........>60 mg/dL      LDL Cholesterol 05/19/2023 76.2  63.0 - 159.0 mg/dL Final    Comment: The National Cholesterol Education Program (NCEP) has set the  following guidelines (reference values) for LDL Cholesterol:  Optimal.......................<130 mg/dL  Borderline High...............130-159 mg/dL  High..........................160-189 mg/dL  Very High.....................>190 mg/dL      HDL/Cholesterol Ratio 05/19/2023 43.0   20.0 - 50.0 % Final    Total Cholesterol/HDL Ratio 05/19/2023 2.3  2.0 - 5.0 Final    Non-HDL Cholesterol 05/19/2023 90  mg/dL Final    Comment: Risk category and Non-HDL cholesterol goals:  Coronary heart disease (CHD)or equivalent (10-year risk of CHD >20%):  Non-HDL cholesterol goal     <130 mg/dL  Two or more CHD risk factors and 10-year risk of CHD <= 20%:  Non-HDL cholesterol goal     <160 mg/dL  0 to 1 CHD risk factor:  Non-HDL cholesterol goal     <190 mg/dL      Protein S Activity 05/19/2023 >150 (H)  65 - 150 % Final    Comment: Anticoagulants (for example oral direct thrombin inhibitors  like dabigatran, anti-Xa inhibitors like rivaroxaban,  apixaban or edoxaban), heparin levels greater than 1 U/mL,  inhibitors of the APTT test system (for example lupus  anticoagulant), inhibitors of bovine factor V (for example  antibodies that may arise in patients treated with certain  bovine topical thrombin preparations) may produce a falsely  normal or elevated protein S activity result.  Suggest   clinical correlation and if indicated consider repeat assay  of protein S activity and/or antigen in the absence of  anticoagulation therapy.  Increased free protein S activity is of unknown hemostatic  significance.    -------------------ADDITIONAL INFORMATION-------------------  This test has been modified from the 's   instructions. Its performance characteristics were   determined by [facility] in a manner consistent with   CLIA requirements. This test has not been cleared or   approved by the [place] Food and D                           rug Administration.    Test Performed by:  [address] [address]  : Joo Kahn M.D. Ph.D.; CLIA# [id number]      Protein C Activity 05/19/2023 144  70 - 150 % Final    Comment: -------------------ADDITIONAL INFORMATION-------------------  This test has been modified from the  's   instructions. Its performance characteristics were   determined by [facility] in a manner consistent with   CLIA requirements. This test has not been cleared or   approved by the U.S. Food and Drug Administration.    Test Performed by:  [facility] [address] [address]  : Joo Kahn M.D. Ph.D.; CLIA# [id number]      Free T4 05/19/2023 1.22  0.71 - 1.51 ng/dL Final   Admission on 04/21/2023, Discharged on 04/21/2023   Component Date Value Ref Range Status    WBC 04/21/2023 3.85 (L)  3.90 - 12.70 K/uL Final    RBC 04/21/2023 4.68  4.60 - 6.20 M/uL Final    Hemoglobin 04/21/2023 14.4  14.0 - 18.0 g/dL Final    Hematocrit 04/21/2023 44.6  40.0 - 54.0 % Final    MCV 04/21/2023 95  82 - 98 fL Final    MCH 04/21/2023 30.8  27.0 - 31.0 pg Final    MCHC 04/21/2023 32.3  32.0 - 36.0 g/dL Final    RDW 04/21/2023 13.2  11.5 - 14.5 % Final    Platelets 04/21/2023 173  150 - 450 K/uL Final    MPV 04/21/2023 10.5  9.2 - 12.9 fL Final    Immature Granulocytes 04/21/2023 0.3  0.0 - 0.5 % Final    Gran # (ANC) 04/21/2023 2.8  1.8 - 7.7 K/uL Final    Immature Grans (Abs) 04/21/2023 0.01  0.00 - 0.04 K/uL Final    Comment: Mild elevation in immature granulocytes is non specific and   can be seen in a variety of conditions including stress response,   acute inflammation, trauma and pregnancy. Correlation with other   laboratory and clinical findings is essential.      Lymph # 04/21/2023 0.6 (L)  1.0 - 4.8 K/uL Final    Mono # 04/21/2023 0.4  0.3 - 1.0 K/uL Final    Eos # 04/21/2023 0.0  0.0 - 0.5 K/uL Final    Baso # 04/21/2023 0.04  0.00 - 0.20 K/uL Final    nRBC 04/21/2023 0  0 /100 WBC Final    Gran % 04/21/2023 71.5  38.0 - 73.0 % Final    Lymph % 04/21/2023 15.8 (L)  18.0 - 48.0 % Final    Mono % 04/21/2023 11.4  4.0 - 15.0 % Final    Eosinophil % 04/21/2023 0.0  0.0 - 8.0 % Final    Basophil % 04/21/2023 1.0  0.0 - 1.9 % Final     Differential Method 04/21/2023 Automated   Final    Sodium 04/21/2023 133 (L)  136 - 145 mmol/L Final    Potassium 04/21/2023 5.4 (H)  3.5 - 5.1 mmol/L Final    Chloride 04/21/2023 100  95 - 110 mmol/L Final    CO2 04/21/2023 20 (L)  23 - 29 mmol/L Final    Glucose 04/21/2023 91  70 - 110 mg/dL Final    BUN 04/21/2023 40 (H)  8 - 23 mg/dL Final    Creatinine 04/21/2023 2.8 (H)  0.5 - 1.4 mg/dL Final    Calcium 04/21/2023 9.6  8.7 - 10.5 mg/dL Final    Total Protein 04/21/2023 7.7  6.0 - 8.4 g/dL Final    Albumin 04/21/2023 3.7  3.5 - 5.2 g/dL Final    Total Bilirubin 04/21/2023 0.3  0.1 - 1.0 mg/dL Final    Comment: For infants and newborns, interpretation of results should be based  on gestational age, weight and in agreement with clinical  observations.    Premature Infant recommended reference ranges:  Up to 24 hours.............<8.0 mg/dL  Up to 48 hours............<12.0 mg/dL  3-5 days..................<15.0 mg/dL  6-29 days.................<15.0 mg/dL      Alkaline Phosphatase 04/21/2023 42 (L)  55 - 135 U/L Final    AST 04/21/2023 161 (H)  10 - 40 U/L Final    ALT 04/21/2023 97 (H)  10 - 44 U/L Final    Anion Gap 04/21/2023 13  8 - 16 mmol/L Final    eGFR 04/21/2023 24 (A)  >60 mL/min/1.73 m^2 Final    Specimen UA 04/21/2023 Urine, Unspecified   Final    urostomy    Color, UA 04/21/2023 Yellow  Yellow, Straw, Bri Final    Appearance, UA 04/21/2023 Clear  Clear Final    pH, UA 04/21/2023 7.0  5.0 - 8.0 Final    Specific Gravity, UA 04/21/2023 1.010  1.005 - 1.030 Final    Protein, UA 04/21/2023 1+ (A)  Negative Final    Comment: Recommend a 24 hour urine protein or a urine   protein/creatinine ratio if globulin induced proteinuria is  clinically suspected.      Glucose, UA 04/21/2023 Negative  Negative Final    Ketones, UA 04/21/2023 Negative  Negative Final    Bilirubin (UA) 04/21/2023 Negative  Negative Final    Occult Blood UA 04/21/2023 2+ (A)  Negative Final    Nitrite, UA 04/21/2023 Negative  Negative  Final    Urobilinogen, UA 04/21/2023 Negative  <2.0 EU/dL Final    Leukocytes, UA 04/21/2023 3+ (A)  Negative Final    Lipase 04/21/2023 64 (H)  4 - 60 U/L Final    Lactate (Lactic Acid) 04/21/2023 0.9  0.5 - 2.2 mmol/L Final    Comment: Falsely low lactic acid results can be found in samples   containing >=13.0 mg/dL total bilirubin and/or >=3.5 mg/dL   direct bilirubin.      RBC, UA 04/21/2023 12 (H)  0 - 4 /hpf Final    WBC, UA 04/21/2023 49 (H)  0 - 5 /hpf Final    Bacteria 04/21/2023 Moderate (A)  None-Occ /hpf Final    Yeast, UA 04/21/2023 None  None Final    Squam Epithel, UA 04/21/2023 3  /hpf Final    Hyaline Casts, UA 04/21/2023 0  0-1/lpf /lpf Final    Microscopic Comment 04/21/2023 SEE COMMENT   Final    Comment: Other formed elements not mentioned in the report are not   present in the microscopic examination.       Urine Culture, Routine 04/21/2023  (A)   Final                    Value:PROVIDENCIA RETTGERI  >100,000 cfu/ml     Lab Visit on 03/20/2023   Component Date Value Ref Range Status    Sodium 03/20/2023 135 (L)  136 - 145 mmol/L Final    Potassium 03/20/2023 4.9  3.5 - 5.1 mmol/L Final    Chloride 03/20/2023 99  95 - 110 mmol/L Final    CO2 03/20/2023 26  23 - 29 mmol/L Final    Glucose 03/20/2023 95  70 - 110 mg/dL Final    BUN 03/20/2023 48 (H)  8 - 23 mg/dL Final    Creatinine 03/20/2023 2.9 (H)  0.5 - 1.4 mg/dL Final    Calcium 03/20/2023 10.1  8.7 - 10.5 mg/dL Final    Total Protein 03/20/2023 7.2  6.0 - 8.4 g/dL Final    Albumin 03/20/2023 4.4  3.5 - 5.2 g/dL Final    Total Bilirubin 03/20/2023 0.6  0.1 - 1.0 mg/dL Final    Comment: For infants and newborns, interpretation of results should be based  on gestational age, weight and in agreement with clinical  observations.    Premature Infant recommended reference ranges:  Up to 24 hours.............<8.0 mg/dL  Up to 48 hours............<12.0 mg/dL  3-5 days..................<15.0 mg/dL  6-29 days.................<15.0 mg/dL      Alkaline  Phosphatase 03/20/2023 40 (L)  55 - 135 U/L Final    AST 03/20/2023 42 (H)  10 - 40 U/L Final    ALT 03/20/2023 38  10 - 44 U/L Final    Anion Gap 03/20/2023 10  8 - 16 mmol/L Final    eGFR 03/20/2023 23.3 (A)  >60 mL/min/1.73 m^2 Final    WBC 03/20/2023 5.79  3.90 - 12.70 K/uL Final    RBC 03/20/2023 4.39 (L)  4.60 - 6.20 M/uL Final    Hemoglobin 03/20/2023 14.0  14.0 - 18.0 g/dL Final    Hematocrit 03/20/2023 42.7  40.0 - 54.0 % Final    MCV 03/20/2023 97  82 - 98 fL Final    MCH 03/20/2023 31.9 (H)  27.0 - 31.0 pg Final    MCHC 03/20/2023 32.8  32.0 - 36.0 g/dL Final    RDW 03/20/2023 12.9  11.5 - 14.5 % Final    Platelets 03/20/2023 256  150 - 450 K/uL Final    MPV 03/20/2023 10.0  9.2 - 12.9 fL Final    Immature Granulocytes 03/20/2023 0.3  0.0 - 0.5 % Final    Gran # (ANC) 03/20/2023 4.0  1.8 - 7.7 K/uL Final    Immature Grans (Abs) 03/20/2023 0.02  0.00 - 0.04 K/uL Final    Comment: Mild elevation in immature granulocytes is non specific and   can be seen in a variety of conditions including stress response,   acute inflammation, trauma and pregnancy. Correlation with other   laboratory and clinical findings is essential.      Lymph # 03/20/2023 1.1  1.0 - 4.8 K/uL Final    Mono # 03/20/2023 0.5  0.3 - 1.0 K/uL Final    Eos # 03/20/2023 0.2  0.0 - 0.5 K/uL Final    Baso # 03/20/2023 0.05  0.00 - 0.20 K/uL Final    nRBC 03/20/2023 0  0 /100 WBC Final    Gran % 03/20/2023 68.2  38.0 - 73.0 % Final    Lymph % 03/20/2023 18.1  18.0 - 48.0 % Final    Mono % 03/20/2023 9.0  4.0 - 15.0 % Final    Eosinophil % 03/20/2023 3.5  0.0 - 8.0 % Final    Basophil % 03/20/2023 0.9  0.0 - 1.9 % Final    Differential Method 03/20/2023 Automated   Final   Admission on 03/15/2023, Discharged on 03/15/2023   Component Date Value Ref Range Status    WBC 03/15/2023 5.11  3.90 - 12.70 K/uL Final    RBC 03/15/2023 4.29 (L)  4.60 - 6.20 M/uL Final    Hemoglobin 03/15/2023 13.5 (L)  14.0 - 18.0 g/dL Final    Hematocrit 03/15/2023 41.8   40.0 - 54.0 % Final    MCV 03/15/2023 97  82 - 98 fL Final    MCH 03/15/2023 31.5 (H)  27.0 - 31.0 pg Final    MCHC 03/15/2023 32.3  32.0 - 36.0 g/dL Final    RDW 03/15/2023 13.1  11.5 - 14.5 % Final    Platelets 03/15/2023 238  150 - 450 K/uL Final    MPV 03/15/2023 10.5  9.2 - 12.9 fL Final    Immature Granulocytes 03/15/2023 0.2  0.0 - 0.5 % Final    Gran # (ANC) 03/15/2023 3.8  1.8 - 7.7 K/uL Final    Immature Grans (Abs) 03/15/2023 0.01  0.00 - 0.04 K/uL Final    Comment: Mild elevation in immature granulocytes is non specific and   can be seen in a variety of conditions including stress response,   acute inflammation, trauma and pregnancy. Correlation with other   laboratory and clinical findings is essential.      Lymph # 03/15/2023 0.8 (L)  1.0 - 4.8 K/uL Final    Mono # 03/15/2023 0.4  0.3 - 1.0 K/uL Final    Eos # 03/15/2023 0.1  0.0 - 0.5 K/uL Final    Baso # 03/15/2023 0.03  0.00 - 0.20 K/uL Final    nRBC 03/15/2023 0  0 /100 WBC Final    Gran % 03/15/2023 74.1 (H)  38.0 - 73.0 % Final    Lymph % 03/15/2023 14.7 (L)  18.0 - 48.0 % Final    Mono % 03/15/2023 8.6  4.0 - 15.0 % Final    Eosinophil % 03/15/2023 1.8  0.0 - 8.0 % Final    Basophil % 03/15/2023 0.6  0.0 - 1.9 % Final    Differential Method 03/15/2023 Automated   Final    Sodium 03/15/2023 134 (L)  136 - 145 mmol/L Final    Potassium 03/15/2023 4.8  3.5 - 5.1 mmol/L Final    Chloride 03/15/2023 101  95 - 110 mmol/L Final    CO2 03/15/2023 22 (L)  23 - 29 mmol/L Final    Glucose 03/15/2023 82  70 - 110 mg/dL Final    BUN 03/15/2023 50 (H)  8 - 23 mg/dL Final    Creatinine 03/15/2023 2.6 (H)  0.5 - 1.4 mg/dL Final    Calcium 03/15/2023 9.8  8.7 - 10.5 mg/dL Final    Total Protein 03/15/2023 7.3  6.0 - 8.4 g/dL Final    Albumin 03/15/2023 4.3  3.5 - 5.2 g/dL Final    Total Bilirubin 03/15/2023 0.4  0.1 - 1.0 mg/dL Final    Comment: For infants and newborns, interpretation of results should be based  on gestational age, weight and in agreement with  clinical  observations.    Premature Infant recommended reference ranges:  Up to 24 hours.............<8.0 mg/dL  Up to 48 hours............<12.0 mg/dL  3-5 days..................<15.0 mg/dL  6-29 days.................<15.0 mg/dL      Alkaline Phosphatase 03/15/2023 38 (L)  55 - 135 U/L Final    AST 03/15/2023 34  10 - 40 U/L Final    ALT 03/15/2023 31  10 - 44 U/L Final    Anion Gap 03/15/2023 11  8 - 16 mmol/L Final    eGFR 03/15/2023 26.5 (A)  >60 mL/min/1.73 m^2 Final    Prothrombin Time 03/15/2023 10.7  9.0 - 12.5 sec Final    INR 03/15/2023 1.0  0.8 - 1.2 Final    Comment: Coumadin Therapy:  2.0 - 3.0 for INR for all indicators except mechanical heart valves  and antiphospholipid syndromes which should use 2.5 - 3.5.  LOT^040^PT Inn^[id number]      aPTT 03/15/2023 26.0  21.0 - 32.0 sec Final    Comment: aPTT therapeutic range = 39-69 seconds  LOT^050^APTT FSL^[id number]     There may be more visits with results that are not included.       EKG  EKG 08/25/2023 normal sinus rhythm normal rate nonspecific ST-T changes    Echo   Results for orders placed or performed during the hospital encounter of 08/25/23   Echo   Result Value Ref Range    BSA 1.83 m2    LVIDd 4.82 3.5 - 6.0 cm    LV Systolic Volume 71.46 mL    LV Systolic Volume Index 38.4 mL/m2    LVIDs 4.03 (A) 2.1 - 4.0 cm    LV Diastolic Volume 108.58 mL    LV Diastolic Volume Index 58.38 mL/m2    IVS 1.33 (A) 0.6 - 1.1 cm    LVOT diameter 2.29 cm    LVOT area 4.1 cm2    FS 16 (A) 28 - 44 %    Left Ventricle Relative Wall Thickness 0.55 cm    Posterior Wall 1.33 (A) 0.6 - 1.1 cm    LV mass 255.63 g    LV Mass Index 137 g/m2    MV Peak E Abraham 0.50 m/s    TDI LATERAL 0.06 m/s    TDI SEPTAL 0.06 m/s    E/E' ratio 8.33 m/s    MV Peak A Abraham 0.74 m/s    TR Max Abraham 2.61 m/s    E/A ratio 0.68     E wave deceleration time 231.42 msec    LV SEPTAL E/E' RATIO 8.33 m/s    LV LATERAL E/E' RATIO 8.33 m/s    LA size 3.08 cm    Left Atrium Major Axis 3.82 cm    Left Atrium Minor  Axis 4.28 cm    RVDD 3.06 cm    RV S' 10.75 cm/s    TAPSE 1.75 cm    RA Major Axis 4.46 cm    MV mean gradient 1 mmHg    MV peak gradient 2 mmHg    MV stenosis pressure 1/2 time 67.11 ms    MV valve area p 1/2 method 3.28 cm2    MV VTI 22.3 cm    Triscuspid Valve Regurgitation Peak Gradient 27 mmHg    PV PEAK VELOCITY 0.67 m/s    PV peak gradient 2 mmHg    Sinus 3.53 cm    STJ 2.54 cm    IVC diameter 1.72 cm    Mean e' 0.06 m/s    ZLVIDS 1.84     ZLVIDD -0.71     LA Volume Index 21.6 mL/m2    LA volume 40.16 cm3    LA WIDTH 3.8 cm    RA Width 3.2 cm    TV resting pulmonary artery pressure 35 mmHg    RV TB RVSP 11 mmHg    Est. RA pres 8 mmHg    Narrative      Left Ventricle: The left ventricle is normal in size. Mildly increased   wall thickness. There is mild concentric hypertrophy. Mild global   hypokinesis present. There is mildly reduced systolic function with a   visually estimated ejection fraction of 45 - 50%. Grade I diastolic   dysfunction.    Right Ventricle: Normal right ventricular cavity size. Wall thickness   is normal. Right ventricle wall motion  is normal. Systolic function is   normal.    Tricuspid Valve: There is mild regurgitation.    Pulmonary Artery: The estimated pulmonary artery systolic pressure is   35 mmHg.         Imaging  CT Renal Stone Study ABD Pelvis WO    Result Date: 4/21/2023  EXAMINATION: CT RENAL STONE STUDY ABD PELVIS WO CLINICAL HISTORY: Flank pain, kidney stone suspected;right side; TECHNIQUE: Low dose axial images, sagittal and coronal reformations were obtained from the lung bases to the pubic symphysis.  Contrast was not administered. COMPARISON: CT renal stone study 07/12/2022, renal ultrasound 07/08/2021 FINDINGS: Imaged lung bases are clear.  Base of the heart is normal in size without significant pericardial fluid noting multi-vessel coronary arterial and aortic annular calcifications. Remote cholecystectomy.  No biliary ductal dilatation.  Liver is normal in size with  homogeneous parenchymal attenuation.  Spleen is normal in size noting several scattered punctate parenchymal calcifications likely sequela of prior granulomatous disease.  Noncontrast appearance of the pancreas, stomach, duodenum and bilateral adrenal glands are within normal limits.  Few small accessory splenules noted. Bilateral kidneys are stable in overall size, shape and location with grossly similar areas of cortical thinning and scarring and renal cortical lobulation with overall mild atrophy.  Advanced bilateral renal atherosclerotic vascular calcifications noted.  There are additional several scattered subcentimeter suspected nephroliths at each kidney.  No hydronephrosis.  There is relatively symmetric bilateral nonspecific perinephric stranding similar to prior.  Few scattered subcentimeter hypoattenuating cortical foci at each kidney which are too small to characterize. Remote operative change of cystectomy with right lower quadrant ileal conduit.  The bilateral ureters are not significantly dilated. No pelvic mass seen.  Few scattered pelvic phleboliths noted. Appendix is not identified; however, no right lower quadrant inflammatory change.  Several scattered colonic diverticula without diverticulitis.  No evidence of bowel obstruction or acute inflammation.  No pneumatosis or portal venous gas. No ascites, free air or lymphadenopathy by CT criteria. Scattered advanced/severe calcific atherosclerosis of the abdominal aorta extending into its mesenteric and iliac branches.  Abdominal aorta is ectatic but nonaneurysmal.  Grossly stable fusiform aneurysmal dilatation of the right common iliac artery measuring up to 3.4 cm. Operative changes of right lower quadrant parastomal and bilateral inguinal hernia repair similar to prior. Osseous structures appear stable without acute process seen.     1. Suspected bilateral nonobstructing nephrolithiasis.  Otherwise no acute process seen within the abdomen or  pelvis on this noncontrast study. 2. Remote operative change of cystectomy with right lower quadrant ileal conduit.  Resolution of previous right-sided hydronephrosis. 3. Prior cholecystectomy. 4. Diverticulosis coli. 5. 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 6. Grossly stable additional findings as above. Electronically signed by: Lake Hickey MD Date:    04/21/2023 Time:    14:34      Prior coronary angiogram / intervention:  No known    Assessment and Plan  1. Iliac artery aneurysm  Sent to vascular surgery  Control risk factors    2. Hypertriglyceridemia  Check lipid panel fasting  The patient is on fenofibrate    3. Essential hypertension  Continue Coreg  Blood pressure log    4. Coronary artery calcification  ASA 81  Start pravastatin    5. Acute deep vein thrombosis (DVT) of axillary vein of right upper extremity  Anticoagulation for a minimum of 3 months  Could potentially consider provoked due to inactivity  However in light of prior malignancy will refer to Hematology/Oncology to comment on duration of treatment  - Ambulatory referral/consult to Hematology / Oncology; Future    6. Malignant neoplasm of urinary bladder, unspecified site  Refer to hematology oncology  - Ambulatory referral/consult to Hematology / Oncology; Future    7. PVD (peripheral vascular disease)  No records consider lower extremity ultrasound next visit  Referred to vascular surgery for iliac aneurysm    8.  Chest discomfort with elevated troponin and borderline EF  MPI for risk stratification  Patient refuses angiogram and states he would rather die of a heart attack than take a risk of being placed on dialysis  The patient will be on aspirin with Eliquis and statin    Follow Up  1 month    Total professional time spent for the encounter: 40 minutes  Time was spent preparing to see the patient, reviewing results of prior testing, obtaining and/or reviewing  separately obtained history, performing a medically appropriate examination and interview, counseling and educating the patient/family, ordering medications/tests/procedures, referring and communicating with other health care professionals, documenting clinical information in the electronic health record, and independently interpreting results.       John Paul Ayers MD, FACC, RPVI  Interventional Cardiology

## 2023-10-02 ENCOUNTER — TELEPHONE (OUTPATIENT)
Dept: CARDIOLOGY | Facility: CLINIC | Age: 63
End: 2023-10-02
Payer: MEDICARE

## 2023-10-02 NOTE — TELEPHONE ENCOUNTER
----- Message from John Paul Ayers MD sent at 10/2/2023 12:59 AM CDT -----  Please set up for holter  He had atrial tachycardia during stress test  Tell him I need to further evaluate for an abnormal fast heart rhythm  ----- Message -----  From: Sg Reed MD  Sent: 9/28/2023   2:51 PM CDT  To: John Paul Ayers MD

## 2023-10-02 NOTE — TELEPHONE ENCOUNTER
Spoke with patient, informed of test results per provider message and recommendations.  Patient verbalized understanding and will be in office on 10/12/2023 for follow up appointment.

## 2023-10-12 ENCOUNTER — OFFICE VISIT (OUTPATIENT)
Dept: CARDIOLOGY | Facility: CLINIC | Age: 63
End: 2023-10-12
Payer: MEDICARE

## 2023-10-12 VITALS
WEIGHT: 146.69 LBS | HEART RATE: 89 BPM | RESPIRATION RATE: 18 BRPM | DIASTOLIC BLOOD PRESSURE: 62 MMHG | BODY MASS INDEX: 19.87 KG/M2 | OXYGEN SATURATION: 92 % | SYSTOLIC BLOOD PRESSURE: 130 MMHG | HEIGHT: 72 IN

## 2023-10-12 DIAGNOSIS — I72.3 ILIAC ARTERY ANEURYSM: Chronic | ICD-10-CM

## 2023-10-12 DIAGNOSIS — I21.4 NSTEMI (NON-ST ELEVATED MYOCARDIAL INFARCTION): ICD-10-CM

## 2023-10-12 DIAGNOSIS — I25.84 CORONARY ARTERY CALCIFICATION: Primary | ICD-10-CM

## 2023-10-12 DIAGNOSIS — R07.2 PRECORDIAL PAIN: ICD-10-CM

## 2023-10-12 DIAGNOSIS — I25.10 CORONARY ARTERY CALCIFICATION: Primary | ICD-10-CM

## 2023-10-12 DIAGNOSIS — I10 ESSENTIAL HYPERTENSION: Chronic | ICD-10-CM

## 2023-10-12 DIAGNOSIS — E78.1 HYPERTRIGLYCERIDEMIA: Chronic | ICD-10-CM

## 2023-10-12 DIAGNOSIS — I73.9 PVD (PERIPHERAL VASCULAR DISEASE): ICD-10-CM

## 2023-10-12 PROCEDURE — 1101F PT FALLS ASSESS-DOCD LE1/YR: CPT | Mod: CPTII,S$GLB,, | Performed by: INTERNAL MEDICINE

## 2023-10-12 PROCEDURE — 1159F MED LIST DOCD IN RCRD: CPT | Mod: CPTII,S$GLB,, | Performed by: INTERNAL MEDICINE

## 2023-10-12 PROCEDURE — 1101F PR PT FALLS ASSESS DOC 0-1 FALLS W/OUT INJ PAST YR: ICD-10-PCS | Mod: CPTII,S$GLB,, | Performed by: INTERNAL MEDICINE

## 2023-10-12 PROCEDURE — 3288F FALL RISK ASSESSMENT DOCD: CPT | Mod: CPTII,S$GLB,, | Performed by: INTERNAL MEDICINE

## 2023-10-12 PROCEDURE — 3075F SYST BP GE 130 - 139MM HG: CPT | Mod: CPTII,S$GLB,, | Performed by: INTERNAL MEDICINE

## 2023-10-12 PROCEDURE — 1160F RVW MEDS BY RX/DR IN RCRD: CPT | Mod: CPTII,S$GLB,, | Performed by: INTERNAL MEDICINE

## 2023-10-12 PROCEDURE — 3008F PR BODY MASS INDEX (BMI) DOCUMENTED: ICD-10-PCS | Mod: CPTII,S$GLB,, | Performed by: INTERNAL MEDICINE

## 2023-10-12 PROCEDURE — 3288F PR FALLS RISK ASSESSMENT DOCUMENTED: ICD-10-PCS | Mod: CPTII,S$GLB,, | Performed by: INTERNAL MEDICINE

## 2023-10-12 PROCEDURE — 3075F PR MOST RECENT SYSTOLIC BLOOD PRESS GE 130-139MM HG: ICD-10-PCS | Mod: CPTII,S$GLB,, | Performed by: INTERNAL MEDICINE

## 2023-10-12 PROCEDURE — 99999 PR PBB SHADOW E&M-EST. PATIENT-LVL V: CPT | Mod: PBBFAC,,, | Performed by: INTERNAL MEDICINE

## 2023-10-12 PROCEDURE — 99214 OFFICE O/P EST MOD 30 MIN: CPT | Mod: S$GLB,,, | Performed by: INTERNAL MEDICINE

## 2023-10-12 PROCEDURE — 3078F DIAST BP <80 MM HG: CPT | Mod: CPTII,S$GLB,, | Performed by: INTERNAL MEDICINE

## 2023-10-12 PROCEDURE — 99999 PR PBB SHADOW E&M-EST. PATIENT-LVL V: ICD-10-PCS | Mod: PBBFAC,,, | Performed by: INTERNAL MEDICINE

## 2023-10-12 PROCEDURE — 3008F BODY MASS INDEX DOCD: CPT | Mod: CPTII,S$GLB,, | Performed by: INTERNAL MEDICINE

## 2023-10-12 PROCEDURE — 1160F PR REVIEW ALL MEDS BY PRESCRIBER/CLIN PHARMACIST DOCUMENTED: ICD-10-PCS | Mod: CPTII,S$GLB,, | Performed by: INTERNAL MEDICINE

## 2023-10-12 PROCEDURE — 99214 PR OFFICE/OUTPT VISIT, EST, LEVL IV, 30-39 MIN: ICD-10-PCS | Mod: S$GLB,,, | Performed by: INTERNAL MEDICINE

## 2023-10-12 PROCEDURE — 3078F PR MOST RECENT DIASTOLIC BLOOD PRESSURE < 80 MM HG: ICD-10-PCS | Mod: CPTII,S$GLB,, | Performed by: INTERNAL MEDICINE

## 2023-10-12 PROCEDURE — 1159F PR MEDICATION LIST DOCUMENTED IN MEDICAL RECORD: ICD-10-PCS | Mod: CPTII,S$GLB,, | Performed by: INTERNAL MEDICINE

## 2023-10-12 NOTE — PROGRESS NOTES
[facility] - Cardiology [address]  Cardiology Clinic Note      Chief Complaint  Chief Complaint   Patient presents with    Coronary Artery Disease       HPI:      63-year-old male with a past medical history of sacroiliitis, hypothyroidism, hyperparathyroidism, status post colostomy and urostomy, bladder cancer, anemia of chronic disease, urinary obstruction, tobacco use, COPD, SHARMIN, pulmonary nodule, CKD 4, hypertension, dyslipidemia, right common iliac artery aneurysm 3.4 cm, no hemodynamically significant carotid stenosis ultrasound 2022 peripheral vascular disease?, right axillary vein DVT 3/2023, MPI 09/2023 no defect normal estimated EF exercise treadmill 2021 did not achieve target heart rate prior exercise stress echo no ischemia 2018 submaximal rate, coronary artery calcification, echo 08/2023 EF 45-50 mild LVH grade 1 diastolic dysfunction normal RV PASP 35 echo 2020 normal EF mild LVH grade 1 diastolic dysfunction normal RV normal CVP    At our first visit patient came to discuss duration of anticoagulation for his right upper extremity DVT  He states that he leads a sedentary lifestyle  No other provocation could be identified    Multiple hospitalizations since last visit  Respiratory failure 8/2023  Subsequent hospitalization for elevated troponin chest discomfort echo as above  No discomfort since hospitalization  Last visit sent to vascular surgery for iliac artery aneurysm and check fasting lipid panel in addition to sending to Hematology Oncology and ordering MPI started low-dose pravastatin  During the stress test the patient had atrial tachycardia so Holter was ordered and is pending      Medications  Current Outpatient Medications   Medication Sig Dispense Refill    albuterol (PROVENTIL/VENTOLIN HFA) 90 mcg/actuation inhaler Inhale 2 puffs into the lungs every 6 (six) hours as needed for Wheezing. Rescue 18 g 0    albuterol-ipratropium (DUO-NEB) 2.5 mg-0.5 mg/3 mL nebulizer solution Take 3 mLs by  nebulization every 6 (six) hours as needed for Wheezing. Rescue 150 mL 6    allopurinoL (ZYLOPRIM) 100 MG tablet Take 1 tablet (100 mg total) by mouth once daily. 90 tablet 0    apixaban (ELIQUIS) 5 mg Tab Take 1 tablet (5 mg total) by mouth 2 (two) times daily. 60 tablet 0    aspirin (ECOTRIN) 81 MG EC tablet Take 81 mg by mouth once daily.      calcitRIOL (ROCALTROL) 0.25 MCG Cap Take 1 capsule (0.25 mcg total) by mouth once daily. 90 capsule 0    carvediloL (COREG) 6.25 MG tablet Take 1 tablet (6.25 mg total) by mouth 2 (two) times daily with meals. 180 tablet 0    clobetasoL (TEMOVATE) 0.05 % cream Apply topically 2 (two) times daily. 30 g 1    EScitalopram oxalate (LEXAPRO) 20 MG tablet Take 1 tablet (20 mg total) by mouth once daily. 90 tablet 0    fenofibrate (TRICOR) 145 MG tablet Take 1 tablet (145 mg total) by mouth once daily. 90 tablet 0    fluticasone-umeclidin-vilanter (TRELEGY ELLIPTA) 100-62.5-25 mcg DsDv Inhale 1 puff into the lungs once daily. Rinse mouth after use. 60 each 2    gabapentin (NEURONTIN) 400 MG capsule Take 1 capsule (400 mg total) by mouth 2 (two) times daily. 180 capsule 0    HYDROcodone-acetaminophen (NORCO)  mg per tablet Take 1 tablet by mouth every 6 (six) hours as needed for Pain. 12 tablet 0    levothyroxine (SYNTHROID) 125 MCG tablet Take 1 tablet (125 mcg total) by mouth once daily. 90 tablet 0    multivitamin (THERAGRAN) per tablet Take 1 tablet by mouth once daily.      nitroGLYCERIN (NITROSTAT) 0.4 MG SL tablet Place 1 tablet (0.4 mg total) under the tongue every 5 (five) minutes as needed for Chest pain. 30 tablet 3    pantoprazole (PROTONIX) 40 MG tablet Take 1 tablet (40 mg total) by mouth once daily. 90 tablet 0    pravastatin (PRAVACHOL) 20 MG tablet Take 1 tablet (20 mg total) by mouth once daily. 90 tablet 3    sodium bicarbonate 325 MG tablet Take 325 mg by mouth 2 (two) times daily.      sucralfate (CARAFATE) 1 gram tablet Take 1 tablet (1 g total) by mouth  2 (two) times daily. 120 tablet 1    tamsulosin (FLOMAX) 0.4 mg Cap Take 1 capsule (0.4 mg total) by mouth once daily. 90 capsule 0    tiZANidine (ZANAFLEX) 4 MG tablet Take 1 tablet (4 mg total) by mouth once daily. 30 tablet 1    traZODone (DESYREL) 150 MG tablet Take 1 tablet (150 mg total) by mouth every evening. 90 tablet 0     No current facility-administered medications for this visit.        History  Past Medical History:   Diagnosis Date    Anemia of chronic disease     Aneurysm of right common iliac artery     Arthritis     Blood transfusion     CAD (coronary artery disease)     Chest pain of uncertain etiology     CKD (chronic kidney disease), stage IV     COPD (chronic obstructive pulmonary disease)     Coronary artery calcification 8/2/2021    DDD (degenerative disc disease), lumbar     Frequency of urination     General anesthetics causing adverse effect in therapeutic use     pt states he wakes up very violently from anesthesia    GERD (gastroesophageal reflux disease)     Gout     History of bladder cancer     History of urostomy     Hydronephrosis     Hyperparathyroidism     Hypertension     Hypertriglyceridemia     Hypothyroidism (acquired)     Insomnia     Kidney stone     Limited joint range of motion right hip and leg    Lumbar facet arthropathy     Lumbar spondylosis     Mixed anxiety and depressive disorder     Neuropathy     Orchalgia     SHARMIN (obstructive sleep apnea)     Personal history of bladder cancer     PVD (peripheral vascular disease)     Sacroiliitis     Stricture or kinking of ureter     Ureteral stent displacement     Urinary retention     Vitamin D deficiency     Wears glasses      Past Surgical History:   Procedure Laterality Date    APPENDECTOMY  12/30/2015    Procedure: APPENDECTOMY;  Surgeon: CHAD Bo MD;  Location: [facility];  Service: Urology;;    BALLOON DILATION OF URETER  01/22/2018    BOWEL RESECTION      COLONOSCOPY      CREATION OF URETEROILEAL CONDUIT Left  07/05/2013    CYSTOSCOPY      >1  episodes for bilat ureteral stent exchange    CYSTOSCOPY W/ URETERAL STENT PLACEMENT Right 01/22/2018    and 6/9/2015    CYSTOSCOPY W/ URETERAL STENT PLACEMENT Bilateral 10/07/2015    ESOPHAGOGASTRODUODENOSCOPY  04/12/2022    W/ BIOPSY    ESOPHAGOGASTRODUODENOSCOPY N/A 4/12/2022    Procedure: EGD (ESOPHAGOGASTRODUODENOSCOPY);  Surgeon: Tito Fan MD;  Location: [facility] ENDO;  Service: Endoscopy;  Laterality: N/A;    EXPLORATORY LAPAROTOMY  07/05/2013    EXPLORATORY LAPAROTOMY W/ BOWEL RESECTION  12/30/2015    EXTRACORPOREAL SHOCK WAVE LITHOTRIPSY Right 06/07/2021    Procedure: LITHOTRIPSY, ESWL;  Surgeon: CHAD Bo MD;  Location: [facility] OR;  Service: Urology;  Laterality: Right;  RN PREOP 5/31/2021   VACCINATED    EXTRACORPOREAL SHOCK WAVE LITHOTRIPSY  06/07/2021    FRACTURE SURGERY      HAND SURGERY      HEMICOLECTOMY  12/30/2015    HIP SURGERY  2012    Rt hip septic    LAPAROSCOPIC CHOLECYSTECTOMY N/A 08/11/2021    Procedure: CHOLECYSTECTOMY, LAPAROSCOPIC;  Surgeon: Remy Xiong MD;  Location: [facility] OR;  Service: General;  Laterality: N/A;  gianni video confirmed 8/5/dme    LYMPH NODE DISSECTION  11/08/2012    pelvic    LYSIS OF ADHESIONS  12/30/2015    MOUTH SURGERY  2000    all teeth extracted    PERCUTANEOUS NEPHROLITHOTOMY Left 07/22/2020    Procedure: NEPHROLITHOTOMY, PERCUTANEOUS;  Surgeon: CHAD Bo MD;  Location: [facility] OR;  Service: Urology;  Laterality: Left;  OR 9 ONLY  RN PREOP 7/20/2020----COVID NEGATIVE ON 7/20    PERCUTANEOUS NEPHROLITHOTOMY Bilateral 6/8/2022    Procedure: BILATERAL PERCUTANEOUS NEPHROSCOPY, BILATERAL ANTEGRADE PYELOGRAM, BILATERAL NEPHROSTOMY TUBE PLACEMENT;  Surgeon: CHAD Bo MD;  Location: [facility] OR;  Service: Urology;  Laterality: Bilateral;  room 9 only  RN PREOP 6/1/2022----T/S IN AM    PERCUTANEOUS NEPHROSTOMY Left 01/02/2018    PERCUTANEOUS REPLACEMENT OF NEPHROSTOMY TUBE Bilateral 5/10/2022    Procedure:  REPLACEMENT, NEPHROSTOMY TUBE, PERCUTANEOUS;  Surgeon: Chino Vo MD;  Location: [facility] CATH LAB;  Service: Radiology;  Laterality: Bilateral;    RADICAL CYSTECTOMY  2012    RADIOFREQUENCY THERMOCOAGULATION  2014    median branch lumbar    RADIOFREQUENCY THERMOCOAGULATION  2014    median branch lumbar    REPAIR, HERNIA, PARASTOMAL, LAPAROSCOPIC  2017    ROBOTIC BRONCHOSCOPY N/A 2023    Procedure: ROBOTIC BRONCHOSCOPY;  Surgeon: Camila Rodriguez MD;  Location: [facility] OR [address] [place] [address];  Service: Anesthesiology;  Laterality: N/A;    SMALL INTESTINE SURGERY  2017    urosotomy bag      R abdomen     Social History     Socioeconomic History    Marital status:    Occupational History    Occupation: [employer]     Employer: [employer]   Tobacco Use    Smoking status: Former     Current packs/day: 0.00     Average packs/day: 0.3 packs/day for 42.0 years (12.6 ttl pk-yrs)     Types: Cigarettes     Start date: 1975     Quit date: 2017     Years since quittin.6    Smokeless tobacco: Never    Tobacco comments:     in cessation program   Substance and Sexual Activity    Alcohol use: Not Currently     Alcohol/week: 14.0 - 21.0 standard drinks of alcohol     Types: 14 - 21 Cans of beer per week     Comment: occassional    Drug use: Yes     Types: Marijuana     Comment: occass    Sexual activity: Yes     Partners: Female     Birth control/protection: None     Social Determinants of Health     Financial Resource Strain: Low Risk  (2023)    Overall Financial Resource Strain (CARDIA)     Difficulty of Paying Living Expenses: Not hard at all   Food Insecurity: No Food Insecurity (2023)    Hunger Vital Sign     Worried About Running Out of Food in the Last Year: Never true     Ran Out of Food in the Last Year: Never true   Transportation Needs: No Transportation Needs (2023)    PRAPARE - Transportation     Lack of Transportation (Medical): No     Lack  of Transportation (Non-Medical): No   Physical Activity: Inactive (7/22/2023)    Exercise Vital Sign     Days of Exercise per Week: 0 days     Minutes of Exercise per Session: 0 min   Stress: Stress Concern Present (7/22/2023)    [place] [employer] - Occupational Stress Questionnaire     Feeling of Stress : Very much   Social Connections: Unknown (7/22/2023)    Social Connection and Isolation Panel [NHANES]     Frequency of Communication with Friends and Family: Patient refused     Frequency of Social Gatherings with Friends and Family: Patient refused     Attends Religion Services: Never     Active Member of Clubs or Organizations: No     Attends Club or Organization Meetings: Never     Marital Status:    Housing Stability: Low Risk  (7/22/2023)    Housing Stability Vital Sign     Unable to Pay for Housing in the Last Year: No     Number of Places Lived in the Last Year: 1     Unstable Housing in the Last Year: No     Family History   Adopted: Yes   Family history unknown: Yes        Allergies  Review of patient's allergies indicates:  No Known Allergies    Review of Systems   Review of Systems   Constitutional: Negative for fever.   HENT:  Negative for nosebleeds.    Eyes:  Negative for visual disturbance.   Cardiovascular:  Negative for chest pain, claudication, dyspnea on exertion, palpitations and syncope.   Respiratory:  Negative for cough, hemoptysis and wheezing.    Endocrine: Negative for cold intolerance, heat intolerance, polyphagia and polyuria.   Hematologic/Lymphatic: Negative for bleeding problem.   Skin:  Negative for rash.   Musculoskeletal:  Negative for myalgias.   Gastrointestinal:  Negative for hematemesis, hematochezia, nausea and vomiting.   Genitourinary:  Negative for dysuria.   Neurological:  Negative for focal weakness and sensory change.   Psychiatric/Behavioral:  Negative for altered mental status.        Physical Exam  Vitals:    10/12/23 1501   BP: 130/62    Pulse: 89   Resp: 18     Wt Readings from Last 1 Encounters:   10/12/23 66.6 kg (146 lb 11.5 oz)     Physical Exam  HENT:      Head: Normocephalic and atraumatic.      Mouth/Throat:      Mouth: Mucous membranes are moist.   Eyes:      Extraocular Movements: Extraocular movements intact.      Pupils: Pupils are equal, round, and reactive to light.   Neck:      Vascular: No carotid bruit or JVD.   Cardiovascular:      Rate and Rhythm: Normal rate and regular rhythm.      Pulses: Normal pulses and intact distal pulses.      Heart sounds: Normal heart sounds. No murmur heard.     No friction rub. No gallop.   Pulmonary:      Effort: Pulmonary effort is normal.      Breath sounds: Normal breath sounds.   Abdominal:      Tenderness: There is no abdominal tenderness. There is no guarding or rebound.   Musculoskeletal:      Right lower leg: No edema.      Left lower leg: No edema.   Skin:     General: Skin is warm and dry.      Capillary Refill: Capillary refill takes less than 2 seconds.   Neurological:      General: No focal deficit present.      Mental Status: He is alert and oriented to person, place, and time.   Psychiatric:         Mood and Affect: Mood normal.         Labs  Hospital Outpatient Visit on 09/28/2023   Component Date Value Ref Range Status    85% Max Predicted HR 09/28/2023 132   Final    Max Predicted HR 09/28/2023 155   Final    [facility] CV CPX PATIENT IS MALE 09/28/2023 1.0   Final    [facility] CV CPX PATIENT IS FEMALE 09/28/2023 0.0   Final    Systolic blood pressure 09/28/2023 156  mmHg Final    Diastolic blood pressure 09/28/2023 76  mmHg Final    HR at rest 09/28/2023 86  bpm Final    dose 09/28/2023 0.4  mg Final    Pharm Time 09/28/2023 951  min Final    Exercise duration (min) 09/28/2023 4  minutes Final    Exercise duration (sec) 09/28/2023 0  seconds Final    Peak Systolic BP 09/28/2023 158  mmHg Final    Peak Diatolic BP 09/28/2023 72  mmHg Final    Peak HR 09/28/2023 148.0  bpm Final    % Max HR  Achieved 09/28/2023 95   Final    1 Minute Recovery HR 09/28/2023 107  bpm Final    RPP 09/28/2023 13,416   Final    Peak RPP 09/28/2023 23,384   Final   Admission on 08/25/2023, Discharged on 08/26/2023   Component Date Value Ref Range Status    WBC 08/25/2023 8.88  3.90 - 12.70 K/uL Final    RBC 08/25/2023 4.30 (L)  4.60 - 6.20 M/uL Final    Hemoglobin 08/25/2023 13.2 (L)  14.0 - 18.0 g/dL Final    Hematocrit 08/25/2023 40.9  40.0 - 54.0 % Final    MCV 08/25/2023 95  82 - 98 fL Final    MCH 08/25/2023 30.7  27.0 - 31.0 pg Final    MCHC 08/25/2023 32.3  32.0 - 36.0 g/dL Final    RDW 08/25/2023 14.4  11.5 - 14.5 % Final    Platelets 08/25/2023 169  150 - 450 K/uL Final    MPV 08/25/2023 11.2  9.2 - 12.9 fL Final    Immature Granulocytes 08/25/2023 0.2  0.0 - 0.5 % Final    Gran # (ANC) 08/25/2023 7.3  1.8 - 7.7 K/uL Final    Immature Grans (Abs) 08/25/2023 0.02  0.00 - 0.04 K/uL Final    Comment: Mild elevation in immature granulocytes is non specific and   can be seen in a variety of conditions including stress response,   acute inflammation, trauma and pregnancy. Correlation with other   laboratory and clinical findings is essential.      Lymph # 08/25/2023 0.9 (L)  1.0 - 4.8 K/uL Final    Mono # 08/25/2023 0.6  0.3 - 1.0 K/uL Final    Eos # 08/25/2023 0.0  0.0 - 0.5 K/uL Final    Baso # 08/25/2023 0.01  0.00 - 0.20 K/uL Final    nRBC 08/25/2023 0  0 /100 WBC Final    Gran % 08/25/2023 82.7 (H)  38.0 - 73.0 % Final    Lymph % 08/25/2023 10.5 (L)  18.0 - 48.0 % Final    Mono % 08/25/2023 6.2  4.0 - 15.0 % Final    Eosinophil % 08/25/2023 0.3  0.0 - 8.0 % Final    Basophil % 08/25/2023 0.1  0.0 - 1.9 % Final    Differential Method 08/25/2023 Automated   Final    Sodium 08/25/2023 140  136 - 145 mmol/L Final    Potassium 08/25/2023 4.5  3.5 - 5.1 mmol/L Final    Chloride 08/25/2023 102  95 - 110 mmol/L Final    CO2 08/25/2023 28  23 - 29 mmol/L Final    Glucose 08/25/2023 109  70 - 110 mg/dL Final    BUN 08/25/2023  45 (H)  8 - 23 mg/dL Final    Creatinine 08/25/2023 2.6 (H)  0.5 - 1.4 mg/dL Final    Calcium 08/25/2023 9.8  8.7 - 10.5 mg/dL Final    Total Protein 08/25/2023 7.3  6.0 - 8.4 g/dL Final    Albumin 08/25/2023 4.1  3.5 - 5.2 g/dL Final    Total Bilirubin 08/25/2023 0.3  0.1 - 1.0 mg/dL Final    Comment: For infants and newborns, interpretation of results should be based  on gestational age, weight and in agreement with clinical  observations.    Premature Infant recommended reference ranges:  Up to 24 hours.............<8.0 mg/dL  Up to 48 hours............<12.0 mg/dL  3-5 days..................<15.0 mg/dL  6-29 days.................<15.0 mg/dL      Alkaline Phosphatase 08/25/2023 46 (L)  55 - 135 U/L Final    AST 08/25/2023 29  10 - 40 U/L Final    ALT 08/25/2023 25  10 - 44 U/L Final    eGFR 08/25/2023 27 (A)  >60 mL/min/1.73 m^2 Final    Anion Gap 08/25/2023 10  8 - 16 mmol/L Final    Troponin I 08/25/2023 0.029 (H)  0.000 - 0.026 ng/mL Final    Comment: The reference interval for Troponin I represents the 99th percentile   cutoff   for our facility and is consistent with 3rd generation assay   performance.      BNP 08/25/2023 73  0 - 99 pg/mL Final    Values of less than 100 pg/ml are consistent with non-CHF populations.    Troponin I 08/25/2023 0.037 (H)  0.000 - 0.026 ng/mL Final    Comment: The reference interval for Troponin I represents the 99th percentile   cutoff   for our facility and is consistent with 3rd generation assay   performance.      BSA 08/26/2023 1.83  m2 Final    LVIDd 08/26/2023 4.82  3.5 - 6.0 cm Final    LV Systolic Volume 08/26/2023 71.46  mL Final    LV Systolic Volume Index 08/26/2023 38.4  mL/m2 Final    LVIDs 08/26/2023 4.03 (A)  2.1 - 4.0 cm Final    LV Diastolic Volume 08/26/2023 108.58  mL Final    LV Diastolic Volume Index 08/26/2023 58.38  mL/m2 Final    IVS 08/26/2023 1.33 (A)  0.6 - 1.1 cm Final    LVOT diameter 08/26/2023 2.29  cm Final    LVOT area 08/26/2023 4.1  cm2 Final     FS 08/26/2023 16 (A)  28 - 44 % Final    Left Ventricle Relative Wall Thick* 08/26/2023 0.55  cm Final    Posterior Wall 08/26/2023 1.33 (A)  0.6 - 1.1 cm Final    LV mass 08/26/2023 255.63  g Final    LV Mass Index 08/26/2023 137  g/m2 Final    MV Peak E Abraham 08/26/2023 0.50  m/s Final    TDI LATERAL 08/26/2023 0.06  m/s Final    TDI SEPTAL 08/26/2023 0.06  m/s Final    E/E' ratio 08/26/2023 8.33  m/s Final    MV Peak A Abraham 08/26/2023 0.74  m/s Final    TR Max Abraham 08/26/2023 2.61  m/s Final    E/A ratio 08/26/2023 0.68   Final    E wave deceleration time 08/26/2023 231.42  msec Final    LV SEPTAL E/E' RATIO 08/26/2023 8.33  m/s Final    LV LATERAL E/E' RATIO 08/26/2023 8.33  m/s Final    LA size 08/26/2023 3.08  cm Final    Left Atrium Major Axis 08/26/2023 3.82  cm Final    Left Atrium Minor Axis 08/26/2023 4.28  cm Final    RVDD 08/26/2023 3.06  cm Final    RV S' 08/26/2023 10.75  cm/s Final    TAPSE 08/26/2023 1.75  cm Final    RA Major Axis 08/26/2023 4.46  cm Final    MV mean gradient 08/26/2023 1  mmHg Final    MV peak gradient 08/26/2023 2  mmHg Final    MV stenosis pressure 1/2 time 08/26/2023 67.11  ms Final    MV valve area p 1/2 method 08/26/2023 3.28  cm2 Final    MV VTI 08/26/2023 22.3  cm Final    Triscuspid Valve Regurgitation Pea* 08/26/2023 27  mmHg Final    PV PEAK VELOCITY 08/26/2023 0.67  m/s Final    PV peak gradient 08/26/2023 2  mmHg Final    Sinus 08/26/2023 3.53  cm Final    STJ 08/26/2023 2.54  cm Final    IVC diameter 08/26/2023 1.72  cm Final    Mean e' 08/26/2023 0.06  m/s Final    ZLVIDS 08/26/2023 1.84   Final    ZLVIDD 08/26/2023 -0.71   Final    LA Volume Index 08/26/2023 21.6  mL/m2 Final    LA volume 08/26/2023 40.16  cm3 Final    LA WIDTH 08/26/2023 3.8  cm Final    RA Width 08/26/2023 3.2  cm Final    TV resting pulmonary artery pressu* 08/26/2023 35  mmHg Final    RV TB RVSP 08/26/2023 11  mmHg Final    Est. RA pres 08/26/2023 8  mmHg Final    Troponin I 08/26/2023 0.028 (H)   0.000 - 0.026 ng/mL Final    Comment: The reference interval for Troponin I represents the 99th percentile   cutoff   for our facility and is consistent with 3rd generation assay   performance.      Cholesterol 08/26/2023 120  120 - 199 mg/dL Final    Comment: The National Cholesterol Education Program (NCEP) has set the  following guidelines (reference ranges) for Cholesterol:  Optimal.....................<200 mg/dL  Borderline High.............200-239 mg/dL  High........................> or = 240 mg/dL      Triglycerides 08/26/2023 56  30 - 150 mg/dL Final    Comment: The National Cholesterol Education Program (NCEP) has set the  following guidelines (reference values) for triglycerides:  Normal......................<150 mg/dL  Borderline High.............150-199 mg/dL  High........................200-499 mg/dL      HDL 08/26/2023 55  40 - 75 mg/dL Final    Comment: The National Cholesterol Education Program (NCEP) has set the  following guidelines (reference values) for HDL Cholesterol:  Low...............<40 mg/dL  Optimal...........>60 mg/dL      LDL Cholesterol 08/26/2023 53.8 (L)  63.0 - 159.0 mg/dL Final    Comment: The National Cholesterol Education Program (NCEP) has set the  following guidelines (reference values) for LDL Cholesterol:  Optimal.......................<130 mg/dL  Borderline High...............130-159 mg/dL  High..........................160-189 mg/dL  Very High.....................>190 mg/dL      HDL/Cholesterol Ratio 08/26/2023 45.8  20.0 - 50.0 % Final    Total Cholesterol/HDL Ratio 08/26/2023 2.2  2.0 - 5.0 Final    Non-HDL Cholesterol 08/26/2023 65  mg/dL Final    Comment: Risk category and Non-HDL cholesterol goals:  Coronary heart disease (CHD)or equivalent (10-year risk of CHD >20%):  Non-HDL cholesterol goal     <130 mg/dL  Two or more CHD risk factors and 10-year risk of CHD <= 20%:  Non-HDL cholesterol goal     <160 mg/dL  0 to 1 CHD risk factor:  Non-HDL cholesterol goal     <190  mg/dL      Troponin I 08/26/2023 0.027 (H)  0.000 - 0.026 ng/mL Final    Comment: The reference interval for Troponin I represents the 99th percentile   cutoff   for our facility and is consistent with 3rd generation assay   performance.      Troponin I 08/26/2023 0.027 (H)  0.000 - 0.026 ng/mL Final    Comment: The reference interval for Troponin I represents the 99th percentile   cutoff   for our facility and is consistent with 3rd generation assay   performance.     Admission on 08/22/2023, Discharged on 08/23/2023   Component Date Value Ref Range Status    WBC 08/22/2023 5.99  3.90 - 12.70 K/uL Final    RBC 08/22/2023 4.53 (L)  4.60 - 6.20 M/uL Final    Hemoglobin 08/22/2023 13.9 (L)  14.0 - 18.0 g/dL Final    Hematocrit 08/22/2023 43.4  40.0 - 54.0 % Final    MCV 08/22/2023 96  82 - 98 fL Final    MCH 08/22/2023 30.7  27.0 - 31.0 pg Final    MCHC 08/22/2023 32.0  32.0 - 36.0 g/dL Final    RDW 08/22/2023 14.9 (H)  11.5 - 14.5 % Final    Platelets 08/22/2023 189  150 - 450 K/uL Final    MPV 08/22/2023 11.0  9.2 - 12.9 fL Final    Immature Granulocytes 08/22/2023 0.2  0.0 - 0.5 % Final    Gran # (ANC) 08/22/2023 4.6  1.8 - 7.7 K/uL Final    Immature Grans (Abs) 08/22/2023 0.01  0.00 - 0.04 K/uL Final    Comment: Mild elevation in immature granulocytes is non specific and   can be seen in a variety of conditions including stress response,   acute inflammation, trauma and pregnancy. Correlation with other   laboratory and clinical findings is essential.      Lymph # 08/22/2023 0.7 (L)  1.0 - 4.8 K/uL Final    Mono # 08/22/2023 0.4  0.3 - 1.0 K/uL Final    Eos # 08/22/2023 0.2  0.0 - 0.5 K/uL Final    Baso # 08/22/2023 0.02  0.00 - 0.20 K/uL Final    nRBC 08/22/2023 0  0 /100 WBC Final    Gran % 08/22/2023 77.3 (H)  38.0 - 73.0 % Final    Lymph % 08/22/2023 12.0 (L)  18.0 - 48.0 % Final    Mono % 08/22/2023 7.0  4.0 - 15.0 % Final    Eosinophil % 08/22/2023 3.2  0.0 - 8.0 % Final    Basophil % 08/22/2023 0.3  0.0 -  1.9 % Final    Differential Method 08/22/2023 Automated   Final    Sodium 08/22/2023 138  136 - 145 mmol/L Final    Potassium 08/22/2023 5.0  3.5 - 5.1 mmol/L Final    Chloride 08/22/2023 102  95 - 110 mmol/L Final    CO2 08/22/2023 26  23 - 29 mmol/L Final    Glucose 08/22/2023 84  70 - 110 mg/dL Final    BUN 08/22/2023 52 (H)  8 - 23 mg/dL Final    Creatinine 08/22/2023 2.9 (H)  0.5 - 1.4 mg/dL Final    Calcium 08/22/2023 10.2  8.7 - 10.5 mg/dL Final    Total Protein 08/22/2023 7.3  6.0 - 8.4 g/dL Final    Albumin 08/22/2023 4.1  3.5 - 5.2 g/dL Final    Total Bilirubin 08/22/2023 0.5  0.1 - 1.0 mg/dL Final    Comment: For infants and newborns, interpretation of results should be based  on gestational age, weight and in agreement with clinical  observations.    Premature Infant recommended reference ranges:  Up to 24 hours.............<8.0 mg/dL  Up to 48 hours............<12.0 mg/dL  3-5 days..................<15.0 mg/dL  6-29 days.................<15.0 mg/dL      Alkaline Phosphatase 08/22/2023 42 (L)  55 - 135 U/L Final    AST 08/22/2023 34  10 - 40 U/L Final    ALT 08/22/2023 27  10 - 44 U/L Final    eGFR 08/22/2023 23 (A)  >60 mL/min/1.73 m^2 Final    Anion Gap 08/22/2023 10  8 - 16 mmol/L Final    Troponin I 08/22/2023 0.104 (H)  0.000 - 0.026 ng/mL Final    Comment: The reference interval for Troponin I represents the 99th percentile   cutoff   for our facility and is consistent with 3rd generation assay   performance.      Troponin I 08/23/2023 0.091 (H)  0.000 - 0.026 ng/mL Final    Comment: The reference interval for Troponin I represents the 99th percentile   cutoff   for our facility and is consistent with 3rd generation assay   performance.     Admission on 08/18/2023, Discharged on 08/19/2023   Component Date Value Ref Range Status    WBC 08/18/2023 8.83  3.90 - 12.70 K/uL Final    RBC 08/18/2023 4.18 (L)  4.60 - 6.20 M/uL Final    Hemoglobin 08/18/2023 13.0 (L)  14.0 - 18.0 g/dL Final    Hematocrit  08/18/2023 41.0  40.0 - 54.0 % Final    MCV 08/18/2023 98  82 - 98 fL Final    MCH 08/18/2023 31.1 (H)  27.0 - 31.0 pg Final    MCHC 08/18/2023 31.7 (L)  32.0 - 36.0 g/dL Final    RDW 08/18/2023 14.7 (H)  11.5 - 14.5 % Final    Platelets 08/18/2023 193  150 - 450 K/uL Final    MPV 08/18/2023 11.5  9.2 - 12.9 fL Final    Immature Granulocytes 08/18/2023 0.3  0.0 - 0.5 % Final    Gran # (ANC) 08/18/2023 7.7  1.8 - 7.7 K/uL Final    Immature Grans (Abs) 08/18/2023 0.03  0.00 - 0.04 K/uL Final    Comment: Mild elevation in immature granulocytes is non specific and   can be seen in a variety of conditions including stress response,   acute inflammation, trauma and pregnancy. Correlation with other   laboratory and clinical findings is essential.      Lymph # 08/18/2023 0.5 (L)  1.0 - 4.8 K/uL Final    Mono # 08/18/2023 0.3  0.3 - 1.0 K/uL Final    Eos # 08/18/2023 0.2  0.0 - 0.5 K/uL Final    Baso # 08/18/2023 0.04  0.00 - 0.20 K/uL Final    nRBC 08/18/2023 0  0 /100 WBC Final    Gran % 08/18/2023 87.3 (H)  38.0 - 73.0 % Final    Lymph % 08/18/2023 6.1 (L)  18.0 - 48.0 % Final    Mono % 08/18/2023 3.6 (L)  4.0 - 15.0 % Final    Eosinophil % 08/18/2023 2.2  0.0 - 8.0 % Final    Basophil % 08/18/2023 0.5  0.0 - 1.9 % Final    Differential Method 08/18/2023 Automated   Final    BNP 08/18/2023 93  0 - 99 pg/mL Final    Values of less than 100 pg/ml are consistent with non-CHF populations.    Sodium 08/18/2023 142  136 - 145 mmol/L Final    Potassium 08/18/2023 4.8  3.5 - 5.1 mmol/L Final    Chloride 08/18/2023 105  95 - 110 mmol/L Final    CO2 08/18/2023 22 (L)  23 - 29 mmol/L Final    Glucose 08/18/2023 110  70 - 110 mg/dL Final    BUN 08/18/2023 48 (H)  8 - 23 mg/dL Final    Creatinine 08/18/2023 2.5 (H)  0.5 - 1.4 mg/dL Final    Calcium 08/18/2023 9.4  8.7 - 10.5 mg/dL Final    Total Protein 08/18/2023 7.3  6.0 - 8.4 g/dL Final    Albumin 08/18/2023 4.0  3.5 - 5.2 g/dL Final    Total Bilirubin 08/18/2023 0.3  0.1 - 1.0  mg/dL Final    Comment: For infants and newborns, interpretation of results should be based  on gestational age, weight and in agreement with clinical  observations.    Premature Infant recommended reference ranges:  Up to 24 hours.............<8.0 mg/dL  Up to 48 hours............<12.0 mg/dL  3-5 days..................<15.0 mg/dL  6-29 days.................<15.0 mg/dL      Alkaline Phosphatase 08/18/2023 46 (L)  55 - 135 U/L Final    AST 08/18/2023 37  10 - 40 U/L Final    ALT 08/18/2023 26  10 - 44 U/L Final    eGFR 08/18/2023 27.8 (A)  >60 mL/min/1.73 m^2 Final    Anion Gap 08/18/2023 15  8 - 16 mmol/L Final    Troponin I 08/18/2023 0.022  0.000 - 0.026 ng/mL Final    Comment: The reference interval for Troponin I represents the 99th percentile   cutoff   for our facility and is consistent with 3rd generation assay   performance.      Magnesium 08/18/2023 2.0  1.6 - 2.6 mg/dL Final    Prothrombin Time 08/18/2023 10.8  9.0 - 12.5 sec Final    INR 08/18/2023 1.0  0.8 - 1.2 Final    Comment: Coumadin Therapy:  2.0 - 3.0 for INR for all indicators except mechanical heart valves  and antiphospholipid syndromes which should use 2.5 - 3.5.  LOT^040^PT Inn^[id number]      POC Rapid COVID 08/18/2023 Negative  Negative Final     Acceptable 08/18/2023 Yes   Final    POC Molecular Influenza A Ag 08/18/2023 Negative  Negative, Not Reported Final    POC Molecular Influenza B Ag 08/18/2023 Negative  Negative, Not Reported Final     Acceptable 08/18/2023 Yes   Final    POC PH 08/19/2023 7.342 (L)  7.35 - 7.45 Final    POC PCO2 08/19/2023 54.3 (H)  35 - 45 mmHg Final    POC PO2 08/19/2023 63 (L)  80 - 100 mmHg Final    POC HCO3 08/19/2023 29.4 (H)  24 - 28 mmol/L Final    POC BE 08/19/2023 4  -2 to 2 mmol/L Final    POC SATURATED O2 08/19/2023 90 (L)  95 - 100 % Final    POC TCO2 08/19/2023 31 (H)  23 - 27 mmol/L Final    POC Hematocrit 08/19/2023 39  36 - 54 %PCV Final    POC HEMOGLOBIN 08/19/2023 13   g/dL Final    Rate 08/19/2023 20   Final    Sample 08/19/2023 ARTERIAL   Final    Site 08/19/2023 RR   Final    Allens Test 08/19/2023 Pass   Final    DelSys 08/19/2023 CPAP/BiPAP   Final    Mode 08/19/2023 BiPAP   Final    FiO2 08/19/2023 30   Final    IP 08/19/2023 12   Final    EP 08/19/2023 6   Final    TSH 08/19/2023 7.439 (H)  0.400 - 4.000 uIU/mL Final    Troponin I 08/19/2023 0.037 (H)  0.000 - 0.026 ng/mL Final    Comment: The reference interval for Troponin I represents the 99th percentile   cutoff   for our facility and is consistent with 3rd generation assay   performance.      Free T4 08/19/2023 1.05  0.71 - 1.51 ng/dL Final   Lab Visit on 08/08/2023   Component Date Value Ref Range Status    Sodium 08/08/2023 140  136 - 145 mmol/L Final    Potassium 08/08/2023 5.2 (H)  3.5 - 5.1 mmol/L Final    Chloride 08/08/2023 106  95 - 110 mmol/L Final    CO2 08/08/2023 25  23 - 29 mmol/L Final    Glucose 08/08/2023 92  70 - 110 mg/dL Final    BUN 08/08/2023 43 (H)  8 - 23 mg/dL Final    Creatinine 08/08/2023 2.5 (H)  0.5 - 1.4 mg/dL Final    Calcium 08/08/2023 9.8  8.7 - 10.5 mg/dL Final    Total Protein 08/08/2023 7.0  6.0 - 8.4 g/dL Final    Albumin 08/08/2023 3.9  3.5 - 5.2 g/dL Final    Total Bilirubin 08/08/2023 0.3  0.1 - 1.0 mg/dL Final    Comment: For infants and newborns, interpretation of results should be based  on gestational age, weight and in agreement with clinical  observations.    Premature Infant recommended reference ranges:  Up to 24 hours.............<8.0 mg/dL  Up to 48 hours............<12.0 mg/dL  3-5 days..................<15.0 mg/dL  6-29 days.................<15.0 mg/dL      Alkaline Phosphatase 08/08/2023 45 (L)  55 - 135 U/L Final    AST 08/08/2023 33  10 - 40 U/L Final    ALT 08/08/2023 29  10 - 44 U/L Final    eGFR 08/08/2023 27.8 (A)  >60 mL/min/1.73 m^2 Final    Anion Gap 08/08/2023 9  8 - 16 mmol/L Final    WBC 08/08/2023 6.23  3.90 - 12.70 K/uL Final    RBC 08/08/2023 4.18 (L)   4.60 - 6.20 M/uL Final    Hemoglobin 08/08/2023 12.7 (L)  14.0 - 18.0 g/dL Final    Hematocrit 08/08/2023 40.4  40.0 - 54.0 % Final    MCV 08/08/2023 97  82 - 98 fL Final    MCH 08/08/2023 30.4  27.0 - 31.0 pg Final    MCHC 08/08/2023 31.4 (L)  32.0 - 36.0 g/dL Final    RDW 08/08/2023 14.4  11.5 - 14.5 % Final    Platelets 08/08/2023 327  150 - 450 K/uL Final    MPV 08/08/2023 10.8  9.2 - 12.9 fL Final    Immature Granulocytes 08/08/2023 0.3  0.0 - 0.5 % Final    Gran # (ANC) 08/08/2023 4.6  1.8 - 7.7 K/uL Final    Immature Grans (Abs) 08/08/2023 0.02  0.00 - 0.04 K/uL Final    Comment: Mild elevation in immature granulocytes is non specific and   can be seen in a variety of conditions including stress response,   acute inflammation, trauma and pregnancy. Correlation with other   laboratory and clinical findings is essential.      Lymph # 08/08/2023 1.0  1.0 - 4.8 K/uL Final    Mono # 08/08/2023 0.5  0.3 - 1.0 K/uL Final    Eos # 08/08/2023 0.2  0.0 - 0.5 K/uL Final    Baso # 08/08/2023 0.05  0.00 - 0.20 K/uL Final    nRBC 08/08/2023 0  0 /100 WBC Final    Gran % 08/08/2023 73.2 (H)  38.0 - 73.0 % Final    Lymph % 08/08/2023 15.2 (L)  18.0 - 48.0 % Final    Mono % 08/08/2023 7.9  4.0 - 15.0 % Final    Eosinophil % 08/08/2023 2.6  0.0 - 8.0 % Final    Basophil % 08/08/2023 0.8  0.0 - 1.9 % Final    Differential Method 08/08/2023 Automated   Final   Admission on 07/21/2023, Discharged on 07/23/2023   Component Date Value Ref Range Status    WBC 07/21/2023 3.85 (L)  3.90 - 12.70 K/uL Final    RBC 07/21/2023 4.79  4.60 - 6.20 M/uL Final    Hemoglobin 07/21/2023 14.4  14.0 - 18.0 g/dL Final    Hematocrit 07/21/2023 45.0  40.0 - 54.0 % Final    MCV 07/21/2023 94  82 - 98 fL Final    MCH 07/21/2023 30.1  27.0 - 31.0 pg Final    MCHC 07/21/2023 32.0  32.0 - 36.0 g/dL Final    RDW 07/21/2023 14.4  11.5 - 14.5 % Final    Platelets 07/21/2023 185  150 - 450 K/uL Final    MPV 07/21/2023 11.1  9.2 - 12.9 fL Final    Immature  Granulocytes 07/21/2023 0.3  0.0 - 0.5 % Final    Gran # (ANC) 07/21/2023 2.6  1.8 - 7.7 K/uL Final    Immature Grans (Abs) 07/21/2023 0.01  0.00 - 0.04 K/uL Final    Comment: Mild elevation in immature granulocytes is non specific and   can be seen in a variety of conditions including stress response,   acute inflammation, trauma and pregnancy. Correlation with other   laboratory and clinical findings is essential.      Lymph # 07/21/2023 0.8 (L)  1.0 - 4.8 K/uL Final    Mono # 07/21/2023 0.4  0.3 - 1.0 K/uL Final    Eos # 07/21/2023 0.1  0.0 - 0.5 K/uL Final    Baso # 07/21/2023 0.01  0.00 - 0.20 K/uL Final    nRBC 07/21/2023 0  0 /100 WBC Final    Gran % 07/21/2023 66.3  38.0 - 73.0 % Final    Lymph % 07/21/2023 20.3  18.0 - 48.0 % Final    Mono % 07/21/2023 11.2  4.0 - 15.0 % Final    Eosinophil % 07/21/2023 1.6  0.0 - 8.0 % Final    Basophil % 07/21/2023 0.3  0.0 - 1.9 % Final    Differential Method 07/21/2023 Automated   Final    Sodium 07/21/2023 138  136 - 145 mmol/L Final    Potassium 07/21/2023 5.0  3.5 - 5.1 mmol/L Final    Specimen slightly hemolyzed    Chloride 07/21/2023 102  95 - 110 mmol/L Final    CO2 07/21/2023 22 (L)  23 - 29 mmol/L Final    Glucose 07/21/2023 93  70 - 110 mg/dL Final    BUN 07/21/2023 39 (H)  8 - 23 mg/dL Final    Creatinine 07/21/2023 2.7 (H)  0.5 - 1.4 mg/dL Final    Calcium 07/21/2023 9.9  8.7 - 10.5 mg/dL Final    Total Protein 07/21/2023 7.6  6.0 - 8.4 g/dL Final    Albumin 07/21/2023 4.1  3.5 - 5.2 g/dL Final    Total Bilirubin 07/21/2023 0.4  0.1 - 1.0 mg/dL Final    Comment: For infants and newborns, interpretation of results should be based  on gestational age, weight and in agreement with clinical  observations.    Premature Infant recommended reference ranges:  Up to 24 hours.............<8.0 mg/dL  Up to 48 hours............<12.0 mg/dL  3-5 days..................<15.0 mg/dL  6-29 days.................<15.0 mg/dL      Alkaline Phosphatase 07/21/2023 48 (L)  55 - 135  U/L Final    AST 07/21/2023 47 (H)  10 - 40 U/L Final    ALT 07/21/2023 39  10 - 44 U/L Final    eGFR 07/21/2023 25 (A)  >60 mL/min/1.73 m^2 Final    Anion Gap 07/21/2023 14  8 - 16 mmol/L Final    Specimen UA 07/21/2023 Urine, Clean Catch   Final    Color, UA 07/21/2023 Red (A)  Yellow, Straw, Bri Final    Appearance, UA 07/21/2023 Cloudy (A)  Clear Final    pH, UA 07/21/2023 7.0  5.0 - 8.0 Final    Specific Gravity, UA 07/21/2023 1.020  1.005 - 1.030 Final    Protein, UA 07/21/2023 3+ (A)  Negative Final    Comment: Recommend a 24 hour urine protein or a urine   protein/creatinine ratio if globulin induced proteinuria is  clinically suspected.      Glucose, UA 07/21/2023 Negative  Negative Final    Ketones, UA 07/21/2023 Negative  Negative Final    Bilirubin (UA) 07/21/2023 Negative  Negative Final    Occult Blood UA 07/21/2023 3+ (A)  Negative Final    Nitrite, UA 07/21/2023 Negative  Negative Final    Urobilinogen, UA 07/21/2023 Negative  <2.0 EU/dL Final    Leukocytes, UA 07/21/2023 2+ (A)  Negative Final    Prothrombin Time 07/21/2023 11.1  9.0 - 12.5 sec Final    INR 07/21/2023 1.0  0.8 - 1.2 Final    Comment: Coumadin Therapy:  2.0 - 3.0 for INR for all indicators except mechanical heart valves  and antiphospholipid syndromes which should use 2.5 - 3.5.  LOT^040^PT Inn^[id number]      aPTT 07/21/2023 31.1  21.0 - 32.0 sec Final    Comment: Refer to local heparin nomogram for intensity/dose specific   therapeutic   range.  LOT^050^APTT FSL^[id number]      RBC, UA 07/21/2023 >100 (H)  0 - 4 /hpf Final    WBC, UA 07/21/2023 3  0 - 5 /hpf Final    Bacteria 07/21/2023 Rare  None-Occ /hpf Final    Hyaline Casts, UA 07/21/2023 0  0-1/lpf /lpf Final    Microscopic Comment 07/21/2023 SEE COMMENT   Final    Comment: Other formed elements not mentioned in the report are not   present in the microscopic examination.       Sodium 07/22/2023 140  136 - 145 mmol/L Final    Potassium 07/22/2023 4.8  3.5 - 5.1 mmol/L Final     Chloride 07/22/2023 106  95 - 110 mmol/L Final    CO2 07/22/2023 24  23 - 29 mmol/L Final    Glucose 07/22/2023 84  70 - 110 mg/dL Final    BUN 07/22/2023 37 (H)  8 - 23 mg/dL Final    Creatinine 07/22/2023 2.4 (H)  0.5 - 1.4 mg/dL Final    Calcium 07/22/2023 9.5  8.7 - 10.5 mg/dL Final    Anion Gap 07/22/2023 10  8 - 16 mmol/L Final    eGFR 07/22/2023 29 (A)  >60 mL/min/1.73 m^2 Final    Magnesium 07/22/2023 1.8  1.6 - 2.6 mg/dL Final    WBC 07/22/2023 3.96  3.90 - 12.70 K/uL Final    RBC 07/22/2023 4.64  4.60 - 6.20 M/uL Final    Hemoglobin 07/22/2023 14.1  14.0 - 18.0 g/dL Final    Hematocrit 07/22/2023 44.9  40.0 - 54.0 % Final    MCV 07/22/2023 97  82 - 98 fL Final    MCH 07/22/2023 30.4  27.0 - 31.0 pg Final    MCHC 07/22/2023 31.4 (L)  32.0 - 36.0 g/dL Final    RDW 07/22/2023 14.2  11.5 - 14.5 % Final    Platelets 07/22/2023 171  150 - 450 K/uL Final    MPV 07/22/2023 10.9  9.2 - 12.9 fL Final    Immature Granulocytes 07/22/2023 0.3  0.0 - 0.5 % Final    Gran # (ANC) 07/22/2023 3.1  1.8 - 7.7 K/uL Final    Immature Grans (Abs) 07/22/2023 0.01  0.00 - 0.04 K/uL Final    Comment: Mild elevation in immature granulocytes is non specific and   can be seen in a variety of conditions including stress response,   acute inflammation, trauma and pregnancy. Correlation with other   laboratory and clinical findings is essential.      Lymph # 07/22/2023 0.5 (L)  1.0 - 4.8 K/uL Final    Mono # 07/22/2023 0.3  0.3 - 1.0 K/uL Final    Eos # 07/22/2023 0.0  0.0 - 0.5 K/uL Final    Baso # 07/22/2023 0.01  0.00 - 0.20 K/uL Final    nRBC 07/22/2023 0  0 /100 WBC Final    Gran % 07/22/2023 77.2 (H)  38.0 - 73.0 % Final    Lymph % 07/22/2023 13.6 (L)  18.0 - 48.0 % Final    Mono % 07/22/2023 8.1  4.0 - 15.0 % Final    Eosinophil % 07/22/2023 0.5  0.0 - 8.0 % Final    Basophil % 07/22/2023 0.3  0.0 - 1.9 % Final    Differential Method 07/22/2023 Automated   Final    Sodium 07/23/2023 140  136 - 145 mmol/L Final    Potassium  07/23/2023 4.2  3.5 - 5.1 mmol/L Final    Chloride 07/23/2023 108  95 - 110 mmol/L Final    CO2 07/23/2023 21 (L)  23 - 29 mmol/L Final    Glucose 07/23/2023 78  70 - 110 mg/dL Final    BUN 07/23/2023 28 (H)  8 - 23 mg/dL Final    Creatinine 07/23/2023 2.0 (H)  0.5 - 1.4 mg/dL Final    Calcium 07/23/2023 9.2  8.7 - 10.5 mg/dL Final    Anion Gap 07/23/2023 11  8 - 16 mmol/L Final    eGFR 07/23/2023 36 (A)  >60 mL/min/1.73 m^2 Final    Magnesium 07/23/2023 1.6  1.6 - 2.6 mg/dL Final    WBC 07/23/2023 4.41  3.90 - 12.70 K/uL Final    RBC 07/23/2023 4.24 (L)  4.60 - 6.20 M/uL Final    Hemoglobin 07/23/2023 13.1 (L)  14.0 - 18.0 g/dL Final    Hematocrit 07/23/2023 40.5  40.0 - 54.0 % Final    MCV 07/23/2023 96  82 - 98 fL Final    MCH 07/23/2023 30.9  27.0 - 31.0 pg Final    MCHC 07/23/2023 32.3  32.0 - 36.0 g/dL Final    RDW 07/23/2023 13.9  11.5 - 14.5 % Final    Platelets 07/23/2023 146 (L)  150 - 450 K/uL Final    MPV 07/23/2023 10.7  9.2 - 12.9 fL Final    Immature Granulocytes 07/23/2023 0.2  0.0 - 0.5 % Final    Gran # (ANC) 07/23/2023 3.4  1.8 - 7.7 K/uL Final    Immature Grans (Abs) 07/23/2023 0.01  0.00 - 0.04 K/uL Final    Comment: Mild elevation in immature granulocytes is non specific and   can be seen in a variety of conditions including stress response,   acute inflammation, trauma and pregnancy. Correlation with other   laboratory and clinical findings is essential.      Lymph # 07/23/2023 0.6 (L)  1.0 - 4.8 K/uL Final    Mono # 07/23/2023 0.4  0.3 - 1.0 K/uL Final    Eos # 07/23/2023 0.0  0.0 - 0.5 K/uL Final    Baso # 07/23/2023 0.01  0.00 - 0.20 K/uL Final    nRBC 07/23/2023 0  0 /100 WBC Final    Gran % 07/23/2023 77.1 (H)  38.0 - 73.0 % Final    Lymph % 07/23/2023 13.2 (L)  18.0 - 48.0 % Final    Mono % 07/23/2023 8.4  4.0 - 15.0 % Final    Eosinophil % 07/23/2023 0.9  0.0 - 8.0 % Final    Basophil % 07/23/2023 0.2  0.0 - 1.9 % Final    Differential Method 07/23/2023 Automated   Final    Urine  Culture, Routine 07/23/2023  (A)   Final                    Value:MYROIDES SPECIES  10,000 - 49,999 cfu/ml      Urine Culture, Routine 07/23/2023  (A)   Final                    Value:ENTEROCOCCUS FAECALIS  > 100,000 cfu/ml     Lab Visit on 05/19/2023   Component Date Value Ref Range Status    Sodium 05/19/2023 137  136 - 145 mmol/L Final    Potassium 05/19/2023 5.0  3.5 - 5.1 mmol/L Final    Chloride 05/19/2023 101  95 - 110 mmol/L Final    CO2 05/19/2023 24  23 - 29 mmol/L Final    Glucose 05/19/2023 97  70 - 110 mg/dL Final    BUN 05/19/2023 31 (H)  8 - 23 mg/dL Final    Creatinine 05/19/2023 2.4 (H)  0.5 - 1.4 mg/dL Final    Calcium 05/19/2023 10.2  8.7 - 10.5 mg/dL Final    Total Protein 05/19/2023 7.5  6.0 - 8.4 g/dL Final    Albumin 05/19/2023 4.0  3.5 - 5.2 g/dL Final    Total Bilirubin 05/19/2023 0.3  0.1 - 1.0 mg/dL Final    Comment: For infants and newborns, interpretation of results should be based  on gestational age, weight and in agreement with clinical  observations.    Premature Infant recommended reference ranges:  Up to 24 hours.............<8.0 mg/dL  Up to 48 hours............<12.0 mg/dL  3-5 days..................<15.0 mg/dL  6-29 days.................<15.0 mg/dL      Alkaline Phosphatase 05/19/2023 49 (L)  55 - 135 U/L Final    AST 05/19/2023 45 (H)  10 - 40 U/L Final    ALT 05/19/2023 41  10 - 44 U/L Final    Anion Gap 05/19/2023 12  8 - 16 mmol/L Final    eGFR 05/19/2023 29.2 (A)  >60 mL/min/1.73 m^2 Final    WBC 05/19/2023 6.23  3.90 - 12.70 K/uL Final    RBC 05/19/2023 4.40 (L)  4.60 - 6.20 M/uL Final    Hemoglobin 05/19/2023 13.2 (L)  14.0 - 18.0 g/dL Final    Hematocrit 05/19/2023 41.7  40.0 - 54.0 % Final    MCV 05/19/2023 95  82 - 98 fL Final    MCH 05/19/2023 30.0  27.0 - 31.0 pg Final    MCHC 05/19/2023 31.7 (L)  32.0 - 36.0 g/dL Final    RDW 05/19/2023 13.2  11.5 - 14.5 % Final    Platelets 05/19/2023 272  150 - 450 K/uL Final    MPV 05/19/2023 9.8  9.2 - 12.9 fL Final    Immature  Granulocytes 05/19/2023 0.2  0.0 - 0.5 % Final    Gran # (ANC) 05/19/2023 4.3  1.8 - 7.7 K/uL Final    Immature Grans (Abs) 05/19/2023 0.01  0.00 - 0.04 K/uL Final    Comment: Mild elevation in immature granulocytes is non specific and   can be seen in a variety of conditions including stress response,   acute inflammation, trauma and pregnancy. Correlation with other   laboratory and clinical findings is essential.      Lymph # 05/19/2023 0.9 (L)  1.0 - 4.8 K/uL Final    Mono # 05/19/2023 0.7  0.3 - 1.0 K/uL Final    Eos # 05/19/2023 0.3  0.0 - 0.5 K/uL Final    Baso # 05/19/2023 0.06  0.00 - 0.20 K/uL Final    nRBC 05/19/2023 0  0 /100 WBC Final    Gran % 05/19/2023 68.6  38.0 - 73.0 % Final    Lymph % 05/19/2023 13.6 (L)  18.0 - 48.0 % Final    Mono % 05/19/2023 11.1  4.0 - 15.0 % Final    Eosinophil % 05/19/2023 5.5  0.0 - 8.0 % Final    Basophil % 05/19/2023 1.0  0.0 - 1.9 % Final    Differential Method 05/19/2023 Automated   Final   Lab Visit on 05/19/2023   Component Date Value Ref Range Status    Cholesterol 05/19/2023 158  120 - 199 mg/dL Final    Comment: The National Cholesterol Education Program (NCEP) has set the  following guidelines (reference ranges) for Cholesterol:  Optimal.....................<200 mg/dL  Borderline High.............200-239 mg/dL  High........................> or = 240 mg/dL      Triglycerides 05/19/2023 69  30 - 150 mg/dL Final    Comment: The National Cholesterol Education Program (NCEP) has set the  following guidelines (reference values) for triglycerides:  Normal......................<150 mg/dL  Borderline High.............150-199 mg/dL  High........................200-499 mg/dL      HDL 05/19/2023 68  40 - 75 mg/dL Final    Comment: The National Cholesterol Education Program (NCEP) has set the  following guidelines (reference values) for HDL Cholesterol:  Low...............<40 mg/dL  Optimal...........>60 mg/dL      LDL Cholesterol 05/19/2023 76.2  63.0 - 159.0 mg/dL Final     Comment: The National Cholesterol Education Program (NCEP) has set the  following guidelines (reference values) for LDL Cholesterol:  Optimal.......................<130 mg/dL  Borderline High...............130-159 mg/dL  High..........................160-189 mg/dL  Very High.....................>190 mg/dL      HDL/Cholesterol Ratio 05/19/2023 43.0  20.0 - 50.0 % Final    Total Cholesterol/HDL Ratio 05/19/2023 2.3  2.0 - 5.0 Final    Non-HDL Cholesterol 05/19/2023 90  mg/dL Final    Comment: Risk category and Non-HDL cholesterol goals:  Coronary heart disease (CHD)or equivalent (10-year risk of CHD >20%):  Non-HDL cholesterol goal     <130 mg/dL  Two or more CHD risk factors and 10-year risk of CHD <= 20%:  Non-HDL cholesterol goal     <160 mg/dL  0 to 1 CHD risk factor:  Non-HDL cholesterol goal     <190 mg/dL      PSA, Screen 05/19/2023 <0.01  0.00 - 4.00 ng/mL Final    Comment: The testing method is a chemiluminescent microparticle immunoassay   manufactured by [employer] and performed on the [employer]   or   [employer] system. Values obtained with different assay manufacturers   for   methods may be different and cannot be used interchangeably.  PSA Expected levels:  Hormonal Therapy: <0.05 ng/ml  Prostatectomy: <0.01 ng/ml  Radiation Therapy: <1.00 ng/ml      TSH 05/19/2023 4.374 (H)  0.400 - 4.000 uIU/mL Final    PTH, Intact 05/19/2023 77.4 (H)  9.0 - 77.0 pg/mL Final    PSA, Screen 05/19/2023 <0.01  0.00 - 4.00 ng/mL Final    Comment: The testing method is a chemiluminescent microparticle immunoassay   manufactured by [employer] and performed on the [employer]   or   [employer] system. Values obtained with different assay manufacturers   for   methods may be different and cannot be used interchangeably.  PSA Expected levels:  Hormonal Therapy: <0.05 ng/ml  Prostatectomy: <0.01 ng/ml  Radiation Therapy: <1.00 ng/ml      PTH, Intact 05/19/2023 77.4 (H)  9.0 - 77.0 pg/mL Final    TSH 05/19/2023  4.374 (H)  0.400 - 4.000 uIU/mL Final    Cholesterol 05/19/2023 158  120 - 199 mg/dL Final    Comment: The National Cholesterol Education Program (NCEP) has set the  following guidelines (reference ranges) for Cholesterol:  Optimal.....................<200 mg/dL  Borderline High.............200-239 mg/dL  High........................> or = 240 mg/dL      Triglycerides 05/19/2023 69  30 - 150 mg/dL Final    Comment: The National Cholesterol Education Program (NCEP) has set the  following guidelines (reference values) for triglycerides:  Normal......................<150 mg/dL  Borderline High.............150-199 mg/dL  High........................200-499 mg/dL      HDL 05/19/2023 68  40 - 75 mg/dL Final    Comment: The National Cholesterol Education Program (NCEP) has set the  following guidelines (reference values) for HDL Cholesterol:  Low...............<40 mg/dL  Optimal...........>60 mg/dL      LDL Cholesterol 05/19/2023 76.2  63.0 - 159.0 mg/dL Final    Comment: The National Cholesterol Education Program (NCEP) has set the  following guidelines (reference values) for LDL Cholesterol:  Optimal.......................<130 mg/dL  Borderline High...............130-159 mg/dL  High..........................160-189 mg/dL  Very High.....................>190 mg/dL      HDL/Cholesterol Ratio 05/19/2023 43.0  20.0 - 50.0 % Final    Total Cholesterol/HDL Ratio 05/19/2023 2.3  2.0 - 5.0 Final    Non-HDL Cholesterol 05/19/2023 90  mg/dL Final    Comment: Risk category and Non-HDL cholesterol goals:  Coronary heart disease (CHD)or equivalent (10-year risk of CHD >20%):  Non-HDL cholesterol goal     <130 mg/dL  Two or more CHD risk factors and 10-year risk of CHD <= 20%:  Non-HDL cholesterol goal     <160 mg/dL  0 to 1 CHD risk factor:  Non-HDL cholesterol goal     <190 mg/dL      Protein S Activity 05/19/2023 >150 (H)  65 - 150 % Final    Comment: Anticoagulants (for example oral direct thrombin inhibitors  like dabigatran,  anti-Xa inhibitors like rivaroxaban,  apixaban or edoxaban), heparin levels greater than 1 U/mL,  inhibitors of the APTT test system (for example lupus  anticoagulant), inhibitors of bovine factor V (for example  antibodies that may arise in patients treated with certain  bovine topical thrombin preparations) may produce a falsely  normal or elevated protein S activity result.  Suggest   clinical correlation and if indicated consider repeat assay  of protein S activity and/or antigen in the absence of  anticoagulation therapy.  Increased free protein S activity is of unknown hemostatic  significance.    -------------------ADDITIONAL INFORMATION-------------------  This test has been modified from the 's   instructions. Its performance characteristics were   determined by [facility] in a manner consistent with   CLIA requirements. This test has not been cleared or   approved by the [place] Food and D                           rug Administration.    Test Performed by:  [place] [address]  : Joo Kahn M.D. Ph.D.; CLIA# [id number]      Protein C Activity 05/19/2023 144  70 - 150 % Final    Comment: -------------------ADDITIONAL INFORMATION-------------------  This test has been modified from the 's   instructions. Its performance characteristics were   determined by [facility] in a manner consistent with   CLIA requirements. This test has not been cleared or   approved by the U.S. Food and Drug Administration.    Test Performed by:  [place] [id number]  : Joo Kahn M.D. Ph.D.; CLIA# [id number]      Free T4 05/19/2023 1.22  0.71 - 1.51 ng/dL Final   Admission on 04/21/2023, Discharged on 04/21/2023   Component Date Value Ref Range Status    WBC 04/21/2023 3.85 (L)  3.90 - 12.70 K/uL Final    RBC 04/21/2023 4.68  4.60 -  6.20 M/uL Final    Hemoglobin 04/21/2023 14.4  14.0 - 18.0 g/dL Final    Hematocrit 04/21/2023 44.6  40.0 - 54.0 % Final    MCV 04/21/2023 95  82 - 98 fL Final    MCH 04/21/2023 30.8  27.0 - 31.0 pg Final    MCHC 04/21/2023 32.3  32.0 - 36.0 g/dL Final    RDW 04/21/2023 13.2  11.5 - 14.5 % Final    Platelets 04/21/2023 173  150 - 450 K/uL Final    MPV 04/21/2023 10.5  9.2 - 12.9 fL Final    Immature Granulocytes 04/21/2023 0.3  0.0 - 0.5 % Final    Gran # (ANC) 04/21/2023 2.8  1.8 - 7.7 K/uL Final    Immature Grans (Abs) 04/21/2023 0.01  0.00 - 0.04 K/uL Final    Comment: Mild elevation in immature granulocytes is non specific and   can be seen in a variety of conditions including stress response,   acute inflammation, trauma and pregnancy. Correlation with other   laboratory and clinical findings is essential.      Lymph # 04/21/2023 0.6 (L)  1.0 - 4.8 K/uL Final    Mono # 04/21/2023 0.4  0.3 - 1.0 K/uL Final    Eos # 04/21/2023 0.0  0.0 - 0.5 K/uL Final    Baso # 04/21/2023 0.04  0.00 - 0.20 K/uL Final    nRBC 04/21/2023 0  0 /100 WBC Final    Gran % 04/21/2023 71.5  38.0 - 73.0 % Final    Lymph % 04/21/2023 15.8 (L)  18.0 - 48.0 % Final    Mono % 04/21/2023 11.4  4.0 - 15.0 % Final    Eosinophil % 04/21/2023 0.0  0.0 - 8.0 % Final    Basophil % 04/21/2023 1.0  0.0 - 1.9 % Final    Differential Method 04/21/2023 Automated   Final    Sodium 04/21/2023 133 (L)  136 - 145 mmol/L Final    Potassium 04/21/2023 5.4 (H)  3.5 - 5.1 mmol/L Final    Chloride 04/21/2023 100  95 - 110 mmol/L Final    CO2 04/21/2023 20 (L)  23 - 29 mmol/L Final    Glucose 04/21/2023 91  70 - 110 mg/dL Final    BUN 04/21/2023 40 (H)  8 - 23 mg/dL Final    Creatinine 04/21/2023 2.8 (H)  0.5 - 1.4 mg/dL Final    Calcium 04/21/2023 9.6  8.7 - 10.5 mg/dL Final    Total Protein 04/21/2023 7.7  6.0 - 8.4 g/dL Final    Albumin 04/21/2023 3.7  3.5 - 5.2 g/dL Final    Total Bilirubin 04/21/2023 0.3  0.1 - 1.0 mg/dL Final    Comment: For infants and  newborns, interpretation of results should be based  on gestational age, weight and in agreement with clinical  observations.    Premature Infant recommended reference ranges:  Up to 24 hours.............<8.0 mg/dL  Up to 48 hours............<12.0 mg/dL  3-5 days..................<15.0 mg/dL  6-29 days.................<15.0 mg/dL      Alkaline Phosphatase 04/21/2023 42 (L)  55 - 135 U/L Final    AST 04/21/2023 161 (H)  10 - 40 U/L Final    ALT 04/21/2023 97 (H)  10 - 44 U/L Final    Anion Gap 04/21/2023 13  8 - 16 mmol/L Final    eGFR 04/21/2023 24 (A)  >60 mL/min/1.73 m^2 Final    Specimen UA 04/21/2023 Urine, Unspecified   Final    urostomy    Color, UA 04/21/2023 Yellow  Yellow, Straw, Bri Final    Appearance, UA 04/21/2023 Clear  Clear Final    pH, UA 04/21/2023 7.0  5.0 - 8.0 Final    Specific Gravity, UA 04/21/2023 1.010  1.005 - 1.030 Final    Protein, UA 04/21/2023 1+ (A)  Negative Final    Comment: Recommend a 24 hour urine protein or a urine   protein/creatinine ratio if globulin induced proteinuria is  clinically suspected.      Glucose, UA 04/21/2023 Negative  Negative Final    Ketones, UA 04/21/2023 Negative  Negative Final    Bilirubin (UA) 04/21/2023 Negative  Negative Final    Occult Blood UA 04/21/2023 2+ (A)  Negative Final    Nitrite, UA 04/21/2023 Negative  Negative Final    Urobilinogen, UA 04/21/2023 Negative  <2.0 EU/dL Final    Leukocytes, UA 04/21/2023 3+ (A)  Negative Final    Lipase 04/21/2023 64 (H)  4 - 60 U/L Final    Lactate (Lactic Acid) 04/21/2023 0.9  0.5 - 2.2 mmol/L Final    Comment: Falsely low lactic acid results can be found in samples   containing >=13.0 mg/dL total bilirubin and/or >=3.5 mg/dL   direct bilirubin.      RBC, UA 04/21/2023 12 (H)  0 - 4 /hpf Final    WBC, UA 04/21/2023 49 (H)  0 - 5 /hpf Final    Bacteria 04/21/2023 Moderate (A)  None-Occ /hpf Final    Yeast, UA 04/21/2023 None  None Final    Squam Epithel, UA 04/21/2023 3  /hpf Final    Hyaline Casts, UA  04/21/2023 0  0-1/lpf /lpf Final    Microscopic Comment 04/21/2023 SEE COMMENT   Final    Comment: Other formed elements not mentioned in the report are not   present in the microscopic examination.       Urine Culture, Routine 04/21/2023  (A)   Final                    Value:PROVIDENCIA RETTGERI  >100,000 cfu/ml         EKG  EKG 08/25/2023 normal sinus rhythm normal rate nonspecific ST-T changes    Echo   Results for orders placed or performed during the hospital encounter of 08/25/23   Echo   Result Value Ref Range    BSA 1.83 m2    LVIDd 4.82 3.5 - 6.0 cm    LV Systolic Volume 71.46 mL    LV Systolic Volume Index 38.4 mL/m2    LVIDs 4.03 (A) 2.1 - 4.0 cm    LV Diastolic Volume 108.58 mL    LV Diastolic Volume Index 58.38 mL/m2    IVS 1.33 (A) 0.6 - 1.1 cm    LVOT diameter 2.29 cm    LVOT area 4.1 cm2    FS 16 (A) 28 - 44 %    Left Ventricle Relative Wall Thickness 0.55 cm    Posterior Wall 1.33 (A) 0.6 - 1.1 cm    LV mass 255.63 g    LV Mass Index 137 g/m2    MV Peak E Abraham 0.50 m/s    TDI LATERAL 0.06 m/s    TDI SEPTAL 0.06 m/s    E/E' ratio 8.33 m/s    MV Peak A Abraham 0.74 m/s    TR Max Abraham 2.61 m/s    E/A ratio 0.68     E wave deceleration time 231.42 msec    LV SEPTAL E/E' RATIO 8.33 m/s    LV LATERAL E/E' RATIO 8.33 m/s    LA size 3.08 cm    Left Atrium Major Axis 3.82 cm    Left Atrium Minor Axis 4.28 cm    RVDD 3.06 cm    RV S' 10.75 cm/s    TAPSE 1.75 cm    RA Major Axis 4.46 cm    MV mean gradient 1 mmHg    MV peak gradient 2 mmHg    MV stenosis pressure 1/2 time 67.11 ms    MV valve area p 1/2 method 3.28 cm2    MV VTI 22.3 cm    Triscuspid Valve Regurgitation Peak Gradient 27 mmHg    PV PEAK VELOCITY 0.67 m/s    PV peak gradient 2 mmHg    Sinus 3.53 cm    STJ 2.54 cm    IVC diameter 1.72 cm    Mean e' 0.06 m/s    ZLVIDS 1.84     ZLVIDD -0.71     LA Volume Index 21.6 mL/m2    LA volume 40.16 cm3    LA WIDTH 3.8 cm    RA Width 3.2 cm    TV resting pulmonary artery pressure 35 mmHg    RV TB RVSP 11 mmHg     Est. RA pres 8 mmHg    Narrative      Left Ventricle: The left ventricle is normal in size. Mildly increased   wall thickness. There is mild concentric hypertrophy. Mild global   hypokinesis present. There is mildly reduced systolic function with a   visually estimated ejection fraction of 45 - 50%. Grade I diastolic   dysfunction.    Right Ventricle: Normal right ventricular cavity size. Wall thickness   is normal. Right ventricle wall motion  is normal. Systolic function is   normal.    Tricuspid Valve: There is mild regurgitation.    Pulmonary Artery: The estimated pulmonary artery systolic pressure is   35 mmHg.         Imaging  CT Renal Stone Study ABD Pelvis WO    Result Date: 4/21/2023  EXAMINATION: CT RENAL STONE STUDY ABD PELVIS WO CLINICAL HISTORY: Flank pain, kidney stone suspected;right side; TECHNIQUE: Low dose axial images, sagittal and coronal reformations were obtained from the lung bases to the pubic symphysis.  Contrast was not administered. COMPARISON: CT renal stone study 07/12/2022, renal ultrasound 07/08/2021 FINDINGS: Imaged lung bases are clear.  Base of the heart is normal in size without significant pericardial fluid noting multi-vessel coronary arterial and aortic annular calcifications. Remote cholecystectomy.  No biliary ductal dilatation.  Liver is normal in size with homogeneous parenchymal attenuation.  Spleen is normal in size noting several scattered punctate parenchymal calcifications likely sequela of prior granulomatous disease.  Noncontrast appearance of the pancreas, stomach, duodenum and bilateral adrenal glands are within normal limits.  Few small accessory splenules noted. Bilateral kidneys are stable in overall size, shape and location with grossly similar areas of cortical thinning and scarring and renal cortical lobulation with overall mild atrophy.  Advanced bilateral renal atherosclerotic vascular calcifications noted.  There are additional several scattered  subcentimeter suspected nephroliths at each kidney.  No hydronephrosis.  There is relatively symmetric bilateral nonspecific perinephric stranding similar to prior.  Few scattered subcentimeter hypoattenuating cortical foci at each kidney which are too small to characterize. Remote operative change of cystectomy with right lower quadrant ileal conduit.  The bilateral ureters are not significantly dilated. No pelvic mass seen.  Few scattered pelvic phleboliths noted. Appendix is not identified; however, no right lower quadrant inflammatory change.  Several scattered colonic diverticula without diverticulitis.  No evidence of bowel obstruction or acute inflammation.  No pneumatosis or portal venous gas. No ascites, free air or lymphadenopathy by CT criteria. Scattered advanced/severe calcific atherosclerosis of the abdominal aorta extending into its mesenteric and iliac branches.  Abdominal aorta is ectatic but nonaneurysmal.  Grossly stable fusiform aneurysmal dilatation of the right common iliac artery measuring up to 3.4 cm. Operative changes of right lower quadrant parastomal and bilateral inguinal hernia repair similar to prior. Osseous structures appear stable without acute process seen.     1. Suspected bilateral nonobstructing nephrolithiasis.  Otherwise no acute process seen within the abdomen or pelvis on this noncontrast study. 2. Remote operative change of cystectomy with right lower quadrant ileal conduit.  Resolution of previous right-sided hydronephrosis. 3. Prior cholecystectomy. 4. Diverticulosis coli. 5. 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 6. Grossly stable additional findings as above. Electronically signed by: Lake Hickey MD Date:    04/21/2023 Time:    14:34      Prior coronary angiogram / intervention:  No known    Assessment and Plan  1. Iliac artery aneurysm  Sent to vascular surgery  Control risk factors    2.  Hypertriglyceridemia  The patient is on fenofibrate    3. Essential hypertension  Continue Coreg  Blood pressure log    4. Coronary artery calcification  ASA 81  Continue pravastatin    5. Acute deep vein thrombosis (DVT) of axillary vein of right upper extremity  Anticoagulation for a minimum of 3 months  Could potentially consider provoked due to inactivity  However in light of prior malignancy will refer to Hematology/Oncology to comment on duration of treatment  - Ambulatory referral/consult to Hematology / Oncology; Future    6. Malignant neoplasm of urinary bladder, unspecified site  Refer to hematology oncology  - Ambulatory referral/consult to Hematology / Oncology; Future    7. PVD (peripheral vascular disease)  No records consider lower extremity ultrasound next visit  Referred to vascular surgery for iliac aneurysm    8.  Chest discomfort with elevated troponin and borderline EF  MPI negative    9. pSVT/AT  See HPI Holter pending    Follow Up  3 month    Total professional time spent for the encounter: 30 minutes  Time was spent preparing to see the patient, reviewing results of prior testing, obtaining and/or reviewing separately obtained history, performing a medically appropriate examination and interview, counseling and educating the patient/family, ordering medications/tests/procedures, referring and communicating with other health care professionals, documenting clinical information in the electronic health record, and independently interpreting results.       John Paul Ayers MD, FACC, RPVI  Interventional Cardiology

## 2023-11-07 ENCOUNTER — TELEPHONE (OUTPATIENT)
Dept: CARDIOLOGY | Facility: CLINIC | Age: 63
End: 2023-11-07
Payer: MEDICARE

## 2023-11-07 NOTE — TELEPHONE ENCOUNTER
Attempted to contact patient, no answer, voice mail box not set up, unable to leave message.  Portal message sent.

## 2023-11-07 NOTE — TELEPHONE ENCOUNTER
----- Message from John Paul Ayers MD sent at 11/4/2023  7:35 PM CDT -----  Please let the patient know he has an abnormal heart rhythm coming from the top of his heart known as supraventricular tachycardia and I am sending him to electrophysiology

## 2023-11-10 ENCOUNTER — TELEPHONE (OUTPATIENT)
Dept: PAIN MEDICINE | Facility: CLINIC | Age: 63
End: 2023-11-10
Payer: MEDICARE

## 2023-11-10 NOTE — TELEPHONE ENCOUNTER
"Spoke with patient. Discussed a referral that was received from Dr Zeng's office to schedule an appointment with pain management for chronic pain. Patient stated that "he has been through all that crap before and it did not work". He was adamant that he did not want to schedule an appointment at this time. No further issues discussed.  "

## 2023-11-10 NOTE — TELEPHONE ENCOUNTER
----- Message from Hussain Fleming MA sent at 11/8/2023 12:48 PM CST -----  Regarding: FW: New Patient Appointment    ----- Message -----  From: Angelina Ford MA  Sent: 11/7/2023   9:40 AM CST  To: [facility] Staff  Subject: New Patient Appointment                          Dr. Zeng is referring the attached patient to you for evaluation and treatment for Chronic Pain. (Referral is in Epic System)    We appreciate your assistance in the patient's care and look forward to your findings and recommendations.  Please contact patient to set up an appointment.  If we can be of any assistance, please contact the office.        Sincerely,      Blade OLIVER

## 2023-11-20 ENCOUNTER — TELEPHONE (OUTPATIENT)
Dept: ELECTROPHYSIOLOGY | Facility: CLINIC | Age: 63
End: 2023-11-20
Payer: MEDICARE

## 2023-11-21 PROBLEM — I47.19 ATRIAL TACHYCARDIA: Status: ACTIVE | Noted: 2023-11-21

## 2023-11-21 PROBLEM — I47.10 SVT (SUPRAVENTRICULAR TACHYCARDIA): Status: ACTIVE | Noted: 2023-11-21

## 2023-11-27 PROBLEM — I21.4 NSTEMI (NON-ST ELEVATED MYOCARDIAL INFARCTION): Status: RESOLVED | Noted: 2023-08-22 | Resolved: 2023-11-27

## 2023-12-05 ENCOUNTER — OFFICE VISIT (OUTPATIENT)
Dept: VASCULAR SURGERY | Facility: CLINIC | Age: 63
End: 2023-12-05
Payer: MEDICARE

## 2023-12-05 VITALS
SYSTOLIC BLOOD PRESSURE: 131 MMHG | HEIGHT: 72 IN | BODY MASS INDEX: 19.68 KG/M2 | HEART RATE: 72 BPM | WEIGHT: 145.31 LBS | DIASTOLIC BLOOD PRESSURE: 73 MMHG

## 2023-12-05 DIAGNOSIS — I72.3 ANEURYSM OF RIGHT ILIAC ARTERY: Primary | ICD-10-CM

## 2023-12-05 DIAGNOSIS — I71.9 AORTIC ANEURYSM WITHOUT RUPTURE, UNSPECIFIED PORTION OF AORTA: ICD-10-CM

## 2023-12-05 PROCEDURE — 3078F DIAST BP <80 MM HG: CPT | Mod: CPTII,S$GLB,, | Performed by: NURSE PRACTITIONER

## 2023-12-05 PROCEDURE — 99215 OFFICE O/P EST HI 40 MIN: CPT | Mod: S$GLB,,, | Performed by: NURSE PRACTITIONER

## 2023-12-05 PROCEDURE — 99999 PR PBB SHADOW E&M-EST. PATIENT-LVL II: ICD-10-PCS | Mod: PBBFAC,,, | Performed by: NURSE PRACTITIONER

## 2023-12-05 PROCEDURE — 3075F SYST BP GE 130 - 139MM HG: CPT | Mod: CPTII,S$GLB,, | Performed by: NURSE PRACTITIONER

## 2023-12-05 PROCEDURE — 1101F PR PT FALLS ASSESS DOC 0-1 FALLS W/OUT INJ PAST YR: ICD-10-PCS | Mod: CPTII,S$GLB,, | Performed by: NURSE PRACTITIONER

## 2023-12-05 PROCEDURE — 3008F PR BODY MASS INDEX (BMI) DOCUMENTED: ICD-10-PCS | Mod: CPTII,S$GLB,, | Performed by: NURSE PRACTITIONER

## 2023-12-05 PROCEDURE — 99999 PR PBB SHADOW E&M-EST. PATIENT-LVL II: CPT | Mod: PBBFAC,,, | Performed by: NURSE PRACTITIONER

## 2023-12-05 PROCEDURE — 1101F PT FALLS ASSESS-DOCD LE1/YR: CPT | Mod: CPTII,S$GLB,, | Performed by: NURSE PRACTITIONER

## 2023-12-05 PROCEDURE — 3075F PR MOST RECENT SYSTOLIC BLOOD PRESS GE 130-139MM HG: ICD-10-PCS | Mod: CPTII,S$GLB,, | Performed by: NURSE PRACTITIONER

## 2023-12-05 PROCEDURE — 3288F FALL RISK ASSESSMENT DOCD: CPT | Mod: CPTII,S$GLB,, | Performed by: NURSE PRACTITIONER

## 2023-12-05 PROCEDURE — 3008F BODY MASS INDEX DOCD: CPT | Mod: CPTII,S$GLB,, | Performed by: NURSE PRACTITIONER

## 2023-12-05 PROCEDURE — 99215 PR OFFICE/OUTPT VISIT, EST, LEVL V, 40-54 MIN: ICD-10-PCS | Mod: S$GLB,,, | Performed by: NURSE PRACTITIONER

## 2023-12-05 PROCEDURE — 3078F PR MOST RECENT DIASTOLIC BLOOD PRESSURE < 80 MM HG: ICD-10-PCS | Mod: CPTII,S$GLB,, | Performed by: NURSE PRACTITIONER

## 2023-12-05 PROCEDURE — 3288F PR FALLS RISK ASSESSMENT DOCUMENTED: ICD-10-PCS | Mod: CPTII,S$GLB,, | Performed by: NURSE PRACTITIONER

## 2023-12-07 NOTE — PROGRESS NOTES
[facility]                         12/07/2023    HPI:  Blake Guillory is a 63 y.o. male with   Patient Active Problem List   Diagnosis    Insomnia    Lumbar facet arthropathy    Sacroiliitis    Spondylosis without myelopathy    DDD (degenerative disc disease)    History of bladder cancer    Hydronephrosis, bilateral    H/O primary malignant neoplasm of urinary bladder    Essential hypertension    Anemia of chronic disease    Moderate protein malnutrition    Chronic gout    Renal insufficiency    Body mass index (BMI) 20.0-20.9, adult    Personal history of bladder cancer    Acquired hypothyroidism    Hypoxemia    Severe malnutrition    Hydronephrosis    History of primary malignant neoplasm of urinary bladder    Hydronephrosis with urinary obstruction due to ureteral calculus    Chest pain    Centrilobular emphysema    Epigastric abdominal tenderness without rebound tenderness    S/P colonoscopy    Iliac artery aneurysm    Kidney stones    Hypertriglyceridemia    Nephrolithiasis    Kidney stone on left side    Hyperparathyroidism    Encounter for screening for malignant neoplasm of respiratory organs    Personal history of nicotine dependence    Lung nodule    Coronary artery calcification    Symptomatic cholelithiasis    Presence of urostomy    Obstructive chronic bronchitis without exacerbation    Chronic deep vein thrombosis (DVT) of axillary vein of right upper extremity    PVD (peripheral vascular disease)    Gross hematuria    Anxiety    Atrial tachycardia    SVT (supraventricular tachycardia)    being managed by PCP and specialists who is here today for evaluation of R ANTHONY aneurysm.  Pt has a history of bladder cancer who status post cystectomy with ileal conduit.  He had a revision of his ileal ureteral anastomoses several months after his cystectomy.  He developed recurrent ureteral strictures and he was managed with stents.  His last ureteral stent change  through the conduit was in October 2015.  Within days of the last ureteral stent change, he developed profuse diarrhea.  He saw Gastroenterology who performed a colonoscopy, who discovered that he had an ureteral stent in the colon.  CT scan showing that there was the stent indeed coming from the conduit into the colon. and is s/p exploratory laparotomy, lysis of adhesions, excision of ileal conduit to distal ileum fistula, small bowel resection, appendectomy, small bowel to small bowel anastomosis, replacement of ileal conduit 2015.  Pt is without complaints today.  No abd pain or pelvic pain.  Prev 2.9 cm on 4/2017 non contrasted CT A/P.  Pt states he has severe SHAFFER and is unable to climb one flight of stairs.    no MI  no Stroke  Tobacco use: quit 1/2018    2019:  Denies abdominal, pelvic and back pain.  C/o BLE heaviness at 1 block for a few months.  +tingling BLE.  No BLE edema.    2/2022:  No new complaints.    06/05/23: Pt without any complaints. Remains asymptomatic. Remain free from smoking. BP controlled, complaint on medications.     12/7/23: Pt without any complaints. Remains asymptomatic. BP controlled, complaint on medications.     Past Medical History:   Diagnosis Date    Anemia of chronic disease     Aneurysm of right common iliac artery     Arthritis     Blood transfusion     CAD (coronary artery disease)     Chest pain of uncertain etiology     CKD (chronic kidney disease), stage IV     COPD (chronic obstructive pulmonary disease)     Coronary artery calcification 8/2/2021    DDD (degenerative disc disease), lumbar     Frequency of urination     General anesthetics causing adverse effect in therapeutic use     pt states he wakes up very violently from anesthesia    GERD (gastroesophageal reflux disease)     Gout     History of bladder cancer     History of urostomy     Hydronephrosis     Hyperparathyroidism     Hypertension     Hypertriglyceridemia     Hypothyroidism (acquired)     Insomnia      Kidney stone     Limited joint range of motion right hip and leg    Lumbar facet arthropathy     Lumbar spondylosis     Mixed anxiety and depressive disorder     Neuropathy     Orchalgia     SHARMIN (obstructive sleep apnea)     Personal history of bladder cancer     PVD (peripheral vascular disease)     Sacroiliitis     Stricture or kinking of ureter     Ureteral stent displacement     Urinary retention     Vitamin D deficiency     Wears glasses      Past Surgical History:   Procedure Laterality Date    APPENDECTOMY  12/30/2015    Procedure: APPENDECTOMY;  Surgeon: CHAD Bo MD;  Location: [facility] OR;  Service: Urology;;    BALLOON DILATION OF URETER  01/22/2018    BOWEL RESECTION      COLONOSCOPY      CREATION OF URETEROILEAL CONDUIT Left 07/05/2013    CYSTOSCOPY      >1  episodes for bilat ureteral stent exchange    CYSTOSCOPY W/ URETERAL STENT PLACEMENT Right 01/22/2018    and 6/9/2015    CYSTOSCOPY W/ URETERAL STENT PLACEMENT Bilateral 10/07/2015    ESOPHAGOGASTRODUODENOSCOPY  04/12/2022    W/ BIOPSY    ESOPHAGOGASTRODUODENOSCOPY N/A 4/12/2022    Procedure: EGD (ESOPHAGOGASTRODUODENOSCOPY);  Surgeon: Tito Fan MD;  Location: [facility];  Service: Endoscopy;  Laterality: N/A;    EXPLORATORY LAPAROTOMY  07/05/2013    EXPLORATORY LAPAROTOMY W/ BOWEL RESECTION  12/30/2015    EXTRACORPOREAL SHOCK WAVE LITHOTRIPSY Right 06/07/2021    Procedure: LITHOTRIPSY, ESWL;  Surgeon: CHAD Bo MD;  Location: [facility] OR;  Service: Urology;  Laterality: Right;  RN PREOP 5/31/2021   VACCINATED    EXTRACORPOREAL SHOCK WAVE LITHOTRIPSY  06/07/2021    FRACTURE SURGERY      HAND SURGERY      HEMICOLECTOMY  12/30/2015    HIP SURGERY  2012    Rt hip septic    LAPAROSCOPIC CHOLECYSTECTOMY N/A 08/11/2021    Procedure: CHOLECYSTECTOMY, LAPAROSCOPIC;  Surgeon: Remy Xiong MD;  Location: [facility] OR;  Service: General;  Laterality: N/A;  gianni video confirmed 8/5/dme    LYMPH NODE DISSECTION  11/08/2012    pelvic     LYSIS OF ADHESIONS  2015    MOUTH SURGERY      all teeth extracted    PERCUTANEOUS NEPHROLITHOTOMY Left 2020    Procedure: NEPHROLITHOTOMY, PERCUTANEOUS;  Surgeon: CHAD Bo MD;  Location: [facility] OR;  Service: Urology;  Laterality: Left;  OR 9 ONLY  RN PREOP 2020----COVID NEGATIVE ON     PERCUTANEOUS NEPHROLITHOTOMY Bilateral 2022    Procedure: BILATERAL PERCUTANEOUS NEPHROSCOPY, BILATERAL ANTEGRADE PYELOGRAM, BILATERAL NEPHROSTOMY TUBE PLACEMENT;  Surgeon: CHAD Bo MD;  Location: [facility] OR;  Service: Urology;  Laterality: Bilateral;  room 9 only  RN PREOP 2022----T/S IN AM    PERCUTANEOUS NEPHROSTOMY Left 2018    PERCUTANEOUS REPLACEMENT OF NEPHROSTOMY TUBE Bilateral 5/10/2022    Procedure: REPLACEMENT, NEPHROSTOMY TUBE, PERCUTANEOUS;  Surgeon: Chino Vo MD;  Location: [facility] CATH LAB;  Service: Radiology;  Laterality: Bilateral;    RADICAL CYSTECTOMY  2012    RADIOFREQUENCY THERMOCOAGULATION  2014    median branch lumbar    RADIOFREQUENCY THERMOCOAGULATION  2014    median branch lumbar    REPAIR, HERNIA, PARASTOMAL, LAPAROSCOPIC  2017    ROBOTIC BRONCHOSCOPY N/A 2023    Procedure: ROBOTIC BRONCHOSCOPY;  Surgeon: Camila Rodriguez MD;  Location: [address];  Service: Anesthesiology;  Laterality: N/A;    SMALL INTESTINE SURGERY  2017    urosotomy bag  2011    R abdomen     Family History   Adopted: Yes   Family history unknown: Yes     Social History     Socioeconomic History    Marital status:    Occupational History    Occupation: [employer]     Employer: [employer]   Tobacco Use    Smoking status: Former     Current packs/day: 0.00     Average packs/day: 0.3 packs/day for 42.0 years (12.6 ttl pk-yrs)     Types: Cigarettes     Start date: 1975     Quit date: 2017     Years since quittin.8    Smokeless tobacco: Never    Tobacco comments:     in cessation program   Substance and Sexual  Activity    Alcohol use: Not Currently     Alcohol/week: 14.0 - 21.0 standard drinks of alcohol     Types: 14 - 21 Cans of beer per week     Comment: occassional    Drug use: Yes     Types: Marijuana     Comment: occass    Sexual activity: Yes     Partners: Female     Birth control/protection: None     Social Determinants of Health     Financial Resource Strain: Low Risk  (7/22/2023)    Overall Financial Resource Strain (CARDIA)     Difficulty of Paying Living Expenses: Not hard at all   Food Insecurity: No Food Insecurity (7/22/2023)    Hunger Vital Sign     Worried About Running Out of Food in the Last Year: Never true     Ran Out of Food in the Last Year: Never true   Transportation Needs: No Transportation Needs (7/22/2023)    PRAPARE - Transportation     Lack of Transportation (Medical): No     Lack of Transportation (Non-Medical): No   Physical Activity: Inactive (7/22/2023)    Exercise Vital Sign     Days of Exercise per Week: 0 days     Minutes of Exercise per Session: 0 min   Stress: Stress Concern Present (7/22/2023)    [employer] - Occupational Stress Questionnaire     Feeling of Stress : Very much   Social Connections: Unknown (7/22/2023)    Social Connection and Isolation Panel [NHANES]     Frequency of Communication with Friends and Family: Patient refused     Frequency of Social Gatherings with Friends and Family: Patient refused     Attends Tenriism Services: Never     Active Member of Clubs or Organizations: No     Attends Club or Organization Meetings: Never     Marital Status:    Housing Stability: Low Risk  (7/22/2023)    Housing Stability Vital Sign     Unable to Pay for Housing in the Last Year: No     Number of Places Lived in the Last Year: 1     Unstable Housing in the Last Year: No       Current Outpatient Medications:     albuterol (PROVENTIL/VENTOLIN HFA) 90 mcg/actuation inhaler, INHALE 2 PUFFS INTO THE LUNGS EVERY 6 HOURS AS NEEDED WHEEZING. RESCUE,  Disp: 17 g, Rfl: 1    albuterol-ipratropium (DUO-NEB) 2.5 mg-0.5 mg/3 mL nebulizer solution, Take 3 mLs by nebulization every 6 (six) hours as needed for Wheezing. Rescue, Disp: 150 mL, Rfl: 6    allopurinoL (ZYLOPRIM) 100 MG tablet, Take 1 tablet (100 mg total) by mouth once daily., Disp: 90 tablet, Rfl: 0    apixaban (ELIQUIS) 5 mg Tab, Take 1 tablet (5 mg total) by mouth 2 (two) times daily., Disp: 60 tablet, Rfl: 0    aspirin (ECOTRIN) 81 MG EC tablet, Take 81 mg by mouth once daily., Disp: , Rfl:     calcitRIOL (ROCALTROL) 0.25 MCG Cap, Take 1 capsule (0.25 mcg total) by mouth once daily., Disp: 90 capsule, Rfl: 0    carvediloL (COREG) 6.25 MG tablet, Take 1 tablet (6.25 mg total) by mouth 2 (two) times daily with meals., Disp: 180 tablet, Rfl: 0    clobetasoL (TEMOVATE) 0.05 % cream, Apply topically 2 (two) times daily., Disp: 30 g, Rfl: 1    EScitalopram oxalate (LEXAPRO) 20 MG tablet, Take 1 tablet (20 mg total) by mouth once daily., Disp: 90 tablet, Rfl: 0    fluticasone-umeclidin-vilanter (TRELEGY ELLIPTA) 100-62.5-25 mcg DsDv, Inhale 1 puff into the lungs once daily. Rinse mouth after use., Disp: 60 each, Rfl: 2    gabapentin (NEURONTIN) 400 MG capsule, Take 1 capsule (400 mg total) by mouth 2 (two) times daily., Disp: 180 capsule, Rfl: 0    HYDROcodone-acetaminophen (NORCO)  mg per tablet, Take 1 tablet by mouth every 6 (six) hours as needed for Pain., Disp: 12 tablet, Rfl: 0    levothyroxine (SYNTHROID) 125 MCG tablet, Take 1 tablet (125 mcg total) by mouth once daily., Disp: 90 tablet, Rfl: 0    levothyroxine (SYNTHROID) 88 MCG tablet, TAKE 1 TABLET(88 MCG) BY MOUTH EVERY DAY, Disp: 90 tablet, Rfl: 0    multivitamin (THERAGRAN) per tablet, Take 1 tablet by mouth once daily., Disp: , Rfl:     nitroGLYCERIN (NITROSTAT) 0.4 MG SL tablet, Place 1 tablet (0.4 mg total) under the tongue every 5 (five) minutes as needed for Chest pain., Disp: 30 tablet, Rfl: 3    pantoprazole (PROTONIX) 40 MG tablet,  Take 1 tablet (40 mg total) by mouth once daily., Disp: 90 tablet, Rfl: 0    pravastatin (PRAVACHOL) 20 MG tablet, Take 1 tablet (20 mg total) by mouth once daily., Disp: 90 tablet, Rfl: 0    sodium bicarbonate 325 MG tablet, Take 325 mg by mouth 2 (two) times daily., Disp: , Rfl:     sucralfate (CARAFATE) 1 gram tablet, Take 1 tablet (1 g total) by mouth 2 (two) times daily., Disp: 120 tablet, Rfl: 1    tamsulosin (FLOMAX) 0.4 mg Cap, Take 1 capsule (0.4 mg total) by mouth once daily., Disp: 90 capsule, Rfl: 0    tiZANidine (ZANAFLEX) 4 MG tablet, Take 1 tablet (4 mg total) by mouth every 8 (eight) hours., Disp: 30 tablet, Rfl: 1    traZODone (DESYREL) 150 MG tablet, Take 1 tablet (150 mg total) by mouth every evening., Disp: 90 tablet, Rfl: 0    REVIEW OF SYSTEMS:  General: No fevers or chills; ENT: No sore throat; Allergy and Immunology: no persistent infections; Hematological and Lymphatic: No history of bleeding or easy bruising; Endocrine: negative; Respiratory: no cough, shortness of breath, or wheezing; Cardiovascular: no chest pain or dyspnea on exertion; Gastrointestinal: no abdominal pain/back, change in bowel habits, or bloody stools; Genito-Urinary: no dysuria, trouble voiding, or hematuria; Musculoskeletal: negative; Neurological: no TIA or stroke symptoms; Psychiatric: no nervousness, anxiety or depression.    PHYSICAL EXAM:      Pulse: 72         General appearance:  Alert, well-appearing, and in no distress.  Oriented to person, place, and time                    Neurological: Normal speech, no focal findings noted; CN II - XII grossly intact. RLE with sensation to light touch, LLE with sensation to light touch.            Musculoskeletal: Digits/nail without cyanosis/clubbing.  Strength 5/5 BLE.                    Neck: Supple, no significant adenopathy, no carotid bruit can be auscultated                  Chest:  Clear to auscultation, no wheezes, rales or rhonchi, symmetric air entry. No use of  "accessory muscles               Cardiac: Normal rate and regular rhythm, S1 and S2 normal            Abdomen: Soft, nontender, nondistended, no masses or organomegaly, no hernia     No rebound tenderness noted; bowel sounds normal     No groin adenopathy      Extremities:   2+ R femoral pulse, 2+ L femoral pulse     2+ R popliteal pulse, 2+ L popliteal pulse     2+ R PT pulse, 1+ L PT pulse     2+ R DP pulse, 1+ L DP pulse     no RLE edema, no LLE edema    Skin: RLE without tissue loss; LLE without tissue loss    LAB RESULTS:  No results found for: "CBC"  Lab Results   Component Value Date    LABPROT 10.8 08/18/2023    INR 1.0 08/18/2023     Lab Results   Component Value Date     10/30/2023    K 5.2 (H) 10/30/2023     10/30/2023    CO2 27 10/30/2023    GLU 94 10/30/2023    BUN 49 (H) 10/30/2023    CREATININE 3.0 (H) 10/30/2023    CALCIUM 10.1 10/30/2023    ANIONGAP 11 10/30/2023    EGFRNONAA 23.8 (A) 07/25/2022     Lab Results   Component Value Date    WBC 5.80 10/30/2023    RBC 4.52 (L) 10/30/2023    HGB 14.0 10/30/2023    HCT 43.8 10/30/2023    MCV 97 10/30/2023    MCH 31.0 10/30/2023    MCHC 32.0 10/30/2023    RDW 12.7 10/30/2023     10/30/2023    MPV 10.9 10/30/2023    GRAN 4.1 10/30/2023    GRAN 70.6 10/30/2023    LYMPH 1.0 10/30/2023    LYMPH 17.6 (L) 10/30/2023    MONO 0.5 10/30/2023    MONO 7.8 10/30/2023    EOS 0.2 10/30/2023    BASO 0.05 10/30/2023    EOSINOPHIL 2.9 10/30/2023    BASOPHIL 0.9 10/30/2023    DIFFMETHOD Automated 10/30/2023     .  Lab Results   Component Value Date    HGBA1C 5.4 05/06/2020       IMAGING:  All pertinent imaging has been reviewed and interpreted independently.    CT A/P without contrast 11/2018, 4/2017, 7/2019 reviewed:  3 cm R ANTHONY aneurysm, stable in size.    CT non con 4/2021 - 3.2 cm R ANTHONY aneurysm    CT Renal stone ABD Pel WO 04/21/23:  FINDINGS:  Imaged lung bases are clear.  Base of the heart is normal in size without significant pericardial fluid " noting multi-vessel coronary arterial and aortic annular calcifications.     Remote cholecystectomy.  No biliary ductal dilatation.  Liver is normal in size with homogeneous parenchymal attenuation.  Spleen is normal in size noting several scattered punctate parenchymal calcifications likely sequela of prior granulomatous disease.  Noncontrast appearance of the pancreas, stomach, duodenum and bilateral adrenal glands are within normal limits.  Few small accessory splenules noted.     Bilateral kidneys are stable in overall size, shape and location with grossly similar areas of cortical thinning and scarring and renal cortical lobulation with overall mild atrophy.  Advanced bilateral renal atherosclerotic vascular calcifications noted.  There are additional several scattered subcentimeter suspected nephroliths at each kidney.  No hydronephrosis.  There is relatively symmetric bilateral nonspecific perinephric stranding similar to prior.  Few scattered subcentimeter hypoattenuating cortical foci at each kidney which are too small to characterize.     Remote operative change of cystectomy with right lower quadrant ileal conduit.  The bilateral ureters are not significantly dilated.     No pelvic mass seen.  Few scattered pelvic phleboliths noted.     Appendix is not identified; however, no right lower quadrant inflammatory change.  Several scattered colonic diverticula without diverticulitis.  No evidence of bowel obstruction or acute inflammation.  No pneumatosis or portal venous gas.     No ascites, free air or lymphadenopathy by CT criteria.     Scattered advanced/severe calcific atherosclerosis of the abdominal aorta extending into its mesenteric and iliac branches.  Abdominal aorta is ectatic but nonaneurysmal.  Grossly stable fusiform aneurysmal dilatation of the right common iliac artery measuring up to 3.4 cm.     Operative changes of right lower quadrant parastomal and bilateral inguinal hernia repair similar  to prior.     Osseous structures appear stable without acute process seen.     Impression:     1. Suspected bilateral nonobstructing nephrolithiasis.  Otherwise no acute process seen within the abdomen or pelvis on this noncontrast study.  2. Remote operative change of cystectomy with right lower quadrant ileal conduit.  Resolution of previous right-sided hydronephrosis.  3. Prior cholecystectomy.  4. Diverticulosis coli.  5. 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  6. Grossly stable additional findings as above.       IMP/PLAN:  63 y.o. male with   Patient Active Problem List   Diagnosis    Insomnia    Lumbar facet arthropathy    Sacroiliitis    Spondylosis without myelopathy    DDD (degenerative disc disease)    History of bladder cancer    Hydronephrosis, bilateral    H/O primary malignant neoplasm of urinary bladder    Essential hypertension    Anemia of chronic disease    Moderate protein malnutrition    Chronic gout    Renal insufficiency    Body mass index (BMI) 20.0-20.9, adult    Personal history of bladder cancer    Acquired hypothyroidism    Hypoxemia    Severe malnutrition    Hydronephrosis    History of primary malignant neoplasm of urinary bladder    Hydronephrosis with urinary obstruction due to ureteral calculus    Chest pain    Centrilobular emphysema    Epigastric abdominal tenderness without rebound tenderness    S/P colonoscopy    Iliac artery aneurysm    Kidney stones    Hypertriglyceridemia    Nephrolithiasis    Kidney stone on left side    Hyperparathyroidism    Encounter for screening for malignant neoplasm of respiratory organs    Personal history of nicotine dependence    Lung nodule    Coronary artery calcification    Symptomatic cholelithiasis    Presence of urostomy    Obstructive chronic bronchitis without exacerbation    Chronic deep vein thrombosis (DVT) of axillary vein of right upper extremity    PVD  (peripheral vascular disease)    Gross hematuria    Anxiety    Atrial tachycardia    SVT (supraventricular tachycardia)    being managed by PCP and specialists who is here today for evaluation of R ANTHONY aneurysm.    -Stable 3.3 cm R ANTHONY aneurysm (2.9 cm 4/2017), asymptomatic; CKD stage 3, GFR 29 with extensive intraabdominal open surgical history with complications from ileal conduit and stent erosion into colon and severe SHAFFER - risks outweigh benefits of obtaining contrasted imaging at this time, if aneurysm increases in size >3.5cm he would benefit from a CTA for possible endovascular vs open repair  -Will perform routine surveillance with repeat CT A/P non contrast in 6 months   -Rec daily ASA, BP control and cont smoking cessation  -Obtain CT A/P non contrast ASAP, will call with results and further discussion treatment plan.    ** Plan discussed with Dr. Adame.    I spent 30 minutes evaluating this patient and greater than 50% of the time was spent counseling, coordinator care and discussing the plan of care.  All questions were answered and patient stated understanding with agreement with the above treatment plan.    Montse Rubi NP  Vascular and Endovascular Surgery

## 2023-12-15 DIAGNOSIS — R91.1 PULMONARY NODULE: Primary | ICD-10-CM

## 2023-12-26 ENCOUNTER — HOSPITAL ENCOUNTER (EMERGENCY)
Facility: OTHER | Age: 63
Discharge: HOME | End: 2023-12-26
Attending: EMERGENCY MEDICINE
Payer: MEDICARE

## 2023-12-26 VITALS
DIASTOLIC BLOOD PRESSURE: 89 MMHG | OXYGEN SATURATION: 94 % | HEART RATE: 94 BPM | HEIGHT: 72 IN | BODY MASS INDEX: 20.32 KG/M2 | TEMPERATURE: 98 F | RESPIRATION RATE: 18 BRPM | SYSTOLIC BLOOD PRESSURE: 142 MMHG | WEIGHT: 150 LBS

## 2023-12-26 DIAGNOSIS — M25.552 LEFT HIP PAIN: Primary | ICD-10-CM

## 2023-12-26 DIAGNOSIS — I72.3 ILIAC ARTERY ANEURYSM: ICD-10-CM

## 2023-12-26 PROCEDURE — 25000003 PHARM REV CODE 250: Performed by: NURSE PRACTITIONER

## 2023-12-26 PROCEDURE — 99284 EMERGENCY DEPT VISIT MOD MDM: CPT | Mod: 25

## 2023-12-26 RX ORDER — HYDROCODONE BITARTRATE AND ACETAMINOPHEN 5; 325 MG/1; MG/1
1 TABLET ORAL 4 TIMES DAILY
Qty: 12 TABLET | Refills: 0 | Status: SHIPPED | OUTPATIENT
Start: 2023-12-26 | End: 2023-12-29

## 2023-12-26 RX ORDER — HYDROCODONE BITARTRATE AND ACETAMINOPHEN 5; 325 MG/1; MG/1
1 TABLET ORAL
Status: COMPLETED | OUTPATIENT
Start: 2023-12-26 | End: 2023-12-26

## 2023-12-26 RX ORDER — ORPHENADRINE CITRATE 100 MG/1
100 TABLET, EXTENDED RELEASE ORAL
Status: COMPLETED | OUTPATIENT
Start: 2023-12-26 | End: 2023-12-26

## 2023-12-26 RX ORDER — LIDOCAINE 50 MG/G
1 PATCH TOPICAL
Status: DISCONTINUED | OUTPATIENT
Start: 2023-12-26 | End: 2023-12-26 | Stop reason: HOSPADM

## 2023-12-26 RX ADMIN — ORPHENADRINE CITRATE 100 MG: 100 TABLET, EXTENDED RELEASE ORAL at 12:12

## 2023-12-26 RX ADMIN — HYDROCODONE BITARTRATE AND ACETAMINOPHEN 1 TABLET: 5; 325 TABLET ORAL at 12:12

## 2023-12-26 RX ADMIN — LIDOCAINE 1 PATCH: 50 PATCH CUTANEOUS at 12:12

## 2023-12-26 NOTE — ED PROVIDER NOTES
Source of History:  Patient     Chief complaint:  Hip Pain (Pt reporting L hip pain x 12/22. Denies injury. Reports hx of bursitis. Denies relief with OTC medications. )      HPI:  Blake Guillory is a 63 y.o. male presenting to the emergency department with atraumatic left hip pain that began 3 weeks ago worse over the last 2 days.  Pain is worse with any movement.  He has not tried any over-the-counter medications.  Past medical history includes anemia, CAD, COPD bladder cancer with a urostomy, high cholesterol.  Patient is ambulatory but with limping gait.      This is the extent to the patients complaints today here in the emergency department.    PMH:  As per HPI and below:  Past Medical History:   Diagnosis Date    Anemia of chronic disease     Aneurysm of right common iliac artery     Arthritis     Blood transfusion     CAD (coronary artery disease)     Chest pain of uncertain etiology     CKD (chronic kidney disease), stage IV     COPD (chronic obstructive pulmonary disease)     Coronary artery calcification 8/2/2021    DDD (degenerative disc disease), lumbar     Frequency of urination     General anesthetics causing adverse effect in therapeutic use     pt states he wakes up very violently from anesthesia    GERD (gastroesophageal reflux disease)     Gout     History of bladder cancer     History of urostomy     Hydronephrosis     Hyperparathyroidism     Hypertension     Hypertriglyceridemia     Hypothyroidism (acquired)     Insomnia     Kidney stone     Limited joint range of motion right hip and leg    Lumbar facet arthropathy     Lumbar spondylosis     Mixed anxiety and depressive disorder     Neuropathy     Orchalgia     SHARMIN (obstructive sleep apnea)     Personal history of bladder cancer     PVD (peripheral vascular disease)     Sacroiliitis     Stricture or kinking of ureter     Ureteral stent displacement     Urinary retention     Vitamin D deficiency     Wears glasses      Past Surgical History:    Procedure Laterality Date    APPENDECTOMY  12/30/2015    Procedure: APPENDECTOMY;  Surgeon: CHAD Bo MD;  Location: [facility] OR;  Service: Urology;;    BALLOON DILATION OF URETER  01/22/2018    BOWEL RESECTION      COLONOSCOPY      CREATION OF URETEROILEAL CONDUIT Left 07/05/2013    CYSTOSCOPY      >1  episodes for bilat ureteral stent exchange    CYSTOSCOPY W/ URETERAL STENT PLACEMENT Right 01/22/2018    and 6/9/2015    CYSTOSCOPY W/ URETERAL STENT PLACEMENT Bilateral 10/07/2015    ESOPHAGOGASTRODUODENOSCOPY  04/12/2022    W/ BIOPSY    ESOPHAGOGASTRODUODENOSCOPY N/A 4/12/2022    Procedure: EGD (ESOPHAGOGASTRODUODENOSCOPY);  Surgeon: Tito Fan MD;  Location: [facility];  Service: Endoscopy;  Laterality: N/A;    EXPLORATORY LAPAROTOMY  07/05/2013    EXPLORATORY LAPAROTOMY W/ BOWEL RESECTION  12/30/2015    EXTRACORPOREAL SHOCK WAVE LITHOTRIPSY Right 06/07/2021    Procedure: LITHOTRIPSY, ESWL;  Surgeon: CHAD Bo MD;  Location: [facility] OR;  Service: Urology;  Laterality: Right;  RN PREOP 5/31/2021   VACCINATED    EXTRACORPOREAL SHOCK WAVE LITHOTRIPSY  06/07/2021    FRACTURE SURGERY      HAND SURGERY      HEMICOLECTOMY  12/30/2015    HIP SURGERY  2012    Rt hip septic    LAPAROSCOPIC CHOLECYSTECTOMY N/A 08/11/2021    Procedure: CHOLECYSTECTOMY, LAPAROSCOPIC;  Surgeon: Remy Xiong MD;  Location: [facility] OR;  Service: General;  Laterality: N/A;  gianni video confirmed 8/5/dme    LYMPH NODE DISSECTION  11/08/2012    pelvic    LYSIS OF ADHESIONS  12/30/2015    MOUTH SURGERY  2000    all teeth extracted    PERCUTANEOUS NEPHROLITHOTOMY Left 07/22/2020    Procedure: NEPHROLITHOTOMY, PERCUTANEOUS;  Surgeon: CHAD Bo MD;  Location: [facility] OR;  Service: Urology;  Laterality: Left;  OR 9 ONLY  RN PREOP 7/20/2020----COVID NEGATIVE ON 7/20    PERCUTANEOUS NEPHROLITHOTOMY Bilateral 6/8/2022    Procedure: BILATERAL PERCUTANEOUS NEPHROSCOPY, BILATERAL ANTEGRADE PYELOGRAM, BILATERAL NEPHROSTOMY TUBE  PLACEMENT;  Surgeon: CHAD Bo MD;  Location: [facility] OR;  Service: Urology;  Laterality: Bilateral;  room 9 only  RN PREOP 2022----T/S IN AM    PERCUTANEOUS NEPHROSTOMY Left 2018    PERCUTANEOUS REPLACEMENT OF NEPHROSTOMY TUBE Bilateral 5/10/2022    Procedure: REPLACEMENT, NEPHROSTOMY TUBE, PERCUTANEOUS;  Surgeon: Chino Vo MD;  Location: [facility] CATH LAB;  Service: Radiology;  Laterality: Bilateral;    RADICAL CYSTECTOMY  2012    RADIOFREQUENCY THERMOCOAGULATION  2014    median branch lumbar    RADIOFREQUENCY THERMOCOAGULATION  2014    median branch lumbar    REPAIR, HERNIA, PARASTOMAL, LAPAROSCOPIC  2017    ROBOTIC BRONCHOSCOPY N/A 2023    Procedure: ROBOTIC BRONCHOSCOPY;  Surgeon: Camila Rodriguez MD;  Location: [address];  Service: Anesthesiology;  Laterality: N/A;    SMALL INTESTINE SURGERY  2017    urosotomy bag  2011    R abdomen       Social History     Tobacco Use    Smoking status: Former     Current packs/day: 0.00     Average packs/day: 0.3 packs/day for 42.0 years (12.6 ttl pk-yrs)     Types: Cigarettes     Start date: 1975     Quit date: 2017     Years since quittin.9    Smokeless tobacco: Never    Tobacco comments:     in cessation program   Substance Use Topics    Alcohol use: Not Currently     Alcohol/week: 14.0 - 21.0 standard drinks of alcohol     Types: 14 - 21 Cans of beer per week     Comment: occassional    Drug use: Yes     Types: Marijuana     Comment: occass       Review of patient's allergies indicates:  No Known Allergies    ROS: As per HPI and below:  General: No fever.  No chills.  Eyes: No visual changes.   ENT: No sore throat. No ear pain.  Urinary: No abnormal urination.  MSK:  Hip pain  Integument:  No rashes or lesions.       Physical Exam:    BP (!) 142/89 (BP Location: Left arm, Patient Position: Sitting)   Pulse 94   Temp 98.4 °F (36.9 °C) (Oral)   Resp 18   Ht 6' (1.829 m)   Wt 68 kg (150 lb)   SpO2  (!) 94% Comment: Hx of COPD, asthma, and emphysema  BMI 20.34 kg/m²   Vitals:    12/26/23 1053 12/26/23 1055 12/26/23 1202   BP: (!) 142/89     Pulse: 94     Resp: 19  18   Temp: 98.4 °F (36.9 °C)     TempSrc: Oral     SpO2: (!) 94%     Weight:  68 kg (150 lb)    Height:  6' (1.829 m)        Nursing note and vital signs reviewed.  Appearance: No acute distress.  Eyes: No conjunctival injection.  Extraocular muscles are intact.  ENT: Normal phonation.  Cardio:  DP +2 bilaterally.  Musculoskeletal:  Point tenderness over the lateral aspect of the left hip, there is no bruising, swelling or erythema noted.  Able to range joint but with increased pain.  No tenderness over the knee or ankle.  Skin:  No rashes seen.  Good turgor.  No abrasions.  No ecchymoses.  Mental Status:  Alert and oriented x 3.  Appropriate, conversant.        Labs Reviewed - No data to display    CT Hip Without Contrast Left   Final Result      There is a left iliac artery aneurysm.  No fracture dislocation bone destruction seen.      Diverticulosis and 2 iliac artery aneurysms.      Heterotopic ossification.         Electronically signed by: Rafael Agosto MD   Date:    12/26/2023   Time:    12:59      X-Ray Hip 2 or 3 views Left (with Pelvis when performed)   Final Result            Initial Impression/ Differential Dx:  Differential diagnosis includes, but is not limited to:    Hip arthritis, bursitis, fracture, dislocation, septic joint, referred pain from back or knee, neuropathic pain, DVT, referred pain from abdomen     Medical Decision Making  Amount and/or Complexity of Data Reviewed  Radiology: ordered.    Risk  Prescription drug management.         MDM:    63 y.o. male with left hip pain that began 3 weeks ago, atraumatic in nature.  On exam there is some point tenderness over the lateral aspect without swelling, erythema or redness.  I have a low suspicion for a septic joint or cellulitis.  X-rays reveal no acute fractures.  However due  to persistent pain CT was obtained which was also negative.  There is a known left iliac artery aneurysm who he follows up with vascular for.  There was also heterotopic ossification which could possibly be the cause of his pain as it appears where he is most point tender.  Discussed with the patient's close follow up with Orthopedics, referral was placed for the patient.         Diagnostic Impression:    1. Left hip pain    2. Iliac artery aneurysm         ED Disposition Condition    Discharge Stable            ED Prescriptions       Medication Sig Dispense Start Date End Date Auth. Provider    HYDROcodone-acetaminophen (NORCO) 5-325 mg per tablet Take 1 tablet by mouth 4 (four) times daily. for 3 days 12 tablet 12/26/2023 12/29/2023 Freda Samuels, EMA          Follow-up Information       Follow up With Specialties Details Why Contact Info    Varun Zeng MD Internal Medicine, Physical Medicine and Rehabilitation Schedule an appointment as soon as possible for a visit in 3 days  [address] [facility] [address]  [phone]      [facility] - Emergency Dept Emergency Medicine Go to  If symptoms worsen [address] [facility] [id number] [phone]               Freda Samuels, STIVENP  12/27/23 1231

## 2023-12-26 NOTE — ED TRIAGE NOTES
Pt reports to ED with c/o L hip pain x 4 days. Pt reports onset on nontraumatic L hip pain 4 days ago, pt states pain is constant but worse in certain positions. Hx of bursitis

## 2023-12-27 ENCOUNTER — TELEPHONE (OUTPATIENT)
Dept: ORTHOPEDICS | Facility: CLINIC | Age: 63
End: 2023-12-27
Payer: MEDICARE

## 2023-12-28 ENCOUNTER — TELEPHONE (OUTPATIENT)
Dept: ORTHOPEDICS | Facility: CLINIC | Age: 63
End: 2023-12-28
Payer: MEDICARE

## 2024-01-04 ENCOUNTER — TELEPHONE (OUTPATIENT)
Dept: ORTHOPEDICS | Facility: CLINIC | Age: 64
End: 2024-01-04
Payer: MEDICARE

## 2024-01-04 ENCOUNTER — OFFICE VISIT (OUTPATIENT)
Dept: ORTHOPEDICS | Facility: CLINIC | Age: 64
End: 2024-01-04
Payer: MEDICARE

## 2024-01-04 VITALS
DIASTOLIC BLOOD PRESSURE: 88 MMHG | WEIGHT: 140.63 LBS | SYSTOLIC BLOOD PRESSURE: 138 MMHG | HEART RATE: 81 BPM | HEIGHT: 72 IN | BODY MASS INDEX: 19.05 KG/M2

## 2024-01-04 DIAGNOSIS — M25.552 LEFT HIP PAIN: ICD-10-CM

## 2024-01-04 DIAGNOSIS — M25.552 ACUTE HIP PAIN, LEFT: Primary | ICD-10-CM

## 2024-01-04 PROCEDURE — 1101F PT FALLS ASSESS-DOCD LE1/YR: CPT | Mod: CPTII,S$GLB,,

## 2024-01-04 PROCEDURE — 3075F SYST BP GE 130 - 139MM HG: CPT | Mod: CPTII,S$GLB,,

## 2024-01-04 PROCEDURE — 3288F FALL RISK ASSESSMENT DOCD: CPT | Mod: CPTII,S$GLB,,

## 2024-01-04 PROCEDURE — 3079F DIAST BP 80-89 MM HG: CPT | Mod: CPTII,S$GLB,,

## 2024-01-04 PROCEDURE — 1160F RVW MEDS BY RX/DR IN RCRD: CPT | Mod: CPTII,S$GLB,,

## 2024-01-04 PROCEDURE — 99213 OFFICE O/P EST LOW 20 MIN: CPT | Mod: S$GLB,,,

## 2024-01-04 PROCEDURE — 1159F MED LIST DOCD IN RCRD: CPT | Mod: CPTII,S$GLB,,

## 2024-01-04 PROCEDURE — 99999 PR PBB SHADOW E&M-EST. PATIENT-LVL V: CPT | Mod: PBBFAC,,,

## 2024-01-04 PROCEDURE — 1125F AMNT PAIN NOTED PAIN PRSNT: CPT | Mod: CPTII,S$GLB,,

## 2024-01-04 PROCEDURE — 3008F BODY MASS INDEX DOCD: CPT | Mod: CPTII,S$GLB,,

## 2024-01-04 RX ORDER — METHYLPREDNISOLONE 4 MG/1
TABLET ORAL
Qty: 21 EACH | Refills: 0 | Status: SHIPPED | OUTPATIENT
Start: 2024-01-04 | End: 2024-01-25

## 2024-01-04 NOTE — PROGRESS NOTES
Patient ID: Blake Guillory is a 63 y.o. male    Pain of the Left Hip      History of Present Illness:    Blake Guillory presents to clinic for left hip pain. Patient denies known CEDRICK. The pain started 3 weeks ago and is becoming progressively worse.  Pain is located over (points to) posterior and lateral hip. He reports that the pain is a 8 /10 sharp and aching pain toda. The pain is affecting ADLs and limiting desired level of activity. Denies numbness, tingling, radiation and inability to bear weight. Pain is 10 /10 at its worst.     He has not tried anything for the pain.  He went to the ER and x-rays/CT was negative for any acute fracture dislocation or significant degenerative changes.  Does report pain has improved since initial onset and is here today for follow up from the ER.    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    Ambulating:  Unassisted  Diabetic: no  Smokin year hx  Hx of DVT/PE: yes, on eliquis     PAST MEDICAL HISTORY:   Past Medical History:   Diagnosis Date    Anemia of chronic disease     Aneurysm of right common iliac artery     Arthritis     Blood transfusion     CAD (coronary artery disease)     Chest pain of uncertain etiology     CKD (chronic kidney disease), stage IV     COPD (chronic obstructive pulmonary disease)     Coronary artery calcification 2021    DDD (degenerative disc disease), lumbar     Frequency of urination     General anesthetics causing adverse effect in therapeutic use     pt states he wakes up very violently from anesthesia    GERD (gastroesophageal reflux disease)     Gout     History of bladder cancer     History of urostomy     Hydronephrosis     Hyperparathyroidism     Hypertension     Hypertriglyceridemia     Hypothyroidism (acquired)     Insomnia     Kidney stone     Limited joint range of motion right hip and leg    Lumbar facet arthropathy     Lumbar spondylosis     Mixed anxiety and depressive disorder     Neuropathy     Orchalgia      SHARMIN (obstructive sleep apnea)     Personal history of bladder cancer     PVD (peripheral vascular disease)     Sacroiliitis     Stricture or kinking of ureter     Ureteral stent displacement     Urinary retention     Vitamin D deficiency     Wears glasses      PAST SURGICAL HISTORY:   Past Surgical History:   Procedure Laterality Date    APPENDECTOMY  12/30/2015    Procedure: APPENDECTOMY;  Surgeon: CHAD Bo MD;  Location: [facility] OR;  Service: Urology;;    BALLOON DILATION OF URETER  01/22/2018    BOWEL RESECTION      COLONOSCOPY      CREATION OF URETEROILEAL CONDUIT Left 07/05/2013    CYSTOSCOPY      >1  episodes for bilat ureteral stent exchange    CYSTOSCOPY W/ URETERAL STENT PLACEMENT Right 01/22/2018    and 6/9/2015    CYSTOSCOPY W/ URETERAL STENT PLACEMENT Bilateral 10/07/2015    ESOPHAGOGASTRODUODENOSCOPY  04/12/2022    W/ BIOPSY    ESOPHAGOGASTRODUODENOSCOPY N/A 4/12/2022    Procedure: EGD (ESOPHAGOGASTRODUODENOSCOPY);  Surgeon: Tito Fan MD;  Location: [facility];  Service: Endoscopy;  Laterality: N/A;    EXPLORATORY LAPAROTOMY  07/05/2013    EXPLORATORY LAPAROTOMY W/ BOWEL RESECTION  12/30/2015    EXTRACORPOREAL SHOCK WAVE LITHOTRIPSY Right 06/07/2021    Procedure: LITHOTRIPSY, ESWL;  Surgeon: CHAD Bo MD;  Location: [facility] OR;  Service: Urology;  Laterality: Right;  RN PREOP 5/31/2021   VACCINATED    EXTRACORPOREAL SHOCK WAVE LITHOTRIPSY  06/07/2021    FRACTURE SURGERY      HAND SURGERY      HEMICOLECTOMY  12/30/2015    HIP SURGERY  2012    Rt hip septic    LAPAROSCOPIC CHOLECYSTECTOMY N/A 08/11/2021    Procedure: CHOLECYSTECTOMY, LAPAROSCOPIC;  Surgeon: Remy Xiong MD;  Location: [facility] OR;  Service: General;  Laterality: N/A;  gianni video confirmed 8/5/dme    LYMPH NODE DISSECTION  11/08/2012    pelvic    LYSIS OF ADHESIONS  12/30/2015    MOUTH SURGERY  2000    all teeth extracted    PERCUTANEOUS NEPHROLITHOTOMY Left 07/22/2020    Procedure: NEPHROLITHOTOMY, PERCUTANEOUS;   Surgeon: CHAD Bo MD;  Location: [facility] OR;  Service: Urology;  Laterality: Left;  OR 9 ONLY  RN PREOP 2020----COVID NEGATIVE ON     PERCUTANEOUS NEPHROLITHOTOMY Bilateral 2022    Procedure: BILATERAL PERCUTANEOUS NEPHROSCOPY, BILATERAL ANTEGRADE PYELOGRAM, BILATERAL NEPHROSTOMY TUBE PLACEMENT;  Surgeon: CHAD Bo MD;  Location: [facility] OR;  Service: Urology;  Laterality: Bilateral;  room 9 only  RN PREOP 2022----T/S IN AM    PERCUTANEOUS NEPHROSTOMY Left 2018    PERCUTANEOUS REPLACEMENT OF NEPHROSTOMY TUBE Bilateral 5/10/2022    Procedure: REPLACEMENT, NEPHROSTOMY TUBE, PERCUTANEOUS;  Surgeon: Chino Vo MD;  Location: [facility] CATH LAB;  Service: Radiology;  Laterality: Bilateral;    RADICAL CYSTECTOMY  2012    RADIOFREQUENCY THERMOCOAGULATION  2014    median branch lumbar    RADIOFREQUENCY THERMOCOAGULATION  2014    median branch lumbar    REPAIR, HERNIA, PARASTOMAL, LAPAROSCOPIC  2017    ROBOTIC BRONCHOSCOPY N/A 2023    Procedure: ROBOTIC BRONCHOSCOPY;  Surgeon: Camila Rodriguez MD;  Location: [address];  Service: Anesthesiology;  Laterality: N/A;    SMALL INTESTINE SURGERY  2017    urosotomy bag  2011    R abdomen     FAMILY HISTORY:   Family History   Adopted: Yes   Family history unknown: Yes     SOCIAL HISTORY:   Social History     Occupational History    Occupation: [employer]     Employer: [employer]   Tobacco Use    Smoking status: Former     Current packs/day: 0.00     Average packs/day: 0.3 packs/day for 42.0 years (12.6 ttl pk-yrs)     Types: Cigarettes     Start date: 1975     Quit date: 2017     Years since quittin.9    Smokeless tobacco: Never    Tobacco comments:     in cessation program   Substance and Sexual Activity    Alcohol use: Not Currently     Alcohol/week: 14.0 - 21.0 standard drinks of alcohol     Types: 14 - 21 Cans of beer per week     Comment: occassional    Drug use: Yes      Types: Marijuana     Comment: occass    Sexual activity: Yes     Partners: Female     Birth control/protection: None        MEDICATIONS:   Current Outpatient Medications:     albuterol (PROVENTIL/VENTOLIN HFA) 90 mcg/actuation inhaler, INHALE 2 PUFFS INTO THE LUNGS EVERY 6 HOURS AS NEEDED WHEEZING. RESCUE, Disp: 17 g, Rfl: 1    albuterol-ipratropium (DUO-NEB) 2.5 mg-0.5 mg/3 mL nebulizer solution, Take 3 mLs by nebulization every 6 (six) hours as needed for Wheezing. Rescue, Disp: 150 mL, Rfl: 6    allopurinoL (ZYLOPRIM) 100 MG tablet, Take 1 tablet (100 mg total) by mouth once daily., Disp: 90 tablet, Rfl: 0    aspirin (ECOTRIN) 81 MG EC tablet, Take 81 mg by mouth once daily., Disp: , Rfl:     calcitRIOL (ROCALTROL) 0.25 MCG Cap, TAKE 1 CAPSULE(0.25 MCG) BY MOUTH EVERY DAY, Disp: 90 capsule, Rfl: 0    carvediloL (COREG) 6.25 MG tablet, Take 1 tablet (6.25 mg total) by mouth 2 (two) times daily with meals., Disp: 180 tablet, Rfl: 0    clobetasoL (TEMOVATE) 0.05 % cream, Apply topically 2 (two) times daily., Disp: 30 g, Rfl: 1    ELIQUIS 5 mg Tab, TAKE 1 TABLET(5 MG) BY MOUTH TWICE DAILY, Disp: 60 tablet, Rfl: 0    EScitalopram oxalate (LEXAPRO) 20 MG tablet, Take 1 tablet (20 mg total) by mouth once daily., Disp: 90 tablet, Rfl: 0    fluticasone-umeclidin-vilanter (TRELEGY ELLIPTA) 100-62.5-25 mcg DsDv, Inhale 1 puff into the lungs once daily. Rinse mouth after use., Disp: 60 each, Rfl: 2    gabapentin (NEURONTIN) 400 MG capsule, TAKE 1 CAPSULE(400 MG) BY MOUTH TWICE DAILY, Disp: 180 capsule, Rfl: 0    HYDROcodone-acetaminophen (NORCO)  mg per tablet, Take 1 tablet by mouth every 6 (six) hours as needed for Pain., Disp: 12 tablet, Rfl: 0    levothyroxine (SYNTHROID) 125 MCG tablet, Take 1 tablet (125 mcg total) by mouth once daily., Disp: 90 tablet, Rfl: 0    levothyroxine (SYNTHROID) 88 MCG tablet, TAKE 1 TABLET(88 MCG) BY MOUTH EVERY DAY, Disp: 90 tablet, Rfl: 0    multivitamin (THERAGRAN) per tablet, Take  1 tablet by mouth once daily., Disp: , Rfl:     nitroGLYCERIN (NITROSTAT) 0.4 MG SL tablet, Place 1 tablet (0.4 mg total) under the tongue every 5 (five) minutes as needed for Chest pain., Disp: 30 tablet, Rfl: 3    pantoprazole (PROTONIX) 40 MG tablet, Take 1 tablet (40 mg total) by mouth once daily., Disp: 90 tablet, Rfl: 0    pravastatin (PRAVACHOL) 20 MG tablet, Take 1 tablet (20 mg total) by mouth once daily., Disp: 90 tablet, Rfl: 0    sodium bicarbonate 325 MG tablet, Take 325 mg by mouth 2 (two) times daily., Disp: , Rfl:     sucralfate (CARAFATE) 1 gram tablet, Take 1 tablet (1 g total) by mouth 2 (two) times daily., Disp: 120 tablet, Rfl: 1    tamsulosin (FLOMAX) 0.4 mg Cap, Take 1 capsule (0.4 mg total) by mouth once daily., Disp: 90 capsule, Rfl: 0    tiZANidine (ZANAFLEX) 4 MG tablet, TAKE 1 TABLET(4 MG) BY MOUTH EVERY 8 HOURS, Disp: 30 tablet, Rfl: 1    traZODone (DESYREL) 150 MG tablet, Take 1 tablet (150 mg total) by mouth every evening., Disp: 90 tablet, Rfl: 0    methylPREDNISolone (MEDROL DOSEPACK) 4 mg tablet, use as directed, Disp: 21 each, Rfl: 0  ALLERGIES: Review of patient's allergies indicates:  No Known Allergies      Physical Exam     Vitals:    01/04/24 1548   BP: 138/88   Pulse: 81     Alert and oriented to person, place and time. No acute distress. Well-groomed, not ill appearing. Pupils round and reactive, normal respiratory effort, no audible wheezing.       General:  The patient is alert and oriented x 3.  Mood is pleasant.      left HIP EXAMINATION     OBSERVATION / INSPECTION  Gait:   Nonantalgic   Alignment:  Neutral   Scars:   None   Muscle atrophy: None   Effusion:  None   Warmth:  None   Leg lengths:   Equal     TENDERNESS         Trochanteric bursa   +  Piriformis    +  SI joint    -   Psoas tendon   -   Rectus insertion  -   Adductor insertion  -   Pubic symphysis  -     ROM: (* = pain)    Flexion:    120 degrees  External rotation: 40 degrees  Internal rotation with axial  load: 30 degrees  Internal rotation without axial load: 40 degrees  Abduction:  45 degrees  Adduction:   20 degrees    SPECIAL TESTS:  Pain w/ forced internal rotation (FADIR): +  Pain w/ forced external rotation (MOISES): Negative   Circumduction test:    Negative   Stinchfield test:    Negative   Log roll:      Negative   Snapping hip (internal):   Negative   Sit-up pain:     Negative   Resisted sit-up pain:    Negative   Trendelenburg test:    Negative       EXTREMITY NEURO-VASCULAR EXAMINATION:   Sensation:  Grossly intact to light touch all dermatomal regions.   Motor Function:  Fully intact motor function at hip, knee, foot and ankle    DTRs;  quadriceps and  achilles 2+.  No clonus and downgoing Babinski.    Vascular status:  DP and PT pulses 2+, brisk capillary refill, symmetric.    Skin: intact, compartments soft.    Imaging:     Left hip X-rays ordered/reviewed by me showing no evidence of fracture or dislocation. There is no obvious malalignment. No evidence of masses, lesions or foreign bodies. Surgical clips present.    CT reviewed by me showing no evidence of acute fx or dislocation, joint spaces maintained.     Assessment & Plan    Acute hip pain, left  -     methylPREDNISolone (MEDROL DOSEPACK) 4 mg tablet; use as directed  Dispense: 21 each; Refill: 0    Left hip pain  -     Ambulatory referral/consult to Orthopedics         I made the decision to obtain old records of the patient including previous notes and imaging. New imaging was ordered today of the extremity or extremities evaluated. I independently reviewed and interpreted the radiographs and/or MRIs/CT scan today as well as prior imaging.    We discussed at length different treatment options including conservative vs surgical management. These include anti-inflammatories, acetaminophen, rest, ice, heat, formal physical therapy including strengthening and stretching exercises, home exercise programs, injections, dry needling, and finally  surgical intervention.      Discussed with patient this could be arthritic flare versus GT bursitis.  Patient is not interested in any injections today and would like to trial a Medrol Dosepak.  Medrol Dosepak sent.  He is overall a poor surgical candidate for elective procedures given his past medical history.  If no improvement, he will return to clinic in at that time we may further discuss intra-articular hip injection.    Follow up:  P.r.n.  X-rays next visit:  None    All questions were answered and patient is agreeable to the above plan.

## 2024-01-05 DIAGNOSIS — I71.9 AORTIC ANEURYSM WITHOUT RUPTURE, UNSPECIFIED PORTION OF AORTA: Primary | ICD-10-CM

## 2024-09-04 NOTE — PROGRESS NOTES
Subjective:   Patient ID: Blake Guillory is a 62 y.o. male    Chief Complaint:   Chief Complaint   Patient presents with    COPD       HPI  Blake Guillory is a 62 y.o. male who presents for follow-up. He is new to me. He presents with his wife, Mrs. Luke Guillory. He presents today, for follow up visit, scheduled by the billing department to evaluate for necessity of oxygen therapy that he was prescribed several years ago. He had walk test today that shows no desaturation.    History of COPD. He has seen several pulmonary providers, most recently Dr. [place]ang on [place] [facility] [place]  Prior to that, he saw Dr. Rodriguez for suspicious lung nodule. He was planned for robotic brochoscopy but it had to be cancelled due to neck pain and stiffness which was later found to be related to Spondylosis, which has improved with treatment.    He is former smoker. Quit 2018. Smoked cigarettes for 50 years. Has chronic shortness of breath and cough. He is using Trelegy 1 puff daily.   Does have dyspnea with exertion. Does not get short of breath with usual activity around the house, doing wood work, but does so when helping his special needs son. He does get short of breath walking to the backyard, which is about 60 feet in length. He does not do much activity and does not get out of the house much.    Cough is chronic. Dark/green , chronic intermittently with specks of blood. Has not had any steroids in several years for breathing.    Continues to lose weight unintentionally, 40+ lbs in 10 years.    Currently using Trelegy daily. He uses nebulizer Duoneb. He uses it 2-3 times daily. He had Albuterol puffer but stopped using it mainly due to feeling vertigo, but realized it was not related.    CT chest from 12/6/22 reveals increasing spiculated right upper lobe opacity, with enlarging soft tissue component and new 11 mm MIRIAM nodule, not present on CT chest from 7/29/22. There is advanced emphysema.    Prior PET/CT from Nov 2021  Your MRI did not reveal any evidence of inflammation in your spinal cord     You do have a herniated disc in your cervical spine - there is an area of significant tightness that would correspond to your symptoms   Your diagnosis: cervical spondylotic myelopathy (disc in the neck pressing on the spinal cord and causing your changes in walking)     Xrays to check for neck instability     Referral to neurosurgery   *if more than 2 weeks out, you can send me a message    Follow up in 3 months    with right upper lobe mildly metabolic activity.    Walk test: 94% -> 93% -> 94%      Objective:   Vitals reviewed   Physical Exam  Constitutional:       Appearance: Normal appearance.   HENT:      Nose: Nose normal.      Mouth/Throat:      Mouth: Mucous membranes are moist.   Neck:      Comments: No palpable cervical or supraclavicular adenopathy  Cardiovascular:      Rate and Rhythm: Normal rate and regular rhythm.   Pulmonary:      Effort: Pulmonary effort is normal.      Comments: Diminished breath sounds b/l, no wheezing  Musculoskeletal:         General: Normal range of motion.   Neurological:      Mental Status: He is alert.       Assessment/Plan:     Problem List Items Addressed This Visit          Pulmonary    Centrilobular emphysema    Overview     Severe disease  COPD B  - fev1 of 38%    -Stable. Patient with significant smoking history.  48 years at 2 packs a day quit in 2017.Reports 0 hospitalizations or exacerbations in relation to breathing issues.  He denies any increase in cough or sputum production.                Current Assessment & Plan     -continue Trelegy inhalerfor control  -ordered nebulizer supplies  - Duoneb neb solution QID as needed ordered  - Combivent inh as needed ordered  - offered pulmonary rehab, but deferred for now due to complexity of getting to rehab facility  - recommended graded exercise, can get a portable peddler, goal 30 min per day 5 days a week         Lung nodule    Overview     - enlarging on serial CT's  - tobacco abuse with bladder cancer and enlarging rul               Current Assessment & Plan     - prior attempt at robotic ebus met with neck pain, found to be due to Spondylosis. Better now  - suspicion high for malignancy given clinic context  - will refer to Dr. Rodriguez for possible EBUS.         Chronic respiratory failure with hypoxia    Overview     Previously on o2, walk test shows no desaturation         Current Assessment & Plan     O2 cancellation order  placed         Obstructive chronic bronchitis without exacerbation - Primary    Relevant Orders    NEBULIZER KIT (SUPPLIES) FOR HOME USE           60 minutes of total time spent on the encounter, which includes face to face time and non-face to face time preparing to see the patient (eg, review of tests), Obtaining and/or reviewing separately obtained history, Documenting clinical information in the electronic or other health record, Independently interpreting results (not separately reported) and communicating results to the patient/family/caregiver, or Care coordination (not separately reported).

## (undated) DEVICE — GAUZE SPONGE 4X4 12PLY

## (undated) DEVICE — GOWN SMART IMP BREATHABLE XXLG

## (undated) DEVICE — SYR 10CC LUER LOCK

## (undated) DEVICE — COVER OVERHEAD SURG LT BLUE

## (undated) DEVICE — NDL ASPIRATING VIZISHOT 20-40M

## (undated) DEVICE — CATH URETERAL ANES 10FR 50CM

## (undated) DEVICE — POSITIONER IV ARMBOARD FOAM

## (undated) DEVICE — COVER SNAP KAP 26IN

## (undated) DEVICE — SYR SLIP TIP 20CC

## (undated) DEVICE — ALCOHOL BLEND 95%

## (undated) DEVICE — SCISSOR CURVED ENDOPATH 5MM

## (undated) DEVICE — SUT 2-0 VICRYL / SH (J417)

## (undated) DEVICE — SEE MEDLINE ITEM 156918

## (undated) DEVICE — INSERT CUSHIONPRONE VIEW LARGE

## (undated) DEVICE — Device

## (undated) DEVICE — BAG DRAINAGE DISP LF 600ML

## (undated) DEVICE — BLANKET UPPER BODY 78.7X29.9IN

## (undated) DEVICE — TUBING INSUFFLATION 10

## (undated) DEVICE — CHLORAPREP W TINT 26ML APPL

## (undated) DEVICE — GLOVE SURG BIOGEL LATEX SZ 7.5

## (undated) DEVICE — KIT NEPHR DLTN BLLN CATH 7FR

## (undated) DEVICE — WIRE GUIDE .035 150CM.

## (undated) DEVICE — SUT SILK 0 BLK BR FSL 18 IN

## (undated) DEVICE — SENSOR DUAL FLEX STR 150CM

## (undated) DEVICE — ADAPTER SWIVEL

## (undated) DEVICE — DILATOR VESSEL 8FR

## (undated) DEVICE — APPLICATOR CHLORAPREP ORN 26ML

## (undated) DEVICE — KIT ANTIFOG

## (undated) DEVICE — SUT MONOCRYL 4-0 PS-2

## (undated) DEVICE — TRAY FOLEY 16FR INFECTION CONT

## (undated) DEVICE — SEE MEDLINE ITEM 152622

## (undated) DEVICE — FASTENER CATH 12-22FR SM/LRG

## (undated) DEVICE — ARMCRADLE BLUE POLY FOAM

## (undated) DEVICE — SUPPORT ULNA NERVE PROTECTOR

## (undated) DEVICE — COVER HD BACK TABLE 6FT

## (undated) DEVICE — TUBING SUC UNIV W/CONN 12FT

## (undated) DEVICE — BLADE SURG CARBON STEEL SZ11

## (undated) DEVICE — SUT SILK 2.0 BLK 18

## (undated) DEVICE — CATH NEPHROSTOMY DRN 8F

## (undated) DEVICE — NEPTUNE 4 PORT MANIFOLD

## (undated) DEVICE — CATH BRONCHOSCOPE F/BF

## (undated) DEVICE — GLOVE RADIATION SIZE 8

## (undated) DEVICE — SEE L#120831

## (undated) DEVICE — CATH URTRL OPEN END STR TIP 5F

## (undated) DEVICE — KIT CATH URTRL CONE TIP 5F

## (undated) DEVICE — TROCAR ENDOPATH XCEL 5X100MM

## (undated) DEVICE — GLOVE BIOGEL PI MICRO SZ 7

## (undated) DEVICE — SEE L#95700

## (undated) DEVICE — WIRE BENTSON 035/180

## (undated) DEVICE — PACK SET UP CONVERTORS

## (undated) DEVICE — LUBRICANT SURGILUBE 2 OZ

## (undated) DEVICE — CATH ANGIO NONBRD KUMPE 5F

## (undated) DEVICE — NDL VIZISHOT 2 FLEX 19G

## (undated) DEVICE — CANISTER SUCTION 2 LTR

## (undated) DEVICE — PACK ECLIPSE SET-UP W/O DRAPE

## (undated) DEVICE — ADAPT UROLOGICAL 2-WAY STOPCK

## (undated) DEVICE — CATCHER STONE W/TUBING ADAPTER

## (undated) DEVICE — SYR ONLY LUER LOCK 20CC

## (undated) DEVICE — UNDERGLOVE BIOGEL PI SZ 6.5 LF

## (undated) DEVICE — BAG PRESSURE INFUSER 3000CC

## (undated) DEVICE — GOWN B1 X-LG X-LONG

## (undated) DEVICE — DRESSING TRANS 6X8 TEGADERM

## (undated) DEVICE — GOWN POLY REINF BRTH SLV XL

## (undated) DEVICE — SYS LABEL CORRECT MED

## (undated) DEVICE — GLOVE, SURG,LATEX FREE SZ 7

## (undated) DEVICE — SUT 0 18IN SILK BLK BRAIDE

## (undated) DEVICE — DRAPE SLUSH WARMER WITH DISC

## (undated) DEVICE — SEE MEDLINE ITEM 152467

## (undated) DEVICE — SOL IRR NACL .9% 3000ML

## (undated) DEVICE — SEE MEDLINE ITEM 157110

## (undated) DEVICE — TOWEL OR DISP STRL BLUE 4/PK

## (undated) DEVICE — NDL HYPO REG 25G X 1 1/2

## (undated) DEVICE — PENCIL GOLF STERILE

## (undated) DEVICE — CYTOSPIN COLLECTION FLUID BLT

## (undated) DEVICE — GUIDEWIRE 3CM FLEX .035IN 150

## (undated) DEVICE — SEE MEDLINE ITEM 152532

## (undated) DEVICE — BAG URINARY DRN 2000ML

## (undated) DEVICE — TROCAR ENDOPATH XCEL 11MM 10CM

## (undated) DEVICE — SHEET DRAPE FAN-FOLDED 3/4

## (undated) DEVICE — SOL IRR STRL WATER 500ML

## (undated) DEVICE — BOWL STERILE LARGE 32OZ

## (undated) DEVICE — FLOWSWITCH 12/BX

## (undated) DEVICE — BINDER ABDOMINAL 9 30-45

## (undated) DEVICE — GUIDEWIRE ZIPWIRE .035 D 150CM

## (undated) DEVICE — COLLECTION SPECIMEN NEPTUNE

## (undated) DEVICE — SLEEVE SCD EXPRESS CALF MEDIUM

## (undated) DEVICE — MAT QUICK 40X30 FLOOR FLUID LF

## (undated) DEVICE — GLOVE BIOGEL SKINSENSE PI 8.0

## (undated) DEVICE — SODIUM BICARBONATE 50 POUNDS

## (undated) DEVICE — KIT PROBE COVER WITH GEL

## (undated) DEVICE — FLOWSWITCH HP 1-W W/O LL

## (undated) DEVICE — SUT CTD VICRYL 0 UND BR SUT

## (undated) DEVICE — GLOVE BIOGEL SKINSENSE PI 6.5

## (undated) DEVICE — SUT GORETEX CV2-THX26

## (undated) DEVICE — CATH BLLN UROMAX ULT 7FR 2.3MM

## (undated) DEVICE — ELECTRODE REM PLYHSV RETURN 9

## (undated) DEVICE — NDL VIZISHOT 2 FLEX 22G

## (undated) DEVICE — CATH DILAT UROMAX-20 6F

## (undated) DEVICE — INTRODUCER ACCUSTICK RO MARKER

## (undated) DEVICE — CONTAINER SPECIMEN STRL 4OZ

## (undated) DEVICE — SEE MEDLINE ITEM 146313

## (undated) DEVICE — FORCEP JAW RADIAL PULM 2X100CM

## (undated) DEVICE — WIRE GUIDE TFLN CT SPR STFF ST

## (undated) DEVICE — PILLOW HEAD REST

## (undated) DEVICE — DRAPE THREE-QTR REINF 53X77IN

## (undated) DEVICE — SHEATH ACC HD 12/14 FRX36CM

## (undated) DEVICE — CATH ALL PURPOSE URETHRAL 14FR

## (undated) DEVICE — PACK ENDOSCOPY GENERAL

## (undated) DEVICE — SYS LABLNG CORECT MED 4 FLG

## (undated) DEVICE — STRAP CATH ELASTIC HOOK&LOOP

## (undated) DEVICE — KIT NEPHR DLTN BLLN CATH 10F

## (undated) DEVICE — BAG DRNGE DEPOT CLR 600ML

## (undated) DEVICE — SORBAFIX 30 COUNT.

## (undated) DEVICE — SET NEPH PERC 10FR 30CM

## (undated) DEVICE — SEAL SCOPE WARMER 20/BX

## (undated) DEVICE — SNAP CAP

## (undated) DEVICE — PROBE ULTRASOUND 3.3MM X 403MM

## (undated) DEVICE — TUBING MINIBORE EXTENSION

## (undated) DEVICE — DRESSING ADAPTIC TOUCH 3X2

## (undated) DEVICE — CATH BACTI GUARD 2W 20FR 30CC

## (undated) DEVICE — SEE MEDLINE ITEM 157117

## (undated) DEVICE — ADHESIVE DERMABOND ADVANCED

## (undated) DEVICE — CHLORAPREP 10.5 ML APPLICATOR

## (undated) DEVICE — CATH RE-ENTRY 24F MALECOT